# Patient Record
Sex: MALE | Race: WHITE | Employment: OTHER | URBAN - METROPOLITAN AREA
[De-identification: names, ages, dates, MRNs, and addresses within clinical notes are randomized per-mention and may not be internally consistent; named-entity substitution may affect disease eponyms.]

---

## 2017-01-01 ENCOUNTER — HOSPITAL ENCOUNTER (OUTPATIENT)
Dept: OTHER | Age: 72
Discharge: HOME | End: 2017-01-31
Attending: FAMILY MEDICINE | Admitting: FAMILY MEDICINE

## 2017-01-06 ENCOUNTER — HOSPITAL ENCOUNTER (OUTPATIENT)
Dept: WOUND CARE | Age: 72
Discharge: HOME | End: 2017-01-06
Attending: INTERNAL MEDICINE | Admitting: INTERNAL MEDICINE

## 2017-01-06 VITALS
TEMPERATURE: 98.2 F | HEART RATE: 96 BPM | SYSTOLIC BLOOD PRESSURE: 120 MMHG | RESPIRATION RATE: 16 BRPM | DIASTOLIC BLOOD PRESSURE: 76 MMHG

## 2017-01-06 DIAGNOSIS — L97.922 NON-PRESSURE CHRONIC ULCER OF LEFT LOWER LEG WITH FAT LAYER EXPOSED (HCC): Primary | ICD-10-CM

## 2017-01-12 LAB
POC INR: 1.6 INDEX
PROTHROMBIN TIME, POC: 18.6 SECONDS (ref 10–14.3)

## 2017-01-13 ENCOUNTER — HOSPITAL ENCOUNTER (OUTPATIENT)
Dept: WOUND CARE | Age: 72
Discharge: HOME | End: 2017-01-13
Attending: INTERNAL MEDICINE | Admitting: INTERNAL MEDICINE

## 2017-01-13 VITALS
HEART RATE: 77 BPM | DIASTOLIC BLOOD PRESSURE: 73 MMHG | SYSTOLIC BLOOD PRESSURE: 128 MMHG | TEMPERATURE: 97.8 F | RESPIRATION RATE: 16 BRPM

## 2017-01-13 DIAGNOSIS — L97.922 NON-PRESSURE CHRONIC ULCER OF LEFT LOWER LEG WITH FAT LAYER EXPOSED (HCC): ICD-10-CM

## 2017-01-19 LAB
POC INR: 2.6 INDEX
PROTHROMBIN TIME, POC: 31.3 SECONDS (ref 10–14.3)

## 2017-01-20 ENCOUNTER — HOSPITAL ENCOUNTER (OUTPATIENT)
Dept: WOUND CARE | Age: 72
Discharge: HOME | End: 2017-01-20
Attending: INTERNAL MEDICINE | Admitting: INTERNAL MEDICINE

## 2017-01-20 VITALS
DIASTOLIC BLOOD PRESSURE: 77 MMHG | HEART RATE: 81 BPM | TEMPERATURE: 98.5 F | SYSTOLIC BLOOD PRESSURE: 123 MMHG | RESPIRATION RATE: 16 BRPM

## 2017-01-20 DIAGNOSIS — L97.922 NON-PRESSURE CHRONIC ULCER OF LEFT LOWER LEG WITH FAT LAYER EXPOSED (HCC): Primary | ICD-10-CM

## 2017-01-20 DIAGNOSIS — L98.499 DIABETES MELLITUS WITH SKIN ULCER (HCC): ICD-10-CM

## 2017-01-20 DIAGNOSIS — E11.622 DIABETES MELLITUS WITH SKIN ULCER (HCC): ICD-10-CM

## 2017-01-27 ENCOUNTER — HOSPITAL ENCOUNTER (OUTPATIENT)
Dept: WOUND CARE | Age: 72
Discharge: HOME | End: 2017-01-27
Attending: INTERNAL MEDICINE | Admitting: INTERNAL MEDICINE

## 2017-01-27 VITALS
DIASTOLIC BLOOD PRESSURE: 79 MMHG | SYSTOLIC BLOOD PRESSURE: 130 MMHG | RESPIRATION RATE: 16 BRPM | HEART RATE: 89 BPM | TEMPERATURE: 97.4 F

## 2017-01-27 DIAGNOSIS — L97.922 NON-PRESSURE CHRONIC ULCER OF LEFT LOWER LEG WITH FAT LAYER EXPOSED (HCC): ICD-10-CM

## 2017-02-01 ENCOUNTER — HOSPITAL ENCOUNTER (OUTPATIENT)
Dept: OTHER | Age: 72
Discharge: HOME | End: 2017-02-28
Attending: FAMILY MEDICINE | Admitting: FAMILY MEDICINE

## 2017-02-02 LAB
POC INR: 3.5 INDEX
PROTHROMBIN TIME, POC: 42.5 SECONDS (ref 10–14.3)

## 2017-02-03 ENCOUNTER — HOSPITAL ENCOUNTER (OUTPATIENT)
Dept: WOUND CARE | Age: 72
Discharge: HOME | End: 2017-02-03
Attending: INTERNAL MEDICINE | Admitting: INTERNAL MEDICINE

## 2017-02-03 VITALS
RESPIRATION RATE: 16 BRPM | DIASTOLIC BLOOD PRESSURE: 83 MMHG | SYSTOLIC BLOOD PRESSURE: 139 MMHG | HEART RATE: 88 BPM | TEMPERATURE: 97.3 F

## 2017-02-03 DIAGNOSIS — L97.922 NON-PRESSURE CHRONIC ULCER OF LEFT LOWER LEG WITH FAT LAYER EXPOSED (HCC): Primary | ICD-10-CM

## 2017-02-10 ENCOUNTER — HOSPITAL ENCOUNTER (OUTPATIENT)
Dept: WOUND CARE | Age: 72
Discharge: HOME | End: 2017-02-10
Attending: INTERNAL MEDICINE | Admitting: INTERNAL MEDICINE

## 2017-02-10 VITALS
RESPIRATION RATE: 18 BRPM | HEART RATE: 105 BPM | SYSTOLIC BLOOD PRESSURE: 107 MMHG | TEMPERATURE: 98.4 F | DIASTOLIC BLOOD PRESSURE: 72 MMHG

## 2017-02-10 DIAGNOSIS — L97.922 NON-PRESSURE CHRONIC ULCER OF LEFT LOWER LEG WITH FAT LAYER EXPOSED (HCC): ICD-10-CM

## 2017-02-14 ENCOUNTER — OFFICE VISIT (OUTPATIENT)
Dept: FAMILY MEDICINE CLINIC | Age: 72
End: 2017-02-14

## 2017-02-14 ENCOUNTER — CARE COORDINATOR VISIT (OUTPATIENT)
Dept: CARE COORDINATION | Age: 72
End: 2017-02-14

## 2017-02-14 VITALS
RESPIRATION RATE: 22 BRPM | DIASTOLIC BLOOD PRESSURE: 74 MMHG | BODY MASS INDEX: 37.17 KG/M2 | SYSTOLIC BLOOD PRESSURE: 126 MMHG | HEIGHT: 77 IN | WEIGHT: 314.8 LBS | OXYGEN SATURATION: 95 % | HEART RATE: 93 BPM

## 2017-02-14 DIAGNOSIS — N18.30 HYPERTENSION IN STAGE 3 CHRONIC KIDNEY DISEASE DUE TO TYPE 2 DIABETES MELLITUS (HCC): ICD-10-CM

## 2017-02-14 DIAGNOSIS — E11.622 DIABETIC SKIN ULCER ASSOCIATED WITH TYPE 2 DIABETES MELLITUS (HCC): Primary | ICD-10-CM

## 2017-02-14 DIAGNOSIS — E11.22 HYPERTENSION IN STAGE 3 CHRONIC KIDNEY DISEASE DUE TO TYPE 2 DIABETES MELLITUS (HCC): ICD-10-CM

## 2017-02-14 DIAGNOSIS — F33.3 SEVERE RECURRENT MAJOR DEPRESSIVE DISORDER WITH PSYCHOTIC FEATURES (HCC): ICD-10-CM

## 2017-02-14 DIAGNOSIS — E11.69 COMBINED HYPERLIPIDEMIA ASSOCIATED WITH TYPE 2 DIABETES MELLITUS (HCC): ICD-10-CM

## 2017-02-14 DIAGNOSIS — I12.9 HYPERTENSION IN STAGE 3 CHRONIC KIDNEY DISEASE DUE TO TYPE 2 DIABETES MELLITUS (HCC): ICD-10-CM

## 2017-02-14 DIAGNOSIS — E78.2 COMBINED HYPERLIPIDEMIA ASSOCIATED WITH TYPE 2 DIABETES MELLITUS (HCC): ICD-10-CM

## 2017-02-14 DIAGNOSIS — J44.9 CHRONIC OBSTRUCTIVE PULMONARY DISEASE, UNSPECIFIED COPD TYPE (HCC): ICD-10-CM

## 2017-02-14 DIAGNOSIS — R25.1 TREMOR OF BOTH HANDS: ICD-10-CM

## 2017-02-14 DIAGNOSIS — L98.499 DIABETIC SKIN ULCER ASSOCIATED WITH TYPE 2 DIABETES MELLITUS (HCC): Primary | ICD-10-CM

## 2017-02-14 LAB — HBA1C MFR BLD: 7.6 %

## 2017-02-14 PROCEDURE — 83036 HEMOGLOBIN GLYCOSYLATED A1C: CPT | Performed by: FAMILY MEDICINE

## 2017-02-14 PROCEDURE — 99214 OFFICE O/P EST MOD 30 MIN: CPT | Performed by: FAMILY MEDICINE

## 2017-02-14 RX ORDER — ESCITALOPRAM OXALATE 20 MG/1
20 TABLET ORAL DAILY
Qty: 90 TABLET | Refills: 3 | Status: SHIPPED | OUTPATIENT
Start: 2017-02-14 | End: 2018-01-03 | Stop reason: SDUPTHER

## 2017-02-14 RX ORDER — VALSARTAN 80 MG/1
80 TABLET ORAL DAILY
Qty: 90 TABLET | Refills: 3 | Status: SHIPPED | OUTPATIENT
Start: 2017-02-14 | End: 2018-01-03 | Stop reason: SDUPTHER

## 2017-02-14 RX ORDER — ALBUTEROL SULFATE 90 MCG
2 HFA AEROSOL WITH ADAPTER (GRAM) INHALATION EVERY 6 HOURS
Qty: 1 INHALER | Refills: 6 | Status: SHIPPED | OUTPATIENT
Start: 2017-02-14 | End: 2017-08-15 | Stop reason: ALTCHOICE

## 2017-02-14 RX ORDER — OMEPRAZOLE 20 MG/1
CAPSULE, DELAYED RELEASE ORAL
Qty: 90 CAPSULE | Refills: 3 | Status: SHIPPED | OUTPATIENT
Start: 2017-02-14 | End: 2018-01-03 | Stop reason: SDUPTHER

## 2017-02-14 RX ORDER — BUPROPION HYDROCHLORIDE 300 MG/1
300 TABLET ORAL EVERY MORNING
Qty: 90 TABLET | Refills: 3 | Status: SHIPPED | OUTPATIENT
Start: 2017-02-14 | End: 2018-01-03 | Stop reason: SDUPTHER

## 2017-02-14 RX ORDER — ATORVASTATIN CALCIUM 40 MG/1
40 TABLET, FILM COATED ORAL EVERY EVENING
Qty: 90 TABLET | Refills: 3 | Status: SHIPPED | OUTPATIENT
Start: 2017-02-14 | End: 2018-01-03 | Stop reason: SDUPTHER

## 2017-02-16 LAB
POC INR: 1.8 INDEX
PROTHROMBIN TIME, POC: 21.3 SECONDS (ref 10–14.3)

## 2017-02-17 ENCOUNTER — HOSPITAL ENCOUNTER (OUTPATIENT)
Dept: WOUND CARE | Age: 72
Discharge: HOME | End: 2017-02-17
Attending: INTERNAL MEDICINE | Admitting: INTERNAL MEDICINE

## 2017-02-17 VITALS
SYSTOLIC BLOOD PRESSURE: 143 MMHG | RESPIRATION RATE: 18 BRPM | HEART RATE: 80 BPM | TEMPERATURE: 97.6 F | DIASTOLIC BLOOD PRESSURE: 75 MMHG

## 2017-02-17 DIAGNOSIS — L97.922 NON-PRESSURE CHRONIC ULCER OF LEFT LOWER LEG WITH FAT LAYER EXPOSED (HCC): Primary | ICD-10-CM

## 2017-02-24 ENCOUNTER — HOSPITAL ENCOUNTER (OUTPATIENT)
Dept: WOUND CARE | Age: 72
Discharge: HOME | End: 2017-02-24
Attending: INTERNAL MEDICINE | Admitting: INTERNAL MEDICINE

## 2017-02-24 VITALS
HEART RATE: 100 BPM | SYSTOLIC BLOOD PRESSURE: 136 MMHG | TEMPERATURE: 98 F | DIASTOLIC BLOOD PRESSURE: 84 MMHG | RESPIRATION RATE: 16 BRPM

## 2017-02-24 DIAGNOSIS — L97.922 NON-PRESSURE CHRONIC ULCER OF LEFT LOWER LEG WITH FAT LAYER EXPOSED (HCC): Primary | ICD-10-CM

## 2017-03-01 ENCOUNTER — HOSPITAL ENCOUNTER (OUTPATIENT)
Dept: OTHER | Age: 72
Discharge: HOME | End: 2017-03-31
Attending: FAMILY MEDICINE | Admitting: FAMILY MEDICINE

## 2017-03-02 LAB
POC INR: 2 INDEX
PROTHROMBIN TIME, POC: 23.8 SECONDS (ref 10–14.3)

## 2017-03-03 ENCOUNTER — HOSPITAL ENCOUNTER (OUTPATIENT)
Dept: WOUND CARE | Age: 72
Discharge: HOME | End: 2017-03-03
Attending: INTERNAL MEDICINE | Admitting: INTERNAL MEDICINE

## 2017-03-03 VITALS
RESPIRATION RATE: 18 BRPM | SYSTOLIC BLOOD PRESSURE: 138 MMHG | DIASTOLIC BLOOD PRESSURE: 83 MMHG | TEMPERATURE: 97.6 F | HEART RATE: 98 BPM

## 2017-03-10 ENCOUNTER — HOSPITAL ENCOUNTER (OUTPATIENT)
Dept: WOUND CARE | Age: 72
Discharge: HOME | End: 2017-03-10
Attending: INTERNAL MEDICINE | Admitting: INTERNAL MEDICINE

## 2017-03-10 VITALS
DIASTOLIC BLOOD PRESSURE: 80 MMHG | SYSTOLIC BLOOD PRESSURE: 123 MMHG | HEART RATE: 80 BPM | RESPIRATION RATE: 16 BRPM | TEMPERATURE: 98.4 F

## 2017-03-10 DIAGNOSIS — L97.922 NON-PRESSURE CHRONIC ULCER OF LEFT LOWER LEG WITH FAT LAYER EXPOSED (HCC): Primary | ICD-10-CM

## 2017-03-24 ENCOUNTER — HOSPITAL ENCOUNTER (OUTPATIENT)
Dept: WOUND CARE | Age: 72
Discharge: HOME | End: 2017-03-24
Attending: INTERNAL MEDICINE | Admitting: INTERNAL MEDICINE

## 2017-03-24 VITALS
TEMPERATURE: 97.3 F | HEART RATE: 84 BPM | RESPIRATION RATE: 16 BRPM | SYSTOLIC BLOOD PRESSURE: 144 MMHG | DIASTOLIC BLOOD PRESSURE: 82 MMHG

## 2017-03-24 DIAGNOSIS — L97.922 NON-PRESSURE CHRONIC ULCER OF LEFT LOWER LEG WITH FAT LAYER EXPOSED (HCC): Primary | ICD-10-CM

## 2017-03-24 DIAGNOSIS — L98.499 DIABETES MELLITUS WITH SKIN ULCER (HCC): ICD-10-CM

## 2017-03-24 DIAGNOSIS — E11.622 DIABETES MELLITUS WITH SKIN ULCER (HCC): ICD-10-CM

## 2017-03-31 ENCOUNTER — HOSPITAL ENCOUNTER (OUTPATIENT)
Dept: WOUND CARE | Age: 72
Discharge: HOME | End: 2017-03-31
Attending: INTERNAL MEDICINE | Admitting: INTERNAL MEDICINE

## 2017-03-31 VITALS — TEMPERATURE: 98 F | DIASTOLIC BLOOD PRESSURE: 78 MMHG | SYSTOLIC BLOOD PRESSURE: 120 MMHG | HEART RATE: 90 BPM

## 2017-03-31 DIAGNOSIS — L97.922 NON-PRESSURE CHRONIC ULCER OF LEFT LOWER LEG WITH FAT LAYER EXPOSED (HCC): Primary | ICD-10-CM

## 2017-04-01 ENCOUNTER — HOSPITAL ENCOUNTER (OUTPATIENT)
Dept: OTHER | Age: 72
Discharge: HOME | End: 2017-04-30
Attending: FAMILY MEDICINE | Admitting: FAMILY MEDICINE

## 2017-04-03 LAB
POC INR: 3.4 INDEX
PROTHROMBIN TIME, POC: 40.7 SECONDS (ref 10–14.3)

## 2017-04-06 ENCOUNTER — ANTI-COAG VISIT (OUTPATIENT)
Dept: FAMILY MEDICINE CLINIC | Age: 72
End: 2017-04-06

## 2017-04-06 DIAGNOSIS — Z79.01 LONG TERM CURRENT USE OF ANTICOAGULANTS WITH INR GOAL OF 2.0-3.0: ICD-10-CM

## 2017-04-06 DIAGNOSIS — Z86.718 HISTORY OF DVT (DEEP VEIN THROMBOSIS): ICD-10-CM

## 2017-04-07 ENCOUNTER — HOSPITAL ENCOUNTER (OUTPATIENT)
Dept: WOUND CARE | Age: 72
Discharge: HOME | End: 2017-04-07
Attending: INTERNAL MEDICINE | Admitting: INTERNAL MEDICINE

## 2017-04-07 VITALS
DIASTOLIC BLOOD PRESSURE: 60 MMHG | RESPIRATION RATE: 17 BRPM | TEMPERATURE: 98.1 F | SYSTOLIC BLOOD PRESSURE: 90 MMHG | HEART RATE: 90 BPM

## 2017-04-07 DIAGNOSIS — L97.922 NON-PRESSURE CHRONIC ULCER OF LEFT LOWER LEG WITH FAT LAYER EXPOSED (HCC): ICD-10-CM

## 2017-04-14 ENCOUNTER — HOSPITAL ENCOUNTER (OUTPATIENT)
Dept: WOUND CARE | Age: 72
Discharge: HOME | End: 2017-04-14
Attending: INTERNAL MEDICINE | Admitting: INTERNAL MEDICINE

## 2017-04-14 VITALS
TEMPERATURE: 98.6 F | DIASTOLIC BLOOD PRESSURE: 77 MMHG | RESPIRATION RATE: 16 BRPM | SYSTOLIC BLOOD PRESSURE: 116 MMHG | HEART RATE: 86 BPM

## 2017-04-14 DIAGNOSIS — L97.922 NON-PRESSURE CHRONIC ULCER OF LEFT LOWER LEG WITH FAT LAYER EXPOSED (HCC): Primary | ICD-10-CM

## 2017-04-17 LAB
POC INR: 2.9 INDEX
PROTHROMBIN TIME, POC: 35.3 SECONDS (ref 10–14.3)

## 2017-04-21 ENCOUNTER — HOSPITAL ENCOUNTER (OUTPATIENT)
Dept: WOUND CARE | Age: 72
Discharge: HOME | End: 2017-04-21
Attending: INTERNAL MEDICINE | Admitting: INTERNAL MEDICINE

## 2017-04-21 VITALS
SYSTOLIC BLOOD PRESSURE: 116 MMHG | RESPIRATION RATE: 16 BRPM | TEMPERATURE: 97 F | HEART RATE: 87 BPM | DIASTOLIC BLOOD PRESSURE: 77 MMHG

## 2017-04-21 DIAGNOSIS — L97.922 NON-PRESSURE CHRONIC ULCER OF LEFT LOWER LEG WITH FAT LAYER EXPOSED (HCC): ICD-10-CM

## 2017-04-28 ENCOUNTER — HOSPITAL ENCOUNTER (OUTPATIENT)
Dept: WOUND CARE | Age: 72
Discharge: HOME | End: 2017-04-28
Attending: INTERNAL MEDICINE | Admitting: INTERNAL MEDICINE

## 2017-04-28 VITALS
HEART RATE: 65 BPM | TEMPERATURE: 97.1 F | SYSTOLIC BLOOD PRESSURE: 137 MMHG | RESPIRATION RATE: 16 BRPM | DIASTOLIC BLOOD PRESSURE: 81 MMHG

## 2017-04-28 DIAGNOSIS — L97.922 NON-PRESSURE CHRONIC ULCER OF LEFT LOWER LEG WITH FAT LAYER EXPOSED (HCC): Primary | ICD-10-CM

## 2017-05-01 ENCOUNTER — HOSPITAL ENCOUNTER (OUTPATIENT)
Dept: OTHER | Age: 72
Discharge: HOME | End: 2017-05-31
Attending: FAMILY MEDICINE | Admitting: FAMILY MEDICINE

## 2017-05-05 ENCOUNTER — HOSPITAL ENCOUNTER (OUTPATIENT)
Dept: WOUND CARE | Age: 72
Discharge: HOME | End: 2017-05-05
Attending: INTERNAL MEDICINE | Admitting: INTERNAL MEDICINE

## 2017-05-05 DIAGNOSIS — L97.922 NON-PRESSURE CHRONIC ULCER OF LEFT LOWER LEG WITH FAT LAYER EXPOSED (HCC): ICD-10-CM

## 2017-05-11 ENCOUNTER — CARE COORDINATION (OUTPATIENT)
Dept: CARE COORDINATION | Age: 72
End: 2017-05-11

## 2017-05-12 ENCOUNTER — HOSPITAL ENCOUNTER (OUTPATIENT)
Dept: WOUND CARE | Age: 72
Discharge: HOME | End: 2017-05-12
Attending: INTERNAL MEDICINE | Admitting: INTERNAL MEDICINE

## 2017-05-12 DIAGNOSIS — L97.922 NON-PRESSURE CHRONIC ULCER OF LEFT LOWER LEG WITH FAT LAYER EXPOSED (HCC): ICD-10-CM

## 2017-05-15 LAB
POC INR: 2.5 INDEX
PROTHROMBIN TIME, POC: 30.6 SECONDS (ref 10–14.3)

## 2017-05-19 ENCOUNTER — HOSPITAL ENCOUNTER (OUTPATIENT)
Dept: WOUND CARE | Age: 72
Discharge: HOME | End: 2017-05-19
Attending: INTERNAL MEDICINE | Admitting: INTERNAL MEDICINE

## 2017-05-19 VITALS
DIASTOLIC BLOOD PRESSURE: 72 MMHG | SYSTOLIC BLOOD PRESSURE: 114 MMHG | TEMPERATURE: 98.2 F | HEART RATE: 73 BPM | RESPIRATION RATE: 16 BRPM

## 2017-05-19 DIAGNOSIS — L97.922 NON-PRESSURE CHRONIC ULCER OF LEFT LOWER LEG WITH FAT LAYER EXPOSED (HCC): ICD-10-CM

## 2017-05-26 ENCOUNTER — HOSPITAL ENCOUNTER (OUTPATIENT)
Dept: WOUND CARE | Age: 72
Discharge: HOME | End: 2017-05-26
Attending: INTERNAL MEDICINE | Admitting: INTERNAL MEDICINE

## 2017-05-26 VITALS
HEART RATE: 71 BPM | RESPIRATION RATE: 16 BRPM | TEMPERATURE: 97.1 F | DIASTOLIC BLOOD PRESSURE: 76 MMHG | SYSTOLIC BLOOD PRESSURE: 123 MMHG

## 2017-05-26 DIAGNOSIS — L97.922 NON-PRESSURE CHRONIC ULCER OF LEFT LOWER LEG WITH FAT LAYER EXPOSED (HCC): ICD-10-CM

## 2017-06-01 ENCOUNTER — HOSPITAL ENCOUNTER (OUTPATIENT)
Dept: OTHER | Age: 72
Discharge: HOME | End: 2017-06-30
Attending: FAMILY MEDICINE | Admitting: FAMILY MEDICINE

## 2017-06-02 ENCOUNTER — HOSPITAL ENCOUNTER (OUTPATIENT)
Dept: WOUND CARE | Age: 72
Discharge: HOME | End: 2017-06-02
Attending: INTERNAL MEDICINE | Admitting: INTERNAL MEDICINE

## 2017-06-02 VITALS
SYSTOLIC BLOOD PRESSURE: 112 MMHG | TEMPERATURE: 97.8 F | HEART RATE: 75 BPM | DIASTOLIC BLOOD PRESSURE: 72 MMHG | RESPIRATION RATE: 16 BRPM

## 2017-06-02 DIAGNOSIS — L97.922 NON-PRESSURE CHRONIC ULCER OF LEFT LOWER LEG WITH FAT LAYER EXPOSED (HCC): ICD-10-CM

## 2017-06-09 ENCOUNTER — HOSPITAL ENCOUNTER (OUTPATIENT)
Dept: WOUND CARE | Age: 72
Discharge: HOME | End: 2017-06-09
Attending: INTERNAL MEDICINE | Admitting: INTERNAL MEDICINE

## 2017-06-09 VITALS
HEART RATE: 75 BPM | RESPIRATION RATE: 16 BRPM | SYSTOLIC BLOOD PRESSURE: 135 MMHG | TEMPERATURE: 98 F | DIASTOLIC BLOOD PRESSURE: 79 MMHG

## 2017-06-09 DIAGNOSIS — L97.922 NON-PRESSURE CHRONIC ULCER OF LEFT LOWER LEG WITH FAT LAYER EXPOSED (HCC): ICD-10-CM

## 2017-06-12 LAB
POC INR: 2.1 INDEX
PROTHROMBIN TIME, POC: 24.8 SECONDS (ref 10–14.3)

## 2017-06-16 ENCOUNTER — HOSPITAL ENCOUNTER (OUTPATIENT)
Dept: WOUND CARE | Age: 72
Discharge: HOME | End: 2017-06-16
Attending: INTERNAL MEDICINE | Admitting: INTERNAL MEDICINE

## 2017-06-16 VITALS
SYSTOLIC BLOOD PRESSURE: 111 MMHG | TEMPERATURE: 97 F | DIASTOLIC BLOOD PRESSURE: 74 MMHG | RESPIRATION RATE: 16 BRPM | HEART RATE: 86 BPM

## 2017-06-16 DIAGNOSIS — L97.922 NON-PRESSURE CHRONIC ULCER OF LEFT LOWER LEG WITH FAT LAYER EXPOSED (HCC): ICD-10-CM

## 2017-06-23 ENCOUNTER — HOSPITAL ENCOUNTER (OUTPATIENT)
Dept: WOUND CARE | Age: 72
Discharge: HOME | End: 2017-06-23
Attending: INTERNAL MEDICINE | Admitting: INTERNAL MEDICINE

## 2017-06-23 VITALS
HEART RATE: 78 BPM | TEMPERATURE: 97.6 F | SYSTOLIC BLOOD PRESSURE: 133 MMHG | DIASTOLIC BLOOD PRESSURE: 67 MMHG | RESPIRATION RATE: 16 BRPM

## 2017-06-23 DIAGNOSIS — L97.922 NON-PRESSURE CHRONIC ULCER OF LEFT LOWER LEG WITH FAT LAYER EXPOSED (HCC): ICD-10-CM

## 2017-06-23 PROCEDURE — 11042 DBRDMT SUBQ TIS 1ST 20SQCM/<: CPT | Performed by: INTERNAL MEDICINE

## 2017-06-30 ENCOUNTER — HOSPITAL ENCOUNTER (OUTPATIENT)
Dept: WOUND CARE | Age: 72
Discharge: HOME | End: 2017-06-30
Attending: INTERNAL MEDICINE | Admitting: INTERNAL MEDICINE

## 2017-06-30 VITALS
SYSTOLIC BLOOD PRESSURE: 120 MMHG | HEART RATE: 102 BPM | TEMPERATURE: 97.3 F | DIASTOLIC BLOOD PRESSURE: 76 MMHG | RESPIRATION RATE: 16 BRPM

## 2017-06-30 DIAGNOSIS — L97.922 NON-PRESSURE CHRONIC ULCER OF LEFT LOWER LEG WITH FAT LAYER EXPOSED (HCC): ICD-10-CM

## 2017-07-01 ENCOUNTER — HOSPITAL ENCOUNTER (OUTPATIENT)
Dept: OTHER | Age: 72
Discharge: HOME | End: 2017-07-31
Attending: FAMILY MEDICINE | Admitting: FAMILY MEDICINE

## 2017-07-05 ENCOUNTER — CARE COORDINATION (OUTPATIENT)
Dept: CARE COORDINATION | Age: 72
End: 2017-07-05

## 2017-07-07 ENCOUNTER — HOSPITAL ENCOUNTER (OUTPATIENT)
Dept: WOUND CARE | Age: 72
Discharge: HOME | End: 2017-07-07
Attending: INTERNAL MEDICINE | Admitting: INTERNAL MEDICINE

## 2017-07-07 ENCOUNTER — HOSPITAL ENCOUNTER (OUTPATIENT)
Dept: WOUND CARE | Age: 72
Discharge: HOME | End: 2017-07-06
Attending: INTERNAL MEDICINE | Admitting: INTERNAL MEDICINE

## 2017-07-07 VITALS
TEMPERATURE: 98.9 F | HEART RATE: 78 BPM | RESPIRATION RATE: 20 BRPM | SYSTOLIC BLOOD PRESSURE: 136 MMHG | DIASTOLIC BLOOD PRESSURE: 76 MMHG

## 2017-07-07 DIAGNOSIS — L97.922 NON-PRESSURE CHRONIC ULCER OF LEFT LOWER LEG WITH FAT LAYER EXPOSED (HCC): ICD-10-CM

## 2017-07-07 ASSESSMENT — PAIN SCALES - GENERAL: PAINLEVEL_OUTOF10: 0

## 2017-07-10 LAB
POC INR: 1.7 INDEX
PROTHROMBIN TIME, POC: 20.3 SECONDS (ref 10–14.3)

## 2017-07-14 ENCOUNTER — HOSPITAL ENCOUNTER (OUTPATIENT)
Dept: WOUND CARE | Age: 72
Discharge: HOME | End: 2017-07-14
Attending: INTERNAL MEDICINE | Admitting: INTERNAL MEDICINE

## 2017-07-14 VITALS
SYSTOLIC BLOOD PRESSURE: 138 MMHG | HEART RATE: 86 BPM | TEMPERATURE: 96.2 F | DIASTOLIC BLOOD PRESSURE: 73 MMHG | RESPIRATION RATE: 16 BRPM

## 2017-07-14 DIAGNOSIS — L97.922 NON-PRESSURE CHRONIC ULCER OF LEFT LOWER LEG WITH FAT LAYER EXPOSED (HCC): ICD-10-CM

## 2017-07-21 ENCOUNTER — HOSPITAL ENCOUNTER (OUTPATIENT)
Dept: WOUND CARE | Age: 72
Discharge: HOME | End: 2017-07-21
Attending: INTERNAL MEDICINE | Admitting: INTERNAL MEDICINE

## 2017-07-21 VITALS
SYSTOLIC BLOOD PRESSURE: 121 MMHG | HEART RATE: 80 BPM | TEMPERATURE: 97.8 F | DIASTOLIC BLOOD PRESSURE: 77 MMHG | RESPIRATION RATE: 16 BRPM

## 2017-07-21 DIAGNOSIS — L97.922 NON-PRESSURE CHRONIC ULCER OF LEFT LOWER LEG WITH FAT LAYER EXPOSED (HCC): ICD-10-CM

## 2017-07-21 ASSESSMENT — PAIN DESCRIPTION - FREQUENCY: FREQUENCY: CONTINUOUS

## 2017-07-21 ASSESSMENT — PAIN SCALES - GENERAL: PAINLEVEL_OUTOF10: 4

## 2017-07-21 ASSESSMENT — PAIN DESCRIPTION - ORIENTATION: ORIENTATION: LEFT

## 2017-07-21 ASSESSMENT — PAIN DESCRIPTION - LOCATION: LOCATION: ANKLE

## 2017-07-21 ASSESSMENT — PAIN DESCRIPTION - DESCRIPTORS: DESCRIPTORS: ACHING

## 2017-07-21 ASSESSMENT — PAIN DESCRIPTION - PAIN TYPE: TYPE: ACUTE PAIN

## 2017-07-21 ASSESSMENT — PAIN DESCRIPTION - ONSET: ONSET: ON-GOING

## 2017-07-28 ENCOUNTER — HOSPITAL ENCOUNTER (OUTPATIENT)
Dept: WOUND CARE | Age: 72
Discharge: HOME | End: 2017-07-28
Attending: INTERNAL MEDICINE | Admitting: INTERNAL MEDICINE

## 2017-07-28 VITALS
SYSTOLIC BLOOD PRESSURE: 122 MMHG | DIASTOLIC BLOOD PRESSURE: 78 MMHG | HEART RATE: 88 BPM | RESPIRATION RATE: 16 BRPM | TEMPERATURE: 97.1 F

## 2017-07-28 DIAGNOSIS — L97.922 NON-PRESSURE CHRONIC ULCER OF LEFT LOWER LEG WITH FAT LAYER EXPOSED (HCC): ICD-10-CM

## 2017-08-01 ENCOUNTER — HOSPITAL ENCOUNTER (OUTPATIENT)
Dept: OTHER | Age: 72
Discharge: HOME | End: 2017-08-31
Attending: FAMILY MEDICINE | Admitting: FAMILY MEDICINE

## 2017-08-04 ENCOUNTER — HOSPITAL ENCOUNTER (OUTPATIENT)
Dept: WOUND CARE | Age: 72
Discharge: HOME | End: 2017-08-04
Attending: INTERNAL MEDICINE | Admitting: INTERNAL MEDICINE

## 2017-08-04 VITALS
TEMPERATURE: 96.7 F | RESPIRATION RATE: 16 BRPM | SYSTOLIC BLOOD PRESSURE: 105 MMHG | DIASTOLIC BLOOD PRESSURE: 69 MMHG | HEART RATE: 93 BPM

## 2017-08-04 DIAGNOSIS — L97.922 NON-PRESSURE CHRONIC ULCER OF LEFT LOWER LEG WITH FAT LAYER EXPOSED (HCC): ICD-10-CM

## 2017-08-11 ENCOUNTER — HOSPITAL ENCOUNTER (OUTPATIENT)
Dept: WOUND CARE | Age: 72
Discharge: HOME | End: 2017-08-11
Attending: INTERNAL MEDICINE | Admitting: INTERNAL MEDICINE

## 2017-08-11 VITALS
TEMPERATURE: 97.6 F | RESPIRATION RATE: 16 BRPM | DIASTOLIC BLOOD PRESSURE: 73 MMHG | SYSTOLIC BLOOD PRESSURE: 111 MMHG | HEART RATE: 91 BPM

## 2017-08-11 DIAGNOSIS — L97.922 NON-PRESSURE CHRONIC ULCER OF LEFT LOWER LEG WITH FAT LAYER EXPOSED (HCC): ICD-10-CM

## 2017-08-14 LAB
POC INR: 2.1 INDEX
PROTHROMBIN TIME, POC: 25.1 SECONDS (ref 10–14.3)

## 2017-08-15 ENCOUNTER — OFFICE VISIT (OUTPATIENT)
Dept: FAMILY MEDICINE CLINIC | Age: 72
End: 2017-08-15

## 2017-08-15 VITALS
DIASTOLIC BLOOD PRESSURE: 74 MMHG | OXYGEN SATURATION: 97 % | SYSTOLIC BLOOD PRESSURE: 118 MMHG | HEIGHT: 77 IN | RESPIRATION RATE: 18 BRPM | WEIGHT: 303 LBS | HEART RATE: 75 BPM | BODY MASS INDEX: 35.78 KG/M2

## 2017-08-15 DIAGNOSIS — K59.09 OTHER CONSTIPATION: ICD-10-CM

## 2017-08-15 DIAGNOSIS — Z79.01 LONG TERM CURRENT USE OF ANTICOAGULANTS WITH INR GOAL OF 2.0-3.0: ICD-10-CM

## 2017-08-15 LAB — HBA1C MFR BLD: 7 %

## 2017-08-15 PROCEDURE — 83036 HEMOGLOBIN GLYCOSYLATED A1C: CPT | Performed by: FAMILY MEDICINE

## 2017-08-15 PROCEDURE — 99213 OFFICE O/P EST LOW 20 MIN: CPT | Performed by: FAMILY MEDICINE

## 2017-08-15 RX ORDER — WARFARIN SODIUM 7.5 MG/1
TABLET ORAL
Qty: 90 TABLET | Refills: 2 | Status: SHIPPED | OUTPATIENT
Start: 2017-08-15 | End: 2018-01-03 | Stop reason: SDUPTHER

## 2017-08-15 RX ORDER — POLYETHYLENE GLYCOL 3350 17 G/17G
POWDER, FOR SOLUTION ORAL
Qty: 30 EACH | Refills: 5 | Status: SHIPPED | OUTPATIENT
Start: 2017-08-15 | End: 2018-01-03 | Stop reason: SDUPTHER

## 2017-08-18 ENCOUNTER — HOSPITAL ENCOUNTER (OUTPATIENT)
Dept: WOUND CARE | Age: 72
Discharge: HOME | End: 2017-08-18
Attending: INTERNAL MEDICINE | Admitting: INTERNAL MEDICINE

## 2017-08-18 VITALS
HEART RATE: 75 BPM | DIASTOLIC BLOOD PRESSURE: 75 MMHG | TEMPERATURE: 98.2 F | RESPIRATION RATE: 18 BRPM | SYSTOLIC BLOOD PRESSURE: 118 MMHG

## 2017-08-18 DIAGNOSIS — L97.922 NON-PRESSURE CHRONIC ULCER OF LEFT LOWER LEG WITH FAT LAYER EXPOSED (HCC): ICD-10-CM

## 2017-08-25 ENCOUNTER — HOSPITAL ENCOUNTER (OUTPATIENT)
Dept: WOUND CARE | Age: 72
Discharge: HOME | End: 2017-08-25
Attending: INTERNAL MEDICINE | Admitting: INTERNAL MEDICINE

## 2017-08-25 DIAGNOSIS — L97.922 NON-PRESSURE CHRONIC ULCER OF LEFT LOWER LEG WITH FAT LAYER EXPOSED (HCC): ICD-10-CM

## 2017-09-01 ENCOUNTER — HOSPITAL ENCOUNTER (OUTPATIENT)
Dept: OTHER | Age: 72
Discharge: HOME | End: 2017-09-30
Attending: FAMILY MEDICINE | Admitting: FAMILY MEDICINE

## 2017-09-01 ENCOUNTER — HOSPITAL ENCOUNTER (OUTPATIENT)
Dept: WOUND CARE | Age: 72
Discharge: HOME | End: 2017-09-01
Attending: INTERNAL MEDICINE | Admitting: INTERNAL MEDICINE

## 2017-09-01 VITALS
SYSTOLIC BLOOD PRESSURE: 112 MMHG | TEMPERATURE: 97.5 F | RESPIRATION RATE: 16 BRPM | DIASTOLIC BLOOD PRESSURE: 75 MMHG | HEART RATE: 103 BPM

## 2017-09-08 ENCOUNTER — HOSPITAL ENCOUNTER (OUTPATIENT)
Dept: WOUND CARE | Age: 72
Discharge: HOME | End: 2017-09-08
Attending: INTERNAL MEDICINE | Admitting: INTERNAL MEDICINE

## 2017-09-08 VITALS
DIASTOLIC BLOOD PRESSURE: 74 MMHG | HEART RATE: 94 BPM | RESPIRATION RATE: 16 BRPM | TEMPERATURE: 97.3 F | SYSTOLIC BLOOD PRESSURE: 109 MMHG

## 2017-09-08 DIAGNOSIS — L97.922 NON-PRESSURE CHRONIC ULCER OF LEFT LOWER LEG WITH FAT LAYER EXPOSED (HCC): ICD-10-CM

## 2017-09-15 ENCOUNTER — HOSPITAL ENCOUNTER (OUTPATIENT)
Dept: WOUND CARE | Age: 72
Discharge: HOME | End: 2017-09-15
Attending: INTERNAL MEDICINE | Admitting: INTERNAL MEDICINE

## 2017-09-15 VITALS
DIASTOLIC BLOOD PRESSURE: 67 MMHG | SYSTOLIC BLOOD PRESSURE: 101 MMHG | TEMPERATURE: 97.6 F | HEART RATE: 98 BPM | RESPIRATION RATE: 16 BRPM

## 2017-09-18 LAB
POC INR: 3.6 INDEX
PROTHROMBIN TIME, POC: 43.1 SECONDS (ref 10–14.3)

## 2017-09-22 ENCOUNTER — HOSPITAL ENCOUNTER (OUTPATIENT)
Dept: WOUND CARE | Age: 72
Discharge: HOME | End: 2017-09-22
Attending: INTERNAL MEDICINE | Admitting: INTERNAL MEDICINE

## 2017-09-22 VITALS
SYSTOLIC BLOOD PRESSURE: 129 MMHG | DIASTOLIC BLOOD PRESSURE: 82 MMHG | HEART RATE: 99 BPM | TEMPERATURE: 97.2 F | RESPIRATION RATE: 18 BRPM

## 2017-09-22 DIAGNOSIS — L97.922 NON-PRESSURE CHRONIC ULCER OF LEFT LOWER LEG WITH FAT LAYER EXPOSED (HCC): ICD-10-CM

## 2017-09-29 ENCOUNTER — HOSPITAL ENCOUNTER (OUTPATIENT)
Dept: WOUND CARE | Age: 72
Discharge: HOME | End: 2017-09-29
Attending: INTERNAL MEDICINE | Admitting: INTERNAL MEDICINE

## 2017-09-29 VITALS
RESPIRATION RATE: 16 BRPM | DIASTOLIC BLOOD PRESSURE: 77 MMHG | SYSTOLIC BLOOD PRESSURE: 121 MMHG | TEMPERATURE: 98 F | HEART RATE: 81 BPM

## 2017-09-29 DIAGNOSIS — L97.922 NON-PRESSURE CHRONIC ULCER OF LEFT LOWER LEG WITH FAT LAYER EXPOSED (HCC): ICD-10-CM

## 2017-10-01 ENCOUNTER — HOSPITAL ENCOUNTER (OUTPATIENT)
Dept: OTHER | Age: 72
Discharge: HOME | End: 2017-10-31
Attending: FAMILY MEDICINE | Admitting: FAMILY MEDICINE

## 2017-10-06 ENCOUNTER — HOSPITAL ENCOUNTER (OUTPATIENT)
Dept: WOUND CARE | Age: 72
Discharge: HOME | End: 2017-10-06
Attending: INTERNAL MEDICINE | Admitting: INTERNAL MEDICINE

## 2017-10-06 VITALS
SYSTOLIC BLOOD PRESSURE: 137 MMHG | TEMPERATURE: 98.2 F | HEART RATE: 91 BPM | DIASTOLIC BLOOD PRESSURE: 70 MMHG | RESPIRATION RATE: 18 BRPM

## 2017-10-06 DIAGNOSIS — L97.922 NON-PRESSURE CHRONIC ULCER OF LEFT LOWER LEG WITH FAT LAYER EXPOSED (HCC): ICD-10-CM

## 2017-10-06 NOTE — IP AVS SNAPSHOT
After Visit Summary  (Discharge Instructions)    Medication List for Home    Based on the information you provided to us as well as any changes during this visit, the following is your updated medication list.  Compare this with your prescription bottles at home. If you have any questions or concerns, contact your primary care physician's office. Daily Medication List (This medication list can be shared with any healthcare provider who is helping you manage your medications)      ASK your doctor about these medications if you have questions        Last Dose    Next Dose Due AM NOON PM NIGHT    ARIPiprazole 5 MG tablet   Commonly known as:  ABILIFY   Take 10 mg by mouth daily                                         aspirin EC 81 MG EC tablet   Take 81 mg by mouth daily. atorvastatin 40 MG tablet   Commonly known as:  LIPITOR   Take 1 tablet by mouth every evening                                         BLOOD GLUCOSE TEST STRIPS Strp   Please give contour testing strips to test blood sugar 4x daily                                         ALISHA CONTOUR TEST strip   Generic drug:  glucose blood VI test strips   USE AS DIRECTED TO TEST BLOOD SUGAR FOUR TIMES DAILY                                         glucose blood VI test strips strip   Commonly known as:  ALISHA CONTOUR TEST   1 each by In Vitro route 4 times daily As needed. buPROPion 300 MG extended release tablet   Commonly known as:  WELLBUTRIN XL   Take 1 tablet by mouth every morning                                         clotrimazole-betamethasone 1-0.05 % cream   Commonly known as:  LOTRISONE   Apply topically 2 times daily. clotrimazole-betamethasone 1-0.05 % cream   Commonly known as:  LOTRISONE   Apply topically when you change your dressing as needed for irritation/redness. Appointment with Yoko Enrique MD at [address] [place] [address] ([phone])   Please arrive 15 minutes prior to appointment, bring photo ID and insurance card.   [address]         Preventive Care        Date Due    One-time abdominal aortic aneurism (AAA) screening is recommended for all men between the age of 73-68 who have ever smoked 1945    Hepatitis C screening is recommended for all adults regardless of risk factors born between [facility] at least once (lifetime) who have never been tested. 1945    Zoster Vaccine 11/30/2006    Eye Exam By An Eye Doctor 4/28/2016    Cholesterol Screening 5/5/2016    Diabetic Foot Exam 9/2/2016    Yearly Flu Vaccine (1) 9/1/2017    Hemoglobin A1C (Test For Long-Term Glucose Control) 8/15/2018    Colonoscopy 8/4/2019    Tetanus Combination Vaccine (2 - Td) 9/1/2026                 Care Plan Once You Return Home    This section includes instructions you will need to follow once you leave the hospital.  Your care team will discuss these with you, so you and those caring for you know how to best care for your health needs at home. This section may also include educational information about certain health topics that may be of help to you. Discharge Instructions       Discharge Instructions         Elevate legs to the level of the heart or above for 30 minutes 4-5 times a day and/or        when sitting.                              Wound orders 10/6/17      DO NOT  PUT LOTRISONE AROUND RACIEL WOUND TODAY      WOUND CARE:LEFT medial leg wound (DIABETIC ULCERS)   -  Apply Enluxtra, 4x4, conform, tape.  apply double  tubigrip D or Juxta Lite   May use lotrimizol cream at home to periwound.      Culture obtained 12/2/16      Arterial duplex study to pablo lower legs--negative for issues at this time     GET [address]      Your dressing care supplies will be ordered through [employer].  If

## 2017-10-06 NOTE — IP AVS SNAPSHOT
* PEN NEEDLES 31GX5/16\" 31G X 8 MM Misc   USE AS DIRECTED       * PEN NEEDLES 31GX5/16\" 31G X 8 MM Misc   Inject 1 each into the skin 4 times daily as needed (to check blood sugars)       polyethylene glycol Pack packet   Commonly known as:  MIRALAX   Take 17g by mouth two times per week to daily as needed for constipation       Spacer/Aero-Holding Larnell Boga   1 Device by Does not apply route daily as needed. tobramycin-dexamethasone 0.3-0.1 % ophthalmic suspension   Commonly known as:  TOBRADEX       valsartan 80 MG tablet   Commonly known as:  DIOVAN   Take 1 tablet by mouth daily       * warfarin 5 MG tablet   Commonly known as:  COUMADIN   TAKE ONE (1) TABLET BY MOUTH ONCE DAILY EXCEPT TAKE ONE & ONE-HALF (1&1/2) TABLETS BY MOUTH ON WEDNESDAYS       * warfarin 7.5 MG tablet   Commonly known as:  COUMADIN   Indications: 7.5 on WED. and 5mg all other days       * Notice: This list has 11 medication(s) that are the same as other medications prescribed for you. Read the directions carefully, and ask your doctor or other care provider to review them with you.

## 2017-10-06 NOTE — PLAN OF CARE
Problem: Wound:  Intervention: Assess pain status  See flow sheet  Intervention: Assess wound size, appearance and drainage  See flow sheet  Intervention: Doppler if unable to palpate pedal pulse  See flow sheet

## 2017-10-06 NOTE — IP AVS SNAPSHOT
Patient Information     Patient Name KENRICK Corrigan 1945      WARFARIN INFORMATION       What is the most important information you should know about warfarin? Warfarin is a medicine that you take to prevent blood clots. It is often called a blood thinner. Doctors give warfarin (such as Coumadin) to reduce the risk of blood clots. Warfarin/Coumadin Instructions:  ? It is important to take your anticoagulant medication as instructed, and notify your physician if you are unable to for any reason. ? Complete any blood tests ordered for monitoring your medication; such as PT/INR, so your physician can adjust the dose if necessary. ? Eat a consistent amount of foods with Vitamin K, such as leafy greens. ? Avoid major changes in your dietary habits, or notify your health professional before changing habits. ? Follow up with your health care provider or clinic as directed by your physician for monitoring your anticoagulant medication. ? Diet and medication can affect your anticoagulant and PT/INR blood test.   ? Do not take or discontinue any medication or over-the-counter medication except on the advice of your physician or pharmacist.   ? Anticoagulants can increase the risk of bleeding.

## 2017-10-07 NOTE — PROGRESS NOTES
glucose blood VI test strips (ALISHA CONTOUR TEST) strip 1 each by In Vitro route 4 times daily As needed. 100 strip 11    insulin glargine (LANTUS SOLOSTAR) 100 UNIT/ML injection pen 65 units morning and 60 units nightly (dose decrease as of 6/21/2016) 15 Pen 5    LANTUS SOLOSTAR 100 UNIT/ML injection pen INJECT 70 UNITS SUBCUTANEOUSLY EVERY MORNING & SIXTY-FIVE (65) UNITS NIGHTLY 15 Pen 4    clotrimazole-betamethasone (LOTRISONE) 1-0.05 % cream Apply topically when you change your dressing as needed for irritation/redness. 45 g 1    Insulin Pen Needle (PEN NEEDLES 31GX5/16\") 31G X 8 MM MISC Inject 1 each into the skin 4 times daily as needed (to check blood sugars) 100 each 5    ARIPiprazole (ABILIFY) 5 MG tablet Take 10 mg by mouth daily       clotrimazole-betamethasone (LOTRISONE) 1-0.05 % cream Apply topically 2 times daily. 45 g 1    ALISHA CONTOUR TEST strip USE AS DIRECTED TO TEST BLOOD SUGAR FOUR TIMES DAILY 100 strip 5    Insulin Pen Needle (PEN NEEDLES 31GX5/16\") 31G X 8 MM MISC USE AS DIRECTED 100 each 1    Glucose Blood (BLOOD GLUCOSE TEST STRIPS) STRP Please give contour testing strips to test blood sugar 4x daily 200 strip 3    Spacer/Aero-Holding Chambers NGOZI 1 Device by Does not apply route daily as needed. 1 Device 0    tobramycin-dexamethasone (TOBRADEX) ophthalmic suspension Place 1 drop into both eyes 2 times daily as needed.  aspirin EC 81 MG EC tablet Take 81 mg by mouth daily. No current facility-administered medications on file prior to encounter. REVIEW OF SYSTEMS    Pertinent items are noted in HPI. Constitutional: Negative for systemic symptoms including fever, chills and malaise. Objective:      /70   Pulse 91   Temp 98.2 °F (36.8 °C) (Temporal)   Resp 18     PHYSICAL EXAM      General: The patient is in no acute distress. Mental status:  Patient is appropriate, is  oriented to place and plan of care.   Dermatologic exam: Visual inspection 0 10/6/2017 10:44 AM   Undermining Maxium Distance (cm) 0 10/6/2017 10:44 AM   Wound Assessment Red 10/6/2017 10:44 AM   Margins Defined edges 10/6/2017 10:44 AM   Raciel-wound Assessment Dark edges; Red;Black; Maceration 10/6/2017 10:44 AM   Non-staged Wound Description Full thickness 10/6/2017 10:44 AM   Pink%Wound Bed 0 10/6/2017 10:44 AM   Red%Wound Bed 100 10/6/2017 10:44 AM   Yellow%Wound Bed 0 10/6/2017 10:44 AM   Black%Wound Bed 0 10/6/2017 10:44 AM   Purple%Wound Bed 0 10/6/2017 10:44 AM   Other%Wound Bed 0 10/6/2017 10:44 AM   Drainage Amount Moderate 10/6/2017 10:44 AM   Drainage Description Sanguinous 10/6/2017 10:44 AM   Odor Mild 10/6/2017 10:44 AM   Debridement per physician Partial thickness 10/6/2017 11:21 AM   Time out Yes 2/20/2015 10:51 AM   Procedural Pain 0 11/7/2014  9:25 AM   Post procedural Pain 0 4/18/2014  8:55 AM   Number of days: 1653       Percent of Wound(s) Debrided: approximately 100%    Total  Area  Debrided:  0.6 sq cm     Bleeding:  Minimal    Hemostasis Achieved:  by pressure    Procedural Pain:  0  / 10     Post Procedural Pain:  0 / 10     Response to treatment:  Well tolerated by patient. Wound(s) is unchanged. Next week, I will send him with a surgical scrub brush and have him brush the wound gently for about 20 seconds each time he is in the shower. I would like to remove some of the slough and debris regularly while at home as an attempt to keep the wound bed \"\".       Plan:       Discharge Instructions         Discharge Instructions         Elevate legs to the level of the heart or above for 30 minutes 4-5 times a day and/or        when sitting.                              Wound orders 10/6/17      DO NOT  PUT LOTRISONE AROUND RACIEL WOUND TODAY      WOUND CARE:LEFT medial leg wound (DIABETIC ULCERS)   - When available- before each dressing change- gently scrub wound with surgical scrub brush during shower then- Apply Enluxtra, 4x4, conform, tape.  apply double  tubigrip D or Juxta Lite   May use lotrimizol cream at home to periwound.      Culture obtained 12/2/16      Arterial duplex study to pablo lower legs--negative for issues at this time     GET [address]  Your dressing care supplies will be ordered through [employer]. If you have questions or need to reorder your supplies please contact our [employer] representative [address] [place] [address] at [phone].         Follow up with Stanton Gibson in 1 weeks Friday in wound clinic      Call [phone] for any questions or concerns.        Physicians signature_____________________________      Date__________  Time________             Treatment Note Wound 03/29/13 Diabetic Ulcer Ankle Left; Inner;Distal # 2a (onset 1978) Left Medial Proximal Ankle-Dressing/Treatment: 4x4 (enluxtra, 4x4, conform, tape, tubi D x2,)    Written Patient Dismissal Instructions Given            Electronically signed by Lovett Fabry, MD on 10/7/2017 at 8:42 AM

## 2017-10-13 ENCOUNTER — HOSPITAL ENCOUNTER (OUTPATIENT)
Dept: WOUND CARE | Age: 72
Discharge: HOME | End: 2017-10-13
Attending: INTERNAL MEDICINE | Admitting: INTERNAL MEDICINE

## 2017-10-13 VITALS
SYSTOLIC BLOOD PRESSURE: 138 MMHG | DIASTOLIC BLOOD PRESSURE: 85 MMHG | TEMPERATURE: 98 F | HEART RATE: 101 BPM | RESPIRATION RATE: 18 BRPM

## 2017-10-13 DIAGNOSIS — L97.922 NON-PRESSURE CHRONIC ULCER OF LEFT LOWER LEG WITH FAT LAYER EXPOSED (HCC): Primary | ICD-10-CM

## 2017-10-13 DIAGNOSIS — E11.622 DIABETES MELLITUS WITH SKIN ULCER (HCC): ICD-10-CM

## 2017-10-13 DIAGNOSIS — L98.499 DIABETES MELLITUS WITH SKIN ULCER (HCC): ICD-10-CM

## 2017-10-13 PROCEDURE — 11042 DBRDMT SUBQ TIS 1ST 20SQCM/<: CPT | Performed by: INTERNAL MEDICINE

## 2017-10-13 RX ORDER — TACROLIMUS 0.3 MG/G
OINTMENT TOPICAL
Qty: 30 G | Refills: 0 | Status: SHIPPED | OUTPATIENT
Start: 2017-10-13 | End: 2019-09-09 | Stop reason: SDUPTHER

## 2017-10-13 RX ORDER — TACROLIMUS 0.3 MG/G
30 OINTMENT TOPICAL 2 TIMES DAILY
COMMUNITY
End: 2019-12-19 | Stop reason: ALTCHOICE

## 2017-10-13 NOTE — PROGRESS NOTES
HISTORY    Family History   Problem Relation Age of Onset    Cancer Father      prostate    Heart Disease Father     High Blood Pressure Father     High Cholesterol Father     Cancer Brother     Diabetes Brother     Thyroid Disease Brother        SOCIAL HISTORY    Social History   Substance Use Topics    Smoking status: Former Smoker     Packs/day: 2.00     Years: 30.00     Types: Cigarettes     Quit date: 5/28/1993    Smokeless tobacco: Current User     Types: Chew      Comment: chew--30 years. Reviewed 9/24/2015    Alcohol use 0.0 oz/week      Comment: 12-pack beer weekly; 5 cups coffee daily       ALLERGIES    Allergies   Allergen Reactions    Cavilon Durable Barrier [Mineral Oil-Dimeth-Coconut Oil]     Parabens Hives    Prinivil [Lisinopril] Swelling    Cortisone Rash    Dilaudid [Hydromorphone Hcl] Rash    Penicillins Rash    Sulfamethoxazole-Trimethoprim Nausea Only    Tape [Adhesive Tape] Rash       MEDICATIONS    Current Outpatient Prescriptions on File Prior to Encounter   Medication Sig Dispense Refill    polyethylene glycol (MIRALAX) PACK packet Take 17g by mouth two times per week to daily as needed for constipation 30 each 5    warfarin (COUMADIN) 7.5 MG tablet Indications: 7.5 on WED.  and 5mg all other days 90 tablet 2    warfarin (COUMADIN) 5 MG tablet TAKE ONE (1) TABLET BY MOUTH ONCE DAILY EXCEPT TAKE ONE & ONE-HALF (1&1/2) TABLETS BY MOUTH ON WEDNESDAYS 100 tablet 5    buPROPion (WELLBUTRIN XL) 300 MG extended release tablet Take 1 tablet by mouth every morning 90 tablet 3    valsartan (DIOVAN) 80 MG tablet Take 1 tablet by mouth daily 90 tablet 3    atorvastatin (LIPITOR) 40 MG tablet Take 1 tablet by mouth every evening 90 tablet 3    insulin lispro (HUMALOG KWIKPEN) 100 UNIT/ML pen 2- 14 units with each meal three times per day 15 Pen 5    Liraglutide (VICTOZA) 18 MG/3ML SOPN SC injection Inject 1.2 mg into the skin daily 3 Pen 3    escitalopram (LEXAPRO) 20 MG tablet Mental status:  Patient is appropriate, is  oriented to place and plan of care. Dermatologic exam: Visual inspection of the periwound reveals the skin to be dry, coarse and scaly  Wound exam: see wound description below in procedure note      Assessment:     Problem List Items Addressed This Visit     Diabetes mellitus with skin ulcer ([address]    WC-Non-pressure chronic ulcer of left lower leg with fat layer exposed ([address] - Primary      Other Visit Diagnoses    None. Procedure Note    Indications:  Based on my examination of this patient's wound(s) today, sharp excision into necrotic subcutaneous tissue is required to promote healing and evaluate the extent of previous healing. Performed by: Donnell Mccain MD    Consent obtained: Yes    Time out taken:  Yes    Pain Control: not needed       Debridement:Excisional Debridement    Using curette the wound(s) was/were sharply debrided down through and including the removal of subcutaneous tissue. Devitalized Tissue Debrided:  fibrin, biofilm, slough and necrotic/eschar    Pre Debridement Measurements:  Are located in the Wound Documentation Flow Sheet    All active wounds listed below with today's date are evaluated  Wound(s)    debrided this date include # : 2     Post  Debridement Measurements:  Wound 03/29/13 Diabetic Ulcer Ankle Left; Inner;Distal # 2a (onset 1978) Left Medial Proximal Ankle (Active)   Wound Type Wound 10/13/2017 10:45 AM   Dressing Status Clean;Dry; Intact 10/13/2017 11:42 AM   Dressing Changed Changed/New 10/13/2017 11:42 AM   Dressing/Treatment 4x4 10/13/2017 11:42 AM   Wound Cleansed Wound cleanser 10/13/2017 10:45 AM   Wound Length (cm) 2.8 cm 10/13/2017 11:16 AM   Wound Width (cm) 2.5 cm 10/13/2017 11:16 AM   Wound Depth (cm)  .1 10/13/2017 11:16 AM   Calculated Wound Size (cm^2) (l*w) 7 cm^2 10/13/2017 11:16 AM   Change in Wound Size % (l*w) -87607.08 10/13/2017 11:16 AM   Distance Tunneling (cm) 0 cm 10/13/2017 10:45 AM conform, tape.  apply double  tubigrip D or Juxta Lite   May use lotrimizol cream at home to periwound.      Culture obtained 12/2/16      Arterial duplex study to pablo lower legs--negative for issues at this time     GET [address]  Your dressing care supplies will be ordered through [facility]. If you have questions or need to reorder your supplies please contact our [employer] representative Audrey Zheng at [phone].         Follow up with Horatio Rinne in 1 weeks Friday in wound clinic      Call [phone] for any questions or concerns.        Physicians signature_____________________________      Date__________  Time________                Treatment Note Wound 03/29/13 Diabetic Ulcer Ankle Left; Inner;Distal # 2a (onset 1978) Left Medial Proximal Ankle-Dressing/Treatment: 4x4 (enluxtra, 4x4, conform, tape, tubi D x2,)    Written Patient Dismissal Instructions Given            Electronically signed by Allyson Desai MD on 10/13/2017 at 11:47 AM

## 2017-10-20 ENCOUNTER — HOSPITAL ENCOUNTER (OUTPATIENT)
Dept: WOUND CARE | Age: 72
Discharge: HOME | End: 2017-10-20
Attending: INTERNAL MEDICINE | Admitting: INTERNAL MEDICINE

## 2017-10-20 VITALS
SYSTOLIC BLOOD PRESSURE: 111 MMHG | RESPIRATION RATE: 18 BRPM | TEMPERATURE: 97.8 F | DIASTOLIC BLOOD PRESSURE: 73 MMHG | HEART RATE: 84 BPM

## 2017-10-20 DIAGNOSIS — L97.922 NON-PRESSURE CHRONIC ULCER OF LEFT LOWER LEG WITH FAT LAYER EXPOSED (HCC): ICD-10-CM

## 2017-10-20 NOTE — PLAN OF CARE
Problem: Wound:  Intervention: Assess pain status  See flowsheet  Intervention: Assess wound size, appearance and drainage  See flowsheet  Intervention: Doppler if unable to palpate pedal pulse  See flowsheet    Goal: Will show signs of wound healing; wound closure and no evidence of infection  Will show signs of wound healing; wound closure and no evidence of infection   Outcome: Ongoing

## 2017-10-23 NOTE — PROGRESS NOTES
mumps orchitis     as a youth    Hemorrhoids 4/23/12    Dr. Saúl Riley; repeat colonoscopy 3 years   [facility] HLD (hyperlipidemia)     Hx of Doppler ultrasound 1/06/15    Lymph node seen in left groin area. No bilateral stenosis.     Hyperlipemia     Hypertension 1992    Idiopathic chronic venous hypertension of left lower extremity with ulcer and inflammation (HCC) 10/2/2015    Leg ulcer ([address] 1978-present    following at wound center, Dr Gaby Strauss No diabetic retinopathy OU 11/12    Dr. Mac Bolus chronic ulcer of left lower leg with fat layer exposed ([address] 10/2/2015    Pulmonary embolism ([address] 11/13    patient on coumadin    Sleep apnea     doesnt always use cpap dt it drys him out    SOB (shortness of breath) Oct 2011    Stress test normal.     Tendinitis 1973    plantar tendons    Type II or unspecified type diabetes mellitus with other specified manifestations, not stated as uncontrolled 2/8/2013    Unspecified venous (peripheral) insufficiency     Urticaria        PAST SURGICAL HISTORY    Past Surgical History:   Procedure Laterality Date    BREAST SURGERY  1970s    benign tumors bilaterally    CARDIAC CATHETERIZATION  6/3/14    EF55% normal study    CARPAL TUNNEL RELEASE Left 1993    CATARACT REMOVAL Right 3/11/2013    Dr. Josh Gutierrez Left 2/25/2013    Dr. Js Calix  2006    polyps    COLONOSCOPY  11/21/11    villous component--f/u colonoscopy will be needed in 6 months    COLONOSCOPY  4/23/12    mild diverticulosis, internal hemorrhoids; repeat in 3 years (Dr. Saúl Riley)    COLONOSCOPY  08/04/2016    mild diverticulosis, three colon polyps    HERNIA REPAIR  1970s    SKIN GRAFT  1978-present    skin grafts to left ankle ulcers    SPLENECTOMY  1958    TONSILLECTOMY  1953    VARICOSE VEIN SURGERY Left 2009       FAMILY HISTORY    Family History   Problem Relation Age of Onset    Cancer Father      prostate    Heart Disease Father     High Blood Pressure Father     High Cholesterol Father     Cancer Brother     Diabetes Brother     Thyroid Disease Brother        SOCIAL HISTORY    Social History   Substance Use Topics    Smoking status: Former Smoker     Packs/day: 2.00     Years: 30.00     Types: Cigarettes     Quit date: 5/28/1993    Smokeless tobacco: Current User     Types: Chew      Comment: chew--30 years. Reviewed 9/24/2015    Alcohol use 0.0 oz/week      Comment: 12-pack beer weekly; 5 cups coffee daily       ALLERGIES    Allergies   Allergen Reactions    Cavilon Durable Barrier [Mineral Oil-Dimeth-Coconut Oil]     Parabens Hives    Prinivil [Lisinopril] Swelling    Cortisone Rash    Dilaudid [Hydromorphone Hcl] Rash    Penicillins Rash    Sulfamethoxazole-Trimethoprim Nausea Only    Tape [Adhesive Tape] Rash       MEDICATIONS    Current Outpatient Prescriptions on File Prior to Encounter   Medication Sig Dispense Refill    tacrolimus (PROTOPIC) 0.03 % ointment Apply topically 2 times daily. 30 g 0    tacrolimus (PROTOPIC) 0.03 % ointment Apply 30 g topically 2 times daily Apply topically 2 times daily.  polyethylene glycol (MIRALAX) PACK packet Take 17g by mouth two times per week to daily as needed for constipation 30 each 5    warfarin (COUMADIN) 7.5 MG tablet Indications: 7.5 on WED.  and 5mg all other days 90 tablet 2    warfarin (COUMADIN) 5 MG tablet TAKE ONE (1) TABLET BY MOUTH ONCE DAILY EXCEPT TAKE ONE & ONE-HALF (1&1/2) TABLETS BY MOUTH ON WEDNESDAYS 100 tablet 5    buPROPion (WELLBUTRIN XL) 300 MG extended release tablet Take 1 tablet by mouth every morning 90 tablet 3    valsartan (DIOVAN) 80 MG tablet Take 1 tablet by mouth daily 90 tablet 3    atorvastatin (LIPITOR) 40 MG tablet Take 1 tablet by mouth every evening 90 tablet 3    insulin lispro (HUMALOG KWIKPEN) 100 UNIT/ML pen 2- 14 units with each meal three times per day 15 Pen 5    Liraglutide (VICTOZA) 18 MG/3ML SOPN SC injection Inject 1.2 mg into the °C) (Temporal)   Resp 18     PHYSICAL EXAM      General: The patient is in no acute distress. Mental status:  Patient is appropriate, is  oriented to place and plan of care. Dermatologic exam: Visual inspection of the periwound reveals the skin to be normal in turgor and texture  Wound exam: see wound description below in procedure note      Assessment:     Problem List Items Addressed This Visit     WC-Chronic venous hypertension with ulcer involving left side ([address] - Primary    WC-Non-pressure chronic ulcer of left lower leg with fat layer exposed ([address]      Other Visit Diagnoses    None. Procedure Note    Indications:  Based on my examination of this patient's wound(s) today, sharp excision into necrotic subcutaneous tissue is required to promote healing and evaluate the extent of previous healing. Performed by: Shantanu Malik MD    Consent obtained: Yes    Time out taken:  Yes    Pain Control: none       Debridement:Excisional Debridement    Using curette the wound(s) was/were sharply debrided down through and including the removal of epidermis, dermis and subcutaneous tissue. Devitalized Tissue Debrided:  biofilm, slough and necrotic/eschar    Pre Debridement Measurements:  Are located in the Wound Documentation Flow Sheet    All active wounds listed below with today's date are evaluated  Wound(s)    debrided this date include # : 2     Post  Debridement Measurements:  Wound 03/29/13 Diabetic Ulcer Ankle Left; Inner;Distal # 2a (onset 1978) Left Medial Proximal Ankle (Active)   Wound Type Wound 10/20/2017 11:11 AM   Dressing Status Clean;Dry; Intact 10/20/2017 11:56 AM   Dressing Changed Changed/New 10/20/2017 11:56 AM   Dressing/Treatment 4x4 10/13/2017 11:42 AM   Wound Cleansed Wound cleanser 10/20/2017 11:11 AM   Wound Length (cm) 1.9 cm 10/20/2017 11:37 AM   Wound Width (cm) 1.1 cm 10/20/2017 11:37 AM   Wound Depth (cm)  .1 10/20/2017 11:37 AM   Calculated Wound Size (cm^2) (l*w) 2.09 with Enluxtra, 4x4, conform, tape.  apply double  tubigrip D or Juxta Lite   May use lotrimizol cream at home to periwound.      Culture obtained 12/2/16      Arterial duplex study to pablo lower legs--negative for issues at this time     GET [address]  Your dressing care supplies will be ordered through [facility]. If you have questions or need to reorder your supplies please contact our [employer] representative [address] [place] [address] at [phone].         Follow up with Leslie Licea in 1 weeks Friday in wound clinic      Call [phone] for any questions or concerns.        Physicians signature_____________________________      Date__________  Time________                Treatment Note Wound 03/29/13 Diabetic Ulcer Ankle Left; Inner;Distal # 2a (onset 1978) Left Medial Proximal Ankle-Dressing/Treatment:  (Enluxtra, 4x4, conform, double tubi D)    Written Patient Dismissal Instructions Given            Electronically signed by Citlaly Bryan MD on 10/22/2017 at 9:20 PM

## 2017-10-27 ENCOUNTER — HOSPITAL ENCOUNTER (OUTPATIENT)
Dept: WOUND CARE | Age: 72
Discharge: HOME | End: 2017-10-27
Attending: INTERNAL MEDICINE | Admitting: INTERNAL MEDICINE

## 2017-10-27 VITALS
RESPIRATION RATE: 18 BRPM | DIASTOLIC BLOOD PRESSURE: 71 MMHG | TEMPERATURE: 97.9 F | SYSTOLIC BLOOD PRESSURE: 110 MMHG | HEART RATE: 86 BPM

## 2017-10-27 DIAGNOSIS — L97.922 NON-PRESSURE CHRONIC ULCER OF LEFT LOWER LEG WITH FAT LAYER EXPOSED (HCC): ICD-10-CM

## 2017-10-27 NOTE — PROGRESS NOTES
Dio Enrique  AGE: 80 y.o. GENDER: male  : 1945  EPISODE DATE:  10/27/2017     Subjective:     Chief Complaint   Patient presents with    Wound Check     Left ankle          HISTORY of PRESENT ILLNESS      Dio Enrique is a 80 y.o. male who presents today for wound evaluation of Chronic venous and diabetic wound(s) of the left leg. The wound is of mild severity. The underlying cause of the wound is venous hypertension. Started tacrolimus last week, seems to be helping. There is less drainage and discharge this week. 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 Elías Costa Anemia 2011    Arm fracture Vinnynt Alexey Hanson with also yearly DM exam    Cataract     worsening-Dr. Tamara Dangelo    Cellulitis of left lower leg     Chronic venous hypertension with ulcer ([address] 2011    CKD (chronic kidney disease) 2012    Renal u/S normal     Colon polyps     Dr. Elías Costa Diabetes mellitus ([address]     Diabetes mellitus with peripheral circulatory disorder ([address] 10/2/2015    Diabetes mellitus with skin ulcer ([address] 10/2/2015    Diabetic peripheral neuropathy ([address]     + EMG, NCS    Diabetic skin ulcer associated with type 2 diabetes mellitus ([address]     Diverticulosis 12    mild, left colon    Femoral DVT (deep venous thrombosis) ([address] 2011    PARTIAL,CHRONIC-SPFLD HEART SURGEONS    GERD (gastroesophageal reflux disease)     Glaucoma     open angle-Dr. Dony Cartagena H/O cardiac catheterization 6/3/14    EF55% normal study    H/O Doppler ultrasound 03/26/15    Carotid US- no significant stenosis noted bilaterally.      H/O mumps orchitis 3    escitalopram (LEXAPRO) 20 MG tablet Take 1 tablet by mouth daily 90 tablet 3    omeprazole (PRILOSEC) 20 MG delayed release capsule TAKE ONE (1) CAPSULE BY MOUTH ONCE DAILY 90 capsule 3    glucose blood VI test strips (ALISHA CONTOUR TEST) strip 1 each by In Vitro route 4 times daily As needed. 100 strip 11    insulin glargine (LANTUS SOLOSTAR) 100 UNIT/ML injection pen 65 units morning and 60 units nightly (dose decrease as of 6/21/2016) 15 Pen 5    LANTUS SOLOSTAR 100 UNIT/ML injection pen INJECT 70 UNITS SUBCUTANEOUSLY EVERY MORNING & SIXTY-FIVE (65) UNITS NIGHTLY 15 Pen 4    clotrimazole-betamethasone (LOTRISONE) 1-0.05 % cream Apply topically when you change your dressing as needed for irritation/redness. 45 g 1    Insulin Pen Needle (PEN NEEDLES 31GX5/16\") 31G X 8 MM MISC Inject 1 each into the skin 4 times daily as needed (to check blood sugars) 100 each 5    ARIPiprazole (ABILIFY) 5 MG tablet Take 10 mg by mouth daily       clotrimazole-betamethasone (LOTRISONE) 1-0.05 % cream Apply topically 2 times daily. 45 g 1    ALISHA CONTOUR TEST strip USE AS DIRECTED TO TEST BLOOD SUGAR FOUR TIMES DAILY 100 strip 5    Insulin Pen Needle (PEN NEEDLES 31GX5/16\") 31G X 8 MM MISC USE AS DIRECTED 100 each 1    Glucose Blood (BLOOD GLUCOSE TEST STRIPS) STRP Please give contour testing strips to test blood sugar 4x daily 200 strip 3    Spacer/Aero-Holding Chambers NGOZI 1 Device by Does not apply route daily as needed. 1 Device 0    tobramycin-dexamethasone (TOBRADEX) ophthalmic suspension Place 1 drop into both eyes 2 times daily as needed.  aspirin EC 81 MG EC tablet Take 81 mg by mouth daily. No current facility-administered medications on file prior to encounter. REVIEW OF SYSTEMS    Pertinent items are noted in HPI. Constitutional: Negative for systemic symptoms including fever, chills and malaise.       Objective:      /71   Pulse 86   Temp 97.9 °F (36.6 °C) (Temporal) 4x4, conform, tape.  apply double  tubigrip D or Juxta Lite   May use lotrimizol cream at home to periwound.      Culture obtained 12/2/16      Arterial duplex study to pablo lower legs--negative for issues at this time     GET [address]  Your dressing care supplies will be ordered through [facility]. If you have questions or need to reorder your supplies please contact our [employer] representative Monique Leonel at [phone].         Follow up with Kelley Salas in 1 weeks Friday in wound clinic      Call [phone] for any questions or concerns.        Physicians signature_____________________________      Date__________  Time________                Treatment Note Wound 03/29/13 Diabetic Ulcer Ankle Left; Inner;Distal # 2a (onset 1978) Left Medial Proximal Ankle-Dressing/Treatment:  (Pateint own oint., Enluxtra, ABD, conform tape Tubi double D)    Written Patient Dismissal Instructions Given            Electronically signed by Sandro Banks MD on 10/27/2017 at 11:55 AM

## 2017-10-30 LAB
POC INR: 2.1 INDEX
PROTHROMBIN TIME, POC: 25.8 SECONDS (ref 10–14.3)

## 2017-11-01 ENCOUNTER — HOSPITAL ENCOUNTER (OUTPATIENT)
Dept: OTHER | Age: 72
Discharge: HOME | End: 2017-11-30
Attending: FAMILY MEDICINE | Admitting: FAMILY MEDICINE

## 2017-11-03 ENCOUNTER — HOSPITAL ENCOUNTER (OUTPATIENT)
Dept: WOUND CARE | Age: 72
Discharge: HOME | End: 2017-11-02
Attending: INTERNAL MEDICINE | Admitting: INTERNAL MEDICINE

## 2017-11-10 ENCOUNTER — HOSPITAL ENCOUNTER (OUTPATIENT)
Dept: WOUND CARE | Age: 72
Discharge: HOME | End: 2017-11-10
Attending: INTERNAL MEDICINE | Admitting: INTERNAL MEDICINE

## 2017-11-10 VITALS
RESPIRATION RATE: 18 BRPM | SYSTOLIC BLOOD PRESSURE: 110 MMHG | TEMPERATURE: 97.6 F | DIASTOLIC BLOOD PRESSURE: 72 MMHG | HEART RATE: 79 BPM

## 2017-11-10 DIAGNOSIS — L97.922 NON-PRESSURE CHRONIC ULCER OF LEFT LOWER LEG WITH FAT LAYER EXPOSED (HCC): ICD-10-CM

## 2017-11-10 NOTE — PROGRESS NOTES
mumps orchitis     as a youth    Hemorrhoids 4/23/12    Dr. Dorothy Adams; repeat colonoscopy 3 years   Harl Rack HLD (hyperlipidemia)     Hx of Doppler ultrasound 1/06/15    Lymph node seen in left groin area. No bilateral stenosis.     Hyperlipemia     Hypertension 1992    Idiopathic chronic venous hypertension of left lower extremity with ulcer and inflammation (HCC) 10/2/2015    Leg ulcer ([address] 1978-present    following at wound center, Dr Maikol De Jesus No diabetic retinopathy OU 11/12    Dr. Patric Mcdonald chronic ulcer of left lower leg with fat layer exposed ([address] 10/2/2015    Pulmonary embolism ([address] 11/13    patient on coumadin    Sleep apnea     doesnt always use cpap dt it drys him out    SOB (shortness of breath) Oct 2011    Stress test normal.     Tendinitis 1973    plantar tendons    Type II or unspecified type diabetes mellitus with other specified manifestations, not stated as uncontrolled 2/8/2013    Unspecified venous (peripheral) insufficiency     Urticaria        PAST SURGICAL HISTORY    Past Surgical History:   Procedure Laterality Date    BREAST SURGERY  1970s    benign tumors bilaterally    CARDIAC CATHETERIZATION  6/3/14    EF55% normal study    CARPAL TUNNEL RELEASE Left 1993    CATARACT REMOVAL Right 3/11/2013    Dr. Milena Vigil Left 2/25/2013    Dr. Blane Slaughter  2006    polyps    COLONOSCOPY  11/21/11    villous component--f/u colonoscopy will be needed in 6 months    COLONOSCOPY  4/23/12    mild diverticulosis, internal hemorrhoids; repeat in 3 years (Dr. Dorothy Adams)    COLONOSCOPY  08/04/2016    mild diverticulosis, three colon polyps    HERNIA REPAIR  1970s    SKIN GRAFT  1978-present    skin grafts to left ankle ulcers    SPLENECTOMY  1958    TONSILLECTOMY  1953    VARICOSE VEIN SURGERY Left 2009       FAMILY HISTORY    Family History   Problem Relation Age of Onset    Cancer Father      prostate    Heart Disease Father     High Blood Pressure Father     High Cholesterol Father     Cancer Brother     Diabetes Brother     Thyroid Disease Brother        SOCIAL HISTORY    Social History   Substance Use Topics    Smoking status: Former Smoker     Packs/day: 2.00     Years: 30.00     Types: Cigarettes     Quit date: 5/28/1993    Smokeless tobacco: Current User     Types: Chew      Comment: chew--30 years. Reviewed 9/24/2015    Alcohol use 0.0 oz/week      Comment: 12-pack beer weekly; 5 cups coffee daily       ALLERGIES    Allergies   Allergen Reactions    Cavilon Durable Barrier [Mineral Oil-Dimeth-Coconut Oil]     Parabens Hives    Prinivil [Lisinopril] Swelling    Cortisone Rash    Dilaudid [Hydromorphone Hcl] Rash    Penicillins Rash    Sulfamethoxazole-Trimethoprim Nausea Only    Tape [Adhesive Tape] Rash       MEDICATIONS    Current Outpatient Prescriptions on File Prior to Encounter   Medication Sig Dispense Refill    tacrolimus (PROTOPIC) 0.03 % ointment Apply topically 2 times daily. 30 g 0    tacrolimus (PROTOPIC) 0.03 % ointment Apply 30 g topically 2 times daily Apply topically 2 times daily.  polyethylene glycol (MIRALAX) PACK packet Take 17g by mouth two times per week to daily as needed for constipation 30 each 5    warfarin (COUMADIN) 7.5 MG tablet Indications: 7.5 on WED.  and 5mg all other days 90 tablet 2    warfarin (COUMADIN) 5 MG tablet TAKE ONE (1) TABLET BY MOUTH ONCE DAILY EXCEPT TAKE ONE & ONE-HALF (1&1/2) TABLETS BY MOUTH ON WEDNESDAYS 100 tablet 5    buPROPion (WELLBUTRIN XL) 300 MG extended release tablet Take 1 tablet by mouth every morning 90 tablet 3    valsartan (DIOVAN) 80 MG tablet Take 1 tablet by mouth daily 90 tablet 3    atorvastatin (LIPITOR) 40 MG tablet Take 1 tablet by mouth every evening 90 tablet 3    insulin lispro (HUMALOG KWIKPEN) 100 UNIT/ML pen 2- 14 units with each meal three times per day 15 Pen 5    Liraglutide (VICTOZA) 18 MG/3ML SOPN SC injection Inject 1.2 mg into the 0.15 cm^2 11/10/2017 11:16 AM   Change in Wound Size % (l*w) -400 11/10/2017 11:16 AM   Distance Tunneling (cm) 0 cm 11/10/2017 10:53 AM   Tunneling Position ___ O'Clock 0 11/10/2017 10:53 AM   Undermining Starts ___ O'Clock 0 11/10/2017 10:53 AM   Undermining Ends___ O'Clock 0 11/10/2017 10:53 AM   Undermining Maxium Distance (cm) 0 11/10/2017 10:53 AM   Wound Assessment Red;Yellow 11/10/2017 10:53 AM   Margins Defined edges 11/10/2017 10:53 AM   Ana-wound Assessment Dark edges 11/10/2017 10:53 AM   Non-staged Wound Description Full thickness 11/10/2017 10:53 AM   Solis%Wound Bed 0 11/10/2017 10:53 AM   Red%Wound Bed 20 11/10/2017 10:53 AM   Yellow%Wound Bed 80 11/10/2017 10:53 AM   Black%Wound Bed 0 11/10/2017 10:53 AM   Purple%Wound Bed 0 11/10/2017 10:53 AM   Other%Wound Bed 0 11/10/2017 10:53 AM   Drainage Amount Small 11/10/2017 10:53 AM   Drainage Description Serosanguinous 11/10/2017 10:53 AM   Odor Strong 11/10/2017 10:53 AM   Debridement per physician Full thickness 11/10/2017 11:16 AM   Number of days: 9901       Percent of Wound(s) Debrided: approximately 100%    Total  Area  Debrided:  0.15 sq cm     Bleeding:  minimal    Hemostasis Achieved:  by silver nitrate stick    Procedural Pain:  0  / 10     Post Procedural Pain:  0 / 10     Response to treatment:  Well tolerated by patient. Wound(s) has slightly improved. Whole area is improved. Will continue with the tacrolimus.       Plan:       Discharge Instructions         Discharge Instructions           Discharge Instructions           Discharge Instructions         Elevate legs to the level of the heart or above for 30 minutes 4-5 times a day and/or        when sitting.                              MARYCARMENXO orders 10/27/17       WOUND CARE:LEFT medial leg wound (DIABETIC ULCERS)  --APPLY DESITIN TO DAMP PORTION OF LEG OUTSIDE ENLUXTRA-- Apply small amount of protopic--rub it in--cover with Enluxtra, 4x4, conform, tape.  apply double  tubigrip D or

## 2017-11-17 ENCOUNTER — HOSPITAL ENCOUNTER (OUTPATIENT)
Dept: WOUND CARE | Age: 72
Discharge: HOME | End: 2017-11-17
Attending: INTERNAL MEDICINE | Admitting: INTERNAL MEDICINE

## 2017-11-17 VITALS
TEMPERATURE: 99.1 F | HEART RATE: 82 BPM | RESPIRATION RATE: 16 BRPM | DIASTOLIC BLOOD PRESSURE: 74 MMHG | SYSTOLIC BLOOD PRESSURE: 140 MMHG

## 2017-11-17 DIAGNOSIS — L97.922 NON-PRESSURE CHRONIC ULCER OF LEFT LOWER LEG WITH FAT LAYER EXPOSED (HCC): ICD-10-CM

## 2017-11-17 NOTE — PROGRESS NOTES
Brando Lucas  AGE: 80 y.o. GENDER: male  : 1945  EPISODE DATE:  2017     Subjective:     Chief Complaint   Patient presents with    Wound Check         HISTORY of PRESENT ILLNESS      Brando Lucas is a 80 y.o. male who presents today for wound evaluation of Chronic venous wound(s) of the left leg. The wound is of mild severity. The underlying cause of the wound is venous hypertension. He has been having slow improvement with Tacrolimus- today his mood is a little down and he fell in the AM resulting in skin tear on the right arm. 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 Bj Christensen Anemia 2011    Arm fracture Stan Jara Proffer with also yearly DM exam    Cataract     worsening-Dr. Deven Mead    Cellulitis of left lower leg     Chronic venous hypertension with ulcer ([address] 2011    CKD (chronic kidney disease) 2012    Renal u/S normal     Colon polyps     Dr. Bj Christensen Diabetes mellitus ([address]     Diabetes mellitus with peripheral circulatory disorder ([address] 10/2/2015    Diabetes mellitus with skin ulcer ([address] 10/2/2015    Diabetic peripheral neuropathy ([address]     + EMG, NCS    Diabetic skin ulcer associated with type 2 diabetes mellitus ([address]     Diverticulosis 12    mild, left colon    Femoral DVT (deep venous thrombosis) ([address] 2011    PARTIAL,CHRONIC-SPFLD HEART SURGEONS    GERD (gastroesophageal reflux disease)     Glaucoma     open angle-Dr. Rolanda Leyva H/O cardiac catheterization 6/3/14    EF55% normal study    H/O Doppler ultrasound 03/26/15    Carotid US- no significant stenosis noted bilaterally.      H/O mumps daily 3 Pen 3    escitalopram (LEXAPRO) 20 MG tablet Take 1 tablet by mouth daily 90 tablet 3    omeprazole (PRILOSEC) 20 MG delayed release capsule TAKE ONE (1) CAPSULE BY MOUTH ONCE DAILY 90 capsule 3    glucose blood VI test strips (ALISHA CONTOUR TEST) strip 1 each by In Vitro route 4 times daily As needed. 100 strip 11    insulin glargine (LANTUS SOLOSTAR) 100 UNIT/ML injection pen 65 units morning and 60 units nightly (dose decrease as of 6/21/2016) 15 Pen 5    LANTUS SOLOSTAR 100 UNIT/ML injection pen INJECT 70 UNITS SUBCUTANEOUSLY EVERY MORNING & SIXTY-FIVE (65) UNITS NIGHTLY 15 Pen 4    clotrimazole-betamethasone (LOTRISONE) 1-0.05 % cream Apply topically when you change your dressing as needed for irritation/redness. 45 g 1    Insulin Pen Needle (PEN NEEDLES 31GX5/16\") 31G X 8 MM MISC Inject 1 each into the skin 4 times daily as needed (to check blood sugars) 100 each 5    ARIPiprazole (ABILIFY) 5 MG tablet Take 10 mg by mouth daily       clotrimazole-betamethasone (LOTRISONE) 1-0.05 % cream Apply topically 2 times daily. 45 g 1    ALISHA CONTOUR TEST strip USE AS DIRECTED TO TEST BLOOD SUGAR FOUR TIMES DAILY 100 strip 5    Insulin Pen Needle (PEN NEEDLES 31GX5/16\") 31G X 8 MM MISC USE AS DIRECTED 100 each 1    Glucose Blood (BLOOD GLUCOSE TEST STRIPS) STRP Please give contour testing strips to test blood sugar 4x daily 200 strip 3    Spacer/Aero-Holding Chambers NGOZI 1 Device by Does not apply route daily as needed. 1 Device 0    tobramycin-dexamethasone (TOBRADEX) ophthalmic suspension Place 1 drop into both eyes 2 times daily as needed.  aspirin EC 81 MG EC tablet Take 81 mg by mouth daily. No current facility-administered medications on file prior to encounter. REVIEW OF SYSTEMS    Pertinent items are noted in HPI. Constitutional: Negative for systemic symptoms including fever, chills and malaise.       Objective:      BP (!) 140/74   Pulse 82   Temp 99.1 °F (37.3 °C) (Oral)   Resp 16     PHYSICAL EXAM      General: The patient is in no acute distress. Mental status:  Patient is appropriate, is  oriented to place and plan of care. Dermatologic exam: Visual inspection of the periwound reveals the skin to be normal in turgor and texture  Wound exam: see wound description below in procedure note      Assessment:     Problem List Items Addressed This Visit     WC-Chronic venous hypertension with ulcer involving left side ([address] - Primary    WC-Non-pressure chronic ulcer of left lower leg with fat layer exposed ([address]      Other Visit Diagnoses    None. Procedure Note    Indications:  Based on my examination of this patient's wound(s) today, sharp excision into necrotic epidermis is required to promote healing and evaluate the extent of previous healing. Performed by: Javon Frederick MD    Consent obtained: Yes    Time out taken:  Yes    Pain Control: none       Debridement:Non-excisional Debridement    Using curette the wound(s) was/were sharply debrided down through and including the removal of epidermis. Devitalized Tissue Debrided:  fibrin, biofilm, slough and exudate    Pre Debridement Measurements:  Are located in the Wound Documentation Flow Sheet    All active wounds listed below with today's date are evaluated  Wound(s)    debrided this date include # : 2     Post  Debridement Measurements:  Wound 03/29/13 Diabetic Ulcer Ankle Left; Inner;Distal # 2a (onset 1978) Left Medial Proximal Ankle (Active)   Wound Type Wound 11/17/2017 11:43 AM   Dressing Status Clean;Dry; Intact 11/17/2017 12:24 PM   Dressing Changed Changed/New 11/17/2017 12:24 PM   Dressing/Treatment 4x4 10/13/2017 11:42 AM   Wound Cleansed Wound cleanser;Rinsed/Irrigated with saline 11/17/2017 11:43 AM   Wound Length (cm) 0.3 cm 11/17/2017 11:47 AM   Wound Width (cm) 0.6 cm 11/17/2017 11:47 AM   Wound Depth (cm)  .2 11/17/2017 11:47 AM   Calculated Wound Size (cm^2) (l*w) 0.18 cm^2 11/17/2017 conform, tubi Dx2)  Wound 11/17/17 #5 right distal arm (onset 1 day)-Dressing/Treatment:  (mepitel, fibracol, border)    Written Patient Dismissal Instructions Given            Electronically signed by Macey Ramon MD on 11/17/2017 at 6:49 PM

## 2017-11-27 LAB
POC INR: 2.4 INDEX
PROTHROMBIN TIME, POC: 28.3 SECONDS (ref 10–14.3)

## 2017-12-01 ENCOUNTER — HOSPITAL ENCOUNTER (OUTPATIENT)
Dept: OTHER | Age: 72
Discharge: HOME | End: 2017-12-31
Attending: FAMILY MEDICINE | Admitting: FAMILY MEDICINE

## 2017-12-01 ENCOUNTER — HOSPITAL ENCOUNTER (OUTPATIENT)
Dept: WOUND CARE | Age: 72
Discharge: HOME | End: 2017-12-01
Attending: INTERNAL MEDICINE | Admitting: INTERNAL MEDICINE

## 2017-12-01 VITALS
DIASTOLIC BLOOD PRESSURE: 89 MMHG | HEART RATE: 93 BPM | SYSTOLIC BLOOD PRESSURE: 150 MMHG | RESPIRATION RATE: 16 BRPM | TEMPERATURE: 99 F

## 2017-12-01 DIAGNOSIS — L97.922 NON-PRESSURE CHRONIC ULCER OF LEFT LOWER LEG WITH FAT LAYER EXPOSED (HCC): ICD-10-CM

## 2017-12-01 RX ORDER — PEN NEEDLE, DIABETIC 31 GX5/16"
1 NEEDLE, DISPOSABLE MISCELLANEOUS 4 TIMES DAILY PRN
Qty: 100 EACH | Refills: 5 | Status: SHIPPED | OUTPATIENT
Start: 2017-12-01 | End: 2018-04-05 | Stop reason: ALTCHOICE

## 2017-12-01 NOTE — PLAN OF CARE
Problem: Wound:  Intervention: Assess pain status  See flowsheet  Intervention: Assess wound size, appearance and drainage  See flowsheet  Intervention: Doppler if unable to palpate pedal pulse  See flowsheet    Goal: Will show signs of wound healing; wound closure and no evidence of infection  Will show signs of wound healing; wound closure and no evidence of infection   See flowsheet

## 2017-12-02 NOTE — PROGRESS NOTES
Lisa Coffman  AGE: 71 y.o. GENDER: male  : 1945  EPISODE DATE:  2017     Subjective:     Chief Complaint   Patient presents with    Wound Check     left lower leg         HISTORY of PRESENT ILLNESS      Lisa Coffman is a 71 y.o. male who presents today for wound evaluation of Chronic venous wound(s) of the left leg. The wound is of mild severity. The underlying cause of the wound is venous hypertension- he is doing well with tacrolimus- no new issues. 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 Alyson Rossi Anemia 2011    Arm fracture Ria Pemberton with also yearly DM exam    Cataract     worsening-Dr. Shayan Martinez    Cellulitis of left lower leg     Chronic venous hypertension with ulcer ([address] 2011    CKD (chronic kidney disease) 2012    Renal u/S normal     Colon polyps     Dr. Alyson Rossi Diabetes mellitus ([address]     Diabetes mellitus with peripheral circulatory disorder ([address] 10/2/2015    Diabetes mellitus with skin ulcer ([address] 10/2/2015    Diabetic peripheral neuropathy ([address]     + EMG, NCS    Diabetic skin ulcer associated with type 2 diabetes mellitus ([address]     Diverticulosis 12    mild, left colon    Femoral DVT (deep venous thrombosis) ([address] 2011    PARTIAL,CHRONIC-SPFLD HEART SURGEONS    GERD (gastroesophageal reflux disease)     Glaucoma     open angle-Dr. Esvin Hilliard H/O cardiac catheterization 6/3/14    EF55% normal study    H/O Doppler ultrasound 03/26/15    Carotid US- no significant stenosis noted bilaterally.      H/O mumps orchitis     as a youth    Hemorrhoids 12    Dr. Bri Duff; repeat colonoscopy 3 years    HLD (hyperlipidemia)     Hx of Doppler ultrasound 1/06/15    Lymph node seen in left groin area. No bilateral stenosis.     Hyperlipemia     Hypertension 1992    Idiopathic chronic venous hypertension of left lower extremity with ulcer and inflammation (HCC) 10/2/2015    Leg ulcer ([facility] 1978-present    following at wound center, Dr Spike Tapia No diabetic retinopathy OU 11/12    Dr. Capps Cons chronic ulcer of left lower leg with fat layer exposed ([address] 10/2/2015    Pulmonary embolism ([address] 11/13    patient on coumadin    Sleep apnea     doesnt always use cpap dt it drys him out    SOB (shortness of breath) Oct 2011    Stress test normal.     Tendinitis 1973    plantar tendons    Type II or unspecified type diabetes mellitus with other specified manifestations, not stated as uncontrolled 2/8/2013    Unspecified venous (peripheral) insufficiency     Urticaria        PAST SURGICAL HISTORY    Past Surgical History:   Procedure Laterality Date    BREAST SURGERY  1970s    benign tumors bilaterally    CARDIAC CATHETERIZATION  6/3/14    EF55% normal study    CARPAL TUNNEL RELEASE Left 1993    CATARACT REMOVAL Right 3/11/2013    Dr. Latia Higginbotham Left 2/25/2013    Dr. Hay  2006    polyps    COLONOSCOPY  11/21/11    villous component--f/u colonoscopy will be needed in 6 months    COLONOSCOPY  4/23/12    mild diverticulosis, internal hemorrhoids; repeat in 3 years (Dr. Garfield Valladares)    COLONOSCOPY  08/04/2016    mild diverticulosis, three colon polyps    HERNIA REPAIR  1970s    SKIN GRAFT  1978-present    skin grafts to left ankle ulcers    SPLENECTOMY  1958    TONSILLECTOMY  1953    VARICOSE VEIN SURGERY Left 2009       FAMILY HISTORY    Family History   Problem Relation Age of Onset    Cancer Father      prostate    Heart Disease Father     High Blood Pressure Father     High Cholesterol Father     Cancer Brother    [employer] Pen 5    Liraglutide (VICTOZA) 18 MG/3ML SOPN SC injection Inject 1.2 mg into the skin daily 3 Pen 3    escitalopram (LEXAPRO) 20 MG tablet Take 1 tablet by mouth daily 90 tablet 3    omeprazole (PRILOSEC) 20 MG delayed release capsule TAKE ONE (1) CAPSULE BY MOUTH ONCE DAILY 90 capsule 3    glucose blood VI test strips (ALISHA CONTOUR TEST) strip 1 each by In Vitro route 4 times daily As needed. 100 strip 11    insulin glargine (LANTUS SOLOSTAR) 100 UNIT/ML injection pen 65 units morning and 60 units nightly (dose decrease as of 6/21/2016) 15 Pen 5    LANTUS SOLOSTAR 100 UNIT/ML injection pen INJECT 70 UNITS SUBCUTANEOUSLY EVERY MORNING & SIXTY-FIVE (65) UNITS NIGHTLY 15 Pen 4    clotrimazole-betamethasone (LOTRISONE) 1-0.05 % cream Apply topically when you change your dressing as needed for irritation/redness. 45 g 1    ARIPiprazole (ABILIFY) 5 MG tablet Take 10 mg by mouth daily       clotrimazole-betamethasone (LOTRISONE) 1-0.05 % cream Apply topically 2 times daily. 45 g 1    ALISHA CONTOUR TEST strip USE AS DIRECTED TO TEST BLOOD SUGAR FOUR TIMES DAILY 100 strip 5    Insulin Pen Needle (PEN NEEDLES 31GX5/16\") 31G X 8 MM MISC USE AS DIRECTED 100 each 1    Glucose Blood (BLOOD GLUCOSE TEST STRIPS) STRP Please give contour testing strips to test blood sugar 4x daily 200 strip 3    Spacer/Aero-Holding Chambers NGOZI 1 Device by Does not apply route daily as needed. 1 Device 0    tobramycin-dexamethasone (TOBRADEX) ophthalmic suspension Place 1 drop into both eyes 2 times daily as needed.  aspirin EC 81 MG EC tablet Take 81 mg by mouth daily. No current facility-administered medications on file prior to encounter. REVIEW OF SYSTEMS    Pertinent items are noted in HPI. Constitutional: Negative for systemic symptoms including fever, chills and malaise.       Objective:      BP (!) 150/89   Pulse 93   Temp 99 °F (37.2 °C) (Temporal)   Resp 16     PHYSICAL EXAM      General: The Tunneling (cm) 0 cm 12/1/2017 11:24 AM   Tunneling Position ___ O'Clock 0 12/1/2017 11:24 AM   Undermining Starts ___ O'Clock 0 12/1/2017 11:24 AM   Undermining Ends___ O'Clock 0 12/1/2017 11:24 AM   Undermining Maxium Distance (cm) 0 12/1/2017 11:24 AM   Wound Assessment Red 12/1/2017 11:24 AM   Drainage Amount Small 12/1/2017 11:24 AM   Drainage Description Serosanguinous 12/1/2017 11:24 AM   Odor None 12/1/2017 11:24 AM   Margins Attached edges 12/1/2017 11:24 AM   Ana-wound Assessment Clean;Dark edges 12/1/2017 11:24 AM   Non-staged Wound Description Full thickness 12/1/2017 11:24 AM   Pink%Wound Bed 0 12/1/2017 11:24 AM   Red%Wound Bed 100 12/1/2017 11:24 AM   Yellow%Wound Bed 0 12/1/2017 11:24 AM   Black%Wound Bed 0 12/1/2017 11:24 AM   Purple%Wound Bed 0 12/1/2017 11:24 AM   Other%Wound Bed 0 12/1/2017 11:24 AM   Debridement per physician Full thickness 12/1/2017 12:20 PM   Time out Yes 2/20/2015 10:51 AM   Procedural Pain 0 11/7/2014  9:25 AM   Post procedural Pain 0 4/18/2014  8:55 AM   Number of days: 1708       Percent of Wound(s) Debrided: approximately 100%    Total  Area  Debrided:  0.21 sq cm     Bleeding:  Minimal    Hemostasis Achieved:  by pressure    Procedural Pain:  0  / 10     Post Procedural Pain:  0 / 10     Response to treatment:  Well tolerated by patient. Status of wound progress and description from last visit:   The wound actually is smaller- there is a very small portion of the wound that is open- but still a lot of irregular tissue in the wound bed. Over-all he is getting better. Would continue with current. Will consider antifungal cream to the periwound next week.       Plan:       Discharge Instructions         Discharge Instructions         Elevate legs to the level of the heart or above for 30 minutes 4-5 times a day and/or        when sitting.                              [facility] orders 12/1/17       WOUND CARE:LEFT medial leg wound (DIABETIC ULCERS) --Apply small amount of protopic--rub it in--cover with Enluxtra, 4x4, conform, tape.  apply double  tubigrip D or Juxta Lite      May use lotrimizol cream at home to periwound.     Right arm wounds--healed--no dressing needed      Culture obtained 12/2/16      Arterial duplex study to pablo lower legs--negative for issues at this time     GET [address]  Your dressing care supplies will be ordered through [employer]. If you have questions or need to reorder your supplies please contact our [employer] representative [address] [place] [address] at [phone].         Follow up with Angela Delvalle in 1 weeks Friday in wound clinic      Call [phone] for any questions or concerns.        Physicians signature_____________________________      Date__________  Time________                         Treatment Note Wound 03/29/13 Diabetic Ulcer Ankle Left; Inner;Distal # 2a (onset 1978) Left Medial Proximal Ankle-Dressing/Treatment:  (lotion, tacrolimus, enluxtra, 4x4, conform, double tubi D)    Written Patient Dismissal Instructions Given            Electronically signed by Андрей Chi MD on 12/1/2017 at 7:11 PM

## 2017-12-08 ENCOUNTER — HOSPITAL ENCOUNTER (OUTPATIENT)
Dept: WOUND CARE | Age: 72
Discharge: HOME | End: 2017-12-08
Attending: INTERNAL MEDICINE | Admitting: INTERNAL MEDICINE

## 2017-12-22 ENCOUNTER — HOSPITAL ENCOUNTER (OUTPATIENT)
Dept: WOUND CARE | Age: 72
Discharge: HOME | End: 2017-12-22
Attending: INTERNAL MEDICINE | Admitting: INTERNAL MEDICINE

## 2017-12-22 VITALS
SYSTOLIC BLOOD PRESSURE: 143 MMHG | RESPIRATION RATE: 16 BRPM | HEART RATE: 97 BPM | TEMPERATURE: 97.6 F | DIASTOLIC BLOOD PRESSURE: 79 MMHG

## 2017-12-22 DIAGNOSIS — L97.922 NON-PRESSURE CHRONIC ULCER OF LEFT LOWER LEG WITH FAT LAYER EXPOSED (HCC): Primary | ICD-10-CM

## 2017-12-22 PROCEDURE — 11042 DBRDMT SUBQ TIS 1ST 20SQCM/<: CPT | Performed by: INTERNAL MEDICINE

## 2017-12-22 NOTE — PROGRESS NOTES
Mani Oneill  AGE: 71 y.o. GENDER: male  : 1945  EPISODE DATE:  2017     Subjective:     Chief Complaint   Patient presents with    Wound Check         HISTORY of PRESENT ILLNESS      Mani Oneill is a 71 y.o. male who presents today for wound evaluation of Chronic venous and diabetic wound(s) of the left leg/ankle. The wound is of moderate severity. The underlying cause of the wound is venous disease and diabetes. Pt has had ulcer in the area for at least 8 years. He notes that there is a new lesion on the posterior part of the left ankle.   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 Martha Gilmore Anemia 2011    Arm fracture Tommie Rudy     Dr Zoila Us with also yearly DM exam    Cataract     worsening-Dr. Hakeem Montoya    Cellulitis of left lower leg     Chronic venous hypertension with ulcer ([address] 2011    CKD (chronic kidney disease) 2012    Renal u/S normal     Colon polyps     Dr. Martha Gilmore Diabetes mellitus ([address]     Diabetes mellitus with peripheral circulatory disorder ([address] 10/2/2015    Diabetes mellitus with skin ulcer ([address] 10/2/2015    Diabetic peripheral neuropathy ([address]     + EMG, NCS    Diabetic skin ulcer associated with type 2 diabetes mellitus ([address]     Diverticulosis 12    mild, left colon    Femoral DVT (deep venous thrombosis) ([address] 2011    PARTIAL,CHRONIC-SPFLD HEART SURGEONS    GERD (gastroesophageal reflux disease)     Glaucoma     open angle-Dr. Laura Whitt H/O cardiac catheterization 6/3/14    EF55% normal study    H/O Doppler ultrasound 03/26/15 every evening 90 tablet 3    insulin lispro (HUMALOG KWIKPEN) 100 UNIT/ML pen 2- 14 units with each meal three times per day 15 Pen 5    Liraglutide (VICTOZA) 18 MG/3ML SOPN SC injection Inject 1.2 mg into the skin daily 3 Pen 3    escitalopram (LEXAPRO) 20 MG tablet Take 1 tablet by mouth daily 90 tablet 3    omeprazole (PRILOSEC) 20 MG delayed release capsule TAKE ONE (1) CAPSULE BY MOUTH ONCE DAILY 90 capsule 3    glucose blood VI test strips (ALISHA CONTOUR TEST) strip 1 each by In Vitro route 4 times daily As needed. 100 strip 11    insulin glargine (LANTUS SOLOSTAR) 100 UNIT/ML injection pen 65 units morning and 60 units nightly (dose decrease as of 6/21/2016) 15 Pen 5    LANTUS SOLOSTAR 100 UNIT/ML injection pen INJECT 70 UNITS SUBCUTANEOUSLY EVERY MORNING & SIXTY-FIVE (65) UNITS NIGHTLY 15 Pen 4    clotrimazole-betamethasone (LOTRISONE) 1-0.05 % cream Apply topically when you change your dressing as needed for irritation/redness. 45 g 1    ARIPiprazole (ABILIFY) 5 MG tablet Take 10 mg by mouth daily       clotrimazole-betamethasone (LOTRISONE) 1-0.05 % cream Apply topically 2 times daily. 45 g 1    ALISHA CONTOUR TEST strip USE AS DIRECTED TO TEST BLOOD SUGAR FOUR TIMES DAILY 100 strip 5    Insulin Pen Needle (PEN NEEDLES 31GX5/16\") 31G X 8 MM MISC USE AS DIRECTED 100 each 1    Glucose Blood (BLOOD GLUCOSE TEST STRIPS) STRP Please give contour testing strips to test blood sugar 4x daily 200 strip 3    Spacer/Aero-Holding Chambers NGOZI 1 Device by Does not apply route daily as needed. 1 Device 0    tobramycin-dexamethasone (TOBRADEX) ophthalmic suspension Place 1 drop into both eyes 2 times daily as needed.  aspirin EC 81 MG EC tablet Take 81 mg by mouth daily. No current facility-administered medications on file prior to encounter. REVIEW OF SYSTEMS      Constitutional: Negative for systemic symptoms including fever, chills and malaise.       Objective:      BP (!) 143/79 Pulse 97   Temp 97.6 °F (36.4 °C) (Temporal)   Resp 16     PHYSICAL EXAM    General: The patient is in no acute distress. Seems to be more subdued than usual  Mental status:  Patient is appropriate, is  oriented to place and plan of care. Dermatologic exam: Visual inspection of the periwound reveals the skin to be sclerotic  Wound exam: see wound description below in procedure note      Assessment:     Problem List Items Addressed This Visit     WC-Chronic venous hypertension with ulcer involving left side ([address]    WC-Non-pressure chronic ulcer of left lower leg with fat layer exposed ([address] - Primary      Other Visit Diagnoses    None. Procedure Note    Indications:  Based on my examination of this patient's wound(s) today, sharp excision into necrotic subcutaneous tissue is required to promote healing and evaluate the extent of previous healing. Performed by: Buford Hodgkin, MD    Consent obtained: Yes    Time out taken:  Yes    Pain Control: not needed       Debridement:Excisional Debridement    Using curette the wound(s) was/were sharply debrided down through and including the removal of subcutaneous tissue. Devitalized Tissue Debrided:  fibrin, biofilm and slough    Pre Debridement Measurements:  Are located in the Wound Documentation Flow Sheet    All active wounds listed below with today's date are evaluated  Wound(s)    debrided this date include # : 2 a    Post  Debridement Measurements:  Wound 03/29/13 Diabetic Ulcer Ankle Left; Inner;Distal # 2a (onset 1978) Left Medial Proximal Ankle (Active)   Wound Type Wound 11/17/2017 11:43 AM   Dressing Status Clean;Dry; Intact 12/22/2017 12:04 PM   Dressing Changed Changed/New 12/22/2017 12:04 PM   Dressing/Treatment 4x4 10/13/2017 11:42 AM   Wound Cleansed Wound cleanser 12/22/2017 11:12 AM   Wound Length (cm) 0.5 cm 12/22/2017 11:51 AM   Wound Width (cm) 0.8 cm 12/22/2017 11:51 AM   Wound Depth (cm)  .2 12/22/2017 11:51 AM   Calculated Wound Size (cm^2) (l*w) 0.4 cm^2 12/22/2017 11:51 AM   Change in Wound Size % (l*w) -1233.33 12/22/2017 11:51 AM   Distance Tunneling (cm) 0 cm 12/22/2017 11:12 AM   Tunneling Position ___ O'Clock 0 12/22/2017 11:12 AM   Undermining Starts ___ O'Clock 5 12/22/2017 11:12 AM   Undermining Ends___ O'Clock 7 12/22/2017 11:12 AM   Undermining Maxium Distance (cm) 0.2 12/22/2017 11:12 AM   Wound Assessment Yellow;Black; Red 12/22/2017 11:12 AM   Drainage Amount Small 12/22/2017 11:12 AM   Drainage Description Serosanguinous 12/22/2017 11:12 AM   Odor None 12/22/2017 11:12 AM   Margins Undefined edges 12/22/2017 11:12 AM   Ana-wound Assessment Clean;Dark edges 12/22/2017 11:12 AM   Non-staged Wound Description Full thickness 12/22/2017 11:12 AM   Virgie%Wound Bed 0 12/22/2017 11:12 AM   Red%Wound Bed 20 12/22/2017 11:12 AM   Yellow%Wound Bed 50 12/22/2017 11:12 AM   Black%Wound Bed 30 12/22/2017 11:12 AM   Purple%Wound Bed 0 12/22/2017 11:12 AM   Other%Wound Bed 0 12/22/2017 11:12 AM   Debridement per physician Subcutaneous 12/22/2017 11:51 AM   Time out Yes 2/20/2015 10:51 AM   Procedural Pain 0 11/7/2014  9:25 AM   Post procedural Pain 0 4/18/2014  8:55 AM   Number of days: 0189       Percent of Wound(s) Debrided: approximately 100%    Total  Area  Debrided:  0.4 sq cm     Bleeding:  Minimal    Hemostasis Achieved:  by pressure and by silver nitrate stick    Procedural Pain:  0  / 10     Post Procedural Pain:  0 / 10     Response to treatment:  Well tolerated by patient. Status of wound progress and description from last visit:   Primary wound looks better there is a possible new area posterior. This is treated with alginate.       Plan:       Discharge Instructions         Discharge Instructions         Elevate legs to the level of the heart or above for 30 minutes 4-5 times a day and/or        when sitting.          Wound orders 12/22/17       WOUND CARE:LEFT medial leg wound (DIABETIC ULCERS) --Apply small amount of

## 2017-12-28 LAB
POC INR: 2.3 INDEX
PROTHROMBIN TIME, POC: 28.1 SECONDS (ref 10–14.3)

## 2017-12-29 ENCOUNTER — HOSPITAL ENCOUNTER (OUTPATIENT)
Dept: WOUND CARE | Age: 72
Discharge: HOME | End: 2017-12-29
Attending: INTERNAL MEDICINE | Admitting: INTERNAL MEDICINE

## 2017-12-29 VITALS
DIASTOLIC BLOOD PRESSURE: 80 MMHG | HEART RATE: 69 BPM | RESPIRATION RATE: 16 BRPM | SYSTOLIC BLOOD PRESSURE: 136 MMHG | TEMPERATURE: 97.6 F

## 2017-12-29 DIAGNOSIS — L97.922 NON-PRESSURE CHRONIC ULCER OF LEFT LOWER LEG WITH FAT LAYER EXPOSED (HCC): ICD-10-CM

## 2017-12-29 NOTE — PROGRESS NOTES
Arleen Blake  AGE: 71 y.o. GENDER: male  : 1945  EPISODE DATE:  2017     Subjective:     Chief Complaint   Patient presents with    Wound Check     left ankle         HISTORY of PRESENT ILLNESS      Arleen Blake is a 71 y.o. male who presents today for wound evaluation of Chronic venous wound(s) of the left leg. The wound is of mild severity. The underlying cause of the wound is venous- he is improved this week with less drainage. There is no pain. 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 Logan Rodríguez Anemia 2011    Arm fracture Ivory Mcmillan with also yearly DM exam    Cataract     worsening-Dr. Jaramillo Care    Cellulitis of left lower leg     Chronic venous hypertension with ulcer ([address] 2011    CKD (chronic kidney disease) 2012    Renal u/S normal     Colon polyps     Dr. Logan Rodríguez Diabetes mellitus ([address]     Diabetes mellitus with peripheral circulatory disorder ([address] 10/2/2015    Diabetes mellitus with skin ulcer ([address] 10/2/2015    Diabetic peripheral neuropathy ([address]     + EMG, NCS    Diabetic skin ulcer associated with type 2 diabetes mellitus ([address]     Diverticulosis 12    mild, left colon    Femoral DVT (deep venous thrombosis) ([address] 2011    PARTIAL,CHRONIC-SPFLD HEART SURGEONS    GERD (gastroesophageal reflux disease)     Glaucoma     open angle-Dr. Roshan Nava H/O cardiac catheterization 6/3/14    EF55% normal study    H/O Doppler ultrasound 03/26/15    Carotid US- no significant stenosis noted bilaterally.      H/O mumps orchitis     as a youth    Hemorrhoids 12     15 Pen 5    Liraglutide (VICTOZA) 18 MG/3ML SOPN SC injection Inject 1.2 mg into the skin daily 3 Pen 3    escitalopram (LEXAPRO) 20 MG tablet Take 1 tablet by mouth daily 90 tablet 3    omeprazole (PRILOSEC) 20 MG delayed release capsule TAKE ONE (1) CAPSULE BY MOUTH ONCE DAILY 90 capsule 3    glucose blood VI test strips (ALISHA CONTOUR TEST) strip 1 each by In Vitro route 4 times daily As needed. 100 strip 11    insulin glargine (LANTUS SOLOSTAR) 100 UNIT/ML injection pen 65 units morning and 60 units nightly (dose decrease as of 6/21/2016) 15 Pen 5    LANTUS SOLOSTAR 100 UNIT/ML injection pen INJECT 70 UNITS SUBCUTANEOUSLY EVERY MORNING & SIXTY-FIVE (65) UNITS NIGHTLY 15 Pen 4    clotrimazole-betamethasone (LOTRISONE) 1-0.05 % cream Apply topically when you change your dressing as needed for irritation/redness. 45 g 1    ARIPiprazole (ABILIFY) 5 MG tablet Take 10 mg by mouth daily       clotrimazole-betamethasone (LOTRISONE) 1-0.05 % cream Apply topically 2 times daily. 45 g 1    ALISHA CONTOUR TEST strip USE AS DIRECTED TO TEST BLOOD SUGAR FOUR TIMES DAILY 100 strip 5    Insulin Pen Needle (PEN NEEDLES 31GX5/16\") 31G X 8 MM MISC USE AS DIRECTED 100 each 1    Glucose Blood (BLOOD GLUCOSE TEST STRIPS) STRP Please give contour testing strips to test blood sugar 4x daily 200 strip 3    Spacer/Aero-Holding Chambers NGOZI 1 Device by Does not apply route daily as needed. 1 Device 0    tobramycin-dexamethasone (TOBRADEX) ophthalmic suspension Place 1 drop into both eyes 2 times daily as needed.  aspirin EC 81 MG EC tablet Take 81 mg by mouth daily. No current facility-administered medications on file prior to encounter. REVIEW OF SYSTEMS    Pertinent items are noted in HPI. Constitutional: Negative for systemic symptoms including fever, chills and malaise.       Objective:      /80   Pulse 69   Temp 97.6 °F (36.4 °C) (Temporal)   Resp 16     PHYSICAL EXAM      General: The patient is in no acute distress. Mental status:  Patient is appropriate, is  oriented to place and plan of care. Dermatologic exam: Visual inspection of the periwound reveals the skin to be normal in turgor and texture  Wound exam: see wound description below in procedure note      Assessment:     Problem List Items Addressed This Visit     WC-Chronic venous hypertension with ulcer involving left side ([address] - Primary    WC-Non-pressure chronic ulcer of left lower leg with fat layer exposed ([address]      Other Visit Diagnoses    None. Procedure Note    Indications:  Based on my examination of this patient's wound(s) today, sharp excision into necrotic epidermis and dermis is required to promote healing and evaluate the extent of previous healing. Performed by: Janny Friend MD    Consent obtained: Yes    Time out taken:  Yes    Pain Control: none       Debridement:Non-excisional Debridement    Using curette the wound(s) was/were sharply debrided down through and including the removal of epidermis and dermis. Devitalized Tissue Debrided:  fibrin, biofilm, slough and necrotic/eschar    Pre Debridement Measurements:  Are located in the Wound Documentation Flow Sheet    All active wounds listed below with today's date are evaluated  Wound(s)    debrided this date include # : 2     Post  Debridement Measurements:  Wound 03/29/13 Diabetic Ulcer Ankle Left; Inner;Distal # 2a (onset 1978) Left Medial Proximal Ankle (Active)   Wound Type Wound 11/17/2017 11:43 AM   Dressing Status Clean;Dry; Intact 12/29/2017 11:52 AM   Dressing Changed Changed/New 12/29/2017 11:52 AM   Dressing/Treatment 4x4 10/13/2017 11:42 AM   Wound Cleansed Wound cleanser 12/29/2017 11:15 AM   Wound Length (cm) 0.5 cm 12/29/2017 11:47 AM   Wound Width (cm) 0.5 cm 12/29/2017 11:47 AM   Wound Depth (cm)  .2 12/29/2017 11:47 AM   Calculated Wound Size (cm^2) (l*w) 0.25 cm^2 12/29/2017 11:47 AM   Change in Wound Size % (l*w) -733.33

## 2018-01-01 ENCOUNTER — HOSPITAL ENCOUNTER (OUTPATIENT)
Dept: OTHER | Age: 73
Discharge: HOME | End: 2018-01-31
Attending: FAMILY MEDICINE | Admitting: FAMILY MEDICINE

## 2018-01-04 ENCOUNTER — OFFICE VISIT (OUTPATIENT)
Dept: FAMILY MEDICINE CLINIC | Age: 73
End: 2018-01-04

## 2018-01-04 VITALS
HEART RATE: 86 BPM | WEIGHT: 303 LBS | HEIGHT: 77 IN | BODY MASS INDEX: 35.78 KG/M2 | RESPIRATION RATE: 18 BRPM | DIASTOLIC BLOOD PRESSURE: 78 MMHG | OXYGEN SATURATION: 95 % | SYSTOLIC BLOOD PRESSURE: 130 MMHG

## 2018-01-04 DIAGNOSIS — N18.30 HYPERTENSION IN STAGE 3 CHRONIC KIDNEY DISEASE DUE TO TYPE 2 DIABETES MELLITUS (HCC): ICD-10-CM

## 2018-01-04 DIAGNOSIS — E78.2 COMBINED HYPERLIPIDEMIA ASSOCIATED WITH TYPE 2 DIABETES MELLITUS (HCC): ICD-10-CM

## 2018-01-04 DIAGNOSIS — F33.3 SEVERE RECURRENT MAJOR DEPRESSIVE DISORDER WITH PSYCHOTIC FEATURES (HCC): ICD-10-CM

## 2018-01-04 DIAGNOSIS — E11.622 DIABETIC SKIN ULCER ASSOCIATED WITH TYPE 2 DIABETES MELLITUS (HCC): ICD-10-CM

## 2018-01-04 DIAGNOSIS — L97.922 NON-PRESSURE CHRONIC ULCER OF LEFT LOWER LEG WITH FAT LAYER EXPOSED (HCC): ICD-10-CM

## 2018-01-04 DIAGNOSIS — K59.09 OTHER CONSTIPATION: ICD-10-CM

## 2018-01-04 DIAGNOSIS — E11.22 HYPERTENSION IN STAGE 3 CHRONIC KIDNEY DISEASE DUE TO TYPE 2 DIABETES MELLITUS (HCC): ICD-10-CM

## 2018-01-04 DIAGNOSIS — L98.499 DIABETIC SKIN ULCER ASSOCIATED WITH TYPE 2 DIABETES MELLITUS (HCC): ICD-10-CM

## 2018-01-04 DIAGNOSIS — J44.9 CHRONIC OBSTRUCTIVE PULMONARY DISEASE, UNSPECIFIED COPD TYPE (HCC): ICD-10-CM

## 2018-01-04 DIAGNOSIS — E11.69 COMBINED HYPERLIPIDEMIA ASSOCIATED WITH TYPE 2 DIABETES MELLITUS (HCC): ICD-10-CM

## 2018-01-04 DIAGNOSIS — Z23 NEED FOR INFLUENZA VACCINATION: ICD-10-CM

## 2018-01-04 DIAGNOSIS — Z79.01 LONG TERM CURRENT USE OF ANTICOAGULANTS WITH INR GOAL OF 2.0-3.0: ICD-10-CM

## 2018-01-04 DIAGNOSIS — I12.9 HYPERTENSION IN STAGE 3 CHRONIC KIDNEY DISEASE DUE TO TYPE 2 DIABETES MELLITUS (HCC): ICD-10-CM

## 2018-01-04 LAB
ANION GAP SERPL CALCULATED.3IONS-SCNC: 15 MMOL/L (ref 3–16)
BUN BLDV-MCNC: 12 MG/DL (ref 7–20)
CALCIUM SERPL-MCNC: 8.9 MG/DL (ref 8.3–10.6)
CHLORIDE BLD-SCNC: 104 MMOL/L (ref 99–110)
CO2: 22 MMOL/L (ref 21–32)
CREAT SERPL-MCNC: 1.3 MG/DL (ref 0.8–1.3)
GFR AFRICAN AMERICAN: >60
GFR NON-AFRICAN AMERICAN: 54
GLUCOSE BLD-MCNC: 120 MG/DL (ref 70–99)
HBA1C MFR BLD: 6.6 %
HCT VFR BLD CALC: 44.6 % (ref 40.5–52.5)
HEMOGLOBIN: 14.7 G/DL (ref 13.5–17.5)
MCH RBC QN AUTO: 31.5 PG (ref 26–34)
MCHC RBC AUTO-ENTMCNC: 33 G/DL (ref 31–36)
MCV RBC AUTO: 95.5 FL (ref 80–100)
PDW BLD-RTO: 15.2 % (ref 12.4–15.4)
PLATELET # BLD: 228 K/UL (ref 135–450)
PMV BLD AUTO: 10.6 FL (ref 5–10.5)
POTASSIUM SERPL-SCNC: 5.4 MMOL/L (ref 3.5–5.1)
RBC # BLD: 4.67 M/UL (ref 4.2–5.9)
SODIUM BLD-SCNC: 141 MMOL/L (ref 136–145)
TSH SERPL DL<=0.05 MIU/L-ACNC: 2 UIU/ML (ref 0.27–4.2)
WBC # BLD: 7 K/UL (ref 4–11)

## 2018-01-04 PROCEDURE — 90686 IIV4 VACC NO PRSV 0.5 ML IM: CPT | Performed by: FAMILY MEDICINE

## 2018-01-04 PROCEDURE — G8484 FLU IMMUNIZE NO ADMIN: HCPCS | Performed by: FAMILY MEDICINE

## 2018-01-04 PROCEDURE — 4004F PT TOBACCO SCREEN RCVD TLK: CPT | Performed by: FAMILY MEDICINE

## 2018-01-04 PROCEDURE — 3017F COLORECTAL CA SCREEN DOC REV: CPT | Performed by: FAMILY MEDICINE

## 2018-01-04 PROCEDURE — 3288F FALL RISK ASSESSMENT DOCD: CPT | Performed by: FAMILY MEDICINE

## 2018-01-04 PROCEDURE — 1123F ACP DISCUSS/DSCN MKR DOCD: CPT | Performed by: FAMILY MEDICINE

## 2018-01-04 PROCEDURE — G8510 SCR DEP NEG, NO PLAN REQD: HCPCS | Performed by: FAMILY MEDICINE

## 2018-01-04 PROCEDURE — G0008 ADMIN INFLUENZA VIRUS VAC: HCPCS | Performed by: FAMILY MEDICINE

## 2018-01-04 PROCEDURE — G8926 SPIRO NO PERF OR DOC: HCPCS | Performed by: FAMILY MEDICINE

## 2018-01-04 PROCEDURE — G8427 DOCREV CUR MEDS BY ELIG CLIN: HCPCS | Performed by: FAMILY MEDICINE

## 2018-01-04 PROCEDURE — 4040F PNEUMOC VAC/ADMIN/RCVD: CPT | Performed by: FAMILY MEDICINE

## 2018-01-04 PROCEDURE — 3044F HG A1C LEVEL LT 7.0%: CPT | Performed by: FAMILY MEDICINE

## 2018-01-04 PROCEDURE — 99214 OFFICE O/P EST MOD 30 MIN: CPT | Performed by: FAMILY MEDICINE

## 2018-01-04 PROCEDURE — G8417 CALC BMI ABV UP PARAM F/U: HCPCS | Performed by: FAMILY MEDICINE

## 2018-01-04 PROCEDURE — 3023F SPIROM DOC REV: CPT | Performed by: FAMILY MEDICINE

## 2018-01-04 PROCEDURE — 83036 HEMOGLOBIN GLYCOSYLATED A1C: CPT | Performed by: FAMILY MEDICINE

## 2018-01-04 PROCEDURE — 36415 COLL VENOUS BLD VENIPUNCTURE: CPT | Performed by: FAMILY MEDICINE

## 2018-01-04 RX ORDER — WARFARIN SODIUM 5 MG/1
5 TABLET ORAL DAILY
Qty: 100 TABLET | Refills: 5 | Status: SHIPPED | OUTPATIENT
Start: 2018-01-04 | End: 2018-09-27 | Stop reason: DRUGHIGH

## 2018-01-04 RX ORDER — WARFARIN SODIUM 7.5 MG/1
TABLET ORAL
Qty: 90 TABLET | Refills: 2 | Status: SHIPPED | OUTPATIENT
Start: 2018-01-04 | End: 2018-05-18

## 2018-01-04 RX ORDER — ATORVASTATIN CALCIUM 40 MG/1
40 TABLET, FILM COATED ORAL EVERY EVENING
Qty: 90 TABLET | Refills: 3 | Status: SHIPPED | OUTPATIENT
Start: 2018-01-04 | End: 2019-03-11 | Stop reason: SDUPTHER

## 2018-01-04 RX ORDER — BUPROPION HYDROCHLORIDE 300 MG/1
300 TABLET ORAL EVERY MORNING
Qty: 90 TABLET | Refills: 3 | Status: SHIPPED | OUTPATIENT
Start: 2018-01-04 | End: 2018-11-30 | Stop reason: SDUPTHER

## 2018-01-04 RX ORDER — POLYETHYLENE GLYCOL 3350 17 G/17G
POWDER, FOR SOLUTION ORAL
Qty: 30 EACH | Refills: 5 | Status: SHIPPED | OUTPATIENT
Start: 2018-01-04 | End: 2018-10-15 | Stop reason: ALTCHOICE

## 2018-01-04 RX ORDER — OMEPRAZOLE 20 MG/1
CAPSULE, DELAYED RELEASE ORAL
Qty: 90 CAPSULE | Refills: 3 | Status: SHIPPED | OUTPATIENT
Start: 2018-01-04 | End: 2019-01-11 | Stop reason: SDUPTHER

## 2018-01-04 RX ORDER — ESCITALOPRAM OXALATE 20 MG/1
20 TABLET ORAL DAILY
Qty: 90 TABLET | Refills: 3 | Status: SHIPPED | OUTPATIENT
Start: 2018-01-04 | End: 2018-11-30 | Stop reason: SDUPTHER

## 2018-01-04 RX ORDER — VALSARTAN 80 MG/1
80 TABLET ORAL DAILY
Qty: 90 TABLET | Refills: 3 | Status: SHIPPED | OUTPATIENT
Start: 2018-01-04 | End: 2019-01-11 | Stop reason: SDUPTHER

## 2018-01-04 ASSESSMENT — PATIENT HEALTH QUESTIONNAIRE - PHQ9
1. LITTLE INTEREST OR PLEASURE IN DOING THINGS: 0
SUM OF ALL RESPONSES TO PHQ9 QUESTIONS 1 & 2: 0
2. FEELING DOWN, DEPRESSED OR HOPELESS: 0
SUM OF ALL RESPONSES TO PHQ QUESTIONS 1-9: 0

## 2018-01-04 NOTE — PROGRESS NOTES
Diagnosis Assessment and Associated Orders:      No Follow-up on file. 1. DM (diabetes mellitus) type II uncontrolled, periph vascular disorder ([facility] -DM- doing well. Decrease long acting to 50 units bid. Stop Novolog. Amos Coy as he is tolerated well. If still having morning tremors, will stop insulins, and vicotza, and switch to glipizide only with meals. Liraglutide (VICTOZA) 18 MG/3ML SOPN SC injection    POCT glycosylated hemoglobin (Hb A1C)    insulin glargine (BASAGLAR KWIKPEN) 100 UNIT/ML injection pen   2. Hypertension in stage 3 chronic kidney disease due to type 2 diabetes mellitus ([facility] -Stable on current regimen. Refill given on current medication   valsartan (DIOVAN) 80 MG tablet    TSH without Reflex    Basic Metabolic Panel    CBC   3. Combined hyperlipidemia associated with type 2 diabetes mellitus (HCC) - stable. Will continue o statin atorvastatin (LIPITOR) 40 MG tablet   4. Diabetic skin ulcer associated with type 2 diabetes mellitus ([facility] - improving with better blood sugars and weekly wound care team intervention. Liraglutide (VICTOZA) 18 MG/3ML SOPN SC injection    insulin glargine (BASAGLAR KWIKPEN) 100 UNIT/ML injection pen   5. WC-Non-pressure chronic ulcer of left lower leg with fat layer exposed ([facility] - improved with treatment. Will continue to follow along with wound care team     6. Chronic obstructive pulmonary disease, unspecified COPD type ([facility] - stable on rare use of inhaler     7. Severe recurrent major depressive disorder with psychotic features ([facility] - doing well on lexapro, wellbutrin and abilify  and following with Dr. Eric Litten. buPROPion (WELLBUTRIN XL) 300 MG extended release tablet    escitalopram (LEXAPRO) 20 MG tablet   8. Long term current use of anticoagulants with INR goal of 2.0-3.0 - stable. Med refill given continue INR checks warfarin (COUMADIN) 7.5 MG tablet   9. Other constipation - improved with miralax.   polyethylene glycol (MIRALAX)

## 2018-01-12 ENCOUNTER — TELEPHONE (OUTPATIENT)
Dept: FAMILY MEDICINE CLINIC | Age: 73
End: 2018-01-12

## 2018-01-12 ENCOUNTER — HOSPITAL ENCOUNTER (OUTPATIENT)
Dept: WOUND CARE | Age: 73
Discharge: HOME | End: 2018-01-12
Attending: INTERNAL MEDICINE | Admitting: INTERNAL MEDICINE

## 2018-01-26 ENCOUNTER — HOSPITAL ENCOUNTER (OUTPATIENT)
Dept: WOUND CARE | Age: 73
Discharge: HOME | End: 2018-01-26
Attending: INTERNAL MEDICINE | Admitting: INTERNAL MEDICINE

## 2018-01-26 VITALS — DIASTOLIC BLOOD PRESSURE: 73 MMHG | SYSTOLIC BLOOD PRESSURE: 117 MMHG | TEMPERATURE: 98.2 F | HEART RATE: 73 BPM

## 2018-01-26 DIAGNOSIS — L97.922 NON-PRESSURE CHRONIC ULCER OF LEFT LOWER LEG WITH FAT LAYER EXPOSED (HCC): Primary | ICD-10-CM

## 2018-01-26 NOTE — PROGRESS NOTES
Relation Age of Onset    Cancer Father      prostate    Heart Disease Father     High Blood Pressure Father     High Cholesterol Father     Cancer Brother     Diabetes Brother     Thyroid Disease Brother        SOCIAL HISTORY    Social History   Substance Use Topics    Smoking status: Former Smoker     Packs/day: 2.00     Years: 30.00     Types: Cigarettes     Quit date: 5/28/1993    Smokeless tobacco: Current User     Types: Chew      Comment: chew--30 years.   Reviewed 9/24/2015    Alcohol use 0.0 oz/week      Comment: 12-pack beer weekly; 5 cups coffee daily       ALLERGIES    Allergies   Allergen Reactions    Cavilon Durable Barrier [Mineral Oil-Dimeth-Coconut Oil]     Parabens Hives    Prinivil [Lisinopril] Swelling    Cortisone Rash    Dilaudid [Hydromorphone Hcl] Rash    Penicillins Rash    Sulfamethoxazole-Trimethoprim Nausea Only    Tape [Adhesive Tape] Rash       MEDICATIONS    Current Outpatient Prescriptions on File Prior to Encounter   Medication Sig Dispense Refill    warfarin (COUMADIN) 7.5 MG tablet Taken on Wednesdays and 5mg all other days 90 tablet 2    warfarin (COUMADIN) 5 MG tablet Take 1 tablet by mouth daily 100 tablet 5    buPROPion (WELLBUTRIN XL) 300 MG extended release tablet Take 1 tablet by mouth every morning 90 tablet 3    escitalopram (LEXAPRO) 20 MG tablet Take 1 tablet by mouth daily 90 tablet 3    Liraglutide (VICTOZA) 18 MG/3ML SOPN SC injection Inject 1.2 mg into the skin daily 3 pen 3    atorvastatin (LIPITOR) 40 MG tablet Take 1 tablet by mouth every evening 90 tablet 3    valsartan (DIOVAN) 80 MG tablet Take 1 tablet by mouth daily 90 tablet 3    polyethylene glycol (MIRALAX) PACK packet Take 17g by mouth two times per week to daily as needed for constipation 30 each 5    omeprazole (PRILOSEC) 20 MG delayed release capsule TAKE ONE (1) CAPSULE BY MOUTH ONCE DAILY 90 capsule 3    insulin glargine (BASAGLAR KWIKPEN) 100 UNIT/ML injection pen 50 units in the morning and 50 units in the evening 15 pen 3    Insulin Pen Needle (PEN NEEDLES 31GX5/16\") 31G X 8 MM MISC Inject 1 each into the skin 4 times daily as needed (to check blood sugars) 100 each 5    tacrolimus (PROTOPIC) 0.03 % ointment Apply topically 2 times daily. 30 g 0    tacrolimus (PROTOPIC) 0.03 % ointment Apply 30 g topically 2 times daily Apply topically 2 times daily.  insulin lispro (HUMALOG KWIKPEN) 100 UNIT/ML pen 2- 14 units with each meal three times per day 15 Pen 5    glucose blood VI test strips (ALISHA CONTOUR TEST) strip 1 each by In Vitro route 4 times daily As needed. 100 strip 11    LANTUS SOLOSTAR 100 UNIT/ML injection pen INJECT 70 UNITS SUBCUTANEOUSLY EVERY MORNING & SIXTY-FIVE (65) UNITS NIGHTLY 15 Pen 4    clotrimazole-betamethasone (LOTRISONE) 1-0.05 % cream Apply topically when you change your dressing as needed for irritation/redness. 45 g 1    ARIPiprazole (ABILIFY) 5 MG tablet Take 10 mg by mouth daily       clotrimazole-betamethasone (LOTRISONE) 1-0.05 % cream Apply topically 2 times daily. 45 g 1    ALISHA CONTOUR TEST strip USE AS DIRECTED TO TEST BLOOD SUGAR FOUR TIMES DAILY 100 strip 5    Insulin Pen Needle (PEN NEEDLES 31GX5/16\") 31G X 8 MM MISC USE AS DIRECTED 100 each 1    Glucose Blood (BLOOD GLUCOSE TEST STRIPS) STRP Please give contour testing strips to test blood sugar 4x daily 200 strip 3    Spacer/Aero-Holding Chambers NGOZI 1 Device by Does not apply route daily as needed. 1 Device 0    tobramycin-dexamethasone (TOBRADEX) ophthalmic suspension Place 1 drop into both eyes 2 times daily as needed.  aspirin EC 81 MG EC tablet Take 81 mg by mouth daily. No current facility-administered medications on file prior to encounter. REVIEW OF SYSTEMS    Pertinent items are noted in HPI. Constitutional: Negative for systemic symptoms including fever, chills and malaise.       Objective:      /73   Pulse 73   Temp 98.2 °F (36.8 °C)

## 2018-01-29 LAB
POC INR: 3.7 INDEX
PROTHROMBIN TIME, POC: 43.8 SECONDS (ref 10–14.3)

## 2018-02-01 ENCOUNTER — HOSPITAL ENCOUNTER (OUTPATIENT)
Dept: OTHER | Age: 73
Discharge: HOME | End: 2018-02-28
Attending: FAMILY MEDICINE | Admitting: FAMILY MEDICINE

## 2018-02-02 ENCOUNTER — HOSPITAL ENCOUNTER (OUTPATIENT)
Dept: WOUND CARE | Age: 73
Discharge: HOME | End: 2018-02-02
Attending: INTERNAL MEDICINE | Admitting: INTERNAL MEDICINE

## 2018-02-02 VITALS
TEMPERATURE: 98.4 F | DIASTOLIC BLOOD PRESSURE: 83 MMHG | RESPIRATION RATE: 16 BRPM | SYSTOLIC BLOOD PRESSURE: 153 MMHG | HEART RATE: 94 BPM

## 2018-02-02 DIAGNOSIS — L97.922 NON-PRESSURE CHRONIC ULCER OF LEFT LOWER LEG WITH FAT LAYER EXPOSED (HCC): Primary | ICD-10-CM

## 2018-02-02 NOTE — PROGRESS NOTES
Damian Chaves  AGE: 71 y.o. GENDER: male  : 1945  EPISODE DATE:  2018     Subjective:     Chief Complaint   Patient presents with    Wound Check     left ankle         HISTORY of PRESENT ILLNESS      Damian Chaves is a 71 y.o. male who presents today for wound evaluation of Chronic venous and diabetic wound(s) of the left leg. The wound is of mild severity. The underlying cause of the wound is venous stasis. He is in for f/u- doing well with betadine- much less drainage. 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 Zaida Ramirez Anemia 2011    Arm fracture Merary Pastrana with also yearly DM exam    Cataract     worsening-Dr. Brianna Dixon    Cellulitis of left lower leg     Chronic venous hypertension with ulcer ([address] 2011    CKD (chronic kidney disease) 2012    Renal u/S normal     Colon polyps     Dr. Zaida Ramirez Diabetes mellitus ([address]     Diabetes mellitus with peripheral circulatory disorder ([address] 10/2/2015    Diabetes mellitus with skin ulcer ([address] 10/2/2015    Diabetic peripheral neuropathy ([address]     + EMG, NCS    Diabetic skin ulcer associated with type 2 diabetes mellitus ([address]     Diverticulosis 12    mild, left colon    Femoral DVT (deep venous thrombosis) ([address] 2011    PARTIAL,CHRONIC-SPFLD HEART SURGEONS    GERD (gastroesophageal reflux disease)     Glaucoma     open angle-Dr. Gi Voss H/O cardiac catheterization 6/3/14    EF55% normal study    H/O Doppler ultrasound 03/26/15    Carotid US- no significant stenosis noted bilaterally.      H/O mumps orchitis     as a youth    Hemorrhoids 12      Diabetes Brother     Thyroid Disease Brother        SOCIAL HISTORY    Social History   Substance Use Topics    Smoking status: Former Smoker     Packs/day: 2.00     Years: 30.00     Types: Cigarettes     Quit date: 5/28/1993    Smokeless tobacco: Current User     Types: Chew      Comment: chew--30 years. Reviewed 9/24/2015    Alcohol use 0.0 oz/week      Comment: 12-pack beer weekly; 5 cups coffee daily       ALLERGIES    Allergies   Allergen Reactions    Cavilon Durable Barrier [Mineral Oil-Dimeth-Coconut Oil]     Parabens Hives    Prinivil [Lisinopril] Swelling    Cortisone Rash    Dilaudid [Hydromorphone Hcl] Rash    Penicillins Rash    Sulfamethoxazole-Trimethoprim Nausea Only    Tape [Adhesive Tape] Rash       MEDICATIONS    Current Outpatient Prescriptions on File Prior to Encounter   Medication Sig Dispense Refill    warfarin (COUMADIN) 7.5 MG tablet Taken on Wednesdays and 5mg all other days 90 tablet 2    warfarin (COUMADIN) 5 MG tablet Take 1 tablet by mouth daily 100 tablet 5    buPROPion (WELLBUTRIN XL) 300 MG extended release tablet Take 1 tablet by mouth every morning 90 tablet 3    escitalopram (LEXAPRO) 20 MG tablet Take 1 tablet by mouth daily 90 tablet 3    Liraglutide (VICTOZA) 18 MG/3ML SOPN SC injection Inject 1.2 mg into the skin daily 3 pen 3    atorvastatin (LIPITOR) 40 MG tablet Take 1 tablet by mouth every evening 90 tablet 3    valsartan (DIOVAN) 80 MG tablet Take 1 tablet by mouth daily 90 tablet 3    polyethylene glycol (MIRALAX) PACK packet Take 17g by mouth two times per week to daily as needed for constipation 30 each 5    omeprazole (PRILOSEC) 20 MG delayed release capsule TAKE ONE (1) CAPSULE BY MOUTH ONCE DAILY 90 capsule 3    Insulin Pen Needle (PEN NEEDLES 31GX5/16\") 31G X 8 MM MISC Inject 1 each into the skin 4 times daily as needed (to check blood sugars) 100 each 5    tacrolimus (PROTOPIC) 0.03 % ointment Apply topically 2 times daily.  30 g 0    APPROVAL FOR NEW SHIELD      Your dressing care supplies will be ordered through [employer]. If you have questions or need to reorder your supplies please contact our [employer] representative [address] [place] [address] at [phone].         Follow up with Jacques Gibson in 1 weeks Friday in wound clinic      Call [phone] for any questions or concerns.        Physicians signature_____________________________      Date__________  Time________                                  Treatment Note Wound 03/29/13 Diabetic Ulcer Ankle Left; Inner;Distal # 2a (onset 1978) Left Medial Proximal Ankle-Dressing/Treatment:  (betadine damp 2x2, Enluxtra, conform, double tubi D)    Written Patient Dismissal Instructions Given            Electronically signed by Eloina Silvestre MD on 2/2/2018 at 2:16 PM

## 2018-02-09 ENCOUNTER — HOSPITAL ENCOUNTER (OUTPATIENT)
Dept: WOUND CARE | Age: 73
Discharge: HOME | End: 2018-02-09
Attending: INTERNAL MEDICINE | Admitting: INTERNAL MEDICINE

## 2018-02-09 VITALS
DIASTOLIC BLOOD PRESSURE: 82 MMHG | BODY MASS INDEX: 36.01 KG/M2 | SYSTOLIC BLOOD PRESSURE: 143 MMHG | WEIGHT: 305 LBS | HEART RATE: 71 BPM | TEMPERATURE: 98.5 F | RESPIRATION RATE: 16 BRPM | HEIGHT: 77 IN

## 2018-02-09 DIAGNOSIS — L97.922 NON-PRESSURE CHRONIC ULCER OF LEFT LOWER LEG WITH FAT LAYER EXPOSED (HCC): Primary | ICD-10-CM

## 2018-02-09 NOTE — PROGRESS NOTES
(HUMALOG KWIKPEN) 100 UNIT/ML pen 2- 14 units with each meal three times per day 15 Pen 5    glucose blood VI test strips (ALISHA CONTOUR TEST) strip 1 each by In Vitro route 4 times daily As needed. 100 strip 11    LANTUS SOLOSTAR 100 UNIT/ML injection pen INJECT 70 UNITS SUBCUTANEOUSLY EVERY MORNING & SIXTY-FIVE (65) UNITS NIGHTLY 15 Pen 4    clotrimazole-betamethasone (LOTRISONE) 1-0.05 % cream Apply topically when you change your dressing as needed for irritation/redness. 45 g 1    ARIPiprazole (ABILIFY) 5 MG tablet Take 10 mg by mouth daily       clotrimazole-betamethasone (LOTRISONE) 1-0.05 % cream Apply topically 2 times daily. 45 g 1    ALISHA CONTOUR TEST strip USE AS DIRECTED TO TEST BLOOD SUGAR FOUR TIMES DAILY 100 strip 5    Insulin Pen Needle (PEN NEEDLES 31GX5/16\") 31G X 8 MM MISC USE AS DIRECTED 100 each 1    Glucose Blood (BLOOD GLUCOSE TEST STRIPS) STRP Please give contour testing strips to test blood sugar 4x daily 200 strip 3    tobramycin-dexamethasone (TOBRADEX) ophthalmic suspension Place 1 drop into both eyes 2 times daily as needed.  aspirin EC 81 MG EC tablet Take 81 mg by mouth daily.  polyethylene glycol (MIRALAX) PACK packet Take 17g by mouth two times per week to daily as needed for constipation 30 each 5    insulin glargine (BASAGLAR KWIKPEN) 100 UNIT/ML injection pen 50 units in the morning and 50 units in the evening 15 pen 3    Spacer/Aero-Holding Chambers NGOZI 1 Device by Does not apply route daily as needed. 1 Device 0     No current facility-administered medications on file prior to encounter. REVIEW OF SYSTEMS    Pertinent items are noted in HPI. Constitutional: Negative for systemic symptoms including fever, chills and malaise.       Objective:      BP (!) 143/82   Pulse 71   Temp 98.5 °F (36.9 °C) (Temporal)   Resp 16   Ht 6' 5\" (1.956 m)   Wt (!) 305 lb (138.3 kg)   BMI 36.17 kg/m²     PHYSICAL EXAM      General: The patient is in no acute distress. Mental status:  Patient is appropriate, is  oriented to place and plan of care. Dermatologic exam: Visual inspection of the periwound reveals the skin to be normal in turgor and texture  Wound exam: see wound description below in procedure note      Assessment:     Problem List Items Addressed This Visit     WC-Chronic venous hypertension with ulcer involving left side ([address]    WC-Non-pressure chronic ulcer of left lower leg with fat layer exposed ([address] - Primary      Other Visit Diagnoses    None. Procedure Note    Indications:  Based on my examination of this patient's wound(s) today, sharp excision into necrotic epidermis is required to promote healing and evaluate the extent of previous healing. Performed by: Rosalva Spencre MD    Consent obtained: Yes    Time out taken:  Yes    Pain Control: none       Debridement:Non-excisional Debridement    Using curette the wound(s) was/were sharply debrided down through and including the removal of epidermis. Devitalized Tissue Debrided:  fibrin, biofilm, slough and exudate    Pre Debridement Measurements:  Are located in the Wound Documentation Flow Sheet    All active wounds listed below with today's date are evaluated  Wound(s)    debrided this date include # : 2     Post  Debridement Measurements:  Wound 03/29/13 Diabetic Ulcer Ankle Left; Inner;Distal # 2a (onset 1978) Left Medial Proximal Ankle (Active)   Wound Image   2/9/2018 11:20 AM   Wound Type Wound 2/9/2018 11:20 AM   Dressing Status Clean;Dry; Intact 2/9/2018 12:14 PM   Dressing Changed Changed/New 2/9/2018 12:14 PM   Wound Cleansed Rinsed/Irrigated with saline 2/9/2018 11:20 AM   Wound Length (cm) 0.2 cm 2/9/2018 11:58 AM   Wound Width (cm) 0.3 cm 2/9/2018 11:58 AM   Wound Depth (cm)  .2 2/9/2018 11:58 AM   Calculated Wound Size (cm^2) (l*w) 0.06 cm^2 2/9/2018 11:58 AM   Change in Wound Size % (l*w) -100 2/9/2018 11:58 AM   Distance Tunneling (cm) 0 cm 2/9/2018 11:20 AM PCP:     Continuing wound care orders and information:              Residence: home              Continue home health care with:    Your wound-care supplies will be provided by: Libby CARDENAS provider:   Compression with   Off loading:  Date    Wound Meds:    Wound cleansing:      Do not scrub or use excessive force. Wash hands with soap and water before and after dressing changes. Prior to applying a clean dressing, cleanse wound with normal saline,    wound cleanser, or mild soap and water. Ask your physician or nurse before getting the wound(s) wet in the shower. Daily Wound management:    Keep weight off wounds and reposition every 2 hours. Avoid standing for long periods of time. Apply wraps/stockings in AM and remove at bedtime. Elevate legs to the level of the heart or above for 30 minutes 4-5 times a day and/or when sitting. When taking antibiotics take entire prescription as ordered by MD do not stop taking until medicine is all gone. Orders for this week: 2/9/18     LEFT medial leg wound (DIABETIC ULCERS) --lotrisone to periwound--apply small piece of betadine damp 2x2 to indent, half of abd, secure with conform, tape.  apply double  tubigrip D or Juxta Lite--change every other day    May use lotrimizol cream at home to periwound    Follow up with Dr Ivis Mchugh  In 1 week in the wound care center    Call [phone] for any questions or concerns. Date__________   Time____________                   Treatment Note Wound 03/29/13 Diabetic Ulcer Ankle Left; Inner;Distal # 2a (onset 1978) Left Medial Proximal Ankle-Dressing/Treatment:  (lotrisone, 2x2 damp betadine, ABD, conform, tubi D)    Written Patient Dismissal Instructions Given            Electronically signed by Barbi Mattson MD on 2/9/2018 at 12:19 PM

## 2018-02-16 ENCOUNTER — HOSPITAL ENCOUNTER (OUTPATIENT)
Dept: WOUND CARE | Age: 73
Discharge: HOME | End: 2018-02-16
Attending: INTERNAL MEDICINE | Admitting: INTERNAL MEDICINE

## 2018-02-16 VITALS
RESPIRATION RATE: 16 BRPM | HEART RATE: 92 BPM | DIASTOLIC BLOOD PRESSURE: 79 MMHG | SYSTOLIC BLOOD PRESSURE: 117 MMHG | TEMPERATURE: 97.1 F

## 2018-02-16 DIAGNOSIS — L97.922 NON-PRESSURE CHRONIC ULCER OF LEFT LOWER LEG WITH FAT LAYER EXPOSED (HCC): Primary | ICD-10-CM

## 2018-02-16 NOTE — PROGRESS NOTES
4/23/12    Dr. Gonzalez Addison; repeat colonoscopy 3 years   Leno Granados HLD (hyperlipidemia)     Hx of Doppler ultrasound 1/06/15    Lymph node seen in left groin area. No bilateral stenosis.     Hyperlipemia     Hypertension 1992    Idiopathic chronic venous hypertension of left lower extremity with ulcer and inflammation (HCC) 10/2/2015    Leg ulcer ([address] 1978-present    following at wound center, Dr Esteban Richard No diabetic retinopathy OU 11/12    Dr. Kerry Montana chronic ulcer of left lower leg with fat layer exposed ([address] 10/2/2015    Pulmonary embolism ([address] 11/13    patient on coumadin    Sleep apnea     doesnt always use cpap dt it drys him out    SOB (shortness of breath) Oct 2011    Stress test normal.     Tendinitis 1973    plantar tendons    Type II or unspecified type diabetes mellitus with other specified manifestations, not stated as uncontrolled 2/8/2013    Unspecified venous (peripheral) insufficiency     Urticaria        PAST SURGICAL HISTORY    Past Surgical History:   Procedure Laterality Date    BREAST SURGERY  1970s    benign tumors bilaterally    CARDIAC CATHETERIZATION  6/3/14    EF55% normal study    CARPAL TUNNEL RELEASE Left 1993    CATARACT REMOVAL Right 3/11/2013    Dr. Alcantar Bachsugaror Left 2/25/2013    Dr. Hanny Fairbanks  2006    polyps    COLONOSCOPY  11/21/11    villous component--f/u colonoscopy will be needed in 6 months    COLONOSCOPY  4/23/12    mild diverticulosis, internal hemorrhoids; repeat in 3 years (Dr. Gonzalez Addison)    COLONOSCOPY  08/04/2016    mild diverticulosis, three colon polyps    HERNIA REPAIR  1970s    SKIN GRAFT  1978-present    skin grafts to left ankle ulcers    SPLENECTOMY  1958    TONSILLECTOMY  1953    VARICOSE VEIN SURGERY Left 2009       FAMILY HISTORY    Family History   Problem Relation Age of Onset    Cancer Father      prostate    Heart Disease Father     High Blood Pressure Father     High Cholesterol Father     0 cm 2/16/2018 11:31 AM   Tunneling Position ___ O'Clock 0 2/16/2018 11:31 AM   Undermining Starts ___ O'Clock 0 2/16/2018 11:31 AM   Undermining Ends___ O'Clock 0 2/16/2018 11:31 AM   Undermining Maxium Distance (cm) 0 2/16/2018 11:31 AM   Wound Assessment Red 2/9/2018 11:20 AM   Drainage Amount Moderate 2/16/2018 11:31 AM   Drainage Description Serosanguinous 2/16/2018 11:31 AM   Odor None 2/16/2018 11:31 AM   Margins Defined edges 2/16/2018 11:31 AM   Ana-wound Assessment Dark edges 2/16/2018 11:31 AM   Non-staged Wound Description Full thickness 2/16/2018 11:31 AM   [place]%Wound Bed 0 2/16/2018 11:31 AM   Red%Wound Bed 100 2/16/2018 11:31 AM   Yellow%Wound Bed 0 2/16/2018 11:31 AM   Black%Wound Bed 0 2/16/2018 11:31 AM   Purple%Wound Bed 0 2/16/2018 11:31 AM   Other%Wound Bed 0 2/16/2018 11:31 AM   Debridement per physician Full thickness 2/9/2018 11:58 AM   Time out Yes 2/20/2015 10:51 AM   Procedural Pain 0 11/7/2014  9:25 AM   Post procedural Pain 0 4/18/2014  8:55 AM   Number of days: 1367       Percent of Wound(s) Debrided: approximately 100%    Total  Area  Debrided:  0.06 sq cm     Bleeding:  Minimal    Hemostasis Achieved:  by pressure    Procedural Pain:  0  / 10     Post Procedural Pain:  0 / 10     Response to treatment:  Well tolerated by patient. Status of wound progress and description from last visit:   improved. Plan:       Discharge Instructions            PHYSICIAN ORDERS AND DISCHARGE INSTRUCTIONS     NOTE: Upon discharge from the [address], you will receive a patient experience survey.  We would be grateful if you would take the time to fill this survey out.     Wound care order history:        ANA CRISTINA's   Right       Left               Date      Vascular studies:   Date completed w no issues noted     Imaging:   Date      Cultures:   Date      Labs/ HbA1c:   Date      Grafts:  Date      HBO:  Not at this time     Antibiotics:               Earlier Wound care treatments:

## 2018-02-26 LAB
POC INR: 2.5 INDEX
PROTHROMBIN TIME, POC: 29.9 SECONDS (ref 10–14.3)

## 2018-03-01 ENCOUNTER — HOSPITAL ENCOUNTER (OUTPATIENT)
Dept: OTHER | Age: 73
Discharge: HOME | End: 2018-03-31
Attending: FAMILY MEDICINE | Admitting: FAMILY MEDICINE

## 2018-03-02 ENCOUNTER — HOSPITAL ENCOUNTER (OUTPATIENT)
Dept: WOUND CARE | Age: 73
Discharge: HOME | End: 2018-03-02
Attending: INTERNAL MEDICINE | Admitting: INTERNAL MEDICINE

## 2018-03-02 VITALS
HEART RATE: 94 BPM | DIASTOLIC BLOOD PRESSURE: 83 MMHG | RESPIRATION RATE: 16 BRPM | TEMPERATURE: 98.4 F | SYSTOLIC BLOOD PRESSURE: 132 MMHG

## 2018-03-02 DIAGNOSIS — L97.921 NON-PRESSURE CHRONIC ULCER OF LEFT LOWER LEG, LIMITED TO BREAKDOWN OF SKIN (HCC): ICD-10-CM

## 2018-03-02 NOTE — PROGRESS NOTES
no acute distress. Mental status:  Patient is appropriate, is  oriented to place and plan of care. Dermatologic exam: Visual inspection of the periwound reveals the skin to be normal in turgor and texture. Wound exam:  see wound description below     All active wounds listed below with today's date are evaluated      Wound 11/15/13 Other (Comment) Leg Inner erethema with a small puncture site (Active)   Number of days: 1567       Assessment:       Problem List Items Addressed This Visit     WC-Chronic venous hypertension with ulcer involving left side ([address] - Primary    WD-Non-pressure chronic ulcer of left lower leg, limited to breakdown of skin ([address]          Status of wound progress and description from last visit:   Wound appears to have closed today! I will have him f/u in 2 weeks to ensure stability. Plan:     Discharge Instructions         PHYSICIAN ORDERS AND DISCHARGE INSTRUCTIONS     NOTE: Upon discharge from the [address], you will receive a patient experience survey. We would be grateful if you would take the time to fill this survey out.     Wound care order history:        ANA CRISTINA's   Right       Left               Date      Vascular studies:   Date completed w no issues noted     Imaging:   Date      Cultures:   Date      Labs/ HbA1c:   Date      Grafts:  Date      HBO:  Not at this time     Antibiotics:               Earlier Wound care treatments:                Authorizations:                  Consults:   Date                  PCP:      Continuing wound care orders and information:              Residence: home              Continue home health care with:      Your wound-care supplies will be provided by: Clydell Rubinstein provider:     Compression with     EWX loading:  Date      Wound Meds:     Wound cleansing:                  MR not scrub or use excessive force.                 Wash hands with soap and water before and after dressing changes.                  DWDTX to applying a clean

## 2018-03-02 NOTE — PLAN OF CARE
Problem: Wound:  Intervention: Assess pain status  See flowsheet  Intervention: Assess wound size, appearance and drainage  See flowsheet  Intervention: Doppler if unable to palpate pedal pulse  See flowsheet  Intervention: Assess pedal pulses bilaterally if patient has a foot or leg ulcer  See flowsheet

## 2018-03-16 ENCOUNTER — HOSPITAL ENCOUNTER (OUTPATIENT)
Dept: WOUND CARE | Age: 73
Discharge: HOME | End: 2018-03-16
Attending: INTERNAL MEDICINE | Admitting: INTERNAL MEDICINE

## 2018-03-16 VITALS
RESPIRATION RATE: 16 BRPM | TEMPERATURE: 97.7 F | HEART RATE: 87 BPM | DIASTOLIC BLOOD PRESSURE: 76 MMHG | SYSTOLIC BLOOD PRESSURE: 134 MMHG

## 2018-03-16 DIAGNOSIS — L98.499 DIABETIC SKIN ULCER ASSOCIATED WITH TYPE 2 DIABETES MELLITUS (HCC): ICD-10-CM

## 2018-03-16 DIAGNOSIS — E11.622 DIABETIC SKIN ULCER ASSOCIATED WITH TYPE 2 DIABETES MELLITUS (HCC): ICD-10-CM

## 2018-03-16 DIAGNOSIS — L97.921 NON-PRESSURE CHRONIC ULCER OF LEFT LOWER LEG, LIMITED TO BREAKDOWN OF SKIN (HCC): Primary | ICD-10-CM

## 2018-03-16 DIAGNOSIS — I83.219 VARICOSE VEINS OF LOWER EXTREMITIES WITH ULCER AND INFLAMMATION (HCC): ICD-10-CM

## 2018-03-16 DIAGNOSIS — L97.919 VARICOSE VEINS OF LOWER EXTREMITIES WITH ULCER AND INFLAMMATION (HCC): ICD-10-CM

## 2018-03-16 DIAGNOSIS — I83.229 VARICOSE VEINS OF LOWER EXTREMITIES WITH ULCER AND INFLAMMATION (HCC): ICD-10-CM

## 2018-03-16 DIAGNOSIS — L97.929 VARICOSE VEINS OF LOWER EXTREMITIES WITH ULCER AND INFLAMMATION (HCC): ICD-10-CM

## 2018-03-16 NOTE — PROGRESS NOTES
VARICOSE VEIN SURGERY Left 2009       FAMILY HISTORY    Family History   Problem Relation Age of Onset    Cancer Father      prostate    Heart Disease Father     High Blood Pressure Father     High Cholesterol Father     Cancer Brother     Diabetes Brother     Thyroid Disease Brother        SOCIAL HISTORY    Social History   Substance Use Topics    Smoking status: Former Smoker     Packs/day: 2.00     Years: 30.00     Types: Cigarettes     Quit date: 5/28/1993    Smokeless tobacco: Current User     Types: Chew      Comment: chew--30 years.   Reviewed 9/24/2015    Alcohol use 0.0 oz/week      Comment: 12-pack beer weekly; 5 cups coffee daily       ALLERGIES    Allergies   Allergen Reactions    Cavilon Durable Barrier [Mineral Oil-Dimeth-Coconut Oil]     Parabens Hives    Prinivil [Lisinopril] Swelling    Cortisone Rash    Dilaudid [Hydromorphone Hcl] Rash    Penicillins Rash    Sulfamethoxazole-Trimethoprim Nausea Only    Tape [Adhesive Tape] Rash       MEDICATIONS    Current Outpatient Prescriptions on File Prior to Encounter   Medication Sig Dispense Refill    warfarin (COUMADIN) 7.5 MG tablet Taken on Wednesdays and 5mg all other days 90 tablet 2    warfarin (COUMADIN) 5 MG tablet Take 1 tablet by mouth daily 100 tablet 5    buPROPion (WELLBUTRIN XL) 300 MG extended release tablet Take 1 tablet by mouth every morning 90 tablet 3    escitalopram (LEXAPRO) 20 MG tablet Take 1 tablet by mouth daily 90 tablet 3    Liraglutide (VICTOZA) 18 MG/3ML SOPN SC injection Inject 1.2 mg into the skin daily 3 pen 3    atorvastatin (LIPITOR) 40 MG tablet Take 1 tablet by mouth every evening 90 tablet 3    valsartan (DIOVAN) 80 MG tablet Take 1 tablet by mouth daily 90 tablet 3    polyethylene glycol (MIRALAX) PACK packet Take 17g by mouth two times per week to daily as needed for constipation 30 each 5    omeprazole (PRILOSEC) 20 MG delayed release capsule TAKE ONE (1) CAPSULE BY MOUTH ONCE DAILY 90

## 2018-03-16 NOTE — PLAN OF CARE
Problem: Wound:  Intervention: Assess pain status  See Flowsheet  Intervention: Assess wound size, appearance and drainage  See Flowsheet  Intervention: Doppler if unable to palpate pedal pulse  See Flowsheet  Intervention: Assess pedal pulses bilaterally if patient has a foot or leg ulcer  See Flowsheet    Goal: Will show signs of wound healing; wound closure and no evidence of infection  Will show signs of wound healing; wound closure and no evidence of infection    Outcome: Ongoing  See Flowsheet

## 2018-03-26 LAB
POC INR: 2 INDEX
PROTHROMBIN TIME, POC: 23.4 SECONDS (ref 10–14.3)

## 2018-04-01 ENCOUNTER — HOSPITAL ENCOUNTER (OUTPATIENT)
Dept: OTHER | Age: 73
Discharge: HOME | End: 2018-04-30
Attending: FAMILY MEDICINE | Admitting: FAMILY MEDICINE

## 2018-04-05 ENCOUNTER — OFFICE VISIT (OUTPATIENT)
Dept: FAMILY MEDICINE CLINIC | Age: 73
End: 2018-04-05

## 2018-04-05 VITALS
SYSTOLIC BLOOD PRESSURE: 136 MMHG | RESPIRATION RATE: 16 BRPM | HEART RATE: 81 BPM | HEIGHT: 77 IN | WEIGHT: 306.6 LBS | OXYGEN SATURATION: 95 % | DIASTOLIC BLOOD PRESSURE: 74 MMHG | BODY MASS INDEX: 36.2 KG/M2

## 2018-04-05 DIAGNOSIS — E78.2 COMBINED HYPERLIPIDEMIA ASSOCIATED WITH TYPE 2 DIABETES MELLITUS (HCC): ICD-10-CM

## 2018-04-05 DIAGNOSIS — Z11.59 ENCOUNTER FOR HEPATITIS C SCREENING TEST FOR LOW RISK PATIENT: ICD-10-CM

## 2018-04-05 DIAGNOSIS — L98.499 DIABETIC SKIN ULCER ASSOCIATED WITH TYPE 2 DIABETES MELLITUS (HCC): ICD-10-CM

## 2018-04-05 DIAGNOSIS — E11.622 DIABETIC SKIN ULCER ASSOCIATED WITH TYPE 2 DIABETES MELLITUS (HCC): ICD-10-CM

## 2018-04-05 DIAGNOSIS — E11.69 COMBINED HYPERLIPIDEMIA ASSOCIATED WITH TYPE 2 DIABETES MELLITUS (HCC): ICD-10-CM

## 2018-04-05 LAB — HBA1C MFR BLD: 6.2 %

## 2018-04-05 PROCEDURE — 83036 HEMOGLOBIN GLYCOSYLATED A1C: CPT | Performed by: FAMILY MEDICINE

## 2018-04-05 PROCEDURE — G8427 DOCREV CUR MEDS BY ELIG CLIN: HCPCS | Performed by: FAMILY MEDICINE

## 2018-04-05 PROCEDURE — 4004F PT TOBACCO SCREEN RCVD TLK: CPT | Performed by: FAMILY MEDICINE

## 2018-04-05 PROCEDURE — 1123F ACP DISCUSS/DSCN MKR DOCD: CPT | Performed by: FAMILY MEDICINE

## 2018-04-05 PROCEDURE — 4040F PNEUMOC VAC/ADMIN/RCVD: CPT | Performed by: FAMILY MEDICINE

## 2018-04-05 PROCEDURE — 3017F COLORECTAL CA SCREEN DOC REV: CPT | Performed by: FAMILY MEDICINE

## 2018-04-05 PROCEDURE — 3044F HG A1C LEVEL LT 7.0%: CPT | Performed by: FAMILY MEDICINE

## 2018-04-05 PROCEDURE — 99214 OFFICE O/P EST MOD 30 MIN: CPT | Performed by: FAMILY MEDICINE

## 2018-04-05 PROCEDURE — G8417 CALC BMI ABV UP PARAM F/U: HCPCS | Performed by: FAMILY MEDICINE

## 2018-04-05 RX ORDER — PEN NEEDLE, DIABETIC 31 GX5/16"
NEEDLE, DISPOSABLE MISCELLANEOUS
Qty: 100 EACH | Refills: 1 | Status: CANCELLED | OUTPATIENT
Start: 2018-04-05

## 2018-04-06 ENCOUNTER — HOSPITAL ENCOUNTER (OUTPATIENT)
Dept: WOUND CARE | Age: 73
Discharge: HOME | End: 2018-04-06
Attending: INTERNAL MEDICINE | Admitting: INTERNAL MEDICINE

## 2018-04-06 VITALS
DIASTOLIC BLOOD PRESSURE: 79 MMHG | HEART RATE: 74 BPM | SYSTOLIC BLOOD PRESSURE: 132 MMHG | TEMPERATURE: 98.4 F | RESPIRATION RATE: 16 BRPM

## 2018-04-06 DIAGNOSIS — L97.921 NON-PRESSURE CHRONIC ULCER OF LEFT LOWER LEG, LIMITED TO BREAKDOWN OF SKIN (HCC): Primary | ICD-10-CM

## 2018-04-12 LAB — DIABETIC RETINOPATHY: NORMAL

## 2018-04-23 ENCOUNTER — HOSPITAL ENCOUNTER (OUTPATIENT)
Dept: GENERAL RADIOLOGY | Age: 73
Discharge: HOME | End: 2018-04-23
Attending: FAMILY MEDICINE | Admitting: FAMILY MEDICINE

## 2018-04-23 LAB
ANION GAP SERPL CALCULATED.3IONS-SCNC: 14 MMOL/L (ref 4–16)
BUN BLDV-MCNC: 14 MG/DL (ref 6–23)
CALCIUM SERPL-MCNC: 9.1 MG/DL (ref 8.3–10.6)
CHLORIDE BLD-SCNC: 99 MMOL/L (ref 99–110)
CO2: 25 MMOL/L (ref 21–32)
CREAT SERPL-MCNC: 1.4 MG/DL (ref 0.9–1.3)
GFR AFRICAN AMERICAN: >60 ML/MIN/1.73M2
GFR NON-AFRICAN AMERICAN: 50 ML/MIN/1.73M2
GLUCOSE BLD-MCNC: 162 MG/DL (ref 70–99)
HEPATITIS C ANTIBODY: NON REACTIVE
POC INR: 4.2 INDEX
POTASSIUM SERPL-SCNC: 5 MMOL/L (ref 3.5–5.1)
PROTHROMBIN TIME, POC: 50.7 SECONDS (ref 10–14.3)
SODIUM BLD-SCNC: 138 MMOL/L (ref 135–145)

## 2018-04-27 ENCOUNTER — HOSPITAL ENCOUNTER (OUTPATIENT)
Dept: WOUND CARE | Age: 73
Discharge: HOME | End: 2018-04-27
Attending: INTERNAL MEDICINE | Admitting: INTERNAL MEDICINE

## 2018-04-27 DIAGNOSIS — L97.921 NON-PRESSURE CHRONIC ULCER OF LEFT LOWER LEG, LIMITED TO BREAKDOWN OF SKIN (HCC): Primary | ICD-10-CM

## 2018-05-01 ENCOUNTER — HOSPITAL ENCOUNTER (OUTPATIENT)
Dept: OTHER | Age: 73
Discharge: HOME | End: 2018-05-31
Attending: FAMILY MEDICINE | Admitting: FAMILY MEDICINE

## 2018-05-07 LAB
POC INR: 4.5 INDEX
PROTHROMBIN TIME, POC: 53.7 SECONDS (ref 10–14.3)

## 2018-05-17 LAB
POC INR: 3.7 INDEX
PROTHROMBIN TIME, POC: 44.2 SECONDS (ref 10–14.3)

## 2018-05-18 ENCOUNTER — HOSPITAL ENCOUNTER (OUTPATIENT)
Dept: WOUND CARE | Age: 73
Discharge: HOME | End: 2018-05-18
Attending: INTERNAL MEDICINE | Admitting: INTERNAL MEDICINE

## 2018-05-18 VITALS
HEART RATE: 76 BPM | RESPIRATION RATE: 20 BRPM | SYSTOLIC BLOOD PRESSURE: 102 MMHG | DIASTOLIC BLOOD PRESSURE: 69 MMHG | TEMPERATURE: 98.2 F

## 2018-05-18 RX ORDER — WARFARIN SODIUM 2.5 MG/1
2.5 TABLET ORAL
COMMUNITY
End: 2018-09-27 | Stop reason: DRUGHIGH

## 2018-05-31 LAB
POC INR: 3.1 INDEX
PROTHROMBIN TIME, POC: 37 SECONDS (ref 10–14.3)

## 2018-06-01 ENCOUNTER — HOSPITAL ENCOUNTER (OUTPATIENT)
Dept: OTHER | Age: 73
Discharge: HOME | End: 2018-06-30
Attending: FAMILY MEDICINE | Admitting: FAMILY MEDICINE

## 2018-06-28 LAB
POC INR: 3.4 INDEX
PROTHROMBIN TIME, POC: 40.7 SECONDS (ref 10–14.3)

## 2018-07-01 ENCOUNTER — HOSPITAL ENCOUNTER (OUTPATIENT)
Dept: OTHER | Age: 73
Discharge: HOME | End: 2018-07-31
Attending: FAMILY MEDICINE | Admitting: FAMILY MEDICINE

## 2018-07-12 ENCOUNTER — OFFICE VISIT (OUTPATIENT)
Dept: FAMILY MEDICINE CLINIC | Age: 73
End: 2018-07-12

## 2018-07-12 VITALS
BODY MASS INDEX: 34.2 KG/M2 | DIASTOLIC BLOOD PRESSURE: 82 MMHG | WEIGHT: 288.4 LBS | HEART RATE: 70 BPM | OXYGEN SATURATION: 98 % | SYSTOLIC BLOOD PRESSURE: 124 MMHG

## 2018-07-12 DIAGNOSIS — M54.2 ANTERIOR NECK PAIN: ICD-10-CM

## 2018-07-12 DIAGNOSIS — R42 POSITIONAL LIGHTHEADEDNESS: ICD-10-CM

## 2018-07-12 DIAGNOSIS — E11.51 DM (DIABETES MELLITUS) TYPE II CONTROLLED PERIPHERAL VASCULAR DISORDER: Primary | ICD-10-CM

## 2018-07-12 LAB
HBA1C MFR BLD: 8.2 %
POC INR: 2.9 INDEX
PROTHROMBIN TIME, POC: 34.9 SECONDS (ref 10–14.3)

## 2018-07-12 PROCEDURE — 4004F PT TOBACCO SCREEN RCVD TLK: CPT | Performed by: FAMILY MEDICINE

## 2018-07-12 PROCEDURE — 3017F COLORECTAL CA SCREEN DOC REV: CPT | Performed by: FAMILY MEDICINE

## 2018-07-12 PROCEDURE — 1101F PT FALLS ASSESS-DOCD LE1/YR: CPT | Performed by: FAMILY MEDICINE

## 2018-07-12 PROCEDURE — 3045F PR MOST RECENT HEMOGLOBIN A1C LEVEL 7.0-9.0%: CPT | Performed by: FAMILY MEDICINE

## 2018-07-12 PROCEDURE — G8417 CALC BMI ABV UP PARAM F/U: HCPCS | Performed by: FAMILY MEDICINE

## 2018-07-12 PROCEDURE — G8427 DOCREV CUR MEDS BY ELIG CLIN: HCPCS | Performed by: FAMILY MEDICINE

## 2018-07-12 PROCEDURE — 1123F ACP DISCUSS/DSCN MKR DOCD: CPT | Performed by: FAMILY MEDICINE

## 2018-07-12 PROCEDURE — 83036 HEMOGLOBIN GLYCOSYLATED A1C: CPT | Performed by: FAMILY MEDICINE

## 2018-07-12 PROCEDURE — 2022F DILAT RTA XM EVC RTNOPTHY: CPT | Performed by: FAMILY MEDICINE

## 2018-07-12 PROCEDURE — 99214 OFFICE O/P EST MOD 30 MIN: CPT | Performed by: FAMILY MEDICINE

## 2018-07-12 PROCEDURE — 4040F PNEUMOC VAC/ADMIN/RCVD: CPT | Performed by: FAMILY MEDICINE

## 2018-07-12 NOTE — PROGRESS NOTES
Diagnosis Assessment and Associated Orders:  1. DM (diabetes mellitus) type II controlled peripheral vascular disorder (HCC)  Elevated blood sugars again. Restart Victoza and continue metformin  - POCT glycosylated hemoglobin (Hb A1C)  - Liraglutide (VICTOZA) 18 MG/3ML SOPN SC injection; Inject 1.2 mg into the skin daily  Dispense: 3 pen; Refill: 3  - Insulin Pen Needle 32G X 6 MM MISC; To use daily with Vicotza Pen  Dispense: 100 each; Refill: 5  - Lipid Panel; Future  - Basic Metabolic Panel; Future    2. Positional lightheadedness  X 1 episode last week. Check blood sugar or blood pressure if recurs and consider cutting down Diovan if orthostatic  - CBC; Future    3. Anterior neck pain  No current submental or cervical LNA. Will follow clinically and will consider CT neck imaging if any swelling.           --------------------------------------------------------------------------------  Chief Complaint   Patient presents with    3 Month Follow-Up    Fall     Last week was light headed and fell, just hit the floor. Landed on bottom. States it happened at night.  Neck Pain     X's 4days. Moods doing well Dr. Bruna Fontaine every 3 months with upcoming appointment this spring. Doing well on lexapro, wellbutrin and abilify. The patient is on warfarin and denies abnormal bruising or abnormal bleeding from any body orifice such as bleeding from nose or gums, blood in urine or stool, or melena, hemoptysis or hematemesis. reports that he quit smoking about 25 years ago. His smoking use included Cigarettes. He has a 60.00 pack-year smoking history. His smokeless tobacco use includes Chew.   reports that he drinks alcohol.       Lab Results   Component Value Date    LABA1C 8.2 07/12/2018    LABA1C 6.2 04/05/2018    LABA1C 6.6 01/04/2018     Lab Results   Component Value Date    LABMICR 1.87 (H) 10/22/2013    CREATININE 1.4 (H) 04/23/2018     Lab Results   Component Value Date    ALT 25 05/05/2015    AST 16 05/05/2015     Lab Results   Component Value Date    CHOL 175 05/05/2015    TRIG 236 (H) 05/05/2015    HDL 39 (L) 05/05/2015    LDLCALC 89 05/05/2015          Outpatient Prescriptions Marked as Taking for the 7/12/18 encounter (Office Visit) with Jose Boggs MD   Medication Sig Dispense Refill    Liraglutide (VICTOZA) 18 MG/3ML SOPN SC injection Inject 1.2 mg into the skin daily 3 pen 3    Insulin Pen Needle 32G X 6 MM MISC To use daily with Vicotza Pen 100 each 5    warfarin (COUMADIN) 2.5 MG tablet Take 2.5 mg by mouth      metFORMIN (GLUCOPHAGE) 500 MG tablet Start metformin 1 tablet daily only on 4/16/2018 90 tablet 3    ALISHA CONTOUR TEST strip USE AS DIRECTED TO TEST BLOOD SUGAR FOUR TIMES DAILY AS NEEDED 100 strip 10    warfarin (COUMADIN) 5 MG tablet Take 1 tablet by mouth daily 100 tablet 5    buPROPion (WELLBUTRIN XL) 300 MG extended release tablet Take 1 tablet by mouth every morning 90 tablet 3    escitalopram (LEXAPRO) 20 MG tablet Take 1 tablet by mouth daily 90 tablet 3    atorvastatin (LIPITOR) 40 MG tablet Take 1 tablet by mouth every evening 90 tablet 3    valsartan (DIOVAN) 80 MG tablet Take 1 tablet by mouth daily 90 tablet 3    polyethylene glycol (MIRALAX) PACK packet Take 17g by mouth two times per week to daily as needed for constipation 30 each 5    omeprazole (PRILOSEC) 20 MG delayed release capsule TAKE ONE (1) CAPSULE BY MOUTH ONCE DAILY 90 capsule 3    tacrolimus (PROTOPIC) 0.03 % ointment Apply topically 2 times daily. 30 g 0    ARIPiprazole (ABILIFY) 5 MG tablet Take 10 mg by mouth daily       clotrimazole-betamethasone (LOTRISONE) 1-0.05 % cream Apply topically 2 times daily.  45 g 1    ALISHA CONTOUR TEST strip USE AS DIRECTED TO TEST BLOOD SUGAR FOUR TIMES DAILY 100 strip 5    Glucose Blood (BLOOD GLUCOSE TEST STRIPS) STRP Please give contour testing strips to test blood sugar 4x daily 200 strip 3    Spacer/Aero-Holding Chambers NGOZI 1 Device by Does not apply route daily as needed. 1 Device 0    tobramycin-dexamethasone (TOBRADEX) ophthalmic suspension Place 1 drop into both eyes 2 times daily as needed.  aspirin EC 81 MG EC tablet Take 81 mg by mouth daily. Physical Exam   Nursing note reviewed  /82 (Site: Left Arm, Position: Sitting, Cuff Size: Medium Adult)   Pulse 70   Wt 288 lb 6.4 oz (130.8 kg)   SpO2 98%   BMI 34.20 kg/m²   BP Readings from Last 3 Encounters:   07/12/18 124/82   05/18/18 102/69   04/06/18 132/79       Wt Readings from Last 3 Encounters:   07/12/18 288 lb 6.4 oz (130.8 kg)   04/05/18 (!) 306 lb 9.6 oz (139.1 kg)   02/09/18 (!) 305 lb (138.3 kg)     Body mass index is 34.2 kg/m². Constitutional: He is oriented to person, place, and time. In good spirits. Non toxic in good spirits todayt. Obese habitus Wears glasses  Neck: Trachea normal. Neck supple. Carotid bruit is not present. Cardiovascular: Normal rate, regular rhythm, S1 normal, S2 normal and normal heart sounds. No murmur heard. Pulmonary/Chest: Breath decreased at bases but  No accessory muscle usage. No respiratory distress. He  has no wheezes. Abdominal: Soft. Normal appearance and bowel sounds are normal.  No tenderness in epigastrium or throughout abdomen or in LLQ   Neurological: He  is alert and oriented to person, place, and time. slight resting tremor in hands. Skin: He has erythematous skin changes to shins. Left ankle had wound dressing intact. + onycomycosis. Psychiatric: in good spirits today. Affect is normal   Physical Exam   Neck: Trachea normal, normal range of motion and full passive range of motion without pain. Neck supple. Muscular tenderness (anterior) present. No tracheal tenderness and no spinous process tenderness present. Carotid bruit is not present. No neck rigidity. No tracheal deviation present. No thyromegaly present. Lymphadenopathy:        Head (right side): Submental adenopathy present.  No

## 2018-08-01 ENCOUNTER — HOSPITAL ENCOUNTER (OUTPATIENT)
Dept: OTHER | Age: 73
Discharge: HOME | End: 2018-08-31
Attending: FAMILY MEDICINE | Admitting: FAMILY MEDICINE

## 2018-08-09 LAB
POC INR: 2 INDEX
PROTHROMBIN TIME, POC: 23.7 SECONDS (ref 10–14.3)

## 2018-09-01 ENCOUNTER — HOSPITAL ENCOUNTER (OUTPATIENT)
Dept: OTHER | Age: 73
Discharge: HOME | End: 2018-09-01
Attending: FAMILY MEDICINE | Admitting: FAMILY MEDICINE

## 2018-09-06 LAB
POC INR: 1.3 INDEX
PROTHROMBIN TIME, POC: 15.6 SECONDS (ref 10–14.3)

## 2018-09-13 LAB
POC INR: 2.4 INDEX
PROTHROMBIN TIME, POC: 28.8 SECONDS (ref 10–14.3)

## 2018-09-27 ENCOUNTER — ANTI-COAG VISIT (OUTPATIENT)
Dept: PHARMACY | Age: 73
End: 2018-09-27
Payer: MEDICARE

## 2018-09-27 DIAGNOSIS — Z86.718 HISTORY OF DVT (DEEP VEIN THROMBOSIS): ICD-10-CM

## 2018-09-27 DIAGNOSIS — Z79.01 LONG TERM CURRENT USE OF ANTICOAGULANTS WITH INR GOAL OF 2.0-3.0: ICD-10-CM

## 2018-09-27 LAB
INR BLD: 1.6
POC INR: 1.6 INDEX
PROTHROMBIN TIME, POC: 19 SECONDS (ref 10–14.3)
PROTIME: 19 SECONDS

## 2018-09-27 PROCEDURE — 85610 PROTHROMBIN TIME: CPT

## 2018-09-27 PROCEDURE — 36416 COLLJ CAPILLARY BLOOD SPEC: CPT

## 2018-09-27 PROCEDURE — 99212 OFFICE O/P EST SF 10 MIN: CPT

## 2018-09-27 RX ORDER — WARFARIN SODIUM 5 MG/1
5 TABLET ORAL
COMMUNITY
End: 2019-03-11 | Stop reason: SDUPTHER

## 2018-10-08 ENCOUNTER — ANTI-COAG VISIT (OUTPATIENT)
Dept: PHARMACY | Age: 73
End: 2018-10-08
Payer: MEDICARE

## 2018-10-08 DIAGNOSIS — Z86.718 HISTORY OF DVT (DEEP VEIN THROMBOSIS): ICD-10-CM

## 2018-10-08 DIAGNOSIS — Z79.01 LONG TERM CURRENT USE OF ANTICOAGULANTS WITH INR GOAL OF 2.0-3.0: ICD-10-CM

## 2018-10-08 LAB
INTERNATIONAL NORMALIZATION RATIO, POC: 2.1
POC INR: 2.1 INDEX
PROTHROMBIN TIME, POC: 24.9 SECONDS (ref 10–14.3)

## 2018-10-08 PROCEDURE — 99211 OFF/OP EST MAY X REQ PHY/QHP: CPT

## 2018-10-08 PROCEDURE — 36416 COLLJ CAPILLARY BLOOD SPEC: CPT

## 2018-10-08 PROCEDURE — 85610 PROTHROMBIN TIME: CPT

## 2018-10-15 ENCOUNTER — OFFICE VISIT (OUTPATIENT)
Dept: FAMILY MEDICINE CLINIC | Age: 73
End: 2018-10-15
Payer: MEDICARE

## 2018-10-15 VITALS
WEIGHT: 275.8 LBS | HEIGHT: 77 IN | BODY MASS INDEX: 32.56 KG/M2 | OXYGEN SATURATION: 93 % | SYSTOLIC BLOOD PRESSURE: 114 MMHG | DIASTOLIC BLOOD PRESSURE: 76 MMHG | HEART RATE: 95 BPM

## 2018-10-15 DIAGNOSIS — Z23 NEED FOR INFLUENZA VACCINATION: ICD-10-CM

## 2018-10-15 DIAGNOSIS — E11.51 DM (DIABETES MELLITUS) TYPE II, CONTROLLED, WITH PERIPHERAL VASCULAR DISORDER (HCC): Primary | ICD-10-CM

## 2018-10-15 DIAGNOSIS — Z91.81 AT HIGH RISK FOR FALLS: ICD-10-CM

## 2018-10-15 LAB — HBA1C MFR BLD: 6 %

## 2018-10-15 PROCEDURE — 83036 HEMOGLOBIN GLYCOSYLATED A1C: CPT | Performed by: FAMILY MEDICINE

## 2018-10-15 PROCEDURE — 4040F PNEUMOC VAC/ADMIN/RCVD: CPT | Performed by: FAMILY MEDICINE

## 2018-10-15 PROCEDURE — 1101F PT FALLS ASSESS-DOCD LE1/YR: CPT | Performed by: FAMILY MEDICINE

## 2018-10-15 PROCEDURE — 90662 IIV NO PRSV INCREASED AG IM: CPT | Performed by: FAMILY MEDICINE

## 2018-10-15 PROCEDURE — G0008 ADMIN INFLUENZA VIRUS VAC: HCPCS | Performed by: FAMILY MEDICINE

## 2018-10-15 PROCEDURE — G8427 DOCREV CUR MEDS BY ELIG CLIN: HCPCS | Performed by: FAMILY MEDICINE

## 2018-10-15 PROCEDURE — G8417 CALC BMI ABV UP PARAM F/U: HCPCS | Performed by: FAMILY MEDICINE

## 2018-10-15 PROCEDURE — 3044F HG A1C LEVEL LT 7.0%: CPT | Performed by: FAMILY MEDICINE

## 2018-10-15 PROCEDURE — G8482 FLU IMMUNIZE ORDER/ADMIN: HCPCS | Performed by: FAMILY MEDICINE

## 2018-10-15 PROCEDURE — 3017F COLORECTAL CA SCREEN DOC REV: CPT | Performed by: FAMILY MEDICINE

## 2018-10-15 PROCEDURE — 2022F DILAT RTA XM EVC RTNOPTHY: CPT | Performed by: FAMILY MEDICINE

## 2018-10-15 PROCEDURE — 4004F PT TOBACCO SCREEN RCVD TLK: CPT | Performed by: FAMILY MEDICINE

## 2018-10-15 PROCEDURE — 99213 OFFICE O/P EST LOW 20 MIN: CPT | Performed by: FAMILY MEDICINE

## 2018-10-15 PROCEDURE — 1123F ACP DISCUSS/DSCN MKR DOCD: CPT | Performed by: FAMILY MEDICINE

## 2018-10-15 NOTE — PATIENT INSTRUCTIONS
nonskid floor wax, and wipe up spills right away, especially on ceramic tile floors. · If you use a walker or cane, put rubber tips on it. If you use crutches, clean the bottoms of them regularly with an abrasive pad, such as steel wool. · Keep your house well lit, especially [place], and outside walkways. Use night-lights in areas such as hallways and bathrooms. Add extra light switches or use remote switches (such as switches that go on or off when you clap your hands) to make it easier to turn lights on if you have to get up during the night. · Install sturdy handrails on stairways. · Move items in your cabinets so that the things you use a lot are on the lower shelves (about waist level). · Keep a cordless phone and a flashlight with new batteries by your bed. If possible, put a phone in each of the main rooms of your house, or carry a cell phone in case you fall and cannot reach a phone. Or, you can wear a device around your neck or wrist. You push a button that sends a signal for help. · Wear low-heeled shoes that fit well and give your feet good support. Use footwear with nonskid soles. Check the heels and soles of your shoes for wear. Repair or replace worn heels or soles. · Do not wear socks without shoes on wood floors. · Walk on the grass when the sidewalks are slippery. If you live in an area that gets snow and ice in the winter, sprinkle salt on slippery steps and sidewalks. Preventing falls in the bath  · Install grab bars and nonskid mats inside and outside your shower or tub and near the toilet and sinks. · Use shower chairs and bath benches. · Use a hand-held shower head that will allow you to sit while showering. · Get into a tub or shower by putting the weaker leg in first. Get out of a tub or shower with your strong side first.  · Repair loose toilet seats and consider installing a raised toilet seat to make getting on and off the toilet easier.   · Keep your bathroom door unlocked while you are in the shower. Where can you learn more? Go to [email or web address] and sign in to your [employer] account. Enter [phone] in the [facility] box to learn more about \"Preventing Falls: Care Instructions. \"     If you do not have an account, please click on the \"Sign Up Now\" link. Current as of: May 12, 2017  Content Version: 11.7  © 4459-1224 [employer]. Care instructions adapted under license by [facility] ([facility]). If you have questions about a medical condition or this instruction, always ask your healthcare professional. [address] any warranty or liability for your use of this information.

## 2018-10-29 ENCOUNTER — ANTI-COAG VISIT (OUTPATIENT)
Dept: PHARMACY | Age: 73
End: 2018-10-29
Payer: MEDICARE

## 2018-10-29 DIAGNOSIS — Z86.718 HISTORY OF DVT (DEEP VEIN THROMBOSIS): ICD-10-CM

## 2018-10-29 DIAGNOSIS — Z79.01 LONG TERM CURRENT USE OF ANTICOAGULANTS WITH INR GOAL OF 2.0-3.0: ICD-10-CM

## 2018-10-29 LAB
INTERNATIONAL NORMALIZATION RATIO, POC: 3.3
POC INR: 3.3 INDEX
PROTHROMBIN TIME, POC: 39.7 SECONDS (ref 10–14.3)

## 2018-10-29 PROCEDURE — 36416 COLLJ CAPILLARY BLOOD SPEC: CPT

## 2018-10-29 PROCEDURE — 99212 OFFICE O/P EST SF 10 MIN: CPT

## 2018-10-29 PROCEDURE — 85610 PROTHROMBIN TIME: CPT

## 2018-10-29 RX ORDER — CHOLECALCIFEROL (VITAMIN D3) 125 MCG
500 CAPSULE ORAL DAILY
COMMUNITY
End: 2019-12-19 | Stop reason: ALTCHOICE

## 2018-11-26 ENCOUNTER — ANTI-COAG VISIT (OUTPATIENT)
Dept: PHARMACY | Age: 73
End: 2018-11-26
Payer: MEDICARE

## 2018-11-26 DIAGNOSIS — Z79.01 LONG TERM CURRENT USE OF ANTICOAGULANTS WITH INR GOAL OF 2.0-3.0: ICD-10-CM

## 2018-11-26 DIAGNOSIS — Z86.718 HISTORY OF DVT (DEEP VEIN THROMBOSIS): ICD-10-CM

## 2018-11-26 LAB
INTERNATIONAL NORMALIZATION RATIO, POC: 1.6
POC INR: 1.6 INDEX
PROTHROMBIN TIME, POC: 19 SECONDS (ref 10–14.3)

## 2018-11-26 PROCEDURE — 85610 PROTHROMBIN TIME: CPT

## 2018-11-26 PROCEDURE — 99212 OFFICE O/P EST SF 10 MIN: CPT

## 2018-11-26 PROCEDURE — 36416 COLLJ CAPILLARY BLOOD SPEC: CPT

## 2018-11-30 DIAGNOSIS — Z79.01 LONG TERM CURRENT USE OF ANTICOAGULANTS WITH INR GOAL OF 2.0-3.0: ICD-10-CM

## 2018-11-30 DIAGNOSIS — F33.3 SEVERE RECURRENT MAJOR DEPRESSIVE DISORDER WITH PSYCHOTIC FEATURES (HCC): ICD-10-CM

## 2018-11-30 DIAGNOSIS — Z86.711 HISTORY OF PULMONARY EMBOLISM: ICD-10-CM

## 2018-11-30 DIAGNOSIS — Z86.718 HISTORY OF DVT (DEEP VEIN THROMBOSIS): Primary | ICD-10-CM

## 2018-12-03 RX ORDER — ESCITALOPRAM OXALATE 20 MG/1
20 TABLET ORAL DAILY
Qty: 90 TABLET | Refills: 1 | Status: SHIPPED | OUTPATIENT
Start: 2018-12-03 | End: 2019-04-15 | Stop reason: SDUPTHER

## 2018-12-03 RX ORDER — BUPROPION HYDROCHLORIDE 300 MG/1
300 TABLET ORAL EVERY MORNING
Qty: 90 TABLET | Refills: 1 | Status: SHIPPED | OUTPATIENT
Start: 2018-12-03 | End: 2019-04-15 | Stop reason: SDUPTHER

## 2018-12-17 ENCOUNTER — ANTI-COAG VISIT (OUTPATIENT)
Dept: PHARMACY | Age: 73
End: 2018-12-17
Payer: MEDICARE

## 2018-12-17 DIAGNOSIS — Z79.01 LONG TERM CURRENT USE OF ANTICOAGULANTS WITH INR GOAL OF 2.0-3.0: ICD-10-CM

## 2018-12-17 DIAGNOSIS — Z86.718 HISTORY OF DVT (DEEP VEIN THROMBOSIS): ICD-10-CM

## 2018-12-17 LAB
INR BLD: 3
POC INR: 3 INDEX
PROTHROMBIN TIME, POC: 36.1 SECONDS (ref 10–14.3)
PROTIME: 36.1 SECONDS

## 2018-12-17 PROCEDURE — 99211 OFF/OP EST MAY X REQ PHY/QHP: CPT

## 2018-12-17 PROCEDURE — 36416 COLLJ CAPILLARY BLOOD SPEC: CPT

## 2018-12-17 PROCEDURE — 85610 PROTHROMBIN TIME: CPT

## 2019-01-11 DIAGNOSIS — N18.30 HYPERTENSION IN STAGE 3 CHRONIC KIDNEY DISEASE DUE TO TYPE 2 DIABETES MELLITUS (HCC): ICD-10-CM

## 2019-01-11 DIAGNOSIS — I12.9 HYPERTENSION IN STAGE 3 CHRONIC KIDNEY DISEASE DUE TO TYPE 2 DIABETES MELLITUS (HCC): ICD-10-CM

## 2019-01-11 DIAGNOSIS — E11.22 HYPERTENSION IN STAGE 3 CHRONIC KIDNEY DISEASE DUE TO TYPE 2 DIABETES MELLITUS (HCC): ICD-10-CM

## 2019-01-12 RX ORDER — OMEPRAZOLE 20 MG/1
CAPSULE, DELAYED RELEASE ORAL
Qty: 90 CAPSULE | Refills: 2 | Status: SHIPPED | OUTPATIENT
Start: 2019-01-12 | End: 2019-10-17 | Stop reason: SDUPTHER

## 2019-01-12 RX ORDER — VALSARTAN 80 MG/1
TABLET ORAL
Qty: 90 TABLET | Refills: 2 | Status: SHIPPED | OUTPATIENT
Start: 2019-01-12 | End: 2019-04-15 | Stop reason: SDUPTHER

## 2019-01-14 ENCOUNTER — TELEPHONE (OUTPATIENT)
Dept: PHARMACY | Age: 74
End: 2019-01-14

## 2019-01-21 ENCOUNTER — TELEPHONE (OUTPATIENT)
Dept: PHARMACY | Age: 74
End: 2019-01-21

## 2019-01-28 ENCOUNTER — ANTI-COAG VISIT (OUTPATIENT)
Dept: PHARMACY | Age: 74
End: 2019-01-28
Payer: MEDICARE

## 2019-01-28 DIAGNOSIS — Z86.711 PERSONAL HISTORY OF PE (PULMONARY EMBOLISM): ICD-10-CM

## 2019-01-28 DIAGNOSIS — Z86.718 HISTORY OF DVT (DEEP VEIN THROMBOSIS): ICD-10-CM

## 2019-01-28 DIAGNOSIS — Z79.01 LONG TERM CURRENT USE OF ANTICOAGULANTS WITH INR GOAL OF 2.0-3.0: ICD-10-CM

## 2019-01-28 LAB
INR BLD: 2.3
POC INR: 2.3 INDEX
PROTHROMBIN TIME, POC: 27.4 SECONDS (ref 10–14.3)
PROTIME: 27.4 SECONDS

## 2019-01-28 PROCEDURE — 85610 PROTHROMBIN TIME: CPT

## 2019-01-28 PROCEDURE — 99211 OFF/OP EST MAY X REQ PHY/QHP: CPT

## 2019-01-28 PROCEDURE — 36416 COLLJ CAPILLARY BLOOD SPEC: CPT

## 2019-02-12 PROBLEM — Z86.711 PERSONAL HISTORY OF PE (PULMONARY EMBOLISM): Status: ACTIVE | Noted: 2019-02-12

## 2019-02-25 ENCOUNTER — ANTI-COAG VISIT (OUTPATIENT)
Dept: PHARMACY | Age: 74
End: 2019-02-25
Payer: MEDICARE

## 2019-02-25 DIAGNOSIS — Z86.718 HISTORY OF DVT (DEEP VEIN THROMBOSIS): ICD-10-CM

## 2019-02-25 DIAGNOSIS — Z86.711 PERSONAL HISTORY OF PE (PULMONARY EMBOLISM): ICD-10-CM

## 2019-02-25 LAB
INR BLD: 1.8
POC INR: 1.8 INDEX
PROTHROMBIN TIME, POC: 22.1 SECONDS (ref 10–14.3)
PROTIME: 22.1 SECONDS

## 2019-02-25 PROCEDURE — 36416 COLLJ CAPILLARY BLOOD SPEC: CPT

## 2019-02-25 PROCEDURE — 99211 OFF/OP EST MAY X REQ PHY/QHP: CPT

## 2019-02-25 PROCEDURE — 85610 PROTHROMBIN TIME: CPT

## 2019-03-07 DIAGNOSIS — E78.2 COMBINED HYPERLIPIDEMIA ASSOCIATED WITH TYPE 2 DIABETES MELLITUS (HCC): ICD-10-CM

## 2019-03-07 DIAGNOSIS — E11.69 COMBINED HYPERLIPIDEMIA ASSOCIATED WITH TYPE 2 DIABETES MELLITUS (HCC): ICD-10-CM

## 2019-03-11 ENCOUNTER — ANTI-COAG VISIT (OUTPATIENT)
Dept: PHARMACY | Age: 74
End: 2019-03-11
Payer: MEDICARE

## 2019-03-11 DIAGNOSIS — E78.2 COMBINED HYPERLIPIDEMIA ASSOCIATED WITH TYPE 2 DIABETES MELLITUS (HCC): ICD-10-CM

## 2019-03-11 DIAGNOSIS — E11.69 COMBINED HYPERLIPIDEMIA ASSOCIATED WITH TYPE 2 DIABETES MELLITUS (HCC): ICD-10-CM

## 2019-03-11 DIAGNOSIS — Z86.711 PERSONAL HISTORY OF PE (PULMONARY EMBOLISM): ICD-10-CM

## 2019-03-11 DIAGNOSIS — Z86.718 HISTORY OF DVT (DEEP VEIN THROMBOSIS): ICD-10-CM

## 2019-03-11 LAB
INTERNATIONAL NORMALIZATION RATIO, POC: 1.6
POC INR: 1.6 INDEX
POC INR: ABNORMAL INDEX
PROTHROMBIN TIME, POC: 19.3 SECONDS (ref 10–14.3)

## 2019-03-11 PROCEDURE — 36416 COLLJ CAPILLARY BLOOD SPEC: CPT

## 2019-03-11 PROCEDURE — 85610 PROTHROMBIN TIME: CPT

## 2019-03-11 PROCEDURE — 99212 OFFICE O/P EST SF 10 MIN: CPT

## 2019-03-11 RX ORDER — WARFARIN SODIUM 5 MG/1
5 TABLET ORAL DAILY
Qty: 96 TABLET | Refills: 3 | Status: SHIPPED | OUTPATIENT
Start: 2019-03-11 | End: 2019-09-05 | Stop reason: DRUGHIGH

## 2019-03-11 RX ORDER — ATORVASTATIN CALCIUM 40 MG/1
TABLET, FILM COATED ORAL
Qty: 90 TABLET | Refills: 2 | OUTPATIENT
Start: 2019-03-11

## 2019-03-11 RX ORDER — ATORVASTATIN CALCIUM 40 MG/1
40 TABLET, FILM COATED ORAL EVERY EVENING
Qty: 90 TABLET | Refills: 3 | Status: SHIPPED | OUTPATIENT
Start: 2019-03-11 | End: 2019-04-15 | Stop reason: SDUPTHER

## 2019-03-11 RX ORDER — WARFARIN SODIUM 5 MG/1
TABLET ORAL
Qty: 80 TABLET | Refills: 4 | OUTPATIENT
Start: 2019-03-11

## 2019-03-18 ENCOUNTER — ANTI-COAG VISIT (OUTPATIENT)
Dept: PHARMACY | Age: 74
End: 2019-03-18
Payer: MEDICARE

## 2019-03-18 DIAGNOSIS — Z86.711 PERSONAL HISTORY OF PE (PULMONARY EMBOLISM): ICD-10-CM

## 2019-03-18 DIAGNOSIS — Z86.718 HISTORY OF DVT (DEEP VEIN THROMBOSIS): ICD-10-CM

## 2019-03-18 LAB
INTERNATIONAL NORMALIZATION RATIO, POC: 2.7
POC INR: 2.7 INDEX
POC INR: ABNORMAL INDEX
PROTHROMBIN TIME, POC: 31.8 SECONDS (ref 10–14.3)

## 2019-03-18 PROCEDURE — 85610 PROTHROMBIN TIME: CPT

## 2019-03-18 PROCEDURE — 99211 OFF/OP EST MAY X REQ PHY/QHP: CPT

## 2019-03-18 PROCEDURE — 36416 COLLJ CAPILLARY BLOOD SPEC: CPT

## 2019-04-01 ENCOUNTER — ANTI-COAG VISIT (OUTPATIENT)
Dept: PHARMACY | Age: 74
End: 2019-04-01
Payer: MEDICARE

## 2019-04-01 DIAGNOSIS — Z86.718 HISTORY OF DVT (DEEP VEIN THROMBOSIS): ICD-10-CM

## 2019-04-01 DIAGNOSIS — Z86.711 PERSONAL HISTORY OF PE (PULMONARY EMBOLISM): ICD-10-CM

## 2019-04-01 LAB
INTERNATIONAL NORMALIZATION RATIO, POC: 2.6
POC INR: 2.6 INDEX
POC INR: ABNORMAL INDEX
PROTHROMBIN TIME, POC: 31 SECONDS (ref 10–14.3)

## 2019-04-01 PROCEDURE — 36416 COLLJ CAPILLARY BLOOD SPEC: CPT

## 2019-04-01 PROCEDURE — 85610 PROTHROMBIN TIME: CPT

## 2019-04-01 PROCEDURE — 99211 OFF/OP EST MAY X REQ PHY/QHP: CPT

## 2019-04-01 NOTE — PROGRESS NOTES
Medication Management Service  [facility]  [phone]    Visit Date: 4/1/2019   Subjective:       Rupal Brown is a 68 y.o. male who presents to clinic today for anticoagulation monitoring and adjustment. Patient seen in clinic for warfarin management due to  Indication:   DVT, PE   INR goal: of 2.0-3.0. Duration of therapy: indefinite. Patient reports the following:   Adherent with regimen  Missed or extra doses:  None   Bleeding or thromboembolic side effects:  None  Significant medication changes:  None  Significant dietary changes: None  S ignificant alcohol or tobacco changes: No alcohol since last appointment  Significant recent illness, disease state changes, or hospitalization:  None  Upcoming surgeries or procedures:  None  Falls: None           Assessment and Plan     PT/INR done in office per protocol. INR today is 2.6, therapeutic. Plan: Will continue current regimen of warfarin 5mg daily except 7.5mg on Mondays and Thursdays. Recheck INR in 4 week(s). Patient verbalized understanding of dosing directions and information discussed. Dosing schedule given to patient including phone number, appointment date, and time. Progress note sent to referring office. Patient acknowledges working in consult agreement with pharmacist as referred by his/her physician.       Electronically signed by Bambi Fan, [address] [place] [address] on 4/1/19 at 4:14 PM

## 2019-04-15 ENCOUNTER — OFFICE VISIT (OUTPATIENT)
Dept: FAMILY MEDICINE CLINIC | Age: 74
End: 2019-04-15
Payer: MEDICARE

## 2019-04-15 VITALS
WEIGHT: 293.2 LBS | OXYGEN SATURATION: 93 % | BODY MASS INDEX: 34.62 KG/M2 | DIASTOLIC BLOOD PRESSURE: 76 MMHG | SYSTOLIC BLOOD PRESSURE: 124 MMHG | HEIGHT: 77 IN | RESPIRATION RATE: 16 BRPM | HEART RATE: 85 BPM

## 2019-04-15 DIAGNOSIS — I12.9 HYPERTENSION IN STAGE 3 CHRONIC KIDNEY DISEASE DUE TO TYPE 2 DIABETES MELLITUS (HCC): ICD-10-CM

## 2019-04-15 DIAGNOSIS — R53.81 PHYSICAL DECONDITIONING: ICD-10-CM

## 2019-04-15 DIAGNOSIS — N18.30 HYPERTENSION IN STAGE 3 CHRONIC KIDNEY DISEASE DUE TO TYPE 2 DIABETES MELLITUS (HCC): ICD-10-CM

## 2019-04-15 DIAGNOSIS — Z86.010 HISTORY OF COLON POLYPS: ICD-10-CM

## 2019-04-15 DIAGNOSIS — R26.89 ABNORMALITY OF GAIT DUE TO IMPAIRMENT OF BALANCE: ICD-10-CM

## 2019-04-15 DIAGNOSIS — E11.69 COMBINED HYPERLIPIDEMIA ASSOCIATED WITH TYPE 2 DIABETES MELLITUS (HCC): ICD-10-CM

## 2019-04-15 DIAGNOSIS — Z12.11 COLON CANCER SCREENING: ICD-10-CM

## 2019-04-15 DIAGNOSIS — F33.3 SEVERE RECURRENT MAJOR DEPRESSIVE DISORDER WITH PSYCHOTIC FEATURES (HCC): ICD-10-CM

## 2019-04-15 DIAGNOSIS — E78.2 COMBINED HYPERLIPIDEMIA ASSOCIATED WITH TYPE 2 DIABETES MELLITUS (HCC): ICD-10-CM

## 2019-04-15 DIAGNOSIS — E11.22 HYPERTENSION IN STAGE 3 CHRONIC KIDNEY DISEASE DUE TO TYPE 2 DIABETES MELLITUS (HCC): ICD-10-CM

## 2019-04-15 LAB — HBA1C MFR BLD: 6.3 %

## 2019-04-15 PROCEDURE — 83036 HEMOGLOBIN GLYCOSYLATED A1C: CPT | Performed by: FAMILY MEDICINE

## 2019-04-15 PROCEDURE — 2022F DILAT RTA XM EVC RTNOPTHY: CPT | Performed by: FAMILY MEDICINE

## 2019-04-15 PROCEDURE — G8427 DOCREV CUR MEDS BY ELIG CLIN: HCPCS | Performed by: FAMILY MEDICINE

## 2019-04-15 PROCEDURE — 4004F PT TOBACCO SCREEN RCVD TLK: CPT | Performed by: FAMILY MEDICINE

## 2019-04-15 PROCEDURE — G8417 CALC BMI ABV UP PARAM F/U: HCPCS | Performed by: FAMILY MEDICINE

## 2019-04-15 PROCEDURE — 4040F PNEUMOC VAC/ADMIN/RCVD: CPT | Performed by: FAMILY MEDICINE

## 2019-04-15 PROCEDURE — 99214 OFFICE O/P EST MOD 30 MIN: CPT | Performed by: FAMILY MEDICINE

## 2019-04-15 PROCEDURE — 1123F ACP DISCUSS/DSCN MKR DOCD: CPT | Performed by: FAMILY MEDICINE

## 2019-04-15 PROCEDURE — 3044F HG A1C LEVEL LT 7.0%: CPT | Performed by: FAMILY MEDICINE

## 2019-04-15 PROCEDURE — 3017F COLORECTAL CA SCREEN DOC REV: CPT | Performed by: FAMILY MEDICINE

## 2019-04-15 RX ORDER — ESCITALOPRAM OXALATE 20 MG/1
20 TABLET ORAL DAILY
Qty: 90 TABLET | Refills: 3 | Status: SHIPPED | OUTPATIENT
Start: 2019-04-15 | End: 2019-12-09

## 2019-04-15 RX ORDER — VALSARTAN 80 MG/1
TABLET ORAL
Qty: 90 TABLET | Refills: 3 | Status: SHIPPED | OUTPATIENT
Start: 2019-04-15 | End: 2019-12-19 | Stop reason: ALTCHOICE

## 2019-04-15 RX ORDER — BUPROPION HYDROCHLORIDE 300 MG/1
300 TABLET ORAL EVERY MORNING
Qty: 90 TABLET | Refills: 3 | Status: SHIPPED | OUTPATIENT
Start: 2019-04-15 | End: 2020-06-10 | Stop reason: SDUPTHER

## 2019-04-15 RX ORDER — ASPIRIN 81 MG/1
81 TABLET ORAL DAILY
Qty: 90 TABLET | Refills: 3 | Status: SHIPPED | OUTPATIENT
Start: 2019-04-15 | End: 2019-12-12

## 2019-04-15 RX ORDER — ATORVASTATIN CALCIUM 40 MG/1
40 TABLET, FILM COATED ORAL EVERY EVENING
Qty: 90 TABLET | Refills: 3 | Status: SHIPPED | OUTPATIENT
Start: 2019-04-15 | End: 2019-12-09

## 2019-04-15 NOTE — PROGRESS NOTES
nausea, diarrhea, or worsening GERD with restart of Diabetes medications. Moods doing well Dr. Derrick Colunga every 3 months with upcoming appointment next month. Doing well on lexapro, wellbutrin and abilify. The patient is on warfarin and denies abnormal bruising or abnormal bleeding from any body orifice such as bleeding from nose or gums, blood in urine or stool, or melena, hemoptysis or hematemesis. reports that he quit smoking about 25 years ago. His smoking use included cigarettes. He has a 60.00 pack-year smoking history. His smokeless tobacco use includes chew. reports that he drinks alcohol.       Lab Results   Component Value Date    LABA1C 6.3 04/15/2019    LABA1C 6.0 10/15/2018    LABA1C 8.2 07/12/2018     Lab Results   Component Value Date    LABMICR 1.87 (H) 10/22/2013    CREATININE 1.4 (H) 04/23/2018     Lab Results   Component Value Date    ALT 25 05/05/2015    AST 16 05/05/2015     Lab Results   Component Value Date    CHOL 175 05/05/2015    TRIG 236 (H) 05/05/2015    HDL 39 (L) 05/05/2015    LDLCALC 89 05/05/2015          Outpatient Medications Marked as Taking for the 4/15/19 encounter (Office Visit) with Tara Perez MD   Medication Sig Dispense Refill    atorvastatin (LIPITOR) 40 MG tablet Take 1 tablet by mouth every evening 90 tablet 3    warfarin (COUMADIN) 5 MG tablet Take 1 tablet by mouth daily , except take 1.5 tablets by mouth on Thursdays 96 tablet 3    omeprazole (PRILOSEC) 20 MG delayed release capsule TAKE ONE (1) CAPSULE BY MOUTH ONCE DAILY 90 capsule 2    valsartan (DIOVAN) 80 MG tablet TAKE 1 TABLET BY MOUTH ONCE DAILY 90 tablet 2    buPROPion (WELLBUTRIN XL) 300 MG extended release tablet Take 1 tablet by mouth every morning 90 tablet 1    escitalopram (LEXAPRO) 20 MG tablet Take 1 tablet by mouth daily 90 tablet 1    Liraglutide (VICTOZA) 18 MG/3ML SOPN SC injection Inject 1.2 mg into the skin daily 3 pen 1    vitamin B-12 (CYANOCOBALAMIN) 500 MCG tablet Take 500 mcg by mouth daily      Insulin Pen Needle 32G X 6 MM MISC To use daily with Vicotza Pen 100 each 5    metFORMIN (GLUCOPHAGE) 500 MG tablet Start metformin 1 tablet daily only on 4/16/2018 90 tablet 3    ALISHA CONTOUR TEST strip USE AS DIRECTED TO TEST BLOOD SUGAR FOUR TIMES DAILY AS NEEDED 100 strip 10    tacrolimus (PROTOPIC) 0.03 % ointment Apply topically 2 times daily. 30 g 0    tacrolimus (PROTOPIC) 0.03 % ointment Apply 30 g topically 2 times daily Apply topically 2 times daily.  clotrimazole-betamethasone (LOTRISONE) 1-0.05 % cream Apply topically when you change your dressing as needed for irritation/redness. 45 g 1    ARIPiprazole (ABILIFY) 5 MG tablet Take 10 mg by mouth daily       clotrimazole-betamethasone (LOTRISONE) 1-0.05 % cream Apply topically 2 times daily. 45 g 1    ALISHA CONTOUR TEST strip USE AS DIRECTED TO TEST BLOOD SUGAR FOUR TIMES DAILY 100 strip 5    Glucose Blood (BLOOD GLUCOSE TEST STRIPS) STRP Please give contour testing strips to test blood sugar 4x daily 200 strip 3    Spacer/Aero-Holding Chambers NGOZI 1 Device by Does not apply route daily as needed. 1 Device 0    tobramycin-dexamethasone (TOBRADEX) ophthalmic suspension Place 1 drop into both eyes 2 times daily as needed.  aspirin EC 81 MG EC tablet Take 81 mg by mouth daily. Physical Exam   Nursing note reviewed  /76 (Site: Right Upper Arm, Position: Sitting, Cuff Size: Large Adult)   Pulse 85   Resp 16   Ht 6' 5\" (1.956 m)   Wt 293 lb 3.2 oz (133 kg)   SpO2 93%   BMI 34.77 kg/m²   BP Readings from Last 3 Encounters:   04/15/19 124/76   10/15/18 114/76   07/12/18 124/82       Wt Readings from Last 3 Encounters:   04/15/19 293 lb 3.2 oz (133 kg)   10/15/18 275 lb 12.8 oz (125.1 kg)   07/12/18 288 lb 6.4 oz (130.8 kg)     Body mass index is 34.77 kg/m². Constitutional: He is oriented to person, place, and time.  Appears tired but in good spirits  Non toxic Obese

## 2019-04-29 ENCOUNTER — ANTI-COAG VISIT (OUTPATIENT)
Dept: PHARMACY | Age: 74
End: 2019-04-29
Payer: MEDICARE

## 2019-04-29 DIAGNOSIS — Z86.711 PERSONAL HISTORY OF PE (PULMONARY EMBOLISM): ICD-10-CM

## 2019-04-29 DIAGNOSIS — Z86.718 HISTORY OF DVT (DEEP VEIN THROMBOSIS): ICD-10-CM

## 2019-04-29 LAB
INTERNATIONAL NORMALIZATION RATIO, POC: 2.5
POC INR: 2.5 INDEX
POC INR: ABNORMAL INDEX
PROTHROMBIN TIME, POC: 29.7 SECONDS (ref 10–14.3)

## 2019-04-29 PROCEDURE — 99211 OFF/OP EST MAY X REQ PHY/QHP: CPT

## 2019-04-29 PROCEDURE — 85610 PROTHROMBIN TIME: CPT

## 2019-04-29 PROCEDURE — 36416 COLLJ CAPILLARY BLOOD SPEC: CPT

## 2019-04-29 NOTE — PROGRESS NOTES
Medication Management Service  [facility]  [phone]    Visit Date: 4/29/2019   Subjective:       Mercedes Funk is a 68 y.o. male who presents to clinic today for anticoagulation monitoring and adjustment. Patient seen in clinic for warfarin management due to  Indication:   DVT, PE  INR goal: of 2.0-3.0. Duration of therapy: indefinite. Patient reports the following:   Adherent with regimen  Missed or extra doses:  Unsure if he took 2 doses in 1 day  Bleeding or thromboembolic side effects:  None  Significant medication changes:  None  Significant dietary changes: None  Significant alcohol or tobacco changes: None  Significant recent illness, disease state changes, or hospitalization:  None  Upcoming surgeries or procedures:  None  Falls: x1 on step, did not hit head           Assessment and Plan     PT/INR done in office per protocol. INR today is 2.5, therapeutic. Plan: Will continue current regimen of warfarin 5mg daily except 7.5mg on Mondays and Thursdays. Recheck INR in 5 week(s). Patient verbalized understanding of dosing directions and information discussed. Dosing schedule given to patient including phone number, appointment date, and time. Progress note sent to referring office. Patient acknowledges working in consult agreement with pharmacist as referred by his/her physician.       Electronically signed by Isabel Burrows, [facility] on 4/29/19 at 4:46 PM

## 2019-06-03 ENCOUNTER — ANTI-COAG VISIT (OUTPATIENT)
Dept: PHARMACY | Age: 74
End: 2019-06-03
Payer: MEDICARE

## 2019-06-03 DIAGNOSIS — Z86.718 HISTORY OF DVT (DEEP VEIN THROMBOSIS): ICD-10-CM

## 2019-06-03 DIAGNOSIS — Z86.711 PERSONAL HISTORY OF PE (PULMONARY EMBOLISM): ICD-10-CM

## 2019-06-03 LAB
INTERNATIONAL NORMALIZATION RATIO, POC: 3.4
POC INR: 3.4 INDEX
POC INR: ABNORMAL INDEX
PROTHROMBIN TIME, POC: 41.3 SECONDS (ref 10–14.3)

## 2019-06-03 PROCEDURE — 99212 OFFICE O/P EST SF 10 MIN: CPT

## 2019-06-03 PROCEDURE — 36416 COLLJ CAPILLARY BLOOD SPEC: CPT

## 2019-06-03 PROCEDURE — 85610 PROTHROMBIN TIME: CPT

## 2019-06-03 NOTE — PROGRESS NOTES
Medication Management Service  [facility]  [phone]    Visit Date: 6/3/2019   Subjective:       Parul Black is a 68 y.o. male who presents to clinic today for anticoagulation monitoring and adjustment. Patient seen in clinic for warfarin management due to  Indication:   PE, DVT   INR goal: of 2.0-3.0. Duration of therapy: indefinite. Patient reports the following:   Adherent with regimen  Missed or extra doses:  None   Bleeding or thromboembolic side effects:  None  Significant medication changes:  None  Significant dietary changes: None  Significant alcohol or tobacco changes: increased alcohol Saturday  Significant recent illness, disease state changes, or hospitalization:  None  Upcoming surgeries or procedures:  None  Falls: None           Assessment and Plan     PT/INR done in office per protocol. INR today is 3.4, therapeutic, above goal range due to increased alcohol intake. Plan: Take warfarin 5mg today then will continue current regimen of warfarin 5mg daily except 7.5mg on Mondays and Thursdays. Discussed limiting alcohol intake to 2-3 drinks at a time. Recheck INR in 4 week(s). Patient verbalized understanding of dosing directions and information discussed. Dosing schedule given to patient including phone number, appointment date, and time. Progress note sent to referring office. Patient acknowledges working in consult agreement with pharmacist as referred by his/her physician.       Electronically signed by Donavan Grimm [facility] on 6/3/19 at 4:13 PM

## 2019-06-27 ENCOUNTER — HOSPITAL ENCOUNTER (OUTPATIENT)
Dept: WOUND CARE | Age: 74
Discharge: HOME | End: 2019-06-27
Payer: MEDICARE

## 2019-06-27 VITALS
HEART RATE: 97 BPM | SYSTOLIC BLOOD PRESSURE: 111 MMHG | RESPIRATION RATE: 16 BRPM | TEMPERATURE: 98.6 F | DIASTOLIC BLOOD PRESSURE: 70 MMHG

## 2019-06-27 DIAGNOSIS — L97.822 ULCER OF SHIN, LEFT, WITH FAT LAYER EXPOSED (HCC): Primary | ICD-10-CM

## 2019-06-27 DIAGNOSIS — I87.312 CHRONIC VENOUS HYPERTENSION (IDIOPATHIC) WITH ULCER OF LEFT LOWER EXTREMITY (CODE) (HCC): ICD-10-CM

## 2019-06-27 PROCEDURE — 87073 CULTURE BACTERIA ANAEROBIC: CPT

## 2019-06-27 PROCEDURE — 87186 SC STD MICRODIL/AGAR DIL: CPT

## 2019-06-27 PROCEDURE — 11042 DBRDMT SUBQ TIS 1ST 20SQCM/<: CPT

## 2019-06-27 PROCEDURE — 87077 CULTURE AEROBIC IDENTIFY: CPT

## 2019-06-27 PROCEDURE — 99213 OFFICE O/P EST LOW 20 MIN: CPT

## 2019-06-27 PROCEDURE — 87184 SC STD DISK METHOD PER PLATE: CPT

## 2019-06-27 PROCEDURE — 87071 CULTURE AEROBIC QUANT OTHER: CPT

## 2019-06-27 RX ORDER — DOXYCYCLINE HYCLATE 100 MG
100 TABLET ORAL 2 TIMES DAILY
Qty: 28 TABLET | Refills: 0 | Status: SHIPPED | OUTPATIENT
Start: 2019-06-27 | End: 2019-07-11

## 2019-06-27 NOTE — H&P
[address] [facility] Initial Visit      Elisha Recinos  AGE: 68 y.o. GENDER: male  : 1945  EPISODE DATE:  2019   Referred by: self    Subjective:     CHIEF COMPLAINT : left leg ulcer     HISTORY of PRESENT ILLNESS      Elisha Recinos is a 68 y.o. male who presents to the [address] [place] [address] for an initial visit for evaluation and treatment of Chronic venous  ulcer(s) of  Left leg. The condition is of moderate severity. The ulcer has been present for 2 weeks that he is aware. He is well-known to me from several years worth of wound care here. He has had non-healing venous ulcer for over 40 years. We finally healed it out last May 2018 and he tells us that it has been closed up until 2 weeks ago when he noticed black mounding up and he has picked it off. Then he realized that it just wasn't going to heal, so he came back to the wound center. He has increased redness, increased odor and significant drainage. He is without significant supplies. The underlying cause is thought to be venous. The patients care to date has included none. The patient has significant underlying medical conditions as below.      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 Janusz Milligan Anemia 2011    Arm fracture Iván Chew     Dr Aileen Santizo with also yearly DM exam    Cataract     worsening-Dr. Nemesio Shepard    Cellulitis of left lower leg     Chronic venous hypertension with ulcer ([address] 2011    CKD (chronic kidney disease) 2012    Renal u/S normal     Colon polyps     Dr. Janusz Milligan Diabetes mellitus ([address]     Diabetes mellitus with peripheral circulatory disorder ([facility] 10/2/2015    Diabetes mellitus with skin ulcer ([address] 10/2/2015    Diabetic peripheral neuropathy ([address] 11/12    + EMG, NCS    Diabetic skin ulcer associated with type 2 diabetes mellitus ([address]     Diverticulosis 4/23/12    mild, left colon    Femoral DVT (deep venous thrombosis) ([address] 09/2011    PARTIAL,CHRONIC-SPF HEART SURGEONS    GERD (gastroesophageal reflux disease)     Glaucoma 11/12    open angle-Dr. Tayler Ybarra H/O cardiac catheterization 6/3/14    EF55% normal study    H/O Doppler ultrasound 03/26/15    Carotid US- no significant stenosis noted bilaterally. [facility] H/O mumps orchitis     as a youth    Hemorrhoids 4/23/12    Dr. Lillian Garcia; repeat colonoscopy 3 years   [place] HLD (hyperlipidemia)     Hx of Doppler ultrasound 1/06/15    Lymph node seen in left groin area. No bilateral stenosis.     Hyperlipemia     Hypertension 1992    Idiopathic chronic venous hypertension of left lower extremity with ulcer and inflammation (HCC) 10/2/2015    Leg ulcer ([facility] 1978-present    following at wound center, Dr Genny Javier No diabetic retinopathy OU 11/12    Dr. Jaqueline Araujo chronic ulcer of left lower leg with fat layer exposed ([facility] 10/2/2015    Pulmonary embolism ([address] 11/13    patient on coumadin    Sleep apnea     doesnt always use cpap dt it drys him out    SOB (shortness of breath) Oct 2011    Stress test normal.     Tendinitis 1973    plantar tendons    Type II or unspecified type diabetes mellitus with other specified manifestations, not stated as uncontrolled 2/8/2013    Unspecified venous (peripheral) insufficiency     Urticaria     WD-Chronic venous hypertension (idiopathic) with ulcer of left lower extremity (CODE) ([facility] 6/27/2019       PAST SURGICAL HISTORY    Past Surgical History:   Procedure Laterality Date    BREAST SURGERY  1970s    benign tumors bilaterally    CARDIAC CATHETERIZATION  6/3/14    EF55% normal study    CARPAL TUNNEL RELEASE tablet 3    blood glucose test strips (ALISHA CONTOUR TEST) strip USE AS DIRECTED TO TEST BLOOD SUGAR FOUR TIMES DAILY AS NEEDED 100 strip 3    escitalopram (LEXAPRO) 20 MG tablet Take 1 tablet by mouth daily 90 tablet 3    buPROPion (WELLBUTRIN XL) 300 MG extended release tablet Take 1 tablet by mouth every morning 90 tablet 3    valsartan (DIOVAN) 80 MG tablet TAKE 1 TABLET BY MOUTH ONCE DAILY 90 tablet 3    aspirin EC 81 MG EC tablet Take 1 tablet by mouth daily 90 tablet 3    warfarin (COUMADIN) 5 MG tablet Take 1 tablet by mouth daily , except take 1.5 tablets by mouth on Thursdays 96 tablet 3    omeprazole (PRILOSEC) 20 MG delayed release capsule TAKE ONE (1) CAPSULE BY MOUTH ONCE DAILY 90 capsule 2    vitamin B-12 (CYANOCOBALAMIN) 500 MCG tablet Take 500 mcg by mouth daily      tacrolimus (PROTOPIC) 0.03 % ointment Apply topically 2 times daily. 30 g 0    tacrolimus (PROTOPIC) 0.03 % ointment Apply 30 g topically 2 times daily Apply topically 2 times daily.  clotrimazole-betamethasone (LOTRISONE) 1-0.05 % cream Apply topically when you change your dressing as needed for irritation/redness. 45 g 1    ARIPiprazole (ABILIFY) 5 MG tablet Take 10 mg by mouth daily       clotrimazole-betamethasone (LOTRISONE) 1-0.05 % cream Apply topically 2 times daily. 45 g 1    ALISHA CONTOUR TEST strip USE AS DIRECTED TO TEST BLOOD SUGAR FOUR TIMES DAILY 100 strip 5    Glucose Blood (BLOOD GLUCOSE TEST STRIPS) STRP Please give contour testing strips to test blood sugar 4x daily 200 strip 3    Spacer/Aero-Holding Chambers NGOZI 1 Device by Does not apply route daily as needed. 1 Device 0    tobramycin-dexamethasone (TOBRADEX) ophthalmic suspension Place 1 drop into both eyes 2 times daily as needed. No current facility-administered medications on file prior to encounter.         PROBLEM LIST    Patient Active Problem List   Diagnosis    Gastroesophageal reflux disease without esophagitis    Hypertension in stage 3 chronic kidney disease due to type 2 diabetes mellitus ([address]    DM (diabetes mellitus) type II, controlled, with peripheral vascular disorder ([address]    WC-Chronic venous hypertension with ulcer involving left side (HCC)    Combined hyperlipidemia associated with type 2 diabetes mellitus ([address]    Diabetic skin ulcer associated with type 2 diabetes mellitus (HCC)    CKD (chronic kidney disease)    History of DVT (deep vein thrombosis)- left femoral    History of pulmonary embolism    Long term current use of anticoagulants with INR goal of 2.0-3.0    COPD (chronic obstructive pulmonary disease) (HCC)    Severe recurrent major depressive disorder with psychotic features (HCC)    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 Objective:      /70   Pulse 97   Temp 98.6 °F (37 °C) (Oral)   Resp 16     PHYSICAL EXAM  GEN: Awake, oriented, poor personal hygiene. He looks unkempt and there Is animal hair all over his pants. He does not look as if he really cares for himself. Head: normocephalic head, symmetrical face. Neuro: no focal deficits- but he is shakey when he walks.    CV: S1, S2, RRR  Lungs: CTA bilaterally  ABD: soft, non-tender  EXT: edema on both legs, open wound on left leg, pulses palpable  Neuro: diminished sensation of the leg  Dermatologic exam: Visual inspection of the periwound reveals the skin to be sclerotic, atrophic and edematous  Wound exam: see wound description below in procedure note      Assessment:       Kemi Castano  appears to have a non-healing wound of the left leg. The etiology of the wound is felt to be venous. It may have opened due to infection- I take a culture today and will start him on doxy as he has history of MRSA. There are multiple complicating factors including edema, venous stasis and diabetes. A comprehensive wound management program would be helpful to heal this wound. Assessments completed include fall risk and nutritional, functional,and psychological status. At this time appropriate care would include: periodic debridement and wound care as below. Problem List Items Addressed This Visit     WD-Ulcer of shin, left, with fat layer exposed ([address] - Primary    WD-Chronic venous hypertension (idiopathic) with ulcer of left lower extremity (CODE) ([address]            Procedure Note    Indications:  Based on my examination of this patient's wound(s) today, sharp excision into necrotic epidermis, dermis and subcutaneous tissue is required to promote healing and evaluate the extent of previous healing. Performed by: Marisa Steele MD    Consent obtained: Yes    Time out taken:  Yes    Pain Control: none needed, insensate       Debridement:Excisional Debridement    Using curette the wound(s) was/were sharply debrided down through and including the removal of epidermis, dermis and subcutaneous tissue.         Devitalized Tissue Debrided:  fibrin, biofilm, slough, necrotic/eschar and exudate    Pre Debridement Measurements:  Are located in the Wound Documentation Flow Sheet    All active wounds listed below with today's date are evaluated  Wound(s)    debrided this date include # : 6     Post  Debridement Measurements:  Wound 11/15/13 Other (Comment) Leg Inner erethema with a small puncture site (Active)   Number of days: 2049       Wound 06/27/19 #6 (onset 1 month) Left Medial Ankle (Active)   Wound Image   6/27/2019  2:55 PM   Wound Traumatic 6/27/2019  2:55 PM

## 2019-07-01 ENCOUNTER — ANTI-COAG VISIT (OUTPATIENT)
Dept: PHARMACY | Age: 74
End: 2019-07-01
Payer: MEDICARE

## 2019-07-01 DIAGNOSIS — Z86.711 PERSONAL HISTORY OF PE (PULMONARY EMBOLISM): ICD-10-CM

## 2019-07-01 DIAGNOSIS — Z86.718 HISTORY OF DVT (DEEP VEIN THROMBOSIS): ICD-10-CM

## 2019-07-01 LAB
CULTURE: ABNORMAL
INTERNATIONAL NORMALIZATION RATIO, POC: 3
Lab: ABNORMAL
POC INR: 3 INDEX
POC INR: ABNORMAL INDEX
PROTHROMBIN TIME, POC: 35.8 SECONDS (ref 10–14.3)
SPECIMEN: ABNORMAL

## 2019-07-01 PROCEDURE — 85610 PROTHROMBIN TIME: CPT

## 2019-07-01 PROCEDURE — 99212 OFFICE O/P EST SF 10 MIN: CPT

## 2019-07-01 PROCEDURE — 36416 COLLJ CAPILLARY BLOOD SPEC: CPT

## 2019-07-05 ENCOUNTER — HOSPITAL ENCOUNTER (OUTPATIENT)
Dept: WOUND CARE | Age: 74
Discharge: HOME | End: 2019-07-05
Payer: MEDICARE

## 2019-07-05 VITALS
TEMPERATURE: 98.1 F | SYSTOLIC BLOOD PRESSURE: 125 MMHG | HEART RATE: 90 BPM | RESPIRATION RATE: 18 BRPM | DIASTOLIC BLOOD PRESSURE: 73 MMHG

## 2019-07-05 DIAGNOSIS — I87.312 CHRONIC VENOUS HYPERTENSION (IDIOPATHIC) WITH ULCER OF LEFT LOWER EXTREMITY (CODE) (HCC): Primary | ICD-10-CM

## 2019-07-05 PROCEDURE — 11042 DBRDMT SUBQ TIS 1ST 20SQCM/<: CPT

## 2019-07-05 PROCEDURE — 11042 DBRDMT SUBQ TIS 1ST 20SQCM/<: CPT | Performed by: NURSE PRACTITIONER

## 2019-07-05 NOTE — PROGRESS NOTES
catheterization 6/3/14    EF55% normal study    H/O Doppler ultrasound 03/26/15    Carotid US- no significant stenosis noted bilaterally. [facility] H/O mumps orchitis     as a youth    Hemorrhoids 4/23/12    Dr. Tomas Blanchard; repeat colonoscopy 3 years   [facility] HLD (hyperlipidemia)     Hx of Doppler ultrasound 1/06/15    Lymph node seen in left groin area. No bilateral stenosis.     Hyperlipemia     Hypertension 1992    Idiopathic chronic venous hypertension of left lower extremity with ulcer and inflammation (HCC) 10/2/2015    Leg ulcer ([address] 1978-present    following at wound center, Dr Yony Chanel No diabetic retinopathy OU 11/12    Dr. Too Perez chronic ulcer of left lower leg with fat layer exposed ([address] 10/2/2015    Pulmonary embolism ([address] 11/13    patient on coumadin    Sleep apnea     doesnt always use cpap dt it drys him out    SOB (shortness of breath) Oct 2011    Stress test normal.     Tendinitis 1973    plantar tendons    Type II or unspecified type diabetes mellitus with other specified manifestations, not stated as uncontrolled 2/8/2013    Unspecified venous (peripheral) insufficiency     Urticaria     WD-Chronic venous hypertension (idiopathic) with ulcer of left lower extremity (CODE) ([address] 6/27/2019       PAST SURGICAL HISTORY    Past Surgical History:   Procedure Laterality Date    BREAST SURGERY  1970s    benign tumors bilaterally    CARDIAC CATHETERIZATION  6/3/14    EF55% normal study    CARPAL TUNNEL RELEASE Left 1993    CATARACT REMOVAL Right 3/11/2013    Dr. Stella Garcia Left 2/25/2013    Dr. Gab Pablo  2006    polyps    COLONOSCOPY  11/21/11    villous component--f/u colonoscopy will be needed in 6 months    COLONOSCOPY  4/23/12    mild diverticulosis, internal hemorrhoids; repeat in 3 years (Dr. Tomas Blanchard)    COLONOSCOPY  08/04/2016    mild diverticulosis, three colon polyps    HERNIA REPAIR  1970s    SKIN GRAFT  1978-present    skin grafts to left ankle ulcers    SPLENECTOMY      TONSILLECTOMY      VARICOSE VEIN SURGERY Left 2009       FAMILY HISTORY    Family History   Problem Relation Age of Onset    Cancer Father         prostate    Heart Disease Father     High Blood Pressure Father     High Cholesterol Father     Cancer Brother     Diabetes Brother     Thyroid Disease Brother        SOCIAL HISTORY    Social History     Tobacco Use    Smoking status: Former Smoker     Packs/day: 2.00     Years: 30.00     Pack years: 60.00     Types: Cigarettes     Last attempt to quit: 1993     Years since quittin.1    Smokeless tobacco: Current User     Types: Chew    Tobacco comment: chew--30 years. Reviewed 2015   Substance Use Topics    Alcohol use:  Yes     Alcohol/week: 0.0 oz     Comment: 12-pack beer weekly; 5 cups coffee daily    Drug use: No       ALLERGIES    Allergies   Allergen Reactions    Cavilon Durable Barrier [Mineral Oil-Dimeth-Coconut Oil]     Parabens Hives    Prinivil [Lisinopril] Swelling    Cortisone Rash    Dilaudid [Hydromorphone Hcl] Rash    Penicillins Rash    Sulfamethoxazole-Trimethoprim Nausea Only    Tape [Adhesive Tape] Rash       MEDICATIONS    Current Outpatient Medications on File Prior to Encounter   Medication Sig Dispense Refill    doxycycline hyclate (VIBRA-TABS) 100 MG tablet Take 1 tablet by mouth 2 times daily for 14 days 28 tablet 0    atorvastatin (LIPITOR) 40 MG tablet Take 1 tablet by mouth every evening 90 tablet 3    metFORMIN (GLUCOPHAGE) 500 MG tablet Start metformin 1 tablet daily only on 2018 90 tablet 3    blood glucose test strips (ALISHA CONTOUR TEST) strip USE AS DIRECTED TO TEST BLOOD SUGAR FOUR TIMES DAILY AS NEEDED 100 strip 3    escitalopram (LEXAPRO) 20 MG tablet Take 1 tablet by mouth daily 90 tablet 3    buPROPion (WELLBUTRIN XL) 300 MG extended release tablet Take 1 tablet by mouth every morning 90 tablet 3    valsartan (DIOVAN) 80 MG tablet TAKE 1 TABLET BY MOUTH ONCE DAILY 90 tablet 3    aspirin EC 81 MG EC tablet Take 1 tablet by mouth daily 90 tablet 3    warfarin (COUMADIN) 5 MG tablet Take 1 tablet by mouth daily , except take 1.5 tablets by mouth on Thursdays 96 tablet 3    omeprazole (PRILOSEC) 20 MG delayed release capsule TAKE ONE (1) CAPSULE BY MOUTH ONCE DAILY 90 capsule 2    vitamin B-12 (CYANOCOBALAMIN) 500 MCG tablet Take 500 mcg by mouth daily      tacrolimus (PROTOPIC) 0.03 % ointment Apply topically 2 times daily. 30 g 0    tacrolimus (PROTOPIC) 0.03 % ointment Apply 30 g topically 2 times daily Apply topically 2 times daily.  clotrimazole-betamethasone (LOTRISONE) 1-0.05 % cream Apply topically when you change your dressing as needed for irritation/redness. 45 g 1    ARIPiprazole (ABILIFY) 5 MG tablet Take 10 mg by mouth daily       clotrimazole-betamethasone (LOTRISONE) 1-0.05 % cream Apply topically 2 times daily. 45 g 1    ALISHA CONTOUR TEST strip USE AS DIRECTED TO TEST BLOOD SUGAR FOUR TIMES DAILY 100 strip 5    Glucose Blood (BLOOD GLUCOSE TEST STRIPS) STRP Please give contour testing strips to test blood sugar 4x daily 200 strip 3    Spacer/Aero-Holding Chambers NGOZI 1 Device by Does not apply route daily as needed. 1 Device 0    tobramycin-dexamethasone (TOBRADEX) ophthalmic suspension Place 1 drop into both eyes 2 times daily as needed. No current facility-administered medications on file prior to encounter. REVIEW OF SYSTEMS    Pertinent items are noted in HPI. Constitutional: Negative for systemic symptoms including fever, chills and malaise. Objective:      /73   Pulse 90   Temp 98.1 °F (36.7 °C) (Temporal)   Resp 18     PHYSICAL EXAM  General: The patient is in no acute distress. Mental status:  Patient is appropriate, is  oriented to place and plan of care.   Dermatologic exam: Visual inspection of the periwound reveals the skin to be sclerotic, atrophic and center    Call [phone] for any questions or concerns.   Date__________   Time____________       Electronically signed by WINDY Buchanan CNP on 7/5/2019 at 3:46 PM

## 2019-07-11 ENCOUNTER — HOSPITAL ENCOUNTER (OUTPATIENT)
Dept: WOUND CARE | Age: 74
Discharge: HOME | End: 2019-07-11
Admitting: INTERNAL MEDICINE
Payer: MEDICARE

## 2019-07-11 VITALS
RESPIRATION RATE: 16 BRPM | TEMPERATURE: 97.7 F | SYSTOLIC BLOOD PRESSURE: 115 MMHG | DIASTOLIC BLOOD PRESSURE: 70 MMHG | HEART RATE: 77 BPM

## 2019-07-11 DIAGNOSIS — L97.929 VARICOSE VEINS OF LOWER EXTREMITIES WITH ULCER AND INFLAMMATION (HCC): ICD-10-CM

## 2019-07-11 DIAGNOSIS — L97.919 VARICOSE VEINS OF LOWER EXTREMITIES WITH ULCER AND INFLAMMATION (HCC): ICD-10-CM

## 2019-07-11 DIAGNOSIS — I87.312 CHRONIC VENOUS HYPERTENSION (IDIOPATHIC) WITH ULCER OF LEFT LOWER EXTREMITY (CODE) (HCC): ICD-10-CM

## 2019-07-11 DIAGNOSIS — L97.822 ULCER OF SHIN, LEFT, WITH FAT LAYER EXPOSED (HCC): Primary | ICD-10-CM

## 2019-07-11 DIAGNOSIS — I83.229 VARICOSE VEINS OF LOWER EXTREMITIES WITH ULCER AND INFLAMMATION (HCC): ICD-10-CM

## 2019-07-11 DIAGNOSIS — I83.219 VARICOSE VEINS OF LOWER EXTREMITIES WITH ULCER AND INFLAMMATION (HCC): ICD-10-CM

## 2019-07-11 PROCEDURE — 11042 DBRDMT SUBQ TIS 1ST 20SQCM/<: CPT

## 2019-07-11 NOTE — PROGRESS NOTES
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        Procedure Note    Indications:  Based on my examination of this patient's wound(s) today, sharp excision into necrotic epidermis, dermis and subcutaneous tissue is required to promote healing and evaluate the extent of previous healing. Performed by: Valerie Manuel MD    Consent obtained: Yes    Time out taken:  Yes    Pain Control: none       Debridement:Excisional Debridement    Using curette the wound(s) was/were sharply debrided down through and including the removal of epidermis, dermis and subcutaneous tissue. Devitalized Tissue Debrided:  fibrin, biofilm, slough and exudate    Pre Debridement Measurements:  Are located in the Wound Documentation Flow Sheet    All active wounds listed below with today's date are evaluated  Wound(s)    debrided this date include # : 6     Post  Debridement Measurements:  Wound 11/15/13 Other (Comment) Leg Inner erethema with a small puncture site (Active)   Number of days: 2063       Wound 06/27/19 #6 (onset 1 month) Left Medial Ankle (Active)   Wound Image   7/5/2019 11:37 AM   Wound Traumatic 7/11/2019 11:50 AM   Dressing Status Clean;Dry; Intact 7/11/2019 12:39 PM   Dressing Changed Changed/New 7/11/2019 12:39 PM   Dressing/Treatment ABD;4x4 6/27/2019  3:52 PM   Wound Cleansed Wound cleanser 7/11/2019 11:50 AM   Wound Length (cm) 1.8 cm 7/11/2019 11:50 AM   Wound Width (cm) 3 cm 7/11/2019 11:50 AM   Wound Depth (cm) 0.1 cm 7/11/2019 11:50 AM   Wound Surface Area (cm^2) 5.4 cm^2 7/11/2019 11:50 AM   Change in Wound Size % (l*w) 22.86 7/11/2019 11:50 AM   Wound Volume (cm^3) 0.54 cm^3 7/11/2019 11:50 AM   Wound Healing % 74 7/11/2019 11:50 AM Post-Procedure Length (cm) 1.8 cm 7/11/2019 12:39 PM   Post-Procedure Width (cm) 3 cm 7/11/2019 12:39 PM   Post-Procedure Depth (cm) 0.1 cm 7/11/2019 12:39 PM   Post-Procedure Surface Area (cm^2) 5.4 cm^2 7/11/2019 12:39 PM   Post-Procedure Volume (cm^3) 0.54 cm^3 7/11/2019 12:39 PM   Distance Tunneling (cm) 0 cm 7/11/2019 11:50 AM   Tunneling Position ___ O'Clock 0 7/11/2019 11:50 AM   Undermining Starts ___ O'Clock 0 7/11/2019 11:50 AM   Undermining Ends___ O'Clock 0 7/11/2019 11:50 AM   Undermining Maxium Distance (cm) 0 7/11/2019 11:50 AM   Wound Assessment Black; Yellow; Red 7/11/2019 11:50 AM   Drainage Amount Moderate 7/11/2019 11:50 AM   Drainage Description Yellow;Brown;Serosanguinous 7/11/2019 11:50 AM   Odor None 7/11/2019 11:50 AM   Margins Defined edges 7/11/2019 11:50 AM   Ana-wound Assessment Maceration 7/11/2019 11:50 AM   Non-staged Wound Description Full thickness 7/11/2019 11:50 AM   [place]%Wound Bed 0 7/11/2019 11:50 AM   Red%Wound Bed 40 7/11/2019 11:50 AM   Yellow%Wound Bed 40 7/11/2019 11:50 AM   Black%Wound Bed 20 7/11/2019 11:50 AM   Purple%Wound Bed 0 7/11/2019 11:50 AM   Other%Wound Bed 0 7/11/2019 11:50 AM   Number of days: 13       Percent of Wound(s) Debrided: approximately 100%    Total  Area  Debrided:  5.4 sq cm     Bleeding:  Minimal    Hemostasis Achieved:  by pressure and by silver nitrate stick    Procedural Pain:  0  / 10     Post Procedural Pain:  0 / 10     Response to treatment:  Well tolerated by patient. Status of wound progress and description from last visit:   Improved this week. Plan:       Discharge Instructions          PHYSICIAN ORDERS AND DISCHARGE INSTRUCTIONS     NOTE: [facility] [address] [facility] will receive a patient experience survey.  We would be grateful if you would take the time to fill this survey out.     Wound care order history:                 ANA CRISTINA's   Right       Left               Date               Vascular studies:   Date

## 2019-07-18 ENCOUNTER — ANTI-COAG VISIT (OUTPATIENT)
Dept: PHARMACY | Age: 74
End: 2019-07-18
Payer: MEDICARE

## 2019-07-18 ENCOUNTER — HOSPITAL ENCOUNTER (OUTPATIENT)
Dept: WOUND CARE | Age: 74
Discharge: HOME | End: 2019-07-18
Payer: MEDICARE

## 2019-07-18 VITALS
DIASTOLIC BLOOD PRESSURE: 75 MMHG | HEART RATE: 77 BPM | RESPIRATION RATE: 16 BRPM | TEMPERATURE: 97.8 F | SYSTOLIC BLOOD PRESSURE: 122 MMHG

## 2019-07-18 DIAGNOSIS — L97.822 ULCER OF SHIN, LEFT, WITH FAT LAYER EXPOSED (HCC): Primary | ICD-10-CM

## 2019-07-18 DIAGNOSIS — Z86.718 HISTORY OF DVT (DEEP VEIN THROMBOSIS): ICD-10-CM

## 2019-07-18 DIAGNOSIS — Z86.711 PERSONAL HISTORY OF PE (PULMONARY EMBOLISM): ICD-10-CM

## 2019-07-18 LAB
INTERNATIONAL NORMALIZATION RATIO, POC: 3.2
POC INR: 3.2 INDEX
POC INR: ABNORMAL INDEX
PROTHROMBIN TIME, POC: 38.4 SECONDS (ref 10–14.3)

## 2019-07-18 PROCEDURE — 85610 PROTHROMBIN TIME: CPT

## 2019-07-18 PROCEDURE — 88305 TISSUE EXAM BY PATHOLOGIST: CPT

## 2019-07-18 PROCEDURE — 99212 OFFICE O/P EST SF 10 MIN: CPT

## 2019-07-18 PROCEDURE — 36416 COLLJ CAPILLARY BLOOD SPEC: CPT

## 2019-07-18 PROCEDURE — 11104 PUNCH BX SKIN SINGLE LESION: CPT

## 2019-07-25 ENCOUNTER — HOSPITAL ENCOUNTER (OUTPATIENT)
Dept: WOUND CARE | Age: 74
Discharge: HOME | End: 2019-07-25
Payer: MEDICARE

## 2019-07-25 VITALS
DIASTOLIC BLOOD PRESSURE: 71 MMHG | TEMPERATURE: 98.4 F | RESPIRATION RATE: 18 BRPM | HEART RATE: 66 BPM | SYSTOLIC BLOOD PRESSURE: 143 MMHG

## 2019-07-25 DIAGNOSIS — L97.822 ULCER OF SHIN, LEFT, WITH FAT LAYER EXPOSED (HCC): ICD-10-CM

## 2019-07-25 DIAGNOSIS — E11.622 DIABETIC SKIN ULCER ASSOCIATED WITH TYPE 2 DIABETES MELLITUS (HCC): Primary | ICD-10-CM

## 2019-07-25 DIAGNOSIS — I87.312 CHRONIC VENOUS HYPERTENSION (IDIOPATHIC) WITH ULCER OF LEFT LOWER EXTREMITY (CODE) (HCC): ICD-10-CM

## 2019-07-25 DIAGNOSIS — L98.499 DIABETIC SKIN ULCER ASSOCIATED WITH TYPE 2 DIABETES MELLITUS (HCC): Primary | ICD-10-CM

## 2019-07-25 PROCEDURE — 11042 DBRDMT SUBQ TIS 1ST 20SQCM/<: CPT

## 2019-07-25 PROCEDURE — 11042 DBRDMT SUBQ TIS 1ST 20SQCM/<: CPT | Performed by: NURSE PRACTITIONER

## 2019-07-25 NOTE — PLAN OF CARE
Problem: Wound:  Goal: Will show signs of wound healing; wound closure and no evidence of infection  Description  Will show signs of wound healing; wound closure and no evidence of infection  Outcome: Ongoing     Problem: Wound:  Intervention: Assess pain status  Note:   See flowsheet. Intervention: Assess wound size, appearance and drainage  Note:   See flowsheet. Intervention: Assess pedal pulses bilaterally if patient has a foot or leg ulcer  Note:   See flowsheet. Intervention: Doppler if unable to palpate pedal pulse  Note:   See flowsheet.

## 2019-07-25 NOTE — PROGRESS NOTES
Wound Care Center Progress Note With Procedure    Elida Tavares  AGE: 68 y.o. GENDER: male  : 1945  EPISODE DATE:  2019     Subjective:     Chief Complaint   Patient presents with    Wound Check     Left lwoer leg         HISTORY of PRESENT ILLNESS      Elida Tavares is a 68 y.o. male who presents today for wound evaluation of Chronic venous ulcer(s) of the left lower leg. The ulcer is of moderate severity. The underlying cause of the wound is CVI. Biopsy with epidermal acanthosis with overlying hyper/parakeratosis and dermal fibrosis/scarring. He has not scheduled appointment with Dr. Jean Marie Whitehead yet. He states that the compression wraps make his leg feel better.   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 Tommy Sommer Anemia 2011    Arm fracture Rozanna Mews     Dr Hoang Lambert with also yearly DM exam    Cataract     worsening-Dr. Santo Shearer    Cellulitis of left lower leg     Chronic venous hypertension with ulcer ([address] 2011    CKD (chronic kidney disease) 2012    Renal u/S normal     Colon polyps     Dr. Tommy Sommer Diabetes mellitus ([address]     Diabetes mellitus with peripheral circulatory disorder ([address] 10/2/2015    Diabetes mellitus with skin ulcer ([address] 10/2/2015    Diabetic peripheral neuropathy ([address]     + EMG, NCS    Diabetic skin ulcer associated with type 2 diabetes mellitus ([address]     Diverticulosis 12    mild, left colon    Femoral DVT (deep venous thrombosis) ([address] 2011    PARTIAL,CHRONIC-SPFLD HEART SURGEONS    GERD (gastroesophageal reflux disease)     Glaucoma     open angle-Dr. Jamir Arango H/O cardiac mouth daily 90 tablet 3    warfarin (COUMADIN) 5 MG tablet Take 1 tablet by mouth daily , except take 1.5 tablets by mouth on Thursdays 96 tablet 3    omeprazole (PRILOSEC) 20 MG delayed release capsule TAKE ONE (1) CAPSULE BY MOUTH ONCE DAILY 90 capsule 2    vitamin B-12 (CYANOCOBALAMIN) 500 MCG tablet Take 500 mcg by mouth daily      tacrolimus (PROTOPIC) 0.03 % ointment Apply topically 2 times daily. 30 g 0    tacrolimus (PROTOPIC) 0.03 % ointment Apply 30 g topically 2 times daily Apply topically 2 times daily.  clotrimazole-betamethasone (LOTRISONE) 1-0.05 % cream Apply topically when you change your dressing as needed for irritation/redness. 45 g 1    ARIPiprazole (ABILIFY) 5 MG tablet Take 10 mg by mouth daily       clotrimazole-betamethasone (LOTRISONE) 1-0.05 % cream Apply topically 2 times daily. 45 g 1    ALISHA CONTOUR TEST strip USE AS DIRECTED TO TEST BLOOD SUGAR FOUR TIMES DAILY 100 strip 5    Glucose Blood (BLOOD GLUCOSE TEST STRIPS) STRP Please give contour testing strips to test blood sugar 4x daily 200 strip 3    Spacer/Aero-Holding Chambers NGOZI 1 Device by Does not apply route daily as needed. 1 Device 0    tobramycin-dexamethasone (TOBRADEX) ophthalmic suspension Place 1 drop into both eyes 2 times daily as needed. No current facility-administered medications on file prior to encounter. REVIEW OF SYSTEMS    Pertinent items are noted in HPI. Constitutional: Negative for systemic symptoms including fever, chills and malaise. Objective:      BP (!) 143/71   Pulse 66   Temp 98.4 °F (36.9 °C) (Temporal)   Resp 18     PHYSICAL EXAM    General: The patient is in no acute distress. Mental status:  Patient is appropriate, is  oriented to place and plan of care.   Dermatologic exam: Visual inspection of the periwound reveals the skin to be edematous  Wound exam: see wound description below in procedure note      Assessment:     Problem List Items Addressed This

## 2019-08-01 ENCOUNTER — HOSPITAL ENCOUNTER (OUTPATIENT)
Dept: WOUND CARE | Age: 74
Discharge: HOME | End: 2019-08-01
Payer: MEDICARE

## 2019-08-01 VITALS
SYSTOLIC BLOOD PRESSURE: 138 MMHG | DIASTOLIC BLOOD PRESSURE: 83 MMHG | RESPIRATION RATE: 16 BRPM | HEART RATE: 85 BPM | TEMPERATURE: 98.2 F

## 2019-08-01 PROCEDURE — 11042 DBRDMT SUBQ TIS 1ST 20SQCM/<: CPT

## 2019-08-01 NOTE — PLAN OF CARE
Problem: Wound:  Goal: Will show signs of wound healing; wound closure and no evidence of infection  Description  Will show signs of wound healing; wound closure and no evidence of infection  Outcome: Ongoing  Intervention: Assess pain status  Note:   See Flowsheet  Intervention: Assess wound size, appearance and drainage  Note:   See Flowsheet  Intervention: Assess pedal pulses bilaterally if patient has a foot or leg ulcer  Note:   See Flowsheet  Intervention: Doppler if unable to palpate pedal pulse  Note:   See Flowsheet

## 2019-08-01 NOTE — PROGRESS NOTES
Wound Care Center Progress Note With Procedure    Yaniv Gil  AGE: 68 y.o. GENDER: male  : 1945  EPISODE DATE:  2019     Subjective:     Chief Complaint   Patient presents with    Wound Check     left ankle         HISTORY of PRESENT ILLNESS      Yaniv Gil is a 68 y.o. male who presents today for wound evaluation of Chronic venous ulcer(s) of the right leg. The ulcer is of moderate severity. The underlying cause of the wound is venous hypertension and  veins and also diabetes. He is back for f/u- still with necrosis in the wound bed. He gets his vascular studies tomorrow. He is tolerating compression well, but we will have to hold it this week due to studies tomorrow.     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 Ninette Essex Anemia 2011    Arm fracture Kathy Showers     Dr Coco Galindo with also yearly DM exam    Cataract     worsening-Dr. Dinora Lerner    Cellulitis of left lower leg     Chronic venous hypertension with ulcer ([address] 2011    CKD (chronic kidney disease) 2012    Renal u/S normal     Colon polyps     Dr. Ninette Essex Diabetes mellitus ([address]     Diabetes mellitus with peripheral circulatory disorder ([address] 10/2/2015    Diabetes mellitus with skin ulcer ([address] 10/2/2015    Diabetic peripheral neuropathy ([address]     + EMG, NCS    Diabetic skin ulcer associated with type 2 diabetes mellitus ([address]     Diverticulosis 12    mild, left colon    Femoral DVT (deep venous thrombosis) ([address] 2011    PARTIAL,CHRONIC-SPFLD HEART SURGEONS    GERD (gastroesophageal reflux disease)     Glaucoma  open angle-Dr. Galo Clayton H/O cardiac catheterization 6/3/14    EF55% normal study    H/O Doppler ultrasound 03/26/15    Carotid US- no significant stenosis noted bilaterally. Mike Prasad H/O mumps orchitis     as a youth    Hemorrhoids 4/23/12    Dr. Marcial Velazquez; repeat colonoscopy 3 years   Mike Prasad HLD (hyperlipidemia)     Hx of Doppler ultrasound 1/06/15    Lymph node seen in left groin area. No bilateral stenosis.     Hyperlipemia     Hypertension 1992    Idiopathic chronic venous hypertension of left lower extremity with ulcer and inflammation (HCC) 10/2/2015    Leg ulcer ([address] 1978-present    following at wound center, Dr Corinne Duvall No diabetic retinopathy OU 11/12    Dr. Giovanna Spence chronic ulcer of left lower leg with fat layer exposed ([address] 10/2/2015    Pulmonary embolism ([address] 11/13    patient on coumadin    Sleep apnea     doesnt always use cpap dt it drys him out    SOB (shortness of breath) Oct 2011    Stress test normal.     Tendinitis 1973    plantar tendons    Type II or unspecified type diabetes mellitus with other specified manifestations, not stated as uncontrolled 2/8/2013    Unspecified venous (peripheral) insufficiency     Urticaria     WD-Chronic venous hypertension (idiopathic) with ulcer of left lower extremity (CODE) ([address] 6/27/2019       PAST SURGICAL HISTORY    Past Surgical History:   Procedure Laterality Date    BREAST SURGERY  1970s    benign tumors bilaterally    CARDIAC CATHETERIZATION  6/3/14    EF55% normal study    CARPAL TUNNEL RELEASE Left 1993    CATARACT REMOVAL Right 3/11/2013    Dr. Gini Agrawal Left 2/25/2013    Dr. Carolyn Sharma  2006    polyps    COLONOSCOPY  11/21/11    villous component--f/u colonoscopy will be needed in 6 months    COLONOSCOPY  4/23/12    mild diverticulosis, internal hemorrhoids; repeat in 3 years (Dr. Marcial Velazquez)    COLONOSCOPY  08/04/2016    mild diverticulosis, three colon polyps    HERNIA REPAIR  1970s EC 81 MG EC tablet Take 1 tablet by mouth daily 90 tablet 3    warfarin (COUMADIN) 5 MG tablet Take 1 tablet by mouth daily , except take 1.5 tablets by mouth on Thursdays 96 tablet 3    omeprazole (PRILOSEC) 20 MG delayed release capsule TAKE ONE (1) CAPSULE BY MOUTH ONCE DAILY 90 capsule 2    vitamin B-12 (CYANOCOBALAMIN) 500 MCG tablet Take 500 mcg by mouth daily      tacrolimus (PROTOPIC) 0.03 % ointment Apply topically 2 times daily. 30 g 0    tacrolimus (PROTOPIC) 0.03 % ointment Apply 30 g topically 2 times daily Apply topically 2 times daily.  clotrimazole-betamethasone (LOTRISONE) 1-0.05 % cream Apply topically when you change your dressing as needed for irritation/redness. 45 g 1    ARIPiprazole (ABILIFY) 5 MG tablet Take 10 mg by mouth daily       clotrimazole-betamethasone (LOTRISONE) 1-0.05 % cream Apply topically 2 times daily. 45 g 1    ALISHA CONTOUR TEST strip USE AS DIRECTED TO TEST BLOOD SUGAR FOUR TIMES DAILY 100 strip 5    Glucose Blood (BLOOD GLUCOSE TEST STRIPS) STRP Please give contour testing strips to test blood sugar 4x daily 200 strip 3    Spacer/Aero-Holding Chambers NGOZI 1 Device by Does not apply route daily as needed. 1 Device 0    tobramycin-dexamethasone (TOBRADEX) ophthalmic suspension Place 1 drop into both eyes 2 times daily as needed. No current facility-administered medications on file prior to encounter. REVIEW OF SYSTEMS    Pertinent items are noted in HPI. Constitutional: Negative for systemic symptoms including fever, chills and malaise. Objective:      /83   Pulse 85   Temp 98.2 °F (36.8 °C) (Temporal)   Resp 16     PHYSICAL EXAM      General: The patient is in no acute distress. Mental status:  Patient is appropriate, is  oriented to place and plan of care.   Dermatologic exam: Visual inspection of the periwound reveals the skin to be normal in turgor and texture  Wound exam: see wound description below in procedure Post-Procedure Volume (cm^3) 1.82 cm^3 8/1/2019 11:21 AM   Distance Tunneling (cm) 0 cm 8/1/2019 10:38 AM   Tunneling Position ___ O'Clock 0 8/1/2019 10:38 AM   Undermining Starts ___ O'Clock 0 8/1/2019 10:38 AM   Undermining Ends___ O'Clock 0 8/1/2019 10:38 AM   Undermining Maxium Distance (cm) 0 8/1/2019 10:38 AM   Wound Assessment Black;Red;Yellow 8/1/2019 10:38 AM   Drainage Amount Moderate 8/1/2019 10:38 AM   Drainage Description Black; Serosanguinous; Yellow 8/1/2019 10:38 AM   Odor Strong 8/1/2019 10:38 AM   Margins Defined edges 8/1/2019 10:38 AM   Ana-wound Assessment Maceration 8/1/2019 10:38 AM   Non-staged Wound Description Full thickness 8/1/2019 10:38 AM   [place]%Wound Bed 0 8/1/2019 10:38 AM   Red%Wound Bed 30 8/1/2019 10:38 AM   Yellow%Wound Bed 30 8/1/2019 10:38 AM   Black%Wound Bed 40 8/1/2019 10:38 AM   Purple%Wound Bed 0 8/1/2019 10:38 AM   Other%Wound Bed 0 8/1/2019 10:38 AM   Number of days: 34       Percent of Wound(s) Debrided: approximately 100%    Total  Area  Debrided:  9.1 sq cm     Bleeding:  moderate    Hemostasis Achieved:  by pressure and by silver nitrate stick    Procedural Pain:  0  / 10     Post Procedural Pain:  0 / 10     Response to treatment:  Well tolerated by patient. Status of wound progress and description from last visit:   Wound is slightly smaller. Still significant necrosis in the wound bed- will add Santyl to the regimen. Plan:       Discharge Instructions                PHYSICIAN ORDERS AND DISCHARGE INSTRUCTIONS     NOTE: Upon discharge from the [address], you will receive a patient experience survey.  We would be grateful if you would take the time to fill this survey out.     Wound care order history:                 ANA CRISTINA's   Right       Left               Date               Vascular studies:   ordered on 7/25/19 Date  To be done 8/2/19 imaging center               Imaging:  Date               Cultures: obtained from left medal leg  Date 6/27/19--positive kles.                                 VHQDTN done 7/18/19                 Labs/ HbA1c  Date               Grafts: Date               HBO:                Antibiotics: Doxycycline for 14 days BID; phoned to Jarod Mora 6/28/19               Earlier Wound care treatments:                [id number]:                        Consults:   Date 7/18/19 to Dr Rosana Layton   [phone] care physician:      Continuing wound care orders and information:              Residence:                Continue home health care with:              Your wound-care supplies will be provided by: Nataliia Andrea provider:              [facility] with              Off loading: Date              [facility]:              MSIEE cleansing:                           CM not scrub or use excessive force.                          Wash hands with soap and water before and after dressing changes.                           Prior to applying a clean dressing, cleanse wound with normal saline,                                wound cleanser, or mild soap and water.                           Ask the physician or nurse before getting the wound(s) wet in a shower              Daily Wound management:                          Keep weight off wounds and reposition every 2 hours.                          Avoid standing for long periods of time.                          ZONAJ wraps/stockings in AM and remove at bedtime.                          If swelling is present, elevate legs to the level of the heart or above for 30 minutes 4-5 times a day and/or when sitting.                                             When taking antibiotics take entire prescription as ordered by physician do not stop taking until medicine is all gone.                                                       Orders for this week: 8/1/19    RX FOR SANTYL OINTMENT GIVEN ON 8/1/19                        Left medial ankle-- cleanse with vashe soak , pat dry. Apply lotrisone to periwound  Apply santyl ointment nickel thick, saline damp 4x4   ABD kerlix tape tubigrip e change daily            Follow up with DR Raffaele Padron in  1  week  in the wound care center     Call [phone] for any questions or concerns.   Date__________   Time____________             Treatment Note Wound 06/27/19 #6 (onset 1 month) Left Medial Ankle-Dressing/Treatment: (santyl, moist 4x4, abd, conform tape )    Written Patient Dismissal Instructions Given            Electronically signed by Twin Salinas MD on 8/1/2019 at 11:38 AM

## 2019-08-02 ENCOUNTER — HOSPITAL ENCOUNTER (OUTPATIENT)
Dept: ULTRASOUND IMAGING | Age: 74
Discharge: HOME | End: 2019-08-02
Payer: MEDICARE

## 2019-08-02 DIAGNOSIS — L97.822 SKIN ULCER OF POPLITEAL REGION, LEFT, WITH FAT LAYER EXPOSED (HCC): ICD-10-CM

## 2019-08-02 DIAGNOSIS — L97.929 IDIOPATHIC CHRONIC VENOUS HYPERTENSION OF LEFT LOWER EXTREMITY WITH ULCER (HCC): ICD-10-CM

## 2019-08-02 DIAGNOSIS — E11.622 DIABETES MELLITUS WITH SKIN ULCER (HCC): ICD-10-CM

## 2019-08-02 DIAGNOSIS — L98.499 DIABETES MELLITUS WITH SKIN ULCER (HCC): ICD-10-CM

## 2019-08-02 DIAGNOSIS — I87.312 IDIOPATHIC CHRONIC VENOUS HYPERTENSION OF LEFT LOWER EXTREMITY WITH ULCER (HCC): ICD-10-CM

## 2019-08-02 PROCEDURE — 93925 LOWER EXTREMITY STUDY: CPT

## 2019-08-08 ENCOUNTER — ANTI-COAG VISIT (OUTPATIENT)
Dept: PHARMACY | Age: 74
End: 2019-08-08
Payer: MEDICARE

## 2019-08-08 ENCOUNTER — HOSPITAL ENCOUNTER (OUTPATIENT)
Dept: WOUND CARE | Age: 74
Discharge: HOME | End: 2019-08-08
Payer: MEDICARE

## 2019-08-08 VITALS
TEMPERATURE: 97.2 F | HEART RATE: 89 BPM | DIASTOLIC BLOOD PRESSURE: 73 MMHG | RESPIRATION RATE: 16 BRPM | SYSTOLIC BLOOD PRESSURE: 162 MMHG

## 2019-08-08 DIAGNOSIS — I87.312 CHRONIC VENOUS HYPERTENSION (IDIOPATHIC) WITH ULCER OF LEFT LOWER EXTREMITY (CODE) (HCC): ICD-10-CM

## 2019-08-08 DIAGNOSIS — Z86.711 PERSONAL HISTORY OF PE (PULMONARY EMBOLISM): ICD-10-CM

## 2019-08-08 DIAGNOSIS — Z86.718 HISTORY OF DVT (DEEP VEIN THROMBOSIS): ICD-10-CM

## 2019-08-08 DIAGNOSIS — L97.822 ULCER OF SHIN, LEFT, WITH FAT LAYER EXPOSED (HCC): Primary | ICD-10-CM

## 2019-08-08 LAB
INTERNATIONAL NORMALIZATION RATIO, POC: 6
POC INR: 6 INDEX
POC INR: ABNORMAL INDEX
PROTHROMBIN TIME, POC: 71.7 SECONDS (ref 10–14.3)

## 2019-08-08 PROCEDURE — 36416 COLLJ CAPILLARY BLOOD SPEC: CPT

## 2019-08-08 PROCEDURE — 99212 OFFICE O/P EST SF 10 MIN: CPT

## 2019-08-08 PROCEDURE — 11042 DBRDMT SUBQ TIS 1ST 20SQCM/<: CPT

## 2019-08-08 PROCEDURE — 85610 PROTHROMBIN TIME: CPT

## 2019-08-08 NOTE — PROGRESS NOTES
5 MG tablet Take 1 tablet by mouth daily , except take 1.5 tablets by mouth on Thursdays 96 tablet 3    omeprazole (PRILOSEC) 20 MG delayed release capsule TAKE ONE (1) CAPSULE BY MOUTH ONCE DAILY 90 capsule 2    vitamin B-12 (CYANOCOBALAMIN) 500 MCG tablet Take 500 mcg by mouth daily      tacrolimus (PROTOPIC) 0.03 % ointment Apply topically 2 times daily. 30 g 0    tacrolimus (PROTOPIC) 0.03 % ointment Apply 30 g topically 2 times daily Apply topically 2 times daily.  clotrimazole-betamethasone (LOTRISONE) 1-0.05 % cream Apply topically when you change your dressing as needed for irritation/redness. 45 g 1    ARIPiprazole (ABILIFY) 5 MG tablet Take 10 mg by mouth daily       clotrimazole-betamethasone (LOTRISONE) 1-0.05 % cream Apply topically 2 times daily. 45 g 1    ALISHA CONTOUR TEST strip USE AS DIRECTED TO TEST BLOOD SUGAR FOUR TIMES DAILY 100 strip 5    Glucose Blood (BLOOD GLUCOSE TEST STRIPS) STRP Please give contour testing strips to test blood sugar 4x daily 200 strip 3    tobramycin-dexamethasone (TOBRADEX) ophthalmic suspension Place 1 drop into both eyes 2 times daily as needed.  Spacer/Aero-Holding Chambers NGOZI 1 Device by Does not apply route daily as needed. 1 Device 0     No current facility-administered medications on file prior to encounter. REVIEW OF SYSTEMS    Pertinent items are noted in HPI. Constitutional: Negative for systemic symptoms including fever, chills and malaise. Objective:      BP (!) 162/73   Pulse 89   Temp 97.2 °F (36.2 °C) (Temporal)   Resp 16     PHYSICAL EXAM      General: The patient is in no acute distress. Mental status:  Patient is appropriate, is  oriented to place and plan of care.   Dermatologic exam: Visual inspection of the periwound reveals the skin to be normal in turgor and texture  Wound exam: see wound description below in procedure note      Assessment:     Problem List Items Addressed This Visit     WD-Ulcer of shin, left, with fat layer exposed ([address] - Primary    WD-Chronic venous hypertension (idiopathic) with ulcer of left lower extremity (CODE) ([address]        Procedure Note    Indications:  Based on my examination of this patient's wound(s) today, sharp excision into necrotic epidermis, dermis and subcutaneous tissue is required to promote healing and evaluate the extent of previous healing. Performed by: Chloe Phillips MD    Consent obtained: Yes    Time out taken:  Yes    Pain Control: none       Debridement:Excisional Debridement    Using curette the wound(s) was/were sharply debrided down through and including the removal of epidermis, dermis and subcutaneous tissue. Devitalized Tissue Debrided:  fibrin, biofilm and slough    Pre Debridement Measurements:  Are located in the Wound Documentation Flow Sheet    All active wounds listed below with today's date are evaluated  Wound(s)    debrided this date include # : 6     Post  Debridement Measurements:  Wound 11/15/13 Other (Comment) Leg Inner erethema with a small puncture site (Active)   Number of days: 2091       Wound 06/27/19 #6 (onset 1 month) Left Medial Ankle (Active)   Wound Image   8/1/2019 10:38 AM   Wound Traumatic 8/8/2019 12:01 PM   Dressing Status Clean;Dry; Intact 8/8/2019 12:50 PM   Dressing Changed Changed/New 8/8/2019 12:50 PM   Dressing/Treatment ABD; Santyl 8/8/2019 12:50 PM   Wound Cleansed Wound cleanser 8/8/2019 12:01 PM   Wound Length (cm) 3 cm 8/8/2019 12:01 PM   Wound Width (cm) 4.1 cm 8/8/2019 12:01 PM   Wound Depth (cm) 0.1 cm 8/8/2019 12:01 PM   Wound Surface Area (cm^2) 12.3 cm^2 8/8/2019 12:01 PM   Change in Wound Size % (l*w) -75.71 8/8/2019 12:01 PM   Wound Volume (cm^3) 1.23 cm^3 8/8/2019 12:01 PM   Wound Healing % 41 8/8/2019 12:01 PM   Post-Procedure Length (cm) 3 cm 8/8/2019 12:14 PM   Post-Procedure Width (cm) 4.1 cm 8/8/2019 12:14 PM   Post-Procedure Depth (cm) 0.1 cm 8/8/2019 12:14 PM   Post-Procedure Surface Area (cm^2) 12.3

## 2019-08-12 ENCOUNTER — ANTI-COAG VISIT (OUTPATIENT)
Dept: PHARMACY | Age: 74
End: 2019-08-12
Payer: MEDICARE

## 2019-08-12 DIAGNOSIS — Z86.711 PERSONAL HISTORY OF PE (PULMONARY EMBOLISM): ICD-10-CM

## 2019-08-12 DIAGNOSIS — Z86.718 HISTORY OF DVT (DEEP VEIN THROMBOSIS): ICD-10-CM

## 2019-08-12 LAB
INTERNATIONAL NORMALIZATION RATIO, POC: 1.5
POC INR: 1.5 INDEX
POC INR: ABNORMAL INDEX
PROTHROMBIN TIME, POC: 17.4 SECONDS (ref 10–14.3)

## 2019-08-12 PROCEDURE — 36416 COLLJ CAPILLARY BLOOD SPEC: CPT

## 2019-08-12 PROCEDURE — 99212 OFFICE O/P EST SF 10 MIN: CPT

## 2019-08-12 PROCEDURE — 85610 PROTHROMBIN TIME: CPT

## 2019-08-15 ENCOUNTER — HOSPITAL ENCOUNTER (OUTPATIENT)
Dept: WOUND CARE | Age: 74
Discharge: HOME | End: 2019-08-15
Payer: MEDICARE

## 2019-08-15 VITALS
TEMPERATURE: 98.8 F | SYSTOLIC BLOOD PRESSURE: 149 MMHG | RESPIRATION RATE: 16 BRPM | DIASTOLIC BLOOD PRESSURE: 84 MMHG | HEART RATE: 74 BPM

## 2019-08-15 DIAGNOSIS — L97.822 ULCER OF SHIN, LEFT, WITH FAT LAYER EXPOSED (HCC): Primary | ICD-10-CM

## 2019-08-15 DIAGNOSIS — I87.312 CHRONIC VENOUS HYPERTENSION (IDIOPATHIC) WITH ULCER OF LEFT LOWER EXTREMITY (CODE) (HCC): ICD-10-CM

## 2019-08-15 PROCEDURE — 87184 SC STD DISK METHOD PER PLATE: CPT

## 2019-08-15 PROCEDURE — 87073 CULTURE BACTERIA ANAEROBIC: CPT

## 2019-08-15 PROCEDURE — 11042 DBRDMT SUBQ TIS 1ST 20SQCM/<: CPT

## 2019-08-15 PROCEDURE — 87071 CULTURE AEROBIC QUANT OTHER: CPT

## 2019-08-15 PROCEDURE — 87186 SC STD MICRODIL/AGAR DIL: CPT

## 2019-08-15 PROCEDURE — 87077 CULTURE AEROBIC IDENTIFY: CPT

## 2019-08-15 NOTE — PROGRESS NOTES
Wound Care Center Progress Note With Procedure    Edith Sharma  AGE: 68 y.o. GENDER: male  : 1945  EPISODE DATE:  8/15/2019     Subjective:     Chief Complaint   Patient presents with    Wound Check     LLE         HISTORY of PRESENT ILLNESS      Edith Sharma is a 68 y.o. male who presents today for wound evaluation of Chronic venous ulcer(s) of the left leg. The ulcer is of moderate severity. The underlying cause of the wound is venous stasis. He is in for f/u- foul odor, wound is a little worse and too wet this visit. 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 John Gramajo Anemia 2011    Arm fracture Sherill Loges     Dr Shayna Moran with also yearly DM exam    Cataract     worsening-Dr. Mccall Scales    Cellulitis of left lower leg     Chronic venous hypertension with ulcer ([address] 2011    CKD (chronic kidney disease) 2012    Renal u/S normal     Colon polyps     Dr. John Gramajo Diabetes mellitus ([address]     Diabetes mellitus with peripheral circulatory disorder ([address] 10/2/2015    Diabetes mellitus with skin ulcer ([address] 10/2/2015    Diabetic peripheral neuropathy ([address]     + EMG, NCS    Diabetic skin ulcer associated with type 2 diabetes mellitus ([address]     Diverticulosis 12    mild, left colon    Femoral DVT (deep venous thrombosis) ([address] 2011    PARTIAL,CHRONIC-SPFLD HEART SURGEONS    GERD (gastroesophageal reflux disease)     Glaucoma     open angle-Dr. Rachel Albrecht H/O cardiac catheterization 6/3/14    EF55% normal study    H/O Doppler ultrasound 03/26/15    Carotid US- no significant stenosis noted bilaterally. Chloe Eddy H/O mumps orchitis     as a youth    Hemorrhoids 4/23/12    Dr. Donya Glover; repeat colonoscopy 3 years   Chloe Eddy HLD (hyperlipidemia)     Hx of Doppler ultrasound 1/06/15    Lymph node seen in left groin area. No bilateral stenosis.     Hyperlipemia     Hypertension 1992    Idiopathic chronic venous hypertension of left lower extremity with ulcer and inflammation (HCC) 10/2/2015    Leg ulcer ([address] 1978-present    following at wound center, Dr Inés Wilson No diabetic retinopathy OU 11/12    Dr. Isa Farrell chronic ulcer of left lower leg with fat layer exposed ([address] 10/2/2015    Pulmonary embolism ([address] 11/13    patient on coumadin    Sleep apnea     doesnt always use cpap dt it drys him out    SOB (shortness of breath) Oct 2011    Stress test normal.     Tendinitis 1973    plantar tendons    Type II or unspecified type diabetes mellitus with other specified manifestations, not stated as uncontrolled 2/8/2013    Unspecified venous (peripheral) insufficiency     Urticaria     WD-Chronic venous hypertension (idiopathic) with ulcer of left lower extremity (CODE) ([address] 6/27/2019       PAST SURGICAL HISTORY    Past Surgical History:   Procedure Laterality Date    BREAST SURGERY  1970s    benign tumors bilaterally    CARDIAC CATHETERIZATION  6/3/14    EF55% normal study    CARPAL TUNNEL RELEASE Left 1993    CATARACT REMOVAL Right 3/11/2013    Dr. Heather Andrade Left 2/25/2013    Dr. Himanshu Harper  2006    polyps    COLONOSCOPY  11/21/11    villous component--f/u colonoscopy will be needed in 6 months    COLONOSCOPY  4/23/12    mild diverticulosis, internal hemorrhoids; repeat in 3 years (Dr. Donya Glover)    COLONOSCOPY  08/04/2016    mild diverticulosis, three colon polyps    HERNIA REPAIR  1970s    SKIN GRAFT  1978-present    skin grafts to left ankle ulcers   [address]    VARICOSE VEIN SURGERY Left 2009       FAMILY HISTORY    Family ulcer of left lower extremity (CODE) ([address]        Procedure Note    Indications:  Based on my examination of this patient's wound(s) today, sharp excision into necrotic subcutaneous tissue is required to promote healing and evaluate the extent of previous healing. Performed by: Naomi Garcia MD    Consent obtained: Yes    Time out taken:  Yes    Pain Control: none       Debridement:Excisional Debridement    Using curette the wound(s) was/were sharply debrided down through and including the removal of epidermis, dermis and subcutaneous tissue. Devitalized Tissue Debrided:  fibrin, biofilm, slough and exudate    Pre Debridement Measurements:  Are located in the Wound Documentation Flow Sheet    All active wounds listed below with today's date are evaluated  Wound(s)    debrided this date include # : 6     Post  Debridement Measurements:  Wound 11/15/13 Other (Comment) Leg Inner erethema with a small puncture site (Active)   Number of days: 2098       Wound 06/27/19 #6 (onset 1 month) Left Medial Ankle (Active)   Wound Image   8/15/2019 10:50 AM   Wound Traumatic 8/15/2019 10:50 AM   Dressing Status Clean;Dry; Intact 8/15/2019 12:18 PM   Dressing Changed Changed/New 8/15/2019 12:18 PM   Dressing/Treatment ABD; Alginate 8/15/2019 12:18 PM   Wound Cleansed Wound cleanser;Rinsed/Irrigated with saline 8/15/2019 10:50 AM   Wound Length (cm) 2.5 cm 8/15/2019 10:50 AM   Wound Width (cm) 4.5 cm 8/15/2019 10:50 AM   Wound Depth (cm) 0.1 cm 8/15/2019 10:50 AM   Wound Surface Area (cm^2) 11.25 cm^2 8/15/2019 10:50 AM   Change in Wound Size % (l*w) -60.71 8/15/2019 10:50 AM   Wound Volume (cm^3) 1.12 cm^3 8/15/2019 10:50 AM   Wound Healing % 47 8/15/2019 10:50 AM   Post-Procedure Length (cm) 3 cm 8/8/2019 12:14 PM   Post-Procedure Width (cm) 4.1 cm 8/8/2019 12:14 PM   Post-Procedure Depth (cm) 0.1 cm 8/8/2019 12:14 PM   Post-Procedure Surface Area (cm^2) 12.3 cm^2 8/8/2019 12:14 PM   Post-Procedure Volume (cm^3) 1.23 cm^3 8/8/2019 12:14 PM   Distance Tunneling (cm) 0 cm 8/15/2019 10:50 AM   Tunneling Position ___ O'Clock 0 8/15/2019 10:50 AM   Undermining Starts ___ O'Clock 0 8/15/2019 10:50 AM   Undermining Ends___ O'Clock 0 8/15/2019 10:50 AM   Undermining Maxium Distance (cm) 0 8/15/2019 10:50 AM   Wound Assessment Black;Red;Yellow 8/15/2019 10:50 AM   Drainage Amount Moderate 8/15/2019 10:50 AM   Drainage Description Brown 8/15/2019 10:50 AM   Odor Strong 8/15/2019 10:50 AM   Margins Defined edges; Unattached edges 8/15/2019 10:50 AM   Ana-wound Assessment Maceration 8/15/2019 10:50 AM   Non-staged Wound Description Full thickness 8/15/2019 10:50 AM   Marks%Wound Bed 0 8/15/2019 10:50 AM   Red%Wound Bed 25 8/15/2019 10:50 AM   Yellow%Wound Bed 5 8/15/2019 10:50 AM   Black%Wound Bed 70 8/15/2019 10:50 AM   Purple%Wound Bed 0 8/15/2019 10:50 AM   Other%Wound Bed 0 8/15/2019 10:50 AM   Number of days: 48       Percent of Wound(s) Debrided: approximately 100%    Total  Area  Debrided:  11.25 sq cm     Bleeding:  Minimal    Hemostasis Achieved:  by pressure    Procedural Pain:  0  / 10     Post Procedural Pain:  0 / 10     Response to treatment:  Well tolerated by patient. Status of wound progress and description from last visit:   Too wet this week. A little larger too. Plan:       Discharge Instructions          PHYSICIAN ORDERS AND DISCHARGE INSTRUCTIONS     NOTE: Upon discharge from the [address], you will receive a patient experience survey. We would be grateful if you would take the time to fill this survey out.     Wound care order history:                 ANA CRISTINA's   Right       Left               Date               Vascular studies:   ordered on 7/25/19 Date  To be done 8/2/19 imaging center               Imaging:  Date               Cultures: obtained from left medal leg  Date 6/27/19--positive kles.                                Obtained from left medial lower leg on

## 2019-08-19 ENCOUNTER — HOSPITAL ENCOUNTER (OUTPATIENT)
Dept: WOUND CARE | Age: 74
Discharge: HOME | End: 2019-08-19
Payer: MEDICARE

## 2019-08-19 ENCOUNTER — ANTI-COAG VISIT (OUTPATIENT)
Dept: PHARMACY | Age: 74
End: 2019-08-19
Payer: MEDICARE

## 2019-08-19 VITALS
HEART RATE: 106 BPM | RESPIRATION RATE: 16 BRPM | TEMPERATURE: 97.2 F | DIASTOLIC BLOOD PRESSURE: 79 MMHG | SYSTOLIC BLOOD PRESSURE: 142 MMHG

## 2019-08-19 DIAGNOSIS — Z86.718 HISTORY OF DVT (DEEP VEIN THROMBOSIS): ICD-10-CM

## 2019-08-19 DIAGNOSIS — Z86.711 PERSONAL HISTORY OF PE (PULMONARY EMBOLISM): ICD-10-CM

## 2019-08-19 LAB
INTERNATIONAL NORMALIZATION RATIO, POC: 2.4
POC INR: 2.4 INDEX
POC INR: ABNORMAL INDEX
PROTHROMBIN TIME, POC: 29.2 SECONDS (ref 10–14.3)

## 2019-08-19 PROCEDURE — 85610 PROTHROMBIN TIME: CPT

## 2019-08-19 PROCEDURE — 36416 COLLJ CAPILLARY BLOOD SPEC: CPT

## 2019-08-19 PROCEDURE — 29581 APPL MULTLAYER CMPRN SYS LEG: CPT

## 2019-08-19 PROCEDURE — 99211 OFF/OP EST MAY X REQ PHY/QHP: CPT

## 2019-08-19 NOTE — PROGRESS NOTES
Medication Management Service  [facility]  [phone]    Visit Date: 8/19/2019   Subjective:       Ivan Gunderson is a 68 y.o. male who presents to clinic today for anticoagulation monitoring and adjustment. Patient seen in clinic for warfarin management due to  Indication:   DVT and PE. INR goal: of 2.0-3.0. Duration of therapy: indefinite. Patient reports the following:   Adherent with regimen  Missed or extra doses:  None   Bleeding or thromboembolic side effects:  None  Significant medication changes:  None  Significant dietary changes: None  Significant alcohol or tobacco changes: None  Significant recent illness, disease state changes, or hospitalization:  None  Upcoming surgeries or procedures:  None  Falls: None           Assessment and Plan     PT/INR done in office per protocol. INR today is 2.4, therapeutic. Plan: Will continue current regimen of warfarin 5mg daily except 7.5mg on Mondays. Recheck INR in 2.5 week(s). Patient verbalized understanding of dosing directions and information discussed. Dosing schedule given to patient including phone number, appointment date, and time. Progress note sent to referring office. Patient acknowledges working in consult agreement with pharmacist as referred by his/her physician.       Electronically signed by Francie Ruby, [address] [place] [address] on 8/19/19 at 3:51 PM

## 2019-08-20 LAB
CULTURE: ABNORMAL
Lab: ABNORMAL
SPECIMEN: ABNORMAL

## 2019-08-22 ENCOUNTER — HOSPITAL ENCOUNTER (OUTPATIENT)
Dept: WOUND CARE | Age: 74
Discharge: HOME | End: 2019-08-22
Payer: MEDICARE

## 2019-08-22 ENCOUNTER — TELEPHONE (OUTPATIENT)
Dept: PHARMACY | Age: 74
End: 2019-08-22

## 2019-08-22 VITALS
DIASTOLIC BLOOD PRESSURE: 72 MMHG | HEART RATE: 72 BPM | TEMPERATURE: 98.3 F | SYSTOLIC BLOOD PRESSURE: 138 MMHG | RESPIRATION RATE: 16 BRPM

## 2019-08-22 DIAGNOSIS — I83.219 VARICOSE VEINS OF LOWER EXTREMITIES WITH ULCER AND INFLAMMATION (HCC): Primary | ICD-10-CM

## 2019-08-22 DIAGNOSIS — L97.822 ULCER OF SHIN, LEFT, WITH FAT LAYER EXPOSED (HCC): ICD-10-CM

## 2019-08-22 DIAGNOSIS — I83.229 VARICOSE VEINS OF LOWER EXTREMITIES WITH ULCER AND INFLAMMATION (HCC): Primary | ICD-10-CM

## 2019-08-22 DIAGNOSIS — L97.919 VARICOSE VEINS OF LOWER EXTREMITIES WITH ULCER AND INFLAMMATION (HCC): Primary | ICD-10-CM

## 2019-08-22 DIAGNOSIS — I87.312 CHRONIC VENOUS HYPERTENSION (IDIOPATHIC) WITH ULCER OF LEFT LOWER EXTREMITY (CODE) (HCC): ICD-10-CM

## 2019-08-22 DIAGNOSIS — L97.929 VARICOSE VEINS OF LOWER EXTREMITIES WITH ULCER AND INFLAMMATION (HCC): Primary | ICD-10-CM

## 2019-08-22 PROCEDURE — 11042 DBRDMT SUBQ TIS 1ST 20SQCM/<: CPT

## 2019-08-22 NOTE — PROGRESS NOTES
([address] 11/12    + EMG, NCS    Diabetic skin ulcer associated with type 2 diabetes mellitus ([address]     Diverticulosis 4/23/12    mild, left colon    Femoral DVT (deep venous thrombosis) ([address] 09/2011    PARTIAL,CHRONIC-SPFLD HEART SURGEONS    GERD (gastroesophageal reflux disease)     Glaucoma 11/12    open angle-Dr. Yomi Hinkle H/O cardiac catheterization 6/3/14    EF55% normal study    H/O Doppler ultrasound 03/26/15    Carotid US- no significant stenosis noted bilaterally. [employer] H/O mumps orchitis     as a youth    Hemorrhoids 4/23/12    Dr. Rudi Stocktonchild; repeat colonoscopy 3 years   [employer] HLD (hyperlipidemia)     Hx of Doppler ultrasound 1/06/15    Lymph node seen in left groin area. No bilateral stenosis.     Hyperlipemia     Hypertension 1992    Idiopathic chronic venous hypertension of left lower extremity with ulcer and inflammation (HCC) 10/2/2015    Leg ulcer ([address] 1978-present    following at wound center, Dr Lopez Arellano No diabetic retinopathy OU 11/12    Dr. Olivo Patch chronic ulcer of left lower leg with fat layer exposed ([address] 10/2/2015    Pulmonary embolism ([address] 11/13    patient on coumadin    Sleep apnea     doesnt always use cpap dt it drys him out    SOB (shortness of breath) Oct 2011    Stress test normal.     Tendinitis 1973    plantar tendons    Type II or unspecified type diabetes mellitus with other specified manifestations, not stated as uncontrolled 2/8/2013    Unspecified venous (peripheral) insufficiency     Urticaria     WD-Chronic venous hypertension (idiopathic) with ulcer of left lower extremity (CODE) ([address] 6/27/2019       PAST SURGICAL HISTORY    Past Surgical History:   Procedure Laterality Date    BREAST SURGERY  1970s    benign tumors bilaterally    CARDIAC CATHETERIZATION  6/3/14    EF55% normal study    CARPAL TUNNEL RELEASE Left 1993    CATARACT REMOVAL Right 3/11/2013    Dr. Magdy Manning Left 2/25/2013    Dr. Kit Lanes EXAM      General: The patient is in no acute distress. Mental status:  Patient is appropriate, is  oriented to place and plan of care. 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 Performed by: Duc Figueroa MD    Consent obtained: Yes    Time out taken:  Yes    Pain Control: none needed       Debridement:Excisional Debridement    Using curette the wound(s) was/were sharply debrided down through and including the removal of subcutaneous tissue. Devitalized Tissue Debrided:  fibrin, biofilm, slough, exudate and callus    Pre Debridement Measurements:  Are located in the Wound Documentation Flow Sheet    All active wounds listed below with today's date are evaluated  Wound(s)    debrided this date include # : 6     Post  Debridement Measurements:  Wound 11/15/13 Other (Comment) Leg Inner erethema with a small puncture site (Active)   Number of days: 2105       Wound 06/27/19 #6 (onset 1 month) Left Medial Ankle (Active)   Wound Image   8/15/2019 10:50 AM   Wound Venous 8/22/2019 10:51 AM   Dressing Status Clean;Dry; Intact 8/22/2019 11:47 AM   Dressing Changed Changed/New 8/22/2019 11:47 AM   Dressing/Treatment ABD; Alginate 8/15/2019 12:18 PM   Wound Cleansed Wound cleanser 8/22/2019 10:51 AM   Wound Length (cm) 3 cm 8/22/2019 10:51 AM   Wound Width (cm) 4.2 cm 8/22/2019 10:51

## 2019-08-26 ENCOUNTER — ANTI-COAG VISIT (OUTPATIENT)
Dept: PHARMACY | Age: 74
End: 2019-08-26
Payer: MEDICARE

## 2019-08-26 DIAGNOSIS — Z86.718 HISTORY OF DVT (DEEP VEIN THROMBOSIS): ICD-10-CM

## 2019-08-26 DIAGNOSIS — Z86.711 PERSONAL HISTORY OF PE (PULMONARY EMBOLISM): ICD-10-CM

## 2019-08-26 LAB
INTERNATIONAL NORMALIZATION RATIO, POC: 4.4
POC INR: 4.4 INDEX
POC INR: ABNORMAL INDEX
PROTHROMBIN TIME, POC: 52.5 SECONDS (ref 10–14.3)

## 2019-08-26 PROCEDURE — 99212 OFFICE O/P EST SF 10 MIN: CPT

## 2019-08-26 PROCEDURE — 85610 PROTHROMBIN TIME: CPT

## 2019-08-26 PROCEDURE — 36416 COLLJ CAPILLARY BLOOD SPEC: CPT

## 2019-08-27 ENCOUNTER — HOSPITAL ENCOUNTER (OUTPATIENT)
Dept: WOUND CARE | Age: 74
Discharge: HOME | End: 2019-08-27
Payer: MEDICARE

## 2019-08-27 VITALS
DIASTOLIC BLOOD PRESSURE: 71 MMHG | TEMPERATURE: 97.6 F | RESPIRATION RATE: 16 BRPM | HEART RATE: 92 BPM | SYSTOLIC BLOOD PRESSURE: 110 MMHG

## 2019-08-27 DIAGNOSIS — L97.822 ULCER OF SHIN, LEFT, WITH FAT LAYER EXPOSED (HCC): Primary | ICD-10-CM

## 2019-08-27 PROCEDURE — 11042 DBRDMT SUBQ TIS 1ST 20SQCM/<: CPT

## 2019-08-27 NOTE — PROGRESS NOTES
FAMILY HISTORY    Family History   Problem Relation Age of Onset    Cancer Father         prostate    Heart Disease Father     High Blood Pressure Father     High Cholesterol Father     Cancer Brother     Diabetes Brother     Thyroid Disease Brother        SOCIAL HISTORY    Social History     Tobacco Use    Smoking status: Former Smoker     Packs/day: 2.00     Years: 30.00     Pack years: 60.00     Types: Cigarettes     Last attempt to quit: 1993     Years since quittin.2    Smokeless tobacco: Current User     Types: Chew    Tobacco comment: chew--30 years. Reviewed 2015   Substance Use Topics    Alcohol use:  Yes     Alcohol/week: 0.0 standard drinks     Comment: 12-pack beer weekly; 5 cups coffee daily    Drug use: No       ALLERGIES    Allergies   Allergen Reactions    Cavilon Durable Barrier [Mineral Oil-Dimeth-Coconut Oil]     Parabens Hives    Prinivil [Lisinopril] Swelling    Cortisone Rash    Dilaudid [Hydromorphone Hcl] Rash    Penicillins Rash    Sulfamethoxazole-Trimethoprim Nausea Only    Tape [Adhesive Tape] Rash       MEDICATIONS    Current Outpatient Medications on File Prior to Encounter   Medication Sig Dispense Refill    ciprofloxacin (CIPRO) 500 MG tablet Take 500 mg by mouth 2 times daily      doxycycline hyclate (VIBRAMYCIN) 100 MG capsule Take 100 mg by mouth 2 times daily      atorvastatin (LIPITOR) 40 MG tablet Take 1 tablet by mouth every evening 90 tablet 3    metFORMIN (GLUCOPHAGE) 500 MG tablet Start metformin 1 tablet daily only on 2018 90 tablet 3    blood glucose test strips (ALISHA CONTOUR TEST) strip USE AS DIRECTED TO TEST BLOOD SUGAR FOUR TIMES DAILY AS NEEDED 100 strip 3    escitalopram (LEXAPRO) 20 MG tablet Take 1 tablet by mouth daily 90 tablet 3    buPROPion (WELLBUTRIN XL) 300 MG extended release tablet Take 1 tablet by mouth every morning 90 tablet 3    valsartan (DIOVAN) 80 MG tablet TAKE 1 TABLET BY MOUTH ONCE DAILY 90

## 2019-08-29 ENCOUNTER — HOSPITAL ENCOUNTER (OUTPATIENT)
Dept: WOUND CARE | Age: 74
Discharge: HOME | End: 2019-08-29
Payer: MEDICARE

## 2019-08-29 VITALS
RESPIRATION RATE: 18 BRPM | TEMPERATURE: 98.3 F | SYSTOLIC BLOOD PRESSURE: 154 MMHG | HEART RATE: 91 BPM | DIASTOLIC BLOOD PRESSURE: 80 MMHG

## 2019-08-29 DIAGNOSIS — L97.822 ULCER OF SHIN, LEFT, WITH FAT LAYER EXPOSED (HCC): ICD-10-CM

## 2019-08-29 DIAGNOSIS — S51.811A SKIN TEAR OF RIGHT FOREARM WITHOUT COMPLICATION, INITIAL ENCOUNTER: ICD-10-CM

## 2019-08-29 DIAGNOSIS — I87.312 CHRONIC VENOUS HYPERTENSION (IDIOPATHIC) WITH ULCER OF LEFT LOWER EXTREMITY (CODE) (HCC): Primary | ICD-10-CM

## 2019-08-29 PROCEDURE — 11042 DBRDMT SUBQ TIS 1ST 20SQCM/<: CPT

## 2019-09-05 ENCOUNTER — ANTI-COAG VISIT (OUTPATIENT)
Dept: PHARMACY | Age: 74
End: 2019-09-05
Payer: MEDICARE

## 2019-09-05 ENCOUNTER — HOSPITAL ENCOUNTER (OUTPATIENT)
Dept: WOUND CARE | Age: 74
Discharge: HOME | End: 2019-09-05
Payer: MEDICARE

## 2019-09-05 VITALS
RESPIRATION RATE: 18 BRPM | SYSTOLIC BLOOD PRESSURE: 126 MMHG | HEART RATE: 91 BPM | DIASTOLIC BLOOD PRESSURE: 75 MMHG | TEMPERATURE: 97.5 F

## 2019-09-05 DIAGNOSIS — I87.312 CHRONIC VENOUS HYPERTENSION (IDIOPATHIC) WITH ULCER OF LEFT LOWER EXTREMITY (CODE) (HCC): Primary | ICD-10-CM

## 2019-09-05 DIAGNOSIS — L03.115 CELLULITIS OF RIGHT ANTERIOR LOWER LEG: ICD-10-CM

## 2019-09-05 DIAGNOSIS — Z86.711 PERSONAL HISTORY OF PE (PULMONARY EMBOLISM): ICD-10-CM

## 2019-09-05 DIAGNOSIS — S51.811A SKIN TEAR OF RIGHT FOREARM WITHOUT COMPLICATION, INITIAL ENCOUNTER: ICD-10-CM

## 2019-09-05 DIAGNOSIS — Z86.718 HISTORY OF DVT (DEEP VEIN THROMBOSIS): ICD-10-CM

## 2019-09-05 LAB
INTERNATIONAL NORMALIZATION RATIO, POC: 4.3
POC INR: 4.3 INDEX
POC INR: ABNORMAL INDEX
PROTHROMBIN TIME, POC: 52.1 SECONDS (ref 10–14.3)

## 2019-09-05 PROCEDURE — 99212 OFFICE O/P EST SF 10 MIN: CPT

## 2019-09-05 PROCEDURE — 36416 COLLJ CAPILLARY BLOOD SPEC: CPT

## 2019-09-05 PROCEDURE — 85610 PROTHROMBIN TIME: CPT

## 2019-09-05 PROCEDURE — 11042 DBRDMT SUBQ TIS 1ST 20SQCM/<: CPT

## 2019-09-05 RX ORDER — WARFARIN SODIUM 5 MG/1
5 TABLET ORAL DAILY
COMMUNITY
End: 2019-11-18 | Stop reason: DRUGHIGH

## 2019-09-05 RX ORDER — DOXYCYCLINE HYCLATE 100 MG
100 TABLET ORAL 2 TIMES DAILY
Qty: 14 TABLET | Refills: 0 | Status: SHIPPED | OUTPATIENT
Start: 2019-09-05 | End: 2019-09-12

## 2019-09-05 NOTE — PROGRESS NOTES
Medication Management Service  [facility]  [phone]    Visit Date: 9/5/2019   Subjective:       Solitario Garibay is a 68 y.o. male who presents to clinic today for anticoagulation monitoring and adjustment. Patient seen in clinic for warfarin management due to  Indication:   DVT. INR goal: of 2.0-3.0. Duration of therapy: indefinite. Patient reports the following:   Adherent with regimen  Missed or extra doses:  None   Bleeding or thromboembolic side effects:  None  Significant medication changes:  Doxycycline for 7 days starting today   Significant dietary changes: None  Significant alcohol or tobacco changes: None  Significant recent illness, disease state changes, or hospitalization:  Pain from fall   Upcoming surgeries or procedures:  None  Falls: None           Assessment and Plan     PT/INR done in office per protocol. INR today is 4.3, supratherapeutic. Patient in increased pain from last fall and reports cellulitis in leg. Patient starting doxycycline again for 7 days, which can increase INR. Plan:  Hold warfarin today then decrease weekly dose by ~7% to warfarin 5mg daily. Recheck INR in 1 week(s). Patient verbalized understanding of dosing directions and information discussed. Dosing schedule given to patient including phone number, appointment date, and time. Progress note sent to referring office. Patient acknowledges working in consult agreement with pharmacist as referred by his/her physician.       Electronically signed by NADEEN [facility] on 9/5/19 at 4:05 PM

## 2019-09-05 NOTE — PROGRESS NOTES
skin to be normal in turgor and texture  Wound exam: see wound description below in procedure note      Assessment:     Problem List Items Addressed This Visit     WD-Chronic venous hypertension (idiopathic) with ulcer of left lower extremity (CODE) ([address] - Primary    WD-Skin tear of right forearm without complication    WD-Cellulitis of right anterior lower leg        Procedure Note    Indications:  Based on my examination of this patient's wound(s) today, sharp excision into necrotic epidermis, dermis and subcutaneous tissue is required to promote healing and evaluate the extent of previous healing. Performed by: Krunal Mckeon MD    Consent obtained: Yes    Time out taken:  Yes    Pain Control: none needed       Debridement:Excisional Debridement    Using curette, #15 blade scalpel and forceps the wound(s) was/were sharply debrided down through and including the removal of epidermis, dermis and subcutaneous tissue. Devitalized Tissue Debrided:  fibrin, biofilm, slough and necrotic/eschar    Pre Debridement Measurements:  Are located in the Wound Documentation Flow Sheet    All active wounds listed below with today's date are evaluated  Wound(s)    debrided this date include # : 6 and 7     Post  Debridement Measurements:  Wound 11/15/13 Other (Comment) Leg Inner erethema with a small puncture site (Active)   Number of days: 2119       Wound 06/27/19 #6 (onset 1 month) Left Medial Ankle (Active)   Wound Image   9/5/2019 11:04 AM   Wound Venous 9/5/2019 11:04 AM   Dressing Status Clean;Dry; Intact 8/29/2019 11:13 AM   Dressing Changed Changed/New 8/29/2019 11:13 AM   Dressing/Treatment ABD; Alginate 8/15/2019 12:18 PM   Wound Cleansed Wound cleanser 9/5/2019 11:04 AM   Wound Length (cm) 3 cm 9/5/2019 11:04 AM   Wound Width (cm) 4.5 cm 9/5/2019 11:04 AM   Wound Depth (cm) 0.2 cm 9/5/2019 11:04 AM   Wound Surface Area (cm^2) 13.5 cm^2 9/5/2019 11:04 AM   Change in Wound Size % (l*w) -92.86 9/5/2019 11:04 AM Wound Volume (cm^3) 2.7 cm^3 9/5/2019 11:04 AM   Wound Healing % -29 9/5/2019 11:04 AM   Post-Procedure Length (cm) 3 cm 9/5/2019 11:47 AM   Post-Procedure Width (cm) 4.5 cm 9/5/2019 11:47 AM   Post-Procedure Depth (cm) 0.2 cm 9/5/2019 11:47 AM   Post-Procedure Surface Area (cm^2) 13.5 cm^2 9/5/2019 11:47 AM   Post-Procedure Volume (cm^3) 2.7 cm^3 9/5/2019 11:47 AM   Distance Tunneling (cm) 0 cm 9/5/2019 11:04 AM   Tunneling Position ___ O'Clock 0 9/5/2019 11:04 AM   Undermining Starts ___ O'Clock 0 9/5/2019 11:04 AM   Undermining Ends___ O'Clock 0 9/5/2019 11:04 AM   Undermining Maxium Distance (cm) 0 8/29/2019 10:29 AM   Wound Assessment Red;Yellow 9/5/2019 11:04 AM   Drainage Amount Moderate 9/5/2019 11:04 AM   Drainage Description Serosanguinous 9/5/2019 11:04 AM   Odor Strong 9/5/2019 11:04 AM   Margins Defined edges 9/5/2019 11:04 AM   Ana-wound Assessment Calloused; Maceration 9/5/2019 11:04 AM   Non-staged Wound Description Full thickness 9/5/2019 11:04 AM   [place]%Wound Bed 0 9/5/2019 11:04 AM   Red%Wound Bed 10 9/5/2019 11:04 AM   Yellow%Wound Bed 90 9/5/2019 11:04 AM   Black%Wound Bed 0 9/5/2019 11:04 AM   Purple%Wound Bed 0 9/5/2019 11:04 AM   Other%Wound Bed 0 8/29/2019 10:29 AM   Number of days: 69       Wound 08/29/19 #7 right forearm cluster  (Active)   Wound Image   9/5/2019 11:04 AM   Wound Skin Tear 9/5/2019 11:04 AM   Dressing Status Clean;Dry; Intact 8/29/2019 11:13 AM   Dressing Changed Changed/New 8/29/2019 11:13 AM   Wound Cleansed Wound cleanser 9/5/2019 11:04 AM   Wound Length (cm) 3 cm 9/5/2019 11:04 AM   Wound Width (cm) 1.5 cm 9/5/2019 11:04 AM   Wound Depth (cm) 0.1 cm 9/5/2019 11:04 AM   Wound Surface Area (cm^2) 4.5 cm^2 9/5/2019 11:04 AM   Change in Wound Size % (l*w) 94.89 9/5/2019 11:04 AM   Wound Volume (cm^3) 0.45 cm^3 9/5/2019 11:04 AM   Wound Healing % 97 9/5/2019 11:04 AM   Post-Procedure Length (cm) 3 cm 9/5/2019 11:47 AM   Post-Procedure Width (cm) 1.5 cm 9/5/2019 11:47 AM

## 2019-09-09 ENCOUNTER — HOSPITAL ENCOUNTER (OUTPATIENT)
Dept: WOUND CARE | Age: 74
Discharge: HOME | End: 2019-09-09
Payer: MEDICARE

## 2019-09-09 VITALS
RESPIRATION RATE: 16 BRPM | HEART RATE: 94 BPM | DIASTOLIC BLOOD PRESSURE: 65 MMHG | SYSTOLIC BLOOD PRESSURE: 172 MMHG | TEMPERATURE: 98.7 F

## 2019-09-09 PROCEDURE — 29581 APPL MULTLAYER CMPRN SYS LEG: CPT

## 2019-09-12 ENCOUNTER — ANTI-COAG VISIT (OUTPATIENT)
Dept: PHARMACY | Age: 74
End: 2019-09-12
Payer: MEDICARE

## 2019-09-12 ENCOUNTER — HOSPITAL ENCOUNTER (OUTPATIENT)
Dept: WOUND CARE | Age: 74
Discharge: HOME | End: 2019-09-12
Payer: MEDICARE

## 2019-09-12 VITALS
HEART RATE: 89 BPM | DIASTOLIC BLOOD PRESSURE: 82 MMHG | RESPIRATION RATE: 18 BRPM | SYSTOLIC BLOOD PRESSURE: 158 MMHG | TEMPERATURE: 98.7 F

## 2019-09-12 DIAGNOSIS — Z86.718 HISTORY OF DVT (DEEP VEIN THROMBOSIS): ICD-10-CM

## 2019-09-12 DIAGNOSIS — I83.219 VARICOSE VEINS OF LOWER EXTREMITIES WITH ULCER AND INFLAMMATION (HCC): ICD-10-CM

## 2019-09-12 DIAGNOSIS — I83.229 VARICOSE VEINS OF LOWER EXTREMITIES WITH ULCER AND INFLAMMATION (HCC): ICD-10-CM

## 2019-09-12 DIAGNOSIS — L97.919 VARICOSE VEINS OF LOWER EXTREMITIES WITH ULCER AND INFLAMMATION (HCC): ICD-10-CM

## 2019-09-12 DIAGNOSIS — I87.312 CHRONIC VENOUS HYPERTENSION (IDIOPATHIC) WITH ULCER OF LEFT LOWER EXTREMITY (CODE) (HCC): ICD-10-CM

## 2019-09-12 DIAGNOSIS — L97.929 VARICOSE VEINS OF LOWER EXTREMITIES WITH ULCER AND INFLAMMATION (HCC): ICD-10-CM

## 2019-09-12 DIAGNOSIS — Z86.711 PERSONAL HISTORY OF PE (PULMONARY EMBOLISM): ICD-10-CM

## 2019-09-12 DIAGNOSIS — L97.822 ULCER OF SHIN, LEFT, WITH FAT LAYER EXPOSED (HCC): Primary | ICD-10-CM

## 2019-09-12 LAB
INTERNATIONAL NORMALIZATION RATIO, POC: 5
POC INR: 5 INDEX
POC INR: ABNORMAL INDEX
PROTHROMBIN TIME, POC: 59.6 SECONDS (ref 10–14.3)

## 2019-09-12 PROCEDURE — 36416 COLLJ CAPILLARY BLOOD SPEC: CPT

## 2019-09-12 PROCEDURE — 11042 DBRDMT SUBQ TIS 1ST 20SQCM/<: CPT

## 2019-09-12 PROCEDURE — 85610 PROTHROMBIN TIME: CPT

## 2019-09-12 PROCEDURE — 99212 OFFICE O/P EST SF 10 MIN: CPT

## 2019-09-12 NOTE — PROGRESS NOTES
PARTIAL,CHRONIC-SPFLD [facility]ART SURGEONS    GERD (gastroesophageal reflux disease)     Glaucoma 11/12    open angle-Dr. Zeinab Falk H/O cardiac catheterization 6/3/14    EF55% normal study    H/O Doppler ultrasound 03/26/15    Carotid US- no significant stenosis noted bilaterally. [place] H/O mumps orchitis     as a youth    Hemorrhoids 4/23/12    Dr. Xavier Granados; repeat colonoscopy 3 years   [place] HLD (hyperlipidemia)     Hx of Doppler ultrasound 1/06/15    Lymph node seen in left groin area. No bilateral stenosis.     Hyperlipemia     Hypertension 1992    Idiopathic chronic venous hypertension of left lower extremity with ulcer and inflammation (HCC) 10/2/2015    Leg ulcer ([address] 1978-present    following at wound center, Dr Lilia Johnston No diabetic retinopathy OU 11/12    Dr. Samantha Marie chronic ulcer of left lower leg with fat layer exposed ([address] 10/2/2015    Pulmonary embolism ([address] 11/13    patient on coumadin    Sleep apnea     doesnt always use cpap dt it drys him out    SOB (shortness of breath) Oct 2011    Stress test normal.     Tendinitis 1973    plantar tendons    Type II or unspecified type diabetes mellitus with other specified manifestations, not stated as uncontrolled 2/8/2013    Unspecified venous (peripheral) insufficiency     Urticaria     WD-Cellulitis of right anterior lower leg 9/5/2019    WD-Chronic venous hypertension (idiopathic) with ulcer of left lower extremity (CODE) ([address] 6/27/2019    WD-Skin tear of right forearm without complication 1/91/0992       PAST SURGICAL HISTORY    Past Surgical History:   Procedure Laterality Date    BREAST SURGERY  1970s    benign tumors bilaterally    CARDIAC CATHETERIZATION  6/3/14    EF55% normal study    CARPAL TUNNEL RELEASE Left 1993    CATARACT REMOVAL Right 3/11/2013    Dr. Daksha Neal Left 2/25/2013    Dr. Kerrie Lucas  2006    polyps    COLONOSCOPY  11/21/11    villous component--f/u colonoscopy 10:35 AM   Post-Procedure Length (cm) 3 cm 9/5/2019 11:47 AM   Post-Procedure Width (cm) 1.5 cm 9/5/2019 11:47 AM   Post-Procedure Depth (cm) 0.1 cm 9/5/2019 11:47 AM   Post-Procedure Surface Area (cm^2) 4.5 cm^2 9/5/2019 11:47 AM   Post-Procedure Volume (cm^3) 0.45 cm^3 9/5/2019 11:47 AM   Distance Tunneling (cm) 0 cm 9/12/2019 10:35 AM   Tunneling Position ___ O'Clock 0 9/12/2019 10:35 AM   Undermining Starts ___ O'Clock 0 9/12/2019 10:35 AM   Undermining Ends___ O'Clock 0 9/12/2019 10:35 AM   Undermining Maxium Distance (cm) 0 9/12/2019 10:35 AM   Wound Assessment Pink 9/12/2019 10:35 AM   Drainage Amount None 9/12/2019 10:35 AM   Drainage Description Sanguinous 9/5/2019 11:04 AM   Odor None 9/12/2019 10:35 AM   Margins Attached edges 9/12/2019 10:35 AM   Ana-wound Assessment Dry; Intact; Pink 9/12/2019 10:35 AM   Non-staged Wound Description Not applicable 6/16/3730 76:79 AM   [place]%Wound Bed 0 9/12/2019 10:35 AM   Red%Wound Bed 0 9/12/2019 10:35 AM   Yellow%Wound Bed 0 9/12/2019 10:35 AM   Black%Wound Bed 0 9/12/2019 10:35 AM   Purple%Wound Bed 0 9/12/2019 10:35 AM   Other%Wound Bed 0 9/12/2019 10:35 AM   Number of days: 14       Percent of Wound(s) Debrided: approximately 100%    Total  Area  Debrided:  4.5 sq cm     Bleeding:  Minimal    Hemostasis Achieved:  by pressure    Procedural Pain:  0  / 10     Post Procedural Pain:  0 / 10     Response to treatment:  Well tolerated by patient. Status of wound progress and description from last visit:   Improved today- he has continued to epithelialize around the 'islands' of skin. Plan:       Discharge Instructions         Discharge Instructions        PHYSICIAN ORDERS AND DISCHARGE INSTRUCTIONS     NOTE: [facility] [facility] you will receive a patient experience survey.  We would be grateful if you would take the time to fill this survey out.     Wound care order history:                 ANA CRISTINA's   Right       Left               Date sitting.                                             KKPA taking antibiotics take entire prescription as ordered by physician do not stop taking until medicine is all gone.                                                       Orders for this week: 9/12/19     RX FOR SANTYL OINTMENT GIVEN ON 8/1/19                     Left medial ankle-- cleanse with vashe soak , pat dry.  Apply lotrisone to periwound  Apply  Santyl  ointment to wound bed , Calcium alginate then  abd profore--Change with nurse visit on Monday     Right left TUBI E     Right lower arm--Healed today 9/12/19      Nurse visit on Monday-- I87.312,L97.822,S51.811A        Follow up with DR CORNEJO this Thursday as scheduled in the wound care center     Call [phone] for any questions or concerns.     Date__________   Time____________          Treatment Note Wound 08/29/19 #7 right forearm cluster -Dressing/Treatment: Open to air  Wound 06/27/19 #6 (onset 1 month) Left Medial Ankle-Dressing/Treatment: Alginate, Santyl, 4x4, ABD(vashe soak, santyl, calcium alginate, ABD, profore, )    Written Patient Dismissal Instructions Given            Electronically signed by Dom Jimenez MD on 9/12/2019 at 12:00 PM

## 2019-09-16 ENCOUNTER — HOSPITAL ENCOUNTER (OUTPATIENT)
Dept: WOUND CARE | Age: 74
Discharge: HOME | End: 2019-09-16
Payer: MEDICARE

## 2019-09-16 VITALS
TEMPERATURE: 98.7 F | HEART RATE: 102 BPM | RESPIRATION RATE: 18 BRPM | DIASTOLIC BLOOD PRESSURE: 70 MMHG | SYSTOLIC BLOOD PRESSURE: 150 MMHG

## 2019-09-16 PROCEDURE — 29581 APPL MULTLAYER CMPRN SYS LEG: CPT

## 2019-09-19 ENCOUNTER — HOSPITAL ENCOUNTER (OUTPATIENT)
Dept: WOUND CARE | Age: 74
Discharge: HOME | End: 2019-09-19
Payer: MEDICARE

## 2019-09-19 ENCOUNTER — ANTI-COAG VISIT (OUTPATIENT)
Dept: PHARMACY | Age: 74
End: 2019-09-19
Payer: MEDICARE

## 2019-09-19 VITALS
SYSTOLIC BLOOD PRESSURE: 125 MMHG | RESPIRATION RATE: 20 BRPM | HEART RATE: 83 BPM | DIASTOLIC BLOOD PRESSURE: 66 MMHG | TEMPERATURE: 97.4 F

## 2019-09-19 DIAGNOSIS — I83.219 VARICOSE VEINS OF LOWER EXTREMITIES WITH ULCER AND INFLAMMATION (HCC): Primary | ICD-10-CM

## 2019-09-19 DIAGNOSIS — I83.229 VARICOSE VEINS OF LOWER EXTREMITIES WITH ULCER AND INFLAMMATION (HCC): Primary | ICD-10-CM

## 2019-09-19 DIAGNOSIS — Z86.718 HISTORY OF DVT (DEEP VEIN THROMBOSIS): ICD-10-CM

## 2019-09-19 DIAGNOSIS — L97.929 VARICOSE VEINS OF LOWER EXTREMITIES WITH ULCER AND INFLAMMATION (HCC): Primary | ICD-10-CM

## 2019-09-19 DIAGNOSIS — I87.321 CHRONIC VENOUS HYPERTENSION WITH INFLAMMATION, RIGHT: ICD-10-CM

## 2019-09-19 DIAGNOSIS — Z86.711 PERSONAL HISTORY OF PE (PULMONARY EMBOLISM): ICD-10-CM

## 2019-09-19 DIAGNOSIS — L97.919 VARICOSE VEINS OF LOWER EXTREMITIES WITH ULCER AND INFLAMMATION (HCC): Primary | ICD-10-CM

## 2019-09-19 DIAGNOSIS — I87.312 CHRONIC VENOUS HYPERTENSION (IDIOPATHIC) WITH ULCER OF LEFT LOWER EXTREMITY (CODE) (HCC): ICD-10-CM

## 2019-09-19 LAB
INTERNATIONAL NORMALIZATION RATIO, POC: 2.6
POC INR: 2.6 INDEX
POC INR: ABNORMAL INDEX
PROTHROMBIN TIME, POC: 31.7 SECONDS (ref 10–14.3)

## 2019-09-19 PROCEDURE — 99211 OFF/OP EST MAY X REQ PHY/QHP: CPT

## 2019-09-19 PROCEDURE — 85610 PROTHROMBIN TIME: CPT

## 2019-09-19 PROCEDURE — 36416 COLLJ CAPILLARY BLOOD SPEC: CPT

## 2019-09-19 PROCEDURE — 11042 DBRDMT SUBQ TIS 1ST 20SQCM/<: CPT

## 2019-09-19 NOTE — PROGRESS NOTES
normal study    H/O Doppler ultrasound 03/26/15    Carotid US- no significant stenosis noted bilaterally. Jossie Ochoa H/O mumps orchitis     as a youth    Hemorrhoids 4/23/12    Dr. Devorah Valles; repeat colonoscopy 3 years   Jossie Ochoa HLD (hyperlipidemia)     Hx of Doppler ultrasound 1/06/15    Lymph node seen in left groin area. No bilateral stenosis.     Hyperlipemia     Hypertension 1992    Idiopathic chronic venous hypertension of left lower extremity with ulcer and inflammation (HCC) 10/2/2015    Leg ulcer ([address] 1978-present    following at wound center, Dr Neal Landrum No diabetic retinopathy OU 11/12    Dr. Dora Nielsen chronic ulcer of left lower leg with fat layer exposed ([address] 10/2/2015    Pulmonary embolism ([address] 11/13    patient on coumadin    Sleep apnea     doesnt always use cpap dt it drys him out    SOB (shortness of breath) Oct 2011    Stress test normal.     Tendinitis 1973    plantar tendons    Type II or unspecified type diabetes mellitus with other specified manifestations, not stated as uncontrolled 2/8/2013    Unspecified venous (peripheral) insufficiency     Urticaria     WD-Cellulitis of right anterior lower leg 9/5/2019    WD-Chronic venous hypertension (idiopathic) with ulcer of left lower extremity (CODE) ([address] 6/27/2019    WD-Skin tear of right forearm without complication 3/75/7524       PAST SURGICAL HISTORY    Past Surgical History:   Procedure Laterality Date    BREAST SURGERY  1970s    benign tumors bilaterally    CARDIAC CATHETERIZATION  6/3/14    EF55% normal study    CARPAL TUNNEL RELEASE Left 1993    CATARACT REMOVAL Right 3/11/2013    Dr. Filomena Newell Left 2/25/2013    Dr. Simeon Wolfe  2006    polyps    COLONOSCOPY  11/21/11    villous component--f/u colonoscopy will be needed in 6 months    COLONOSCOPY  4/23/12    mild diverticulosis, internal hemorrhoids; repeat in 3 years (Dr. Devorah Valles)    COLONOSCOPY  08/04/2016    mild diverticulosis, three colon polyps    HERNIA REPAIR  1970s    SKIN GRAFT  -present    skin grafts to left ankle ulcers    SPLENECTOMY      TONSILLECTOMY      VARICOSE VEIN SURGERY Left 2009       FAMILY HISTORY    Family History   Problem Relation Age of Onset    Cancer Father         prostate    Heart Disease Father     High Blood Pressure Father     High Cholesterol Father     Cancer Brother     Diabetes Brother     Thyroid Disease Brother        SOCIAL HISTORY    Social History     Tobacco Use    Smoking status: Former Smoker     Packs/day: 2.00     Years: 30.00     Pack years: 60.00     Types: Cigarettes     Last attempt to quit: 1993     Years since quittin.3    Smokeless tobacco: Current User     Types: Chew    Tobacco comment: chew--30 years. Reviewed 2015   Substance Use Topics    Alcohol use:  Yes     Alcohol/week: 0.0 standard drinks     Comment: 12-pack beer weekly; 5 cups coffee daily    Drug use: No       ALLERGIES    Allergies   Allergen Reactions    Cavilon Durable Barrier [Mineral Oil-Dimeth-Coconut Oil]     Parabens Hives    Prinivil [Lisinopril] Swelling    Cortisone Rash    Dilaudid [Hydromorphone Hcl] Rash    Penicillins Rash    Sulfamethoxazole-Trimethoprim Nausea Only    Tape [Adhesive Tape] Rash       MEDICATIONS    Current Outpatient Medications on File Prior to Encounter   Medication Sig Dispense Refill    warfarin (COUMADIN) 5 MG tablet Take 5 mg by mouth daily      atorvastatin (LIPITOR) 40 MG tablet Take 1 tablet by mouth every evening 90 tablet 3    metFORMIN (GLUCOPHAGE) 500 MG tablet Start metformin 1 tablet daily only on 2018 90 tablet 3    blood glucose test strips (ALISHA CONTOUR TEST) strip USE AS DIRECTED TO TEST BLOOD SUGAR FOUR TIMES DAILY AS NEEDED 100 strip 3    escitalopram (LEXAPRO) 20 MG tablet Take 1 tablet by mouth daily 90 tablet 3    buPROPion (WELLBUTRIN XL) 300 MG extended release tablet Take 1 tablet by Undermining Ends___ O'Clock 0 9/19/2019 11:38 AM   Undermining Maxium Distance (cm) 0 9/19/2019 11:38 AM   Wound Assessment Pink 9/19/2019 11:38 AM   Drainage Amount None 9/19/2019 11:38 AM   Drainage Description Sanguinous 9/5/2019 11:04 AM   Odor None 9/19/2019 11:38 AM   Margins Attached edges 9/19/2019 11:38 AM   Ana-wound Assessment Pink 9/19/2019 11:38 AM   Non-staged Wound Description Not applicable 6/63/5254 90:53 AM   [place]%Wound Bed 100 9/19/2019 11:38 AM   Red%Wound Bed 0 9/19/2019 11:38 AM   Yellow%Wound Bed 0 9/19/2019 11:38 AM   Black%Wound Bed 0 9/19/2019 11:38 AM   Purple%Wound Bed 0 9/19/2019 11:38 AM   Other%Wound Bed 0 9/19/2019 11:38 AM   Number of days: 21       Percent of Wound(s) Debrided: approximately 100%    Total  Area  Debrided:  12 sq cm     Bleeding:  Minimal    Hemostasis Achieved:  by pressure    Procedural Pain:  0  / 10     Post Procedural Pain:  0 / 10     Response to treatment:  Well tolerated by patient. Status of wound progress and description from last visit:   Improved today, but need to start compression on the opposite leg for venous hypertension. Plan:       Discharge Instructions         Discharge Instructions        PHYSICIAN ORDERS AND DISCHARGE INSTRUCTIONS     NOTE: Upon discharge from the [address], you will receive a patient experience survey. We would be grateful if you would take the time to fill this survey out.     Wound care order history:                 ANA CRISTINA's   Right       Left               Date               Vascular studies:   ordered on 7/25/19 Date  To be done 8/2/19 imaging center               Imaging:  Date               Cultures: obtained from left medal leg  Date 6/27/19--positive kles.                                NIDPEVSI from left medial lower leg on 8/15/19                                  Biopsy done 7/18/19                 Labs/ HbA1c  Date               Grafts: Date               HBO:                Antibiotics: Doxycycline for  Santyl  ointment to wound bed , Calcium alginate then  abd profore--Change with nurse visit on Monday     Right leg wrap with unna boot and coban       Nurse visit on Monday-- I87.312,L97.822,S51.811A        Follow up Susannah ambrosio Thursday as scheduled in the wound care center     Call [phone] for any questions or concerns.     Date__________   Time____________          Treatment Note Wound 08/29/19 #7 right forearm cluster -Dressing/Treatment: Open to air  Wound 06/27/19 #6 (onset 1 month) Left Medial Ankle-Dressing/Treatment: Alginate, Santyl, 4x4, ABD(vashe soak, santyl, calcium alginate, ABD, profore, )    Written Patient Dismissal Instructions Given            Electronically signed by Christie Mullen MD on 9/19/2019 at 12:55 PM

## 2019-09-23 ENCOUNTER — HOSPITAL ENCOUNTER (OUTPATIENT)
Dept: WOUND CARE | Age: 74
Discharge: HOME | End: 2019-09-23
Payer: MEDICARE

## 2019-09-23 VITALS
DIASTOLIC BLOOD PRESSURE: 69 MMHG | HEART RATE: 74 BPM | RESPIRATION RATE: 16 BRPM | SYSTOLIC BLOOD PRESSURE: 118 MMHG | TEMPERATURE: 98.9 F

## 2019-09-23 PROCEDURE — 97602 WOUND(S) CARE NON-SELECTIVE: CPT

## 2019-09-23 NOTE — PROGRESS NOTES
Dr Kemal Chamorro notified of rash to right leg from ankle area to right below knee. Patient states it is the type of rash he gets when he is sensitive to things. lortisone and tubigrip e ordered per dr hendrix--patient to apply lotrisone daily at home for the next few days--dr hendrix to reeval on Thursday with doctors visit.  Electronically signed by Salma Chamorro RN on 9/23/2019 at 2:00 PM

## 2019-09-23 NOTE — PROGRESS NOTES
Nonselective enzymatic debridement performed with Santyl per physician order to wound(s) of the LLE  Patient tolerated the procedure well.

## 2019-09-26 ENCOUNTER — HOSPITAL ENCOUNTER (OUTPATIENT)
Dept: WOUND CARE | Age: 74
Discharge: HOME | End: 2019-09-26
Payer: MEDICARE

## 2019-09-26 ENCOUNTER — ANTI-COAG VISIT (OUTPATIENT)
Dept: PHARMACY | Age: 74
End: 2019-09-26
Payer: MEDICARE

## 2019-09-26 VITALS
TEMPERATURE: 98.3 F | SYSTOLIC BLOOD PRESSURE: 129 MMHG | HEART RATE: 78 BPM | DIASTOLIC BLOOD PRESSURE: 74 MMHG | RESPIRATION RATE: 16 BRPM

## 2019-09-26 DIAGNOSIS — Z86.718 HISTORY OF DVT (DEEP VEIN THROMBOSIS): ICD-10-CM

## 2019-09-26 DIAGNOSIS — Z86.711 PERSONAL HISTORY OF PE (PULMONARY EMBOLISM): ICD-10-CM

## 2019-09-26 DIAGNOSIS — L97.822 ULCER OF SHIN, LEFT, WITH FAT LAYER EXPOSED (HCC): Primary | ICD-10-CM

## 2019-09-26 DIAGNOSIS — I87.312 CHRONIC VENOUS HYPERTENSION (IDIOPATHIC) WITH ULCER OF LEFT LOWER EXTREMITY (CODE) (HCC): ICD-10-CM

## 2019-09-26 PROBLEM — S51.811A SKIN TEAR OF RIGHT FOREARM WITHOUT COMPLICATION: Status: RESOLVED | Noted: 2019-08-29 | Resolved: 2019-09-26

## 2019-09-26 LAB
INTERNATIONAL NORMALIZATION RATIO, POC: 4.6
POC INR: 4.6 INDEX
POC INR: ABNORMAL INDEX
PROTHROMBIN TIME, POC: 55.7 SECONDS (ref 10–14.3)

## 2019-09-26 PROCEDURE — 99212 OFFICE O/P EST SF 10 MIN: CPT

## 2019-09-26 PROCEDURE — 36416 COLLJ CAPILLARY BLOOD SPEC: CPT

## 2019-09-26 PROCEDURE — 85610 PROTHROMBIN TIME: CPT

## 2019-09-26 PROCEDURE — 87186 SC STD MICRODIL/AGAR DIL: CPT

## 2019-09-26 PROCEDURE — 11042 DBRDMT SUBQ TIS 1ST 20SQCM/<: CPT

## 2019-09-26 PROCEDURE — 87071 CULTURE AEROBIC QUANT OTHER: CPT

## 2019-09-26 PROCEDURE — 87073 CULTURE BACTERIA ANAEROBIC: CPT

## 2019-09-26 PROCEDURE — 87077 CULTURE AEROBIC IDENTIFY: CPT

## 2019-09-26 NOTE — PROGRESS NOTES
H/O Doppler ultrasound 03/26/15    Carotid US- no significant stenosis noted bilaterally. [employer] H/O mumps orchitis     as a youth    Hemorrhoids 4/23/12    Dr. Marcial Velazquez; repeat colonoscopy 3 years   [employer] HLD (hyperlipidemia)     Hx of Doppler ultrasound 1/06/15    Lymph node seen in left groin area. No bilateral stenosis.     Hyperlipemia     Hypertension 1992    Idiopathic chronic venous hypertension of left lower extremity with ulcer and inflammation (HCC) 10/2/2015    Leg ulcer ([address] 1978-present    following at wound center, Dr Corinne Duvall No diabetic retinopathy OU 11/12    Dr. Giovanna Spence chronic ulcer of left lower leg with fat layer exposed ([address] 10/2/2015    Pulmonary embolism ([address] 11/13    patient on coumadin    Sleep apnea     doesnt always use cpap dt it drys him out    SOB (shortness of breath) Oct 2011    Stress test normal.     Tendinitis 1973    plantar tendons    Type II or unspecified type diabetes mellitus with other specified manifestations, not stated as uncontrolled 2/8/2013    Unspecified venous (peripheral) insufficiency     Urticaria     WD-Cellulitis of right anterior lower leg 9/5/2019    WD-Chronic venous hypertension (idiopathic) with ulcer of left lower extremity (CODE) ([address] 6/27/2019    WD-Chronic venous hypertension with inflammation, right 9/19/2019    WD-Skin tear of right forearm without complication 1/87/4010       PAST SURGICAL HISTORY    Past Surgical History:   Procedure Laterality Date    BREAST SURGERY  1970s    benign tumors bilaterally    CARDIAC CATHETERIZATION  6/3/14    EF55% normal study    CARPAL TUNNEL RELEASE Left 1993    CATARACT REMOVAL Right 3/11/2013    Dr. Gini Agrawal Left 2/25/2013    Dr. Carolyn Sharma  2006    polyps    COLONOSCOPY  11/21/11    villous component--f/u colonoscopy will be needed in 6 months    COLONOSCOPY  4/23/12    mild diverticulosis, internal hemorrhoids; repeat in 3 years (

## 2019-09-30 ENCOUNTER — HOSPITAL ENCOUNTER (OUTPATIENT)
Dept: WOUND CARE | Age: 74
Discharge: HOME | End: 2019-09-30
Payer: MEDICARE

## 2019-09-30 LAB
CULTURE: ABNORMAL
Lab: ABNORMAL
SPECIMEN: ABNORMAL

## 2019-09-30 PROCEDURE — 29581 APPL MULTLAYER CMPRN SYS LEG: CPT

## 2019-10-03 ENCOUNTER — HOSPITAL ENCOUNTER (OUTPATIENT)
Dept: WOUND CARE | Age: 74
Discharge: HOME | End: 2019-10-03
Payer: MEDICARE

## 2019-10-03 ENCOUNTER — ANTI-COAG VISIT (OUTPATIENT)
Dept: PHARMACY | Age: 74
End: 2019-10-03
Payer: MEDICARE

## 2019-10-03 VITALS
SYSTOLIC BLOOD PRESSURE: 136 MMHG | RESPIRATION RATE: 17 BRPM | TEMPERATURE: 97.7 F | DIASTOLIC BLOOD PRESSURE: 76 MMHG | HEART RATE: 85 BPM

## 2019-10-03 DIAGNOSIS — Z86.718 HISTORY OF DVT (DEEP VEIN THROMBOSIS): ICD-10-CM

## 2019-10-03 DIAGNOSIS — L97.822 ULCER OF SHIN, LEFT, WITH FAT LAYER EXPOSED (HCC): Primary | ICD-10-CM

## 2019-10-03 DIAGNOSIS — I87.312 CHRONIC VENOUS HYPERTENSION (IDIOPATHIC) WITH ULCER OF LEFT LOWER EXTREMITY (CODE) (HCC): ICD-10-CM

## 2019-10-03 DIAGNOSIS — Z86.711 PERSONAL HISTORY OF PE (PULMONARY EMBOLISM): ICD-10-CM

## 2019-10-03 DIAGNOSIS — L03.115 CELLULITIS OF RIGHT ANTERIOR LOWER LEG: ICD-10-CM

## 2019-10-03 DIAGNOSIS — I87.321 CHRONIC VENOUS HYPERTENSION WITH INFLAMMATION, RIGHT: ICD-10-CM

## 2019-10-03 LAB
INTERNATIONAL NORMALIZATION RATIO, POC: 2.9
POC INR: 2.9 INDEX
POC INR: ABNORMAL INDEX
PROTHROMBIN TIME, POC: 34.9 SECONDS (ref 10–14.3)

## 2019-10-03 PROCEDURE — 11042 DBRDMT SUBQ TIS 1ST 20SQCM/<: CPT

## 2019-10-03 PROCEDURE — 85610 PROTHROMBIN TIME: CPT

## 2019-10-03 PROCEDURE — 36416 COLLJ CAPILLARY BLOOD SPEC: CPT

## 2019-10-03 PROCEDURE — 99211 OFF/OP EST MAY X REQ PHY/QHP: CPT

## 2019-10-10 ENCOUNTER — HOSPITAL ENCOUNTER (OUTPATIENT)
Dept: WOUND CARE | Age: 74
Discharge: HOME | End: 2019-10-10
Payer: MEDICARE

## 2019-10-10 VITALS
TEMPERATURE: 98.9 F | RESPIRATION RATE: 16 BRPM | SYSTOLIC BLOOD PRESSURE: 138 MMHG | DIASTOLIC BLOOD PRESSURE: 81 MMHG | HEART RATE: 96 BPM

## 2019-10-10 DIAGNOSIS — I87.312 CHRONIC VENOUS HYPERTENSION (IDIOPATHIC) WITH ULCER OF LEFT LOWER EXTREMITY (CODE) (HCC): ICD-10-CM

## 2019-10-10 DIAGNOSIS — L97.822 ULCER OF SHIN, LEFT, WITH FAT LAYER EXPOSED (HCC): Primary | ICD-10-CM

## 2019-10-10 PROCEDURE — 11042 DBRDMT SUBQ TIS 1ST 20SQCM/<: CPT

## 2019-10-14 ENCOUNTER — ANTI-COAG VISIT (OUTPATIENT)
Dept: PHARMACY | Age: 74
End: 2019-10-14
Payer: MEDICARE

## 2019-10-14 DIAGNOSIS — Z86.718 HISTORY OF DVT (DEEP VEIN THROMBOSIS): ICD-10-CM

## 2019-10-14 DIAGNOSIS — Z86.711 PERSONAL HISTORY OF PE (PULMONARY EMBOLISM): ICD-10-CM

## 2019-10-14 LAB
INTERNATIONAL NORMALIZATION RATIO, POC: 2.2
POC INR: 2.2 INDEX
POC INR: ABNORMAL INDEX
PROTHROMBIN TIME, POC: 26 SECONDS (ref 10–14.3)

## 2019-10-14 PROCEDURE — 99211 OFF/OP EST MAY X REQ PHY/QHP: CPT

## 2019-10-14 PROCEDURE — 85610 PROTHROMBIN TIME: CPT

## 2019-10-14 PROCEDURE — 36416 COLLJ CAPILLARY BLOOD SPEC: CPT

## 2019-10-17 ENCOUNTER — HOSPITAL ENCOUNTER (OUTPATIENT)
Dept: WOUND CARE | Age: 74
Discharge: HOME | End: 2019-10-17
Payer: MEDICARE

## 2019-10-17 VITALS
DIASTOLIC BLOOD PRESSURE: 73 MMHG | RESPIRATION RATE: 16 BRPM | SYSTOLIC BLOOD PRESSURE: 131 MMHG | TEMPERATURE: 99 F | HEART RATE: 79 BPM

## 2019-10-17 DIAGNOSIS — I87.312 CHRONIC VENOUS HYPERTENSION (IDIOPATHIC) WITH ULCER OF LEFT LOWER EXTREMITY (CODE) (HCC): Primary | ICD-10-CM

## 2019-10-17 DIAGNOSIS — I87.321 CHRONIC VENOUS HYPERTENSION WITH INFLAMMATION, RIGHT: ICD-10-CM

## 2019-10-17 DIAGNOSIS — L03.115 CELLULITIS OF RIGHT ANTERIOR LOWER LEG: ICD-10-CM

## 2019-10-17 DIAGNOSIS — L97.812 ULCER OF RIGHT PRETIBIAL REGION, WITH FAT LAYER EXPOSED (HCC): ICD-10-CM

## 2019-10-17 PROCEDURE — 87073 CULTURE BACTERIA ANAEROBIC: CPT

## 2019-10-17 PROCEDURE — 87077 CULTURE AEROBIC IDENTIFY: CPT

## 2019-10-17 PROCEDURE — 11042 DBRDMT SUBQ TIS 1ST 20SQCM/<: CPT

## 2019-10-17 PROCEDURE — 87186 SC STD MICRODIL/AGAR DIL: CPT

## 2019-10-17 PROCEDURE — 87071 CULTURE AEROBIC QUANT OTHER: CPT

## 2019-10-17 RX ORDER — OMEPRAZOLE 20 MG/1
CAPSULE, DELAYED RELEASE ORAL
Qty: 90 CAPSULE | Refills: 1 | Status: SHIPPED | OUTPATIENT
Start: 2019-10-17 | End: 2020-06-10 | Stop reason: SDUPTHER

## 2019-10-20 LAB
CULTURE: ABNORMAL
Lab: ABNORMAL
SPECIMEN: ABNORMAL

## 2019-10-21 ENCOUNTER — OFFICE VISIT (OUTPATIENT)
Dept: FAMILY MEDICINE CLINIC | Age: 74
End: 2019-10-21
Payer: MEDICARE

## 2019-10-21 VITALS
BODY MASS INDEX: 33.77 KG/M2 | DIASTOLIC BLOOD PRESSURE: 68 MMHG | SYSTOLIC BLOOD PRESSURE: 132 MMHG | HEIGHT: 77 IN | OXYGEN SATURATION: 92 % | HEART RATE: 78 BPM | WEIGHT: 286 LBS

## 2019-10-21 DIAGNOSIS — E11.51 DM (DIABETES MELLITUS) TYPE II, CONTROLLED, WITH PERIPHERAL VASCULAR DISORDER (HCC): ICD-10-CM

## 2019-10-21 DIAGNOSIS — R79.81 BORDERLINE LOW O2 SATURATION: ICD-10-CM

## 2019-10-21 DIAGNOSIS — Z00.00 ROUTINE GENERAL MEDICAL EXAMINATION AT A HEALTH CARE FACILITY: Primary | ICD-10-CM

## 2019-10-21 LAB
A/G RATIO: 1.1 (ref 1.1–2.2)
ALBUMIN SERPL-MCNC: 3.5 G/DL (ref 3.4–5)
ALP BLD-CCNC: 126 U/L (ref 40–129)
ALT SERPL-CCNC: 13 U/L (ref 10–40)
ANION GAP SERPL CALCULATED.3IONS-SCNC: 16 MMOL/L (ref 3–16)
AST SERPL-CCNC: 22 U/L (ref 15–37)
BILIRUB SERPL-MCNC: 0.7 MG/DL (ref 0–1)
BUN BLDV-MCNC: 9 MG/DL (ref 7–20)
CALCIUM SERPL-MCNC: 9.1 MG/DL (ref 8.3–10.6)
CHLORIDE BLD-SCNC: 100 MMOL/L (ref 99–110)
CHOLESTEROL, FASTING: 147 MG/DL (ref 0–199)
CO2: 23 MMOL/L (ref 21–32)
CREAT SERPL-MCNC: 1.1 MG/DL (ref 0.8–1.3)
GFR AFRICAN AMERICAN: >60
GFR NON-AFRICAN AMERICAN: >60
GLOBULIN: 3.2 G/DL
GLUCOSE BLD-MCNC: 178 MG/DL (ref 70–99)
HDLC SERPL-MCNC: 60 MG/DL (ref 40–60)
LDL CHOLESTEROL CALCULATED: 65 MG/DL
POTASSIUM SERPL-SCNC: 4.6 MMOL/L (ref 3.5–5.1)
SODIUM BLD-SCNC: 139 MMOL/L (ref 136–145)
TOTAL PROTEIN: 6.7 G/DL (ref 6.4–8.2)
TRIGLYCERIDE, FASTING: 108 MG/DL (ref 0–150)
VLDLC SERPL CALC-MCNC: 22 MG/DL

## 2019-10-21 PROCEDURE — 1123F ACP DISCUSS/DSCN MKR DOCD: CPT | Performed by: FAMILY MEDICINE

## 2019-10-21 PROCEDURE — 3017F COLORECTAL CA SCREEN DOC REV: CPT | Performed by: FAMILY MEDICINE

## 2019-10-21 PROCEDURE — 90653 IIV ADJUVANT VACCINE IM: CPT | Performed by: FAMILY MEDICINE

## 2019-10-21 PROCEDURE — G0008 ADMIN INFLUENZA VIRUS VAC: HCPCS | Performed by: FAMILY MEDICINE

## 2019-10-21 PROCEDURE — 3044F HG A1C LEVEL LT 7.0%: CPT | Performed by: FAMILY MEDICINE

## 2019-10-21 PROCEDURE — G0438 PPPS, INITIAL VISIT: HCPCS | Performed by: FAMILY MEDICINE

## 2019-10-21 PROCEDURE — G8482 FLU IMMUNIZE ORDER/ADMIN: HCPCS | Performed by: FAMILY MEDICINE

## 2019-10-21 PROCEDURE — 36415 COLL VENOUS BLD VENIPUNCTURE: CPT | Performed by: FAMILY MEDICINE

## 2019-10-21 PROCEDURE — 4040F PNEUMOC VAC/ADMIN/RCVD: CPT | Performed by: FAMILY MEDICINE

## 2019-10-21 RX ORDER — AZITHROMYCIN 250 MG/1
TABLET, FILM COATED ORAL
Qty: 6 TABLET | Refills: 0 | Status: SHIPPED | OUTPATIENT
Start: 2019-10-21 | End: 2019-10-31

## 2019-10-21 ASSESSMENT — LIFESTYLE VARIABLES
HOW OFTEN DO YOU HAVE SIX OR MORE DRINKS ON ONE OCCASION: 0
HAS A RELATIVE, FRIEND, DOCTOR, OR ANOTHER HEALTH PROFESSIONAL EXPRESSED CONCERN ABOUT YOUR DRINKING OR SUGGESTED YOU CUT DOWN: 0
HOW OFTEN DO YOU HAVE A DRINK CONTAINING ALCOHOL: 2
HOW OFTEN DURING THE LAST YEAR HAVE YOU FAILED TO DO WHAT WAS NORMALLY EXPECTED FROM YOU BECAUSE OF DRINKING: 0
AUDIT-C TOTAL SCORE: 2
HOW OFTEN DURING THE LAST YEAR HAVE YOU HAD A FEELING OF GUILT OR REMORSE AFTER DRINKING: 0
HOW OFTEN DURING THE LAST YEAR HAVE YOU FOUND THAT YOU WERE NOT ABLE TO STOP DRINKING ONCE YOU HAD STARTED: 0
AUDIT TOTAL SCORE: 2
HOW OFTEN DURING THE LAST YEAR HAVE YOU BEEN UNABLE TO REMEMBER WHAT HAPPENED THE NIGHT BEFORE BECAUSE YOU HAD BEEN DRINKING: 0
HOW OFTEN DURING THE LAST YEAR HAVE YOU NEEDED AN ALCOHOLIC DRINK FIRST THING IN THE MORNING TO GET YOURSELF GOING AFTER A NIGHT OF HEAVY DRINKING: 0
HAVE YOU OR SOMEONE ELSE BEEN INJURED AS A RESULT OF YOUR DRINKING: 0
HOW MANY STANDARD DRINKS CONTAINING ALCOHOL DO YOU HAVE ON A TYPICAL DAY: 0

## 2019-10-21 ASSESSMENT — PATIENT HEALTH QUESTIONNAIRE - PHQ9
SUM OF ALL RESPONSES TO PHQ QUESTIONS 1-9: 0
SUM OF ALL RESPONSES TO PHQ QUESTIONS 1-9: 0

## 2019-10-22 LAB
ESTIMATED AVERAGE GLUCOSE: 214.5 MG/DL
HBA1C MFR BLD: 9.1 %

## 2019-10-24 ENCOUNTER — HOSPITAL ENCOUNTER (OUTPATIENT)
Dept: WOUND CARE | Age: 74
Discharge: HOME | End: 2019-10-24
Payer: MEDICARE

## 2019-10-24 VITALS
SYSTOLIC BLOOD PRESSURE: 152 MMHG | DIASTOLIC BLOOD PRESSURE: 81 MMHG | RESPIRATION RATE: 18 BRPM | HEART RATE: 97 BPM | TEMPERATURE: 99.1 F

## 2019-10-24 DIAGNOSIS — I87.312 CHRONIC VENOUS HYPERTENSION (IDIOPATHIC) WITH ULCER OF LEFT LOWER EXTREMITY (CODE) (HCC): Primary | ICD-10-CM

## 2019-10-24 DIAGNOSIS — I87.321 CHRONIC VENOUS HYPERTENSION WITH INFLAMMATION, RIGHT: ICD-10-CM

## 2019-10-24 DIAGNOSIS — L03.115 CELLULITIS OF RIGHT ANTERIOR LOWER LEG: ICD-10-CM

## 2019-10-24 DIAGNOSIS — L97.812 ULCER OF RIGHT PRETIBIAL REGION, WITH FAT LAYER EXPOSED (HCC): ICD-10-CM

## 2019-10-24 PROCEDURE — 11042 DBRDMT SUBQ TIS 1ST 20SQCM/<: CPT

## 2019-10-28 ENCOUNTER — ANTI-COAG VISIT (OUTPATIENT)
Dept: PHARMACY | Age: 74
End: 2019-10-28
Payer: MEDICARE

## 2019-10-28 DIAGNOSIS — Z86.718 HISTORY OF DVT (DEEP VEIN THROMBOSIS): ICD-10-CM

## 2019-10-28 DIAGNOSIS — Z86.711 PERSONAL HISTORY OF PE (PULMONARY EMBOLISM): ICD-10-CM

## 2019-10-28 LAB
INTERNATIONAL NORMALIZATION RATIO, POC: 2.9
POC INR: 2.9 INDEX
POC INR: ABNORMAL INDEX
PROTHROMBIN TIME, POC: 34.2 SECONDS (ref 10–14.3)

## 2019-10-28 PROCEDURE — 36416 COLLJ CAPILLARY BLOOD SPEC: CPT

## 2019-10-28 PROCEDURE — 85610 PROTHROMBIN TIME: CPT

## 2019-10-28 PROCEDURE — 99211 OFF/OP EST MAY X REQ PHY/QHP: CPT

## 2019-11-07 ENCOUNTER — HOSPITAL ENCOUNTER (OUTPATIENT)
Dept: WOUND CARE | Age: 74
Discharge: HOME | End: 2019-11-07
Payer: MEDICARE

## 2019-11-07 VITALS
RESPIRATION RATE: 20 BRPM | SYSTOLIC BLOOD PRESSURE: 160 MMHG | TEMPERATURE: 96.7 F | DIASTOLIC BLOOD PRESSURE: 79 MMHG | HEART RATE: 102 BPM

## 2019-11-07 DIAGNOSIS — I87.312 CHRONIC VENOUS HYPERTENSION (IDIOPATHIC) WITH ULCER OF LEFT LOWER EXTREMITY (CODE) (HCC): ICD-10-CM

## 2019-11-07 DIAGNOSIS — I87.321 CHRONIC VENOUS HYPERTENSION WITH INFLAMMATION, RIGHT: ICD-10-CM

## 2019-11-07 DIAGNOSIS — L97.822 ULCER OF SHIN, LEFT, WITH FAT LAYER EXPOSED (HCC): ICD-10-CM

## 2019-11-07 DIAGNOSIS — L97.812 ULCER OF RIGHT PRETIBIAL REGION, WITH FAT LAYER EXPOSED (HCC): Primary | ICD-10-CM

## 2019-11-07 PROCEDURE — 11042 DBRDMT SUBQ TIS 1ST 20SQCM/<: CPT

## 2019-11-14 ENCOUNTER — HOSPITAL ENCOUNTER (OUTPATIENT)
Dept: WOUND CARE | Age: 74
Discharge: HOME | End: 2019-11-14
Payer: MEDICARE

## 2019-11-14 VITALS
RESPIRATION RATE: 16 BRPM | HEART RATE: 91 BPM | TEMPERATURE: 98.5 F | DIASTOLIC BLOOD PRESSURE: 80 MMHG | SYSTOLIC BLOOD PRESSURE: 147 MMHG

## 2019-11-14 DIAGNOSIS — I87.321 CHRONIC VENOUS HYPERTENSION WITH INFLAMMATION, RIGHT: ICD-10-CM

## 2019-11-14 DIAGNOSIS — I87.312 CHRONIC VENOUS HYPERTENSION (IDIOPATHIC) WITH ULCER OF LEFT LOWER EXTREMITY (CODE) (HCC): Primary | ICD-10-CM

## 2019-11-14 DIAGNOSIS — L03.115 CELLULITIS OF RIGHT ANTERIOR LOWER LEG: ICD-10-CM

## 2019-11-14 PROCEDURE — 11042 DBRDMT SUBQ TIS 1ST 20SQCM/<: CPT

## 2019-11-18 ENCOUNTER — ANTI-COAG VISIT (OUTPATIENT)
Dept: PHARMACY | Age: 74
End: 2019-11-18
Payer: MEDICARE

## 2019-11-18 DIAGNOSIS — Z86.718 HISTORY OF DVT (DEEP VEIN THROMBOSIS): ICD-10-CM

## 2019-11-18 DIAGNOSIS — Z86.711 PERSONAL HISTORY OF PE (PULMONARY EMBOLISM): ICD-10-CM

## 2019-11-18 LAB
INTERNATIONAL NORMALIZATION RATIO, POC: 2.9
POC INR: 2.9 INDEX
POC INR: ABNORMAL INDEX
PROTHROMBIN TIME, POC: 34.8 SECONDS (ref 10–14.3)

## 2019-11-18 PROCEDURE — 36416 COLLJ CAPILLARY BLOOD SPEC: CPT

## 2019-11-18 PROCEDURE — 85610 PROTHROMBIN TIME: CPT

## 2019-11-18 PROCEDURE — 99211 OFF/OP EST MAY X REQ PHY/QHP: CPT

## 2019-11-18 RX ORDER — WARFARIN SODIUM 5 MG/1
5 TABLET ORAL DAILY
COMMUNITY
End: 2020-04-02

## 2019-11-21 ENCOUNTER — APPOINTMENT (OUTPATIENT)
Dept: GENERAL RADIOLOGY | Age: 74
DRG: 281 | End: 2019-11-21
Payer: MEDICARE

## 2019-11-21 ENCOUNTER — HOSPITAL ENCOUNTER (OUTPATIENT)
Dept: WOUND CARE | Age: 74
Discharge: HOME | End: 2019-11-21
Payer: MEDICARE

## 2019-11-21 ENCOUNTER — HOSPITAL ENCOUNTER (INPATIENT)
Age: 74
LOS: 4 days | Discharge: NURSING, REHAB OR LONG-TERM CARE | DRG: 281 | End: 2019-11-25
Attending: EMERGENCY MEDICINE | Admitting: HOSPITALIST
Payer: MEDICARE

## 2019-11-21 VITALS
HEART RATE: 89 BPM | DIASTOLIC BLOOD PRESSURE: 80 MMHG | RESPIRATION RATE: 18 BRPM | SYSTOLIC BLOOD PRESSURE: 151 MMHG | TEMPERATURE: 97.5 F

## 2019-11-21 DIAGNOSIS — I87.312 CHRONIC VENOUS HYPERTENSION (IDIOPATHIC) WITH ULCER OF LEFT LOWER EXTREMITY (CODE) (HCC): Primary | ICD-10-CM

## 2019-11-21 DIAGNOSIS — R53.83 OTHER FATIGUE: ICD-10-CM

## 2019-11-21 DIAGNOSIS — L03.115 CELLULITIS OF RIGHT ANTERIOR LOWER LEG: ICD-10-CM

## 2019-11-21 DIAGNOSIS — R77.8 ELEVATED TROPONIN: Primary | ICD-10-CM

## 2019-11-21 DIAGNOSIS — R79.89 ELEVATED LACTIC ACID LEVEL: ICD-10-CM

## 2019-11-21 DIAGNOSIS — I87.321 CHRONIC VENOUS HYPERTENSION WITH INFLAMMATION, RIGHT: ICD-10-CM

## 2019-11-21 DIAGNOSIS — R79.1 SUPRATHERAPEUTIC INR: ICD-10-CM

## 2019-11-21 PROBLEM — I21.4 NSTEMI (NON-ST ELEVATED MYOCARDIAL INFARCTION) (HCC): Status: ACTIVE | Noted: 2019-11-21

## 2019-11-21 LAB
ALBUMIN SERPL-MCNC: 3.4 GM/DL (ref 3.4–5)
ALP BLD-CCNC: 100 IU/L (ref 40–129)
ALT SERPL-CCNC: 15 U/L (ref 10–40)
ANION GAP SERPL CALCULATED.3IONS-SCNC: 16 MMOL/L (ref 4–16)
APTT: 36.3 SECONDS (ref 25.1–37.1)
AST SERPL-CCNC: 27 IU/L (ref 15–37)
BASOPHILS ABSOLUTE: 0 K/CU MM
BASOPHILS RELATIVE PERCENT: 0.6 % (ref 0–1)
BILIRUB SERPL-MCNC: 0.9 MG/DL (ref 0–1)
BUN BLDV-MCNC: 12 MG/DL (ref 6–23)
CALCIUM SERPL-MCNC: 9.2 MG/DL (ref 8.3–10.6)
CHLORIDE BLD-SCNC: 100 MMOL/L (ref 99–110)
CHP ED QC CHECK: YES
CO2: 22 MMOL/L (ref 21–32)
CREAT SERPL-MCNC: 1.2 MG/DL (ref 0.9–1.3)
DIFFERENTIAL TYPE: ABNORMAL
EOSINOPHILS ABSOLUTE: 0.1 K/CU MM
EOSINOPHILS RELATIVE PERCENT: 1.6 % (ref 0–3)
GFR AFRICAN AMERICAN: >60 ML/MIN/1.73M2
GFR NON-AFRICAN AMERICAN: 60 ML/MIN/1.73M2
GLUCOSE BLD-MCNC: 105 MG/DL (ref 70–99)
GLUCOSE BLD-MCNC: 93 MG/DL
GLUCOSE BLD-MCNC: 93 MG/DL (ref 70–99)
GLUCOSE BLD-MCNC: 93 MG/DL (ref 70–99)
HCT VFR BLD CALC: 44.9 % (ref 42–52)
HEMOGLOBIN: 13.8 GM/DL (ref 13.5–18)
HETEROPHILE ANTIBODIES: NEGATIVE
IMMATURE NEUTROPHIL %: 0.4 % (ref 0–0.43)
INR BLD: 4.05 INDEX
LACTATE: 1.3 MMOL/L (ref 0.4–2)
LACTATE: ABNORMAL MMOL/L (ref 0.4–2)
LYMPHOCYTES ABSOLUTE: 1.8 K/CU MM
LYMPHOCYTES RELATIVE PERCENT: 25.6 % (ref 24–44)
MCH RBC QN AUTO: 28.9 PG (ref 27–31)
MCHC RBC AUTO-ENTMCNC: 30.7 % (ref 32–36)
MCV RBC AUTO: 94.1 FL (ref 78–100)
MONOCYTES ABSOLUTE: 0.4 K/CU MM
MONOCYTES RELATIVE PERCENT: 6.3 % (ref 0–4)
NUCLEATED RBC %: 0 %
PDW BLD-RTO: 15.6 % (ref 11.7–14.9)
PLATELET # BLD: 254 K/CU MM (ref 140–440)
PMV BLD AUTO: 11.4 FL (ref 7.5–11.1)
POTASSIUM SERPL-SCNC: 4.2 MMOL/L (ref 3.5–5.1)
PRO-BNP: 115.2 PG/ML
PROTHROMBIN TIME: 47.6 SECONDS (ref 11.7–14.5)
RAPID INFLUENZA  B AGN: NEGATIVE
RAPID INFLUENZA A AGN: NEGATIVE
RBC # BLD: 4.77 M/CU MM (ref 4.6–6.2)
SEGMENTED NEUTROPHILS ABSOLUTE COUNT: 4.6 K/CU MM
SEGMENTED NEUTROPHILS RELATIVE PERCENT: 65.5 % (ref 36–66)
SODIUM BLD-SCNC: 138 MMOL/L (ref 135–145)
T4 FREE: 1.14 NG/DL (ref 0.9–1.8)
TOTAL CK: 269 IU/L (ref 38–174)
TOTAL IMMATURE NEUTOROPHIL: 0.03 K/CU MM
TOTAL NUCLEATED RBC: 0 K/CU MM
TOTAL PROTEIN: 7 GM/DL (ref 6.4–8.2)
TROPONIN T: 0.14 NG/ML
TROPONIN T: 0.17 NG/ML
TSH HIGH SENSITIVITY: 1.77 UIU/ML (ref 0.27–4.2)
WBC # BLD: 7 K/CU MM (ref 4–10.5)

## 2019-11-21 PROCEDURE — 6370000000 HC RX 637 (ALT 250 FOR IP): Performed by: PHYSICIAN ASSISTANT

## 2019-11-21 PROCEDURE — 83880 ASSAY OF NATRIURETIC PEPTIDE: CPT

## 2019-11-21 PROCEDURE — 80053 COMPREHEN METABOLIC PANEL: CPT

## 2019-11-21 PROCEDURE — 84439 ASSAY OF FREE THYROXINE: CPT

## 2019-11-21 PROCEDURE — 6360000002 HC RX W HCPCS: Performed by: EMERGENCY MEDICINE

## 2019-11-21 PROCEDURE — 84443 ASSAY THYROID STIM HORMONE: CPT

## 2019-11-21 PROCEDURE — 71045 X-RAY EXAM CHEST 1 VIEW: CPT

## 2019-11-21 PROCEDURE — 93005 ELECTROCARDIOGRAM TRACING: CPT | Performed by: PHYSICIAN ASSISTANT

## 2019-11-21 PROCEDURE — 85730 THROMBOPLASTIN TIME PARTIAL: CPT

## 2019-11-21 PROCEDURE — 36415 COLL VENOUS BLD VENIPUNCTURE: CPT

## 2019-11-21 PROCEDURE — 83605 ASSAY OF LACTIC ACID: CPT

## 2019-11-21 PROCEDURE — 87804 INFLUENZA ASSAY W/OPTIC: CPT

## 2019-11-21 PROCEDURE — 85610 PROTHROMBIN TIME: CPT

## 2019-11-21 PROCEDURE — 2580000003 HC RX 258: Performed by: HOSPITALIST

## 2019-11-21 PROCEDURE — 82550 ASSAY OF CK (CPK): CPT

## 2019-11-21 PROCEDURE — 1200000000 HC SEMI PRIVATE

## 2019-11-21 PROCEDURE — 84484 ASSAY OF TROPONIN QUANT: CPT

## 2019-11-21 PROCEDURE — 6370000000 HC RX 637 (ALT 250 FOR IP): Performed by: HOSPITALIST

## 2019-11-21 PROCEDURE — 2580000003 HC RX 258: Performed by: EMERGENCY MEDICINE

## 2019-11-21 PROCEDURE — 86318 IA INFECTIOUS AGENT ANTIBODY: CPT

## 2019-11-21 PROCEDURE — 11042 DBRDMT SUBQ TIS 1ST 20SQCM/<: CPT

## 2019-11-21 PROCEDURE — 99285 EMERGENCY DEPT VISIT HI MDM: CPT

## 2019-11-21 PROCEDURE — 85025 COMPLETE CBC W/AUTO DIFF WBC: CPT

## 2019-11-21 PROCEDURE — 82962 GLUCOSE BLOOD TEST: CPT

## 2019-11-21 RX ORDER — ONDANSETRON 2 MG/ML
4 INJECTION INTRAMUSCULAR; INTRAVENOUS EVERY 6 HOURS PRN
Status: DISCONTINUED | OUTPATIENT
Start: 2019-11-21 | End: 2019-11-25 | Stop reason: HOSPADM

## 2019-11-21 RX ORDER — ESCITALOPRAM OXALATE 10 MG/1
10 TABLET ORAL DAILY
Status: ON HOLD | COMMUNITY
End: 2019-11-25 | Stop reason: HOSPADM

## 2019-11-21 RX ORDER — ARIPIPRAZOLE 15 MG/1
15 TABLET ORAL DAILY
COMMUNITY
End: 2020-06-10 | Stop reason: SDUPTHER

## 2019-11-21 RX ORDER — SODIUM CHLORIDE 0.9 % (FLUSH) 0.9 %
10 SYRINGE (ML) INJECTION PRN
Status: DISCONTINUED | OUTPATIENT
Start: 2019-11-21 | End: 2019-11-25 | Stop reason: HOSPADM

## 2019-11-21 RX ORDER — VALSARTAN 160 MG/1
80 TABLET ORAL DAILY
Status: DISCONTINUED | OUTPATIENT
Start: 2019-11-22 | End: 2019-11-25 | Stop reason: HOSPADM

## 2019-11-21 RX ORDER — VALSARTAN 80 MG/1
80 TABLET ORAL DAILY
COMMUNITY
End: 2019-12-12

## 2019-11-21 RX ORDER — ATORVASTATIN CALCIUM 40 MG/1
40 TABLET, FILM COATED ORAL NIGHTLY
COMMUNITY
End: 2020-06-10 | Stop reason: SDUPTHER

## 2019-11-21 RX ORDER — ATORVASTATIN CALCIUM 40 MG/1
40 TABLET, FILM COATED ORAL NIGHTLY
Status: DISCONTINUED | OUTPATIENT
Start: 2019-11-21 | End: 2019-11-25 | Stop reason: HOSPADM

## 2019-11-21 RX ORDER — ASPIRIN 81 MG/1
81 TABLET, CHEWABLE ORAL DAILY
Status: DISCONTINUED | OUTPATIENT
Start: 2019-11-22 | End: 2019-11-25 | Stop reason: HOSPADM

## 2019-11-21 RX ORDER — SODIUM CHLORIDE 0.9 % (FLUSH) 0.9 %
10 SYRINGE (ML) INJECTION EVERY 12 HOURS SCHEDULED
Status: DISCONTINUED | OUTPATIENT
Start: 2019-11-21 | End: 2019-11-25 | Stop reason: HOSPADM

## 2019-11-21 RX ORDER — SODIUM CHLORIDE 9 MG/ML
INJECTION, SOLUTION INTRAVENOUS CONTINUOUS
Status: DISPENSED | OUTPATIENT
Start: 2019-11-21 | End: 2019-11-22

## 2019-11-21 RX ORDER — ESCITALOPRAM OXALATE 10 MG/1
10 TABLET ORAL DAILY
Status: DISCONTINUED | OUTPATIENT
Start: 2019-11-22 | End: 2019-11-25

## 2019-11-21 RX ORDER — BUPROPION HYDROCHLORIDE 150 MG/1
300 TABLET ORAL EVERY MORNING
Status: DISCONTINUED | OUTPATIENT
Start: 2019-11-22 | End: 2019-11-25 | Stop reason: HOSPADM

## 2019-11-21 RX ORDER — BUPROPION HYDROCHLORIDE 300 MG/1
300 TABLET ORAL EVERY MORNING
COMMUNITY
End: 2019-12-12 | Stop reason: SDUPTHER

## 2019-11-21 RX ORDER — WARFARIN SODIUM 5 MG/1
5 TABLET ORAL DAILY
Status: DISCONTINUED | OUTPATIENT
Start: 2019-11-21 | End: 2019-11-21

## 2019-11-21 RX ORDER — NITROGLYCERIN 0.4 MG/1
0.4 TABLET SUBLINGUAL EVERY 5 MIN PRN
Status: DISCONTINUED | OUTPATIENT
Start: 2019-11-21 | End: 2019-11-25 | Stop reason: HOSPADM

## 2019-11-21 RX ORDER — ASPIRIN 325 MG
325 TABLET ORAL ONCE
Status: COMPLETED | OUTPATIENT
Start: 2019-11-21 | End: 2019-11-21

## 2019-11-21 RX ORDER — OMEPRAZOLE 20 MG/1
20 CAPSULE, DELAYED RELEASE ORAL DAILY
COMMUNITY
End: 2019-12-09

## 2019-11-21 RX ADMIN — ATORVASTATIN CALCIUM 40 MG: 40 TABLET, FILM COATED ORAL at 21:14

## 2019-11-21 RX ADMIN — ASPIRIN 325 MG ORAL TABLET 325 MG: 325 PILL ORAL at 19:24

## 2019-11-21 RX ADMIN — Medication 10 ML: at 21:15

## 2019-11-21 RX ADMIN — CEFEPIME HYDROCHLORIDE 2 G: 2 INJECTION, POWDER, FOR SOLUTION INTRAVENOUS at 19:24

## 2019-11-21 RX ADMIN — SODIUM CHLORIDE: 9 INJECTION, SOLUTION INTRAVENOUS at 21:14

## 2019-11-21 ASSESSMENT — ENCOUNTER SYMPTOMS
EYE REDNESS: 0
SORE THROAT: 0
EYE DISCHARGE: 0
BLOOD IN STOOL: 0
CHEST TIGHTNESS: 0
CONSTIPATION: 0
COUGH: 0
DIARRHEA: 0
WHEEZING: 0
SHORTNESS OF BREATH: 1
VOMITING: 0
EYE ITCHING: 0
RHINORRHEA: 1
ABDOMINAL PAIN: 0

## 2019-11-22 ENCOUNTER — APPOINTMENT (OUTPATIENT)
Dept: ULTRASOUND IMAGING | Age: 74
DRG: 281 | End: 2019-11-22
Payer: MEDICARE

## 2019-11-22 ENCOUNTER — APPOINTMENT (OUTPATIENT)
Dept: MRI IMAGING | Age: 74
DRG: 281 | End: 2019-11-22
Payer: MEDICARE

## 2019-11-22 ENCOUNTER — APPOINTMENT (OUTPATIENT)
Dept: NUCLEAR MEDICINE | Age: 74
DRG: 281 | End: 2019-11-22
Payer: MEDICARE

## 2019-11-22 LAB
ALBUMIN SERPL-MCNC: 3.1 GM/DL (ref 3.4–5)
ALP BLD-CCNC: 88 IU/L (ref 40–128)
ALT SERPL-CCNC: 12 U/L (ref 10–40)
ANION GAP SERPL CALCULATED.3IONS-SCNC: 10 MMOL/L (ref 4–16)
AST SERPL-CCNC: 22 IU/L (ref 15–37)
BILIRUB SERPL-MCNC: 0.9 MG/DL (ref 0–1)
BUN BLDV-MCNC: 12 MG/DL (ref 6–23)
CALCIUM SERPL-MCNC: 8.5 MG/DL (ref 8.3–10.6)
CHLORIDE BLD-SCNC: 100 MMOL/L (ref 99–110)
CHOLESTEROL: 107 MG/DL
CO2: 27 MMOL/L (ref 21–32)
CREAT SERPL-MCNC: 1.2 MG/DL (ref 0.9–1.3)
EKG ATRIAL RATE: 58 BPM
EKG ATRIAL RATE: 85 BPM
EKG DIAGNOSIS: NORMAL
EKG DIAGNOSIS: NORMAL
EKG P AXIS: 55 DEGREES
EKG P-R INTERVAL: 218 MS
EKG Q-T INTERVAL: 390 MS
EKG Q-T INTERVAL: 468 MS
EKG QRS DURATION: 104 MS
EKG QRS DURATION: 108 MS
EKG QTC CALCULATION (BAZETT): 455 MS
EKG QTC CALCULATION (BAZETT): 464 MS
EKG R AXIS: -27 DEGREES
EKG R AXIS: -33 DEGREES
EKG T AXIS: 2 DEGREES
EKG T AXIS: 31 DEGREES
EKG VENTRICULAR RATE: 57 BPM
EKG VENTRICULAR RATE: 85 BPM
ESTIMATED AVERAGE GLUCOSE: 192 MG/DL
GFR AFRICAN AMERICAN: >60 ML/MIN/1.73M2
GFR NON-AFRICAN AMERICAN: 60 ML/MIN/1.73M2
GLUCOSE BLD-MCNC: 73 MG/DL (ref 70–99)
GLUCOSE BLD-MCNC: 87 MG/DL (ref 70–99)
GLUCOSE BLD-MCNC: 89 MG/DL (ref 70–99)
HBA1C MFR BLD: 8.3 % (ref 4.2–6.3)
HCT VFR BLD CALC: 40.5 % (ref 42–52)
HDLC SERPL-MCNC: 46 MG/DL
HEMOGLOBIN: 12.3 GM/DL (ref 13.5–18)
INR BLD: 3.11 INDEX
LACTATE: 1.6 MMOL/L (ref 0.4–2)
LACTATE: 1.7 MMOL/L (ref 0.4–2)
LDL CHOLESTEROL DIRECT: 53 MG/DL
LV EF: 53 %
LVEF MODALITY: NORMAL
MCH RBC QN AUTO: 28.7 PG (ref 27–31)
MCHC RBC AUTO-ENTMCNC: 30.4 % (ref 32–36)
MCV RBC AUTO: 94.4 FL (ref 78–100)
PDW BLD-RTO: 15.6 % (ref 11.7–14.9)
PLATELET # BLD: 233 K/CU MM (ref 140–440)
PMV BLD AUTO: 11.3 FL (ref 7.5–11.1)
POTASSIUM SERPL-SCNC: 3.7 MMOL/L (ref 3.5–5.1)
PROTHROMBIN TIME: 35.9 SECONDS (ref 11.7–14.5)
RBC # BLD: 4.29 M/CU MM (ref 4.6–6.2)
SODIUM BLD-SCNC: 137 MMOL/L (ref 135–145)
TOTAL PROTEIN: 5.9 GM/DL (ref 6.4–8.2)
TRIGL SERPL-MCNC: 81 MG/DL
TROPONIN T: 0.14 NG/ML
WBC # BLD: 5.5 K/CU MM (ref 4–10.5)

## 2019-11-22 PROCEDURE — 99222 1ST HOSP IP/OBS MODERATE 55: CPT | Performed by: INTERNAL MEDICINE

## 2019-11-22 PROCEDURE — 97530 THERAPEUTIC ACTIVITIES: CPT

## 2019-11-22 PROCEDURE — 2500000003 HC RX 250 WO HCPCS: Performed by: NURSE PRACTITIONER

## 2019-11-22 PROCEDURE — 78582 LUNG VENTILAT&PERFUS IMAGING: CPT

## 2019-11-22 PROCEDURE — 83605 ASSAY OF LACTIC ACID: CPT

## 2019-11-22 PROCEDURE — 6370000000 HC RX 637 (ALT 250 FOR IP): Performed by: HOSPITALIST

## 2019-11-22 PROCEDURE — 80061 LIPID PANEL: CPT

## 2019-11-22 PROCEDURE — 83721 ASSAY OF BLOOD LIPOPROTEIN: CPT

## 2019-11-22 PROCEDURE — 85610 PROTHROMBIN TIME: CPT

## 2019-11-22 PROCEDURE — 70551 MRI BRAIN STEM W/O DYE: CPT

## 2019-11-22 PROCEDURE — 72148 MRI LUMBAR SPINE W/O DYE: CPT

## 2019-11-22 PROCEDURE — 85027 COMPLETE CBC AUTOMATED: CPT

## 2019-11-22 PROCEDURE — 2580000003 HC RX 258: Performed by: HOSPITALIST

## 2019-11-22 PROCEDURE — 1200000000 HC SEMI PRIVATE

## 2019-11-22 PROCEDURE — 97116 GAIT TRAINING THERAPY: CPT

## 2019-11-22 PROCEDURE — 36415 COLL VENOUS BLD VENIPUNCTURE: CPT

## 2019-11-22 PROCEDURE — 93005 ELECTROCARDIOGRAM TRACING: CPT | Performed by: HOSPITALIST

## 2019-11-22 PROCEDURE — 82962 GLUCOSE BLOOD TEST: CPT

## 2019-11-22 PROCEDURE — 83036 HEMOGLOBIN GLYCOSYLATED A1C: CPT

## 2019-11-22 PROCEDURE — 93010 ELECTROCARDIOGRAM REPORT: CPT | Performed by: INTERNAL MEDICINE

## 2019-11-22 PROCEDURE — 93306 TTE W/DOPPLER COMPLETE: CPT

## 2019-11-22 PROCEDURE — 93970 EXTREMITY STUDY: CPT

## 2019-11-22 PROCEDURE — 80053 COMPREHEN METABOLIC PANEL: CPT

## 2019-11-22 PROCEDURE — 3430000000 HC RX DIAGNOSTIC RADIOPHARMACEUTICAL: Performed by: INTERNAL MEDICINE

## 2019-11-22 PROCEDURE — 97166 OT EVAL MOD COMPLEX 45 MIN: CPT

## 2019-11-22 PROCEDURE — 93925 LOWER EXTREMITY STUDY: CPT

## 2019-11-22 PROCEDURE — 94761 N-INVAS EAR/PLS OXIMETRY MLT: CPT

## 2019-11-22 PROCEDURE — A9540 TC99M MAA: HCPCS | Performed by: INTERNAL MEDICINE

## 2019-11-22 PROCEDURE — 99211 OFF/OP EST MAY X REQ PHY/QHP: CPT

## 2019-11-22 PROCEDURE — A9539 TC99M PENTETATE: HCPCS | Performed by: INTERNAL MEDICINE

## 2019-11-22 PROCEDURE — 97163 PT EVAL HIGH COMPLEX 45 MIN: CPT

## 2019-11-22 RX ORDER — KIT FOR THE PREPARATION OF TECHNETIUM TC 99M PENTETATE 20 MG/1
3 INJECTION, POWDER, LYOPHILIZED, FOR SOLUTION INTRAVENOUS; RESPIRATORY (INHALATION)
Status: COMPLETED | OUTPATIENT
Start: 2019-11-22 | End: 2019-11-22

## 2019-11-22 RX ORDER — DEXTROSE MONOHYDRATE 25 G/50ML
12.5 INJECTION, SOLUTION INTRAVENOUS PRN
Status: DISCONTINUED | OUTPATIENT
Start: 2019-11-22 | End: 2019-11-25 | Stop reason: HOSPADM

## 2019-11-22 RX ORDER — DEXTROSE MONOHYDRATE 50 MG/ML
100 INJECTION, SOLUTION INTRAVENOUS PRN
Status: DISCONTINUED | OUTPATIENT
Start: 2019-11-22 | End: 2019-11-25 | Stop reason: HOSPADM

## 2019-11-22 RX ORDER — NICOTINE POLACRILEX 4 MG
15 LOZENGE BUCCAL PRN
Status: DISCONTINUED | OUTPATIENT
Start: 2019-11-22 | End: 2019-11-25 | Stop reason: HOSPADM

## 2019-11-22 RX ADMIN — VALSARTAN 80 MG: 160 TABLET, FILM COATED ORAL at 15:53

## 2019-11-22 RX ADMIN — MICONAZOLE NITRATE: 20 POWDER TOPICAL at 20:56

## 2019-11-22 RX ADMIN — ARIPIPRAZOLE 15 MG: 10 TABLET ORAL at 15:53

## 2019-11-22 RX ADMIN — METOPROLOL TARTRATE 12.5 MG: 25 TABLET ORAL at 20:56

## 2019-11-22 RX ADMIN — ATORVASTATIN CALCIUM 40 MG: 40 TABLET, FILM COATED ORAL at 20:56

## 2019-11-22 RX ADMIN — KIT FOR THE PREPARATION OF TECHNETIUM TC 99M PENTETATE 3 MILLICURIE: 20 INJECTION, POWDER, LYOPHILIZED, FOR SOLUTION INTRAVENOUS; RESPIRATORY (INHALATION) at 11:20

## 2019-11-22 RX ADMIN — METOPROLOL TARTRATE 25 MG: 25 TABLET ORAL at 01:32

## 2019-11-22 RX ADMIN — ESCITALOPRAM OXALATE 10 MG: 10 TABLET ORAL at 15:53

## 2019-11-22 RX ADMIN — ASPIRIN 81 MG 81 MG: 81 TABLET ORAL at 15:53

## 2019-11-22 RX ADMIN — MICONAZOLE NITRATE: 20 POWDER TOPICAL at 06:40

## 2019-11-22 RX ADMIN — Medication 10 ML: at 20:56

## 2019-11-22 RX ADMIN — Medication 6 MILLICURIE: at 11:35

## 2019-11-22 ASSESSMENT — PAIN SCALES - GENERAL
PAINLEVEL_OUTOF10: 0
PAINLEVEL_OUTOF10: 0

## 2019-11-23 LAB
EKG ATRIAL RATE: 62 BPM
EKG DIAGNOSIS: NORMAL
EKG P AXIS: 47 DEGREES
EKG P-R INTERVAL: 242 MS
EKG Q-T INTERVAL: 494 MS
EKG QRS DURATION: 144 MS
EKG QTC CALCULATION (BAZETT): 501 MS
EKG R AXIS: -43 DEGREES
EKG T AXIS: 13 DEGREES
EKG VENTRICULAR RATE: 62 BPM
ERYTHROCYTE SEDIMENTATION RATE: 22 MM/HR (ref 0–20)
FOLATE: 6.4 NG/ML (ref 3.1–17.5)
GLUCOSE BLD-MCNC: 118 MG/DL (ref 70–99)
GLUCOSE BLD-MCNC: 125 MG/DL (ref 70–99)
GLUCOSE BLD-MCNC: 213 MG/DL (ref 70–99)
GLUCOSE BLD-MCNC: 88 MG/DL (ref 70–99)
HIGH SENSITIVE C-REACTIVE PROTEIN: 3.8 MG/L
INR BLD: 1.8 INDEX
PROTHROMBIN TIME: 20.7 SECONDS (ref 11.7–14.5)
TOTAL CK: 191 IU/L (ref 38–174)
VITAMIN B-12: 824.5 PG/ML (ref 211–911)

## 2019-11-23 PROCEDURE — 82607 VITAMIN B-12: CPT

## 2019-11-23 PROCEDURE — 99233 SBSQ HOSP IP/OBS HIGH 50: CPT | Performed by: INTERNAL MEDICINE

## 2019-11-23 PROCEDURE — 82962 GLUCOSE BLOOD TEST: CPT

## 2019-11-23 PROCEDURE — 2580000003 HC RX 258: Performed by: HOSPITALIST

## 2019-11-23 PROCEDURE — 93010 ELECTROCARDIOGRAM REPORT: CPT | Performed by: INTERNAL MEDICINE

## 2019-11-23 PROCEDURE — 86141 C-REACTIVE PROTEIN HS: CPT

## 2019-11-23 PROCEDURE — 93005 ELECTROCARDIOGRAM TRACING: CPT | Performed by: INTERNAL MEDICINE

## 2019-11-23 PROCEDURE — 6370000000 HC RX 637 (ALT 250 FOR IP): Performed by: HOSPITALIST

## 2019-11-23 PROCEDURE — 1200000000 HC SEMI PRIVATE

## 2019-11-23 PROCEDURE — 94761 N-INVAS EAR/PLS OXIMETRY MLT: CPT

## 2019-11-23 PROCEDURE — 82746 ASSAY OF FOLIC ACID SERUM: CPT

## 2019-11-23 PROCEDURE — 82550 ASSAY OF CK (CPK): CPT

## 2019-11-23 PROCEDURE — 36415 COLL VENOUS BLD VENIPUNCTURE: CPT

## 2019-11-23 PROCEDURE — 85610 PROTHROMBIN TIME: CPT

## 2019-11-23 PROCEDURE — 85652 RBC SED RATE AUTOMATED: CPT

## 2019-11-23 RX ORDER — WARFARIN SODIUM 4 MG/1
4 TABLET ORAL DAILY
Status: DISCONTINUED | OUTPATIENT
Start: 2019-11-23 | End: 2019-11-25

## 2019-11-23 RX ORDER — ACETAMINOPHEN 80 MG
TABLET,CHEWABLE ORAL
Status: DISPENSED
Start: 2019-11-23 | End: 2019-11-23

## 2019-11-23 RX ADMIN — MICONAZOLE NITRATE: 20 POWDER TOPICAL at 22:10

## 2019-11-23 RX ADMIN — ESCITALOPRAM OXALATE 10 MG: 10 TABLET ORAL at 08:45

## 2019-11-23 RX ADMIN — BUPROPION HYDROCHLORIDE 300 MG: 150 TABLET, FILM COATED, EXTENDED RELEASE ORAL at 08:45

## 2019-11-23 RX ADMIN — WARFARIN SODIUM 4 MG: 4 TABLET ORAL at 18:08

## 2019-11-23 RX ADMIN — ATORVASTATIN CALCIUM 40 MG: 40 TABLET, FILM COATED ORAL at 22:11

## 2019-11-23 RX ADMIN — ARIPIPRAZOLE 15 MG: 10 TABLET ORAL at 08:45

## 2019-11-23 RX ADMIN — METOPROLOL TARTRATE 12.5 MG: 25 TABLET ORAL at 08:45

## 2019-11-23 RX ADMIN — MICONAZOLE NITRATE: 20 POWDER TOPICAL at 08:44

## 2019-11-23 RX ADMIN — Medication 10 ML: at 22:14

## 2019-11-23 RX ADMIN — ASPIRIN 81 MG 81 MG: 81 TABLET ORAL at 08:45

## 2019-11-23 RX ADMIN — VALSARTAN 80 MG: 160 TABLET, FILM COATED ORAL at 08:45

## 2019-11-23 RX ADMIN — Medication 10 ML: at 08:45

## 2019-11-23 ASSESSMENT — PAIN SCALES - GENERAL
PAINLEVEL_OUTOF10: 0
PAINLEVEL_OUTOF10: 0

## 2019-11-24 LAB
GLUCOSE BLD-MCNC: 119 MG/DL (ref 70–99)
GLUCOSE BLD-MCNC: 125 MG/DL (ref 70–99)
GLUCOSE BLD-MCNC: 131 MG/DL (ref 70–99)
GLUCOSE BLD-MCNC: 135 MG/DL (ref 70–99)
INR BLD: 1.35 INDEX
PROTHROMBIN TIME: 15.4 SECONDS (ref 11.7–14.5)

## 2019-11-24 PROCEDURE — 36415 COLL VENOUS BLD VENIPUNCTURE: CPT

## 2019-11-24 PROCEDURE — 2580000003 HC RX 258: Performed by: HOSPITALIST

## 2019-11-24 PROCEDURE — 1200000000 HC SEMI PRIVATE

## 2019-11-24 PROCEDURE — 94761 N-INVAS EAR/PLS OXIMETRY MLT: CPT

## 2019-11-24 PROCEDURE — 6370000000 HC RX 637 (ALT 250 FOR IP): Performed by: HOSPITALIST

## 2019-11-24 PROCEDURE — 97530 THERAPEUTIC ACTIVITIES: CPT

## 2019-11-24 PROCEDURE — 82962 GLUCOSE BLOOD TEST: CPT

## 2019-11-24 PROCEDURE — 2700000000 HC OXYGEN THERAPY PER DAY

## 2019-11-24 PROCEDURE — 85610 PROTHROMBIN TIME: CPT

## 2019-11-24 PROCEDURE — 97110 THERAPEUTIC EXERCISES: CPT

## 2019-11-24 PROCEDURE — 99233 SBSQ HOSP IP/OBS HIGH 50: CPT | Performed by: INTERNAL MEDICINE

## 2019-11-24 RX ADMIN — Medication 10 ML: at 13:42

## 2019-11-24 RX ADMIN — MICONAZOLE NITRATE: 20 POWDER TOPICAL at 13:42

## 2019-11-24 RX ADMIN — ESCITALOPRAM OXALATE 10 MG: 10 TABLET ORAL at 08:46

## 2019-11-24 RX ADMIN — METOPROLOL TARTRATE 12.5 MG: 25 TABLET ORAL at 20:38

## 2019-11-24 RX ADMIN — VALSARTAN 80 MG: 160 TABLET, FILM COATED ORAL at 08:46

## 2019-11-24 RX ADMIN — WARFARIN SODIUM 4 MG: 4 TABLET ORAL at 16:58

## 2019-11-24 RX ADMIN — BUPROPION HYDROCHLORIDE 300 MG: 150 TABLET, FILM COATED, EXTENDED RELEASE ORAL at 08:46

## 2019-11-24 RX ADMIN — Medication 10 ML: at 20:38

## 2019-11-24 RX ADMIN — ATORVASTATIN CALCIUM 40 MG: 40 TABLET, FILM COATED ORAL at 20:38

## 2019-11-24 RX ADMIN — ASPIRIN 81 MG 81 MG: 81 TABLET ORAL at 08:46

## 2019-11-24 RX ADMIN — ARIPIPRAZOLE 15 MG: 10 TABLET ORAL at 08:46

## 2019-11-24 RX ADMIN — MICONAZOLE NITRATE: 20 POWDER TOPICAL at 20:40

## 2019-11-24 ASSESSMENT — PAIN SCALES - GENERAL
PAINLEVEL_OUTOF10: 0

## 2019-11-25 ENCOUNTER — APPOINTMENT (OUTPATIENT)
Dept: ULTRASOUND IMAGING | Age: 74
DRG: 281 | End: 2019-11-25
Payer: MEDICARE

## 2019-11-25 ENCOUNTER — APPOINTMENT (OUTPATIENT)
Dept: MRI IMAGING | Age: 74
DRG: 281 | End: 2019-11-25
Payer: MEDICARE

## 2019-11-25 ENCOUNTER — APPOINTMENT (OUTPATIENT)
Dept: NUCLEAR MEDICINE | Age: 74
DRG: 281 | End: 2019-11-25
Payer: MEDICARE

## 2019-11-25 VITALS
TEMPERATURE: 98.5 F | BODY MASS INDEX: 31.55 KG/M2 | WEIGHT: 267.2 LBS | OXYGEN SATURATION: 97 % | RESPIRATION RATE: 14 BRPM | HEART RATE: 63 BPM | HEIGHT: 77 IN | SYSTOLIC BLOOD PRESSURE: 114 MMHG | DIASTOLIC BLOOD PRESSURE: 49 MMHG

## 2019-11-25 LAB
GLUCOSE BLD-MCNC: 120 MG/DL (ref 70–99)
GLUCOSE BLD-MCNC: 129 MG/DL (ref 70–99)
GLUCOSE BLD-MCNC: 132 MG/DL (ref 70–99)
INR BLD: 1.19 INDEX
LV EF: 60 %
LVEF MODALITY: NORMAL
PROTHROMBIN TIME: 13.6 SECONDS (ref 11.7–14.5)

## 2019-11-25 PROCEDURE — 93880 EXTRACRANIAL BILAT STUDY: CPT

## 2019-11-25 PROCEDURE — 3430000000 HC RX DIAGNOSTIC RADIOPHARMACEUTICAL: Performed by: INTERNAL MEDICINE

## 2019-11-25 PROCEDURE — 99233 SBSQ HOSP IP/OBS HIGH 50: CPT | Performed by: INTERNAL MEDICINE

## 2019-11-25 PROCEDURE — 6360000002 HC RX W HCPCS: Performed by: INTERNAL MEDICINE

## 2019-11-25 PROCEDURE — APPSS60 APP SPLIT SHARED TIME 46-60 MINUTES: Performed by: NURSE PRACTITIONER

## 2019-11-25 PROCEDURE — 93017 CV STRESS TEST TRACING ONLY: CPT

## 2019-11-25 PROCEDURE — A9500 TC99M SESTAMIBI: HCPCS | Performed by: INTERNAL MEDICINE

## 2019-11-25 PROCEDURE — 82962 GLUCOSE BLOOD TEST: CPT

## 2019-11-25 PROCEDURE — 85610 PROTHROMBIN TIME: CPT

## 2019-11-25 PROCEDURE — 78452 HT MUSCLE IMAGE SPECT MULT: CPT

## 2019-11-25 PROCEDURE — 36415 COLL VENOUS BLD VENIPUNCTURE: CPT

## 2019-11-25 PROCEDURE — 6370000000 HC RX 637 (ALT 250 FOR IP): Performed by: HOSPITALIST

## 2019-11-25 PROCEDURE — 2580000003 HC RX 258: Performed by: HOSPITALIST

## 2019-11-25 PROCEDURE — 72146 MRI CHEST SPINE W/O DYE: CPT

## 2019-11-25 RX ORDER — WARFARIN SODIUM 5 MG/1
5 TABLET ORAL DAILY
Status: DISCONTINUED | OUTPATIENT
Start: 2019-11-25 | End: 2019-11-25 | Stop reason: HOSPADM

## 2019-11-25 RX ORDER — ESCITALOPRAM OXALATE 20 MG/1
20 TABLET ORAL DAILY
Qty: 30 TABLET | Refills: 3 | DISCHARGE
Start: 2019-11-26 | End: 2020-06-10 | Stop reason: SDUPTHER

## 2019-11-25 RX ORDER — ESCITALOPRAM OXALATE 10 MG/1
20 TABLET ORAL DAILY
Status: DISCONTINUED | OUTPATIENT
Start: 2019-11-26 | End: 2019-11-25 | Stop reason: HOSPADM

## 2019-11-25 RX ORDER — ASPIRIN 81 MG/1
81 TABLET, CHEWABLE ORAL DAILY
Qty: 30 TABLET | Refills: 3 | DISCHARGE
Start: 2019-11-25

## 2019-11-25 RX ORDER — WARFARIN SODIUM 5 MG/1
5 TABLET ORAL DAILY
Qty: 30 TABLET | Refills: 2 | DISCHARGE
Start: 2019-11-25 | End: 2019-12-20 | Stop reason: SDUPTHER

## 2019-11-25 RX ADMIN — VALSARTAN 80 MG: 160 TABLET, FILM COATED ORAL at 12:54

## 2019-11-25 RX ADMIN — METOPROLOL TARTRATE 12.5 MG: 25 TABLET ORAL at 12:54

## 2019-11-25 RX ADMIN — WARFARIN SODIUM 5 MG: 5 TABLET ORAL at 17:31

## 2019-11-25 RX ADMIN — MICONAZOLE NITRATE: 20 POWDER TOPICAL at 12:55

## 2019-11-25 RX ADMIN — BUPROPION HYDROCHLORIDE 300 MG: 150 TABLET, FILM COATED, EXTENDED RELEASE ORAL at 12:55

## 2019-11-25 RX ADMIN — ASPIRIN 81 MG 81 MG: 81 TABLET ORAL at 12:54

## 2019-11-25 RX ADMIN — ARIPIPRAZOLE 15 MG: 10 TABLET ORAL at 12:55

## 2019-11-25 RX ADMIN — ESCITALOPRAM OXALATE 10 MG: 10 TABLET ORAL at 12:55

## 2019-11-25 RX ADMIN — REGADENOSON 0.4 MG: 0.08 INJECTION, SOLUTION INTRAVENOUS at 10:55

## 2019-11-25 RX ADMIN — Medication 10 MILLICURIE: at 08:20

## 2019-11-25 RX ADMIN — Medication 10 ML: at 12:56

## 2019-11-25 RX ADMIN — Medication 30 MILLICURIE: at 10:55

## 2019-11-25 ASSESSMENT — PAIN SCALES - GENERAL
PAINLEVEL_OUTOF10: 0
PAINLEVEL_OUTOF10: 0

## 2019-12-05 ENCOUNTER — HOSPITAL ENCOUNTER (OUTPATIENT)
Dept: WOUND CARE | Age: 74
Discharge: HOME | End: 2019-12-05
Payer: MEDICARE

## 2019-12-05 VITALS
SYSTOLIC BLOOD PRESSURE: 123 MMHG | TEMPERATURE: 98 F | HEART RATE: 77 BPM | RESPIRATION RATE: 16 BRPM | DIASTOLIC BLOOD PRESSURE: 74 MMHG

## 2019-12-05 DIAGNOSIS — L97.822 ULCER OF SHIN, LEFT, WITH FAT LAYER EXPOSED (HCC): Primary | ICD-10-CM

## 2019-12-05 PROCEDURE — 11042 DBRDMT SUBQ TIS 1ST 20SQCM/<: CPT

## 2019-12-09 ENCOUNTER — OFFICE VISIT (OUTPATIENT)
Dept: CARDIOLOGY CLINIC | Age: 74
End: 2019-12-09
Payer: MEDICARE

## 2019-12-09 VITALS
WEIGHT: 272 LBS | DIASTOLIC BLOOD PRESSURE: 70 MMHG | HEIGHT: 77 IN | SYSTOLIC BLOOD PRESSURE: 122 MMHG | BODY MASS INDEX: 32.12 KG/M2 | HEART RATE: 68 BPM

## 2019-12-09 DIAGNOSIS — I10 ESSENTIAL HYPERTENSION: Primary | ICD-10-CM

## 2019-12-09 PROCEDURE — 1123F ACP DISCUSS/DSCN MKR DOCD: CPT | Performed by: INTERNAL MEDICINE

## 2019-12-09 PROCEDURE — 1111F DSCHRG MED/CURRENT MED MERGE: CPT | Performed by: INTERNAL MEDICINE

## 2019-12-09 PROCEDURE — G8598 ASA/ANTIPLAT THER USED: HCPCS | Performed by: INTERNAL MEDICINE

## 2019-12-09 PROCEDURE — 99214 OFFICE O/P EST MOD 30 MIN: CPT | Performed by: INTERNAL MEDICINE

## 2019-12-09 PROCEDURE — 4040F PNEUMOC VAC/ADMIN/RCVD: CPT | Performed by: INTERNAL MEDICINE

## 2019-12-09 PROCEDURE — G8417 CALC BMI ABV UP PARAM F/U: HCPCS | Performed by: INTERNAL MEDICINE

## 2019-12-09 PROCEDURE — G8482 FLU IMMUNIZE ORDER/ADMIN: HCPCS | Performed by: INTERNAL MEDICINE

## 2019-12-09 PROCEDURE — 1036F TOBACCO NON-USER: CPT | Performed by: INTERNAL MEDICINE

## 2019-12-09 PROCEDURE — 3017F COLORECTAL CA SCREEN DOC REV: CPT | Performed by: INTERNAL MEDICINE

## 2019-12-09 PROCEDURE — G8427 DOCREV CUR MEDS BY ELIG CLIN: HCPCS | Performed by: INTERNAL MEDICINE

## 2019-12-12 ENCOUNTER — HOSPITAL ENCOUNTER (OUTPATIENT)
Dept: WOUND CARE | Age: 74
Discharge: HOME | End: 2019-12-12
Payer: MEDICARE

## 2019-12-12 VITALS
RESPIRATION RATE: 16 BRPM | HEART RATE: 74 BPM | TEMPERATURE: 97.6 F | SYSTOLIC BLOOD PRESSURE: 114 MMHG | DIASTOLIC BLOOD PRESSURE: 63 MMHG

## 2019-12-12 DIAGNOSIS — L97.822 ULCER OF SHIN, LEFT, WITH FAT LAYER EXPOSED (HCC): Primary | ICD-10-CM

## 2019-12-12 DIAGNOSIS — S61.402A: ICD-10-CM

## 2019-12-12 DIAGNOSIS — I87.312 CHRONIC VENOUS HYPERTENSION (IDIOPATHIC) WITH ULCER OF LEFT LOWER EXTREMITY (CODE) (HCC): ICD-10-CM

## 2019-12-12 PROCEDURE — 11042 DBRDMT SUBQ TIS 1ST 20SQCM/<: CPT

## 2019-12-16 ENCOUNTER — TELEPHONE (OUTPATIENT)
Dept: PHARMACY | Age: 74
End: 2019-12-16

## 2019-12-19 ENCOUNTER — TELEPHONE (OUTPATIENT)
Dept: FAMILY MEDICINE CLINIC | Age: 74
End: 2019-12-19

## 2019-12-19 ENCOUNTER — ANTI-COAG VISIT (OUTPATIENT)
Dept: PHARMACY | Age: 74
End: 2019-12-19

## 2019-12-19 ENCOUNTER — OFFICE VISIT (OUTPATIENT)
Dept: FAMILY MEDICINE CLINIC | Age: 74
End: 2019-12-19
Payer: MEDICARE

## 2019-12-19 VITALS
BODY MASS INDEX: 32.21 KG/M2 | RESPIRATION RATE: 16 BRPM | DIASTOLIC BLOOD PRESSURE: 72 MMHG | WEIGHT: 272.8 LBS | OXYGEN SATURATION: 98 % | HEIGHT: 77 IN | HEART RATE: 104 BPM | SYSTOLIC BLOOD PRESSURE: 128 MMHG

## 2019-12-19 DIAGNOSIS — Z86.711 PERSONAL HISTORY OF PE (PULMONARY EMBOLISM): ICD-10-CM

## 2019-12-19 DIAGNOSIS — N18.30 HYPERTENSION IN STAGE 3 CHRONIC KIDNEY DISEASE DUE TO TYPE 2 DIABETES MELLITUS (HCC): ICD-10-CM

## 2019-12-19 DIAGNOSIS — I12.9 HYPERTENSION IN STAGE 3 CHRONIC KIDNEY DISEASE DUE TO TYPE 2 DIABETES MELLITUS (HCC): ICD-10-CM

## 2019-12-19 DIAGNOSIS — Z09 HOSPITAL DISCHARGE FOLLOW-UP: Primary | ICD-10-CM

## 2019-12-19 DIAGNOSIS — E11.44 TYPE 2 DIABETES MELLITUS WITH DIABETIC AMYOTROPHY, WITH LONG-TERM CURRENT USE OF INSULIN (HCC): ICD-10-CM

## 2019-12-19 DIAGNOSIS — Z79.01 LONG TERM CURRENT USE OF ANTICOAGULANTS WITH INR GOAL OF 2.0-3.0: Primary | ICD-10-CM

## 2019-12-19 DIAGNOSIS — L97.822 ULCER OF SHIN, LEFT, WITH FAT LAYER EXPOSED (HCC): ICD-10-CM

## 2019-12-19 DIAGNOSIS — E11.22 HYPERTENSION IN STAGE 3 CHRONIC KIDNEY DISEASE DUE TO TYPE 2 DIABETES MELLITUS (HCC): ICD-10-CM

## 2019-12-19 DIAGNOSIS — Z79.4 TYPE 2 DIABETES MELLITUS WITH DIABETIC AMYOTROPHY, WITH LONG-TERM CURRENT USE OF INSULIN (HCC): ICD-10-CM

## 2019-12-19 DIAGNOSIS — Z86.718 HISTORY OF DVT (DEEP VEIN THROMBOSIS): ICD-10-CM

## 2019-12-19 DIAGNOSIS — R77.8 ELEVATED TROPONIN: ICD-10-CM

## 2019-12-19 LAB — INTERNATIONAL NORMALIZATION RATIO, POC: 2.7

## 2019-12-19 PROCEDURE — 1111F DSCHRG MED/CURRENT MED MERGE: CPT | Performed by: FAMILY MEDICINE

## 2019-12-19 PROCEDURE — 2022F DILAT RTA XM EVC RTNOPTHY: CPT | Performed by: FAMILY MEDICINE

## 2019-12-19 PROCEDURE — 3017F COLORECTAL CA SCREEN DOC REV: CPT | Performed by: FAMILY MEDICINE

## 2019-12-19 PROCEDURE — G8482 FLU IMMUNIZE ORDER/ADMIN: HCPCS | Performed by: FAMILY MEDICINE

## 2019-12-19 PROCEDURE — 1123F ACP DISCUSS/DSCN MKR DOCD: CPT | Performed by: FAMILY MEDICINE

## 2019-12-19 PROCEDURE — G8417 CALC BMI ABV UP PARAM F/U: HCPCS | Performed by: FAMILY MEDICINE

## 2019-12-19 PROCEDURE — 4040F PNEUMOC VAC/ADMIN/RCVD: CPT | Performed by: FAMILY MEDICINE

## 2019-12-19 PROCEDURE — 1036F TOBACCO NON-USER: CPT | Performed by: FAMILY MEDICINE

## 2019-12-19 PROCEDURE — G8427 DOCREV CUR MEDS BY ELIG CLIN: HCPCS | Performed by: FAMILY MEDICINE

## 2019-12-19 PROCEDURE — 99214 OFFICE O/P EST MOD 30 MIN: CPT | Performed by: FAMILY MEDICINE

## 2019-12-19 RX ORDER — LOSARTAN POTASSIUM 50 MG/1
50 TABLET ORAL DAILY
Qty: 90 TABLET | Refills: 1 | Status: SHIPPED | OUTPATIENT
Start: 2019-12-19 | End: 2020-07-09 | Stop reason: SDUPTHER

## 2019-12-26 ENCOUNTER — ANTI-COAG VISIT (OUTPATIENT)
Dept: PHARMACY | Age: 74
End: 2019-12-26

## 2019-12-26 ENCOUNTER — HOSPITAL ENCOUNTER (OUTPATIENT)
Dept: WOUND CARE | Age: 74
Discharge: HOME | End: 2019-12-26
Payer: MEDICARE

## 2019-12-26 VITALS — RESPIRATION RATE: 18 BRPM | DIASTOLIC BLOOD PRESSURE: 62 MMHG | SYSTOLIC BLOOD PRESSURE: 105 MMHG | TEMPERATURE: 97.3 F

## 2019-12-26 DIAGNOSIS — I87.312 CHRONIC VENOUS HYPERTENSION (IDIOPATHIC) WITH ULCER OF LEFT LOWER EXTREMITY (CODE) (HCC): Primary | ICD-10-CM

## 2019-12-26 DIAGNOSIS — L97.822 ULCER OF SHIN, LEFT, WITH FAT LAYER EXPOSED (HCC): ICD-10-CM

## 2019-12-26 DIAGNOSIS — Z86.718 HISTORY OF DVT (DEEP VEIN THROMBOSIS): ICD-10-CM

## 2019-12-26 DIAGNOSIS — Z86.711 PERSONAL HISTORY OF PE (PULMONARY EMBOLISM): ICD-10-CM

## 2019-12-26 PROCEDURE — 87186 SC STD MICRODIL/AGAR DIL: CPT

## 2019-12-26 PROCEDURE — 87073 CULTURE BACTERIA ANAEROBIC: CPT

## 2019-12-26 PROCEDURE — 11042 DBRDMT SUBQ TIS 1ST 20SQCM/<: CPT

## 2019-12-26 PROCEDURE — 87077 CULTURE AEROBIC IDENTIFY: CPT

## 2019-12-26 PROCEDURE — 87071 CULTURE AEROBIC QUANT OTHER: CPT

## 2019-12-27 LAB — INR BLD: 1.9

## 2019-12-29 LAB
CULTURE: ABNORMAL
Lab: ABNORMAL
SPECIMEN: ABNORMAL

## 2020-01-02 ENCOUNTER — HOSPITAL ENCOUNTER (OUTPATIENT)
Dept: WOUND CARE | Age: 75
Discharge: HOME | End: 2020-01-02
Payer: MEDICARE

## 2020-01-02 ENCOUNTER — TELEPHONE (OUTPATIENT)
Dept: PHARMACY | Age: 75
End: 2020-01-02

## 2020-01-02 ENCOUNTER — ANTI-COAG VISIT (OUTPATIENT)
Dept: PHARMACY | Age: 75
End: 2020-01-02

## 2020-01-02 VITALS
RESPIRATION RATE: 18 BRPM | DIASTOLIC BLOOD PRESSURE: 72 MMHG | TEMPERATURE: 97.8 F | SYSTOLIC BLOOD PRESSURE: 114 MMHG | HEART RATE: 89 BPM

## 2020-01-02 PROBLEM — L89.322 PRESSURE INJURY OF LEFT BUTTOCK, STAGE 2 (HCC): Status: ACTIVE | Noted: 2020-01-02

## 2020-01-02 LAB — INR BLD: 1.5

## 2020-01-02 PROCEDURE — 11042 DBRDMT SUBQ TIS 1ST 20SQCM/<: CPT

## 2020-01-02 RX ORDER — DOXYCYCLINE HYCLATE 100 MG
100 TABLET ORAL 2 TIMES DAILY
Qty: 20 TABLET | Refills: 0 | Status: SHIPPED | OUTPATIENT
Start: 2020-01-02 | End: 2020-01-12

## 2020-01-02 ASSESSMENT — PAIN DESCRIPTION - LOCATION: LOCATION: LEG

## 2020-01-02 ASSESSMENT — PAIN DESCRIPTION - PAIN TYPE: TYPE: CHRONIC PAIN

## 2020-01-02 ASSESSMENT — PAIN DESCRIPTION - ORIENTATION: ORIENTATION: LEFT

## 2020-01-02 ASSESSMENT — PAIN SCALES - GENERAL: PAINLEVEL_OUTOF10: 5

## 2020-01-02 NOTE — PROGRESS NOTES
Wound Care Center Progress Note With Procedure    Karen Loo  AGE: 69 y.o. GENDER: male  : 1945  EPISODE DATE:  2020     Subjective:     Chief Complaint   Patient presents with    Wound Check     BLE         HISTORY of PRESENT ILLNESS      Karen Loo is a 69 y.o. male who presents today for wound evaluation of Chronic venous ulcer(s) of the left leg. The ulcer is of moderate severity. The underlying cause of the wound is venous. He is in for f/u- culture was with scanty MRSA, but he has increased drainage and redness- I suspect infection and will treat.   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 Fausto Sierra Anemia 2011    Arm fracture Shirline Patrick     Dr Jonathan Wright with also yearly DM exam    Cataract     worsening-Dr. Arriaga Genin    Cellulitis of left lower leg     Chronic venous hypertension with ulcer ([address] 2011    CKD (chronic kidney disease) 2012    Renal u/S normal     Colon polyps     Dr. Fausto Sierra COPD (chronic obstructive pulmonary disease) ([address]     Diabetes mellitus ([address]     Diabetes mellitus ([address]     Diabetes mellitus with peripheral circulatory disorder ([address] 10/2/2015    Diabetes mellitus with skin ulcer ([address] 10/2/2015    Diabetic peripheral neuropathy ([address]     + EMG, NCS    Diabetic skin ulcer associated with type 2 diabetes mellitus ([address]     Diverticulosis 12    mild, left colon    Femoral DVT (deep venous thrombosis) ([address] 2011    PARTIAL,CHRONIC-SPFLD HEART SURGEONS    GERD (gastroesophageal reflux disease)     Glaucoma     open angle-Dr. Brad Odom H/O cardiac catheterization 6/3/14    EF55% normal study  H/O Doppler ultrasound 03/26/15    Carotid US- no significant stenosis noted bilaterally.  H/O echocardiogram 11/21/2019    EF50-55%, Mild AR. Moderately dilated right ventricle with negative Solis's sign.  H/O mumps orchitis     as a youth    Hemorrhoids 4/23/12    Dr. Araceli Soto; repeat colonoscopy 3 years    History of nuclear stress test 11/21/2019    EF 60%, Normal study.  HLD (hyperlipidemia)     Hx of Doppler ultrasound 1/06/15    Lymph node seen in left groin area. No bilateral stenosis.     Hyperlipemia     Hyperlipidemia     Hypertension 1992    Hypertension     Idiopathic chronic venous hypertension of left lower extremity with ulcer and inflammation (HCC) 10/2/2015    Leg ulcer ([address] 1978-present    following at wound center, Dr Kaylee Durant No diabetic retinopathy OU 11/12    Dr. Kiran Beltran chronic ulcer of left lower leg with fat layer exposed ([address] 10/2/2015    Pulmonary embolism ([address] 11/13    patient on coumadin    Sleep apnea     doesnt always use cpap dt it drys him out    SOB (shortness of breath) Oct 2011    Stress test normal.     Tendinitis 1973    plantar tendons    Type II or unspecified type diabetes mellitus with other specified manifestations, not stated as uncontrolled 2/8/2013    Unspecified venous (peripheral) insufficiency     Urticaria     WD-Cellulitis of right anterior lower leg 9/5/2019    WD-Chronic venous hypertension (idiopathic) with ulcer of left lower extremity (CODE) ([address] 6/27/2019    WD-Chronic venous hypertension with inflammation, right 9/19/2019    WD-Open wound of hand without complication, left, initial encounter 12/12/2019    WD-Skin tear of right forearm without complication 8/29/1640    WD-Ulcer of right pretibial region, with fat layer exposed ([address] 10/17/2019       PAST SURGICAL HISTORY    Past Surgical History:   Procedure Laterality Date    BREAST SURGERY  1970s    benign tumors bilaterally    CARDIAC CATHETERIZATION  6/3/14    EF55% normal study    CARPAL TUNNEL RELEASE Left     CARPAL TUNNEL RELEASE      CATARACT REMOVAL Right 3/11/2013    Dr. Parth Underwood Left 2013    Dr. Aura Milligan      polyps    COLONOSCOPY  11    villous component--f/u colonoscopy will be needed in 6 months    COLONOSCOPY  12    mild diverticulosis, internal hemorrhoids; repeat in 3 years (Dr. Dani Patel)    COLONOSCOPY  2016    mild diverticulosis, three colon polyps    HERNIA REPAIR  1970s    HERNIA REPAIR      SKIN GRAFT  -present    skin grafts to left ankle ulcers    SPLENECTOMY      SPLENECTOMY      TONSILLECTOMY      VARICOSE VEIN SURGERY Left 2009       FAMILY HISTORY    Family History   Problem Relation Age of Onset    Heart Disease Father     Cancer Father         prostate    High Blood Pressure Father     High Cholesterol Father     Cancer Brother     Diabetes Brother     Thyroid Disease Brother        SOCIAL HISTORY    Social History     Tobacco Use    Smoking status: Former Smoker     Packs/day: 2.00     Years: 35.00     Pack years: 70.00     Types: Cigarettes     Last attempt to quit:      Years since quittin.0    Smokeless tobacco: Never Used    Tobacco comment: chew--30 years.   Reviewed 2015   Substance Use Topics    Alcohol use: Yes     Comment: soc    Drug use: Never       ALLERGIES    Allergies   Allergen Reactions    Cavilon Durable Barrier [Mineral Oil-Dimeth-Coconut Oil]     Parabens Hives    Parabens Hives    Prinivil [Lisinopril] Swelling    Cortisone Rash    Cortisone Rash    Dilaudid [Hydromorphone Hcl] Rash    Penicillins Rash    Penicillins Rash    Sulfamethoxazole-Trimethoprim Nausea Only    Tape [Adhesive Tape] Rash       MEDICATIONS    Current Outpatient Medications on File Prior to Encounter   Medication Sig Dispense Refill    Zinc Oxide (DESITIN CREAMY EX) Apply 13 % topically      losartan (COZAAR) 50 MG tablet Take 1 tablet by mouth daily Patient tolerates diovan 90 tablet 1    aspirin 81 MG chewable tablet Take 1 tablet by mouth daily 30 tablet 3    miconazole (MICOTIN) 2 % powder Apply topically 2 times daily. 45 g 1    metoprolol tartrate (LOPRESSOR) 25 MG tablet Take 0.5 tablets by mouth 2 times daily 60 tablet 3    escitalopram (LEXAPRO) 20 MG tablet Take 1 tablet by mouth daily 30 tablet 3    atorvastatin (LIPITOR) 40 MG tablet Take 40 mg by mouth nightly      metFORMIN (GLUCOPHAGE) 500 MG tablet Take 500 mg by mouth daily (with breakfast)      ARIPiprazole (ABILIFY) 15 MG tablet Take 15 mg by mouth daily      warfarin (COUMADIN) 5 MG tablet Take 5 mg by mouth daily Except take 2.5mg on Tuesdays and Fridays      omeprazole (PRILOSEC) 20 MG delayed release capsule TAKE ONE (1) CAPSULE BY MOUTH ONCE DAILY 90 capsule 1    blood glucose test strips (ALISHA CONTOUR TEST) strip USE AS DIRECTED TO TEST BLOOD SUGAR FOUR TIMES DAILY AS NEEDED 100 strip 3    buPROPion (WELLBUTRIN XL) 300 MG extended release tablet Take 1 tablet by mouth every morning 90 tablet 3    Glucose Blood (BLOOD GLUCOSE TEST STRIPS) STRP Please give contour testing strips to test blood sugar 4x daily 200 strip 3    ALISHA CONTOUR TEST strip USE AS DIRECTED TO TEST BLOOD SUGAR FOUR TIMES DAILY 100 strip 5     No current facility-administered medications on file prior to encounter. REVIEW OF SYSTEMS    Pertinent items are noted in HPI. Constitutional: Negative for systemic symptoms including fever, chills and malaise. Objective:      /72   Pulse 89   Temp 97.8 °F (36.6 °C) (Tympanic)   Resp 18     PHYSICAL EXAM      General: The patient is in no acute distress. Mental status:  Patient is appropriate, is  oriented to place and plan of care.   Dermatologic exam: Visual inspection of the periwound reveals the skin to be normal in turgor and texture  Wound exam: see wound description below in procedure note      Assessment:     Problem List Items Addressed This Visit     WD-Ulcer of shin, left, with fat layer exposed ([address] - Primary    WD-Cellulitis of right anterior lower leg    WD-Chronic venous hypertension with inflammation, right    WD-Open wound of hand without complication, left, initial encounter        Procedure Note    Indications:  Based on my examination of this patient's wound(s) today, sharp excision into necrotic subcutaneous tissue is required to promote healing and evaluate the extent of previous healing. Performed by: Junaid Blake MD    Consent obtained: Yes    Time out taken:  Yes    Pain Control: none       Debridement:Excisional Debridement    Using curette the wound(s) was/were sharply debrided down through and including the removal of epidermis, dermis and subcutaneous tissue. Devitalized Tissue Debrided:  fibrin, biofilm and slough    Pre Debridement Measurements:  Are located in the Wound Documentation Flow Sheet    All active wounds listed below with today's date are evaluated  Wound(s)    debrided this date include # : 6     Post  Debridement Measurements:  Wound 11/15/13 Other (Comment) Leg Inner erethema with a small puncture site (Active)   Number of days: 2238       Wound 06/27/19 #6 (onset 1 month) Left Medial Ankle distal  (Active)   Wound Image   12/5/2019 11:11 AM   Wound Venous 12/26/2019 11:24 AM   Offloading for Diabetic Foot Ulcers No 12/26/2019 11:24 AM   Dressing Status Clean;Dry; Intact 12/26/2019 11:55 AM   Dressing Changed Changed/New 12/26/2019 11:55 AM   Wound Cleansed Wound cleanser 12/26/2019 11:24 AM   Wound Length (cm) 1 cm 1/2/2020 11:11 AM   Wound Width (cm) 1 cm 1/2/2020 11:11 AM   Wound Depth (cm) 0.1 cm 1/2/2020 11:11 AM   Wound Surface Area (cm^2) 1 cm^2 1/2/2020 11:11 AM   Change in Wound Size % (l*w) 85.71 1/2/2020 11:11 AM   Wound Volume (cm^3) 0.1 cm^3 1/2/2020 11:11 AM   Wound Healing % 95 1/2/2020 11:11 AM   Post-Procedure Length                         Primary care physician:      Continuing wound care orders and information:              Residence:                Continue home health care with:               Your wound-care supplies will be provided by: Candida Guillermo provider:              EDSVIEVIHER with              CQA loading: Date              MXLKJ Medications: RX SANTYL GIVEN 8/1/19              QDIKK cleansing:                           VV not scrub or use excessive force.                          Wash hands with soap and water before and after dressing changes.                         Prior to applying a clean dressing, cleanse wound with normal saline,                                wound cleanser, or mild soap and water.                                     Daily Wound management:                                                Avoid standing for long periods of time.                          KQUJK wraps/stockings in AM and remove at bedtime.                          If swelling is present, elevate legs to the level of the heart or above for 30 minutes 4-5 times a day and/or when sitting.                                             When taking antibiotics take entire prescription as ordered by physician do not stop taking until medicine is all gone.     Right lower arm--Healed today 9/12/19                                                        Orders for this week: 1/2/2020     RX FOR SANTYL OINTMENT GIVEN ON 8/1/19                     Left medial ankle-- cleanse with  Dakins soak , pat dry. Apply desitin around wound to intact skin.  Then to open areas:  Apply stimulin gel and cover with calcium alginate then wrap with Profore lite  keep in place until next visit      Left hand-- healed 12/26/19     Right Leg -- TUBI E on in am and off at night               Follow up [address] in 1 week on Thursday as scheduled in the wound care center     Call [phone] for any questions or concerns.           Physician Signature ___________________________________________________________  Date_______________________________________________                Treatment Note      Written Patient Dismissal Instructions Given            Electronically signed by Gabriela Nguyen MD on 1/2/2020 at 11:41 AM

## 2020-01-02 NOTE — PROGRESS NOTES
Medication Management Service  [address] [phone]    Visit Date: 1/2/2020   Subjective:       Vero Sutton is a 69 y.o. male who is having INR checked by [facility]. INR results were called in to the clinic for anticoagulation monitoring and adjustment by Kareem Wisdom RN who can be reached at [phone]. Patient results called in to clinic for warfarin management due to  Indication:   DVT and PE. INR goal: of 2.0-3.0. Duration of therapy: indefinite. Patient reports the following:   Adherent with regimen  Missed or extra doses:  None   Bleeding or thromboembolic side effects:  None  Significant medication changes:  Starting doxycycline 100mg BID for 10 days  Significant dietary changes: sauerkraut yesterday  Significant alcohol or tobacco changes: None  Significant recent illness, disease state changes, or hospitalization:  None  Upcoming surgeries or procedures:  None  Falls: None           Assessment and Plan     PT/INR performed by [address] INR today is 1.5, subtherapeutic, likely due to increased vitamin K from sauerkraut he ate yesterday. Patient starting doxycycline today, which has increased his INR in the past. No dose change at this time until re-check. Plan: Will continue current regimen of warfarin 5mg daily except 2.5mg on Tuesdays and Fridays. Recheck INR in 4 days. Patient's home care nurse verbalized understanding of dosing directions and information discussed and will provide information to patient. Patient's home care nurse acknowledges working in consult agreement with pharmacist as referred by patient's physician on behalf of patient.       Electronically signed by Zana Herrera [address] [place] [address] on 1/2/20 at 4:12 PM

## 2020-01-02 NOTE — TELEPHONE ENCOUNTER
Patient starting doxycycline today. Home care is to check patient's INR later today. Will call patient back after home care visit.

## 2020-01-06 ENCOUNTER — ANTI-COAG VISIT (OUTPATIENT)
Dept: PHARMACY | Age: 75
End: 2020-01-06

## 2020-01-06 LAB — INR BLD: 1.9

## 2020-01-06 NOTE — PROGRESS NOTES
Medication Management Service  [address] [phone]    Visit Date: 1/6/2020   Subjective:       Jessy Souza is a 69 y.o. male who is having INR checked by [facility] [place]. INR results were called in to the clinic for anticoagulation monitoring and adjustment by Dhara Diaz RN who can be reached at [phone]. Patient results called in to clinic for warfarin management due to  Indication:   DVT and PE. INR goal: of 2.0-3.0. Duration of therapy: indefinite. Patient reports the following:   Adherent with regimen  Missed or extra doses:  None   Bleeding or thromboembolic side effects:  None  Significant medication changes:  None  Significant dietary changes: None  Significant alcohol or tobacco changes: None  Significant recent illness, disease state changes, or hospitalization:  None  Upcoming surgeries or procedures:  None  Falls: None           Assessment and Plan     PT/INR performed by [address] INR today is 1.9, therapeutic, but below goal range. Patient currently on doxycyline, which can increase INR. Plan: Will continue current regimen of warfarin 5mg daily except 2.5mg on Tuesdays and Fridays. Recheck INR in 1 week(s). Patient's home care nurse verbalized understanding of dosing directions and information discussed and will provide information to patient. Patient's home care nurse acknowledges working in consult agreement with pharmacist as referred by patient's physician on behalf of patient.       Electronically signed by Mack Grimm [facility] on 1/6/20 at 6:05 PM

## 2020-01-09 ENCOUNTER — TELEPHONE (OUTPATIENT)
Dept: FAMILY MEDICINE CLINIC | Age: 75
End: 2020-01-09

## 2020-01-09 ENCOUNTER — HOSPITAL ENCOUNTER (OUTPATIENT)
Dept: WOUND CARE | Age: 75
Discharge: HOME | End: 2020-01-09
Payer: MEDICARE

## 2020-01-09 VITALS
RESPIRATION RATE: 18 BRPM | TEMPERATURE: 97.6 F | DIASTOLIC BLOOD PRESSURE: 79 MMHG | SYSTOLIC BLOOD PRESSURE: 147 MMHG | HEART RATE: 102 BPM

## 2020-01-09 PROBLEM — L89.322 PRESSURE INJURY OF LEFT BUTTOCK, STAGE 2 (HCC): Status: RESOLVED | Noted: 2020-01-02 | Resolved: 2020-01-09

## 2020-01-09 PROBLEM — S61.402A: Status: RESOLVED | Noted: 2019-12-12 | Resolved: 2020-01-09

## 2020-01-09 PROBLEM — L03.115 CELLULITIS OF RIGHT ANTERIOR LOWER LEG: Status: RESOLVED | Noted: 2019-09-05 | Resolved: 2020-01-09

## 2020-01-09 PROCEDURE — 11042 DBRDMT SUBQ TIS 1ST 20SQCM/<: CPT

## 2020-01-09 NOTE — PROGRESS NOTES
Wound Care Center Progress Note With Procedure    Ion Alexandre  AGE: 69 y.o. GENDER: male  : 1945  EPISODE DATE:  2020     Subjective:     Chief Complaint   Patient presents with    Wound Check     right lower leg         HISTORY of PRESENT ILLNESS      Ino Alexandre is a 69 y.o. male who presents today for wound evaluation of Chronic venous ulcer(s) of the left leg. The ulcer is of mild severity. The underlying cause of the wound is venous hypertension. He is in for f/u- much better this week- I have him on antibiotics for wound infection. No new issues.     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 Martínez Anemia 2011    Arm fracture Sheng Rodriguez with also yearly DM exam    Cataract     worsening-Dr. Juventino Baeza    Cellulitis of left lower leg     Chronic venous hypertension with ulcer ([address] 2011    CKD (chronic kidney disease) 2012    Renal u/S normal     Colon polyps     Dr. Martínez COPD (chronic obstructive pulmonary disease) ([address]     Diabetes mellitus ([address]     Diabetes mellitus ([address]     Diabetes mellitus with peripheral circulatory disorder ([address] 10/2/2015    Diabetes mellitus with skin ulcer ([address] 10/2/2015    Diabetic peripheral neuropathy ([address]     + EMG, NCS    Diabetic skin ulcer associated with type 2 diabetes mellitus ([address]     Diverticulosis 12    mild, left colon    Femoral DVT (deep venous thrombosis) ([address] 2011    PARTIAL,CHRONIC-SPF HEART SURGEONS    GERD (gastroesophageal reflux disease)     Glaucoma     open angle-Dr. Rajwinder Garcia H/O cardiac catheterization Medication Sig Dispense Refill    doxycycline hyclate (VIBRA-TABS) 100 MG tablet Take 1 tablet by mouth 2 times daily for 10 days 20 tablet 0    Zinc Oxide (DESITIN CREAMY EX) Apply 13 % topically      losartan (COZAAR) 50 MG tablet Take 1 tablet by mouth daily Patient tolerates diovan 90 tablet 1    aspirin 81 MG chewable tablet Take 1 tablet by mouth daily 30 tablet 3    miconazole (MICOTIN) 2 % powder Apply topically 2 times daily. 45 g 1    metoprolol tartrate (LOPRESSOR) 25 MG tablet Take 0.5 tablets by mouth 2 times daily 60 tablet 3    escitalopram (LEXAPRO) 20 MG tablet Take 1 tablet by mouth daily 30 tablet 3    atorvastatin (LIPITOR) 40 MG tablet Take 40 mg by mouth nightly      metFORMIN (GLUCOPHAGE) 500 MG tablet Take 500 mg by mouth daily (with breakfast)      ARIPiprazole (ABILIFY) 15 MG tablet Take 15 mg by mouth daily      warfarin (COUMADIN) 5 MG tablet Take 5 mg by mouth daily Except take 2.5mg on Tuesdays and Fridays      omeprazole (PRILOSEC) 20 MG delayed release capsule TAKE ONE (1) CAPSULE BY MOUTH ONCE DAILY 90 capsule 1    blood glucose test strips (ALISHA CONTOUR TEST) strip USE AS DIRECTED TO TEST BLOOD SUGAR FOUR TIMES DAILY AS NEEDED 100 strip 3    buPROPion (WELLBUTRIN XL) 300 MG extended release tablet Take 1 tablet by mouth every morning 90 tablet 3    ALISHA CONTOUR TEST strip USE AS DIRECTED TO TEST BLOOD SUGAR FOUR TIMES DAILY 100 strip 5    Glucose Blood (BLOOD GLUCOSE TEST STRIPS) STRP Please give contour testing strips to test blood sugar 4x daily 200 strip 3     No current facility-administered medications on file prior to encounter. REVIEW OF SYSTEMS    Pertinent items are noted in HPI. Constitutional: Negative for systemic symptoms including fever, chills and malaise. Objective:      BP (!) 147/79   Pulse 102   Temp 97.6 °F (36.4 °C) (Temporal)   Resp 18     PHYSICAL EXAM      General: The patient is in no acute distress.     Mental status: Surface Area (cm^2) 0.64 cm^2 1/9/2020 10:35 AM   Change in Wound Size % (l*w) 90.86 1/9/2020 10:35 AM   Wound Volume (cm^3) 0.06 cm^3 1/9/2020 10:35 AM   Wound Healing % 97 1/9/2020 10:35 AM   Post-Procedure Length (cm) 0.8 cm 1/9/2020 11:06 AM   Post-Procedure Width (cm) 0.8 cm 1/9/2020 11:06 AM   Post-Procedure Depth (cm) 0.1 cm 1/9/2020 11:06 AM   Post-Procedure Surface Area (cm^2) 0.64 cm^2 1/9/2020 11:06 AM   Post-Procedure Volume (cm^3) 0.06 cm^3 1/9/2020 11:06 AM   Distance Tunneling (cm) 0 cm 1/9/2020 10:35 AM   Tunneling Position ___ O'Clock 0 1/9/2020 10:35 AM   Undermining Starts ___ O'Clock 0 1/9/2020 10:35 AM   Undermining Ends___ O'Clock 0 1/9/2020 10:35 AM   Undermining Maxium Distance (cm) 0 1/9/2020 10:35 AM   Wound Assessment Pink;Yellow 1/9/2020 10:35 AM   Drainage Amount Small 1/9/2020 10:35 AM   Drainage Description Serosanguinous 1/9/2020 10:35 AM   Odor None 1/9/2020 10:35 AM   Margins Attached edges 1/9/2020 10:35 AM   Ana-wound Assessment Calloused;Dry 1/9/2020 10:35 AM   Non-staged Wound Description Full thickness 1/9/2020 10:35 AM   [place]%Wound Bed 0 1/9/2020 10:35 AM   Red%Wound Bed 10 1/9/2020 10:35 AM   Yellow%Wound Bed 90 1/9/2020 10:35 AM   Black%Wound Bed 0 1/9/2020 10:35 AM   Purple%Wound Bed 0 1/9/2020 10:35 AM   Other%Wound Bed 0 1/2/2020 11:11 AM   Number of days: 195       Wound 12/12/19 Ankle Left;Medial;Proximal #10  from #6 on 12/12/19  (Active)   Dressing Status Clean;Dry; Intact 1/9/2020 11:14 AM   Dressing Changed Changed/New 1/9/2020 11:14 AM   Wound Cleansed Wound cleanser 1/9/2020 10:35 AM   Wound Length (cm) 0 cm 1/9/2020 10:35 AM   Wound Width (cm) 0 cm 1/9/2020 10:35 AM   Wound Depth (cm) 0 cm 1/9/2020 10:35 AM   Wound Surface Area (cm^2) 0 cm^2 1/9/2020 10:35 AM   Wound Volume (cm^3) 0 cm^3 1/9/2020 10:35 AM   Post-Procedure Length (cm) 0.5 cm 12/26/2019 11:43 AM   Post-Procedure Width (cm) 0.4 cm 12/26/2019 11:43 AM   Post-Procedure Depth (cm) 0.1 cm 9/26/19                               Obtained from right leg on 10/17/19                                  Biopsy done 7/18/19                 Labs/ HbA1c  Date               Grafts: Date               HBO:                Antibiotics: Doxycycline for 14 days BID; phoned to [facility] 6/28/19                                    Doxycycline for 10 days BID and CIPRO 500 mg BID for 10 days ordered on 8/22/19 [facility]                                   Doxycycline for 7 days on 9/5/19              Earlier Wound care treatments:                [id number]:                        Consults:   Date 7/18/19 to Dr Ashleigh Maldonado-- [facility] physician:      Continuing wound care orders and information:              Residence:                Continue home health care with:               Your wound-care supplies will be provided by: Maged Perez provider:              [id number] with              KAD loading: Date              ZXQZP Medications: RX SANTYL GIVEN 8/1/19              LTWNB cleansing:                           RI not scrub or use excessive force.                          Wash hands with soap and water before and after dressing changes.                           Prior to applying a clean dressing, cleanse wound with normal saline,                                wound cleanser, or mild soap and water.                                     Daily Wound management:                                                Avoid standing for long periods of time.                          Apply wraps/stockings in AM and remove at bedtime.                          If swelling is present, elevate legs to the level of the heart or above for 30 minutes 4-5 times a day and/or when sitting.                                             When taking antibiotics take entire prescription as ordered by physician do not stop taking until medicine is all gone.     Right lower arm--Healed today

## 2020-01-13 ENCOUNTER — ANTI-COAG VISIT (OUTPATIENT)
Dept: PHARMACY | Age: 75
End: 2020-01-13

## 2020-01-13 LAB — INR BLD: 1.6

## 2020-01-13 NOTE — PROGRESS NOTES
Medication Management Service  [address] [phone]    Visit Date: 1/13/2020   Subjective:       Apollo Lanier is a 69 y.o. male who is having INR checked by [facility]. INR results were called in to the clinic for anticoagulation monitoring and adjustment by Pascual Jalloh RN who can be reached at [phone]. Patient results called in to clinic for warfarin management due to  Indication:   DVT and PE. INR goal: of 2.0-3.0. Duration of therapy: indefinite. Patient reports the following:   Adherent with regimen  Missed or extra doses:  None   Bleeding or thromboembolic side effects:  None  Significant medication changes:  Last dose of doxycycline today  Significant dietary changes: None  Significant alcohol or tobacco changes: None  Significant recent illness, disease state changes, or hospitalization:  None  Upcoming surgeries or procedures:  None  Falls: None           Assessment and Plan     PT/INR performed by [address] INR today is 1.6, subtherapeutic. Patient will finish doxycyline today. INR trending down even while on doxycycline. Patient is feeling better and more active now than immediately after discharge from SNF. Called patient directly with new dose and spoke to home care nurse with new dose. Plan:  Take warfarin 7.5mg today then increase weekly dose by ~8% to warfarin 5mg daily except 2.5mg on Fridays. Recheck INR in 1 week(s). Home care office will be closed on Monday 1/20/20, so patient's visit will be Tuesday. Patient's home care nurse verbalized understanding of dosing directions and information discussed and will provide information to patient. Patient's home care nurse acknowledges working in consult agreement with pharmacist as referred by patient's physician on behalf of patient.       Electronically signed by NADEEN [facility] on 1/13/20 at 1:10 PM

## 2020-01-16 ENCOUNTER — HOSPITAL ENCOUNTER (OUTPATIENT)
Dept: WOUND CARE | Age: 75
Discharge: HOME | End: 2020-01-16
Payer: MEDICARE

## 2020-01-16 VITALS
TEMPERATURE: 97.9 F | SYSTOLIC BLOOD PRESSURE: 127 MMHG | DIASTOLIC BLOOD PRESSURE: 76 MMHG | HEART RATE: 94 BPM | RESPIRATION RATE: 16 BRPM

## 2020-01-16 PROCEDURE — 11042 DBRDMT SUBQ TIS 1ST 20SQCM/<: CPT

## 2020-01-16 NOTE — PROGRESS NOTES
Wound Care Center Progress Note With Procedure    Zia Acevedo  AGE: 69 y.o. GENDER: male  : 1945  EPISODE DATE:  2020     Subjective:     Chief Complaint   Patient presents with    Wound Check     left medial ankle         HISTORY of PRESENT ILLNESS      Zia Acevedo is a 69 y.o. male who presents today for wound evaluation of Chronic venous and diabetic ulcer(s) of the left leg. The ulcer is of mild severity. The underlying cause of the wound is venous hypertension. He has no changes this week. Wound has stalled.     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 Sudhakar Chappell Anemia 2011    Arm fracture Noreen Grippe     Dr Breanna Benson with also yearly DM exam    Cataract     worsening-Dr. Susan Alexandra    Cellulitis of left lower leg     Chronic venous hypertension with ulcer ([address] 2011    CKD (chronic kidney disease) 2012    Renal u/S normal     Colon polyps     Dr. Sudhakar Chappell COPD (chronic obstructive pulmonary disease) ([address]     Diabetes mellitus ([address]     Diabetes mellitus ([address]     Diabetes mellitus with peripheral circulatory disorder ([address] 10/2/2015    Diabetes mellitus with skin ulcer ([address] 10/2/2015    Diabetic peripheral neuropathy ([address]     + EMG, NCS    Diabetic skin ulcer associated with type 2 diabetes mellitus ([address]     Diverticulosis 12    mild, left colon    Femoral DVT (deep venous thrombosis) ([address] 2011    PARTIAL,CHRONIC-SPFLD HEART SURGEONS    GERD (gastroesophageal reflux disease)     Glaucoma     open angle-Dr. Olmstead Red H/O cardiac catheterization 6/3/14    EF55% normal study    H/O Doppler benign tumors bilaterally    CARDIAC CATHETERIZATION  6/3/14    EF55% normal study    CARPAL TUNNEL RELEASE Left     CARPAL TUNNEL RELEASE      CATARACT REMOVAL Right 3/11/2013    Dr. Charity Acosta Left 2013    Dr. Char Elder      polyps    COLONOSCOPY  11    villous component--f/u colonoscopy will be needed in 6 months    COLONOSCOPY  12    mild diverticulosis, internal hemorrhoids; repeat in 3 years (Dr. Ruperto Hamilton)    COLONOSCOPY  2016    mild diverticulosis, three colon polyps    HERNIA REPAIR  1970s    HERNIA REPAIR      SKIN GRAFT  -present    skin grafts to left ankle ulcers    SPLENECTOMY      SPLENECTOMY      TONSILLECTOMY      VARICOSE VEIN SURGERY Left 2009       FAMILY HISTORY    Family History   Problem Relation Age of Onset    Heart Disease Father     Cancer Father         prostate    High Blood Pressure Father     High Cholesterol Father     Cancer Brother     Diabetes Brother     Thyroid Disease Brother        SOCIAL HISTORY    Social History     Tobacco Use    Smoking status: Former Smoker     Packs/day: 2.00     Years: 35.00     Pack years: 70.00     Types: Cigarettes     Last attempt to quit:      Years since quittin.0    Smokeless tobacco: Never Used    Tobacco comment: chew--30 years.   Reviewed 2015   Substance Use Topics    Alcohol use: Yes     Comment: soc    Drug use: Never       ALLERGIES    Allergies   Allergen Reactions    Cavilon Durable Barrier [Mineral Oil-Dimeth-Coconut Oil]     Parabens Hives    Parabens Hives    Prinivil [Lisinopril] Swelling    Cortisone Rash    Cortisone Rash    Dilaudid [Hydromorphone Hcl] Rash    Penicillins Rash    Penicillins Rash    Sulfamethoxazole-Trimethoprim Nausea Only    Tape [Adhesive Tape] Rash       MEDICATIONS    Current Outpatient Medications on File Prior to Encounter   Medication Sig Dispense Refill    metoprolol tartrate studies:   ordered on 7/25/19 Date  To be done 8/2/19 imaging center               Imaging:  Date               Cultures: obtained from left medal leg  Date 6/27/19--positive kles.                              OASNEEQE from left medial lower leg on 8/15/19                               Obtained from left medial lower leg on 9/26/19                               Obtained from right leg on 10/17/19                                  Biopsy done 7/18/19                 Labs/ HbA1c  Date               Grafts: Date               HBO:                Antibiotics: Doxycycline for 14 days BID; phoned to [facility] 6/28/19                                    Doxycycline for 10 days BID and CIPRO 500 mg BID for 10 days ordered on 8/22/19 [facility]                                   Doxycycline for 7 days on 9/5/19              Earlier Wound care treatments:                [id number]:                        Consults:   Date 7/18/19 to Dr Kathryn Blanco-- Mahesh Parul [facility] physician:      Continuing wound care orders and information:              Residence:                Continue home health care with:               Your wound-care supplies will be provided by: Anabel White provider:              BARBARA with              CPE loading: Date              PHRNK Medications: RX SANTYL GIVEN 8/1/19              UISAO cleansing:                           WH not scrub or use excessive force.                          Wash hands with soap and water before and after dressing changes.                           Prior to applying a clean dressing, cleanse wound with normal saline,                                wound cleanser, or mild soap and water.                                     Daily Wound management:                                                Avoid standing for long periods of time.                          XKHIS wraps/stockings in AM and remove at

## 2020-01-16 NOTE — PLAN OF CARE
Problem: Wound:  Intervention: Assess ankle, calf, or foot circumference blilaterally  Note:   See flowsheet  Intervention: Assess pain status  Note:   See flowsheet  Intervention: Assess wound size, appearance and drainage  Note:   See flowsheet  Intervention: Assess pedal pulses bilaterally if patient has a foot or leg ulcer  Note:   See flowsheet  Intervention: Doppler if unable to palpate pedal pulse  Note:   See flowsheet

## 2020-01-16 NOTE — DISCHARGE INSTR - COC
10/21/2019    Pneumococcal Conjugate 13-valent (Oqtmomg19) 09/02/2015    Pneumococcal Polysaccharide (Ohctmrdxf70) 06/10/2014    Tdap (Boostrix, Adacel) 09/01/2016       Active Problems:  Patient Active Problem List   Diagnosis Code    Gastroesophageal reflux disease without esophagitis K21.9    Hypertension in stage 3 chronic kidney disease due to type 2 diabetes mellitus ([facility]) E11.22, I12.9, N18.3    DM (diabetes mellitus) type II, controlled, with peripheral vascular disorder ([facility]) E11.51    Combined hyperlipidemia associated with type 2 diabetes mellitus ([facility]) E11.69, E78.2    Diabetic skin ulcer associated with type 2 diabetes mellitus ([facility]) E11.622, L98.499    CKD (chronic kidney disease) N18.9    History of DVT (deep vein thrombosis)- left femoral Z86.718    History of pulmonary embolism Z86.711    Long term current use of anticoagulants with INR goal of 2.0-3.0 Z79.01    COPD (chronic obstructive pulmonary disease) ([facility]) J44.9    Severe recurrent major depressive disorder with psychotic features ([facility]) F33.3    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 ([facility]) I83.229, I83.219, L97.919, L97.929    Venous (peripheral) insufficiency I87.2    Uncontrolled secondary diabetes mellitus with stage 3 CKD (GFR 30-59) ([facility]) E13.22, N18.3, E13.65    Diabetes mellitus with skin ulcer ([facility]) E11.622, L98.499    WD-Ulcer of shin, left, with fat layer exposed ([facility] Q11.603    History of colon polyps Z86.010    Personal history of PE (pulmonary embolism) Z86.711    WD-Chronic venous hypertension (idiopathic) with ulcer of left lower extremity (CODE) ([facility]) I87.312    WD-Chronic venous hypertension with inflammation, right I87.321    Elevated troponin R79.89       Isolation/Infection:   Isolation          No Isolation        Patient Infection Status     Infection Onset Added Last Indicated Last Indicated By Review Planned Expiration Resolved Resolved By    CRE (Carbapenem-Resistant Enterobacteriaceae)  08/26/19 08/26/19 Jeanne Larson RN        8/15/19 Klebsiella aerogenes wound    MRSA  02/24/16 12/26/19 Wound Culture        12/26/19 wound; 10/17/19 wound; 8/15/19 wound; 6/10/16 leg wound; 4/22/16 wound; 2/19/16 wound    Resolved    MRSA  08/22/12 08/22/12 Christel Blanco RN   11/18/13 Bay Lemons RN    wound 11/15/12          Nurse Assessment:  Last Vital Signs: There were no vitals taken for this visit. Last documented pain score (0-10 scale):    Last Weight:   Wt Readings from Last 1 Encounters:   12/19/19 272 lb 12.8 oz (123.7 kg)     Mental Status:  {IP PT MENTAL STATUS:24429}    IV Access:  { NANETTE IV ACCESS:336803334}    Nursing Mobility/ADLs:  Walking   {CHP DME AYOX:254497420}  Transfer  {P DME RVLH:438971017}  Bathing  {CHP DME LOZD:182028532}  Dressing  {CHP DME OWJU:362270492}  Toileting  {CHP DME HPUB:048760270}  Feeding  {P DME YLUM:482918045}  Med Admin  {P DME UEYI:696126459}  Med Delivery   { NANETTE MED Delivery:159173155}    Wound Care Documentation and Therapy:  Wound 11/15/13 Other (Comment) Leg Inner erethema with a small puncture site (Active)   Number of days: 2252       Wound 06/27/19 #6 (onset 1 month) Left Medial Ankle distal  (Active)   Wound Venous 12/26/2019 11:24 AM   Offloading for Diabetic Foot Ulcers No 12/26/2019 11:24 AM   Dressing Status Clean;Dry; Intact 1/9/2020 11:14 AM   Dressing Changed Changed/New 1/9/2020 11:14 AM   Wound Cleansed Wound cleanser 1/9/2020 10:35 AM   Wound Length (cm) 0.8 cm 1/9/2020 10:35 AM   Wound Width (cm) 0.8 cm 1/9/2020 10:35 AM   Wound Depth (cm) 0.1 cm 1/9/2020 10:35 AM   Wound Surface Area (cm^2) 0.64 cm^2 1/9/2020 10:35 AM   Change in Wound Size % (l*w) 90.86 1/9/2020 10:35 AM   Wound Volume (cm^3) 0.06 cm^3 1/9/2020 10:35 AM   Wound Healing % 97 1/9/2020 10:35 AM   Post-Procedure Length (cm) 0.8 cm 1/9/2020 11:06 AM   Post-Procedure Width (cm) 0.8 cm 1/9/2020 11:06 AM   Post-Procedure Depth (cm) 0.1 cm 2020 10:35 AM   Undermining Maxium Distance (cm) 00 2020 10:35 AM   Wound Assessment Yellow;Pink 2020 10:35 AM   Drainage Amount Scant 2020 10:35 AM   Drainage Description Yellow 2020 10:35 AM   Odor None 2020 10:35 AM   [place]%Wound Bed 0 2020 10:35 AM   Red%Wound Bed 40 2020 10:35 AM   Yellow%Wound Bed 60 2020 10:35 AM   Black%Wound Bed 0 2020 10:35 AM   Purple%Wound Bed 0 2020 10:35 AM   Other%Wound Bed 0 2020 11:11 AM   Number of days: 34        Elimination:  Continence:   · Bowel: {YES / KU:93661}  · Bladder: {YES / NL:31329}  Urinary Catheter: {Urinary Catheter:265894297}   Colostomy/Ileostomy/Ileal Conduit: {YES / ZD:51480}       Date of Last BM: ***  No intake or output data in the 24 hours ending 20 0813  No intake/output data recorded.     Safety Concerns:     [address] [employer] Safety Concerns:335238916}    Impairments/Disabilities:      [address] [employer] Impairments/Disabilities:485352124}    Nutrition Therapy:  Current Nutrition Therapy:   [address] [employer] Diet List:483194867}    Routes of Feeding: {CHP DME Other Feedings:443846259}  Liquids: {Slp liquid thickness:82029}  Daily Fluid Restriction: {CHP DME Yes amt example:050025324}  Last Modified Barium Swallow with Video (Video Swallowing Test): {Done Not Done GIBQ:174560383}    Treatments at the Time of Hospital Discharge:   Respiratory Treatments: ***  Oxygen Therapy:  {Therapy; copd oxygen:99080}  Ventilator:    {[facility] Vent JJIY:198682848}    Rehab Therapies: {THERAPEUTIC INTERVENTION:1352054644}  Weight Bearing Status/Restrictions: [address]  Weight Bearin}  Other Medical Equipment (for information only, NOT a DME order):  {EQUIPMENT:949231220}  Other Treatments: ***    Patient's personal belongings (please select all that are sent with patient):  {P DME Belongings:986988780}    RN SIGNATURE:  {Esignature:843438969}    CASE MANAGEMENT/SOCIAL WORK SECTION    Inpatient Status Date: ***    Readmission Risk Assessment

## 2020-01-18 PROBLEM — R77.8 ELEVATED TROPONIN: Status: RESOLVED | Noted: 2019-11-21 | Resolved: 2020-01-18

## 2020-01-23 ENCOUNTER — HOSPITAL ENCOUNTER (OUTPATIENT)
Dept: WOUND CARE | Age: 75
Discharge: HOME | End: 2020-01-23
Payer: MEDICARE

## 2020-01-23 VITALS
RESPIRATION RATE: 17 BRPM | HEART RATE: 65 BPM | SYSTOLIC BLOOD PRESSURE: 152 MMHG | DIASTOLIC BLOOD PRESSURE: 82 MMHG | TEMPERATURE: 98.1 F

## 2020-01-23 PROCEDURE — 11042 DBRDMT SUBQ TIS 1ST 20SQCM/<: CPT

## 2020-01-23 NOTE — PROGRESS NOTES
Tape Larena Rude Tape] Rash       MEDICATIONS    Current Outpatient Medications on File Prior to Encounter   Medication Sig Dispense Refill    metoprolol tartrate (LOPRESSOR) 25 MG tablet Take 0.5 tablets by mouth 2 times daily 90 tablet 3    Zinc Oxide (DESITIN CREAMY EX) Apply 13 % topically      losartan (COZAAR) 50 MG tablet Take 1 tablet by mouth daily Patient tolerates diovan 90 tablet 1    aspirin 81 MG chewable tablet Take 1 tablet by mouth daily 30 tablet 3    escitalopram (LEXAPRO) 20 MG tablet Take 1 tablet by mouth daily 30 tablet 3    atorvastatin (LIPITOR) 40 MG tablet Take 40 mg by mouth nightly      metFORMIN (GLUCOPHAGE) 500 MG tablet Take 500 mg by mouth daily (with breakfast)      ARIPiprazole (ABILIFY) 15 MG tablet Take 15 mg by mouth daily      warfarin (COUMADIN) 5 MG tablet Take 5 mg by mouth daily       omeprazole (PRILOSEC) 20 MG delayed release capsule TAKE ONE (1) CAPSULE BY MOUTH ONCE DAILY 90 capsule 1    blood glucose test strips (ALISHA CONTOUR TEST) strip USE AS DIRECTED TO TEST BLOOD SUGAR FOUR TIMES DAILY AS NEEDED 100 strip 3    buPROPion (WELLBUTRIN XL) 300 MG extended release tablet Take 1 tablet by mouth every morning 90 tablet 3    ALISHA CONTOUR TEST strip USE AS DIRECTED TO TEST BLOOD SUGAR FOUR TIMES DAILY 100 strip 5    Glucose Blood (BLOOD GLUCOSE TEST STRIPS) STRP Please give contour testing strips to test blood sugar 4x daily 200 strip 3     No current facility-administered medications on file prior to encounter. REVIEW OF SYSTEMS    Pertinent items are noted in HPI. Constitutional: Negative for systemic symptoms including fever, chills and malaise. Objective:      BP (!) 152/82   Pulse 65   Temp 98.1 °F (36.7 °C) (Temporal)   Resp 17     PHYSICAL EXAM      General: The patient is in no acute distress. Mental status:  Patient is appropriate, is  oriented to place and plan of care.   Dermatologic exam: Visual inspection of the periwound 1/23/2020 11:24 AM   Wound Volume (cm^3) 0.05 cm^3 1/23/2020 11:24 AM   Wound Healing % 98 1/23/2020 11:24 AM   Post-Procedure Length (cm) 0.7 cm 1/23/2020 11:42 AM   Post-Procedure Width (cm) 0.7 cm 1/23/2020 11:42 AM   Post-Procedure Depth (cm) 0.1 cm 1/23/2020 11:42 AM   Post-Procedure Surface Area (cm^2) 0.49 cm^2 1/23/2020 11:42 AM   Post-Procedure Volume (cm^3) 0.05 cm^3 1/23/2020 11:42 AM   Distance Tunneling (cm) 0 cm 1/23/2020 11:24 AM   Tunneling Position ___ O'Clock 0 1/23/2020 11:24 AM   Undermining Starts ___ O'Clock 0 1/23/2020 11:24 AM   Undermining Ends___ O'Clock 0 1/23/2020 11:24 AM   Undermining Maxium Distance (cm) 0 1/23/2020 11:24 AM   Wound Assessment Yellow 1/23/2020 11:24 AM   Drainage Amount Small 1/23/2020 11:24 AM   Drainage Description Serosanguinous 1/23/2020 11:24 AM   Odor None 1/23/2020 11:24 AM   Margins Defined edges; Unattached edges 1/23/2020 11:24 AM   Ana-wound Assessment Dry 1/23/2020 11:24 AM   Non-staged Wound Description Full thickness 1/23/2020 11:24 AM   [place]%Wound Bed 0 1/23/2020 11:24 AM   Red%Wound Bed 0 1/23/2020 11:24 AM   Yellow%Wound Bed 100 1/23/2020 11:24 AM   Black%Wound Bed 0 1/23/2020 11:24 AM   Purple%Wound Bed 0 1/23/2020 11:24 AM   Other%Wound Bed 0 1/16/2020 11:06 AM   Number of days: 209       Percent of Wound(s) Debrided: approximately 100%    Total  Area  Debrided:  0.49 sq cm     Bleeding:  Minimal    Hemostasis Achieved:  by pressure    Procedural Pain:  0  / 10     Post Procedural Pain:  0 / 10     Response to treatment:  Well tolerated by patient. Status of wound progress and description from last visit:   Wound is slightly better- the base is more granulated and less fibrous. Over-all size is about the same though. Plan:       Discharge Instructions         Discharge Instructions        PHYSICIAN ORDERS AND DISCHARGE INSTRUCTIONS     NOTE: Upon discharge from the [address], you will receive a patient experience survey.  We would be

## 2020-01-27 ENCOUNTER — ANTI-COAG VISIT (OUTPATIENT)
Dept: PHARMACY | Age: 75
End: 2020-01-27

## 2020-01-27 LAB — INR BLD: 1.7

## 2020-01-30 ENCOUNTER — HOSPITAL ENCOUNTER (OUTPATIENT)
Dept: WOUND CARE | Age: 75
Discharge: HOME | End: 2020-01-30
Payer: MEDICARE

## 2020-01-30 ENCOUNTER — ANTI-COAG VISIT (OUTPATIENT)
Dept: PHARMACY | Age: 75
End: 2020-01-30
Payer: MEDICARE

## 2020-01-30 VITALS
SYSTOLIC BLOOD PRESSURE: 134 MMHG | RESPIRATION RATE: 16 BRPM | DIASTOLIC BLOOD PRESSURE: 73 MMHG | TEMPERATURE: 98.3 F | HEART RATE: 67 BPM

## 2020-01-30 LAB
INTERNATIONAL NORMALIZATION RATIO, POC: 1.7
POC INR: 1.7 INDEX
POC INR: ABNORMAL INDEX
PROTHROMBIN TIME, POC: 20.7 SECONDS (ref 10–14.3)

## 2020-01-30 PROCEDURE — 99212 OFFICE O/P EST SF 10 MIN: CPT

## 2020-01-30 PROCEDURE — 85610 PROTHROMBIN TIME: CPT

## 2020-01-30 PROCEDURE — 11042 DBRDMT SUBQ TIS 1ST 20SQCM/<: CPT

## 2020-01-30 PROCEDURE — 36416 COLLJ CAPILLARY BLOOD SPEC: CPT

## 2020-01-30 NOTE — PROGRESS NOTES
Medication Management Service  [facility]  [phone]    Visit Date: 1/30/2020   Subjective:       Tay Willson is a 69 y.o. male who presents to clinic today for anticoagulation monitoring and adjustment. Patient seen in clinic for warfarin management due to  Indication:   DVT and PE. INR goal: of 2.0-3.0. Duration of therapy: indefinite. Patient reports the following:   Adherent with regimen  Missed or extra doses:  None   Bleeding or thromboembolic side effects:  None  Significant medication changes:  None  Significant dietary changes: None  Significant alcohol or tobacco changes: None  Significant recent illness, disease state changes, or hospitalization:  Hospital to nursing home and now home  Upcoming surgeries or procedures:  None  Falls: None           Assessment and Plan     PT/INR done in office per protocol. INR today is 1.7, therapeutic, but remains below goal range. Patient in physical therapy at home. Plan:  Increase weekly dose ~7% to warfarin 5mg daily except 7.5mg on Mondays and Thursdays. Recheck INR in 1.5 week(s). Patient verbalized understanding of dosing directions and information discussed. Dosing schedule given to patient including phone number, appointment date, and time. Progress note sent to referring office. Patient acknowledges working in consult agreement with pharmacist as referred by his/her physician.       Electronically signed by NADEEN uMrphy [facility] on 1/30/20 at 12:19 PM

## 2020-01-30 NOTE — PROGRESS NOTES
Wound Care Center Progress Note With Procedure    Natalee Bishop  AGE: 69 y.o. GENDER: male  : 1945  EPISODE DATE:  2020     Subjective:     Chief Complaint   Patient presents with    Wound Check     left ankle         HISTORY of PRESENT ILLNESS      Natalee Bishop is a 69 y.o. male who presents today for wound evaluation of Chronic venous ulcer(s) of the left leg. The ulcer is of mild severity. The underlying cause of the wound is venous. He is in for f/u- we had switched to iodopaste dressing last week- no significant changes. The wounds are about the same. Will stick with this until now. Still trying to get [employer] for him.     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 Judy Chavez Anemia 2011    Arm fracture Theadora Cowboy     Dr Camron Han with also yearly DM exam    Cataract     worsening-Dr. Kerri Elam    Cellulitis of left lower leg     Chronic venous hypertension with ulcer ([address] 2011    CKD (chronic kidney disease) 2012    Renal u/S normal     Colon polyps     Dr. Judy Chavez COPD (chronic obstructive pulmonary disease) ([address]     Diabetes mellitus ([address]     Diabetes mellitus ([address]     Diabetes mellitus with peripheral circulatory disorder ([address] 10/2/2015    Diabetes mellitus with skin ulcer ([address] 10/2/2015    Diabetic peripheral neuropathy ([address]     + EMG, NCS    Diabetic skin ulcer associated with type 2 diabetes mellitus ([address]     Diverticulosis 12    mild, left colon    Femoral DVT (deep venous thrombosis) ([address] 2011    PARTIAL,CHRONIC-SPFLD HEART SURGEONS    GERD (gastroesophageal reflux disease)     Glaucoma 11/12    open angle-Dr. Bianca Ballard H/O cardiac catheterization 6/3/14    EF55% normal study    H/O Doppler ultrasound 03/26/15    Carotid US- no significant stenosis noted bilaterally.  H/O echocardiogram 11/21/2019    EF50-55%, Mild AR. Moderately dilated right ventricle with negative Solis's sign.  H/O mumps orchitis     as a youth    Hemorrhoids 4/23/12    Dr. Emilee Tineo; repeat colonoscopy 3 years    History of nuclear stress test 11/21/2019    EF 60%, Normal study.  HLD (hyperlipidemia)     Hx of Doppler ultrasound 1/06/15    Lymph node seen in left groin area. No bilateral stenosis.     Hyperlipemia     Hyperlipidemia     Hypertension 1992    Hypertension     Idiopathic chronic venous hypertension of left lower extremity with ulcer and inflammation (HCC) 10/2/2015    Leg ulcer ([address] 1978-present    following at wound center, Dr Valles Hiss No diabetic retinopathy OU 11/12    Dr. Chris Hu chronic ulcer of left lower leg with fat layer exposed ([address] 10/2/2015    Pulmonary embolism ([address] 11/13    patient on coumadin    Sleep apnea     doesnt always use cpap dt it drys him out    SOB (shortness of breath) Oct 2011    Stress test normal.     Tendinitis 1973    plantar tendons    Type II or unspecified type diabetes mellitus with other specified manifestations, not stated as uncontrolled 2/8/2013    Unspecified venous (peripheral) insufficiency     Urticaria     WD-Cellulitis of right anterior lower leg 9/5/2019    WD-Chronic venous hypertension (idiopathic) with ulcer of left lower extremity (CODE) ([address] 6/27/2019    WD-Chronic venous hypertension with inflammation, right 9/19/2019    WD-Open wound of hand without complication, left, initial encounter 12/12/2019    WD-Pressure injury of left buttock, stage 2 ([address] 1/2/2020    WD-Skin tear of right forearm without complication 6/06/0504    WD-Ulcer of right pretibial region, with fat layer exposed ([address] 10/17/2019 PAST SURGICAL HISTORY    Past Surgical History:   Procedure Laterality Date    BREAST SURGERY  1970s    benign tumors bilaterally    CARDIAC CATHETERIZATION  6/3/14    EF55% normal study    CARPAL TUNNEL RELEASE Left     CARPAL TUNNEL RELEASE      CATARACT REMOVAL Right 3/11/2013    Dr. Taisha Cary Left 2013    Dr. Amos Murcia  2006    polyps    COLONOSCOPY  11    villous component--f/u colonoscopy will be needed in 6 months    COLONOSCOPY  12    mild diverticulosis, internal hemorrhoids; repeat in 3 years (Dr. Sisi Hu)    COLONOSCOPY  2016    mild diverticulosis, three colon polyps    HERNIA REPAIR  1970s    HERNIA REPAIR      SKIN GRAFT  -present    skin grafts to left ankle ulcers    SPLENECTOMY      SPLENECTOMY      TONSILLECTOMY      VARICOSE VEIN SURGERY Left 2009       FAMILY HISTORY    Family History   Problem Relation Age of Onset    Heart Disease Father     Cancer Father         prostate    High Blood Pressure Father     High Cholesterol Father     Cancer Brother     Diabetes Brother     Thyroid Disease Brother        SOCIAL HISTORY    Social History     Tobacco Use    Smoking status: Former Smoker     Packs/day: 2.00     Years: 35.00     Pack years: 70.00     Types: Cigarettes     Last attempt to quit:      Years since quittin.0    Smokeless tobacco: Never Used    Tobacco comment: chew--30 years.   Reviewed 2015   Substance Use Topics    Alcohol use: Yes     Comment: soc    Drug use: Never       ALLERGIES    Allergies   Allergen Reactions    Cavilon Durable Barrier [Mineral Oil-Dimeth-Coconut Oil]     Parabens Hives    Parabens Hives    Prinivil [Lisinopril] Swelling    Cortisone Rash    Cortisone Rash    Dilaudid [Hydromorphone Hcl] Rash    Penicillins Rash    Penicillins Rash    Sulfamethoxazole-Trimethoprim Nausea Only    Tape [Adhesive Tape] Rash       MEDICATIONS    Current Outpatient Medications on File Prior to Encounter   Medication Sig Dispense Refill    metoprolol tartrate (LOPRESSOR) 25 MG tablet Take 0.5 tablets by mouth 2 times daily 90 tablet 3    Zinc Oxide (DESITIN CREAMY EX) Apply 13 % topically      losartan (COZAAR) 50 MG tablet Take 1 tablet by mouth daily Patient tolerates diovan 90 tablet 1    aspirin 81 MG chewable tablet Take 1 tablet by mouth daily 30 tablet 3    escitalopram (LEXAPRO) 20 MG tablet Take 1 tablet by mouth daily 30 tablet 3    atorvastatin (LIPITOR) 40 MG tablet Take 40 mg by mouth nightly      metFORMIN (GLUCOPHAGE) 500 MG tablet Take 500 mg by mouth daily (with breakfast)      ARIPiprazole (ABILIFY) 15 MG tablet Take 15 mg by mouth daily      warfarin (COUMADIN) 5 MG tablet Take 5 mg by mouth daily       omeprazole (PRILOSEC) 20 MG delayed release capsule TAKE ONE (1) CAPSULE BY MOUTH ONCE DAILY 90 capsule 1    blood glucose test strips (ALISHA CONTOUR TEST) strip USE AS DIRECTED TO TEST BLOOD SUGAR FOUR TIMES DAILY AS NEEDED 100 strip 3    buPROPion (WELLBUTRIN XL) 300 MG extended release tablet Take 1 tablet by mouth every morning 90 tablet 3    ALISHA CONTOUR TEST strip USE AS DIRECTED TO TEST BLOOD SUGAR FOUR TIMES DAILY 100 strip 5    Glucose Blood (BLOOD GLUCOSE TEST STRIPS) STRP Please give contour testing strips to test blood sugar 4x daily 200 strip 3     No current facility-administered medications on file prior to encounter. REVIEW OF SYSTEMS    Pertinent items are noted in HPI. Constitutional: Negative for systemic symptoms including fever, chills and malaise. Objective:      /73   Pulse 67   Temp 98.3 °F (36.8 °C) (Oral)   Resp 16     PHYSICAL EXAM      General: The patient is in no acute distress. Mental status:  Patient is appropriate, is  oriented to place and plan of care.   Dermatologic exam: Visual inspection of the periwound reveals the skin to be normal in turgor and texture  Wound exam: see on 7/25/19 Date  To be done 8/2/19 imaging center               Imaging:  Date               Cultures: obtained from left medal leg  Date 6/27/19--positive kles.                              SHANTAESL from left medial lower leg on 8/15/19                               Obtained from left medial lower leg on 9/26/19                               Obtained from right leg on 10/17/19                                  Biopsy done 7/18/19                 Labs/ HbA1c  Date               Grafts: Date               HBO:                Antibiotics: Doxycycline for 14 days BID; phoned to [facility] [address] 6/28/19                                    Doxycycline for 10 days BID and CIPRO 500 mg BID for 10 days ordered on 8/22/19 [facility]                                   Doxycycline for 7 days on 9/5/19              Earlier Wound care treatments:                [id number]:                        Consults:   Date 7/18/19 to Dr Erin Conway-- [facility] physician:      Continuing wound care orders and information:              Residence:                Continue home health care with:               Your wound-care supplies will be provided by: Milo Munson provider:              NIESHAZMARY with              HRZ loading: Date              PKYXN Medications: RX SANTYL GIVEN 8/1/19              LWLBM cleansing:                           QV not scrub or use excessive force.                          Wash hands with soap and water before and after dressing changes.                           Prior to applying a clean dressing, cleanse wound with normal saline,                                wound cleanser, or mild soap and water.                                     Daily Wound management:                                                Avoid standing for long periods of time.                          BTLOQ wraps/stockings in AM and remove at bedtime.                          If swelling is present, elevate legs to the level of the heart or above for 30 minutes 4-5 times a day and/or when sitting.                                             When taking antibiotics take entire prescription as ordered by physician do not stop taking until medicine is all gone.     Right lower arm--Healed today 9/12/19    Left hand-- healed 12/26/19   Left medial buttock -- healed 1/23/2020                                                      Orders for this week: 1/30/2020            Left medial ankle distal-- cleanse with  Vashe soak , pat dry. Lotrisone to both legs.  Then to open areas:  Apply iodosorb  and cover with piece  ABD then wrap with Profore lite  keep in place until next visit      Right Leg --  Apply lotrisone to leg redness, TUBI E on in am and off at night           Follow up with DR CORNEJO in 1 week on Thursday as scheduled in the wound care center     Call [phone] for any questions or concerns.           Physician Signature ___________________________________________________________            Treatment Note Wound 06/27/19 #6 (onset 1 month) Left Medial Distal Ankle-Dressing/Treatment: (vashe, lotirsone, iodosorb, abd profore lite.  (Tubi E right))    Written Patient Dismissal Instructions Given            Electronically signed by Teresa Chapplel MD on 1/30/2020 at 9:45 AM

## 2020-02-10 ENCOUNTER — ANTI-COAG VISIT (OUTPATIENT)
Dept: PHARMACY | Age: 75
End: 2020-02-10
Payer: MEDICARE

## 2020-02-10 LAB
INTERNATIONAL NORMALIZATION RATIO, POC: 2.1
POC INR: 2.1 INDEX
POC INR: ABNORMAL INDEX
PROTHROMBIN TIME, POC: 24.6 SECONDS (ref 10–14.3)

## 2020-02-10 PROCEDURE — 85610 PROTHROMBIN TIME: CPT

## 2020-02-10 PROCEDURE — 36416 COLLJ CAPILLARY BLOOD SPEC: CPT

## 2020-02-10 PROCEDURE — 99211 OFF/OP EST MAY X REQ PHY/QHP: CPT

## 2020-02-10 NOTE — PROGRESS NOTES
Medication Management Service  [facility]  [phone]    Visit Date: 2/10/2020   Subjective:       Roland Handley is a 69 y.o. male who presents to clinic today for anticoagulation monitoring and adjustment. Patient seen in clinic for warfarin management due to  Indication:   DVT and PE. INR goal: of 2.0-3.0. Duration of therapy: indefinite. Patient reports the following:   Adherent with regimen  Missed or extra doses:  None   Bleeding or thromboembolic side effects:  None  Significant medication changes:  None  Significant dietary changes: None  Significant alcohol or tobacco changes: None  Significant recent illness, disease state changes, or hospitalization:  None  Upcoming surgeries or procedures:  None  Falls: None           Assessment and Plan     PT/INR done in office per protocol. INR today is 2.1, therapeutic. Plan: Will continue current regimen of warfarin 5mg daily except 7.5mg on Mondays and Thursdays. Recheck INR in 3 week(s). Patient verbalized understanding of dosing directions and information discussed. Dosing schedule given to patient including phone number, appointment date, and time. Progress note sent to referring office. Patient acknowledges working in consult agreement with pharmacist as referred by his/her physician.       Electronically signed by Rylie Torres, [address] [place] [address] on 2/10/20 at 11:35 AM

## 2020-02-20 ENCOUNTER — HOSPITAL ENCOUNTER (OUTPATIENT)
Dept: WOUND CARE | Age: 75
Discharge: HOME | End: 2020-02-20
Payer: MEDICARE

## 2020-02-20 VITALS
SYSTOLIC BLOOD PRESSURE: 149 MMHG | DIASTOLIC BLOOD PRESSURE: 68 MMHG | HEART RATE: 87 BPM | RESPIRATION RATE: 16 BRPM | TEMPERATURE: 98 F

## 2020-02-20 PROCEDURE — 97597 DBRDMT OPN WND 1ST 20 CM/<: CPT

## 2020-02-20 NOTE — PROGRESS NOTES
Wound Care Center Progress Note With Procedure    Linn Bautista  AGE: 69 y.o. GENDER: male  : 1945  EPISODE DATE:  2020     Subjective:     Chief Complaint   Patient presents with    Wound Check     lt distal ankle         HISTORY of PRESENT ILLNESS      Linn Bautista is a 69 y.o. male who presents today for wound evaluation of Chronic venous and diabetic ulcer(s) of the left leg. The ulcer is of mild severity. The underlying cause of the wound is venous stasis. He is in for f/u- missed last 2 appointments and has kept the SAME wraps on for 3 weeks now. He suprisingly has improvement in his wounds today.     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 Khushi Trujillo Anemia 2011    Arm fracture Jordana Foster Castanon with also yearly DM exam    Cataract     worsening-Dr. Jameson Willingham    Cellulitis of left lower leg     Chronic venous hypertension with ulcer ([address] 2011    CKD (chronic kidney disease) 2012    Renal u/S normal     Colon polyps     Dr. Khushi Trujillo COPD (chronic obstructive pulmonary disease) ([address]     Diabetes mellitus ([address]     Diabetes mellitus ([address]     Diabetes mellitus with peripheral circulatory disorder ([address] 10/2/2015    Diabetes mellitus with skin ulcer ([address] 10/2/2015    Diabetic peripheral neuropathy ([address]     + EMG, NCS    Diabetic skin ulcer associated with type 2 diabetes mellitus ([address]     Diverticulosis 12    mild, left colon    Femoral DVT (deep venous thrombosis) ([address] 2011    PARTIAL,CHRONIC-SPFLD HEART SURGEONS    GERD (gastroesophageal reflux disease)     the skin to be normal in turgor and texture  Wound exam: see wound description below in procedure note      Assessment:     Problem List Items Addressed This Visit     WD-Chronic venous hypertension (idiopathic) with ulcer of left lower extremity (CODE) ([address] - Primary    WD-Chronic venous hypertension with inflammation, right        Procedure Note    Indications:  Based on my examination of this patient's wound(s) today, sharp excision into necrotic epidermis is required to promote healing and evaluate the extent of previous healing. Performed by: Vicente Sutton MD    Consent obtained: Yes    Time out taken:  Yes    Pain Control: none       Debridement:Non-excisional Debridement    Using curette the wound(s) was/were sharply debrided down through and including the removal of epidermis and dermis. Devitalized Tissue Debrided:  fibrin, biofilm, slough and callus    Pre Debridement Measurements:  Are located in the Wound Documentation Flow Sheet    All active wounds listed below with today's date are evaluated  Wound(s)    debrided this date include # : 6     Post  Debridement Measurements:  Wound 11/15/13 Other (Comment) Leg Inner erethema with a small puncture site (Active)   Number of days: 2287       Wound 06/27/19 #6 (onset 1 month) Left Medial Distal Ankle (Active)   Wound Image   1/30/2020  8:52 AM   Wound Other 2/20/2020 10:13 AM   Offloading for Diabetic Foot Ulcers No 12/26/2019 11:24 AM   Dressing Status Clean;Dry; Intact 2/20/2020 11:03 AM   Dressing Changed Changed/New 2/20/2020 11:03 AM   Dressing/Treatment ABD 2/20/2020 11:03 AM   Wound Cleansed Soap and water 2/20/2020 10:13 AM   Wound Length (cm) 0.3 cm 2/20/2020 10:13 AM   Wound Width (cm) 0.5 cm 2/20/2020 10:13 AM   Wound Depth (cm) 0.1 cm 2/20/2020 10:13 AM   Wound Surface Area (cm^2) 0.15 cm^2 2/20/2020 10:13 AM   Change in Wound Size % (l*w) 97.86 2/20/2020 10:13 AM   Wound Volume (cm^3) 0.02 cm^3 2/20/2020 10:13 AM   Wound Healing % 99   Right       Left               Date               Vascular studies:   ordered on 7/25/19 Date  To be done 8/2/19 imaging center               Imaging:  Date               Cultures: obtained from left medal leg  Date 6/27/19--positive kles.                              SGYATDUV from left medial lower leg on 8/15/19                               Obtained from left medial lower leg on 9/26/19                               Obtained from right leg on 10/17/19                                  Biopsy done 7/18/19                 Labs/ HbA1c  Date               Grafts: Date               HBO:                Antibiotics: Doxycycline for 14 days BID; [facility]                                    Doxycycline for 10 days BID and CIPRO 500 mg BID for 10 days ordered on 8/22/19 [facility]                                   Doxycycline for 7 days on 9/5/19              Earlier Wound care treatments:                [id number]:                        Consults:   Date 7/18/19 [facility]:      Continuing wound care orders and information:              Residence:                Continue home health care with:               Your wound-care supplies will be provided by: Valentino Brim provider:              VIKA with              VOT loading: Date              CQQLV Medications: RX SANTYL GIVEN 8/1/19              IMWOP cleansing:                           XX not scrub or use excessive force.                          Wash hands with soap and water before and after dressing changes.                           Prior to applying a clean dressing, cleanse wound with normal saline,                                wound cleanser, or mild soap and water.                                     Daily Wound management:                                                Avoid standing for long periods of time.                          VXTZG wraps/stockings in AM and remove at bedtime.                          If swelling is present, elevate legs to the level of the heart or above for 30 minutes 4-5 times a day and/or when sitting.                                             When taking antibiotics take entire prescription as ordered by physician do not stop taking until medicine is all gone.     Right lower arm--Healed today 9/12/19    Left hand-- healed 12/26/19   Left medial buttock -- healed 1/23/2020                                                      Orders for this week: 2/20/2020            Left medial ankle distal-- cleanse with  Vashe soak , pat dry.  Lotrisone to fabricio wound .  Then to open areas:  Apply iodosorb  and cover with piece  ABD then wrap with Profore lite  keep in place until next visit      Right Leg --   TUBI E on in am and off at night           Follow up with DR CORNEJO in 1 week on Thursday as scheduled in the wound care center     Call [phone] for any questions or concerns.           Physician Signature ___________________________________________________________            Treatment Note Wound 06/27/19 #6 (onset 1 month) Left Medial Distal Ankle-Dressing/Treatment: ABD(lotrisone, iodosorb, ABD, profore lite, (tubi E RLE))    Written Patient Dismissal Instructions Given            Electronically signed by Chad Nowak MD on 2/20/2020 at 11:35 AM

## 2020-03-02 ENCOUNTER — ANTI-COAG VISIT (OUTPATIENT)
Dept: PHARMACY | Age: 75
End: 2020-03-02
Payer: MEDICARE

## 2020-03-02 LAB
INR BLD: 3
POC INR: 3 INDEX
POC INR: ABNORMAL INDEX
PROTHROMBIN TIME, POC: 35.5 SECONDS (ref 10–14.3)
PROTIME: 35.5 SECONDS

## 2020-03-02 PROCEDURE — 85610 PROTHROMBIN TIME: CPT

## 2020-03-02 PROCEDURE — 36416 COLLJ CAPILLARY BLOOD SPEC: CPT

## 2020-03-02 PROCEDURE — 99211 OFF/OP EST MAY X REQ PHY/QHP: CPT

## 2020-03-02 NOTE — PROGRESS NOTES
Medication Management Service  [facility]  [phone]    Visit Date: 3/2/2020   Subjective:       Hunter Walsh is a 69 y.o. male who presents to clinic today for anticoagulation monitoring and adjustment. Patient seen in clinic for warfarin management due to  Indication:   DVT and PE. INR goal: of 2.0-3.0. Duration of therapy: indefinite. Patient reports the following:   Adherent with regimen  Missed or extra doses:  None   Bleeding or thromboembolic side effects:  None  Significant medication changes:  None  Significant dietary changes: None  Significant alcohol or tobacco changes: None  Significant recent illness, disease state changes, or hospitalization:  None  Upcoming surgeries or procedures:  None  Falls: None           Assessment and Plan     PT/INR done in office per protocol. INR today is 3, therapeutic. Plan: Will continue current regimen of warfarin 5mg daily except 7.5mg Mondays and Thursdays. Recheck INR in 4 week(s). Patient verbalized understanding of dosing directions and information discussed. Dosing schedule given to patient including phone number, appointment date, and time. Progress note sent to referring office. Patient acknowledges working in consult agreement with pharmacist as referred by his/her physician.       Electronically signed by [facility], [address] [place] [address] on 3/2/20 at 11:37 AM

## 2020-03-05 ENCOUNTER — HOSPITAL ENCOUNTER (OUTPATIENT)
Dept: WOUND CARE | Age: 75
Discharge: HOME | End: 2020-03-05
Payer: MEDICARE

## 2020-03-05 VITALS
TEMPERATURE: 97.6 F | DIASTOLIC BLOOD PRESSURE: 71 MMHG | SYSTOLIC BLOOD PRESSURE: 145 MMHG | RESPIRATION RATE: 16 BRPM | HEART RATE: 82 BPM

## 2020-03-05 PROCEDURE — 29581 APPL MULTLAYER CMPRN SYS LEG: CPT

## 2020-03-05 PROCEDURE — 97597 DBRDMT OPN WND 1ST 20 CM/<: CPT

## 2020-03-05 NOTE — PROGRESS NOTES
Wound Care Center Progress Note With Procedure    Sarita Jacobson  AGE: 69 y.o. GENDER: male  : 1945  EPISODE DATE:  3/5/2020     Subjective:     Chief Complaint   Patient presents with    Wound Check     left leg         HISTORY of PRESENT ILLNESS      Sarita Jacobson is a 69 y.o. male who presents today for wound evaluation of Chronic venous ulcer(s) of the left leg. The ulcer is of mild severity. The underlying cause of the wound is venous stasis. He is in for f/u- almost closed today.     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 Deyanira Jimenez Anemia 2011    Arm fracture Mamadou Berman with also yearly DM exam    Cataract     worsening-Dr. Iván Calvo    Cellulitis of left lower leg     Chronic venous hypertension with ulcer ([address] 2011    CKD (chronic kidney disease) 2012    Renal u/S normal     Colon polyps     Dr. Deyanira Jimenez COPD (chronic obstructive pulmonary disease) ([address]     Diabetes mellitus ([address]     Diabetes mellitus ([address]     Diabetes mellitus with peripheral circulatory disorder ([address] 10/2/2015    Diabetes mellitus with skin ulcer ([address] 10/2/2015    Diabetic peripheral neuropathy ([address]     + EMG, NCS    Diabetic skin ulcer associated with type 2 diabetes mellitus ([address]     Diverticulosis 12    mild, left colon    Femoral DVT (deep venous thrombosis) ([address] 2011    PARTIAL,CHRONIC-[facility]    GERD (gastroesophageal reflux disease)     Glaucoma     open angle-Dr. Hardy Current H/O cardiac catheterization 6/3/14    EF55% normal study    H/O Doppler ultrasound 03/26/15    Carotid US- no CARDIAC CATHETERIZATION  6/3/14    EF55% normal study    CARPAL TUNNEL RELEASE Left     CARPAL TUNNEL RELEASE      CATARACT REMOVAL Right 3/11/2013    Dr. Grace Daniels Left 2013    Dr. Anthony Luis  2006    polyps    COLONOSCOPY  11    villous component--f/u colonoscopy will be needed in 6 months    COLONOSCOPY  12    mild diverticulosis, internal hemorrhoids; repeat in 3 years (Dr. Guy Merlin)    COLONOSCOPY  2016    mild diverticulosis, three colon polyps    HERNIA REPAIR  1970s    HERNIA REPAIR      SKIN GRAFT  -present    skin grafts to left ankle ulcers    SPLENECTOMY      SPLENECTOMY      TONSILLECTOMY      VARICOSE VEIN SURGERY Left 2009       FAMILY HISTORY    Family History   Problem Relation Age of Onset    Heart Disease Father     Cancer Father         prostate    High Blood Pressure Father     High Cholesterol Father     Cancer Brother     Diabetes Brother     Thyroid Disease Brother        SOCIAL HISTORY    Social History     Tobacco Use    Smoking status: Former Smoker     Packs/day: 2.00     Years: 35.00     Pack years: 70.00     Types: Cigarettes     Last attempt to quit:      Years since quittin.1    Smokeless tobacco: Never Used    Tobacco comment: chew--30 years.   Reviewed 2015   Substance Use Topics    Alcohol use: Yes     Comment: soc    Drug use: Never       ALLERGIES    Allergies   Allergen Reactions    Cavilon Durable Barrier [Mineral Oil-Dimeth-Coconut Oil]     Parabens Hives    Parabens Hives    Prinivil [Lisinopril] Swelling    Cortisone Rash    Cortisone Rash    Dilaudid [Hydromorphone Hcl] Rash    Penicillins Rash    Penicillins Rash    Sulfamethoxazole-Trimethoprim Nausea Only    Tape [Adhesive Tape] Rash       MEDICATIONS    Current Outpatient Medications on File Prior to Encounter   Medication Sig Dispense Refill    metoprolol tartrate (LOPRESSOR) 25 MG tablet Take 0.5 tablets by mouth 2 times daily 90 tablet 3    Zinc Oxide (DESITIN CREAMY EX) Apply 13 % topically      losartan (COZAAR) 50 MG tablet Take 1 tablet by mouth daily Patient tolerates diovan 90 tablet 1    aspirin 81 MG chewable tablet Take 1 tablet by mouth daily 30 tablet 3    escitalopram (LEXAPRO) 20 MG tablet Take 1 tablet by mouth daily 30 tablet 3    atorvastatin (LIPITOR) 40 MG tablet Take 40 mg by mouth nightly      metFORMIN (GLUCOPHAGE) 500 MG tablet Take 500 mg by mouth daily (with breakfast)      ARIPiprazole (ABILIFY) 15 MG tablet Take 15 mg by mouth daily      warfarin (COUMADIN) 5 MG tablet Take 5 mg by mouth daily Except 7.5mg on Mondays and Thursdays      omeprazole (PRILOSEC) 20 MG delayed release capsule TAKE ONE (1) CAPSULE BY MOUTH ONCE DAILY 90 capsule 1    blood glucose test strips (ALISHA CONTOUR TEST) strip USE AS DIRECTED TO TEST BLOOD SUGAR FOUR TIMES DAILY AS NEEDED 100 strip 3    buPROPion (WELLBUTRIN XL) 300 MG extended release tablet Take 1 tablet by mouth every morning 90 tablet 3    ALISHA CONTOUR TEST strip USE AS DIRECTED TO TEST BLOOD SUGAR FOUR TIMES DAILY 100 strip 5    Glucose Blood (BLOOD GLUCOSE TEST STRIPS) STRP Please give contour testing strips to test blood sugar 4x daily 200 strip 3     No current facility-administered medications on file prior to encounter. REVIEW OF SYSTEMS    Pertinent items are noted in HPI. Constitutional: Negative for systemic symptoms including fever, chills and malaise. Objective:      BP (!) 145/71   Pulse 82   Temp 97.6 °F (36.4 °C) (Temporal)   Resp 16     PHYSICAL EXAM    General: The patient is in no acute distress. Mental status:  Patient is appropriate, is  oriented to place and plan of care.   Dermatologic exam: Visual inspection of the periwound reveals the skin to be normal in turgor and texture  Wound exam: see wound description below in procedure note      Assessment:     Problem List Items Addressed This Visit     WD-Ulcer of shin, left, with fat layer exposed ([address] - Primary    WD-Chronic venous hypertension (idiopathic) with ulcer of left lower extremity (CODE) ([address]    WD-Chronic venous hypertension with inflammation, right        Procedure Note    Indications:  Based on my examination of this patient's wound(s) today, sharp excision into necrotic epidermis is required to promote healing and evaluate the extent of previous healing. Performed by: Kristina Bryan MD    Consent obtained: Yes    Time out taken:  Yes    Pain Control: none       Debridement: nonExcisional Debridement    Using curette the wound(s) was/were sharply debrided down through and including the removal of epidermis and dermis. Devitalized Tissue Debrided:  fibrin, biofilm, slough and callus    Pre Debridement Measurements:  Are located in the Wound Documentation Flow Sheet    All active wounds listed below with today's date are evaluated  Wound(s)    debrided this date include # : 6     Post  Debridement Measurements:  Wound 11/15/13 Other (Comment) Leg Inner erethema with a small puncture site (Active)   Number of days: 2301       Wound 06/27/19 #6 (onset 1 month) Left Medial Distal Ankle (Active)   Wound Image   3/5/2020 10:54 AM   Wound Other 3/5/2020 10:54 AM   Offloading for Diabetic Foot Ulcers No 12/26/2019 11:24 AM   Dressing Status Clean;Dry; Intact 2/20/2020 11:03 AM   Dressing Changed Changed/New 2/20/2020 11:03 AM   Dressing/Treatment ABD 2/20/2020 11:03 AM   Wound Cleansed Soap and water 3/5/2020 10:54 AM   Wound Length (cm) 1 cm 3/5/2020 10:54 AM   Wound Width (cm) 0.7 cm 3/5/2020 10:54 AM   Wound Depth (cm) 0.1 cm 3/5/2020 10:54 AM   Wound Surface Area (cm^2) 0.7 cm^2 3/5/2020 10:54 AM   Change in Wound Size % (l*w) 90 3/5/2020 10:54 AM   Wound Volume (cm^3) 0.07 cm^3 3/5/2020 10:54 AM   Wound Healing % 97 3/5/2020 10:54 AM   Post-Procedure Length (cm) 0.3 cm 2/20/2020 10:42 AM   Post-Procedure Width (cm) 0.5 cm 2/20/2020 10:42 AM   Post-Procedure Depth (cm) 0.1 cm 2/20/2020 10:42 AM   Post-Procedure Surface Area (cm^2) 0.15 cm^2 2/20/2020 10:42 AM   Post-Procedure Volume (cm^3) 0.02 cm^3 2/20/2020 10:42 AM   Distance Tunneling (cm) 0 cm 3/5/2020 10:54 AM   Tunneling Position ___ O'Clock 0 3/5/2020 10:54 AM   Undermining Starts ___ O'Clock 0 3/5/2020 10:54 AM   Undermining Ends___ O'Clock 0 3/5/2020 10:54 AM   Undermining Maxium Distance (cm) 0 3/5/2020 10:54 AM   Wound Assessment Red 3/5/2020 10:54 AM   Drainage Amount Small 3/5/2020 10:54 AM   Drainage Description Serosanguinous 3/5/2020 10:54 AM   Odor None 3/5/2020 10:54 AM   Margins Defined edges; Unattached edges 3/5/2020 10:54 AM   Ana-wound Assessment Dry 3/5/2020 10:54 AM   Non-staged Wound Description Full thickness 3/5/2020 10:54 AM   [place]%Wound Bed 100 3/5/2020 10:54 AM   Red%Wound Bed 0 3/5/2020 10:54 AM   Yellow%Wound Bed 0 3/5/2020 10:54 AM   Black%Wound Bed 0 3/5/2020 10:54 AM   Purple%Wound Bed 0 3/5/2020 10:54 AM   Other%Wound Bed 0 3/5/2020 10:54 AM   Number of days: 251       Percent of Wound(s) Debrided: approximately 100%    Total  Area  Debrided:  0.01 sq cm     Bleeding:  None    Hemostasis Achieved:  by pressure    Procedural Pain:  0  / 10     Post Procedural Pain:  0 / 10     Response to treatment:  Well tolerated by patient. Status of wound progress and description from last visit:   Almost closed today- after debridement, the actual open area is less than 0.01. Plan:       Discharge Instructions         Discharge Instructions        PHYSICIAN ORDERS AND DISCHARGE INSTRUCTIONS     NOTE: Upon discharge from the [address], you will receive a patient experience survey.  We would be grateful if you would take the time to fill this survey out.     Wound care order history:                 ANA CRISTINA's   Right       Left               Date               Vascular studies:   ordered on 7/25/19 Date  To be done 8/2/19 imaging center

## 2020-03-12 ENCOUNTER — TELEPHONE (OUTPATIENT)
Dept: PHARMACY | Age: 75
End: 2020-03-12

## 2020-03-12 ENCOUNTER — HOSPITAL ENCOUNTER (OUTPATIENT)
Dept: WOUND CARE | Age: 75
Discharge: HOME | End: 2020-03-12
Payer: MEDICARE

## 2020-03-12 VITALS
HEART RATE: 96 BPM | RESPIRATION RATE: 18 BRPM | TEMPERATURE: 97.4 F | DIASTOLIC BLOOD PRESSURE: 84 MMHG | SYSTOLIC BLOOD PRESSURE: 147 MMHG

## 2020-03-12 PROBLEM — L89.323 PRESSURE INJURY OF LEFT BUTTOCK, STAGE 3 (HCC): Status: ACTIVE | Noted: 2020-03-12

## 2020-03-12 PROBLEM — L89.313 PRESSURE INJURY OF RIGHT BUTTOCK, STAGE 3 (HCC): Status: ACTIVE | Noted: 2020-03-12

## 2020-03-12 PROCEDURE — 11042 DBRDMT SUBQ TIS 1ST 20SQCM/<: CPT

## 2020-03-12 PROCEDURE — 87077 CULTURE AEROBIC IDENTIFY: CPT

## 2020-03-12 PROCEDURE — 87186 SC STD MICRODIL/AGAR DIL: CPT

## 2020-03-12 PROCEDURE — 87073 CULTURE BACTERIA ANAEROBIC: CPT

## 2020-03-12 PROCEDURE — 87071 CULTURE AEROBIC QUANT OTHER: CPT

## 2020-03-12 RX ORDER — DOXYCYCLINE HYCLATE 100 MG
100 TABLET ORAL 2 TIMES DAILY
Qty: 14 TABLET | Refills: 0 | Status: SHIPPED | OUTPATIENT
Start: 2020-03-12 | End: 2020-03-19

## 2020-03-12 ASSESSMENT — PAIN SCALES - GENERAL: PAINLEVEL_OUTOF10: 0

## 2020-03-12 NOTE — TELEPHONE ENCOUNTER
Patient left VM that he is starting doxycycline today. Returned call. No answer and VM not set up. Doxycycline has increased patient's INR significantly in the past. Will call again tomorrow to attempt to schedule for 3/16/20.

## 2020-03-12 NOTE — PROGRESS NOTES
PARTIAL,CHRONIC-[facility]    GERD (gastroesophageal reflux disease)     Glaucoma 11/12    open angle-Dr. Shannan Tinajero H/O cardiac catheterization 6/3/14    EF55% normal study    H/O Doppler ultrasound 03/26/15    Carotid US- no significant stenosis noted bilaterally.  H/O echocardiogram 11/21/2019    EF50-55%, Mild AR. Moderately dilated right ventricle with negative Solis's sign.  H/O mumps orchitis     as a youth    Hemorrhoids 4/23/12    Dr. Robyn Ellis; repeat colonoscopy 3 years    History of nuclear stress test 11/21/2019    EF 60%, Normal study.  HLD (hyperlipidemia)     Hx of Doppler ultrasound 1/06/15    Lymph node seen in left groin area. No bilateral stenosis.     Hyperlipemia     Hyperlipidemia     Hypertension 1992    Hypertension     Idiopathic chronic venous hypertension of left lower extremity with ulcer and inflammation (HCC) 10/2/2015    Leg ulcer ([address] 1978-present    following at wound center, Dr Everton Servin No diabetic retinopathy OU 11/12    Dr. Maryellen Mcdonald chronic ulcer of left lower leg with fat layer exposed ([address] 10/2/2015    Pulmonary embolism ([address] 11/13    patient on coumadin    Sleep apnea     doesnt always use cpap dt it drys him out    SOB (shortness of breath) Oct 2011    Stress test normal.     Tendinitis 1973    plantar tendons    Type II or unspecified type diabetes mellitus with other specified manifestations, not stated as uncontrolled 2/8/2013    Unspecified venous (peripheral) insufficiency     Urticaria     WD-Cellulitis of right anterior lower leg 9/5/2019    WD-Chronic venous hypertension (idiopathic) with ulcer of left lower extremity (CODE) ([address] 6/27/2019    WD-Chronic venous hypertension with inflammation, right 9/19/2019    WD-Open wound of hand without complication, left, initial encounter 12/12/2019    WD-Pressure injury of left buttock, stage 2 ([address] 1/2/2020    WD-Pressure injury of left buttock, stage 3 ([facility] 3/12/2020    WD-Pressure injury of right buttock, stage 3 ([facility] 3/12/2020    WD-Skin tear of right forearm without complication     WD-Ulcer of right pretibial region, with fat layer exposed ([facility] 10/17/2019       PAST SURGICAL HISTORY    Past Surgical History:   Procedure Laterality Date    BREAST SURGERY  1970s    benign tumors bilaterally    CARDIAC CATHETERIZATION  6/3/14    EF55% normal study    CARPAL TUNNEL RELEASE Left     CARPAL TUNNEL RELEASE      CATARACT REMOVAL Right 3/11/2013    Dr. Grace Daniels Left 2013    Dr. Anthony Luis  2006    polyps    COLONOSCOPY  11    villous component--f/u colonoscopy will be needed in 6 months    COLONOSCOPY  12    mild diverticulosis, internal hemorrhoids; repeat in 3 years (Dr. Guy Merlin)    COLONOSCOPY  2016    mild diverticulosis, three colon polyps    HERNIA REPAIR  1970s    HERNIA REPAIR      SKIN GRAFT  -present    skin grafts to left ankle ulcers    SPLENECTOMY      SPLENECTOMY      TONSILLECTOMY      VARICOSE VEIN SURGERY Left 2009       FAMILY HISTORY    Family History   Problem Relation Age of Onset    Heart Disease Father     Cancer Father         prostate    High Blood Pressure Father     High Cholesterol Father     Cancer Brother     Diabetes Brother     Thyroid Disease Brother        SOCIAL HISTORY    Social History     Tobacco Use    Smoking status: Former Smoker     Packs/day: 2.00     Years: 35.00     Pack years: 70.00     Types: Cigarettes     Last attempt to quit:      Years since quittin.2    Smokeless tobacco: Never Used    Tobacco comment: chew--30 years.   Reviewed 2015   Substance Use Topics    Alcohol use: Yes     Comment: soc    Drug use: Never       ALLERGIES    Allergies   Allergen Reactions    Cavilon Durable Barrier [Mineral Oil-Dimeth-Coconut Oil]     Parabens Hives    Parabens Hives    Prinivil [Lisinopril] Swelling  Cortisone Rash    Cortisone Rash    Dilaudid [Hydromorphone Hcl] Rash    Penicillins Rash    Penicillins Rash    Sulfamethoxazole-Trimethoprim Nausea Only    Tape [Adhesive Tape] Rash       MEDICATIONS    Current Outpatient Medications on File Prior to Encounter   Medication Sig Dispense Refill    metoprolol tartrate (LOPRESSOR) 25 MG tablet Take 0.5 tablets by mouth 2 times daily 90 tablet 3    Zinc Oxide (DESITIN CREAMY EX) Apply 13 % topically      losartan (COZAAR) 50 MG tablet Take 1 tablet by mouth daily Patient tolerates diovan 90 tablet 1    aspirin 81 MG chewable tablet Take 1 tablet by mouth daily 30 tablet 3    escitalopram (LEXAPRO) 20 MG tablet Take 1 tablet by mouth daily 30 tablet 3    atorvastatin (LIPITOR) 40 MG tablet Take 40 mg by mouth nightly      metFORMIN (GLUCOPHAGE) 500 MG tablet Take 500 mg by mouth daily (with breakfast)      ARIPiprazole (ABILIFY) 15 MG tablet Take 15 mg by mouth daily      warfarin (COUMADIN) 5 MG tablet Take 5 mg by mouth daily Except 7.5mg on Mondays and Thursdays      omeprazole (PRILOSEC) 20 MG delayed release capsule TAKE ONE (1) CAPSULE BY MOUTH ONCE DAILY 90 capsule 1    blood glucose test strips (ALISHA CONTOUR TEST) strip USE AS DIRECTED TO TEST BLOOD SUGAR FOUR TIMES DAILY AS NEEDED 100 strip 3    buPROPion (WELLBUTRIN XL) 300 MG extended release tablet Take 1 tablet by mouth every morning 90 tablet 3    ALISHA CONTOUR TEST strip USE AS DIRECTED TO TEST BLOOD SUGAR FOUR TIMES DAILY 100 strip 5    Glucose Blood (BLOOD GLUCOSE TEST STRIPS) STRP Please give contour testing strips to test blood sugar 4x daily 200 strip 3     No current facility-administered medications on file prior to encounter. REVIEW OF SYSTEMS    Pertinent items are noted in HPI. Constitutional: Negative for systemic symptoms including fever, chills and malaise.       Objective:      BP (!) 147/84   Pulse 96   Temp 97.4 °F (36.3 °C) (Temporal)   Resp 18 PHYSICAL EXAM      General: The patient is in no acute distress. Mental status:  Patient is appropriate, is  oriented to place and plan of care. Dermatologic exam: Visual inspection of the periwound reveals the skin to be normal in turgor and texture  Wound exam: see wound description below in procedure note      Assessment:     Problem List Items Addressed This Visit     WD-Ulcer of shin, left, with fat layer exposed ([address] - Primary    WD-Chronic venous hypertension (idiopathic) with ulcer of left lower extremity (CODE) ([address]    WD-Chronic venous hypertension with inflammation, right    WD-Pressure injury of left buttock, stage 3 ([address]    WD-Pressure injury of right buttock, stage 3 ([address]        Procedure Note    Indications:  Based on my examination of this patient's wound(s) today, sharp excision into necrotic epidermis, dermis and subcutaneous tissue is required to promote healing and evaluate the extent of previous healing. Performed by: Mirna Peters MD    Consent obtained: Yes    Time out taken:  Yes    Pain Control: none       Debridement: Excisional debridement  Using curette the wound(s) was/were sharply debrided down through and including the removal of epidermis, dermis and subcutaneous tissue. Devitalized Tissue Debrided:  fibrin, biofilm and slough    Pre Debridement Measurements:  Are located in the Wound Documentation Flow Sheet    All active wounds listed below with today's date are evaluated  Wound(s)    debrided this date include # : 6     Post  Debridement Measurements:  Wound 11/15/13 Other (Comment) Leg Inner erethema with a small puncture site (Active)   Number of days: 2308       Wound 06/27/19 #6 (onset 1 month) Left Medial Distal Ankle (Active)   Wound Image   3/5/2020 10:54 AM   Wound Other 3/12/2020  9:08 AM   Offloading for Diabetic Foot Ulcers No 12/26/2019 11:24 AM   Dressing Status Clean;Dry; Intact 2/20/2020 11:03 AM   Dressing Changed Changed/New 3/5/2020 11:58 AM infection today. I also debride his buttocks ulcers to sub cutaneus tissue- I use a scissors and curette, minimal bleeding with pressure to stop. Total debridement area still less than 20 sq cm. Plan:       Discharge Instructions         Discharge Instructions        PHYSICIAN ORDERS AND DISCHARGE INSTRUCTIONS     NOTE: Upon discharge from the [address], you will receive a patient experience survey. We would be grateful if you would take the time to fill this survey out.     Wound care order history:                 ANA CRISTINA's   Right       Left               Date               Vascular studies:   ordered on 7/25/19 Date  To be done 8/2/19 imaging center               Imaging:  Date               Cultures: obtained from left medal leg  Date 6/27/19--positive kles.                                [id number] from left medial lower leg on 8/15/19                               Obtained from left medial lower leg on 9/26/19                               Obtained from right leg on 10/17/19                               Obtained from left leg on 3/12/2020                                 Biopsy done 7/18/19                 Labs/ HbA1c  Date               Grafts: Date               HBO:                Antibiotics: Doxycycline for 14 days BID; phoned to [facility] [address] 6/28/19                                    Doxycycline for 10 days BID and CIPRO 500 mg BID for 10 days ordered on 8/22/19 [facility]                                   Doxycycline for 7 days on 9/5/19                                   Doxycycline for 7 days on 3/12/2020              Earlier Wound care treatments:                [id number]:                        Consults:   Date 7/18/19 to Dr Hilary Roa-- Reema Condon care physician:      Continuing wound care orders and information:              Residence: private                 Continue home health care with: none at this time, referral to [facility] TEDDY Bach wound-care supplies will be provided by: Vera Wang provider:              GLORIA with              [facility] loading: Date              PQAES Medications: RX SANTYL GIVEN 8/1/19              XVLLG cleansing:                           QG not scrub or use excessive force.                          Wash hands with soap and water before and after dressing changes.                         Prior to applying a clean dressing, cleanse wound with normal saline,                                wound cleanser, or mild soap and water.                                     Daily Wound management:                                                Avoid standing for long periods of time.                          BNGKA wraps/stockings in AM and remove at bedtime.                          If swelling is present, elevate legs to the level of the heart or above for 30 minutes 4-5 times a day and/or when sitting.                                             When taking antibiotics take entire prescription as ordered by physician do not stop taking until medicine is all gone.     Right lower arm--Healed today 9/12/19    Left hand-- healed 12/26/19   Left medial buttock -- healed 1/23/2020                                                      Orders for this week: 3/12/2020            Left medial ankle distal-- cleanse with  Vashe soak in clinic  , pat dry. Lotrisone to fabricio wound .  Then to open areas:  Apply betadine dampened 2x2 gauze   and cover with piece  ABD then wrap with conform. Change daily,  tubi E.      Right Leg --   TUBI E on in am and off at night    Buttock wounds-- wash with soap and water, pat dry. Cover with fibracol, and cover with Mepilex sacrum border.   Change every 3 days     Follow up [address] in 1 week on Thursday as scheduled in the wound care center     Call [phone] for any questions or concerns.           Physician Signature _________________________________          Treatment Note Wound 06/27/19 #6

## 2020-03-15 LAB
CULTURE: ABNORMAL
Lab: ABNORMAL
SPECIMEN: ABNORMAL

## 2020-03-16 ENCOUNTER — TELEPHONE (OUTPATIENT)
Dept: PHARMACY | Age: 75
End: 2020-03-16

## 2020-03-16 NOTE — TELEPHONE ENCOUNTER
Patient now receiving home care wound care visits with [facility] per [address] Patient started doxycycline on 3/12/20. Mariajose will check INR at home visit 3/17.     Ksenia Nguyễn, PharmD, [address] [place]  3/16/2020  4:57 PM

## 2020-03-19 ENCOUNTER — HOSPITAL ENCOUNTER (OUTPATIENT)
Dept: WOUND CARE | Age: 75
Discharge: HOME | End: 2020-03-19
Payer: MEDICARE

## 2020-03-19 VITALS
SYSTOLIC BLOOD PRESSURE: 150 MMHG | TEMPERATURE: 97.7 F | RESPIRATION RATE: 18 BRPM | DIASTOLIC BLOOD PRESSURE: 79 MMHG | HEART RATE: 96 BPM

## 2020-03-19 PROCEDURE — 87088 URINE BACTERIA CULTURE: CPT

## 2020-03-19 PROCEDURE — 87186 SC STD MICRODIL/AGAR DIL: CPT

## 2020-03-19 PROCEDURE — 87071 CULTURE AEROBIC QUANT OTHER: CPT

## 2020-03-19 PROCEDURE — 11042 DBRDMT SUBQ TIS 1ST 20SQCM/<: CPT

## 2020-03-19 PROCEDURE — 87073 CULTURE BACTERIA ANAEROBIC: CPT

## 2020-03-19 ASSESSMENT — PAIN SCALES - GENERAL: PAINLEVEL_OUTOF10: 0

## 2020-03-19 NOTE — PROGRESS NOTES
11/18/2011    CKD (chronic kidney disease) Feb 2012    Renal u/S normal     Colon polyps     Dr. Deyanira Jimenez COPD (chronic obstructive pulmonary disease) ([address]     Diabetes mellitus ([address] 1993    Diabetes mellitus ([address]     Diabetes mellitus with peripheral circulatory disorder ([address] 10/2/2015    Diabetes mellitus with skin ulcer ([address] 10/2/2015    Diabetic peripheral neuropathy ([address] 11/12    + EMG, NCS    Diabetic skin ulcer associated with type 2 diabetes mellitus ([address]     Diverticulosis 4/23/12    mild, left colon    Femoral DVT (deep venous thrombosis) ([address] 09/2011    PARTIAL,CHRONIC-[facility]GE[facility]    GERD (gastroesophageal reflux disease)     Glaucoma 11/12    open angle-Dr. Hardy Current H/O cardiac catheterization 6/3/14    EF55% normal study    H/O Doppler ultrasound 03/26/15    Carotid US- no significant stenosis noted bilaterally.  H/O echocardiogram 11/21/2019    EF50-55%, Mild AR. Moderately dilated right ventricle with negative Solis's sign.  H/O mumps orchitis     as a youth    Hemorrhoids 4/23/12    Dr. Pam Willingham; repeat colonoscopy 3 years    History of nuclear stress test 11/21/2019    EF 60%, Normal study.  HLD (hyperlipidemia)     Hx of Doppler ultrasound 1/06/15    Lymph node seen in left groin area. No bilateral stenosis.     Hyperlipemia     Hyperlipidemia     Hypertension 1992    Hypertension     Idiopathic chronic venous hypertension of left lower extremity with ulcer and inflammation ([address] 10/2/2015    Leg ulcer ([address] 1978-present    following at wound center, Dr Shelley Hickman No diabetic retinopathy OU 11/12    Dr. Zenon Huggins chronic ulcer of left lower leg with fat layer exposed ([address] 10/2/2015    Pulmonary embolism ([address] 11/13    patient on coumadin    Sleep apnea     doesnt always use cpap dt it drys him out    SOB (shortness of breath) Oct 2011    Stress test normal.     Tendinitis 1973    plantar tendons    Type II or BLOOD SUGAR FOUR TIMES DAILY AS NEEDED 100 strip 3    buPROPion (WELLBUTRIN XL) 300 MG extended release tablet Take 1 tablet by mouth every morning 90 tablet 3    ALISHA CONTOUR TEST strip USE AS DIRECTED TO TEST BLOOD SUGAR FOUR TIMES DAILY 100 strip 5    Glucose Blood (BLOOD GLUCOSE TEST STRIPS) STRP Please give contour testing strips to test blood sugar 4x daily 200 strip 3     No current facility-administered medications on file prior to encounter. REVIEW OF SYSTEMS    Pertinent items are noted in HPI. Constitutional: Negative for systemic symptoms including fever, chills and malaise. Objective:      BP (!) 150/79   Pulse 96   Temp 97.7 °F (36.5 °C) (Temporal)   Resp 18     PHYSICAL EXAM      General: The patient is in no acute distress. Mental status:  Patient is appropriate, is  oriented to place and plan of care. Dermatologic exam: Visual inspection of the periwound reveals the skin to be normal in turgor and texture  Wound exam: see wound description below in procedure note      Assessment:     Problem List Items Addressed This Visit     WD-Chronic venous hypertension (idiopathic) with ulcer of left lower extremity (CODE) ([address] - Primary    WD-Chronic venous hypertension with inflammation, right    WD-Pressure injury of left buttock, stage 3 (HCC)    WD-Pressure injury of right buttock, stage 3 ([address]        Procedure Note    Indications:  Based on my examination of this patient's wound(s) today, sharp excision into necrotic subcutaneous tissue is required to promote healing and evaluate the extent of previous healing. Performed by: Salvatore Valdovinos MD    Consent obtained: Yes    Time out taken:  Yes    Pain Control: none needed       Debridement:Excisional Debridement    Using curette the wound(s) was/were sharply debrided down through and including the removal of epidermis, dermis and subcutaneous tissue.         Devitalized Tissue Debrided:  fibrin, biofilm, slough and [place]%Wound Bed 100 3/19/2020  9:05 AM   Red%Wound Bed 0 3/19/2020  9:05 AM   Yellow%Wound Bed 0 3/19/2020  9:05 AM   Black%Wound Bed 0 3/19/2020  9:05 AM   Purple%Wound Bed 0 3/19/2020  9:05 AM   Other%Wound Bed 0 3/19/2020  9:05 AM   Number of days: 265       Wound 03/12/20 #11 right buttocks--pressure stage III (Active)   Wound Image   3/12/2020  9:35 AM   Wound Pressure Stage  3 3/12/2020  9:35 AM   Dressing Status Clean;Dry; Intact 3/19/2020  9:32 AM   Dressing Changed Changed/New 3/19/2020  9:32 AM   Wound Length (cm) 3 cm 3/19/2020  9:49 AM   Wound Width (cm) 3.5 cm 3/19/2020  9:49 AM   Wound Depth (cm) 0.1 cm 3/19/2020  9:49 AM   Wound Surface Area (cm^2) 10.5 cm^2 3/19/2020  9:49 AM   Change in Wound Size % (l*w) -1212.5 3/19/2020  9:49 AM   Wound Volume (cm^3) 1.05 cm^3 3/19/2020  9:49 AM   Wound Healing % -556 3/19/2020  9:49 AM   Post-Procedure Length (cm) 5 cm 3/19/2020  9:50 AM   Post-Procedure Width (cm) 3.5 cm 3/19/2020  9:50 AM   Post-Procedure Depth (cm) 0.1 cm 3/19/2020  9:50 AM   Post-Procedure Surface Area (cm^2) 17.5 cm^2 3/19/2020  9:50 AM   Post-Procedure Volume (cm^3) 1.75 cm^3 3/19/2020  9:50 AM   Distance Tunneling (cm) 0 cm 3/12/2020  9:35 AM   Tunneling Position ___ O'Clock 0 3/12/2020  9:35 AM   Undermining Starts ___ O'Clock 0 3/12/2020  9:35 AM   Undermining Ends___ O'Clock 0 3/12/2020  9:35 AM   Undermining Maxium Distance (cm) 0 3/12/2020  9:35 AM   Wound Assessment Red 3/12/2020  9:35 AM   Drainage Amount Moderate 3/12/2020  9:35 AM   Drainage Description Serosanguinous 3/12/2020  9:35 AM   Odor None 3/12/2020  9:35 AM   Margins Defined edges 3/12/2020  9:35 AM   Ana-wound Assessment Red 3/12/2020  9:35 AM   [place]%Wound Bed 0 3/12/2020  9:35 AM   Red%Wound Bed 100 3/12/2020  9:35 AM   Yellow%Wound Bed 0 3/12/2020  9:35 AM   Black%Wound Bed 0 3/12/2020  9:35 AM   Purple%Wound Bed 0 3/12/2020  9:35 AM   Other%Wound Bed 0 3/12/2020  9:35 AM   Number of days: 6       Wound 03/12/20 #12 left buttocks cluster--pressures stage III (Active)   Wound Image   3/12/2020  9:35 AM   Wound Pressure Stage  3 3/19/2020  9:05 AM   Wound Cleansed Soap and water 3/19/2020  9:05 AM   Wound Length (cm) 7 cm 3/19/2020  9:49 AM   Wound Width (cm) 3 cm 3/19/2020  9:49 AM   Wound Depth (cm) 0.2 cm 3/19/2020  9:49 AM   Wound Surface Area (cm^2) 21 cm^2 3/19/2020  9:49 AM   Change in Wound Size % (l*w) 6.67 3/19/2020  9:49 AM   Wound Volume (cm^3) 4.2 cm^3 3/19/2020  9:49 AM   Wound Healing % 7 3/19/2020  9:49 AM   Post-Procedure Length (cm) 7.5 cm 3/12/2020  9:35 AM   Post-Procedure Width (cm) 3 cm 3/12/2020  9:35 AM   Post-Procedure Depth (cm) 0.2 cm 3/12/2020  9:35 AM   Post-Procedure Surface Area (cm^2) 22.5 cm^2 3/12/2020  9:35 AM   Post-Procedure Volume (cm^3) 4.5 cm^3 3/12/2020  9:35 AM   Distance Tunneling (cm) 0 cm 3/12/2020  9:35 AM   Tunneling Position ___ O'Clock 0 3/12/2020  9:35 AM   Undermining Starts ___ O'Clock 0 3/12/2020  9:35 AM   Undermining Ends___ O'Clock 0 3/12/2020  9:35 AM   Undermining Maxium Distance (cm) 0 3/12/2020  9:35 AM   Wound Assessment Red 3/12/2020  9:35 AM   Drainage Amount Moderate 3/12/2020  9:35 AM   Drainage Description Serosanguinous 3/12/2020  9:35 AM   Odor None 3/12/2020  9:35 AM   Margins Defined edges 3/12/2020  9:35 AM   Ana-wound Assessment Red 3/12/2020  9:35 AM   Non-staged Wound Description Full thickness 3/12/2020  9:35 AM   [place]%Wound Bed 0 3/12/2020  9:35 AM   Red%Wound Bed 100 3/12/2020  9:35 AM   Yellow%Wound Bed 0 3/12/2020  9:35 AM   Black%Wound Bed 0 3/12/2020  9:35 AM   Purple%Wound Bed 0 3/12/2020  9:35 AM   Other%Wound Bed 0 3/12/2020  9:35 AM   Number of days: 6       Percent of Wound(s) Debrided: approximately 10%    Total  Area  Debrided:  3.8 sq cm     Bleeding:  Minimal    Hemostasis Achieved:  by pressure    Procedural Pain:  5  / 10     Post Procedural Pain:  2 / 10     Response to treatment:  Well tolerated by patient.      Status of wound progress and description from last visit:   All his wounds are improved today, but I am concerned about the right leg- it is much more red and swollen today. I take a culture to ensure no infection. I extend his doxy due to the heavy growth of MRSA on last culture of the left leg. May need to adjust his antibiotic to cover what grows on his right leg (cultured today). Plan:       Discharge Instructions         Discharge Instructions        PHYSICIAN ORDERS AND DISCHARGE INSTRUCTIONS     NOTE: Upon discharge from the [address], you will receive a patient experience survey. We would be grateful if you would take the time to fill this survey out.     Wound care order history:                 ANA CRISTINA's   Right       Left               Date               Vascular studies:   ordered on 7/25/19 Date  To be done 8/2/19 imaging center               Imaging:  Date               Cultures: obtained from left medal leg  Date 6/27/19--positive kles.                                [id number] from left medial lower leg on 8/15/19                               Obtained from left medial lower leg on 9/26/19                               Obtained from right leg on 10/17/19                               Obtained from left leg on 3/12/2020                               Obtained from right leg on 3/19/2020                                  Biopsy done 7/18/19                 Labs/ HbA1c  Date               Grafts: Date               HBO:                Antibiotics: Doxycycline for 14 days BID; phoned to [facility] 6/28/19                                    Doxycycline for 10 days BID and CIPRO 500 mg BID for 10 days ordered on 8/22/19 [facility]                                   Doxycycline for 7 days on 9/5/19                                   Doxycycline for 7 days on 3/12/2020 continued on 3/19/2020 for 7 days               Earlier Wound care treatments:                [id number]:                        Consults:   Date

## 2020-03-24 LAB
CULTURE: ABNORMAL
Lab: ABNORMAL
SPECIMEN: ABNORMAL

## 2020-03-26 ENCOUNTER — HOSPITAL ENCOUNTER (OUTPATIENT)
Dept: WOUND CARE | Age: 75
Discharge: HOME | End: 2020-03-26
Payer: MEDICARE

## 2020-03-26 VITALS
TEMPERATURE: 97.7 F | SYSTOLIC BLOOD PRESSURE: 111 MMHG | DIASTOLIC BLOOD PRESSURE: 75 MMHG | RESPIRATION RATE: 18 BRPM | HEART RATE: 89 BPM

## 2020-03-26 PROBLEM — L97.811 NON-PRESSURE CHRONIC ULCER OF OTHER PART OF RIGHT LOWER LEG LIMITED TO BREAKDOWN OF SKIN (HCC): Status: ACTIVE | Noted: 2020-03-26

## 2020-03-26 PROBLEM — L03.115 CELLULITIS OF RIGHT LOWER EXTREMITY: Status: ACTIVE | Noted: 2020-03-26

## 2020-03-26 PROCEDURE — 11042 DBRDMT SUBQ TIS 1ST 20SQCM/<: CPT

## 2020-03-26 ASSESSMENT — PAIN SCALES - GENERAL: PAINLEVEL_OUTOF10: 0

## 2020-03-26 NOTE — PROGRESS NOTES
Multilayer Compression Wrap   (Not Unna) Below the Knee    NAME:  Lisa Walker OF BIRTH:  1945  MEDICAL RECORD NUMBER:  [id number]  DATE:  3/26/2020    Multilayer compression wrap: Removed old Multilayer wrap if indicated and wash leg with mild soap/water. Applied moisturizing agent to dry skin as needed. Applied primary and secondary dressing as ordered. Applied multilayered dressing below the knee to left lower leg. Instructed patient/caregiver not to remove dressing and to keep it clean and dry. Instructed patient/caregiver on complications to report to provider, such as pain, numbness in toes, heavy drainage, and slippage of dressing. Instructed patient on purpose of compression dressing and on activity and exercise recommendations.       Electronically signed by Darell Hills LPN on 4/12/0764 at 82:61 PM

## 2020-03-26 NOTE — PROGRESS NOTES
Diabetic skin ulcer associated with type 2 diabetes mellitus ([address]     Diverticulosis 4/23/12    mild, left colon    Femoral DVT (deep venous thrombosis) ([address] 09/2011    [facility]RONIC-[facility]    GERD (gastroesophageal reflux disease)     Glaucoma 11/12    open angle-Dr. Janna Nevarez H/O cardiac catheterization 6/3/14    EF55% normal study    H/O Doppler ultrasound 03/26/15    Carotid US- no significant stenosis noted bilaterally.  H/O echocardiogram 11/21/2019    EF50-55%, Mild AR. Moderately dilated right ventricle with negative Solis's sign.  H/O mumps orchitis     as a youth    Hemorrhoids 4/23/12    Dr. Jj Ruggiero; repeat colonoscopy 3 years    History of nuclear stress test 11/21/2019    EF 60%, Normal study.  HLD (hyperlipidemia)     Hx of Doppler ultrasound 1/06/15    Lymph node seen in left groin area. No bilateral stenosis.     Hyperlipemia     Hyperlipidemia     Hypertension 1992    Hypertension     Idiopathic chronic venous hypertension of left lower extremity with ulcer and inflammation (HCC) 10/2/2015    Leg ulcer ([address] 1978-present    following at wound center, Dr Lyn Hair No diabetic retinopathy OU 11/12    Dr. Radha Manrique chronic ulcer of left lower leg with fat layer exposed ([address] 10/2/2015    Pulmonary embolism ([address] 11/13    patient on coumadin    Sleep apnea     doesnt always use cpap dt it drys him out    SOB (shortness of breath) Oct 2011    Stress test normal.     Tendinitis 1973    plantar tendons    Type II or unspecified type diabetes mellitus with other specified manifestations, not stated as uncontrolled 2/8/2013    Unspecified venous (peripheral) insufficiency     Urticaria     WD-Cellulitis of right anterior lower leg 9/5/2019    WD-Cellulitis of right lower extremity 3/26/2020    WD-Chronic venous hypertension (idiopathic) with ulcer of left lower extremity (CODE) ([address] 6/27/2019    WD-Chronic venous hypertension with inflammation, right 2019    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 to breakdown of skin ([address] 3/26/2020    WD-Open wound of hand without complication, left, initial encounter 2019    WD-Pressure injury of left buttock, stage 2 ([address] 2020    WD-Pressure injury of left buttock, stage 3 ([address] 3/12/2020    WD-Pressure injury of right buttock, stage 3 ([address] 3/12/2020    WD-Skin tear of right forearm without complication     WD-Ulcer of right pretibial region, with fat layer exposed ([address] 10/17/2019       PAST SURGICAL HISTORY    Past Surgical History:   Procedure Laterality Date    BREAST SURGERY  1970s    benign tumors bilaterally    CARDIAC CATHETERIZATION  6/3/14    EF55% normal study    CARPAL TUNNEL RELEASE Left     CARPAL TUNNEL RELEASE      CATARACT REMOVAL Right 3/11/2013    Dr. Tree Keen Left 2013    Dr. Hoang Christine      polyps    COLONOSCOPY  11    villous component--f/u colonoscopy will be needed in 6 months    COLONOSCOPY  12    mild diverticulosis, internal hemorrhoids; repeat in 3 years (Dr. Flor March)    COLONOSCOPY  2016    mild diverticulosis, three colon polyps    HERNIA REPAIR      HERNIA REPAIR      SKIN GRAFT  -present    skin grafts to left ankle ulcers    SPLENECTOMY      SPLENECTOMY      TONSILLECTOMY      VARICOSE VEIN SURGERY Left        FAMILY HISTORY    Family History   Problem Relation Age of Onset    Heart Disease Father     Cancer Father         prostate    High Blood Pressure Father     High Cholesterol Father     Cancer Brother     Diabetes Brother     Thyroid Disease Brother        SOCIAL HISTORY    Social History     Tobacco Use    Smoking status: Former Smoker     Packs/day: 2.00     Years: 35.00     Pack years: 70.00     Types: Cigarettes     Last attempt to quit:      Years since quittin.2    Smokeless tobacco: Never Used    Tobacco comment: chew--30 years.   Reviewed 9/24/2015   Substance Use Topics    Alcohol use: Yes     Comment: soc    Drug use: Never       ALLERGIES    Allergies   Allergen Reactions    Cavilon Durable Barrier [Mineral Oil-Dimeth-Coconut Oil]     Parabens Hives    Parabens Hives    Prinivil [Lisinopril] Swelling    Cortisone Rash    Cortisone Rash    Dilaudid [Hydromorphone Hcl] Rash    Penicillins Rash    Penicillins Rash    Sulfamethoxazole-Trimethoprim Nausea Only    Tape [Adhesive Tape] Rash       MEDICATIONS    Current Outpatient Medications on File Prior to Encounter   Medication Sig Dispense Refill    metoprolol tartrate (LOPRESSOR) 25 MG tablet Take 0.5 tablets by mouth 2 times daily 90 tablet 3    Zinc Oxide (DESITIN CREAMY EX) Apply 13 % topically      losartan (COZAAR) 50 MG tablet Take 1 tablet by mouth daily Patient tolerates diovan 90 tablet 1    aspirin 81 MG chewable tablet Take 1 tablet by mouth daily 30 tablet 3    escitalopram (LEXAPRO) 20 MG tablet Take 1 tablet by mouth daily 30 tablet 3    atorvastatin (LIPITOR) 40 MG tablet Take 40 mg by mouth nightly      metFORMIN (GLUCOPHAGE) 500 MG tablet Take 500 mg by mouth daily (with breakfast)      ARIPiprazole (ABILIFY) 15 MG tablet Take 15 mg by mouth daily      warfarin (COUMADIN) 5 MG tablet Take 5 mg by mouth daily Except 7.5mg on Mondays and Thursdays      omeprazole (PRILOSEC) 20 MG delayed release capsule TAKE ONE (1) CAPSULE BY MOUTH ONCE DAILY 90 capsule 1    blood glucose test strips (ALISHA CONTOUR TEST) strip USE AS DIRECTED TO TEST BLOOD SUGAR FOUR TIMES DAILY AS NEEDED 100 strip 3    buPROPion (WELLBUTRIN XL) 300 MG extended release tablet Take 1 tablet by mouth every morning 90 tablet 3    ALISHA CONTOUR TEST strip USE AS DIRECTED TO TEST BLOOD SUGAR FOUR TIMES DAILY 100 strip 5    Glucose Blood (BLOOD GLUCOSE TEST STRIPS) STRP Please give contour testing strips to test blood sugar 4x daily 200 (Active)   Wound Image   3/12/2020  9:35 AM   Wound Pressure Stage  3 3/26/2020  9:04 AM   Dressing Status Clean;Dry; Intact 3/19/2020  9:50 AM   Dressing Changed Changed/New 3/19/2020  9:50 AM   Wound Cleansed Rinsed/Irrigated with saline 3/26/2020  9:04 AM   Wound Length (cm) 0.5 cm 3/26/2020  9:04 AM   Wound Width (cm) 0.5 cm 3/26/2020  9:04 AM   Wound Depth (cm) 0.1 cm 3/26/2020  9:04 AM   Wound Surface Area (cm^2) 0.25 cm^2 3/26/2020  9:04 AM   Change in Wound Size % (l*w) 68.75 3/26/2020  9:04 AM   Wound Volume (cm^3) 0.02 cm^3 3/26/2020  9:04 AM   Wound Healing % 88 3/26/2020  9:04 AM   Post-Procedure Length (cm) 5 cm 3/19/2020  9:50 AM   Post-Procedure Width (cm) 3.5 cm 3/19/2020  9:50 AM   Post-Procedure Depth (cm) 0.1 cm 3/19/2020  9:50 AM   Post-Procedure Surface Area (cm^2) 17.5 cm^2 3/19/2020  9:50 AM   Post-Procedure Volume (cm^3) 1.75 cm^3 3/19/2020  9:50 AM   Distance Tunneling (cm) 0 cm 3/26/2020  9:04 AM   Tunneling Position ___ O'Clock 0 3/26/2020  9:04 AM   Undermining Starts ___ O'Clock 0 3/26/2020  9:04 AM   Undermining Ends___ O'Clock 0 3/26/2020  9:04 AM   Undermining Maxium Distance (cm) 0 3/26/2020  9:04 AM   Wound Assessment Red 3/26/2020  9:04 AM   Drainage Amount Moderate 3/26/2020  9:04 AM   Drainage Description Serosanguinous 3/26/2020  9:04 AM   Odor None 3/26/2020  9:04 AM   Margins Defined edges 3/26/2020  9:04 AM   Ana-wound Assessment Pink 3/26/2020  9:04 AM   Non-staged Wound Description Full thickness 3/26/2020  9:04 AM   [place]%Wound Bed 0 3/26/2020  9:04 AM   Red%Wound Bed 100 3/26/2020  9:04 AM   Yellow%Wound Bed 0 3/26/2020  9:04 AM   Black%Wound Bed 0 3/26/2020  9:04 AM   Purple%Wound Bed 0 3/26/2020  9:04 AM   Other%Wound Bed 0 3/26/2020  9:04 AM   Number of days: 14       Wound 03/12/20 #12 left buttocks cluster--pressures stage III (Active)   Wound Image   3/12/2020  9:35 AM   Wound Pressure Stage  3 3/26/2020  9:04 AM   Dressing Status Clean;Dry; Intact 3/19/2020  9:50 AM Dressing Changed Changed/New 3/19/2020  9:50 AM   Wound Cleansed Rinsed/Irrigated with saline 3/26/2020  9:04 AM   Wound Length (cm) 1 cm 3/26/2020  9:04 AM   Wound Width (cm) 1 cm 3/26/2020  9:04 AM   Wound Depth (cm) 0.1 cm 3/26/2020  9:04 AM   Wound Surface Area (cm^2) 1 cm^2 3/26/2020  9:04 AM   Change in Wound Size % (l*w) 95.56 3/26/2020  9:04 AM   Wound Volume (cm^3) 0.1 cm^3 3/26/2020  9:04 AM   Wound Healing % 98 3/26/2020  9:04 AM   Post-Procedure Length (cm) 7 cm 3/19/2020  9:50 AM   Post-Procedure Width (cm) 3 cm 3/19/2020  9:50 AM   Post-Procedure Depth (cm) 0.2 cm 3/19/2020  9:50 AM   Post-Procedure Surface Area (cm^2) 21 cm^2 3/19/2020  9:50 AM   Post-Procedure Volume (cm^3) 4.2 cm^3 3/19/2020  9:50 AM   Distance Tunneling (cm) 0 cm 3/26/2020  9:04 AM   Tunneling Position ___ O'Clock 0 3/26/2020  9:04 AM   Undermining Starts ___ O'Clock 0 3/26/2020  9:04 AM   Undermining Ends___ O'Clock 0 3/26/2020  9:04 AM   Undermining Maxium Distance (cm) 0 3/26/2020  9:04 AM   Wound Assessment Red 3/26/2020  9:04 AM   Drainage Amount Moderate 3/26/2020  9:04 AM   Drainage Description Serosanguinous 3/26/2020  9:04 AM   Odor None 3/26/2020  9:04 AM   Margins Defined edges 3/26/2020  9:04 AM   Ana-wound Assessment Dry 3/26/2020  9:04 AM   Non-staged Wound Description Full thickness 3/26/2020  9:04 AM   [place]%Wound Bed 0 3/26/2020  9:04 AM   Red%Wound Bed 100 3/26/2020  9:04 AM   Yellow%Wound Bed 0 3/26/2020  9:04 AM   Black%Wound Bed 0 3/26/2020  9:04 AM   Purple%Wound Bed 0 3/26/2020  9:04 AM   Other%Wound Bed 0 3/26/2020  9:04 AM   Number of days: 14       Wound 03/26/20 Right #13 right lower medial leh (Active)   Number of days: 0       Percent of Wound(s) Debrided: approximately 100%    Total  Area  Debrided:0.35 sq cm     Bleeding:  Minimal    Hemostasis Achieved:  by pressure    Procedural Pain:  0  / 10     Post Procedural Pain:  0 / 10     Response to treatment:  Well tolerated by patient.      Status of wound progress and description from last visit:   Improved today on the left leg, but the right leg has infection. New and worsening cellulitis. He needs antibiotics- linezolid is the only oral option given his allergies and resistances. If he gets worse, he needs to go to the ER. This is discussed with him. Plan:       Discharge Instructions         Discharge Instructions        PHYSICIAN ORDERS AND DISCHARGE INSTRUCTIONS     NOTE: Upon discharge from the [address], you will receive a patient experience survey. We would be grateful if you would take the time to fill this survey out.     Wound care order history:                 ANA CRISTINA's   Right       Left               Date               Vascular studies:   ordered on 7/25/19 Date  To be done 8/2/19 imaging center               Imaging:  Date               Cultures: obtained from left medal leg  Date 6/27/19--positive kles.                                [id number] from left medial lower leg on 8/15/19                               Obtained from left medial lower leg on 9/26/19                               Obtained from right leg on 10/17/19                               Obtained from left leg on 3/12/2020                               Obtained from right leg on 3/19/2020                                  Biopsy done 7/18/19                 Labs/ HbA1c  Date               Grafts: Date               HBO:                Antibiotics: Doxycycline for 14 days BID; phoned to [facility] 6/28/19                                    Doxycycline for 10 days BID and CIPRO 500 mg BID for 10 days ordered on 8/22/19 [facility]                                   Doxycycline for 7 days on 9/5/19                                   Doxycycline for 7 days on 3/12/2020 continued on 3/19/2020 for 7 days               Earlier Wound care treatments:                [id number]:                        Consults:   Date 7/18/19 to Dr Ady Lees-- Hilary Kimble wounds-- wash with soap and water, pat dry.  Cover with fibracol, and cover with Mepilex sacrum border.  Change every 3 days      Follow up Susannah Padilla Dr in 1 week on Thursday as scheduled in the wound care center     Call [phone] for any questions or concerns.           Physician Signature _________________________________             Treatment Note      Written Patient Dismissal Instructions Given            Electronically signed by Elio Soto MD on 3/26/2020 at 10:42 AM

## 2020-03-30 ENCOUNTER — HOSPITAL ENCOUNTER (OUTPATIENT)
Age: 75
Discharge: HOME | End: 2020-03-30
Payer: MEDICARE

## 2020-03-30 ENCOUNTER — ANTI-COAG VISIT (OUTPATIENT)
Dept: PHARMACY | Age: 75
End: 2020-03-30

## 2020-03-30 LAB
INR BLD: 2.72 INDEX
PROTHROMBIN TIME: 33.2 SECONDS (ref 11.7–14.5)

## 2020-03-30 PROCEDURE — 36415 COLL VENOUS BLD VENIPUNCTURE: CPT

## 2020-03-30 PROCEDURE — 99211 OFF/OP EST MAY X REQ PHY/QHP: CPT

## 2020-03-30 PROCEDURE — 85610 PROTHROMBIN TIME: CPT

## 2020-04-02 ENCOUNTER — HOSPITAL ENCOUNTER (OUTPATIENT)
Age: 75
Discharge: HOME | End: 2020-04-02
Payer: MEDICARE

## 2020-04-02 ENCOUNTER — SCHEDULED TELEPHONE ENCOUNTER (OUTPATIENT)
Dept: PHARMACY | Age: 75
End: 2020-04-02

## 2020-04-02 ENCOUNTER — HOSPITAL ENCOUNTER (OUTPATIENT)
Dept: WOUND CARE | Age: 75
Discharge: HOME | End: 2020-04-02
Payer: MEDICARE

## 2020-04-02 VITALS
SYSTOLIC BLOOD PRESSURE: 165 MMHG | HEART RATE: 90 BPM | DIASTOLIC BLOOD PRESSURE: 78 MMHG | RESPIRATION RATE: 20 BRPM | TEMPERATURE: 98.3 F

## 2020-04-02 LAB
INR BLD: 2.64 INDEX
PROTHROMBIN TIME: 32.3 SECONDS (ref 11.7–14.5)

## 2020-04-02 PROCEDURE — 97597 DBRDMT OPN WND 1ST 20 CM/<: CPT

## 2020-04-02 PROCEDURE — 85610 PROTHROMBIN TIME: CPT

## 2020-04-02 PROCEDURE — 36415 COLL VENOUS BLD VENIPUNCTURE: CPT

## 2020-04-02 PROCEDURE — 99211 OFF/OP EST MAY X REQ PHY/QHP: CPT

## 2020-04-02 RX ORDER — WARFARIN SODIUM 5 MG/1
TABLET ORAL
Qty: 96 TABLET | Refills: 5 | Status: SHIPPED | OUTPATIENT
Start: 2020-04-02 | End: 2020-06-10 | Stop reason: ALTCHOICE

## 2020-04-02 ASSESSMENT — PAIN SCALES - GENERAL: PAINLEVEL_OUTOF10: 4

## 2020-04-02 NOTE — PROGRESS NOTES
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 to breakdown of skin ([address] 3/26/2020    WD-Open wound of hand without complication, left, initial encounter 2019    WD-Pressure injury of left buttock, stage 2 ([address] 2020    WD-Pressure injury of left buttock, stage 3 ([address] 3/12/2020    WD-Pressure injury of right buttock, stage 3 ([address] 3/12/2020    WD-Skin tear of right forearm without complication 676    WD-Ulcer of right pretibial region, with fat layer exposed ([address] 10/17/2019       PAST SURGICAL HISTORY    Past Surgical History:   Procedure Laterality Date    BREAST SURGERY  1970s    benign tumors bilaterally    CARDIAC CATHETERIZATION  6/3/14    EF55% normal study    CARPAL TUNNEL RELEASE Left     CARPAL TUNNEL RELEASE      CATARACT REMOVAL Right 3/11/2013    Dr. Yehuda Núñez Left 2013    Dr. Dio Rene      polyps    COLONOSCOPY  11    villous component--f/u colonoscopy will be needed in 6 months    COLONOSCOPY  12    mild diverticulosis, internal hemorrhoids; repeat in 3 years (Dr. Karen Lopez)    COLONOSCOPY  2016    mild diverticulosis, three colon polyps    HERNIA REPAIR      HERNIA REPAIR      SKIN GRAFT  -present    skin grafts to left ankle ulcers    SPLENECTOMY      SPLENECTOMY      TONSILLECTOMY      VARICOSE VEIN SURGERY Left        FAMILY HISTORY    Family History   Problem Relation Age of Onset    Heart Disease Father     Cancer Father         prostate    High Blood Pressure Father     High Cholesterol Father     Cancer Brother     Diabetes Brother     Thyroid Disease Brother        SOCIAL HISTORY    Social History     Tobacco Use    Smoking status: Former Smoker     Packs/day: 2.00     Years: 35.00     Pack years: 70.00     Types: Cigarettes     Last attempt to quit:      Years since quittin.2    Smokeless tobacco: Never Used    Tobacco comment: right buttocks--pressure stage III (Active)   Wound Image   3/12/2020  9:35 AM   Wound Pressure Stage  3 3/26/2020  9:04 AM   Dressing Status Clean;Dry; Intact 3/26/2020 11:51 AM   Dressing Changed Changed/New 3/26/2020 11:51 AM   Wound Cleansed Wound cleanser 4/2/2020  9:06 AM   Wound Length (cm) 0.3 cm 4/2/2020  9:06 AM   Wound Width (cm) 0.3 cm 4/2/2020  9:06 AM   Wound Depth (cm) 0.1 cm 4/2/2020  9:06 AM   Wound Surface Area (cm^2) 0.09 cm^2 4/2/2020  9:06 AM   Change in Wound Size % (l*w) 88.75 4/2/2020  9:06 AM   Wound Volume (cm^3) 0.01 cm^3 4/2/2020  9:06 AM   Wound Healing % 94 4/2/2020  9:06 AM   Post-Procedure Length (cm) 5 cm 3/19/2020  9:50 AM   Post-Procedure Width (cm) 3.5 cm 3/19/2020  9:50 AM   Post-Procedure Depth (cm) 0.1 cm 3/19/2020  9:50 AM   Post-Procedure Surface Area (cm^2) 17.5 cm^2 3/19/2020  9:50 AM   Post-Procedure Volume (cm^3) 1.75 cm^3 3/19/2020  9:50 AM   Distance Tunneling (cm) 0 cm 3/26/2020  9:04 AM   Tunneling Position ___ O'Clock 0 3/26/2020  9:04 AM   Undermining Starts ___ O'Clock 0 3/26/2020  9:04 AM   Undermining Ends___ O'Clock 0 3/26/2020  9:04 AM   Undermining Maxium Distance (cm) 0 3/26/2020  9:04 AM   Wound Assessment Red 3/26/2020  9:04 AM   Drainage Amount Moderate 3/26/2020  9:04 AM   Drainage Description Serosanguinous 3/26/2020  9:04 AM   Odor None 3/26/2020  9:04 AM   Margins Defined edges 3/26/2020  9:04 AM   Ana-wound Assessment Pink 3/26/2020  9:04 AM   Non-staged Wound Description Full thickness 3/26/2020  9:04 AM   [place]%Wound Bed 0 3/26/2020  9:04 AM   Red%Wound Bed 100 3/26/2020  9:04 AM   Yellow%Wound Bed 0 3/26/2020  9:04 AM   Black%Wound Bed 0 3/26/2020  9:04 AM   Purple%Wound Bed 0 3/26/2020  9:04 AM   Other%Wound Bed 0 3/26/2020  9:04 AM   Number of days: 20       Wound 03/12/20 #12 left buttocks cluster--pressures stage III (Active)   Wound Image   3/12/2020  9:35 AM   Wound Pressure Stage  3 3/26/2020  9:04 AM   Dressing Status Clean;Dry; Intact

## 2020-04-09 ENCOUNTER — ANTI-COAG VISIT (OUTPATIENT)
Dept: PHARMACY | Age: 75
End: 2020-04-09

## 2020-04-09 ENCOUNTER — TELEPHONE (OUTPATIENT)
Dept: PHARMACY | Age: 75
End: 2020-04-09

## 2020-04-09 ENCOUNTER — HOSPITAL ENCOUNTER (OUTPATIENT)
Age: 75
Discharge: HOME | End: 2020-04-09
Payer: MEDICARE

## 2020-04-09 LAB
INR BLD: 2.21 INDEX
PROTHROMBIN TIME: 27 SECONDS (ref 11.7–14.5)

## 2020-04-09 PROCEDURE — 99211 OFF/OP EST MAY X REQ PHY/QHP: CPT

## 2020-04-09 PROCEDURE — 36415 COLL VENOUS BLD VENIPUNCTURE: CPT

## 2020-04-09 PROCEDURE — 85610 PROTHROMBIN TIME: CPT

## 2020-04-09 NOTE — PROGRESS NOTES
Medication Management Service  [address] [phone]    Visit Date: 4/9/2020   Subjective: All appointments have been changed to telephone visits at this time due to COVID-19. Bruno Guillermo is a 69 y.o. male who is having INR checked by  Lab. INR results were sent to the clinic for anticoagulation monitoring and adjustment. Patient results called in to clinic for warfarin management due to  Indication:   DVT and PE. INR goal: of 2.0-3.0. Duration of therapy: indefinite. Patient reports the following:   Adherent with regimen  Missed or extra doses:  None   Bleeding or thromboembolic side effects:  None  Significant medication changes:  Picked up 4 more days of Bactrim today  Significant dietary changes: None  Significant alcohol or tobacco changes: None  Significant recent illness, disease state changes, or hospitalization:  None  Upcoming surgeries or procedures:  None  Falls: None           Assessment and Plan     PT/INR performed by Lab. INR today is 2.21, therapeutic. Patient reports he picked up additional 4 days of Bactrim therapy today. Bactrim can increase INR. Dose was reduced already  at 3/30 visit. Will continue frequent monitoring due to Bactrim interaction     Plan: Will continue current regimen of warfarin 5mg. Recheck INR in 1 week(s). Patient has standing lab orders for PT/INR. Patient verbalized understanding of dosing directions and information discussed. Progress note sent to referring office. Patient acknowledges working in consult agreement with pharmacist as referred by his/her physician.       Electronically signed by Josie Aguilar [address] [place] [address] on 4/9/20 at 5:16 PM EDT    CLINICAL PHARMACY CONSULT: MED RECONCILIATION/REVIEW ADDENDUM    For Pharmacy Admin Tracking Only    PHSO: No  Total # of Interventions Recommended: 0    - Maintenance Safety Lab Monitoring #: 1    Total Interventions Accepted:

## 2020-04-16 ENCOUNTER — ANTI-COAG VISIT (OUTPATIENT)
Dept: PHARMACY | Age: 75
End: 2020-04-16

## 2020-04-16 ENCOUNTER — HOSPITAL ENCOUNTER (OUTPATIENT)
Dept: WOUND CARE | Age: 75
Discharge: HOME | End: 2020-04-16
Payer: MEDICARE

## 2020-04-16 ENCOUNTER — HOSPITAL ENCOUNTER (OUTPATIENT)
Age: 75
Discharge: HOME | End: 2020-04-16
Payer: MEDICARE

## 2020-04-16 VITALS
TEMPERATURE: 97.9 F | HEART RATE: 97 BPM | SYSTOLIC BLOOD PRESSURE: 144 MMHG | DIASTOLIC BLOOD PRESSURE: 85 MMHG | RESPIRATION RATE: 20 BRPM

## 2020-04-16 LAB
INR BLD: 2.07 INDEX
PROTHROMBIN TIME: 25.2 SECONDS (ref 11.7–14.5)

## 2020-04-16 PROCEDURE — 87186 SC STD MICRODIL/AGAR DIL: CPT

## 2020-04-16 PROCEDURE — 87077 CULTURE AEROBIC IDENTIFY: CPT

## 2020-04-16 PROCEDURE — 99211 OFF/OP EST MAY X REQ PHY/QHP: CPT

## 2020-04-16 PROCEDURE — 87071 CULTURE AEROBIC QUANT OTHER: CPT

## 2020-04-16 PROCEDURE — 87073 CULTURE BACTERIA ANAEROBIC: CPT

## 2020-04-16 PROCEDURE — 36415 COLL VENOUS BLD VENIPUNCTURE: CPT

## 2020-04-16 PROCEDURE — 85610 PROTHROMBIN TIME: CPT

## 2020-04-16 PROCEDURE — 11042 DBRDMT SUBQ TIS 1ST 20SQCM/<: CPT

## 2020-04-16 ASSESSMENT — PAIN SCALES - GENERAL: PAINLEVEL_OUTOF10: 4

## 2020-04-16 ASSESSMENT — PAIN DESCRIPTION - ONSET: ONSET: ON-GOING

## 2020-04-16 ASSESSMENT — PAIN DESCRIPTION - FREQUENCY: FREQUENCY: CONTINUOUS

## 2020-04-16 ASSESSMENT — PAIN DESCRIPTION - LOCATION: LOCATION: LEG

## 2020-04-16 ASSESSMENT — PAIN DESCRIPTION - ORIENTATION: ORIENTATION: RIGHT

## 2020-04-16 ASSESSMENT — PAIN DESCRIPTION - PROGRESSION: CLINICAL_PROGRESSION: NOT CHANGED

## 2020-04-16 ASSESSMENT — PAIN DESCRIPTION - PAIN TYPE: TYPE: ACUTE PAIN

## 2020-04-16 NOTE — PROGRESS NOTES
Medication Management Service  [address] [phone]  Anais linares [phone]    Visit Date: 4/16/2020   Subjective: All appointments have been changed to telephone visits at this time due to COVID-19. Virginia Holter is a 69 y.o. male who is having INR checked by  Lab. INR results were sent to the clinic for anticoagulation monitoring and adjustment. Patient results called in to clinic for warfarin management due to  Indication:   DVT and PE. INR goal: of 2.0-3.0. Duration of therapy: indefinite. Patient reports the following:   Adherent with regimen  Missed or extra doses:  None   Bleeding or thromboembolic side effects:  Blood when blows nose  Significant medication changes:  Bactrim finished  Significant dietary changes: None  Significant alcohol or tobacco changes: None  Significant recent illness, disease state changes, or hospitalization:  None  Upcoming surgeries or procedures:  None  Falls: None           Assessment and Plan     PT/INR performed by Lab. INR today is 2.07, therapeutic. Patient finished Bactrim, but reports wound culture was done at wound care appointment this morning. Dose was reduced while on Bactrim. Return to maintenance dose prior to Bactrim. Instructed patient to call if he starts a new antibiotic. Reviewed nosebleed precautions and adivsed to gently wipe and avoid blowing nose. Plan: Increase weekly dose ~14% back to maintenance dose on prior to Bactrim therapy: warfarin 5mg daily except 7.5mg on Mondays and Thursdays. Recheck INR in 1 week(s). Patient has standing lab orders for PT/INR. Patient verbalized understanding of dosing directions and information discussed. Progress note sent to referring office. Patient acknowledges working in consult agreement with pharmacist as referred by his/her physician.       Electronically signed by Jerrod Marcos, [facility] on 4/16/20 at 2:06 PM EDT    CLINICAL PHARMACY

## 2020-04-16 NOTE — PROGRESS NOTES
[place]%Wound Bed 0 4/16/2020  9:12 AM   Red%Wound Bed 100 4/16/2020  9:12 AM   Yellow%Wound Bed 0 4/16/2020  9:12 AM   Black%Wound Bed 0 4/16/2020  9:12 AM   Purple%Wound Bed 0 4/16/2020  9:12 AM   Other%Wound Bed 0 4/16/2020  9:12 AM   Number of days: 0       Percent of Wound(s) Debrided: approximately 100%    Total  Area  Debrided:  1 sq cm     Bleeding:  Minimal    Hemostasis Achieved:  by pressure    Procedural Pain:  0  / 10     Post Procedural Pain:  0 / 10     Response to treatment:  Well tolerated by patient. Status of wound progress and description from last visit:   About the same on the left leg, the right leg appears to have infection. I have obtained a culture today. Will also need to apply a compression wrap on the leg. Plan:       Discharge Instructions         Discharge Instructions        PHYSICIAN ORDERS AND DISCHARGE INSTRUCTIONS     NOTE: Upon discharge from the [address], you will receive a patient experience survey. We would be grateful if you would take the time to fill this survey out.     Wound care order history:                 ANA CRISTINA's   Right       Left               Date               Vascular studies:   ordered on 7/25/19 Date  To be done 8/2/19 imaging center               Imaging:  Date               Cultures: obtained from left medal leg  Date 6/27/19--positive kles.                                IBSTHGDV from left medial lower leg on 8/15/19                               Obtained from left medial lower leg on 9/26/19                               Obtained from right leg on 10/17/19                               Obtained from left leg on 3/12/2020                               Obtained from right leg on 3/19/2020                                  Biopsy done 7/18/19                 Labs/ HbA1c  Date               Grafts: Date               HBO:                Antibiotics: Doxycycline for 14 days BID; phoned to [facility] 6/28/19                                  Doxycycline for 10 days BID and CIPRO 500 mg BID for 10 days ordered on 8/22/19 [facility]                                   Doxycycline for 7 days on 9/5/19                                   Doxycycline for 7 days on 3/12/2020 continued on 3/19/2020 for 7 days                                    Bactrim DS for  10 days on 3/26/32586              Earlier Wound care treatments:                [id number]:                        Consults:   Date 7/18/19 to Dr [facility] physician:      Continuing wound care orders and information:              Residence: private                 Continue home health care with: none at this time, [place] [facility] [place]              Your wound-care supplies will be provided by: Ana Paula Romero provider:              [facility] with              [facility] loading: Date              ENGIL Medications: RX SANTYL GIVEN 8/1/19              BKYBB cleansing:                           NF not scrub or use excessive force.                          Wash hands with soap and water before and after dressing changes.                           Prior to applying a clean dressing, cleanse wound with normal saline,                                wound cleanser, or mild soap and water.                                     Daily Wound management:                                                Avoid standing for long periods of time.                          DFQRB wraps/stockings in AM and remove at bedtime.                          If swelling is present, elevate legs to the level of the heart or above for 30 minutes 4-5 times a day and/or when sitting.                                             When taking antibiotics take entire prescription as ordered by physician do not stop taking until medicine is all gone.     Right lower arm--Healed today 9/12/19    Left hand-- healed 12/26/19   Left medial buttock -- healed

## 2020-04-19 LAB
CULTURE: ABNORMAL
Lab: ABNORMAL
SPECIMEN: ABNORMAL

## 2020-04-23 ENCOUNTER — TELEPHONE (OUTPATIENT)
Dept: PHARMACY | Age: 75
End: 2020-04-23

## 2020-04-23 ENCOUNTER — HOSPITAL ENCOUNTER (OUTPATIENT)
Dept: WOUND CARE | Age: 75
Discharge: HOME | End: 2020-04-23
Payer: MEDICARE

## 2020-04-23 ENCOUNTER — ANTI-COAG VISIT (OUTPATIENT)
Dept: PHARMACY | Age: 75
End: 2020-04-23
Payer: MEDICARE

## 2020-04-23 VITALS
HEART RATE: 80 BPM | RESPIRATION RATE: 18 BRPM | DIASTOLIC BLOOD PRESSURE: 72 MMHG | TEMPERATURE: 97.1 F | SYSTOLIC BLOOD PRESSURE: 129 MMHG

## 2020-04-23 LAB
INR BLD: 2.1 INDEX
PROTHROMBIN TIME: 25.6 SECONDS (ref 11.7–14.5)

## 2020-04-23 PROCEDURE — 11042 DBRDMT SUBQ TIS 1ST 20SQCM/<: CPT

## 2020-04-23 PROCEDURE — 36415 COLL VENOUS BLD VENIPUNCTURE: CPT

## 2020-04-23 PROCEDURE — 99211 OFF/OP EST MAY X REQ PHY/QHP: CPT

## 2020-04-23 PROCEDURE — 85610 PROTHROMBIN TIME: CPT

## 2020-04-23 NOTE — PROGRESS NOTES
prostate    High Blood Pressure Father     High Cholesterol Father     Cancer Brother     Diabetes Brother     Thyroid Disease Brother        SOCIAL HISTORY    Social History     Tobacco Use    Smoking status: Former Smoker     Packs/day: 2.00     Years: 35.00     Pack years: 70.00     Types: Cigarettes     Last attempt to quit:      Years since quittin.3    Smokeless tobacco: Never Used    Tobacco comment: chew--30 years.   Reviewed 2015   Substance Use Topics    Alcohol use: Yes     Comment: soc    Drug use: Never       ALLERGIES    Allergies   Allergen Reactions    Cavilon Durable Barrier [Mineral Oil-Dimeth-Coconut Oil]     Parabens Hives    Parabens Hives    Prinivil [Lisinopril] Swelling    Cortisone Rash    Cortisone Rash    Dilaudid [Hydromorphone Hcl] Rash    Penicillins Rash    Penicillins Rash    Sulfamethoxazole-Trimethoprim Nausea Only    Tape [Adhesive Tape] Rash       MEDICATIONS    Current Outpatient Medications on File Prior to Encounter   Medication Sig Dispense Refill    warfarin (COUMADIN) 5 MG tablet TAKE ONE (1) TABLET BY MOUTH DAILY , EXCEPT TAKE ONE AND ONE HALF TABLETS BY MOUTH ON  96 tablet 5    metoprolol tartrate (LOPRESSOR) 25 MG tablet Take 0.5 tablets by mouth 2 times daily 90 tablet 3    Zinc Oxide (DESITIN CREAMY EX) Apply 13 % topically      losartan (COZAAR) 50 MG tablet Take 1 tablet by mouth daily Patient tolerates diovan 90 tablet 1    aspirin 81 MG chewable tablet Take 1 tablet by mouth daily 30 tablet 3    escitalopram (LEXAPRO) 20 MG tablet Take 1 tablet by mouth daily 30 tablet 3    atorvastatin (LIPITOR) 40 MG tablet Take 40 mg by mouth nightly      metFORMIN (GLUCOPHAGE) 500 MG tablet Take 500 mg by mouth daily (with breakfast)      ARIPiprazole (ABILIFY) 15 MG tablet Take 15 mg by mouth daily      omeprazole (PRILOSEC) 20 MG delayed release capsule TAKE ONE (1) CAPSULE BY MOUTH ONCE DAILY 90 capsule 1    blood Debridement    Using curette the wound(s) was/were sharply debrided down through and including the removal of epidermis, dermis and subcutaneous tissue. Devitalized Tissue Debrided:  fibrin, biofilm, slough and exudate    Pre Debridement Measurements:  Are located in the Wound Documentation Flow Sheet    All active wounds listed below with today's date are evaluated  Wound(s)    debrided this date include # : 6     Post  Debridement Measurements:  Wound 11/15/13 Other (Comment) Leg Inner erethema with a small puncture site (Active)   Number of days: 2350       Wound 06/27/19 #6 (onset 1 month) Left Medial Distal Ankle (Active)   Wound Image   4/16/2020  9:12 AM   Wound Venous 4/16/2020  8:54 AM   Offloading for Diabetic Foot Ulcers No 12/26/2019 11:24 AM   Dressing Status Clean;Dry; Intact 4/16/2020  9:46 AM   Dressing Changed Changed/New 4/16/2020  9:46 AM   Wound Cleansed Wound cleanser 4/16/2020  8:54 AM   Wound Length (cm) 1 cm 4/23/2020  9:04 AM   Wound Width (cm) 1 cm 4/23/2020  9:04 AM   Wound Depth (cm) 0.1 cm 4/23/2020  9:04 AM   Wound Surface Area (cm^2) 1 cm^2 4/23/2020  9:04 AM   Change in Wound Size % (l*w) 85.71 4/23/2020  9:04 AM   Wound Volume (cm^3) 0.1 cm^3 4/23/2020  9:04 AM   Wound Healing % 95 4/23/2020  9:04 AM   Post-Procedure Length (cm) 1 cm 4/23/2020  9:04 AM   Post-Procedure Width (cm) 0.8 cm 4/16/2020  9:12 AM   Post-Procedure Depth (cm) 0.1 cm 4/16/2020  9:12 AM   Post-Procedure Surface Area (cm^2) 0.64 cm^2 4/16/2020  9:12 AM   Post-Procedure Volume (cm^3) 0.06 cm^3 4/16/2020  9:12 AM   Distance Tunneling (cm) 0 cm 4/23/2020  9:04 AM   Tunneling Position ___ O'Clock 0 4/23/2020  9:04 AM   Undermining Starts ___ O'Clock 0 4/23/2020  9:04 AM   Undermining Ends___ O'Clock 0 4/23/2020  9:04 AM   Undermining Maxium Distance (cm) 0 4/23/2020  9:04 AM   Wound Assessment Red 4/23/2020  9:04 AM   Drainage Amount Large 4/23/2020  9:04 AM   Drainage Description Serosanguinous 4/23/2020  9:04 AM   Odor Strong 4/23/2020  9:04 AM   Margins Defined edges 4/23/2020  9:04 AM   Ana-wound Assessment Red 4/23/2020  9:04 AM   Non-staged Wound Description Full thickness 4/16/2020  8:54 AM   [place]%Wound Bed 0 4/23/2020  9:04 AM   Red%Wound Bed 100 4/23/2020  9:04 AM   Yellow%Wound Bed 0 4/23/2020  9:04 AM   Black%Wound Bed 0 4/23/2020  9:04 AM   Purple%Wound Bed 0 4/23/2020  9:04 AM   Other%Wound Bed 0 4/23/2020  9:04 AM   Number of days: 300       Wound 03/12/20 #11 right buttocks--pressure stage III (Active)   Wound Image   4/16/2020  9:12 AM   Wound Pressure Stage  3 4/16/2020  9:12 AM   Dressing Status Clean;Dry; Intact 4/16/2020 10:18 AM   Dressing Changed Changed/New 4/16/2020 10:18 AM   Wound Cleansed Wound cleanser 4/2/2020  9:06 AM   Wound Length (cm) 0.1 cm 4/16/2020  9:12 AM   Wound Width (cm) 0.1 cm 4/16/2020  9:12 AM   Wound Depth (cm) 0.1 cm 4/16/2020  9:12 AM   Wound Surface Area (cm^2) 0.01 cm^2 4/16/2020  9:12 AM   Change in Wound Size % (l*w) 98.75 4/16/2020  9:12 AM   Wound Volume (cm^3) 0 cm^3 4/16/2020  9:12 AM   Wound Healing % 100 4/16/2020  9:12 AM   Post-Procedure Length (cm) 5 cm 3/19/2020  9:50 AM   Post-Procedure Width (cm) 3.5 cm 3/19/2020  9:50 AM   Post-Procedure Depth (cm) 0.1 cm 3/19/2020  9:50 AM   Post-Procedure Surface Area (cm^2) 17.5 cm^2 3/19/2020  9:50 AM   Post-Procedure Volume (cm^3) 1.75 cm^3 3/19/2020  9:50 AM   Distance Tunneling (cm) 0 cm 4/16/2020  9:12 AM   Tunneling Position ___ O'Clock 0 4/16/2020  9:12 AM   Undermining Starts ___ O'Clock 0 4/16/2020  9:12 AM   Undermining Ends___ O'Clock 0 4/16/2020  9:12 AM   Undermining Maxium Distance (cm) 0 4/16/2020  9:12 AM   Wound Assessment Pink 4/16/2020  9:12 AM   Drainage Amount Scant 4/16/2020  9:12 AM   Drainage Description Serosanguinous 4/16/2020  9:12 AM   Odor None 4/16/2020  9:12 AM   Margins Defined edges 3/26/2020  9:04 AM   Ana-wound Assessment Pink 4/16/2020  9:12 AM   Non-staged Wound Description Full changes.                         Prior to applying a clean dressing, cleanse wound with normal saline,                                wound cleanser, or mild soap and water.                                     Daily Wound management:                                                Avoid standing for long periods of time.                          KWGNL wraps/stockings in AM and remove at bedtime.                          If swelling is present, elevate legs to the level of the heart or above for 30 minutes 4-5 times a day and/or when sitting.                                             When taking antibiotics take entire prescription as ordered by physician do not stop taking until medicine is all gone.     Right lower arm--Healed today 9/12/19    Left hand-- healed 12/26/19   Left medial buttock -- healed 1/23/2020                                                      Orders for this week: 4/23/2020        Left medial ankle distal-- cleanse with  Dakins  soak in clinic  , pat dry. Lotrisone to fabricio wound .  Then to open areas:  iodosorb gel to open area,calcium alginate and  cover with abd,  Wrap with coban 2 and change with Doctor  visit on Monday      Right Leg --  cleanse with  Dakins  soak in clinic. At home soak in acetic acid on gauze for 5 minutes. Then   Cover with Silver  Calcium Alginate,  ABD and wrap with Kerlix.  Change every other day Tubi F      Buttock wounds-- wash with soap and water, pat dry, cover with Mepilex sacrum border.  Change every 3  days     New culture obtained 4/16/20--right leg      Reassess per DR Padilla(4/27/2020) on Monday due to infection      Follow up [address] in 1 week on Thursday as scheduled in the wound care center     Call [phone] for any questions or concerns.           Physician Signature _________________________________          Treatment Note      Written Patient Dismissal Instructions Given            Electronically signed by Wilfrido Huerta MD on 4/23/2020

## 2020-04-27 ENCOUNTER — HOSPITAL ENCOUNTER (OUTPATIENT)
Dept: WOUND CARE | Age: 75
Discharge: HOME | End: 2020-04-27
Payer: MEDICARE

## 2020-04-27 VITALS
SYSTOLIC BLOOD PRESSURE: 135 MMHG | DIASTOLIC BLOOD PRESSURE: 79 MMHG | RESPIRATION RATE: 20 BRPM | TEMPERATURE: 99.6 F | HEART RATE: 82 BPM

## 2020-04-27 PROCEDURE — 99214 OFFICE O/P EST MOD 30 MIN: CPT

## 2020-04-27 PROCEDURE — 29581 APPL MULTLAYER CMPRN SYS LEG: CPT

## 2020-04-27 ASSESSMENT — PAIN DESCRIPTION - PROGRESSION: CLINICAL_PROGRESSION: NOT CHANGED

## 2020-04-27 ASSESSMENT — PAIN DESCRIPTION - ONSET: ONSET: ON-GOING

## 2020-04-27 ASSESSMENT — PAIN DESCRIPTION - LOCATION: LOCATION: LEG

## 2020-04-27 ASSESSMENT — PAIN - FUNCTIONAL ASSESSMENT: PAIN_FUNCTIONAL_ASSESSMENT: ACTIVITIES ARE NOT PREVENTED

## 2020-04-27 ASSESSMENT — PAIN DESCRIPTION - ORIENTATION: ORIENTATION: RIGHT;LEFT

## 2020-04-27 ASSESSMENT — PAIN DESCRIPTION - PAIN TYPE: TYPE: ACUTE PAIN

## 2020-04-27 ASSESSMENT — PAIN SCALES - GENERAL: PAINLEVEL_OUTOF10: 4

## 2020-04-27 ASSESSMENT — PAIN DESCRIPTION - FREQUENCY: FREQUENCY: CONTINUOUS

## 2020-04-27 ASSESSMENT — PAIN DESCRIPTION - DESCRIPTORS: DESCRIPTORS: SORE

## 2020-04-27 NOTE — PROGRESS NOTES
Wound Care Center Progress Note       Rigo Pryor  AGE: 69 y.o. GENDER: male  : 1945  TODAY'S DATE:  2020        Subjective:     Chief Complaint   Patient presents with    Wound Check     RT LEG, LT LEG         HISTORY of PRESENT ILLNESS     Rigo Pryor is a 69 y.o. male who presents today for wound evaluation of Chronic venous, arterial and diabetic ulcer(s) of the left and right LE. The ulcer is of moderate severity. The underlying cause of the wound is venous disease.     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 Hector Mattson Anemia 2011    Arm fracture Christofer Conquest     Dr Pam Adams with also yearly DM exam    Cataract     worsening-Dr. Barnes Crease    Cellulitis of left lower leg     Chronic venous hypertension with ulcer ([address] 2011    CKD (chronic kidney disease) 2012    Renal u/S normal     Colon polyps     Dr. Hector Mattson COPD (chronic obstructive pulmonary disease) ([address]     Diabetes mellitus ([address]     Diabetes mellitus ([address]     Diabetes mellitus with peripheral circulatory disorder ([address] 10/2/2015    Diabetes mellitus with skin ulcer ([address] 10/2/2015    Diabetic peripheral neuropathy ([address]     + EMG, NCS    Diabetic skin ulcer associated with type 2 diabetes mellitus ([address]     Diverticulosis 12    mild, left colon    Femoral DVT (deep venous thrombosis) ([address] 2011    PARTIAL,CHRONIC-[facility]    GERD (gastroesophageal reflux disease)     Glaucoma     open angle-Dr. Padmaja Cortes H/O cardiac catheterization 6/3/14    EF55% normal  Cortisone Rash    Dilaudid [Hydromorphone Hcl] Rash    Penicillins Rash    Penicillins Rash    Sulfamethoxazole-Trimethoprim Nausea Only    Tape [Adhesive Tape] Rash       MEDICATIONS    Current Outpatient Medications on File Prior to Encounter   Medication Sig Dispense Refill    warfarin (COUMADIN) 5 MG tablet TAKE ONE (1) TABLET BY MOUTH DAILY , EXCEPT TAKE ONE AND ONE HALF TABLETS BY MOUTH ON THURSDAYS 96 tablet 5    metoprolol tartrate (LOPRESSOR) 25 MG tablet Take 0.5 tablets by mouth 2 times daily 90 tablet 3    losartan (COZAAR) 50 MG tablet Take 1 tablet by mouth daily Patient tolerates diovan 90 tablet 1    aspirin 81 MG chewable tablet Take 1 tablet by mouth daily 30 tablet 3    escitalopram (LEXAPRO) 20 MG tablet Take 1 tablet by mouth daily 30 tablet 3    atorvastatin (LIPITOR) 40 MG tablet Take 40 mg by mouth nightly      metFORMIN (GLUCOPHAGE) 500 MG tablet Take 500 mg by mouth daily (with breakfast)      ARIPiprazole (ABILIFY) 15 MG tablet Take 15 mg by mouth daily      omeprazole (PRILOSEC) 20 MG delayed release capsule TAKE ONE (1) CAPSULE BY MOUTH ONCE DAILY 90 capsule 1    buPROPion (WELLBUTRIN XL) 300 MG extended release tablet Take 1 tablet by mouth every morning 90 tablet 3    Zinc Oxide (DESITIN CREAMY EX) Apply 13 % topically      blood glucose test strips (ALISHA CONTOUR TEST) strip USE AS DIRECTED TO TEST BLOOD SUGAR FOUR TIMES DAILY AS NEEDED 100 strip 3    ALISHA CONTOUR TEST strip USE AS DIRECTED TO TEST BLOOD SUGAR FOUR TIMES DAILY 100 strip 5    Glucose Blood (BLOOD GLUCOSE TEST STRIPS) STRP Please give contour testing strips to test blood sugar 4x daily 200 strip 3     No current facility-administered medications on file prior to encounter. REVIEW OF SYSTEMS    Pertinent items are noted in HPI. Constitutional: Negative for systemic symptoms including fever, chills and malaise.     Objective:      /79   Pulse 82   Temp 99.6 °F (37.6 °C) (Temporal) edges 4/27/2020  9:26 AM   Ana-wound Assessment Red 4/27/2020  9:26 AM   Non-staged Wound Description Partial thickness 4/27/2020  9:26 AM   [place]%Wound Bed 0 4/27/2020  9:26 AM   Red%Wound Bed 50 4/27/2020  9:26 AM   Yellow%Wound Bed 50 4/27/2020  9:26 AM   Black%Wound Bed 0 4/27/2020  9:26 AM   Purple%Wound Bed 0 4/27/2020  9:26 AM   Other%Wound Bed 0 4/27/2020  9:26 AM   Number of days: 304       Wound 03/12/20 #11 right buttocks--pressure stage III (Active)   Wound Image   4/16/2020  9:12 AM   Wound Pressure Stage  3 4/16/2020  9:12 AM   Dressing Status Clean;Dry; Intact 4/16/2020 10:18 AM   Dressing Changed Changed/New 4/16/2020 10:18 AM   Wound Cleansed Wound cleanser 4/2/2020  9:06 AM   Wound Length (cm) 0.1 cm 4/16/2020  9:12 AM   Wound Width (cm) 0.1 cm 4/16/2020  9:12 AM   Wound Depth (cm) 0.1 cm 4/16/2020  9:12 AM   Wound Surface Area (cm^2) 0.01 cm^2 4/16/2020  9:12 AM   Change in Wound Size % (l*w) 98.75 4/16/2020  9:12 AM   Wound Volume (cm^3) 0 cm^3 4/16/2020  9:12 AM   Wound Healing % 100 4/16/2020  9:12 AM   Post-Procedure Length (cm) 5 cm 3/19/2020  9:50 AM   Post-Procedure Width (cm) 3.5 cm 3/19/2020  9:50 AM   Post-Procedure Depth (cm) 0.1 cm 3/19/2020  9:50 AM   Post-Procedure Surface Area (cm^2) 17.5 cm^2 3/19/2020  9:50 AM   Post-Procedure Volume (cm^3) 1.75 cm^3 3/19/2020  9:50 AM   Distance Tunneling (cm) 0 cm 4/16/2020  9:12 AM   Tunneling Position ___ O'Clock 0 4/16/2020  9:12 AM   Undermining Starts ___ O'Clock 0 4/16/2020  9:12 AM   Undermining Ends___ O'Clock 0 4/16/2020  9:12 AM   Undermining Maxium Distance (cm) 0 4/16/2020  9:12 AM   Drainage Amount Scant 4/16/2020  9:12 AM   Drainage Description Serosanguinous 4/16/2020  9:12 AM   Odor None 4/16/2020  9:12 AM   Margins Defined edges 3/26/2020  9:04 AM   Ana-wound Assessment Pink 4/16/2020  9:12 AM   Non-staged Wound Description Full thickness 4/16/2020  9:12 AM   [place]%Wound Bed 0 3/26/2020  9:04 AM   Red%Wound Bed 100 3/26/2020

## 2020-04-30 ENCOUNTER — HOSPITAL ENCOUNTER (OUTPATIENT)
Dept: WOUND CARE | Age: 75
Discharge: HOME | End: 2020-04-30
Payer: MEDICARE

## 2020-04-30 ENCOUNTER — HOSPITAL ENCOUNTER (OUTPATIENT)
Age: 75
Discharge: HOME | End: 2020-04-30
Payer: MEDICARE

## 2020-04-30 ENCOUNTER — ANTI-COAG VISIT (OUTPATIENT)
Dept: PHARMACY | Age: 75
End: 2020-04-30

## 2020-04-30 VITALS
RESPIRATION RATE: 20 BRPM | DIASTOLIC BLOOD PRESSURE: 76 MMHG | HEART RATE: 82 BPM | SYSTOLIC BLOOD PRESSURE: 145 MMHG | TEMPERATURE: 98.9 F

## 2020-04-30 PROBLEM — L89.323 PRESSURE INJURY OF LEFT BUTTOCK, STAGE 3 (HCC): Status: RESOLVED | Noted: 2020-03-12 | Resolved: 2020-04-30

## 2020-04-30 PROBLEM — L89.313 PRESSURE INJURY OF RIGHT BUTTOCK, STAGE 3 (HCC): Status: RESOLVED | Noted: 2020-03-12 | Resolved: 2020-04-30

## 2020-04-30 LAB
INR BLD: 1.95 INDEX
PROTHROMBIN TIME: 23.7 SECONDS (ref 11.7–14.5)

## 2020-04-30 PROCEDURE — 11042 DBRDMT SUBQ TIS 1ST 20SQCM/<: CPT

## 2020-04-30 PROCEDURE — 87077 CULTURE AEROBIC IDENTIFY: CPT

## 2020-04-30 PROCEDURE — 36415 COLL VENOUS BLD VENIPUNCTURE: CPT

## 2020-04-30 PROCEDURE — 87186 SC STD MICRODIL/AGAR DIL: CPT

## 2020-04-30 PROCEDURE — 99211 OFF/OP EST MAY X REQ PHY/QHP: CPT

## 2020-04-30 PROCEDURE — 85610 PROTHROMBIN TIME: CPT

## 2020-04-30 PROCEDURE — 87073 CULTURE BACTERIA ANAEROBIC: CPT

## 2020-04-30 PROCEDURE — 87071 CULTURE AEROBIC QUANT OTHER: CPT

## 2020-04-30 ASSESSMENT — PAIN SCALES - GENERAL: PAINLEVEL_OUTOF10: 5

## 2020-04-30 ASSESSMENT — PAIN DESCRIPTION - FREQUENCY: FREQUENCY: CONTINUOUS

## 2020-04-30 ASSESSMENT — PAIN DESCRIPTION - ONSET: ONSET: ON-GOING

## 2020-04-30 ASSESSMENT — PAIN DESCRIPTION - DESCRIPTORS: DESCRIPTORS: ACHING;STABBING;SORE

## 2020-04-30 ASSESSMENT — PAIN DESCRIPTION - PAIN TYPE: TYPE: ACUTE PAIN

## 2020-04-30 ASSESSMENT — PAIN DESCRIPTION - LOCATION: LOCATION: LEG

## 2020-04-30 ASSESSMENT — PAIN DESCRIPTION - ORIENTATION: ORIENTATION: RIGHT

## 2020-04-30 ASSESSMENT — PAIN DESCRIPTION - PROGRESSION: CLINICAL_PROGRESSION: NOT CHANGED

## 2020-04-30 NOTE — PROGRESS NOTES
Wound Care Center Progress Note With Procedure    Syed Nesbitt  AGE: 69 y.o. GENDER: male  : 1945  EPISODE DATE:  2020     Subjective:     Chief Complaint   Patient presents with    Wound Check     pablo lower legs         HISTORY of PRESENT ILLNESS      Syed Nesbitt is a 69 y.o. male who presents today for wound evaluation of Chronic venous ulcer(s) of the left leg and more of a cellulitis on the right leg. The ulcer is of moderate severity. The underlying cause of the wound is venous on the left and I suspect a mix of venous and infection on the right. Last week, I had started him on cipro- he finished as of today. I had him come in earlier this week due to concern about wrapping the right leg due to the infection. He has continued to have redness and excessive drainage. Today, he has foul odor, increased redness, increased drainage and maceration on the right leg. The left leg is the same. Given the worsening of the redness and drainage despite the antibiotics, I will switch the right leg to daily dressing changes with home health. Will continue topical treatment with acetic acid as well. I will take  New culture today. His buttock ulcers are healed this week.   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 Miguel Dixon Anemia 2011    Arm fracture Wynetta Jefferson Dr Rosalva Brunner with also yearly DM exam    Cataract     worsening-Dr. Daren Giles    Cellulitis of left lower leg     Chronic venous hypertension with ulcer ([facility] 2011    CKD (chronic kidney disease) 2012    Renal u/S normal     Colon polyps      Aris    COPD (chronic obstructive pulmonary disease) ([address]     Diabetes mellitus ([address] 1993    Diabetes mellitus ([address]     Diabetes mellitus with peripheral circulatory disorder ([address] 10/2/2015    Diabetes mellitus with skin ulcer ([address] 10/2/2015    Diabetic peripheral neuropathy ([address] 11/12    + EMG, NCS    Diabetic skin ulcer associated with type 2 diabetes mellitus ([address]     Diverticulosis 4/23/12    mild, left colon    Femoral DVT (deep venous thrombosis) ([address] 09/2011    PARTIAL,CHRONIC-[facility]    GERD (gastroesophageal reflux disease)     Glaucoma 11/12    open angle-Dr. Denia Padron H/O cardiac catheterization 6/3/14    EF55% normal study    H/O Doppler ultrasound 03/26/15    Carotid US- no significant stenosis noted bilaterally.  H/O echocardiogram 11/21/2019    EF50-55%, Mild AR. Moderately dilated right ventricle with negative Solis's sign.  H/O mumps orchitis     as a youth    Hemorrhoids 4/23/12    Dr. Troy Julien; repeat colonoscopy 3 years    History of nuclear stress test 11/21/2019    EF 60%, Normal study.  HLD (hyperlipidemia)     Hx of Doppler ultrasound 1/06/15    Lymph node seen in left groin area. No bilateral stenosis.     Hyperlipemia     Hyperlipidemia     Hypertension 1992    Hypertension     Idiopathic chronic venous hypertension of left lower extremity with ulcer and inflammation ([address] 10/2/2015    Leg ulcer ([address] 1978-present    following at wound center, Dr Claudia Jaime No diabetic retinopathy OU 11/12    Dr. Haydee Brady chronic ulcer of left lower leg with fat layer exposed ([address] 10/2/2015    Pulmonary embolism ([address] 11/13    patient on coumadin    Sleep apnea     doesnt always use cpap dt it drys him out    SOB (shortness of breath) Oct 2011    Stress test normal.     Tendinitis 1973    plantar tendons    Type II or unspecified type diabetes mellitus with other specified manifestations, not stated as uncontrolled 2/8/2013 AM   Drainage Amount Moderate 4/30/2020  9:15 AM   Drainage Description Serosanguinous 4/30/2020  9:15 AM   Odor None 4/30/2020  9:15 AM   Margins Defined edges 4/30/2020  9:15 AM   Ana-wound Assessment Red 4/30/2020  9:15 AM   Non-staged Wound Description Full thickness 4/30/2020  9:15 AM   [place]%Wound Bed 0 4/30/2020  9:15 AM   Red%Wound Bed 70 4/30/2020  9:15 AM   Yellow%Wound Bed 30 4/30/2020  9:15 AM   Black%Wound Bed 0 4/30/2020  9:15 AM   Purple%Wound Bed 0 4/30/2020  9:15 AM   Other%Wound Bed 0 4/30/2020  9:15 AM   Number of days: 307       Wound 03/12/20 #11 right buttocks--pressure stage III (Active)   Wound Image   4/16/2020  9:12 AM   Wound Pressure Stage  3 4/16/2020  9:12 AM   Dressing Status Clean;Dry; Intact 4/16/2020 10:18 AM   Dressing Changed Changed/New 4/16/2020 10:18 AM   Wound Cleansed Wound cleanser 4/2/2020  9:06 AM   Wound Length (cm) 0.1 cm 4/16/2020  9:12 AM   Wound Width (cm) 0.1 cm 4/16/2020  9:12 AM   Wound Depth (cm) 0.1 cm 4/16/2020  9:12 AM   Wound Surface Area (cm^2) 0.01 cm^2 4/16/2020  9:12 AM   Change in Wound Size % (l*w) 98.75 4/16/2020  9:12 AM   Wound Volume (cm^3) 0 cm^3 4/16/2020  9:12 AM   Wound Healing % 100 4/16/2020  9:12 AM   Post-Procedure Length (cm) 5 cm 3/19/2020  9:50 AM   Post-Procedure Width (cm) 3.5 cm 3/19/2020  9:50 AM   Post-Procedure Depth (cm) 0.1 cm 3/19/2020  9:50 AM   Post-Procedure Surface Area (cm^2) 17.5 cm^2 3/19/2020  9:50 AM   Post-Procedure Volume (cm^3) 1.75 cm^3 3/19/2020  9:50 AM   Distance Tunneling (cm) 0 cm 4/16/2020  9:12 AM   Tunneling Position ___ O'Clock 0 4/16/2020  9:12 AM   Undermining Starts ___ O'Clock 0 4/16/2020  9:12 AM   Undermining Ends___ O'Clock 0 4/16/2020  9:12 AM   Undermining [place] Distance (cm) 0 4/16/2020  9:12 AM   Drainage Amount Scant 4/16/2020  9:12 AM   Drainage Description Serosanguinous 4/16/2020  9:12 AM   Odor None 4/16/2020  9:12 AM   Margins Defined edges 3/26/2020  9:04 AM   Ana-wound Assessment Pink leg is about the same. The right leg is macerated, there is redness, copious drainage, foul odor. Plan:       Discharge Instructions         Discharge Instructions                   Discharge Instructions        PHYSICIAN ORDERS AND DISCHARGE INSTRUCTIONS     NOTE: Upon discharge from the [address], you will receive a patient experience survey. We would be grateful if you would take the time to fill this survey out.     Wound care order history:                 ANA CRISTINA's   Right       Left               Date               Vascular studies:   ordered on 7/25/19 Date  To be done 8/2/19 imaging center               Imaging:  Date               Cultures: obtained from left medal leg  Date 6/27/19--positive kles.                                [id number] from left medial lower leg on 8/15/19                               Obtained from left medial lower leg on 9/26/19                               Obtained from right leg on 10/17/19                               Obtained from left leg on 3/12/2020                               Obtained from right leg on 3/19/2020                                  Biopsy done 7/18/19                 Labs/ HbA1c  Date               Grafts: Date               HBO:                Antibiotics: Doxycycline for 14 days BID; phoned to [facility] 6/28/19                                    Doxycycline for 10 days BID and CIPRO 500 mg BID for 10 days ordered on 8/22/19 [facility]                                   Doxycycline for 7 days on 9/5/19                                   Doxycycline for 7 days on 3/12/2020 continued on 3/19/2020 for 7 days                                    Bactrim DS for  10 days on 3/26/47694              Earlier Wound care treatments:                [id number]:                        Consults:   Date 7/18/19 to Dr Kacy Temple-- [facility] care physician:      Continuing wound care orders and

## 2020-04-30 NOTE — PROGRESS NOTES
Multilayer Compression Wrap   (Not Unna) Below the Knee    NAME:  Niya Dior OF BIRTH:  1945  MEDICAL RECORD NUMBER:  [id number]  DATE:  4/30/2020    Multilayer compression wrap: Removed old Multilayer wrap if indicated and wash leg with mild soap/water. Applied moisturizing agent to dry skin as needed. Applied primary and secondary dressing as ordered. Applied multilayered dressing below the knee to left lower leg. Instructed patient/caregiver not to remove dressing and to keep it clean and dry. Instructed patient/caregiver on complications to report to provider, such as pain, numbness in toes, heavy drainage, and slippage of dressing. Instructed patient on purpose of compression dressing and on activity and exercise recommendations.       Electronically signed by Shekhar Ashley RN on 4/30/2020 at 10:24 AM

## 2020-05-01 ENCOUNTER — APPOINTMENT (OUTPATIENT)
Dept: ULTRASOUND IMAGING | Age: 75
End: 2020-05-01
Payer: MEDICARE

## 2020-05-01 ENCOUNTER — HOSPITAL ENCOUNTER (EMERGENCY)
Age: 75
Discharge: NURSING, REHAB OR LONG-TERM CARE | End: 2020-05-01
Payer: MEDICARE

## 2020-05-01 ENCOUNTER — APPOINTMENT (OUTPATIENT)
Dept: GENERAL RADIOLOGY | Age: 75
End: 2020-05-01
Payer: MEDICARE

## 2020-05-01 VITALS
HEIGHT: 77 IN | SYSTOLIC BLOOD PRESSURE: 136 MMHG | RESPIRATION RATE: 16 BRPM | HEART RATE: 73 BPM | DIASTOLIC BLOOD PRESSURE: 59 MMHG | TEMPERATURE: 98.4 F | OXYGEN SATURATION: 96 % | WEIGHT: 275 LBS | BODY MASS INDEX: 32.47 KG/M2

## 2020-05-01 LAB
ALBUMIN SERPL-MCNC: 3.1 GM/DL (ref 3.4–5)
ALP BLD-CCNC: 72 IU/L (ref 40–129)
ALT SERPL-CCNC: 9 U/L (ref 10–40)
ANION GAP SERPL CALCULATED.3IONS-SCNC: 9 MMOL/L (ref 4–16)
AST SERPL-CCNC: 13 IU/L (ref 15–37)
BACTERIA: NEGATIVE /HPF
BASOPHILS ABSOLUTE: 0 K/CU MM
BASOPHILS RELATIVE PERCENT: 0.7 % (ref 0–1)
BILIRUB SERPL-MCNC: 0.8 MG/DL (ref 0–1)
BILIRUBIN URINE: NEGATIVE MG/DL
BLOOD, URINE: NEGATIVE
BUN BLDV-MCNC: 14 MG/DL (ref 6–23)
CALCIUM SERPL-MCNC: 8.2 MG/DL (ref 8.3–10.6)
CHLORIDE BLD-SCNC: 103 MMOL/L (ref 99–110)
CLARITY: CLEAR
CO2: 22 MMOL/L (ref 21–32)
COLOR: YELLOW
CREAT SERPL-MCNC: 1.1 MG/DL (ref 0.9–1.3)
DIFFERENTIAL TYPE: ABNORMAL
EOSINOPHILS ABSOLUTE: 0.2 K/CU MM
EOSINOPHILS RELATIVE PERCENT: 2.8 % (ref 0–3)
GFR AFRICAN AMERICAN: >60 ML/MIN/1.73M2
GFR NON-AFRICAN AMERICAN: >60 ML/MIN/1.73M2
GLUCOSE BLD-MCNC: 139 MG/DL (ref 70–99)
GLUCOSE, URINE: NEGATIVE MG/DL
HCT VFR BLD CALC: 34.1 % (ref 42–52)
HEMOGLOBIN: 10.6 GM/DL (ref 13.5–18)
IMMATURE NEUTROPHIL %: 0.4 % (ref 0–0.43)
INR BLD: 2.16 INDEX
KETONES, URINE: NEGATIVE MG/DL
LEUKOCYTE ESTERASE, URINE: NEGATIVE
LYMPHOCYTES ABSOLUTE: 1.7 K/CU MM
LYMPHOCYTES RELATIVE PERCENT: 29.2 % (ref 24–44)
MCH RBC QN AUTO: 29.3 PG (ref 27–31)
MCHC RBC AUTO-ENTMCNC: 31.1 % (ref 32–36)
MCV RBC AUTO: 94.2 FL (ref 78–100)
MONOCYTES ABSOLUTE: 0.5 K/CU MM
MONOCYTES RELATIVE PERCENT: 9.1 % (ref 0–4)
MUCUS: ABNORMAL HPF
NITRITE URINE, QUANTITATIVE: NEGATIVE
NUCLEATED RBC %: 0 %
PDW BLD-RTO: 15.9 % (ref 11.7–14.9)
PH, URINE: 5 (ref 5–8)
PLATELET # BLD: 291 K/CU MM (ref 140–440)
PMV BLD AUTO: 10.7 FL (ref 7.5–11.1)
POTASSIUM SERPL-SCNC: 4.6 MMOL/L (ref 3.5–5.1)
PRO-BNP: 638.9 PG/ML
PROTEIN UA: NEGATIVE MG/DL
PROTHROMBIN TIME: 26.4 SECONDS (ref 11.7–14.5)
RBC # BLD: 3.62 M/CU MM (ref 4.6–6.2)
RBC URINE: 1 /HPF (ref 0–3)
SEGMENTED NEUTROPHILS ABSOLUTE COUNT: 3.3 K/CU MM
SEGMENTED NEUTROPHILS RELATIVE PERCENT: 57.8 % (ref 36–66)
SODIUM BLD-SCNC: 134 MMOL/L (ref 135–145)
SPECIFIC GRAVITY UA: 1.01 (ref 1–1.03)
TOTAL CK: 230 IU/L (ref 38–174)
TOTAL IMMATURE NEUTOROPHIL: 0.02 K/CU MM
TOTAL NUCLEATED RBC: 0 K/CU MM
TOTAL PROTEIN: 6.5 GM/DL (ref 6.4–8.2)
TRICHOMONAS: ABNORMAL /HPF
TROPONIN T: 0.09 NG/ML
TROPONIN T: 0.1 NG/ML
UROBILINOGEN, URINE: NORMAL MG/DL (ref 0.2–1)
WBC # BLD: 5.7 K/CU MM (ref 4–10.5)
WBC UA: <1 /HPF (ref 0–2)

## 2020-05-01 PROCEDURE — 85025 COMPLETE CBC W/AUTO DIFF WBC: CPT

## 2020-05-01 PROCEDURE — 80053 COMPREHEN METABOLIC PANEL: CPT

## 2020-05-01 PROCEDURE — 83880 ASSAY OF NATRIURETIC PEPTIDE: CPT

## 2020-05-01 PROCEDURE — 93005 ELECTROCARDIOGRAM TRACING: CPT | Performed by: PHYSICIAN ASSISTANT

## 2020-05-01 PROCEDURE — 84484 ASSAY OF TROPONIN QUANT: CPT

## 2020-05-01 PROCEDURE — 85610 PROTHROMBIN TIME: CPT

## 2020-05-01 PROCEDURE — 81001 URINALYSIS AUTO W/SCOPE: CPT

## 2020-05-01 PROCEDURE — 99285 EMERGENCY DEPT VISIT HI MDM: CPT

## 2020-05-01 PROCEDURE — 93010 ELECTROCARDIOGRAM REPORT: CPT | Performed by: INTERNAL MEDICINE

## 2020-05-01 PROCEDURE — 82550 ASSAY OF CK (CPK): CPT

## 2020-05-01 PROCEDURE — 71045 X-RAY EXAM CHEST 1 VIEW: CPT

## 2020-05-01 PROCEDURE — 93971 EXTREMITY STUDY: CPT

## 2020-05-01 ASSESSMENT — PAIN DESCRIPTION - ONSET: ONSET: GRADUAL

## 2020-05-01 ASSESSMENT — PAIN DESCRIPTION - FREQUENCY: FREQUENCY: CONTINUOUS

## 2020-05-01 ASSESSMENT — PAIN DESCRIPTION - DESCRIPTORS: DESCRIPTORS: OTHER (COMMENT)

## 2020-05-01 ASSESSMENT — PAIN DESCRIPTION - PROGRESSION: CLINICAL_PROGRESSION: GRADUALLY WORSENING

## 2020-05-01 ASSESSMENT — PAIN SCALES - GENERAL: PAINLEVEL_OUTOF10: 5

## 2020-05-01 ASSESSMENT — PAIN DESCRIPTION - PAIN TYPE: TYPE: ACUTE PAIN

## 2020-05-01 ASSESSMENT — PAIN DESCRIPTION - LOCATION: LOCATION: LEG

## 2020-05-01 ASSESSMENT — PAIN DESCRIPTION - ORIENTATION: ORIENTATION: RIGHT;LOWER

## 2020-05-01 NOTE — ED NOTES
Bed: ED-20  Expected date: 5/1/20  Expected time:   Means of arrival: [facility] [facility]  Comments:  [address]  SAMI Zazueta  05/01/20 [id number]
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
## 2020-05-01 NOTE — ACP (ADVANCE CARE PLANNING)
Advance Care Planning   Advance Care Planning Clinical Specialist  Conversation Note      Date of ACP Conversation: 5/1/2020    Conversation Conducted with:   Patient with [address]: Next of Kin by law (only applies in absence of above) is dtr Barrera Banegas [phone] who pt wants, and wants document completed. ACP Clinical Specialist: Gina Ervin makes decisions on behalf of the incapacitated patient: Decision Maker is asked to consider and make decisions based on patient values, known preferences, or best interests. Current Designated Health Care Decision Maker:   Primary Decision Maker: Rosalind Pascal - Child - [phone]  (as entered in [address] field. Validate  this information as still accurate & up-to-date; edit [place] field as needed.)    If no Decision Maker listed above or available through scanned documents, then:    [address]   Who do you trust to make healthcare decisions for you? Name:   José Luis Wolfe  Phone  Number: [phone]  Can this person be reached and be able to respond quickly, such as within a few minutes or hours? Yes    Who would be your back-up decision maker? Name na  Phone Number: na    For below questions, when conducting conversation with Dolly, substitute \"he\" and \"his\" for \"you\" and \"your\". Ventilation  If you were in your present state of health and suddenly became very ill and were unable to breathe on your own, what would your preference be about the use of a ventilator (breathing machine) if it were available to you? If patient would desire the use of a ventilator (breathing machine), answer \"yes\", if not \"no\":yes    If your health were to worsen and it became clear that your chance of recovery was unlikely, would that change your answer?  Yes    Resuscitation  CPR works best to restart the heart when there is a sudden event, like a heart attack, in someone who is otherwise healthy. Unfortunately, CPR does not typically restart the heart for people who have serious health conditions or who are very sick. In the event your heart stopped, would you want attempts to restart your heart (answer \"yes\") or would you prefer a natural death (answer \"no\")? yes    If your health were to worsen and it became clear that your chance of recovery was unlikely, would that change your answer? No, see below    [] Yes  [x] No   Educated Patient / Damion Dawson regarding differences between Advance Directives and portable DNR orders. Length of ACP Conversation in minutes:  10 minutes    Conversation Outcomes:  [x] ACP discussion completed  [] Existing advance directive reviewed with patient; no changes to patient's previously recorded wishes   [] New Advance Directive completed   [] Portable Do Not Rescitate prepared for Provider review and signature  [] POLST/POST/MOLST/MOST prepared for Provider review and signature      Follow-up plan:    [x] Schedule follow-up conversation to continue planning  [x] Referred individual to Provider for additional questions/concerns   [] Advised patient/agent/surrogate to review completed ACP document and update if needed with changes in condition, patient preferences or care setting     [x] This note routed to one or more involved healthcare providers   would want heart started more than once until no recovery likely.

## 2020-05-01 NOTE — ED NOTES
[place], lactic and 1st set of cultures were drawn on patient.      Luciano Villagran  05/01/20 2256

## 2020-05-01 NOTE — ED TRIAGE NOTES
Patient c/o pain and weakness to right leg since 1400 yesterday. Patient has a drain to right lower leg that has been in for about 2 weeks.

## 2020-05-05 LAB
CULTURE: ABNORMAL
EKG ATRIAL RATE: 66 BPM
EKG DIAGNOSIS: NORMAL
EKG P AXIS: 73 DEGREES
EKG P-R INTERVAL: 218 MS
EKG Q-T INTERVAL: 436 MS
EKG QRS DURATION: 96 MS
EKG QTC CALCULATION (BAZETT): 457 MS
EKG R AXIS: -16 DEGREES
EKG T AXIS: 7 DEGREES
EKG VENTRICULAR RATE: 66 BPM
Lab: ABNORMAL
SPECIMEN: ABNORMAL

## 2020-05-06 ENCOUNTER — TELEPHONE (OUTPATIENT)
Dept: PHARMACY | Age: 75
End: 2020-05-06

## 2020-05-07 ENCOUNTER — TELEPHONE (OUTPATIENT)
Dept: PHARMACY | Age: 75
End: 2020-05-07

## 2020-05-07 ENCOUNTER — HOSPITAL ENCOUNTER (OUTPATIENT)
Dept: WOUND CARE | Age: 75
Discharge: HOME | End: 2020-05-07
Payer: MEDICARE

## 2020-05-07 VITALS
RESPIRATION RATE: 16 BRPM | HEART RATE: 87 BPM | TEMPERATURE: 98.2 F | SYSTOLIC BLOOD PRESSURE: 111 MMHG | DIASTOLIC BLOOD PRESSURE: 64 MMHG

## 2020-05-07 PROCEDURE — 11042 DBRDMT SUBQ TIS 1ST 20SQCM/<: CPT

## 2020-05-07 RX ORDER — TRAMADOL HYDROCHLORIDE 50 MG/1
25 TABLET ORAL EVERY 8 HOURS PRN
COMMUNITY
End: 2020-07-16

## 2020-05-07 NOTE — PROGRESS NOTES
Wound Care Center Progress Note With Procedure    Jose C Santana  AGE: 69 y.o. GENDER: male  : 1945  EPISODE DATE:  2020     Subjective:     Chief Complaint   Patient presents with    Wound Check     pablo lower legs         HISTORY of PRESENT ILLNESS      Jose C Santana is a 69 y.o. male who presents today for wound evaluation of Chronic venous ulcer(s) of the bilateral legs, left worse than right. The ulcer is of mild severity. The underlying cause of the wound is venous. He actually was much worse last week- he ended up going home and then being too weak to move. Home health came to see him the day after his visit and he was still in the same chair as when he got home after being in the wound center- he was too weak to walk. He was taken to the ER and had essentially negative work-up (including venous doppler on the right leg, which was negative for DVT). He was discharged to [facility] for  PT. He presents to the wound center today with significant improvement in both his legs. His culture grew scanty growth of several resistant organisms. Given the clinical improvement from simply having better care really goes against him actually having an infection- so no need for antibiotics. His real issue is that he is unable to care for himself at home and he has no one to help him out. He will likely improve rapidly now that he is at [facility], but once he goes back home, he will probably decline again.     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 Cha Saucedo Anemia 2011    Arm fracture Peggi Crystal     Dr Coty Moran with also yearly DM exam    Cataract     worsening- 2011    Stress test normal.     Tendinitis 1973    plantar tendons    Type II or unspecified type diabetes mellitus with other specified manifestations, not stated as uncontrolled 2/8/2013    Unspecified venous (peripheral) insufficiency     Urticaria     WD-Cellulitis of right anterior lower leg 9/5/2019    WD-Cellulitis of right lower extremity 3/26/2020    WD-Chronic venous hypertension (idiopathic) with ulcer of left lower extremity (CODE) ([address] 6/27/2019    WD-Chronic venous hypertension with inflammation, right 9/19/2019    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 to breakdown of skin ([address] 3/26/2020    WD-Open wound of hand without complication, left, initial encounter 12/12/2019    WD-Pressure injury of left buttock, stage 2 ([address] 1/2/2020    WD-Pressure injury of left buttock, stage 3 ([address] 3/12/2020    WD-Pressure injury of right buttock, stage 3 ([address] 3/12/2020    WD-Skin tear of right forearm without complication 9/99/1248    WD-Ulcer of right pretibial region, with fat layer exposed ([address] 10/17/2019       PAST SURGICAL HISTORY    Past Surgical History:   Procedure Laterality Date    BREAST SURGERY  1970s    benign tumors bilaterally    CARDIAC CATHETERIZATION  6/3/14    EF55% normal study    CARPAL TUNNEL RELEASE Left 1993    CARPAL TUNNEL RELEASE      CATARACT REMOVAL Right 3/11/2013    Dr. Chetan Collado Left 2/25/2013    Dr. Calixto Ly  2006    polyps    COLONOSCOPY  11/21/11    villous component--f/u colonoscopy will be needed in 6 months    COLONOSCOPY  4/23/12    mild diverticulosis, internal hemorrhoids; repeat in 3 years (Dr. Whitt Doctor)    COLONOSCOPY  08/04/2016    mild diverticulosis, three colon polyps    HERNIA REPAIR  1970s    HERNIA REPAIR      SKIN GRAFT  1978-present    skin grafts to left ankle ulcers    SPLENECTOMY  1958    SPLENECTOMY     [address] SURGERY Left 2009 FAMILY HISTORY    Family History   Problem Relation Age of Onset    Heart Disease Father     Cancer Father         prostate    High Blood Pressure Father     High Cholesterol Father     Cancer Brother     Diabetes Brother     Thyroid Disease Brother        SOCIAL HISTORY    Social History     Tobacco Use    Smoking status: Former Smoker     Packs/day: 2.00     Years: 35.00     Pack years: 70.00     Types: Cigarettes     Last attempt to quit:      Years since quittin.3    Smokeless tobacco: Never Used    Tobacco comment: chew--30 years. Reviewed 2015   Substance Use Topics    Alcohol use: Yes     Comment: soc    Drug use: Never       ALLERGIES    Allergies   Allergen Reactions    Cavilon Durable Barrier [Mineral Oil-Dimeth-Coconut Oil]     Parabens Hives    Parabens Hives    Prinivil [Lisinopril] Swelling    Cortisone Rash    Cortisone Rash    Dilaudid [Hydromorphone Hcl] Rash    Penicillins Rash    Penicillins Rash    Sulfamethoxazole-Trimethoprim Nausea Only    Tape [Adhesive Tape] Rash       MEDICATIONS    Current Outpatient Medications on File Prior to Encounter   Medication Sig Dispense Refill    traMADol (ULTRAM) 50 MG tablet Take 25 mg by mouth every 8 hours as needed for Pain.       warfarin (COUMADIN) 5 MG tablet TAKE ONE (1) TABLET BY MOUTH DAILY , EXCEPT TAKE ONE AND ONE HALF TABLETS BY MOUTH ON  (Patient taking differently: 6 mg ) 96 tablet 5    metoprolol tartrate (LOPRESSOR) 25 MG tablet Take 0.5 tablets by mouth 2 times daily 90 tablet 3    losartan (COZAAR) 50 MG tablet Take 1 tablet by mouth daily Patient tolerates diovan 90 tablet 1    aspirin 81 MG chewable tablet Take 1 tablet by mouth daily 30 tablet 3    escitalopram (LEXAPRO) 20 MG tablet Take 1 tablet by mouth daily 30 tablet 3    atorvastatin (LIPITOR) 40 MG tablet Take 40 mg by mouth nightly      metFORMIN (GLUCOPHAGE) 500 MG tablet Take 500 mg by mouth daily (with breakfast)      Ester Dyer MD    Consent obtained: Yes    Time out taken:  Yes    Pain Control: none       Debridement:Excisional Debridement    Using curette the wound(s) was/were sharply debrided down through and including the removal of epidermis, dermis and subcutaneous tissue. Devitalized Tissue Debrided:  fibrin, biofilm, slough and exudate    Pre Debridement Measurements:  Are located in the Wound Documentation Flow Sheet    All active wounds listed below with today's date are evaluated  Wound(s)    debrided this date include # : 6     Post  Debridement Measurements:  Wound 11/15/13 Other (Comment) Leg Inner erethema with a small puncture site (Active)   Number of days: 2364       Wound 06/27/19 #6 (onset 1 month) Left Medial Distal Ankle (Active)   Wound Image   4/27/2020  9:26 AM   Wound Venous 5/7/2020  9:04 AM   Offloading for Diabetic Foot Ulcers No 12/26/2019 11:24 AM   Dressing Status Changed 4/30/2020 10:21 AM   Dressing Changed Changed/New 4/30/2020 10:21 AM   Wound Cleansed Soap and water 4/27/2020  9:26 AM   Wound Length (cm) 0.6 cm 5/7/2020  9:04 AM   Wound Width (cm) 0.7 cm 5/7/2020  9:04 AM   Wound Depth (cm) 0.1 cm 5/7/2020  9:04 AM   Wound Surface Area (cm^2) 0.42 cm^2 5/7/2020  9:04 AM   Change in Wound Size % (l*w) 94 5/7/2020  9:04 AM   Wound Volume (cm^3) 0.04 cm^3 5/7/2020  9:04 AM   Wound Healing % 98 5/7/2020  9:04 AM   Post-Procedure Length (cm) 0.6 cm 5/7/2020  9:29 AM   Post-Procedure Width (cm) 0.7 cm 5/7/2020  9:29 AM   Post-Procedure Depth (cm) 0.1 cm 5/7/2020  9:29 AM   Post-Procedure Surface Area (cm^2) 0.42 cm^2 5/7/2020  9:29 AM   Post-Procedure Volume (cm^3) 0.04 cm^3 5/7/2020  9:29 AM   Distance Tunneling (cm) 0 cm 5/7/2020  9:04 AM   Tunneling Position ___ O'Clock 0 5/7/2020  9:04 AM   Undermining Starts ___ O'Clock 0 5/7/2020  9:04 AM   Undermining Ends___ O'Clock 0 5/7/2020  9:04 AM   Undermining Maxium Distance (cm) 0 5/7/2020  9:04 AM   Wound Assessment Slough; Red 5/7/2020  9:04 AM

## 2020-05-07 NOTE — PLAN OF CARE
Problem: Wound:  Intervention: Assess ankle, calf, or foot circumference blilaterally  Note: See flowsheet  Intervention: Assess pain status  Note: See flowsheet  Intervention: Assess wound size, appearance and drainage  Note: See flowsheet  Intervention: Assess pedal pulses bilaterally if patient has a foot or leg ulcer  Note: See flowsheet

## 2020-05-14 ENCOUNTER — HOSPITAL ENCOUNTER (OUTPATIENT)
Dept: WOUND CARE | Age: 75
Discharge: HOME | End: 2020-05-14
Payer: MEDICARE

## 2020-05-14 VITALS
TEMPERATURE: 97.9 F | RESPIRATION RATE: 16 BRPM | HEART RATE: 70 BPM | SYSTOLIC BLOOD PRESSURE: 129 MMHG | DIASTOLIC BLOOD PRESSURE: 73 MMHG

## 2020-05-14 PROCEDURE — 11042 DBRDMT SUBQ TIS 1ST 20SQCM/<: CPT

## 2020-05-14 RX ORDER — POTASSIUM CHLORIDE 750 MG/1
10 CAPSULE, EXTENDED RELEASE ORAL DAILY
COMMUNITY

## 2020-05-14 RX ORDER — FUROSEMIDE 20 MG/1
20 TABLET ORAL 2 TIMES DAILY
COMMUNITY
End: 2020-06-10 | Stop reason: SDUPTHER

## 2020-05-14 NOTE — PROGRESS NOTES
Take 15 mg by mouth daily      blood glucose test strips (ALISHA CONTOUR TEST) strip USE AS DIRECTED TO TEST BLOOD SUGAR FOUR TIMES DAILY AS NEEDED 100 strip 3    buPROPion (WELLBUTRIN XL) 300 MG extended release tablet Take 1 tablet by mouth every morning 90 tablet 3    ALISHA CONTOUR TEST strip USE AS DIRECTED TO TEST BLOOD SUGAR FOUR TIMES DAILY 100 strip 5    Glucose Blood (BLOOD GLUCOSE TEST STRIPS) STRP Please give contour testing strips to test blood sugar 4x daily 200 strip 3    omeprazole (PRILOSEC) 20 MG delayed release capsule TAKE ONE (1) CAPSULE BY MOUTH ONCE DAILY 90 capsule 1     No current facility-administered medications on file prior to encounter. REVIEW OF SYSTEMS    Pertinent items are noted in HPI. Constitutional: Negative for systemic symptoms including fever, chills and malaise. Objective:      /73   Pulse 70   Temp 97.9 °F (36.6 °C) (Temporal)   Resp 16     PHYSICAL EXAM      General: The patient is in no acute distress. Mental status:  Patient is appropriate, is  oriented to place and plan of care. Dermatologic exam: Visual inspection of the periwound reveals the skin to be normal in turgor and texture  Wound exam: see wound description below in procedure note      Assessment:     Problem List Items Addressed This Visit     WD-Ulcer of shin, left, with fat layer exposed ([address]    WD-Chronic venous hypertension (idiopathic) with ulcer of left lower extremity (CODE) ([address] - Primary    WD-Chronic venous hypertension with inflammation, right    WD-Cellulitis of right lower extremity    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 to breakdown of skin ([address]        Procedure Note    Indications:  Based on my examination of this patient's wound(s) today, sharp excision into necrotic epidermis, dermis and subcutaneous tissue is required to promote healing and evaluate the extent of previous healing.     Performed by: Tiera Magallanes 5/14/2020  9:11 AM   Drainage Amount Moderate 5/14/2020  9:11 AM   Drainage Description Serosanguinous 5/14/2020  9:11 AM   Odor None 5/14/2020  9:11 AM   Margins Defined edges 5/14/2020  9:11 AM   Ana-wound Assessment Pink 5/14/2020  9:11 AM   Non-staged Wound Description Full thickness 5/14/2020  9:11 AM   [place]%Wound Bed 0 5/14/2020  9:11 AM   Red%Wound Bed 70 5/14/2020  9:11 AM   Yellow%Wound Bed 30 5/14/2020  9:11 AM   Black%Wound Bed 0 5/14/2020  9:11 AM   Purple%Wound Bed 0 5/14/2020  9:11 AM   Other%Wound Bed 0 5/14/2020  9:11 AM   Number of days: 321       Wound 04/16/20 Leg Right #14 right lower leg cluster-venous--combined with #13 (Active)   Wound Image   5/14/2020  9:11 AM   Wound Venous 5/14/2020  9:11 AM   Dressing Status Changed 5/7/2020  9:58 AM   Dressing Changed Changed/New 5/7/2020  9:58 AM   Wound Cleansed Soap and water 5/7/2020  9:04 AM   Wound Length (cm) 10.5 cm 5/14/2020  9:11 AM   Wound Width (cm) 12 cm 5/14/2020  9:11 AM   Wound Depth (cm) 0.1 cm 5/14/2020  9:11 AM   Wound Surface Area (cm^2) 126 cm^2 5/14/2020  9:11 AM   Change in Wound Size % (l*w) -100 5/14/2020  9:11 AM   Wound Volume (cm^3) 12.6 cm^3 5/14/2020  9:11 AM   Wound Healing % -100 5/14/2020  9:11 AM   Distance Tunneling (cm) 0 cm 5/14/2020  9:11 AM   Tunneling Position ___ O'Clock 0 5/14/2020  9:11 AM   Undermining Starts ___ O'Clock 0 5/14/2020  9:11 AM   Undermining Ends___ O'Clock 0 5/14/2020  9:11 AM   Undermining Maxium Distance (cm) 0 5/14/2020  9:11 AM   Wound Assessment Red;Black 5/14/2020  9:11 AM   Drainage Amount Small 5/14/2020  9:11 AM   Drainage Description Sanguinous 5/14/2020  9:11 AM   Odor None 5/14/2020  9:11 AM   Ana-wound Assessment Pink;Dry 5/14/2020  9:11 AM   Non-staged Wound Description Partial thickness 5/14/2020  9:11 AM   [place]%Wound Bed 0 5/14/2020  9:11 AM   Red%Wound Bed 80 5/14/2020  9:11 AM   Yellow%Wound Bed 0 5/14/2020  9:11 AM   Black%Wound Bed 20 5/14/2020  9:11 AM   Purple%Wound Bed 0 on 9/5/19                                   Doxycycline for 7 days on 3/12/2020 continued on 3/19/2020 for 7 days                                    Bactrim DS for  10 days on 3/26/83943              Earlier Wound care treatments:                [id number]:                        Consults:   Date 7/18/19 to Dr Marichuy Mayer-- Ronnie Dodson care physician:      Continuing wound care orders and information:              Residence: [place]              Continue home health care with:              Your wound-care supplies will be provided by: Subhash Ramos provider:              QUINTIN with              LHS loading: Date              HSLLX Medications: RX SANTYL GIVEN 8/1/19              FYIVH cleansing:                           KN not scrub or use excessive force.                          Wash hands with soap and water before and after dressing changes.                         Prior to applying a clean dressing, cleanse wound with normal saline,                                wound cleanser, or mild soap and water.                                     Daily Wound management:                                                Avoid standing for long periods of time.                          CPLQB wraps/stockings in AM and remove at bedtime.                          If swelling is present, elevate legs to the level of the heart or above for 30 minutes 4-5 times a day and/or when sitting.                                             When taking antibiotics take entire prescription as ordered by physician do not stop taking until medicine is all gone.     Right lower arm--Healed today 9/12/19    Left hand-- healed 12/26/19   Left medial buttock -- healed 1/23/2020                                                      Orders for this week: 5/14/2020        Left medial ankle distal-- cleanse with Valma Rile --for 5-10 minutes, pat dry. Lotrisone to fabricio wound(in clinic only).

## 2020-05-21 ENCOUNTER — HOSPITAL ENCOUNTER (OUTPATIENT)
Dept: WOUND CARE | Age: 75
Discharge: HOME | End: 2020-05-21
Payer: MEDICARE

## 2020-05-21 VITALS
SYSTOLIC BLOOD PRESSURE: 115 MMHG | DIASTOLIC BLOOD PRESSURE: 62 MMHG | TEMPERATURE: 96 F | RESPIRATION RATE: 16 BRPM | HEART RATE: 66 BPM

## 2020-05-21 PROCEDURE — 11042 DBRDMT SUBQ TIS 1ST 20SQCM/<: CPT

## 2020-05-21 NOTE — PROGRESS NOTES
part of right lower leg limited to breakdown of skin ([address] 3/26/2020    WD-Open wound of hand without complication, left, initial encounter 2019    WD-Pressure injury of left buttock, stage 2 ([address] 2020    WD-Pressure injury of left buttock, stage 3 ([address] 3/12/2020    WD-Pressure injury of right buttock, stage 3 ([address] 3/12/2020    WD-Skin tear of right forearm without complication 8040    WD-Ulcer of right pretibial region, with fat layer exposed ([address] 10/17/2019       PAST SURGICAL HISTORY    Past Surgical History:   Procedure Laterality Date    BREAST SURGERY  1970s    benign tumors bilaterally    CARDIAC CATHETERIZATION  6/3/14    EF55% normal study    CARPAL TUNNEL RELEASE Left     CARPAL TUNNEL RELEASE      CATARACT REMOVAL Right 3/11/2013    Dr. Loree Macias Left 2013    Dr. Anabell Katz      polyps    COLONOSCOPY  11    villous component--f/u colonoscopy will be needed in 6 months    COLONOSCOPY  12    mild diverticulosis, internal hemorrhoids; repeat in 3 years (Dr. Angel Grey)    COLONOSCOPY  2016    mild diverticulosis, three colon polyps    HERNIA REPAIR  1970s    HERNIA REPAIR      SKIN GRAFT  -present    skin grafts to left ankle ulcers    SPLENECTOMY      SPLENECTOMY      TONSILLECTOMY      VARICOSE VEIN SURGERY Left        FAMILY HISTORY    Family History   Problem Relation Age of Onset    Heart Disease Father     Cancer Father         prostate    High Blood Pressure Father     High Cholesterol Father     Cancer Brother     Diabetes Brother     Thyroid Disease Brother        SOCIAL HISTORY    Social History     Tobacco Use    Smoking status: Former Smoker     Packs/day: 2.00     Years: 35.00     Pack years: 70.00     Types: Cigarettes     Last attempt to quit:      Years since quittin.4    Smokeless tobacco: Never Used    Tobacco comment: chew--30 years.   Reviewed 2015 CONTOUR TEST) strip USE AS DIRECTED TO TEST BLOOD SUGAR FOUR TIMES DAILY AS NEEDED 100 strip 3    buPROPion (WELLBUTRIN XL) 300 MG extended release tablet Take 1 tablet by mouth every morning 90 tablet 3    ALISHA CONTOUR TEST strip USE AS DIRECTED TO TEST BLOOD SUGAR FOUR TIMES DAILY 100 strip 5    Glucose Blood (BLOOD GLUCOSE TEST STRIPS) STRP Please give contour testing strips to test blood sugar 4x daily 200 strip 3     No current facility-administered medications on file prior to encounter. REVIEW OF SYSTEMS    Pertinent items are noted in HPI. Constitutional: Negative for systemic symptoms including fever, chills and malaise. Objective:      /62   Pulse 66   Temp 96 °F (35.6 °C) (Temporal)   Resp 16     PHYSICAL EXAM      General: The patient is in no acute distress. Mental status:  Patient is appropriate, is  oriented to place and plan of care. Dermatologic exam: Visual inspection of the periwound reveals the skin to be normal in turgor and texture  Wound exam: see wound description below in procedure note      Assessment:     Problem List Items Addressed This Visit     WD-Ulcer of shin, left, with fat layer exposed ([address] - Primary    WD-Chronic venous hypertension (idiopathic) with ulcer of left lower extremity (CODE) ([address]    WD-Chronic venous hypertension with inflammation, right    WD-Cellulitis of right lower extremity    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 to breakdown of skin ([address]        Procedure Note    Indications:  Based on my examination of this patient's wound(s) today, sharp excision into necrotic epidermis, dermis and subcutaneous tissue is required to promote healing and evaluate the extent of previous healing, and today- he has undermining and the source of the constant bleeding appears to be under the rim of skin on the lateral edge of the wound.     Performed by: Pascual Jimenez MD    Consent obtained: Yes    Time out taken: Yes    Pain Control: none       Debridement:Excisional Debridement    Using curette the wound(s) was/were sharply debrided down through and including the removal of dermis and subcutaneous tissue. Devitalized Tissue Debrided:  fibrin, biofilm and slough    Pre Debridement Measurements:  Are located in the Wound Documentation Flow Sheet    All active wounds listed below with today's date are evaluated  Wound(s)    debrided this date include # : 6     Post  Debridement Measurements:  Wound 11/15/13 Other (Comment) Leg Inner erethema with a small puncture site (Active)   Number of days: 2378       Wound 06/27/19 #6 (onset 1 month) Left Medial Distal Ankle (Active)   Wound Image   4/27/2020  9:26 AM   Wound Venous 5/21/2020  8:23 AM   Offloading for Diabetic Foot Ulcers No 12/26/2019 11:24 AM   Dressing Status Changed 5/14/2020 10:31 AM   Dressing Changed Changed/New 5/14/2020 10:31 AM   Wound Cleansed Soap and water 4/27/2020  9:26 AM   Wound Length (cm) 2 cm 5/21/2020  8:23 AM   Wound Width (cm) 2 cm 5/21/2020  8:23 AM   Wound Depth (cm) 0.1 cm 5/21/2020  8:23 AM   Wound Surface Area (cm^2) 4 cm^2 5/21/2020  8:23 AM   Change in Wound Size % (l*w) 42.86 5/21/2020  8:23 AM   Wound Volume (cm^3) 0.4 cm^3 5/21/2020  8:23 AM   Wound Healing % 81 5/21/2020  8:23 AM   Post-Procedure Length (cm) 2.1 cm 5/21/2020  8:51 AM   Post-Procedure Width (cm) 2.1 cm 5/21/2020  8:51 AM   Post-Procedure Depth (cm) 0.1 cm 5/21/2020  8:51 AM   Post-Procedure Surface Area (cm^2) 4.41 cm^2 5/21/2020  8:51 AM   Post-Procedure Volume (cm^3) 0.44 cm^3 5/21/2020  8:51 AM   Distance Tunneling (cm) 0 cm 5/21/2020  8:23 AM   Tunneling Position ___ O'Clock 0 5/21/2020  8:23 AM   Undermining Starts ___ O'Clock 0 5/21/2020  8:23 AM   Undermining Ends___ O'Clock 0 5/21/2020  8:23 AM   Undermining Maxium Distance (cm) 0 5/21/2020  8:23 AM   Wound Assessment Black; Red 5/21/2020  8:23 AM   Drainage Amount Large 5/21/2020  8:23 AM   Drainage Description Sanguinous 5/21/2020  8:23 AM   Odor None 5/21/2020  8:23 AM   Margins Defined edges; Undefined edges 5/21/2020  8:23 AM   Ana-wound Assessment Red 5/21/2020  8:23 AM   Non-staged Wound Description Full thickness 5/21/2020  8:23 AM   [place]%Wound Bed 0 5/21/2020  8:23 AM   Red%Wound Bed 10 5/21/2020  8:23 AM   Yellow%Wound Bed 0 5/21/2020  8:23 AM   Black%Wound Bed 90 5/21/2020  8:23 AM   Purple%Wound Bed 0 5/21/2020  8:23 AM   Other%Wound Bed 0 5/21/2020  8:23 AM   Number of days: 328       Wound 04/16/20 Leg Right #14 right lower leg cluster-venous--combined with #13 (Active)   Wound Image   5/14/2020  9:11 AM   Wound Venous 5/21/2020  8:23 AM   Dressing Status Changed 5/14/2020 10:31 AM   Dressing Changed Changed/New 5/14/2020 10:31 AM   Wound Cleansed Soap and water 5/7/2020  9:04 AM   Wound Length (cm) 1 cm 5/21/2020  8:23 AM   Wound Width (cm) 3.2 cm 5/21/2020  8:23 AM   Wound Depth (cm) 0.1 cm 5/21/2020  8:23 AM   Wound Surface Area (cm^2) 3.2 cm^2 5/21/2020  8:23 AM   Change in Wound Size % (l*w) 94.92 5/21/2020  8:23 AM   Wound Volume (cm^3) 0.32 cm^3 5/21/2020  8:23 AM   Wound Healing % 95 5/21/2020  8:23 AM   Distance Tunneling (cm) 0 cm 5/21/2020  8:23 AM   Tunneling Position ___ O'Clock 0 5/21/2020  8:23 AM   Undermining Starts ___ O'Clock 0 5/21/2020  8:23 AM   Undermining Ends___ O'Clock 0 5/21/2020  8:23 AM   Undermining Maxium Distance (cm) 0 5/21/2020  8:23 AM   Wound Assessment Red;Black 5/21/2020  8:23 AM   Drainage Amount None 5/21/2020  8:23 AM   Drainage Description Sanguinous 5/14/2020  9:11 AM   Odor None 5/21/2020  8:23 AM   Margins Attached edges; Unattached edges 5/21/2020  8:23 AM   Ana-wound Assessment Pink;Dry 5/21/2020  8:23 AM   Non-staged Wound Description Partial thickness 5/21/2020  8:23 AM   [place]%Wound Bed 0 5/21/2020  8:23 AM   Red%Wound Bed 20 5/21/2020  8:23 AM   Yellow%Wound Bed 0 5/21/2020  8:23 AM   Black%Wound Bed 80 5/21/2020  8:23 AM   Purple%Wound Bed 0 gone.     Right lower arm--Healed today 9/12/19    Left hand-- healed 12/26/19   Left medial buttock -- healed 1/23/2020                                                      Orders for this week: 5/21/2020        Left medial ankle distal-- cleanse with  vashe in clinic today --for 5-10 minutes, pat dry. Lotrisone to fabricio wound(in clinic only).  Lotion to leg, cover open wound and periwound with xeroform, cover with fluffed 4x4, abd, coban 2 lite--leave in place x 1 week until patient returns to clinic next week     Right Leg -- apply lotrisone to all red areas on leg then cover with lotion to intact skin and foot, xeroform to posterior leg open area, cover with abd conform,  tubigrip f--Change daily      Buttock wounds-- no dressing just barrier cream daily    (((((Please be sure to re-eval PT/INR and need for Lovenox--increased bleeding noted today at clinic))))     Follow up [address] in 1 week on Thursday as scheduled in the wound care center     Call [phone] for any questions or concerns.           Physician Signature ______________________________        Treatment Note Wound 06/27/19 #6 (onset 1 month) Left Medial Distal Ankle-Dressing/Treatment: (silver nitrate stick per dr Babak Zuñiga)    Written Patient Dismissal Instructions Given            Electronically signed by Magalis Moore MD on 5/21/2020 at 9:33 AM

## 2020-05-26 ENCOUNTER — TELEPHONE (OUTPATIENT)
Dept: FAMILY MEDICINE CLINIC | Age: 75
End: 2020-05-26

## 2020-05-28 ENCOUNTER — HOSPITAL ENCOUNTER (OUTPATIENT)
Dept: WOUND CARE | Age: 75
Discharge: HOME | End: 2020-05-28
Payer: MEDICARE

## 2020-05-28 PROBLEM — L03.115 CELLULITIS OF RIGHT LOWER EXTREMITY: Status: RESOLVED | Noted: 2020-03-26 | Resolved: 2020-05-28

## 2020-05-28 PROBLEM — L97.811 NON-PRESSURE CHRONIC ULCER OF OTHER PART OF RIGHT LOWER LEG LIMITED TO BREAKDOWN OF SKIN (HCC): Status: RESOLVED | Noted: 2020-03-26 | Resolved: 2020-05-28

## 2020-05-28 PROCEDURE — 11042 DBRDMT SUBQ TIS 1ST 20SQCM/<: CPT

## 2020-05-28 NOTE — PROGRESS NOTES
Orion Yusuf H/O cardiac catheterization 6/3/14    EF55% normal study    H/O Doppler ultrasound 03/26/15    Carotid US- no significant stenosis noted bilaterally.  H/O echocardiogram 11/21/2019    EF50-55%, Mild AR. Moderately dilated right ventricle with negative Solis's sign.  H/O mumps orchitis     as a youth    Hemorrhoids 4/23/12    Dr. Jose Martin Witt; repeat colonoscopy 3 years    History of nuclear stress test 11/21/2019    EF 60%, Normal study.  HLD (hyperlipidemia)     Hx of Doppler ultrasound 1/06/15    Lymph node seen in left groin area. No bilateral stenosis.     Hyperlipemia     Hyperlipidemia     Hypertension 1992    Hypertension     Idiopathic chronic venous hypertension of left lower extremity with ulcer and inflammation (HCC) 10/2/2015    Leg ulcer ([address] 1978-present    following at wound center, Dr Bright Willoughby No diabetic retinopathy OU 11/12    Dr. Crisitan King chronic ulcer of left lower leg with fat layer exposed ([address] 10/2/2015    Pulmonary embolism ([address] 11/13    patient on coumadin    Sleep apnea     doesnt always use cpap dt it drys him out    SOB (shortness of breath) Oct 2011    Stress test normal.     Tendinitis 1973    plantar tendons    Type II or unspecified type diabetes mellitus with other specified manifestations, not stated as uncontrolled 2/8/2013    Unspecified venous (peripheral) insufficiency     Urticaria     WD-Cellulitis of right anterior lower leg 9/5/2019    WD-Cellulitis of right lower extremity 3/26/2020    WD-Chronic venous hypertension (idiopathic) with ulcer of left lower extremity (CODE) ([address] 6/27/2019    WD-Chronic venous hypertension with inflammation, right 9/19/2019    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 to breakdown of skin ([address] 3/26/2020    WD-Open wound of hand without complication, left, initial encounter 12/12/2019    WD-Pressure injury of left buttock, stage 2 ([address] 1/2/2020    CONTOUR TEST strip USE AS DIRECTED TO TEST BLOOD SUGAR FOUR TIMES DAILY 100 strip 5    Glucose Blood (BLOOD GLUCOSE TEST STRIPS) STRP Please give contour testing strips to test blood sugar 4x daily 200 strip 3     No current facility-administered medications on file prior to encounter. REVIEW OF SYSTEMS    Pertinent items are noted in HPI. Constitutional: Negative for systemic symptoms including fever, chills and malaise. Objective: There were no vitals taken for this visit. PHYSICAL EXAM      General: The patient is in no acute distress. Mental status:  Patient is appropriate, is  oriented to place and plan of care. Dermatologic exam: Visual inspection of the periwound reveals the skin to be normal in turgor and texture  Wound exam: see wound description below in procedure note      Assessment:     Problem List Items Addressed This Visit     WD-Chronic venous hypertension (idiopathic) with ulcer of left lower extremity (CODE) ([address] - Primary    WD-Chronic venous hypertension with inflammation, right    RESOLVED: WD-Cellulitis of right lower extremity    RESOLVED: 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 to breakdown of skin ([address]        Procedure Note    Indications:  Based on my examination of this patient's wound(s) today, sharp excision into necrotic subcutaneous tissue is required to promote healing and evaluate the extent of previous healing. Performed by: Juan Her MD    Consent obtained: Yes    Time out taken:  Yes    Pain Control: none needed       Debridement:Excisional Debridement    Using curette the wound(s) was/were sharply debrided down through and including the removal of epidermis, dermis and subcutaneous tissue.         Devitalized Tissue Debrided:  fibrin, biofilm, slough and exudate    Pre Debridement Measurements:  Are located in the Wound Documentation Flow Sheet    All active wounds listed below with today's date are evaluated  Wound(s)    debrided this date include # : 1     Post  Debridement Measurements:  Wound 11/15/13 Other (Comment) Leg Inner erethema with a small puncture site (Active)   Number of days: 2385       Wound 06/27/19 #6 (onset 1 month) Left Medial Distal Ankle (Active)   Wound Image   4/27/2020  9:26 AM   Wound Venous 5/21/2020  8:23 AM   Offloading for Diabetic Foot Ulcers No 12/26/2019 11:24 AM   Dressing Status Changed 5/21/2020  9:05 AM   Dressing Changed Changed/New 5/21/2020  9:05 AM   Wound Cleansed Soap and water 4/27/2020  9:26 AM   Wound Length (cm) 1.8 cm 5/28/2020  9:17 AM   Wound Width (cm) 1.8 cm 5/28/2020  9:17 AM   Wound Depth (cm) 0.1 cm 5/28/2020  9:17 AM   Wound Surface Area (cm^2) 3.24 cm^2 5/28/2020  9:17 AM   Change in Wound Size % (l*w) 53.71 5/28/2020  9:17 AM   Wound Volume (cm^3) 0.32 cm^3 5/28/2020  9:17 AM   Wound Healing % 85 5/28/2020  9:17 AM   Post-Procedure Length (cm) 2.1 cm 5/21/2020  8:51 AM   Post-Procedure Width (cm) 2.1 cm 5/21/2020  8:51 AM   Post-Procedure Depth (cm) 0.1 cm 5/21/2020  8:51 AM   Post-Procedure Surface Area (cm^2) 4.41 cm^2 5/21/2020  8:51 AM   Post-Procedure Volume (cm^3) 0.44 cm^3 5/21/2020  8:51 AM   Distance Tunneling (cm) 0 cm 5/28/2020  9:17 AM   Tunneling Position ___ O'Clock 0 5/28/2020  9:17 AM   Undermining Starts ___ O'Clock 0 5/28/2020  9:17 AM   Undermining Ends___ O'Clock 0 5/28/2020  9:17 AM   Undermining Maxium Distance (cm) 0 5/28/2020  9:17 AM   Wound Assessment Red 5/28/2020  9:17 AM   Drainage Amount Moderate 5/28/2020  9:17 AM   Drainage Description Sanguinous 5/28/2020  9:17 AM   Odor None 5/28/2020  9:17 AM   Margins Defined edges; Undefined edges 5/28/2020  9:17 AM   Ana-wound Assessment Red 5/28/2020  9:17 AM   Non-staged Wound Description Full thickness 5/28/2020  9:17 AM   [place]%Wound Bed 0 5/28/2020  9:17 AM   Red%Wound Bed 100 5/28/2020  9:17 AM   Yellow%Wound Bed 0 5/28/2020  9:17 AM   Black%Wound Bed 0 5/28/2020  9:17 AM Purple%Wound Bed 0 5/28/2020  9:17 AM   Other%Wound Bed 0 5/28/2020  9:17 AM   Number of days: 335       Wound 04/16/20 Leg Right #14 right lower leg cluster-venous--combined with #13 (Active)   Wound Image   5/14/2020  9:11 AM   Wound Venous 5/21/2020  8:23 AM   Dressing Status Changed 5/21/2020  9:05 AM   Dressing Changed Changed/New 5/21/2020  9:05 AM   Wound Cleansed Soap and water 5/7/2020  9:04 AM   Wound Length (cm) 0 cm 5/28/2020  9:17 AM   Wound Width (cm) 0 cm 5/28/2020  9:17 AM   Wound Depth (cm) 0 cm 5/28/2020  9:17 AM   Wound Surface Area (cm^2) 0 cm^2 5/28/2020  9:17 AM   Change in Wound Size % (l*w) 100 5/28/2020  9:17 AM   Wound Volume (cm^3) 0 cm^3 5/28/2020  9:17 AM   Wound Healing % 100 5/28/2020  9:17 AM   Distance Tunneling (cm) 0 cm 5/21/2020  8:23 AM   Tunneling Position ___ O'Clock 0 5/21/2020  8:23 AM   Undermining Starts ___ O'Clock 0 5/21/2020  8:23 AM   Undermining Ends___ O'Clock 0 5/21/2020  8:23 AM   Undermining Maxium Distance (cm) 0 5/21/2020  8:23 AM   Wound Assessment Red;Black 5/21/2020  8:23 AM   Drainage Amount None 5/21/2020  8:23 AM   Drainage Description Sanguinous 5/14/2020  9:11 AM   Odor None 5/21/2020  8:23 AM   Margins Attached edges; Unattached edges 5/21/2020  8:23 AM   Ana-wound Assessment Pink;Dry 5/21/2020  8:23 AM   Non-staged Wound Description Partial thickness 5/21/2020  8:23 AM   [place]%Wound Bed 0 5/21/2020  8:23 AM   Red%Wound Bed 20 5/21/2020  8:23 AM   Yellow%Wound Bed 0 5/21/2020  8:23 AM   Black%Wound Bed 80 5/21/2020  8:23 AM   Purple%Wound Bed 0 5/21/2020  8:23 AM   Other%Wound Bed 0 5/21/2020  8:23 AM   Number of days: 42       Percent of Wound(s) Debrided: approximately 100%    Total  Area  Debrided:  4.41 sq cm     Bleeding:  Minimal    Hemostasis Achieved:  by pressure    Procedural Pain:  0  / 10     Post Procedural Pain:  0 / 10     Response to treatment:  Well tolerated by patient.      Status of wound progress and description from last visit:

## 2020-06-01 ENCOUNTER — TELEPHONE (OUTPATIENT)
Dept: PHARMACY | Age: 75
End: 2020-06-01

## 2020-06-04 ENCOUNTER — HOSPITAL ENCOUNTER (OUTPATIENT)
Dept: WOUND CARE | Age: 75
Discharge: HOME | End: 2020-06-04
Payer: MEDICARE

## 2020-06-04 VITALS
SYSTOLIC BLOOD PRESSURE: 100 MMHG | RESPIRATION RATE: 15 BRPM | TEMPERATURE: 97.4 F | DIASTOLIC BLOOD PRESSURE: 62 MMHG | HEART RATE: 78 BPM

## 2020-06-04 PROCEDURE — 11042 DBRDMT SUBQ TIS 1ST 20SQCM/<: CPT

## 2020-06-04 PROCEDURE — 99213 OFFICE O/P EST LOW 20 MIN: CPT

## 2020-06-04 NOTE — PROGRESS NOTES
Multilayer Compression Wrap   (Not Unna) Below the Knee    NAME:  Ana Paula Oshea  YOB: 1945  MEDICAL RECORD NUMBER:  [id number]  DATE:  6/4/2020    Multilayer compression wrap: Removed old Multilayer wrap if indicated and wash leg with mild soap/water. Applied moisturizing agent to dry skin as needed. Applied primary and secondary dressing as ordered. Applied multilayered dressing below the knee to left lower leg. Instructed patient/caregiver not to remove dressing and to keep it clean and dry. Instructed patient/caregiver on complications to report to provider, such as pain, numbness in toes, heavy drainage, and slippage of dressing. Instructed patient on purpose of compression dressing and on activity and exercise recommendations.       Electronically signed by Fe Joshi RN on 6/4/2020 at 9:48 AM

## 2020-06-04 NOTE — PROGRESS NOTES
WD-Pressure injury of left buttock, stage 3 ([address] 3/12/2020    WD-Pressure injury of right buttock, stage 3 ([address] 3/12/2020    WD-Skin tear of right forearm without complication     WD-Ulcer of right pretibial region, with fat layer exposed ([address] 10/17/2019       PAST SURGICAL HISTORY    Past Surgical History:   Procedure Laterality Date    BREAST SURGERY  1970s    benign tumors bilaterally    CARDIAC CATHETERIZATION  6/3/14    EF55% normal study    CARPAL TUNNEL RELEASE Left     CARPAL TUNNEL RELEASE      CATARACT REMOVAL Right 3/11/2013    Dr. Aidan Rachel Left 2013    Dr. Sarah Burton      polyps    COLONOSCOPY  11    villous component--f/u colonoscopy will be needed in 6 months    COLONOSCOPY  12    mild diverticulosis, internal hemorrhoids; repeat in 3 years (Dr. Anjali Glover)    COLONOSCOPY  2016    mild diverticulosis, three colon polyps    HERNIA REPAIR  1970s    HERNIA REPAIR      SKIN GRAFT  -present    skin grafts to left ankle ulcers    SPLENECTOMY      SPLENECTOMY      TONSILLECTOMY      VARICOSE VEIN SURGERY Left 2009       FAMILY HISTORY    Family History   Problem Relation Age of Onset    Heart Disease Father     Cancer Father         prostate    High Blood Pressure Father     High Cholesterol Father     Cancer Brother     Diabetes Brother     Thyroid Disease Brother        SOCIAL HISTORY    Social History     Tobacco Use    Smoking status: Former Smoker     Packs/day: 2.00     Years: 35.00     Pack years: 70.00     Types: Cigarettes     Last attempt to quit:      Years since quittin.4    Smokeless tobacco: Never Used    Tobacco comment: chew--30 years.   Reviewed 2015   Substance Use Topics    Alcohol use: Yes     Comment: soc    Drug use: Never       ALLERGIES    Allergies   Allergen Reactions    Cavilon Durable Barrier [Mineral Oil-Dimeth-Coconut Oil]     Parabens Hives    test strips (ALISHA CONTOUR TEST) strip USE AS DIRECTED TO TEST BLOOD SUGAR FOUR TIMES DAILY AS NEEDED 100 strip 3    buPROPion (WELLBUTRIN XL) 300 MG extended release tablet Take 1 tablet by mouth every morning 90 tablet 3    ALISHA CONTOUR TEST strip USE AS DIRECTED TO TEST BLOOD SUGAR FOUR TIMES DAILY 100 strip 5    Glucose Blood (BLOOD GLUCOSE TEST STRIPS) STRP Please give contour testing strips to test blood sugar 4x daily 200 strip 3     No current facility-administered medications on file prior to encounter. REVIEW OF SYSTEMS    Pertinent items are noted in HPI. Constitutional: Negative for systemic symptoms including fever, chills and malaise. Objective:      /62   Pulse 78   Temp 97.4 °F (36.3 °C) (Temporal)   Resp 15     PHYSICAL EXAM      General: The patient is in no acute distress. Mental status:  Patient is appropriate, is  oriented to place and plan of care. Dermatologic exam: Visual inspection of the periwound reveals the skin to be normal in turgor and texture  Wound exam: see wound description below in procedure note      Assessment:     Problem List Items Addressed This Visit     WD-Ulcer of shin, left, with fat layer exposed ([address] - Primary    WD-Chronic venous hypertension (idiopathic) with ulcer of left lower extremity (CODE) ([address]        Procedure Note    Indications:  Based on my examination of this patient's wound(s) today, sharp excision into necrotic subcutaneous tissue is required to promote healing and evaluate the extent of previous healing. Performed by: Ciara Sharma MD    Consent obtained: Yes    Time out taken:  Yes    Pain Control: none       Debridement:Excisional Debridement    Using curette the wound(s) was/were sharply debrided down through and including the removal of subcutaneous tissue.         Devitalized Tissue Debrided:  slough and necrotic/eschar    Pre Debridement Measurements:  Are located in the Wound Documentation Flow Sheet    All

## 2020-06-05 ENCOUNTER — TELEPHONE (OUTPATIENT)
Dept: PHARMACY | Age: 75
End: 2020-06-05

## 2020-06-05 NOTE — TELEPHONE ENCOUNTER
Called to schedule. No answer. No VM. Unable to leave message.     CLINICAL PHARMACY CONSULT: MED RECONCILIATION/REVIEW ADDENDUM    For Pharmacy Admin Tracking Only    PHSO: No  Total # of Interventions Recommended: 0  Total Interventions Accepted: 0  Time Spent (min): 5    Jossie Díaz PharmD

## 2020-06-08 ENCOUNTER — TELEPHONE (OUTPATIENT)
Dept: FAMILY MEDICINE CLINIC | Age: 75
End: 2020-06-08

## 2020-06-08 ENCOUNTER — TELEPHONE (OUTPATIENT)
Dept: PHARMACY | Age: 75
End: 2020-06-08

## 2020-06-09 ENCOUNTER — TELEPHONE (OUTPATIENT)
Dept: PHARMACY | Age: 75
End: 2020-06-09

## 2020-06-09 ENCOUNTER — TELEPHONE (OUTPATIENT)
Dept: FAMILY MEDICINE CLINIC | Age: 75
End: 2020-06-09

## 2020-06-09 ENCOUNTER — ANTI-COAG VISIT (OUTPATIENT)
Dept: PHARMACY | Age: 75
End: 2020-06-09
Payer: MEDICARE

## 2020-06-09 ENCOUNTER — HOSPITAL ENCOUNTER (OUTPATIENT)
Age: 75
Setting detail: SPECIMEN
Discharge: HOME | End: 2020-06-09
Payer: MEDICARE

## 2020-06-09 LAB
INR BLD: 8.51 INDEX
PROTHROMBIN TIME: 105.2 SECONDS (ref 11.7–14.5)

## 2020-06-09 PROCEDURE — 99212 OFFICE O/P EST SF 10 MIN: CPT

## 2020-06-09 PROCEDURE — 85610 PROTHROMBIN TIME: CPT

## 2020-06-09 PROCEDURE — 36415 COLL VENOUS BLD VENIPUNCTURE: CPT

## 2020-06-09 RX ORDER — PHYTONADIONE 5 MG/1
5 TABLET ORAL ONCE
Qty: 1 TABLET | Refills: 0 | Status: SHIPPED | OUTPATIENT
Start: 2020-06-09 | End: 2020-11-11

## 2020-06-09 NOTE — PROGRESS NOTES
Medication Management Service  [address] [phone]    Visit Date: 6/9/2020   Subjective: All appointments have been changed to telephone visits at this time due to COVID-19. Eunice Jimenez is a 69 y.o. male who is having INR checked by [facility]. INR results were sent to the clinic for anticoagulation monitoring and adjustment. Patient results called in to clinic for warfarin management due to  Indication:   DVT and PE. INR goal: of 2.0-3.0. Duration of therapy: indefinite. Patient reports the following:   Adherent with regimen - Patient states he has been taking warfarin 12mg daily per the Replaced by [facility]. Missed or extra doses:  None   Bleeding or thromboembolic side effects:  None  Significant medication changes:  None  Significant dietary changes: None  Significant alcohol or tobacco changes: None  Significant recent illness, disease state changes, or hospitalization:  None  Upcoming surgeries or procedures:  None  Falls: None           Assessment and Plan     PT/INR performed by Lab. INR today is 8.51, supratherapeutic. Plan:  Hold warfarin. Recheck INR in 3 days. Patient has standing lab orders for PT/INR. Patient verbalized understanding of dosing directions and information discussed. Progress note sent to referring office. Patient acknowledges working in consult agreement with pharmacist as referred by his/her physician. Patient accepted that for purposes of billing, this is a virtual visit with your provider for which we will submit a claim for reimbursement with your insurance company. You may be responsible for any copays, coinsurance amounts or other amounts not covered by your insurance company.      Electronically signed by NADEEN Alegre [facility] on 6/9/20 at 2:19 PM EDT    CLINICAL PHARMACY CONSULT: MED RECONCILIATION/REVIEW ADDENDUM    For Pharmacy Admin Tracking Only    PHSO:

## 2020-06-09 NOTE — PROGRESS NOTES
Spoke with patient and notified that his INR was 8.5,     Patient having some bleeding through his bandage, currently managed by wound center. If worsens ovenight, I instructed for him to got to ER. Otherwise he will  from [facility] today Vit K tablets x 1 dose. He will follow up with [facility] as scheduled tomorrow afternoon at 4:30, where team can draw POCT INR at his visit. Staff to draw POCT INR tomorrow during his visit with [facility].  I will also copy Coumadin clinic pharmacists

## 2020-06-09 NOTE — TELEPHONE ENCOUNTER
Received notification from [facility] that they don't carry Vit K tablets as they are too expensive. Spoke with [facility] and cancelled order     Spoke with [employer] and we agree to hold all his dose for next 3 days and recheck INR on Friday with the coumadin. Patient is already agreeable to the plan       We won't check in our office tomorrow during his visit with [facility] unless stable.

## 2020-06-10 ENCOUNTER — OFFICE VISIT (OUTPATIENT)
Dept: FAMILY MEDICINE CLINIC | Age: 75
End: 2020-06-10
Payer: MEDICARE

## 2020-06-10 VITALS
OXYGEN SATURATION: 93 % | DIASTOLIC BLOOD PRESSURE: 66 MMHG | HEART RATE: 110 BPM | WEIGHT: 289 LBS | HEIGHT: 77 IN | BODY MASS INDEX: 34.12 KG/M2 | SYSTOLIC BLOOD PRESSURE: 120 MMHG

## 2020-06-10 PROCEDURE — 3023F SPIROM DOC REV: CPT | Performed by: NURSE PRACTITIONER

## 2020-06-10 PROCEDURE — 1123F ACP DISCUSS/DSCN MKR DOCD: CPT | Performed by: NURSE PRACTITIONER

## 2020-06-10 PROCEDURE — 4040F PNEUMOC VAC/ADMIN/RCVD: CPT | Performed by: NURSE PRACTITIONER

## 2020-06-10 PROCEDURE — G8417 CALC BMI ABV UP PARAM F/U: HCPCS | Performed by: NURSE PRACTITIONER

## 2020-06-10 PROCEDURE — 3046F HEMOGLOBIN A1C LEVEL >9.0%: CPT | Performed by: NURSE PRACTITIONER

## 2020-06-10 PROCEDURE — 99214 OFFICE O/P EST MOD 30 MIN: CPT | Performed by: NURSE PRACTITIONER

## 2020-06-10 PROCEDURE — 3017F COLORECTAL CA SCREEN DOC REV: CPT | Performed by: NURSE PRACTITIONER

## 2020-06-10 PROCEDURE — 2022F DILAT RTA XM EVC RTNOPTHY: CPT | Performed by: NURSE PRACTITIONER

## 2020-06-10 PROCEDURE — G8926 SPIRO NO PERF OR DOC: HCPCS | Performed by: NURSE PRACTITIONER

## 2020-06-10 PROCEDURE — 1036F TOBACCO NON-USER: CPT | Performed by: NURSE PRACTITIONER

## 2020-06-10 PROCEDURE — G8427 DOCREV CUR MEDS BY ELIG CLIN: HCPCS | Performed by: NURSE PRACTITIONER

## 2020-06-10 RX ORDER — ARIPIPRAZOLE 15 MG/1
15 TABLET ORAL DAILY
Qty: 90 TABLET | Refills: 0 | Status: SHIPPED | OUTPATIENT
Start: 2020-06-10 | End: 2020-11-11

## 2020-06-10 RX ORDER — ATORVASTATIN CALCIUM 40 MG/1
40 TABLET, FILM COATED ORAL NIGHTLY
Qty: 90 TABLET | Refills: 0 | Status: SHIPPED | OUTPATIENT
Start: 2020-06-10 | End: 2020-11-11

## 2020-06-10 RX ORDER — OMEPRAZOLE 20 MG/1
CAPSULE, DELAYED RELEASE ORAL
Qty: 90 CAPSULE | Refills: 1 | Status: SHIPPED | OUTPATIENT
Start: 2020-06-10

## 2020-06-10 RX ORDER — ESCITALOPRAM OXALATE 20 MG/1
20 TABLET ORAL DAILY
Qty: 90 TABLET | Refills: 0 | Status: SHIPPED | OUTPATIENT
Start: 2020-06-10 | End: 2020-11-11

## 2020-06-10 RX ORDER — BUPROPION HYDROCHLORIDE 300 MG/1
300 TABLET ORAL EVERY MORNING
Qty: 90 TABLET | Refills: 0 | Status: SHIPPED | OUTPATIENT
Start: 2020-06-10

## 2020-06-10 RX ORDER — FUROSEMIDE 20 MG/1
20 TABLET ORAL 2 TIMES DAILY
Qty: 180 TABLET | Refills: 0 | Status: SHIPPED | OUTPATIENT
Start: 2020-06-10 | End: 2020-11-11

## 2020-06-10 ASSESSMENT — ENCOUNTER SYMPTOMS
DIARRHEA: 0
WHEEZING: 0
ABDOMINAL PAIN: 0
VOMITING: 0
BACK PAIN: 1
EYES NEGATIVE: 1
SHORTNESS OF BREATH: 1
COLOR CHANGE: 0
NAUSEA: 0
COUGH: 0
BLOOD IN STOOL: 0
CONSTIPATION: 0

## 2020-06-10 NOTE — PROGRESS NOTES
500 MG tablet Take 500 mg by mouth daily (with breakfast) Yes Historical Provider, MD   ARIPiprazole (ABILIFY) 15 MG tablet Take 15 mg by mouth daily Yes Historical Provider, MD   omeprazole (PRILOSEC) 20 MG delayed release capsule TAKE ONE (1) CAPSULE BY MOUTH ONCE DAILY Yes [facility] MD RADHA   blood glucose test strips (ALISHA CONTOUR TEST) strip USE AS DIRECTED TO TEST BLOOD SUGAR FOUR TIMES DAILY AS NEEDED Yes [facility] MD RADHA   buPROPion (WELLBUTRIN XL) 300 MG extended release tablet Take 1 tablet by mouth every morning Yes [facility] MD RADHA   ALISHA CONTOUR TEST strip USE AS DIRECTED TO TEST BLOOD SUGAR FOUR TIMES DAILY Yes [facility] MD RADHA   Glucose Blood (BLOOD GLUCOSE TEST STRIPS) STRP Please give contour testing strips to test blood sugar 4x daily Yes [facility] MD RADHA   phytonadione (VITAMIN K) 5 MG tablet Take 1 tablet by mouth once for 1 dose Today on 2020 (Today's INR was 8.5)  [facility] RICHIE GARCIA MD        Social History     Tobacco Use    Smoking status: Former Smoker     Packs/day: 2.00     Years: 35.00     Pack years: 70.00     Types: Cigarettes     Last attempt to quit:      Years since quittin.4    Smokeless tobacco: Never Used    Tobacco comment: chew--30 years. Reviewed 2015   Substance Use Topics    Alcohol use: Yes     Comment: soc        Vitals:    06/10/20 1627   BP: 120/66   Site: Left Upper Arm   Position: Sitting   Cuff Size: Large Adult   Pulse: 129   SpO2: (!) 85%   Weight: 289 lb (131.1 kg)   Height: 6' 5\" (1.956 m)     Estimated body mass index is 34.27 kg/m² as calculated from the following:    Height as of this encounter: 6' 5\" (1.956 m). Weight as of this encounter: 289 lb (131.1 kg). Physical Exam  Vitals signs reviewed. Constitutional:       General: He is not in acute distress. Appearance: Normal appearance. He is obese.  He is not ill-appearing,

## 2020-06-12 ENCOUNTER — TELEPHONE (OUTPATIENT)
Dept: PHARMACY | Age: 75
End: 2020-06-12

## 2020-06-12 ENCOUNTER — ANTI-COAG VISIT (OUTPATIENT)
Dept: PHARMACY | Age: 75
End: 2020-06-12
Payer: MEDICARE

## 2020-06-12 LAB — INR BLD: 2.4

## 2020-06-12 RX ORDER — WARFARIN SODIUM 5 MG/1
TABLET ORAL
COMMUNITY
End: 2020-08-31 | Stop reason: SDUPTHER

## 2020-06-12 NOTE — PROGRESS NOTES
Medication Management Service  [address] [phone]    Visit Date: 6/12/2020   Subjective:       Maninder Gutierrez is a 69 y.o. male who is having INR checked by [facility]. INR results were called in to the clinic for anticoagulation monitoring and adjustment by King Eloise RN. Patient results called in to clinic for warfarin management due to  Indication:   DVT and PE. INR goal: of 2.0-3.0. Duration of therapy: indefinite. Patient reports the following:   Adherent with regimen  Missed or extra doses:  None   Bleeding or thromboembolic side effects:  None  Significant medication changes:  None  Significant dietary changes: None  Significant alcohol or tobacco changes: None  Significant recent illness, disease state changes, or hospitalization:  None  Upcoming surgeries or procedures:  None  Falls: None           Assessment and Plan     PT/INR performed by [address] INR today is 2.4, therapeutic, after INR of 8.51 on 6/9/20. Plan: Restart warfarin today at maintenance dose on prior to hospitalization and stay at [facility]:  Warfarin 5mg daily except 7.5mg on Mondays and Thursdays. Recheck INR in 4 days. Provided clinic phone number to nurse. Patient's home care nurse verbalized understanding of dosing directions and information discussed and will provide information to patient. Patient's home care nurse acknowledges working in consult agreement with pharmacist as referred by patient's physician on behalf of patient.       Electronically signed by Mattie Baker, [facility] on 6/12/20 at 12:56 PM EDT    CLINICAL PHARMACY CONSULT: MED RECONCILIATION/REVIEW Ghislaine  22. Tracking Only    PHSO: No  Total # of Interventions Recommended: 0  - Decreased Dose #: 0  - Maintenance Safety Lab Monitoring #: 1  Total Interventions Accepted: 0  Time Spent (min): 109 [facility] PharmD

## 2020-06-16 ENCOUNTER — ANTI-COAG VISIT (OUTPATIENT)
Dept: PHARMACY | Age: 75
End: 2020-06-16

## 2020-06-16 LAB — INR BLD: 2.6

## 2020-06-18 ENCOUNTER — HOSPITAL ENCOUNTER (OUTPATIENT)
Dept: WOUND CARE | Age: 75
Discharge: HOME | End: 2020-06-18
Payer: MEDICARE

## 2020-06-18 VITALS
RESPIRATION RATE: 16 BRPM | HEART RATE: 102 BPM | DIASTOLIC BLOOD PRESSURE: 78 MMHG | TEMPERATURE: 99.2 F | SYSTOLIC BLOOD PRESSURE: 154 MMHG

## 2020-06-18 PROCEDURE — 11042 DBRDMT SUBQ TIS 1ST 20SQCM/<: CPT

## 2020-06-18 NOTE — PROGRESS NOTES
ulcer associated with type 2 diabetes mellitus ([address]     Diverticulosis 4/23/12    mild, left colon    Femoral DVT (deep venous thrombosis) ([address] 09/2011    PARTIAL,CHRONIC-SPF HEART SURGEONS    GERD (gastroesophageal reflux disease)     Glaucoma 11/12    open angle-Dr. Martínez Waters H/O cardiac catheterization 6/3/14    EF55% normal study    H/O Doppler ultrasound 03/26/15    Carotid US- no significant stenosis noted bilaterally.  H/O echocardiogram 11/21/2019    EF50-55%, Mild AR. Moderately dilated right ventricle with negative Solis's sign.  H/O mumps orchitis     as a youth    Hemorrhoids 4/23/12    Dr. Lucille Gandhi; repeat colonoscopy 3 years    History of nuclear stress test 11/21/2019    EF 60%, Normal study.  HLD (hyperlipidemia)     Hx of Doppler ultrasound 1/06/15    Lymph node seen in left groin area. No bilateral stenosis.     Hyperlipemia     Hyperlipidemia     Hypertension 1992    Hypertension     Idiopathic chronic venous hypertension of left lower extremity with ulcer and inflammation (HCC) 10/2/2015    Leg ulcer ([address] 1978-present    following at wound center, Dr Holly Calderón No diabetic retinopathy OU 11/12    Dr. Elen Holland chronic ulcer of left lower leg with fat layer exposed ([address] 10/2/2015    Pulmonary embolism ([address] 11/13    patient on coumadin    Sleep apnea     doesnt always use cpap dt it drys him out    SOB (shortness of breath) Oct 2011    Stress test normal.     Tendinitis 1973    plantar tendons    Type II or unspecified type diabetes mellitus with other specified manifestations, not stated as uncontrolled 2/8/2013    Unspecified venous (peripheral) insufficiency     Urticaria     WD-Cellulitis of right anterior lower leg 9/5/2019    WD-Cellulitis of right lower extremity 3/26/2020    WD-Chronic venous hypertension (idiopathic) with ulcer of left lower extremity (CODE) ([address] 6/27/2019    WD-Chronic venous hypertension with inflammation,  Tobacco comment: chew--30 years. Reviewed 9/24/2015   Substance Use Topics    Alcohol use: Yes     Comment: soc    Drug use: Never       ALLERGIES    Allergies   Allergen Reactions    Cavilon Durable Barrier [Mineral Oil-Dimeth-Coconut Oil]     Parabens Hives    Parabens Hives    Prinivil [Lisinopril] Swelling    Cortisone Rash    Cortisone Rash    Dilaudid [Hydromorphone Hcl] Rash    Penicillins Rash    Penicillins Rash    Sulfamethoxazole-Trimethoprim Nausea Only    Tape [Adhesive Tape] Rash       MEDICATIONS    Current Outpatient Medications on File Prior to Encounter   Medication Sig Dispense Refill    warfarin (COUMADIN) 5 MG tablet 5mg daily except 7.5mg on Mondays and Thursdays      buPROPion (WELLBUTRIN XL) 300 MG extended release tablet Take 1 tablet by mouth every morning 90 tablet 0    escitalopram (LEXAPRO) 20 MG tablet Take 1 tablet by mouth daily 90 tablet 0    omeprazole (PRILOSEC) 20 MG delayed release capsule TAKE ONE (1) CAPSULE BY MOUTH ONCE DAILY 90 capsule 1    ARIPiprazole (ABILIFY) 15 MG tablet Take 1 tablet by mouth daily 90 tablet 0    atorvastatin (LIPITOR) 40 MG tablet Take 1 tablet by mouth nightly 90 tablet 0    furosemide (LASIX) 20 MG tablet Take 1 tablet by mouth 2 times daily 180 tablet 0    metFORMIN (GLUCOPHAGE) 500 MG tablet Take 1 tablet by mouth daily (with breakfast) 90 tablet 0    glycopyrrolate-formoterol (BEVESPI AEROSPHERE) 9-4.8 MCG/ACT AERO Inhale 2 puffs into the lungs 2 times daily 1 Inhaler 5    Spacer/Aero-Holding Chambers (AEROCHAMBER MV) MISC 1 actuation by Does not apply route 2 times daily 1 each 0    Zinc Sulfate (ZINC 15 PO) Take 50 mg by mouth every other day      potassium chloride (MICRO-K) 10 MEQ extended release capsule Take 10 mEq by mouth daily      Polyethylene Glycol 3350 (MIRALAX PO) Take 17 g by mouth every other day      traMADol (ULTRAM) 50 MG tablet Take 25 mg by mouth every 8 hours as needed for Pain.       metoprolol tartrate (LOPRESSOR) 25 MG tablet Take 0.5 tablets by mouth 2 times daily 90 tablet 3    Zinc Oxide (DESITIN CREAMY EX) Apply 13 % topically      losartan (COZAAR) 50 MG tablet Take 1 tablet by mouth daily Patient tolerates diovan 90 tablet 1    aspirin 81 MG chewable tablet Take 1 tablet by mouth daily 30 tablet 3    blood glucose test strips (ALISHA CONTOUR TEST) strip USE AS DIRECTED TO TEST BLOOD SUGAR FOUR TIMES DAILY AS NEEDED 100 strip 3    ALISHA CONTOUR TEST strip USE AS DIRECTED TO TEST BLOOD SUGAR FOUR TIMES DAILY 100 strip 5    Glucose Blood (BLOOD GLUCOSE TEST STRIPS) STRP Please give contour testing strips to test blood sugar 4x daily 200 strip 3    phytonadione (VITAMIN K) 5 MG tablet Take 1 tablet by mouth once for 1 dose Today on 6/9/2020 (Today's INR was 8.5) 1 tablet 0     No current facility-administered medications on file prior to encounter. REVIEW OF SYSTEMS    Pertinent items are noted in HPI. Constitutional: Negative for systemic symptoms including fever, chills and malaise. Objective:      BP (!) 154/78   Pulse 102   Temp 99.2 °F (37.3 °C) (Temporal)   Resp 16     PHYSICAL EXAM      General: The patient is in no acute distress. Mental status:  Patient is appropriate, is  oriented to place and plan of care.   Dermatologic exam: Visual inspection of the periwound reveals the skin to be normal in turgor and texture  Wound exam: see wound description below in procedure note      Assessment:     Problem List Items Addressed This Visit     WD-Ulcer of shin, left, with fat layer exposed ([address]    WD-Chronic venous hypertension (idiopathic) with ulcer of left lower extremity (CODE) ([address] - Primary    WD-Chronic venous hypertension with inflammation, right        Procedure Note    Indications:  Based on my examination of this patient's wound(s) today, sharp excision into necrotic epidermis, dermis and subcutaneous tissue is required to promote healing and evaluate Wound Assessment Red;Yellow 6/18/2020  9:20 AM   Drainage Amount Moderate 6/18/2020  9:20 AM   Drainage Description Serosanguinous 6/18/2020  9:20 AM   Odor None 6/18/2020  9:20 AM   Margins Defined edges 6/18/2020  9:20 AM   Ana-wound Assessment Maceration 6/18/2020  9:20 AM   Non-staged Wound Description Full thickness 6/18/2020  9:20 AM   [place]%Wound Bed 0 6/18/2020  9:20 AM   Red%Wound Bed 90 6/18/2020  9:20 AM   Yellow%Wound Bed 10 6/18/2020  9:20 AM   Black%Wound Bed 0 6/18/2020  9:20 AM   Purple%Wound Bed 0 6/18/2020  9:20 AM   Other%Wound Bed 0 6/18/2020  9:20 AM   Number of days: 356       Percent of Wound(s) Debrided: approximately 100%    Total  Area  Debrided:  1.3 sq cm     Bleeding:  Minimal    Hemostasis Achieved:  by pressure    Procedural Pain:  0  / 10     Post Procedural Pain:  0 / 10     Response to treatment:  Well tolerated by patient. Status of wound progress and description from last visit:   Wound is improved, but he has cellulitis on the right leg- this needs to be treated- will start Bactrim as he has tolerated this in the past.  He is with coumadin checks with home health- will need to increase the frequency. Plan:       Discharge Instructions       NOTE: Upon discharge from the [address], you will receive a patient experience survey. We would be grateful if you would take the time to fill this survey out.     Wound care order history:                 ANA CRISTINA's   Right       Left               Date               Vascular studies:   ordered on 7/25/19 Date  To be done 8/2/19 imaging center               Imaging:  Date               Cultures: obtained from left medal leg  Date 6/27/19--positive kles.                                [id number] from left medial lower leg on 8/15/19                               Obtained from left medial lower leg on 9/26/19                               Obtained from right leg on 10/17/19                               Obtained from left leg on 3/12/2020                               Obtained from right leg on 3/19/2020                                                                                                                           Obtained from right leg on 4/30/2020--scanty growth                                  Biopsy done 7/18/19                 Labs/ HbA1c  Date               Grafts: Date               HBO:                Antibiotics: Doxycycline for 14 days BID; ph[facility] to [facility] 6/28/19                                    Doxycycline for 10 days BID and CIPRO 500 mg BID for 10 days ordered on 8/22/19                                     Doxycycline for 7 days on 9/5/19                                   Doxycycline for 7 days on 3/12/2020 continued on 3/19/2020 for 7 days                                    Bactrim DS for  10 days on 3/26/70668              Earlier Wound care treatments:                [id number]:                        Consults:   Date 7/18/19 to Dr Doris Gamble-- [facility] physician:      Continuing wound care orders and information:              Residence: [place]              Continue home health care with:              Your wound-care supplies will be provided by: Ana Paula Romero provider:              GREG with              IQW loading: Date              COQQI Medications: RX SANTYL GIVEN 8/1/19              ADFCW cleansing:                           YS not scrub or use excessive force.                          Wash hands with soap and water before and after dressing changes.                           Prior to applying a clean dressing, cleanse wound with normal saline,                                wound cleanser, or mild soap and water.                                     Daily Wound management:                                                Avoid standing for long periods of time.                          ZZYMY wraps/stockings in AM and remove at

## 2020-06-23 ENCOUNTER — ANTI-COAG VISIT (OUTPATIENT)
Dept: PHARMACY | Age: 75
End: 2020-06-23

## 2020-06-23 LAB — INR BLD: 2.9

## 2020-06-25 ENCOUNTER — HOSPITAL ENCOUNTER (OUTPATIENT)
Dept: WOUND CARE | Age: 75
Discharge: HOME | End: 2020-06-25
Payer: MEDICARE

## 2020-06-25 VITALS
DIASTOLIC BLOOD PRESSURE: 71 MMHG | SYSTOLIC BLOOD PRESSURE: 153 MMHG | RESPIRATION RATE: 18 BRPM | HEART RATE: 106 BPM | TEMPERATURE: 99.1 F

## 2020-06-25 PROBLEM — L89.323 DECUBITUS ULCER OF LEFT BUTTOCK, STAGE 3 (HCC): Status: ACTIVE | Noted: 2020-06-25

## 2020-06-25 PROCEDURE — 11042 DBRDMT SUBQ TIS 1ST 20SQCM/<: CPT

## 2020-06-25 NOTE — PROGRESS NOTES
Measurements:  Are located in the Wound Documentation Flow Sheet    All active wounds listed below with today's date are evaluated  Wound(s)    debrided this date include # : 6     Post  Debridement Measurements:  Wound 11/15/13 Other (Comment) Leg Inner erethema with a small puncture site (Active)   Number of days: 2413       Wound 06/27/19 #6 (onset 1 month) Left Medial Distal Ankle (Active)   Wound Image   6/25/2020  9:07 AM   Wound Venous 6/25/2020  9:07 AM   Offloading for Diabetic Foot Ulcers No 6/4/2020  9:10 AM   Dressing Status Clean;Dry; Intact 6/25/2020  9:38 AM   Dressing Changed Changed/New 6/25/2020  9:38 AM   Wound Cleansed Vashe 6/18/2020  9:32 AM   Wound Length (cm) 1 cm 6/25/2020  9:07 AM   Wound Width (cm) 1.3 cm 6/25/2020  9:07 AM   Wound Depth (cm) 0.1 cm 6/25/2020  9:07 AM   Wound Surface Area (cm^2) 1.3 cm^2 6/25/2020  9:07 AM   Change in Wound Size % (l*w) 81.43 6/25/2020  9:07 AM   Wound Volume (cm^3) 0.13 cm^3 6/25/2020  9:07 AM   Wound Healing % 94 6/25/2020  9:07 AM   Post-Procedure Length (cm) 1 cm 6/25/2020  9:36 AM   Post-Procedure Width (cm) 1.3 cm 6/25/2020  9:36 AM   Post-Procedure Depth (cm) 0.1 cm 6/25/2020  9:36 AM   Post-Procedure Surface Area (cm^2) 1.3 cm^2 6/25/2020  9:36 AM   Post-Procedure Volume (cm^3) 0.13 cm^3 6/25/2020  9:36 AM   Distance Tunneling (cm) 0 cm 6/25/2020  9:07 AM   Tunneling Position ___ O'Clock 0 6/25/2020  9:07 AM   Undermining Starts ___ O'Clock 0 6/25/2020  9:07 AM   Undermining Ends___ O'Clock 0 6/25/2020  9:07 AM   Undermining Maxium Distance (cm) 0 6/25/2020  9:07 AM   Wound Assessment Red;Yellow 6/25/2020  9:07 AM   Drainage Amount Moderate 6/25/2020  9:07 AM   Drainage Description Tan 6/25/2020  9:07 AM   Odor None 6/25/2020  9:07 AM   Margins Defined edges 6/25/2020  9:07 AM   Ana-wound Assessment Maceration 6/25/2020  9:07 AM   Non-staged Wound Description Full thickness 6/25/2020  9:07 AM   [place]%Wound Bed 0 6/25/2020  9:07 AM   Red%Wound Bed 20

## 2020-06-26 ENCOUNTER — ANTI-COAG VISIT (OUTPATIENT)
Dept: PHARMACY | Age: 75
End: 2020-06-26

## 2020-06-26 LAB — INR BLD: 3.2

## 2020-06-29 ENCOUNTER — ANTI-COAG VISIT (OUTPATIENT)
Dept: PHARMACY | Age: 75
End: 2020-06-29

## 2020-06-29 LAB — INR BLD: 2.1

## 2020-06-29 NOTE — PROGRESS NOTES
Pharmacy Admin Tracking Only    PHSO: No  Total # of Interventions Recommended: 1  - Increased Dose #: 1  - Maintenance Safety Lab Monitoring #: 1     Total Interventions Accepted: 1  Time Spent (min): 15    Shin PostD

## 2020-07-02 ENCOUNTER — HOSPITAL ENCOUNTER (OUTPATIENT)
Age: 75
Discharge: HOME | End: 2020-07-02
Payer: MEDICARE

## 2020-07-02 ENCOUNTER — HOSPITAL ENCOUNTER (OUTPATIENT)
Dept: WOUND CARE | Age: 75
Discharge: HOME | End: 2020-07-02
Payer: MEDICARE

## 2020-07-02 ENCOUNTER — HOSPITAL ENCOUNTER (OUTPATIENT)
Dept: GENERAL RADIOLOGY | Age: 75
Discharge: HOME | End: 2020-07-02
Payer: MEDICARE

## 2020-07-02 VITALS
SYSTOLIC BLOOD PRESSURE: 181 MMHG | DIASTOLIC BLOOD PRESSURE: 78 MMHG | HEART RATE: 105 BPM | RESPIRATION RATE: 17 BRPM | TEMPERATURE: 98.2 F

## 2020-07-02 PROCEDURE — 11042 DBRDMT SUBQ TIS 1ST 20SQCM/<: CPT

## 2020-07-02 PROCEDURE — 71046 X-RAY EXAM CHEST 2 VIEWS: CPT

## 2020-07-02 NOTE — PROGRESS NOTES
Multilayer Compression Wrap   (Not Unna) Below the Knee    NAME:  Dejah Mantilla  YOB: 1945  MEDICAL RECORD NUMBER:  [id number]  DATE:  7/2/2020    Multilayer compression wrap: Removed old Multilayer wrap if indicated and wash leg with mild soap/water. Applied moisturizing agent to dry skin as needed. Applied primary and secondary dressing as ordered. Applied multilayered dressing below the knee to left lower leg. Instructed patient/caregiver not to remove dressing and to keep it clean and dry. Instructed patient/caregiver on complications to report to provider, such as pain, numbness in toes, heavy drainage, and slippage of dressing. Instructed patient on purpose of compression dressing and on activity and exercise recommendations.       Electronically signed by Jordana Munoz RN on 7/2/2020 at 9:51 AM

## 2020-07-02 NOTE — PROGRESS NOTES
[facility]-Dr. Suly Alvarez H/O cardiac catheterization 6/3/14    EF55% normal study    H/O Doppler ultrasound 03/26/15    Carotid US- no significant stenosis noted bilaterally.  H/O echocardiogram 11/21/2019    EF50-55%, Mild AR. Moderately dilated right ventricle with negative Solis's sign.  H/O mumps orchitis     as a youth    Hemorrhoids 4/23/12    Dr. Kristen Garcia; repeat colonoscopy 3 years    History of nuclear stress test 11/21/2019    EF 60%, Normal study.  HLD (hyperlipidemia)     Hx of Doppler ultrasound 1/06/15    Lymph node seen in left groin area. No bilateral stenosis.     Hyperlipemia     Hyperlipidemia     Hypertension 1992    Hypertension     Idiopathic chronic venous hypertension of left lower extremity with ulcer and inflammation (HCC) 10/2/2015    Leg ulcer ([address] 1978-present    following at wound center, Dr Helder Vizcaino No diabetic retinopathy OU 11/12    Dr. Cami Shen chronic ulcer of left lower leg with fat layer exposed ([address] 10/2/2015    Pulmonary embolism ([address] 11/13    patient on coumadin    Sleep apnea     doesnt always use cpap dt it drys him out    SOB (shortness of breath) Oct 2011    Stress test normal.     Tendinitis 1973    plantar tendons    Type II or unspecified type diabetes mellitus with other specified manifestations, not stated as uncontrolled 2/8/2013    Unspecified venous (peripheral) insufficiency     Urticaria     WD-Cellulitis of right anterior lower leg 9/5/2019    WD-Cellulitis of right lower extremity 3/26/2020    WD-Chronic venous hypertension (idiopathic) with ulcer of left lower extremity (CODE) ([address] 6/27/2019    WD-Chronic venous hypertension with inflammation, right 9/19/2019    WD-Decubitus ulcer of left buttock, stage 3 ([address] 6/25/2020    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 to breakdown of skin ([address] 3/26/2020    WD-Open wound of hand without complication, left, initial encounter 2019    WD-Pressure injury of left buttock, stage 2 ([address] 2020    WD-Pressure injury of left buttock, stage 3 ([address] 3/12/2020    WD-Pressure injury of right buttock, stage 3 ([address] 3/12/2020    WD-Skin tear of right forearm without complication     WD-Ulcer of right pretibial region, with fat layer exposed ([address] 10/17/2019       PAST SURGICAL HISTORY    Past Surgical History:   Procedure Laterality Date    BREAST SURGERY  1970s    benign tumors bilaterally    CARDIAC CATHETERIZATION  6/3/14    EF55% normal study    CARPAL TUNNEL RELEASE Left     CARPAL TUNNEL RELEASE      CATARACT REMOVAL Right 3/11/2013    Dr. Tj Elizabeth Left 2013    Dr. William Roy      polyps    COLONOSCOPY  11    villous component--f/u colonoscopy will be needed in 6 months    COLONOSCOPY  12    mild diverticulosis, internal hemorrhoids; repeat in 3 years (Dr. Justin Navarrete)    COLONOSCOPY  2016    mild diverticulosis, three colon polyps    HERNIA REPAIR  1970s    HERNIA REPAIR      SKIN GRAFT  -present    skin grafts to left ankle ulcers    SPLENECTOMY      SPLENECTOMY      TONSILLECTOMY      VARICOSE VEIN SURGERY Left 2009       FAMILY HISTORY    Family History   Problem Relation Age of Onset    Heart Disease Father     Cancer Father         prostate    High Blood Pressure Father     High Cholesterol Father     Cancer Brother     Diabetes Brother     Thyroid Disease Brother        SOCIAL HISTORY    Social History     Tobacco Use    Smoking status: Former Smoker     Packs/day: 2.00     Years: 35.00     Pack years: 70.00     Types: Cigarettes     Last attempt to quit:      Years since quittin.5    Smokeless tobacco: Never Used    Tobacco comment: chew--30 years.   Reviewed 2015   Substance Use Topics    Alcohol use: Yes     Comment: soc    Drug use: Never       ALLERGIES    Allergies   Allergen Reactions    Cavilon Durable Barrier [Mineral Oil-Dimeth-Coconut Oil]     Parabens Hives    Parabens Hives    Prinivil [Lisinopril] Swelling    Cortisone Rash    Cortisone Rash    Dilaudid [Hydromorphone Hcl] Rash    Penicillins Rash    Penicillins Rash    Sulfamethoxazole-Trimethoprim Nausea Only    Tape [Adhesive Tape] Rash       MEDICATIONS    Current Outpatient Medications on File Prior to Encounter   Medication Sig Dispense Refill    warfarin (COUMADIN) 5 MG tablet 5mg daily except 7.5mg on Mondays and Thursdays      buPROPion (WELLBUTRIN XL) 300 MG extended release tablet Take 1 tablet by mouth every morning 90 tablet 0    escitalopram (LEXAPRO) 20 MG tablet Take 1 tablet by mouth daily 90 tablet 0    omeprazole (PRILOSEC) 20 MG delayed release capsule TAKE ONE (1) CAPSULE BY MOUTH ONCE DAILY 90 capsule 1    ARIPiprazole (ABILIFY) 15 MG tablet Take 1 tablet by mouth daily 90 tablet 0    atorvastatin (LIPITOR) 40 MG tablet Take 1 tablet by mouth nightly 90 tablet 0    furosemide (LASIX) 20 MG tablet Take 1 tablet by mouth 2 times daily 180 tablet 0    metFORMIN (GLUCOPHAGE) 500 MG tablet Take 1 tablet by mouth daily (with breakfast) 90 tablet 0    glycopyrrolate-formoterol (BEVESPI AEROSPHERE) 9-4.8 MCG/ACT AERO Inhale 2 puffs into the lungs 2 times daily 1 Inhaler 5    Spacer/Aero-Holding Chambers (AEROCHAMBER MV) MISC 1 actuation by Does not apply route 2 times daily 1 each 0    Zinc Sulfate (ZINC 15 PO) Take 50 mg by mouth every other day      potassium chloride (MICRO-K) 10 MEQ extended release capsule Take 10 mEq by mouth daily      Polyethylene Glycol 3350 (MIRALAX PO) Take 17 g by mouth every other day      traMADol (ULTRAM) 50 MG tablet Take 25 mg by mouth every 8 hours as needed for Pain.       metoprolol tartrate (LOPRESSOR) 25 MG tablet Take 0.5 tablets by mouth 2 times daily 90 tablet 3    Zinc Oxide (DESITIN CREAMY EX) Apply 13 % topically      losartan (COZAAR) 50 MG tablet Take 1 tablet by mouth daily Patient tolerates diovan 90 tablet 1    aspirin 81 MG chewable tablet Take 1 tablet by mouth daily 30 tablet 3    blood glucose test strips (ALISHA CONTOUR TEST) strip USE AS DIRECTED TO TEST BLOOD SUGAR FOUR TIMES DAILY AS NEEDED 100 strip 3    ALISHA CONTOUR TEST strip USE AS DIRECTED TO TEST BLOOD SUGAR FOUR TIMES DAILY 100 strip 5    Glucose Blood (BLOOD GLUCOSE TEST STRIPS) STRP Please give contour testing strips to test blood sugar 4x daily 200 strip 3    phytonadione (VITAMIN K) 5 MG tablet Take 1 tablet by mouth once for 1 dose Today on 6/9/2020 (Today's INR was 8.5) 1 tablet 0     No current facility-administered medications on file prior to encounter. REVIEW OF SYSTEMS    Pertinent items are noted in HPI. Constitutional: Negative for systemic symptoms including fever, chills and malaise. Objective:      BP (!) 181/78   Pulse 105   Temp 98.2 °F (36.8 °C) (Temporal)   Resp 17     PHYSICAL EXAM      General: The patient is in no acute distress. Mental status:  Patient is appropriate, is  oriented to place and plan of care. Dermatologic exam: Visual inspection of the periwound reveals the skin to be normal in turgor and texture  Wound exam: see wound description below in procedure note      Assessment:     Problem List Items Addressed This Visit     WD-Ulcer of shin, left, with fat layer exposed ([address] - Primary    WD-Chronic venous hypertension (idiopathic) with ulcer of left lower extremity (CODE) ([address]      I also reviewed his left buttock and right buttock tiny ulcers- stage 3 and stage 2  Procedure Note    Indications:  Based on my examination of this patient's wound(s) today, sharp excision into necrotic subcutaneous tissue is required to promote healing and evaluate the extent of previous healing.     Performed by: Talia Wadsworth MD    Consent obtained: Yes    Time out taken:  Yes    Pain Control: none needed       Debridement:Non-excisional Debridement    Using curette the wound(s) was/were sharply debrided down through and including the removal of epidermis and subcutaneous tissue. Devitalized Tissue Debrided:  fibrin, biofilm and slough    Pre Debridement Measurements:  Are located in the Wound Documentation Flow Sheet    All active wounds listed below with today's date are evaluated  Wound(s)    debrided this date include # : 6     Post  Debridement Measurements:  Wound 11/15/13 Other (Comment) Leg Inner erethema with a small puncture site (Active)   Number of days: 1575       Wound 06/27/19 #6 (onset 1 month) Left Medial Distal Ankle (Active)   Wound Image   6/25/2020  9:07 AM   Wound Venous 7/2/2020  8:55 AM   Offloading for Diabetic Foot Ulcers No 6/4/2020  9:10 AM   Dressing Status Clean;Dry; Intact 7/2/2020  9:49 AM   Dressing Changed Changed/New 7/2/2020  9:49 AM   Wound Cleansed Soap and water 7/2/2020  8:55 AM   Wound Length (cm) 1.4 cm 7/2/2020  8:55 AM   Wound Width (cm) 0.9 cm 7/2/2020  8:55 AM   Wound Depth (cm) 0.1 cm 7/2/2020  8:55 AM   Wound Surface Area (cm^2) 1.26 cm^2 7/2/2020  8:55 AM   Change in Wound Size % (l*w) 82 7/2/2020  8:55 AM   Wound Volume (cm^3) 0.13 cm^3 7/2/2020  8:55 AM   Wound Healing % 94 7/2/2020  8:55 AM   Post-Procedure Length (cm) 1 cm 6/25/2020  9:36 AM   Post-Procedure Width (cm) 1.3 cm 6/25/2020  9:36 AM   Post-Procedure Depth (cm) 0.1 cm 6/25/2020  9:36 AM   Post-Procedure Surface Area (cm^2) 1.3 cm^2 6/25/2020  9:36 AM   Post-Procedure Volume (cm^3) 0.13 cm^3 6/25/2020  9:36 AM   Distance Tunneling (cm) 0 cm 7/2/2020  8:55 AM   Tunneling Position ___ O'Clock 0 7/2/2020  8:55 AM   Undermining Starts ___ O'Clock 0 7/2/2020  8:55 AM   Undermining Ends___ O'Clock 0 7/2/2020  8:55 AM   Undermining Maxium Distance (cm) 0 7/2/2020  8:55 AM   Wound Assessment Red;Yellow 7/2/2020  8:55 AM   Drainage Amount Moderate 7/2/2020  8:55 AM   Drainage Description Serosanguinous 7/2/2020  8:55 AM   Odor None 7/2/2020  8:55 AM   Margins Defined edges 7/2/2020  8:55 AM   Ana-wound Assessment Maceration 7/2/2020  8:55 AM   Non-staged Wound Description Full thickness 7/2/2020  8:55 AM   [place]%Wound Bed 0 7/2/2020  8:55 AM   Red%Wound Bed 10 7/2/2020  8:55 AM   Yellow%Wound Bed 90 7/2/2020  8:55 AM   Black%Wound Bed 0 7/2/2020  8:55 AM   Purple%Wound Bed 0 7/2/2020  8:55 AM   Other%Wound Bed 0 7/2/2020  8:55 AM   Number of days: 370       Wound 06/25/20 Buttocks Left;Lateral #15  (Active)   Wound Pressure Stage  3 6/25/2020  9:36 AM   Dressing Status Clean;Dry; Intact 6/25/2020  9:38 AM   Dressing Changed Changed/New 6/25/2020  9:38 AM   Wound Cleansed Rinsed/Irrigated with saline; Wound cleanser 6/25/2020  9:36 AM   Wound Length (cm) 1 cm 6/25/2020  9:36 AM   Wound Width (cm) 0.7 cm 6/25/2020  9:36 AM   Wound Depth (cm) 0.1 cm 6/25/2020  9:36 AM   Wound Surface Area (cm^2) 0.7 cm^2 6/25/2020  9:36 AM   Wound Volume (cm^3) 0.07 cm^3 6/25/2020  9:36 AM   Post-Procedure Length (cm) 1 cm 6/25/2020  9:38 AM   Post-Procedure Width (cm) 0.7 cm 6/25/2020  9:38 AM   Post-Procedure Depth (cm) 0.2 cm 6/25/2020  9:38 AM   Post-Procedure Surface Area (cm^2) 0.7 cm^2 6/25/2020  9:38 AM   Post-Procedure Volume (cm^3) 0.14 cm^3 6/25/2020  9:38 AM   Wound Assessment Red;Yellow 6/25/2020  9:36 AM   Drainage Amount Moderate 6/25/2020  9:36 AM   Drainage Description Serosanguinous 6/25/2020  9:36 AM   Odor None 6/25/2020  9:36 AM   Ana-wound Assessment Red 6/25/2020  9:36 AM   [place]%Wound Bed 0 6/25/2020  9:36 AM   Red%Wound Bed 40 6/25/2020  9:36 AM   Yellow%Wound Bed 60 6/25/2020  9:36 AM   Black%Wound Bed 0 6/25/2020  9:36 AM   Purple%Wound Bed 0 6/25/2020  9:36 AM   Number of days: 6       Percent of Wound(s) Debrided: approximately 100%    Total  Area  Debrided:  1.3 sq cm     Bleeding:  Minimal    Hemostasis Achieved:  by pressure    Procedural Pain:  0  / 10     Post Procedural Pain:  0 / 10     Response to treatment:  Well tolerated by patient.      Status of wound progress and description from last visit:   Improved very slightly. The buttocks sores are both healed today, but as I pulled off the bandage on the right buttock, the skin tore and results in bleeding! I place a new foam to the buttock today. Plan:       Discharge Instructions       NOTE: Upon discharge from the [address], you will receive a patient experience survey. We would be grateful if you would take the time to fill this survey out.     Wound care order history:                 ANA CRISTINA's   Right       Left               Date               Vascular studies:   ordered on 7/25/19 Date  To be done 8/2/19 imaging center               Imaging:  Date               Cultures: obtained from left medal leg  Date 6/27/19--positive kles.                                TAPRFRNS from left medial lower leg on 8/15/19                               Obtained from left medial lower leg on 9/26/19                               Obtained from right leg on 10/17/19                               Obtained from left leg on 3/12/2020                               Obtained from right leg on 3/19/2020                                                                                                                           Obtained from right leg on 4/30/2020--scanty growth                                  Biopsy done 7/18/19                 Labs/ HbA1c  Date               Grafts: Date               HBO:                Antibiotics: Doxycycline for 14 days BID; [facility]oned to [facility] 6/28/19                                    Doxycycline for 10 days BID and CIPRO 500 mg BID for 10 days ordered on 8/22/19                                     Doxycycline for 7 days on 9/5/19                                   Doxycycline for 7 days on 3/12/2020 continued on 3/19/2020 for 7 days                                    Bactrim DS for  10 days on 3/26/14842              Earlier Wound care treatments:                [id number]:                        Consults:   Date 7/18/19 to Dr Sarah Patel-- [facility] physician:      Continuing wound care orders and information:              Residence: [place]              Continue home health care with:              Your wound-care supplies will be provided by: Marcos Waller provider:              [id number] with              MMG loading: Date              TKHOB Medications: RX SANTYL GIVEN 8/1/19              IJSMR cleansing:                           UL not scrub or use excessive force.                          Wash hands with soap and water before and after dressing changes.                         Prior to applying a clean dressing, cleanse wound with normal saline,                                wound cleanser, or mild soap and water.                                     Daily Wound management:                                                Avoid standing for long periods of time.                          Apply wraps/stockings in AM and remove at bedtime.                          If swelling is present, elevate legs to the level of the heart or above for 30 minutes 4-5 times a day and/or when sitting.                                             When taking antibiotics take entire prescription as ordered by physician do not stop taking until medicine is all gone.                                                      Orders for this week: 7/2/2020        Left medial ankle distal-- cleanse with  vashe in clinic today --for 5-10 minutes, pat dry. Lotrisone to fabricio wound(in clinic only). Lotion to leg, Apply stimulin to wound bed then cover open wound and periwound with vacutex, abd, coban 2 lite--leave in place x 1 week until patient returns to clinic next week      Right Leg -- apply   tubigrip f      Lateral Left Buttock wounds-- Clean with soap and water,pat dry.   apply Fibracol and Mepilex border Change every 3 days    Script called in for bactrim, please - Make sure to let coumadin clinic know about starting ABX, you will need to have your levels checked more frequently     Follow up [address] Dr in 1 week on Thursday as scheduled in the wound care center  Call [phone] for any questions or concerns.   Physician Signature         Treatment Note Wound 06/27/19 #6 (onset 1 month) Left Medial Distal Ankle-Dressing/Treatment: (lotrisone;stimulen gel;vaccutex;abd;coban 2 lite; tape)    Written Patient Dismissal Instructions Given            Electronically signed by Lydia Clemens MD on 7/2/2020 at 9:51 AM

## 2020-07-03 ENCOUNTER — ANTI-COAG VISIT (OUTPATIENT)
Dept: PHARMACY | Age: 75
End: 2020-07-03

## 2020-07-03 LAB — INR BLD: 1.9

## 2020-07-03 NOTE — PROGRESS NOTES
Medication Management Service  [address] [phone]    Visit Date: 7/3/2020   Subjective:       Jamila Bishop is a 69 y.o. male who is having INR checked by [facility]. INR results were called in to the clinic for anticoagulation monitoring and adjustment by Milagros Betts RN who can be reached at [phone]. Patient results called in to clinic for warfarin management due to  Indication:   DVT and PE. INR goal: of 2.0-3.0. Duration of therapy: indefinite. Patient reports the following:   Adherent with regimen  Missed or extra doses:  None   Bleeding or thromboembolic side effects:  Sore on right leg bled a little. Scattered bruising on arms. Significant medication changes:  None  Significant dietary changes: None  Significant alcohol or tobacco changes: None  Significant recent illness, disease state changes, or hospitalization:  None  Upcoming surgeries or procedures:  None  Falls: None           Assessment and Plan     PT/INR performed by [address] INR today is 1.9, slightly below goal range. Plan:  Take warfarin 7.5mg x 1 then will continue current regimen of warfarin 5mg daily. Recheck INR in 1 week(s). Patient is being discharged from [facility] today and next INR will be checked by [address] [place] [address] lab. Patient's home care nurse verbalized understanding of dosing directions and information discussed and will provide information to patient. Patient's home care nurse acknowledges working in consult agreement with pharmacist as referred by patient's physician on behalf of patient.       Electronically signed by Chad Stone, [facility] on 7/3/20 at 12:47 PM EDT    CLINICAL PHARMACY CONSULT: MED RECONCILIATION/REVIEW ADDENDUM    For Pharmacy Admin Tracking Only    PHSO: No  Total # of Interventions Recommended: 1  - Increased Dose #: 1  - Maintenance Safety Lab Monitoring #: 1  Total Interventions Accepted: 1  Time Spent (min): 15    Kiya Booth, PharmD

## 2020-07-09 ENCOUNTER — HOSPITAL ENCOUNTER (OUTPATIENT)
Age: 75
Discharge: HOME | End: 2020-07-09
Payer: MEDICARE

## 2020-07-09 ENCOUNTER — HOSPITAL ENCOUNTER (OUTPATIENT)
Dept: WOUND CARE | Age: 75
Discharge: HOME | End: 2020-07-09
Payer: MEDICARE

## 2020-07-09 ENCOUNTER — ANTI-COAG VISIT (OUTPATIENT)
Dept: PHARMACY | Age: 75
End: 2020-07-09
Payer: MEDICARE

## 2020-07-09 VITALS
RESPIRATION RATE: 15 BRPM | DIASTOLIC BLOOD PRESSURE: 68 MMHG | SYSTOLIC BLOOD PRESSURE: 110 MMHG | TEMPERATURE: 98.3 F | HEART RATE: 84 BPM

## 2020-07-09 LAB
INR BLD: 2.11 INDEX
PROTHROMBIN TIME: 25.7 SECONDS (ref 11.7–14.5)

## 2020-07-09 PROCEDURE — 11042 DBRDMT SUBQ TIS 1ST 20SQCM/<: CPT

## 2020-07-09 PROCEDURE — 36415 COLL VENOUS BLD VENIPUNCTURE: CPT

## 2020-07-09 PROCEDURE — 99212 OFFICE O/P EST SF 10 MIN: CPT

## 2020-07-09 PROCEDURE — 85610 PROTHROMBIN TIME: CPT

## 2020-07-09 RX ORDER — LOSARTAN POTASSIUM 50 MG/1
50 TABLET ORAL DAILY
Qty: 90 TABLET | Refills: 1 | Status: ON HOLD
Start: 2020-07-09 | End: 2020-11-18 | Stop reason: HOSPADM

## 2020-07-09 NOTE — PLAN OF CARE
Problem: Wound:  Intervention: Assess ankle, calf, or foot circumference blilaterally  Note: See flow sheet  Intervention: Assess pain status  Note: See flow sheet  Intervention: Assess wound size, appearance and drainage  Note: See flow sheet  Intervention: Assess pedal pulses bilaterally if patient has a foot or leg ulcer  Note: See flow sheet  Intervention: Doppler if unable to palpate pedal pulse  Note: See flow sheet

## 2020-07-09 NOTE — PROGRESS NOTES
Medication Management Service  [address] [phone]    Visit Date: 7/9/2020   Subjective: All appointments have been changed to telephone visits at this time due to COVID-19. Grisel Alex is a 69 y.o. male who is having INR checked by [facility]. INR results were sent to the clinic for anticoagulation monitoring and adjustment. Patient results called in to clinic for warfarin management due to  Indication:   DVT and PE. INR goal: of 2.0-3.0. Duration of therapy: indefinite. Patient reports the following:   Adherent with regimen  Missed or extra doses:  None   Bleeding or thromboembolic side effects:  None  Significant medication changes:  None  Significant dietary changes: None  Significant alcohol or tobacco changes: None  Significant recent illness, disease state changes, or hospitalization:  None  Upcoming surgeries or procedures:  None  Falls: None           Assessment and Plan     PT/INR performed by Lab. INR today is 2.11, therapeutic, after booster dose of 7.5mg last Friday. Plan:  Increase weekly dose by ~7% to warfarin 5mg daily except 7.5mg on Fridays. Recheck INR in 1 week(s). Patient has standing lab orders for PT/INR. Patient verbalized understanding of dosing directions and information discussed. Progress note sent to referring office. Patient acknowledges working in consult agreement with pharmacist as referred by his/her physician. Patient accepted that for purposes of billing, this is a virtual visit with your provider for which we will submit a claim for reimbursement with your insurance company. You may be responsible for any copays, coinsurance amounts or other amounts not covered by your insurance company.      Electronically signed by NADEEN Castro [facility] on 7/9/20 at 1:12 PM EDT    CLINICAL PHARMACY CONSULT: MED RECONCILIATION/REVIEW Ghislaine  22. Tracking

## 2020-07-09 NOTE — PROGRESS NOTES
EF55% normal study    H/O Doppler ultrasound 03/26/15    Carotid US- no significant stenosis noted bilaterally.  H/O echocardiogram 11/21/2019    EF50-55%, Mild AR. Moderately dilated right ventricle with negative Solis's sign.  H/O mumps orchitis     as a youth    Hemorrhoids 4/23/12    Dr. Blessing Rosa; repeat colonoscopy 3 years    History of nuclear stress test 11/21/2019    EF 60%, Normal study.  HLD (hyperlipidemia)     Hx of Doppler ultrasound 1/06/15    Lymph node seen in left groin area. No bilateral stenosis.     Hyperlipemia     Hyperlipidemia     Hypertension 1992    Hypertension     Idiopathic chronic venous hypertension of left lower extremity with ulcer and inflammation (HCC) 10/2/2015    Leg ulcer ([address] 1978-present    following at wound center, Dr Talia Gaston No diabetic retinopathy OU 11/12    Dr. Anna Granados chronic ulcer of left lower leg with fat layer exposed ([address] 10/2/2015    Pulmonary embolism ([address] 11/13    patient on coumadin    Sleep apnea     doesnt always use cpap dt it drys him out    SOB (shortness of breath) Oct 2011    Stress test normal.     Tendinitis 1973    plantar tendons    Type II or unspecified type diabetes mellitus with other specified manifestations, not stated as uncontrolled 2/8/2013    Unspecified venous (peripheral) insufficiency     Urticaria     WD-Cellulitis of right anterior lower leg 9/5/2019    WD-Cellulitis of right lower extremity 3/26/2020    WD-Chronic venous hypertension (idiopathic) with ulcer of left lower extremity (CODE) ([address] 6/27/2019    WD-Chronic venous hypertension with inflammation, right 9/19/2019    WD-Decubitus ulcer of left buttock, stage 3 ([address] 6/25/2020    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 to breakdown of skin ([address] 3/26/2020    WD-Open wound of hand without complication, left, initial encounter 12/12/2019    WD-Pressure injury of left buttock, stage 2 ([facility] 2020    WD-Pressure injury of left buttock, stage 3 ([address] 3/12/2020    WD-Pressure injury of right buttock, stage 3 ([address] 3/12/2020    WD-Skin tear of right forearm without complication     WD-Ulcer of right pretibial region, with fat layer exposed ([address] 10/17/2019       PAST SURGICAL HISTORY    Past Surgical History:   Procedure Laterality Date    BREAST SURGERY  1970s    benign tumors bilaterally    CARDIAC CATHETERIZATION  6/3/14    EF55% normal study    CARPAL TUNNEL RELEASE Left     CARPAL TUNNEL RELEASE      CATARACT REMOVAL Right 3/11/2013    Dr. Nehemias Morales Left 2013    Dr. Leonid Galeano      polyps    COLONOSCOPY  11    villous component--f/u colonoscopy will be needed in 6 months    COLONOSCOPY  12    mild diverticulosis, internal hemorrhoids; repeat in 3 years (Dr. Jun Pink)    COLONOSCOPY  2016    mild diverticulosis, three colon polyps    HERNIA REPAIR  1970s    HERNIA REPAIR      SKIN GRAFT  -present    skin grafts to left ankle ulcers    SPLENECTOMY      SPLENECTOMY      TONSILLECTOMY      VARICOSE VEIN SURGERY Left 2009       FAMILY HISTORY    Family History   Problem Relation Age of Onset    Heart Disease Father     Cancer Father         prostate    High Blood Pressure Father     High Cholesterol Father     Cancer Brother     Diabetes Brother     Thyroid Disease Brother        SOCIAL HISTORY    Social History     Tobacco Use    Smoking status: Former Smoker     Packs/day: 2.00     Years: 35.00     Pack years: 70.00     Types: Cigarettes     Last attempt to quit:      Years since quittin.5    Smokeless tobacco: Never Used    Tobacco comment: chew--30 years.   Reviewed 2015   Substance Use Topics    Alcohol use: Yes     Comment: soc    Drug use: Never       ALLERGIES    Allergies   Allergen Reactions    Cavilon Durable Barrier [Mineral Oil-Dimeth-Coconut Oil]     Parabens Hives    Parabens Hives    Prinivil [Lisinopril] Swelling    Cortisone Rash    Cortisone Rash    Dilaudid [Hydromorphone Hcl] Rash    Penicillins Rash    Penicillins Rash    Sulfamethoxazole-Trimethoprim Nausea Only    Tape [Adhesive Tape] Rash       MEDICATIONS    Current Outpatient Medications on File Prior to Encounter   Medication Sig Dispense Refill    warfarin (COUMADIN) 5 MG tablet 5mg daily except 7.5mg on Mondays and Thursdays      buPROPion (WELLBUTRIN XL) 300 MG extended release tablet Take 1 tablet by mouth every morning 90 tablet 0    escitalopram (LEXAPRO) 20 MG tablet Take 1 tablet by mouth daily 90 tablet 0    omeprazole (PRILOSEC) 20 MG delayed release capsule TAKE ONE (1) CAPSULE BY MOUTH ONCE DAILY 90 capsule 1    ARIPiprazole (ABILIFY) 15 MG tablet Take 1 tablet by mouth daily 90 tablet 0    atorvastatin (LIPITOR) 40 MG tablet Take 1 tablet by mouth nightly 90 tablet 0    furosemide (LASIX) 20 MG tablet Take 1 tablet by mouth 2 times daily 180 tablet 0    metFORMIN (GLUCOPHAGE) 500 MG tablet Take 1 tablet by mouth daily (with breakfast) 90 tablet 0    glycopyrrolate-formoterol (BEVESPI AEROSPHERE) 9-4.8 MCG/ACT AERO Inhale 2 puffs into the lungs 2 times daily 1 Inhaler 5    Spacer/Aero-Holding Chambers (AEROCHAMBER MV) MISC 1 actuation by Does not apply route 2 times daily 1 each 0    Zinc Sulfate (ZINC 15 PO) Take 50 mg by mouth every other day      potassium chloride (MICRO-K) 10 MEQ extended release capsule Take 10 mEq by mouth daily      Polyethylene Glycol 3350 (MIRALAX PO) Take 17 g by mouth every other day      traMADol (ULTRAM) 50 MG tablet Take 25 mg by mouth every 8 hours as needed for Pain.       metoprolol tartrate (LOPRESSOR) 25 MG tablet Take 0.5 tablets by mouth 2 times daily 90 tablet 3    Zinc Oxide (DESITIN CREAMY EX) Apply 13 % topically      losartan (COZAAR) 50 MG tablet Take 1 tablet by mouth daily Patient tolerates diovan 90 tablet 1    aspirin 81 MG chewable tablet Take 1 tablet by mouth daily 30 tablet 3    blood glucose test strips (ALISHA CONTOUR TEST) strip USE AS DIRECTED TO TEST BLOOD SUGAR FOUR TIMES DAILY AS NEEDED 100 strip 3    ALISHA CONTOUR TEST strip USE AS DIRECTED TO TEST BLOOD SUGAR FOUR TIMES DAILY 100 strip 5    Glucose Blood (BLOOD GLUCOSE TEST STRIPS) STRP Please give contour testing strips to test blood sugar 4x daily 200 strip 3    phytonadione (VITAMIN K) 5 MG tablet Take 1 tablet by mouth once for 1 dose Today on 6/9/2020 (Today's INR was 8.5) 1 tablet 0     No current facility-administered medications on file prior to encounter. REVIEW OF SYSTEMS    Pertinent items are noted in HPI. Constitutional: Negative for systemic symptoms including fever, chills and malaise. Objective:      /68   Pulse 84   Temp 98.3 °F (36.8 °C) (Temporal)   Resp 15     PHYSICAL EXAM      General: The patient is in no acute distress. Mental status:  Patient is appropriate, is  oriented to place and plan of care. Dermatologic exam: Visual inspection of the periwound reveals the skin to be normal in turgor and texture  Wound exam: see wound description below in procedure note      Assessment:     Problem List Items Addressed This Visit     WD-Ulcer of shin, left, with fat layer exposed ([address]    WD-Chronic venous hypertension (idiopathic) with ulcer of left lower extremity (CODE) ([address]    WD-Chronic venous hypertension with inflammation, right    WD-Decubitus ulcer of left buttock, stage 3 ([address] - Primary        Procedure Note    Indications:  Based on my examination of this patient's wound(s) today, sharp excision into necrotic epidermis, dermis and subcutaneous tissue is required to promote healing and evaluate the extent of previous healing.     Performed by: Imani Pickett MD    Consent obtained: Yes    Time out taken:  Yes    Pain Control: none       Debridement:Excisional Debridement    Using curette the wound(s) was/were sharply debrided down through and including the removal of epidermis, dermis and subcutaneous tissue. Devitalized Tissue Debrided:  fibrin, biofilm and slough    Pre Debridement Measurements:  Are located in the Wound Documentation Flow Sheet    All active wounds listed below with today's date are evaluated  Wound(s)    debrided this date include # : 6     Post  Debridement Measurements:  Wound 11/15/13 Other (Comment) Leg Inner erethema with a small puncture site (Active)   Number of days: 2427       Wound 06/27/19 #6 (onset 1 month) Left Medial Distal Ankle (Active)   Wound Image   7/9/2020  8:47 AM   Wound Venous 7/9/2020  8:47 AM   Offloading for Diabetic Foot Ulcers No 7/9/2020  8:47 AM   Dressing Status Clean;Dry; Intact 7/2/2020  9:49 AM   Dressing Changed Changed/New 7/2/2020  9:49 AM   Wound Cleansed Soap and water 7/9/2020  8:47 AM   Wound Length (cm) 1.3 cm 7/9/2020  8:47 AM   Wound Width (cm) 1.3 cm 7/9/2020  8:47 AM   Wound Depth (cm) 0.1 cm 7/9/2020  8:47 AM   Wound Surface Area (cm^2) 1.69 cm^2 7/9/2020  8:47 AM   Change in Wound Size % (l*w) 75.86 7/9/2020  8:47 AM   Wound Volume (cm^3) 0.17 cm^3 7/9/2020  8:47 AM   Wound Healing % 92 7/9/2020  8:47 AM   Post-Procedure Length (cm) 1.4 cm 7/2/2020  9:30 AM   Post-Procedure Width (cm) 0.9 cm 7/2/2020  9:30 AM   Post-Procedure Depth (cm) 0.1 cm 7/2/2020  9:30 AM   Post-Procedure Surface Area (cm^2) 1.26 cm^2 7/2/2020  9:30 AM   Post-Procedure Volume (cm^3) 0.13 cm^3 7/2/2020  9:30 AM   Distance Tunneling (cm) 0 cm 7/9/2020  8:47 AM   Tunneling Position ___ O'Clock 0 7/9/2020  8:47 AM   Undermining Starts ___ O'Clock 0 7/9/2020  8:47 AM   Undermining Ends___ O'Clock 0 7/9/2020  8:47 AM   Undermining Maxium Distance (cm) 0 7/9/2020  8:47 AM   Wound Assessment Red;Yellow 7/9/2020  8:47 AM   Drainage Amount Moderate 7/9/2020  8:47 AM   Drainage Description Serosanguinous 7/9/2020  8:47 AM   Odor None 7/9/2020  8:47 AM   Margins Defined edges 7/9/2020  8:47 AM   Ana-wound Assessment Maceration 7/9/2020  8:47 AM   Non-staged Wound Description Full thickness 7/9/2020  8:47 AM   [place]%Wound Bed 0 7/9/2020  8:47 AM   Red%Wound Bed 50 7/9/2020  8:47 AM   Yellow%Wound Bed 50 7/9/2020  8:47 AM   Black%Wound Bed 0 7/9/2020  8:47 AM   Purple%Wound Bed 0 7/9/2020  8:47 AM   Other%Wound Bed 0 7/9/2020  8:47 AM   Number of days: 377       Wound 07/09/20 Leg Right #15 Right Medial Lower Leg (Active)   Wound Image   7/9/2020  8:47 AM   Wound Cleansed Soap and water 7/9/2020  8:47 AM   Wound Length (cm) 5.8 cm 7/9/2020  8:47 AM   Wound Width (cm) 2 cm 7/9/2020  8:47 AM   Wound Depth (cm) 0.1 cm 7/9/2020  8:47 AM   Wound Surface Area (cm^2) 11.6 cm^2 7/9/2020  8:47 AM   Wound Volume (cm^3) 1.16 cm^3 7/9/2020  8:47 AM   Distance Tunneling (cm) 0 cm 7/9/2020  8:47 AM   Tunneling Position ___ O'Clock 0 7/9/2020  8:47 AM   Undermining Starts ___ O'Clock 0 7/9/2020  8:47 AM   Undermining Ends___ O'Clock 0 7/9/2020  8:47 AM   Undermining Maxium Distance (cm) 0 7/9/2020  8:47 AM   Wound Assessment Red 7/9/2020  8:47 AM   Drainage Amount Moderate 7/9/2020  8:47 AM   Drainage Description Serosanguinous 7/9/2020  8:47 AM   Odor None 7/9/2020  8:47 AM   Margins Defined edges 7/9/2020  8:47 AM   Ana-wound Assessment Pink 7/9/2020  8:47 AM   Non-staged Wound Description Full thickness 7/9/2020  8:47 AM   [place]%Wound Bed 0 7/9/2020  8:47 AM   Red%Wound Bed 100 7/9/2020  8:47 AM   Yellow%Wound Bed 0 7/9/2020  8:47 AM   Black%Wound Bed 0 7/9/2020  8:47 AM   Purple%Wound Bed 0 7/9/2020  8:47 AM   Other%Wound Bed 0 7/9/2020  8:47 AM   Number of days: 0       Wound 07/09/20 Pretibial Right #16 Right Posterior lower leg (Active)   Wound Image   7/9/2020  8:47 AM   Wound Cleansed Soap and water 7/9/2020  8:47 AM   Wound Length (cm) 5.8 cm 7/9/2020  8:47 AM   Wound Width (cm) 2 cm 7/9/2020  8:47 AM   Wound Depth (cm) 0.1 cm 7/9/2020  8:47 AM   Wound Surface Area (cm^2) 11.6 cm^2 7/9/2020 8:47 AM   Wound Volume (cm^3) 1.16 cm^3 7/9/2020  8:47 AM   Distance Tunneling (cm) 0 cm 7/9/2020  8:47 AM   Tunneling Position ___ O'Clock 0 7/9/2020  8:47 AM   Undermining Starts ___ O'Clock 0 7/9/2020  8:47 AM   Undermining Ends___ O'Clock 0 7/9/2020  8:47 AM   Undermining Maxium Distance (cm) 0 7/9/2020  8:47 AM   Wound Assessment Red 7/9/2020  8:47 AM   Drainage Amount Moderate 7/9/2020  8:47 AM   Drainage Description Serosanguinous 7/9/2020  8:47 AM   Odor None 7/9/2020  8:47 AM   Margins Defined edges 7/9/2020  8:47 AM   Ana-wound Assessment Pink 7/9/2020  8:47 AM   Non-staged Wound Description Full thickness 7/9/2020  8:47 AM   [place]%Wound Bed 0 7/9/2020  8:47 AM   Red%Wound Bed 100 7/9/2020  8:47 AM   Yellow%Wound Bed 0 7/9/2020  8:47 AM   Black%Wound Bed 0 7/9/2020  8:47 AM   Purple%Wound Bed 0 7/9/2020  8:47 AM   Other%Wound Bed 0 7/9/2020  8:47 AM   Number of days: 0       Percent of Wound(s) Debrided: approximately 100%    Total  Area  Debrided:  1.26 sq cm     Bleeding:  Minimal    Hemostasis Achieved:  by pressure    Procedural Pain:  0  / 10     Post Procedural Pain:  0 / 10     Response to treatment:  Well tolerated by patient. Status of wound progress and description from last visit:   Wound is appropriate moisture balance, but no significant change in size. Will need to wrap the right leg also in order to control the swelling and the new wound. He has no open wounds on his buttocks! All is healed! Barrier cream is appropriate at this point! Plan:       Discharge Instructions       NOTE: Upon discharge from the [address], you will receive a patient experience survey.  We would be grateful if you would take the time to fill this survey out.     Wound care order history:                 ANA CRISTINA's   Right       Left               Date               Vascular studies:   ordered on 7/25/19 Date  To be done 8/2/19 imaging center               Imaging:  Date               Cultures: obtained from left medal leg  Date 6/27/19--positive kles.                              [id number] from left medial lower leg on 8/15/19                               Obtained from left medial lower leg on 9/26/19                               Obtained from right leg on 10/17/19                               Obtained from left leg on 3/12/2020                               Obtained from right leg on 3/19/2020                                                                                                                           Obtained from right leg on 4/30/2020--scanty growth                                  Biopsy done 7/18/19                 Labs/ HbA1c  Date               Grafts: Date               HBO:                Antibiotics: Doxycycline for 14 days BID; [facility]                                    Doxycycline for 10 days BID and CIPRO 500 mg BID for 10 days ordered on 8/22/19                                     Doxycycline for 7 days on 9/5/19                                   Doxycycline for 7 days on 3/12/2020 continued on 3/19/2020 for 7 days                                    Bactrim DS for  10 days on 3/26/84080                                   Bactrim DS for 10 days on 6/25/2020              Earlier Wound care treatments:                [id number]:                        Consults:   Date 7/18/19 to Dr Chano Ramirez-- [facility] physician:      Continuing wound care orders and information:              Residence: private               Continue home health care with:none at this time               Your wound-care supplies will be provided by:  Gisele Patel provider:              RAKEL with              IGB loading: Date              SZCAW Medications: RX SANTYL GIVEN 8/1/19              BGNYT cleansing:                           HJ not scrub or use excessive force.                          Wash hands with soap and water before and

## 2020-07-16 ENCOUNTER — ANTI-COAG VISIT (OUTPATIENT)
Dept: PHARMACY | Age: 75
End: 2020-07-16
Payer: MEDICARE

## 2020-07-16 ENCOUNTER — HOSPITAL ENCOUNTER (OUTPATIENT)
Age: 75
Discharge: HOME | End: 2020-07-16
Payer: MEDICARE

## 2020-07-16 ENCOUNTER — HOSPITAL ENCOUNTER (OUTPATIENT)
Dept: WOUND CARE | Age: 75
Discharge: HOME | End: 2020-07-16
Payer: MEDICARE

## 2020-07-16 VITALS
HEART RATE: 80 BPM | SYSTOLIC BLOOD PRESSURE: 119 MMHG | DIASTOLIC BLOOD PRESSURE: 72 MMHG | TEMPERATURE: 98.5 F | RESPIRATION RATE: 18 BRPM

## 2020-07-16 LAB
INR BLD: 2.21 INDEX
PROTHROMBIN TIME: 26.9 SECONDS (ref 11.7–14.5)

## 2020-07-16 PROCEDURE — 85610 PROTHROMBIN TIME: CPT

## 2020-07-16 PROCEDURE — 99211 OFF/OP EST MAY X REQ PHY/QHP: CPT

## 2020-07-16 PROCEDURE — 11042 DBRDMT SUBQ TIS 1ST 20SQCM/<: CPT

## 2020-07-16 PROCEDURE — 36415 COLL VENOUS BLD VENIPUNCTURE: CPT

## 2020-07-16 NOTE — PROGRESS NOTES
Wound Care Center Progress Note With Procedure    Alisha Pulido  AGE: 69 y.o. GENDER: male  : 1945  EPISODE DATE:  2020     Subjective:     Chief Complaint   Patient presents with    Wound Check     bilateral legs         HISTORY of PRESENT ILLNESS      Alisha Pulido is a 69 y.o. male who presents today for wound evaluation of Chronic venous ulcer(s) of the left leg. The ulcer is of mild severity. The underlying cause of the wound is venous. He asks me to evaluate his buttocks this week. Last week, he had healed his buttock wounds.   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 Lucrecia Stinson Anemia 2011    Arm fracture Rise Blonder     Dr Roger [place] with also yearly DM exam    Cataract     worsening-Dr. Harleen Bonner    Cellulitis of left lower leg     Chronic venous hypertension with ulcer ([address] 2011    CKD (chronic kidney disease) 2012    Renal u/S normal     Colon polyps     Dr. Lucrecia Stinson COPD (chronic obstructive pulmonary disease) ([address]     Diabetes mellitus ([address]     Diabetes mellitus ([address]     Diabetes mellitus with peripheral circulatory disorder ([address] 10/2/2015    Diabetes mellitus with skin ulcer ([address] 10/2/2015    Diabetic peripheral neuropathy ([address]     + EMG, NCS    Diabetic skin ulcer associated with type 2 diabetes mellitus ([address]     Diverticulosis 12    mild, left colon    Femoral DVT (deep venous thrombosis) ([address] 2011    PARTIAL,CHRONIC-SPFLD HEART SURGEONS    GERD (gastroesophageal reflux disease)     Glaucoma     open angle-Dr. Chirag Givens H/O cardiac catheterization 6/3/14    EF55% normal study    H/O Doppler ultrasound 03/26/15    Carotid US- no significant stenosis noted bilaterally.  H/O echocardiogram 11/21/2019    EF50-55%, Mild AR. Moderately dilated right ventricle with negative Solis's sign.  H/O mumps orchitis     as a youth    Hemorrhoids 4/23/12    Dr. Linsey Lucas; repeat colonoscopy 3 years    History of nuclear stress test 11/21/2019    EF 60%, Normal study.  HLD (hyperlipidemia)     Hx of Doppler ultrasound 1/06/15    Lymph node seen in left groin area. No bilateral stenosis.     Hyperlipemia     Hyperlipidemia     Hypertension 1992    Hypertension     Idiopathic chronic venous hypertension of left lower extremity with ulcer and inflammation (HCC) 10/2/2015    Leg ulcer ([address] 1978-present    following at wound center, Dr Gretchen Aleman No diabetic retinopathy OU 11/12    Dr. Swapna Abel chronic ulcer of left lower leg with fat layer exposed ([address] 10/2/2015    Pulmonary embolism ([address] 11/13    patient on coumadin    Sleep apnea     doesnt always use cpap dt it drys him out    SOB (shortness of breath) Oct 2011    Stress test normal.     Tendinitis 1973    plantar tendons    Type II or unspecified type diabetes mellitus with other specified manifestations, not stated as uncontrolled 2/8/2013    Unspecified venous (peripheral) insufficiency     Urticaria     WD-Cellulitis of right anterior lower leg 9/5/2019    WD-Cellulitis of right lower extremity 3/26/2020    WD-Chronic venous hypertension (idiopathic) with ulcer of left lower extremity (CODE) ([address] 6/27/2019    WD-Chronic venous hypertension with inflammation, right 9/19/2019    WD-Decubitus ulcer of left buttock, stage 3 ([address] 6/25/2020    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 to breakdown of skin ([address] 3/26/2020    WD-Open wound of hand without complication, left, initial encounter 12/12/2019    WD-Pressure injury of left buttock, stage 2 ([address] 1/2/2020    WD-Pressure injury of left buttock, stage 3 ([facility] 3/12/2020    WD-Pressure injury of right buttock, stage 3 ([address] 3/12/2020    WD-Skin tear of right forearm without complication 7121    WD-Ulcer of right pretibial region, with fat layer exposed ([facility] 10/17/2019       PAST SURGICAL HISTORY    Past Surgical History:   Procedure Laterality Date    BREAST SURGERY  1970s    benign tumors bilaterally    CARDIAC CATHETERIZATION  6/3/14    EF55% normal study    CARPAL TUNNEL RELEASE Left     CARPAL TUNNEL RELEASE      CATARACT REMOVAL Right 3/11/2013    Dr. Casimiro Cheek Left 2013    Dr. Matt Odonnell      polyps    COLONOSCOPY  11    villous component--f/u colonoscopy will be needed in 6 months    COLONOSCOPY  12    mild diverticulosis, internal hemorrhoids; repeat in 3 years (Dr. Linda Briceno)    COLONOSCOPY  2016    mild diverticulosis, three colon polyps    HERNIA REPAIR  1970s    HERNIA REPAIR      SKIN GRAFT  -present    skin grafts to left ankle ulcers    SPLENECTOMY      SPLENECTOMY      TONSILLECTOMY      VARICOSE VEIN SURGERY Left 2009       FAMILY HISTORY    Family History   Problem Relation Age of Onset    Heart Disease Father     Cancer Father         prostate    High Blood Pressure Father     High Cholesterol Father     Cancer Brother     Diabetes Brother     Thyroid Disease Brother        SOCIAL HISTORY    Social History     Tobacco Use    Smoking status: Former Smoker     Packs/day: 2.00     Years: 35.00     Pack years: 70.00     Types: Cigarettes     Last attempt to quit:      Years since quittin.5    Smokeless tobacco: Never Used    Tobacco comment: chew--30 years.   Reviewed 2015   Substance Use Topics    Alcohol use: Yes     Comment: soc    Drug use: Never       ALLERGIES    Allergies   Allergen Reactions    Cavilon Durable Barrier [Mineral Oil-Dimeth-Coconut Oil]     Parabens Hives    Parabens Hives    Prinivil [Lisinopril] Swelling    Cortisone Rash    Cortisone Rash    Dilaudid [Hydromorphone Hcl] Rash    Penicillins Rash    Penicillins Rash    Sulfamethoxazole-Trimethoprim Nausea Only    Tape [Adhesive Tape] Rash       MEDICATIONS    Current Outpatient Medications on File Prior to Encounter   Medication Sig Dispense Refill    losartan (COZAAR) 50 MG tablet Take 1 tablet by mouth daily Patient tolerates diovan- HOLD if blood pressure less than 120/60 90 tablet 1    warfarin (COUMADIN) 5 MG tablet 5mg daily except 7.5mg on Mondays and Thursdays      buPROPion (WELLBUTRIN XL) 300 MG extended release tablet Take 1 tablet by mouth every morning 90 tablet 0    escitalopram (LEXAPRO) 20 MG tablet Take 1 tablet by mouth daily 90 tablet 0    omeprazole (PRILOSEC) 20 MG delayed release capsule TAKE ONE (1) CAPSULE BY MOUTH ONCE DAILY 90 capsule 1    ARIPiprazole (ABILIFY) 15 MG tablet Take 1 tablet by mouth daily 90 tablet 0    atorvastatin (LIPITOR) 40 MG tablet Take 1 tablet by mouth nightly 90 tablet 0    furosemide (LASIX) 20 MG tablet Take 1 tablet by mouth 2 times daily 180 tablet 0    metFORMIN (GLUCOPHAGE) 500 MG tablet Take 1 tablet by mouth daily (with breakfast) 90 tablet 0    glycopyrrolate-formoterol (BEVESPI AEROSPHERE) 9-4.8 MCG/ACT AERO Inhale 2 puffs into the lungs 2 times daily 1 Inhaler 5    Spacer/Aero-Holding Chambers (AEROCHAMBER MV) MISC 1 actuation by Does not apply route 2 times daily 1 each 0    Zinc Sulfate (ZINC 15 PO) Take 50 mg by mouth every other day      potassium chloride (MICRO-K) 10 MEQ extended release capsule Take 10 mEq by mouth daily      Polyethylene Glycol 3350 (MIRALAX PO) Take 17 g by mouth every other day      metoprolol tartrate (LOPRESSOR) 25 MG tablet Take 0.5 tablets by mouth 2 times daily 90 tablet 3    Zinc Oxide (DESITIN CREAMY EX) Apply 13 % topically      aspirin 81 MG chewable tablet Take 1 tablet by mouth daily 30 tablet 3    blood glucose test strips (ALISHA CONTOUR TEST) strip USE AS DIRECTED TO TEST BLOOD SUGAR FOUR TIMES DAILY AS NEEDED 100 strip 3    ALISHA CONTOUR TEST strip USE AS DIRECTED TO TEST BLOOD SUGAR FOUR TIMES DAILY 100 strip 5    Glucose Blood (BLOOD GLUCOSE TEST STRIPS) STRP Please give contour testing strips to test blood sugar 4x daily 200 strip 3    phytonadione (VITAMIN K) 5 MG tablet Take 1 tablet by mouth once for 1 dose Today on 6/9/2020 (Today's INR was 8.5) 1 tablet 0     No current facility-administered medications on file prior to encounter. REVIEW OF SYSTEMS    Pertinent items are noted in HPI. Constitutional: Negative for systemic symptoms including fever, chills and malaise. Objective:      /72   Pulse 80   Temp 98.5 °F (36.9 °C) (Temporal)   Resp 18     PHYSICAL EXAM    General: The patient is in no acute distress. Mental status:  Patient is appropriate, is  oriented to place and plan of care. Dermatologic exam: Visual inspection of the periwound reveals the skin to be normal in turgor and texture  Wound exam: see wound description below in procedure note      Assessment:     Problem List Items Addressed This Visit     WD-Ulcer of shin, left, with fat layer exposed ([address] - Primary    WD-Chronic venous hypertension (idiopathic) with ulcer of left lower extremity (CODE) ([address]    WD-Chronic venous hypertension with inflammation, right    WD-Decubitus ulcer of left buttock, stage 3 ([address]        Procedure Note    Indications:  Based on my examination of this patient's wound(s) today, sharp excision into necrotic epidermis, dermis and subcutaneous tissue is required to promote healing and evaluate the extent of previous healing.     Performed by: Iftikhar Jarquin MD    Consent obtained: Yes    Time out taken:  Yes    Pain Control: none       Debridement:Excisional Debridement    Using curette the wound(s) was/were sharply debrided down through and including the removal of epidermis, dermis and subcutaneous tissue. Devitalized Tissue Debrided:  fibrin, biofilm, slough and exudate    Pre Debridement Measurements:  Are located in the Wound Documentation Flow Sheet    All active wounds listed below with today's date are evaluated  Wound(s)    debrided this date include # : 6     Post  Debridement Measurements:  Wound 11/15/13 Other (Comment) Leg Inner erethema with a small puncture site (Active)   Number of days: 1608       Wound 06/27/19 #6 (onset 1 month) Left Medial Distal Ankle (Active)   Wound Image   07/09/20 0847   Wound Venous 07/16/20 0937   Offloading for Diabetic Foot Ulcers No 07/09/20 0847   Dressing Status Clean;Dry; Intact 07/16/20 1018   Dressing Changed Changed/New 07/16/20 1018   Dressing/Treatment ABD 03/05/20 1158   Wound Cleansed Soap and water 07/09/20 0847   Wound Length (cm) 1.3 cm 07/16/20 0937   Wound Width (cm) 1.3 cm 07/16/20 0937   Wound Depth (cm) 0.1 cm 07/16/20 0937   Wound Surface Area (cm^2) 1.69 cm^2 07/16/20 0937   Change in Wound Size % (l*w) 75.86 07/16/20 0937   Wound Volume (cm^3) 0.17 cm^3 07/16/20 0937   Wound Healing % 92 07/16/20 0937   Post-Procedure Length (cm) 1.3 cm 07/16/20 0957   Post-Procedure Width (cm) 1.3 cm 07/16/20 0957   Post-Procedure Depth (cm) 0.1 cm 07/16/20 0957   Post-Procedure Surface Area (cm^2) 1.69 cm^2 07/16/20 0957   Post-Procedure Volume (cm^3) 0.17 cm^3 07/16/20 0957   Distance Tunneling (cm) 0 cm 07/16/20 0937   Tunneling Position ___ O'Clock 0 07/16/20 0937   Undermining Starts ___ O'Clock 0 07/16/20 0937   Undermining Ends___ O'Clock 0 07/16/20 0937   Undermining Maxium Distance (cm) 0 07/16/20 0937   Wound Assessment Red;Yellow 07/16/20 0937   Drainage Amount Moderate 07/16/20 0937   Drainage Description Serosanguinous 07/16/20 0937   Odor None 07/16/20 0937   Margins Defined edges 07/16/20 0937   Ana-wound Assessment Maceration 07/16/20 0937   Non-staged Wound Description Full thickness 07/16/20 0937   Pink%Wound Bed 0 07/16/20 3478   Red%Wound Bed 10 07/16/20 0937   Yellow%Wound Bed 90 07/16/20 0937   Black%Wound Bed 0 07/16/20 0937   Purple%Wound Bed 0 07/16/20 0937   Other%Wound Bed 0 07/16/20 0937   Number of days: 384       Wound 07/09/20 Leg Right #15 Right Medial Lower Leg (Active)   Wound Image   07/09/20 0847   Wound Venous 07/16/20 0937   Dressing Status Clean;Dry; Intact 07/16/20 1018   Dressing Changed Changed/New 07/16/20 1018   Wound Cleansed Soap and water 07/09/20 0847   Wound Length (cm) 0.4 cm 07/16/20 0937   Wound Width (cm) 0.4 cm 07/16/20 0937   Wound Depth (cm) 0.1 cm 07/16/20 0937   Wound Surface Area (cm^2) 0.16 cm^2 07/16/20 0937   Change in Wound Size % (l*w) 98.62 07/16/20 0937   Wound Volume (cm^3) 0.02 cm^3 07/16/20 0937   Wound Healing % 98 07/16/20 0937   Distance Tunneling (cm) 2 cm 07/16/20 0937   Tunneling Position ___ O'Clock 0 07/16/20 0937   Undermining Starts ___ O'Clock 0 07/16/20 0937   Undermining Ends___ O'Clock 0 07/16/20 0937   Undermining Maxium Distance (cm) 0 07/16/20 0937   Wound Assessment Red 07/16/20 0937   Drainage Amount Moderate 07/16/20 0937   Drainage Description Serosanguinous 07/16/20 0937   Odor None 07/16/20 0937   Margins Defined edges 07/16/20 0937   Ana-wound Assessment Pink 07/16/20 0937   Non-staged Wound Description Full thickness 07/16/20 0937   Pink%Wound Bed 0 07/16/20 0937   Red%Wound Bed 100 07/16/20 0937   Yellow%Wound Bed 0 07/16/20 0937   Black%Wound Bed 0 07/16/20 0937   Purple%Wound Bed 0 07/16/20 0937   Other%Wound Bed 0 07/16/20 0937   Number of days: 7       Wound 07/09/20 Pretibial Right #16 Right Posterior lower leg (Active)   Wound Image   07/09/20 0847   Wound Venous 07/16/20 0937   Dressing Status Clean;Dry; Intact 07/16/20 1018   Dressing Changed Changed/New 07/16/20 1018   Wound Cleansed Soap and water 07/16/20 0937   Wound Length (cm) 0.5 cm 07/16/20 0937   Wound Width (cm) 0.6 cm 07/16/20 0937   Wound Depth (cm) 0.1 cm 07/16/20 0937   Wound Surface Area (cm^2) 0.3 cm^2 07/16/20 0937   Change in Wound Size % (l*w) 97.41 07/16/20 0937   Wound Volume (cm^3) 0.03 cm^3 07/16/20 0937   Wound Healing % 97 07/16/20 0937   Distance Tunneling (cm) 0 cm 07/16/20 0937   Tunneling Position ___ O'Clock 0 07/16/20 0937   Undermining Starts ___ O'Clock 0 07/16/20 0937   Undermining Ends___ O'Clock 0 07/16/20 0937   Undermining Maxium Distance (cm) 0 07/16/20 0937   Wound Assessment Red 07/16/20 0937   Drainage Amount Moderate 07/16/20 0937   Drainage Description Serosanguinous 07/16/20 0937   Odor None 07/16/20 0937   Margins Defined edges 07/16/20 0937   Ana-wound Assessment Pink 07/16/20 0937   Non-staged Wound Description Full thickness 07/16/20 0937   Pink%Wound Bed 0 07/16/20 0937   Red%Wound Bed 100 07/16/20 0937   Yellow%Wound Bed 0 07/16/20 0937   Black%Wound Bed 0 07/16/20 0937   Purple%Wound Bed 0 07/16/20 0937   Other%Wound Bed 0 07/16/20 0937   Number of days: 7       Percent of Wound(s) Debrided: approximately 100%    Total  Area  Debrided:  0.3 sq cm     Bleeding:  Minimal    Hemostasis Achieved:  by pressure    Procedural Pain:  0  / 10     Post Procedural Pain:  0 / 10     Response to treatment:  Well tolerated by patient. Status of wound progress and description from last visit:   Wound is almost exactly the same as last week. I did evaluate his buttocks- currently no open wounds, but he is stage 1. He has dry flaky skin- he needs to use moisturizing barrier cream.  The desitin is drying him out too much. Plan:       Discharge Instructions       NOTE: Upon discharge from the [address], you will receive a patient experience survey.  We would be grateful if you would take the time to fill this survey out.     Wound care order history:                 ANA CRISTINA's   Right       Left               Date               Vascular studies:   ordered on 7/25/19 Date  To be done 8/2/19 imaging center               Imaging:  Date               Cultures: obtained from dressing changes.                         Prior to applying a clean dressing, cleanse wound with normal saline,                                wound cleanser, or mild soap and water.                                     Daily Wound management:                                                Avoid standing for long periods of time.                          Apply wraps/stockings in AM and remove at bedtime.                          If swelling is present, elevate legs to the level of the heart or above for 30 minutes 4-5 times a day and/or when sitting.                                             When taking antibiotics take entire prescription as ordered by physician do not stop taking until medicine is all gone.                                                      Orders for this week: 7/16/2020    Length to knees Right :   50cm                              Left:    50 cm        Left medial ankle distal-- cleanse with  vashe in clinic today --for 5-10 minutes, pat dry. Lotrisone to fabricio wound(in clinic only). Lotion to leg, cover open wound and periwound with vacutex, abd, coban 2 lite--leave in place x 1 week until patient returns to clinic next week      Right Leg -- Cover with calcium alginate then wrap with  coban 2 lite--leave in place x 1 week until patient returns to clinic next week               Follow up [address] in 1 week on Thursday as scheduled in the wound care center  Call [phone] for any questions or concerns.   Physician Signature         Treatment Note Wound 06/27/19 #6 (onset 1 month) Left Medial Distal Ankle-Dressing/Treatment: (lotrisone;vaccutex;abd;coban 2 lite; tape)  Wound 07/09/20 Leg Right #15 Right Medial Lower Leg-Dressing/Treatment: (calcium alginatte,coban lite 2)  Wound 07/09/20 Pretibial Right #16 Right Posterior lower leg-Dressing/Treatment: (calcium alginate,coban lite 2 )    Written Patient Dismissal Instructions Given            Electronically signed by Kaleb Jeter MD on 7/16/2020 at 10:28 AM

## 2020-07-16 NOTE — PROGRESS NOTES
Medication Management Service  [address] [phone]  Anais linares [phone]    Visit Date: 7/16/2020   Subjective: All appointments have been changed to telephone visits at this time due to COVID-19. Vivienne Bolton is a 69 y.o. male who is having INR checked by [facility]. INR results were sent to the clinic for anticoagulation monitoring and adjustment. Patient results called in to clinic for warfarin management due to  Indication:   DVT and PE. INR goal: of 2.0-3.0. Duration of therapy: indefinite. Patient reports the following:   Adherent with regimen  Missed or extra doses:  None   Bleeding or thromboembolic side effects:  None  Significant medication changes:  None  Significant dietary changes: None  Significant alcohol or tobacco changes: None  Significant recent illness, disease state changes, or hospitalization:  None  Upcoming surgeries or procedures:  None  Falls: None           Assessment and Plan     PT/INR performed by Lab. INR today is 2.21, therapeutic. Plan: Will continue current regimen of warfarin 5mg daily except 7.5mg on Fridays. Recheck INR in 2 week(s). Patient has standing lab orders for PT/INR. Patient verbalized understanding of dosing directions and information discussed. Progress note sent to referring office. Patient acknowledges working in consult agreement with pharmacist as referred by his/her physician. Patient accepted that for purposes of billing, this is a virtual visit with your provider for which we will submit a claim for reimbursement with your insurance company. You may be responsible for any copays, coinsurance amounts or other amounts not covered by your insurance company.      Electronically signed by Pablo Joshi, [facility] on 7/16/20 at 11:19 AM EDT    CLINICAL PHARMACY CONSULT: MED RECONCILIATION/REVIEW Ghislaine  22. Tracking Only    PHSO: No  Total # of Interventions Recommended: 0    - Maintenance Safety Lab Monitoring #: 1    Total Interventions Accepted: 0  Time Spent (min): 15    Russell Wilkinson PharmD

## 2020-07-23 ENCOUNTER — HOSPITAL ENCOUNTER (OUTPATIENT)
Dept: WOUND CARE | Age: 75
Discharge: HOME | End: 2020-07-23
Payer: MEDICARE

## 2020-07-23 VITALS
TEMPERATURE: 98.3 F | RESPIRATION RATE: 16 BRPM | SYSTOLIC BLOOD PRESSURE: 136 MMHG | DIASTOLIC BLOOD PRESSURE: 83 MMHG | HEART RATE: 88 BPM

## 2020-07-23 PROCEDURE — 11042 DBRDMT SUBQ TIS 1ST 20SQCM/<: CPT

## 2020-07-23 NOTE — PLAN OF CARE
Problem: Wound:  Intervention: Assess ankle, calf, or foot circumference blilaterally  Note: See Flowsheet  Intervention: Assess pain status  Note: See Flowsheet  Intervention: Assess wound size, appearance and drainage  Note: See Flowsheet  Intervention: Assess pedal pulses bilaterally if patient has a foot or leg ulcer  Note: See Flowsheet  Intervention: Doppler if unable to palpate pedal pulse  Note: See Flowsheet
None

## 2020-07-23 NOTE — PROGRESS NOTES
Multilayer Compression Wrap   (Not Unna) Below the Knee    NAME:  Cr Beard OF BIRTH:  1945  MEDICAL RECORD NUMBER:  [id number]  DATE:  7/23/2020    Multilayer compression wrap: Removed old Multilayer wrap if indicated and wash leg with mild soap/water. Applied moisturizing agent to dry skin as needed. Applied primary and secondary dressing as ordered. Applied multilayered dressing below the knee to left lower leg. Instructed patient/caregiver not to remove dressing and to keep it clean and dry. Instructed patient/caregiver on complications to report to provider, such as pain, numbness in toes, heavy drainage, and slippage of dressing. Instructed patient on purpose of compression dressing and on activity and exercise recommendations.       Electronically signed by Fatmata Crane RN on 7/23/2020 at 10:38 AM

## 2020-07-23 NOTE — PROGRESS NOTES
Wound Care Center Progress Note With Procedure    Jon Lamas  AGE: 69 y.o. GENDER: male  : 1945  EPISODE DATE:  2020     Subjective:     Chief Complaint   Patient presents with    Wound Check     Bilateral lower legs         HISTORY of PRESENT ILLNESS      Jon Lamas is a 69 y.o. male who presents today for wound evaluation of Chronic venous ulcer(s) of the left leg. The ulcer is of mild severity. The underlying cause of the wound is venous hypertension. No new issues- doing better with the plain vacutex as opposed to having the stimulin gel.   Wound Pain Timing/Severity: none  Quality of pain: N/A  Severity of pain:  0 / 10   Modifying Factors: edema and venous stasis  Associated Signs/Symptoms: none, edema and drainage        PAST MEDICAL HISTORY        Diagnosis Date    Adenocarcinoma in situ in tubulovillous adenoma 2011    with high grade dysplasia=- C scope and removal per Dr. Osvaldo Mohan Anemia 2011    Arm fracture Sandy Philip Oshea with also yearly DM exam    Cataract     worsening-Dr. Cathy Álvarez    Cellulitis of left lower leg     Chronic venous hypertension with ulcer ([address] 2011    CKD (chronic kidney disease) 2012    Renal u/S normal     Colon polyps     Dr. Osvalod Mohan COPD (chronic obstructive pulmonary disease) ([address]     Diabetes mellitus ([address]     Diabetes mellitus ([address]     Diabetes mellitus with peripheral circulatory disorder ([address] 10/2/2015    Diabetes mellitus with skin ulcer ([address] 10/2/2015    Diabetic peripheral neuropathy ([address]     + EMG, NCS    Diabetic skin ulcer associated with type 2 diabetes mellitus ([address]     Diverticulosis 12    mild, left colon    Femoral DVT (deep venous thrombosis) ([address] 2011    PARTIAL,CHRONIC-SPF HEART SURGEONS    GERD (gastroesophageal reflux disease)     Glaucoma     open angle-Dr. Trinidad Black H/O cardiac catheterization 6/3/14    EF55% normal study    H/O Doppler ultrasound 03/26/15    Carotid US- no significant stenosis noted bilaterally.  H/O echocardiogram 11/21/2019    EF50-55%, Mild AR. Moderately dilated right ventricle with negative Solis's sign.  H/O mumps orchitis     as a youth    Hemorrhoids 4/23/12    Dr. Edwin Dawson; repeat colonoscopy 3 years    History of nuclear stress test 11/21/2019    EF 60%, Normal study.  HLD (hyperlipidemia)     Hx of Doppler ultrasound 1/06/15    Lymph node seen in left groin area. No bilateral stenosis.     Hyperlipemia     Hyperlipidemia     Hypertension 1992    Hypertension     Idiopathic chronic venous hypertension of left lower extremity with ulcer and inflammation (HCC) 10/2/2015    Leg ulcer ([address] 1978-present    following at wound center, Dr Chase Vaughan No diabetic retinopathy OU 11/12    Dr. Newton Robins chronic ulcer of left lower leg with fat layer exposed ([address] 10/2/2015    Pulmonary embolism ([address] 11/13    patient on coumadin    Sleep apnea     doesnt always use cpap dt it drys him out    SOB (shortness of breath) Oct 2011    Stress test normal.     Tendinitis 1973    plantar tendons    Type II or unspecified type diabetes mellitus with other specified manifestations, not stated as uncontrolled 2/8/2013    Unspecified venous (peripheral) insufficiency     Urticaria     WD-Cellulitis of right anterior lower leg 9/5/2019    WD-Cellulitis of right lower extremity 3/26/2020    WD-Chronic venous hypertension (idiopathic) with ulcer of left lower extremity (CODE) ([address] 6/27/2019    WD-Chronic venous hypertension with inflammation, right 9/19/2019    WD-Decubitus ulcer of left buttock, stage 3 ([address] 6/25/2020    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 to breakdown of skin ([address] 3/26/2020    WD-Open wound of hand without complication, left, initial encounter 12/12/2019    WD-Pressure injury of left buttock, Parabens Hives    Parabens Hives    Prinivil [Lisinopril] Swelling    Cortisone Rash    Cortisone Rash    Dilaudid [Hydromorphone Hcl] Rash    Penicillins Rash    Penicillins Rash    Sulfamethoxazole-Trimethoprim Nausea Only    Tape [Adhesive Tape] Rash       MEDICATIONS    Current Outpatient Medications on File Prior to Encounter   Medication Sig Dispense Refill    losartan (COZAAR) 50 MG tablet Take 1 tablet by mouth daily Patient tolerates diovan- HOLD if blood pressure less than 120/60 90 tablet 1    warfarin (COUMADIN) 5 MG tablet 5mg daily except 7.5mg on Mondays and Thursdays      buPROPion (WELLBUTRIN XL) 300 MG extended release tablet Take 1 tablet by mouth every morning 90 tablet 0    escitalopram (LEXAPRO) 20 MG tablet Take 1 tablet by mouth daily 90 tablet 0    omeprazole (PRILOSEC) 20 MG delayed release capsule TAKE ONE (1) CAPSULE BY MOUTH ONCE DAILY 90 capsule 1    ARIPiprazole (ABILIFY) 15 MG tablet Take 1 tablet by mouth daily 90 tablet 0    atorvastatin (LIPITOR) 40 MG tablet Take 1 tablet by mouth nightly 90 tablet 0    furosemide (LASIX) 20 MG tablet Take 1 tablet by mouth 2 times daily 180 tablet 0    metFORMIN (GLUCOPHAGE) 500 MG tablet Take 1 tablet by mouth daily (with breakfast) 90 tablet 0    phytonadione (VITAMIN K) 5 MG tablet Take 1 tablet by mouth once for 1 dose Today on 6/9/2020 (Today's INR was 8.5) 1 tablet 0    glycopyrrolate-formoterol (BEVESPI AEROSPHERE) 9-4.8 MCG/ACT AERO Inhale 2 puffs into the lungs 2 times daily 1 Inhaler 5    Spacer/Aero-Holding Chambers (AEROCHAMBER MV) MISC 1 actuation by Does not apply route 2 times daily 1 each 0    Zinc Sulfate (ZINC 15 PO) Take 50 mg by mouth every other day      potassium chloride (MICRO-K) 10 MEQ extended release capsule Take 10 mEq by mouth daily      Polyethylene Glycol 3350 (MIRALAX PO) Take 17 g by mouth every other day      metoprolol tartrate (LOPRESSOR) 25 MG tablet Take 0.5 tablets by mouth 2 times daily 90 tablet 3    Zinc Oxide (DESITIN CREAMY EX) Apply 13 % topically      aspirin 81 MG chewable tablet Take 1 tablet by mouth daily 30 tablet 3    blood glucose test strips (ALISHA CONTOUR TEST) strip USE AS DIRECTED TO TEST BLOOD SUGAR FOUR TIMES DAILY AS NEEDED 100 strip 3    ALISHA CONTOUR TEST strip USE AS DIRECTED TO TEST BLOOD SUGAR FOUR TIMES DAILY 100 strip 5    Glucose Blood (BLOOD GLUCOSE TEST STRIPS) STRP Please give contour testing strips to test blood sugar 4x daily 200 strip 3     No current facility-administered medications on file prior to encounter. REVIEW OF SYSTEMS    Pertinent items are noted in HPI. Constitutional: Negative for systemic symptoms including fever, chills and malaise. Objective:      /83   Pulse 88   Temp 98.3 °F (36.8 °C) (Temporal)   Resp 16     PHYSICAL EXAM      General: The patient is in no acute distress. Mental status:  Patient is appropriate, is  oriented to place and plan of care. Dermatologic exam: Visual inspection of the periwound reveals the skin to be normal in turgor and texture  Wound exam: see wound description below in procedure note      Assessment:     Problem List Items Addressed This Visit     WD-Ulcer of shin, left, with fat layer exposed ([address] - Primary    WD-Chronic venous hypertension (idiopathic) with ulcer of left lower extremity (CODE) ([address]    WD-Chronic venous hypertension with inflammation, right    WD-Decubitus ulcer of left buttock, stage 3 ([address]        Procedure Note    Indications:  Based on my examination of this patient's wound(s) today, sharp excision into necrotic epidermis, dermis and subcutaneous tissue is required to promote healing and evaluate the extent of previous healing.     Performed by: Arden Hemphill MD    Consent obtained: Yes    Time out taken:  Yes    Pain Control: none       Debridement:Excisional Debridement    Using curette the wound(s) was/were sharply debrided down through and including the removal of epidermis, dermis and subcutaneous tissue. Devitalized Tissue Debrided:  fibrin, biofilm, slough and exudate    Pre Debridement Measurements:  Are located in the Wound Documentation Flow Sheet    All active wounds listed below with today's date are evaluated  Wound(s)    debrided this date include # : 6     Post  Debridement Measurements:  Wound 11/15/13 Other (Comment) Leg Inner erethema with a small puncture site (Active)   Number of days: 2441       Wound 06/27/19 #6 (onset 1 month) Left Medial Distal Ankle (Active)   Wound Image   07/23/20 0831   Wound Venous 07/23/20 0831   Offloading for Diabetic Foot Ulcers No 07/09/20 0847   Dressing Status Clean;Dry; Intact 07/16/20 1018   Dressing Changed Changed/New 07/16/20 1018   Dressing/Treatment ABD 03/05/20 1158   Wound Cleansed Soap and water 07/23/20 0831   Wound Length (cm) 1.6 cm 07/23/20 0831   Wound Width (cm) 1.5 cm 07/23/20 0831   Wound Depth (cm) 0.1 cm 07/23/20 0831   Wound Surface Area (cm^2) 2.4 cm^2 07/23/20 0831   Change in Wound Size % (l*w) 65.71 07/23/20 0831   Wound Volume (cm^3) 0.24 cm^3 07/23/20 0831   Wound Healing % 89 07/23/20 0831   Post-Procedure Length (cm) 1.6 cm 07/23/20 0842   Post-Procedure Width (cm) 1.5 cm 07/23/20 0842   Post-Procedure Depth (cm) 0.1 cm 07/23/20 0842   Post-Procedure Surface Area (cm^2) 2.4 cm^2 07/23/20 0842   Post-Procedure Volume (cm^3) 0.24 cm^3 07/23/20 0842   Distance Tunneling (cm) 0 cm 07/23/20 0831   Tunneling Position ___ O'Clock 0 07/23/20 0831   Undermining Starts ___ O'Clock 0 07/23/20 0831   Undermining Ends___ O'Clock 0 07/23/20 0831   Undermining Maxium Distance (cm) 0 07/23/20 0831   Wound Assessment Red;Yellow 07/23/20 0831   Drainage Amount Moderate 07/23/20 0831   Drainage Description Serosanguinous 07/23/20 0831   Odor None 07/23/20 0831   Margins Defined edges 07/23/20 0831   Ana-wound Assessment Pink 07/23/20 0831   Non-staged Wound Description Full thickness 07/23/20 CIPRO 500 mg BID for 10 days ordered on 8/22/19                                     Doxycycline for 7 days on 9/5/19                                   Doxycycline for 7 days on 3/12/2020 continued on 3/19/2020 for 7 days                                    Bactrim DS for  10 days on 3/26/41809                                   Bactrim DS for 10 days on 6/25/2020              Earlier Wound care treatments:                YRLRGADFFLCASE:                        Consults:   Date 7/18/19 to Dr Yesenia Louise-- [facility] physician:      Continuing wound care orders and information:              Residence: private               Continue home health care with:none at this time               Your wound-care supplies will be provided by: Cal Mccracken provider:              [id number] with              WYK loading: Date              GBWEV Medications: RX SANTYL GIVEN 8/1/19              NYFIU cleansing:                           WG not scrub or use excessive force.                          Wash hands with soap and water before and after dressing changes.                           Prior to applying a clean dressing, cleanse wound with normal saline,                                wound cleanser, or mild soap and water.                                     Daily Wound management:                                                Avoid standing for long periods of time.                          Apply wraps/stockings in AM and remove at bedtime.                          If swelling is present, elevate legs to the level of the heart or above for 30 minutes 4-5 times a day and/or when sitting.                                             When taking antibiotics take entire prescription as ordered by physician do not stop taking until medicine is all gone.                                                      Orders for this week: 7/16/2020       Left medial ankle distal-- cleanse with  vashe in clinic today --for 5-10 minutes, pat dry. Lotrisone to fabricio wound(in clinic only). Lotion to leg, cover open wound and periwound with vacutex, abd, coban 2 lite--leave in place x 1 week until patient returns to clinic next week      Right Leg -- Apply Lotion and a Tubi  F.        Follow up [address] in 1 week on Thursday in the wound care center  Call [phone] for any questions or concerns.   Physician Signature                 Treatment Note      Written Patient Dismissal Instructions Given            Electronically signed by Kemal Fitch MD on 7/23/2020 at 8:46 AM

## 2020-07-30 ENCOUNTER — ANTI-COAG VISIT (OUTPATIENT)
Dept: PHARMACY | Age: 75
End: 2020-07-30
Payer: MEDICARE

## 2020-07-30 ENCOUNTER — HOSPITAL ENCOUNTER (OUTPATIENT)
Dept: WOUND CARE | Age: 75
Discharge: HOME | End: 2020-07-30
Payer: MEDICARE

## 2020-07-30 VITALS
RESPIRATION RATE: 18 BRPM | HEART RATE: 65 BPM | SYSTOLIC BLOOD PRESSURE: 139 MMHG | DIASTOLIC BLOOD PRESSURE: 63 MMHG | TEMPERATURE: 98.7 F

## 2020-07-30 VITALS — TEMPERATURE: 97.7 F

## 2020-07-30 LAB
INTERNATIONAL NORMALIZATION RATIO, POC: 1.9
POC INR: 1.9 INDEX
PROTHROMBIN TIME, POC: 23.2 SECONDS (ref 10–14.3)

## 2020-07-30 PROCEDURE — 85610 PROTHROMBIN TIME: CPT

## 2020-07-30 PROCEDURE — 99212 OFFICE O/P EST SF 10 MIN: CPT

## 2020-07-30 PROCEDURE — 29581 APPL MULTLAYER CMPRN SYS LEG: CPT

## 2020-07-30 PROCEDURE — 36416 COLLJ CAPILLARY BLOOD SPEC: CPT

## 2020-07-30 NOTE — PROGRESS NOTES
Medication Management Service  [place]  [phone]    Visit Date: 7/30/2020   Subjective:       Delisa Hayward is a 69 y.o. male who presents to clinic today for anticoagulation monitoring and adjustment. Patient seen in clinic for warfarin management due to  Indication:   DVT. INR goal: of 2.0-3.0. Duration of therapy: indefinite. Patient reports the following:   Adherent with regimen  Missed or extra doses:  None   Bleeding or thromboembolic side effects:  None  Significant medication changes:  None  Significant dietary changes: None  Significant alcohol or tobacco changes: None  Significant recent illness, disease state changes, or hospitalization:  None  Upcoming surgeries or procedures:  None  Falls: None           Assessment and Plan     PT/INR done in office per protocol. INR today is 1.9, therapeutic, but below goal range and trending down. Will resume dose on prior to last hospitalization. Plan:  Increase weekly dose by ~7% to warfarin 5mg daily except 7.5mg on Mondays and Thursdays. Recheck INR in 2 week(s). Patient verbalized understanding of dosing directions and information discussed. Dosing schedule given to patient including phone number, appointment date, and time. Progress note sent to referring office. Patient acknowledges working in consult agreement with pharmacist as referred by his/her physician.       Electronically signed by Maurisio Joseph, [facility] on 7/30/20 at 10:38 AM EDT    CLINICAL PHARMACY CONSULT: MED RECONCILIATION/REVIEW Ghislaine  22. Tracking Only    PHSO: No  Total # of Interventions Recommended: 1  - Increased Dose #: 1  - Maintenance Safety Lab Monitoring #: 1    Total Interventions Accepted: 1  Time Spent (min): Melyssa Mcqueen, ShinD

## 2020-07-30 NOTE — PROGRESS NOTES
Wound Care Center Progress Note       Jon Lamas  AGE: 69 y.o. GENDER: male  : 1945  TODAY'S DATE:  2020        Subjective:     Chief Complaint   Patient presents with    Wound Check     Left lower leg         HISTORY of PRESENT ILLNESS     Jon Lamas is a 69 y.o. male who presents today for wound evaluation of Chronic venous ulcer(s) of left leg and now he has re-opening of the wounds on the right leg with associated drainage. The ulcer is of mild severity. The underlying cause of the wound is venous stasis and lymphedema. He is in for f/u- has new wounds on right leg as noted.     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 Osvaldo Mohan Anemia 2011    Arm fracture [place] with also yearly DM exam    Cataract     worsening-Dr. Cathy Álvarez    Cellulitis of left lower leg     Chronic venous hypertension with ulcer ([address] 2011    CKD (chronic kidney disease) 2012    Renal u/S normal     Colon polyps     Dr. Osvaldo Mohan COPD (chronic obstructive pulmonary disease) ([address]     Diabetes mellitus ([address]     Diabetes mellitus ([address]     Diabetes mellitus with peripheral circulatory disorder ([address] 10/2/2015    Diabetes mellitus with skin ulcer ([address] 10/2/2015    Diabetic peripheral neuropathy ([address]     + EMG, NCS    Diabetic skin ulcer associated with type 2 diabetes mellitus ([address]     Diverticulosis 12    mild, left colon    Femoral DVT (deep venous thrombosis) ([address] 2011    PARTIAL,CHRONIC-SPFLD HEART SURGEONS    GERD (gastroesophageal reflux disease)     Glaucoma     open angle-Dr. Trinidad Black H/O cardiac left buttock, stage 2 ([address] 2020    WD-Pressure injury of left buttock, stage 3 ([address] 3/12/2020    WD-Pressure injury of right buttock, stage 3 ([address] 3/12/2020    WD-Skin tear of right forearm without complication     WD-Ulcer of right pretibial region, with fat layer exposed ([address] 10/17/2019       PAST SURGICAL HISTORY    Past Surgical History:   Procedure Laterality Date    BREAST SURGERY  1970s    benign tumors bilaterally    CARDIAC CATHETERIZATION  6/3/14    EF55% normal study    CARPAL TUNNEL RELEASE Left     CARPAL TUNNEL RELEASE      CATARACT REMOVAL Right 3/11/2013    Dr. Radha Garcia Left 2013    Dr. Marvin Lerner      polyps    COLONOSCOPY  11    villous component--f/u colonoscopy will be needed in 6 months    COLONOSCOPY  12    mild diverticulosis, internal hemorrhoids; repeat in 3 years (Dr. Lise Nur)    COLONOSCOPY  2016    mild diverticulosis, three colon polyps    HERNIA REPAIR  1970s    HERNIA REPAIR      SKIN GRAFT  -present    skin grafts to left ankle ulcers    SPLENECTOMY      SPLENECTOMY      TONSILLECTOMY      VARICOSE VEIN SURGERY Left 2009       FAMILY HISTORY    Family History   Problem Relation Age of Onset    Heart Disease Father     Cancer Father         prostate    High Blood Pressure Father     High Cholesterol Father     Cancer Brother     Diabetes Brother     Thyroid Disease Brother        SOCIAL HISTORY    Social History     Tobacco Use    Smoking status: Former Smoker     Packs/day: 2.00     Years: 35.00     Pack years: 70.00     Types: Cigarettes     Last attempt to quit:      Years since quittin.5    Smokeless tobacco: Never Used    Tobacco comment: chew--30 years.   Reviewed 2015   Substance Use Topics    Alcohol use: Yes     Comment: soc    Drug use: Never       ALLERGIES    Allergies   Allergen Reactions    Cavilon Durable Barrier Neri Flakita Oil-Dimeth-Coconut Oil]     Parabens Hives    Parabens Hives    Prinivil [Lisinopril] Swelling    Cortisone Rash    Cortisone Rash    Dilaudid [Hydromorphone Hcl] Rash    Penicillins Rash    Penicillins Rash    Sulfamethoxazole-Trimethoprim Nausea Only    Tape [Adhesive Tape] Rash       MEDICATIONS    Current Outpatient Medications on File Prior to Encounter   Medication Sig Dispense Refill    losartan (COZAAR) 50 MG tablet Take 1 tablet by mouth daily Patient tolerates diovan- HOLD if blood pressure less than 120/60 90 tablet 1    warfarin (COUMADIN) 5 MG tablet 5mg daily except 7.5mg on Mondays and Thursdays      buPROPion (WELLBUTRIN XL) 300 MG extended release tablet Take 1 tablet by mouth every morning 90 tablet 0    escitalopram (LEXAPRO) 20 MG tablet Take 1 tablet by mouth daily 90 tablet 0    omeprazole (PRILOSEC) 20 MG delayed release capsule TAKE ONE (1) CAPSULE BY MOUTH ONCE DAILY 90 capsule 1    ARIPiprazole (ABILIFY) 15 MG tablet Take 1 tablet by mouth daily 90 tablet 0    atorvastatin (LIPITOR) 40 MG tablet Take 1 tablet by mouth nightly 90 tablet 0    furosemide (LASIX) 20 MG tablet Take 1 tablet by mouth 2 times daily 180 tablet 0    metFORMIN (GLUCOPHAGE) 500 MG tablet Take 1 tablet by mouth daily (with breakfast) 90 tablet 0    glycopyrrolate-formoterol (BEVESPI AEROSPHERE) 9-4.8 MCG/ACT AERO Inhale 2 puffs into the lungs 2 times daily 1 Inhaler 5    Spacer/Aero-Holding Chambers (AEROCHAMBER MV) MISC 1 actuation by Does not apply route 2 times daily 1 each 0    Zinc Sulfate (ZINC 15 PO) Take 50 mg by mouth every other day      potassium chloride (MICRO-K) 10 MEQ extended release capsule Take 10 mEq by mouth daily      Polyethylene Glycol 3350 (MIRALAX PO) Take 17 g by mouth every other day      metoprolol tartrate (LOPRESSOR) 25 MG tablet Take 0.5 tablets by mouth 2 times daily 90 tablet 3    Zinc Oxide (DESITIN CREAMY EX) Apply 13 % topically      aspirin 81 MG chewable tablet Take 1 tablet by mouth daily 30 tablet 3    blood glucose test strips (ALISHA CONTOUR TEST) strip USE AS DIRECTED TO TEST BLOOD SUGAR FOUR TIMES DAILY AS NEEDED 100 strip 3    ALISHA CONTOUR TEST strip USE AS DIRECTED TO TEST BLOOD SUGAR FOUR TIMES DAILY 100 strip 5    Glucose Blood (BLOOD GLUCOSE TEST STRIPS) STRP Please give contour testing strips to test blood sugar 4x daily 200 strip 3    phytonadione (VITAMIN K) 5 MG tablet Take 1 tablet by mouth once for 1 dose Today on 6/9/2020 (Today's INR was 8.5) 1 tablet 0     No current facility-administered medications on file prior to encounter. REVIEW OF SYSTEMS    Pertinent items are noted in HPI. Constitutional: Negative for systemic symptoms including fever, chills and malaise. Objective:      /63   Pulse 65   Temp 98.7 °F (37.1 °C) (Oral)   Resp 18     PHYSICAL EXAM      General: The patient is in no acute distress. Mental status:  Patient is appropriate, is  oriented to place and plan of care. Dermatologic exam: Visual inspection of the periwound reveals the skin to be normal in turgor and texture. Wound exam:  see wound description below     All active wounds listed below with today's date are evaluated      Wound 11/15/13 Other (Comment) Leg Inner erethema with a small puncture site (Active)   Number of days: 2448       Wound 06/27/19 #6 (onset 1 month) Left Medial Distal Ankle (Active)   Wound Image   07/23/20 0831   Wound Venous 07/30/20 0840   Offloading for Diabetic Foot Ulcers No 07/30/20 0840   Dressing Status Clean;Dry; Intact 07/30/20 0916   Dressing Changed Changed/New 07/30/20 0916   Dressing/Treatment ABD 03/05/20 1158   Wound Cleansed Soap and water 07/30/20 0840   Wound Length (cm) 1.5 cm 07/30/20 0840   Wound Width (cm) 1.5 cm 07/30/20 0840   Wound Depth (cm) 0.1 cm 07/30/20 0840   Wound Surface Area (cm^2) 2.25 cm^2 07/30/20 0840   Change in Wound Size % (l*w) 67.86 07/30/20 0840   Wound Volume (cm^3) 0.22 cm^3 07/30/20 0840   Wound Healing % 90 07/30/20 0840   Post-Procedure Length (cm) 1.6 cm 07/23/20 0842   Post-Procedure Width (cm) 1.5 cm 07/23/20 0842   Post-Procedure Depth (cm) 0.1 cm 07/23/20 0842   Post-Procedure Surface Area (cm^2) 2.4 cm^2 07/23/20 0842   Post-Procedure Volume (cm^3) 0.24 cm^3 07/23/20 0842   Distance Tunneling (cm) 0 cm 07/23/20 0831   Tunneling Position ___ O'Clock 0 07/30/20 0840   Undermining Starts ___ O'Clock 0 07/30/20 0840   Undermining Ends___ O'Clock 0 07/30/20 0840   Undermining Max[place] Distance (cm) 0 07/30/20 0840   Wound Assessment Red;Yellow 07/30/20 0840   Drainage Amount Moderate 07/30/20 0840   Drainage Description Brown;Yellow 07/30/20 0840   Odor None 07/30/20 0840   Margins Defined edges 07/30/20 0840   Ana-wound Assessment Pink 07/30/20 0840   Non-staged Wound Description Full thickness 07/30/20 0840   Pink%Wound Bed 0 07/30/20 0840   Red%Wound Bed 50 07/30/20 0840   Yellow%Wound Bed 50 07/30/20 0840   Black%Wound Bed 0 07/30/20 0840   Purple%Wound Bed 0 07/30/20 0840   Other%Wound Bed 0 07/30/20 0840   Number of days: 398       Wound 07/30/20 Wound #7 Right Lower Leg Circumfir. (onset x 1 Week) (Active)   Wound Image   07/30/20 0846   Offloading for Diabetic Foot Ulcers No 07/30/20 0846   Dressing Status Clean;Dry; Intact 07/30/20 0916   Dressing Changed Changed/New 07/30/20 0916   Wound Cleansed Soap and water 07/30/20 0846   Wound Length (cm) 8.5 cm 07/30/20 0846   Wound Width (cm) 17 cm 07/30/20 0846   Wound Depth (cm) 0.1 cm 07/30/20 0846   Wound Surface Area (cm^2) 144.5 cm^2 07/30/20 0846   Wound Volume (cm^3) 14.45 cm^3 07/30/20 0846   Distance Tunneling (cm) 0 cm 07/30/20 0846   Tunneling Position ___ O'Clock 0 07/30/20 0846   Undermining Starts ___ O'Clock 0 07/30/20 0846   Undermining Ends___ O'Clock 0 07/30/20 0846   Undermining Maxium Distance (cm) 0 07/30/20 0846   Wound Assessment Red 07/30/20 0846   Drainage Amount Moderate 07/30/20 0846   Drainage Description Yellow 07/30/20 0846   Odor Mild 07/30/20 0846   Margins Undefined edges 07/30/20 0846   Ana-wound Assessment Red 07/30/20 0846   Non-staged Wound Description Full thickness 07/30/20 0846   [place]%Wound Bed 0 07/30/20 0846   Red%Wound Bed 100 07/30/20 0846   Yellow%Wound Bed 0 07/30/20 0846   Black%Wound Bed 0 07/30/20 0846   Purple%Wound Bed 0 07/30/20 0846   Other%Wound Bed 0 07/30/20 0846   Number of days: 0       Assessment:       Problem List Items Addressed This Visit     WD-Ulcer of shin, left, with fat layer exposed ([facility]    WD-Chronic venous hypertension (idiopathic) with ulcer of left lower extremity (CODE) ([facility]    WD-Chronic venous hypertension with inflammation, right    WD-Decubitus ulcer of left buttock, stage 3 ([facility]) - Primary          Status of wound progress and description from last visit: worse- now he has wounds on the right leg again. Left leg ulcer is about the same. Plan:     Discharge Instructions       NOTE: Upon discharge from the [address], you will receive a patient experience survey. We would be grateful if you would take the time to fill this survey out.     Wound care order history:                 ANA CRISTINA's   Right       Left               Date               Vascular studies:   ordered on 7/25/19 Date  To be done 8/2/19 imaging center               Imaging:  Date               Cultures: obtained from left medal leg  Date 6/27/19--positive kles.                                [id number] from left medial lower leg on 8/15/19                               Obtained from left medial lower leg on 9/26/19                               Obtained from right leg on 10/17/19                               Obtained from left leg on 3/12/2020                               Obtained from right leg on 3/19/2020                                                                                                                           Obtained from right leg on 4/30/2020--scanty growth                                  BKKTBL done 7/18/19                 Labs/ HbA1c  Date               Grafts: Date               HBO:                Antibiotics: Doxycycline for 14 days BID; phoned to [facility] 6/28/19                                    Doxycycline for 10 days BID and CIPRO 500 mg BID for 10 days ordered on 8/22/19                                     Doxycycline for 7 days on 9/5/19                                   Doxycycline for 7 days on 3/12/2020 continued on 3/19/2020 for 7 days                                    Bactrim DS for  10 days on 3/26/59365                                   Bactrim DS for 10 days on 6/25/2020              Earlier Wound care treatments:                LNYEEDVELOQUOT:                        Consults:   Date 7/18/19 to Dr Nikki David-- [facility] physician:      Continuing wound care orders and information:              Residence: private               Continue home health care with:none at this time               Your wound-care supplies will be provided by: Hardik Bertrand provider:              [facility] with              SD loading: Date              MFQRA Medications: RX SANTYL GIVEN 8/1/19              DJSUC cleansing:                           EO not scrub or use excessive force.                          Wash hands with soap and water before and after dressing changes.                         Prior to applying a clean dressing, cleanse wound with normal saline,                                wound cleanser, or mild soap and water.                                     Daily Wound management:                                                Avoid standing for long periods of time.                          Apply wraps/stockings in AM and remove at bedtime.                          If swelling is present, elevate legs to the level of the heart or above for 30 minutes 4-5 times a day and/or when sitting.                                             ROXI taking antibiotics take entire prescription as ordered by physician do not stop taking until medicine is all gone.                                                      Orders for this week: 7/16/2020        Left medial ankle distal-- cleanse with  vashe in clinic today --for 5-10 minutes, pat dry. Lotrisone to fabricio wound(in clinic only). Lotion to leg,apply santyl to open area then cover open wound and periwound with  vacutex, coban 2 lite--leave in place x 1 week until patient returns to clinic next week      Right Leg -- Apply calcium alginate to open areas and cover with ABD. Wrap with coban 2 lite leave in place until next visit         Follow up [address] in 1 week on Thursday in the wound care center  Call [phone] for any questions or concerns. Physician Signature         Treatment Note Wound 06/27/19 #6 (onset 1 month) Left Medial Distal Ankle-Dressing/Treatment: (lotrisone;santyl;vaccutex;coban2 lite)  Wound 07/30/20 Wound #7 Right Lower Leg Circumfir.  (onset x 1 Week)-Dressing/Treatment: (lotrisone;santyl;vaccutex;coban2 lite)    Written Patient Dismissal Instructions Given            Electronically signed by Matilda Rogers MD on 7/30/2020 at 9:37 AM

## 2020-08-06 ENCOUNTER — TELEPHONE (OUTPATIENT)
Dept: PHARMACY | Age: 75
End: 2020-08-06

## 2020-08-06 ENCOUNTER — HOSPITAL ENCOUNTER (OUTPATIENT)
Dept: WOUND CARE | Age: 75
Discharge: HOME | End: 2020-08-06
Payer: MEDICARE

## 2020-08-06 VITALS
DIASTOLIC BLOOD PRESSURE: 65 MMHG | SYSTOLIC BLOOD PRESSURE: 142 MMHG | RESPIRATION RATE: 16 BRPM | HEART RATE: 78 BPM | TEMPERATURE: 98.4 F

## 2020-08-06 PROCEDURE — 87077 CULTURE AEROBIC IDENTIFY: CPT

## 2020-08-06 PROCEDURE — 87075 CULTR BACTERIA EXCEPT BLOOD: CPT

## 2020-08-06 PROCEDURE — 87186 SC STD MICRODIL/AGAR DIL: CPT

## 2020-08-06 PROCEDURE — 11042 DBRDMT SUBQ TIS 1ST 20SQCM/<: CPT

## 2020-08-06 PROCEDURE — 87070 CULTURE OTHR SPECIMN AEROBIC: CPT

## 2020-08-06 NOTE — TELEPHONE ENCOUNTER
Patient left VM that he is starting Bactrim today. Returned call and no answer with no VM.      CLINICAL PHARMACY CONSULT: MED RECONCILIATION/REVIEW ADDENDUM    For Pharmacy Admin Tracking Only    PHSO: No  Total # of Interventions Recommended: 0  Total Interventions Accepted: 0  Time Spent (min): 5    Rk Jaimes, PharmD

## 2020-08-06 NOTE — PROGRESS NOTES
 GERD (gastroesophageal reflux disease)     Glaucoma 11/12    open angle-Dr. Jose C Mae H/O cardiac catheterization 6/3/14    EF55% normal study    H/O Doppler ultrasound 03/26/15    Carotid US- no significant stenosis noted bilaterally.  H/O echocardiogram 11/21/2019    EF50-55%, Mild AR. Moderately dilated right ventricle with negative Solis's sign.  H/O mumps orchitis     as a youth    Hemorrhoids 4/23/12    Dr. Lise Nur; repeat colonoscopy 3 years    History of nuclear stress test 11/21/2019    EF 60%, Normal study.  HLD (hyperlipidemia)     Hx of Doppler ultrasound 1/06/15    Lymph node seen in left groin area. No bilateral stenosis.     Hyperlipemia     Hyperlipidemia     Hypertension 1992    Hypertension     Idiopathic chronic venous hypertension of left lower extremity with ulcer and inflammation (HCC) 10/2/2015    Leg ulcer ([address] 1978-present    following at wound center, Dr Trinity De Santiago No diabetic retinopathy OU 11/12    Dr. Nancy Bishop chronic ulcer of left lower leg with fat layer exposed ([address] 10/2/2015    Pulmonary embolism ([address] 11/13    patient on coumadin    Sleep apnea     doesnt always use cpap dt it drys him out    SOB (shortness of breath) Oct 2011    Stress test normal.     Tendinitis 1973    plantar tendons    Type II or unspecified type diabetes mellitus with other specified manifestations, not stated as uncontrolled 2/8/2013    Unspecified venous (peripheral) insufficiency     Urticaria     WD-Cellulitis of right anterior lower leg 9/5/2019    WD-Cellulitis of right lower extremity 3/26/2020    WD-Chronic venous hypertension (idiopathic) with ulcer of left lower extremity (CODE) ([address] 6/27/2019    WD-Chronic venous hypertension with inflammation, right 9/19/2019    WD-Decubitus ulcer of left buttock, stage 3 ([address] 6/25/2020    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 to breakdown of skin ([address] 3/26/2020    WD-Open wound of hand without complication, left, initial encounter 2019    WD-Pressure injury of left buttock, stage 2 ([address] 2020    WD-Pressure injury of left buttock, stage 3 ([address] 3/12/2020    WD-Pressure injury of right buttock, stage 3 ([address] 3/12/2020    WD-Skin tear of right forearm without complication     WD-Ulcer of right pretibial region, with fat layer exposed ([address] 10/17/2019       PAST SURGICAL HISTORY    Past Surgical History:   Procedure Laterality Date    BREAST SURGERY  1970s    benign tumors bilaterally    CARDIAC CATHETERIZATION  6/3/14    EF55% normal study    CARPAL TUNNEL RELEASE Left     CARPAL TUNNEL RELEASE      CATARACT REMOVAL Right 3/11/2013    Dr. Clayton Cox Left 2013    Dr. Brinda Concepcion      polyps    COLONOSCOPY  11    villous component--f/u colonoscopy will be needed in 6 months    COLONOSCOPY  12    mild diverticulosis, internal hemorrhoids; repeat in 3 years (Dr. Radha Torres)    COLONOSCOPY  2016    mild diverticulosis, three colon polyps    HERNIA REPAIR  1970s    HERNIA REPAIR      SKIN GRAFT  -present    skin grafts to left ankle ulcers    SPLENECTOMY      SPLENECTOMY      TONSILLECTOMY      VARICOSE VEIN SURGERY Left        FAMILY HISTORY    Family History   Problem Relation Age of Onset    Heart Disease Father     Cancer Father         prostate    High Blood Pressure Father     High Cholesterol Father     Cancer Brother     Diabetes Brother     Thyroid Disease Brother        SOCIAL HISTORY    Social History     Tobacco Use    Smoking status: Former Smoker     Packs/day: 2.00     Years: 35.00     Pack years: 70.00     Types: Cigarettes     Last attempt to quit:      Years since quittin.6    Smokeless tobacco: Never Used    Tobacco comment: chew--30 years.   Reviewed 2015   Substance Use Topics    Alcohol use: Yes     Comment: soc    Drug use: Never       ALLERGIES    Allergies   Allergen Reactions    Cavilon Durable Barrier [Mineral Oil-Dimeth-Coconut Oil]     Parabens Hives    Parabens Hives    Prinivil [Lisinopril] Swelling    Cortisone Rash    Cortisone Rash    Dilaudid [Hydromorphone Hcl] Rash    Penicillins Rash    Penicillins Rash    Sulfamethoxazole-Trimethoprim Nausea Only    Tape [Adhesive Tape] Rash       MEDICATIONS    Current Outpatient Medications on File Prior to Encounter   Medication Sig Dispense Refill    losartan (COZAAR) 50 MG tablet Take 1 tablet by mouth daily Patient tolerates diovan- HOLD if blood pressure less than 120/60 90 tablet 1    warfarin (COUMADIN) 5 MG tablet 5mg daily except 7.5mg on Mondays and Thursdays      buPROPion (WELLBUTRIN XL) 300 MG extended release tablet Take 1 tablet by mouth every morning 90 tablet 0    escitalopram (LEXAPRO) 20 MG tablet Take 1 tablet by mouth daily 90 tablet 0    omeprazole (PRILOSEC) 20 MG delayed release capsule TAKE ONE (1) CAPSULE BY MOUTH ONCE DAILY 90 capsule 1    ARIPiprazole (ABILIFY) 15 MG tablet Take 1 tablet by mouth daily 90 tablet 0    atorvastatin (LIPITOR) 40 MG tablet Take 1 tablet by mouth nightly 90 tablet 0    furosemide (LASIX) 20 MG tablet Take 1 tablet by mouth 2 times daily 180 tablet 0    metFORMIN (GLUCOPHAGE) 500 MG tablet Take 1 tablet by mouth daily (with breakfast) 90 tablet 0    glycopyrrolate-formoterol (BEVESPI AEROSPHERE) 9-4.8 MCG/ACT AERO Inhale 2 puffs into the lungs 2 times daily 1 Inhaler 5    Spacer/Aero-Holding Chambers (AEROCHAMBER MV) MISC 1 actuation by Does not apply route 2 times daily 1 each 0    Zinc Sulfate (ZINC 15 PO) Take 50 mg by mouth every other day      potassium chloride (MICRO-K) 10 MEQ extended release capsule Take 10 mEq by mouth daily      Polyethylene Glycol 3350 (MIRALAX PO) Take 17 g by mouth every other day      metoprolol tartrate (LOPRESSOR) 25 MG tablet Take 0.5 tablets by mouth 2 times daily 90 tablet 3    Zinc Oxide (DESITIN CREAMY EX) Apply 13 % topically      aspirin 81 MG chewable tablet Take 1 tablet by mouth daily 30 tablet 3    blood glucose test strips (ALISHA CONTOUR TEST) strip USE AS DIRECTED TO TEST BLOOD SUGAR FOUR TIMES DAILY AS NEEDED 100 strip 3    ALISHA CONTOUR TEST strip USE AS DIRECTED TO TEST BLOOD SUGAR FOUR TIMES DAILY 100 strip 5    Glucose Blood (BLOOD GLUCOSE TEST STRIPS) STRP Please give contour testing strips to test blood sugar 4x daily 200 strip 3    phytonadione (VITAMIN K) 5 MG tablet Take 1 tablet by mouth once for 1 dose Today on 6/9/2020 (Today's INR was 8.5) 1 tablet 0     No current facility-administered medications on file prior to encounter. REVIEW OF SYSTEMS    Pertinent items are noted in HPI. Constitutional: Negative for systemic symptoms including fever, chills and malaise. Objective:      BP (!) 142/65   Pulse 78   Temp 98.4 °F (36.9 °C)   Resp 16     PHYSICAL EXAM      General: The patient is in no acute distress. Mental status:  Patient is appropriate, is  oriented to place and plan of care. Dermatologic exam: Visual inspection of the periwound reveals the skin to be normal in turgor and texture  Wound exam: see wound description below in procedure note      Assessment:     Problem List Items Addressed This Visit     WD-Ulcer of shin, left, with fat layer exposed ([address] - Primary    WD-Chronic venous hypertension (idiopathic) with ulcer of left lower extremity (CODE) ([address]    WD-Chronic venous hypertension with inflammation, right        Procedure Note    Indications:  Based on my examination of this patient's wound(s) today, sharp excision into necrotic epidermis, dermis and subcutaneous tissue is required to promote healing and evaluate the extent of previous healing.     Performed by: Bianca Medina MD    Consent obtained: Yes    Time out taken:  Yes    Pain Control: none       Debridement:Excisional Debridement    Using curette the wound(s) was/were sharply debrided down through and including the removal of epidermis, dermis and subcutaneous tissue. Devitalized Tissue Debrided:  fibrin, biofilm and slough    Pre Debridement Measurements:  Are located in the Wound Documentation Flow Sheet    All active wounds listed below with today's date are evaluated  Wound(s)    debrided this date include # : 6     Post  Debridement Measurements:  Wound 11/15/13 Other (Comment) Leg Inner erethema with a small puncture site (Active)   Number of days: 3641       Wound 06/27/19 #6 (onset 1 month) Left Medial Distal Ankle (Active)   Wound Image   07/23/20 0831   Wound Venous 08/06/20 0914   Offloading for Diabetic Foot Ulcers No 08/06/20 0914   Dressing Status Clean;Dry; Intact 07/30/20 0916   Dressing Changed Changed/New 07/30/20 0916   Dressing/Treatment ABD 03/05/20 1158   Wound Cleansed Soap and water;Rinsed/Irrigated with saline 08/06/20 0914   Wound Length (cm) 1.6 cm 08/06/20 0914   Wound Width (cm) 1.7 cm 08/06/20 0914   Wound Depth (cm) 0.1 cm 08/06/20 0914   Wound Surface Area (cm^2) 2.72 cm^2 08/06/20 0914   Change in Wound Size % (l*w) 61.14 08/06/20 0914   Wound Volume (cm^3) 0.27 cm^3 08/06/20 0914   Wound Healing % 87 08/06/20 0914   Post-Procedure Length (cm) 1.6 cm 08/06/20 0932   Post-Procedure Width (cm) 1.7 cm 08/06/20 0932   Post-Procedure Depth (cm) 0.1 cm 08/06/20 0932   Post-Procedure Surface Area (cm^2) 2.72 cm^2 08/06/20 0932   Post-Procedure Volume (cm^3) 0.27 cm^3 08/06/20 0932   Distance Tunneling (cm) 0 cm 08/06/20 0914   Tunneling Position ___ O'Clock 0 08/06/20 0914   Undermining Starts ___ O'Clock 0 08/06/20 0914   Undermining Ends___ O'Clock 0 08/06/20 0914   Undermining Maxium Distance (cm) 0 08/06/20 0914   Wound Assessment Red;Yellow 08/06/20 0914   Drainage Amount Moderate 08/06/20 0914   Drainage Description Brown 08/06/20 0914   Odor Mild 08/06/20 0914   Margins Defined edges; Unattached edges 08/06/20 0914 Ana-wound Assessment Maceration 08/06/20 0914   Non-staged Wound Description Full thickness 08/06/20 0914   Pink%Wound Bed 0 08/06/20 0914   Red%Wound Bed 20 08/06/20 0914   Yellow%Wound Bed 80 08/06/20 0914   Black%Wound Bed 0 08/06/20 0914   Purple%Wound Bed 0 08/06/20 0914   Other%Wound Bed 0 08/06/20 0914   Number of days: 405       Wound 07/30/20 #7 (onset 1 week) Right Lower Leg Circumferential (Active)   Wound Image   07/30/20 0846   Offloading for Diabetic Foot Ulcers No 08/06/20 0914   Dressing Status Clean;Dry; Intact 07/30/20 0916   Dressing Changed Changed/New 07/30/20 0916   Wound Cleansed Soap and water;Rinsed/Irrigated with saline 08/06/20 0914   Wound Length (cm) 28.4 cm 08/06/20 0914   Wound Width (cm) 13 cm 08/06/20 0914   Wound Depth (cm) 0.1 cm 08/06/20 0914   Wound Surface Area (cm^2) 369.2 cm^2 08/06/20 0914   Change in Wound Size % (l*w) -155.5 08/06/20 0914   Wound Volume (cm^3) 36.92 cm^3 08/06/20 0914   Wound Healing % -156 08/06/20 0914   Post-Procedure Length (cm) 28.4 cm 08/06/20 0932   Post-Procedure Width (cm) 13 cm 08/06/20 0932   Post-Procedure Depth (cm) 0.1 cm 08/06/20 0932   Post-Procedure Surface Area (cm^2) 369.2 cm^2 08/06/20 0932   Post-Procedure Volume (cm^3) 36.92 cm^3 08/06/20 0932   Distance Tunneling (cm) 0 cm 08/06/20 0914   Tunneling Position ___ O'Clock 0 08/06/20 0914   Undermining Starts ___ O'Clock 0 08/06/20 0914   Undermining Ends___ O'Clock 0 08/06/20 0914   Undermining Maxium Distance (cm) 0 08/06/20 0914   Wound Assessment Red 08/06/20 0914   Drainage Amount Large 08/06/20 0914   Drainage Description Brown 08/06/20 0914   Odor Mild 08/06/20 0914   Margins Unattached edges; Undefined edges 08/06/20 0914   Ana-wound Assessment Maceration; White 08/06/20 0914   Non-staged Wound Description Full thickness 08/06/20 0914   Pink%Wound Bed 0 08/06/20 0914   Red%Wound Bed 100 08/06/20 0914   Yellow%Wound Bed 0 08/06/20 0914   Black%Wound Bed 0 08/06/20 0914 9/5/19                                   Doxycycline for 7 days on 3/12/2020 continued on 3/19/2020 for 7 days                                    Bactrim DS for  10 days on 3/26/88211                                   Bactrim DS for 10 days on 6/25/2020              Earlier Wound care treatments:                [id number]:                        Consults:   Date 7/18/19 to Dr Chano Ramirez-- [facility] Nose care physician:      Continuing wound care orders and information:              Residence: private               Continue home health care with:none at this time               Your wound-care supplies will be provided by: Gisele Patel provider:              JOSI with              [facility] loading: Date              [facility] Medications: RX SANTYL GIVEN 8/1/19              FRA cleansing:                           WT not scrub or use excessive force.                          Wash hands with soap and water before and after dressing changes.                         Prior to applying a clean dressing, cleanse wound with normal saline,                                wound cleanser, or mild soap and water.                                     Daily Wound management:                                                Avoid standing for long periods of time.                          Apply wraps/stockings in AM and remove at bedtime.                          If swelling is present, elevate legs to the level of the heart or above for 30 minutes 4-5 times a day and/or when sitting.                                             When taking antibiotics take entire prescription as ordered by physician do not stop taking until medicine is all gone.                                                      Orders for this week: 8/6/2020      Left medial ankle distal-- cleanse with  vashe in clinic today --for 5-10 minutes, pat dry. Lotion to leg. Lotrisone to fabricio wound(in clinic only).  Apply endoform

## 2020-08-06 NOTE — PLAN OF CARE
Problem: Wound:  Goal: Will show signs of wound healing; wound closure and no evidence of infection  Description: Will show signs of wound healing; wound closure and no evidence of infection  Outcome: Ongoing     Problem: Wound:  Intervention: Assess ankle, calf, or foot circumference blilaterally  Note: See flowsheet  Intervention: Assess pain status  Note: See flowsheet  Intervention: Assess wound size, appearance and drainage  Note: See flowsheet  Intervention: Assess pedal pulses bilaterally if patient has a foot or leg ulcer  Note: See flowsheet  Intervention: Doppler if unable to palpate pedal pulse  Note: See flowsheet

## 2020-08-11 LAB
CULTURE: ABNORMAL
Lab: ABNORMAL
SPECIMEN: ABNORMAL

## 2020-08-13 ENCOUNTER — HOSPITAL ENCOUNTER (OUTPATIENT)
Dept: WOUND CARE | Age: 75
Discharge: HOME | End: 2020-08-13
Payer: MEDICARE

## 2020-08-13 ENCOUNTER — ANTI-COAG VISIT (OUTPATIENT)
Dept: PHARMACY | Age: 75
End: 2020-08-13
Payer: MEDICARE

## 2020-08-13 VITALS
SYSTOLIC BLOOD PRESSURE: 109 MMHG | RESPIRATION RATE: 16 BRPM | TEMPERATURE: 98.2 F | HEART RATE: 65 BPM | DIASTOLIC BLOOD PRESSURE: 65 MMHG

## 2020-08-13 PROBLEM — L89.323 DECUBITUS ULCER OF LEFT BUTTOCK, STAGE 3 (HCC): Status: RESOLVED | Noted: 2020-06-25 | Resolved: 2020-08-13

## 2020-08-13 LAB
INTERNATIONAL NORMALIZATION RATIO, POC: 2.7
POC INR: 2.7 INDEX
PROTHROMBIN TIME, POC: 32.4 SECONDS (ref 10–14.3)

## 2020-08-13 PROCEDURE — 36416 COLLJ CAPILLARY BLOOD SPEC: CPT

## 2020-08-13 PROCEDURE — 11042 DBRDMT SUBQ TIS 1ST 20SQCM/<: CPT

## 2020-08-13 PROCEDURE — 99211 OFF/OP EST MAY X REQ PHY/QHP: CPT

## 2020-08-13 PROCEDURE — 85610 PROTHROMBIN TIME: CPT

## 2020-08-13 PROCEDURE — 11042 DBRDMT SUBQ TIS 1ST 20SQCM/<: CPT | Performed by: NURSE PRACTITIONER

## 2020-08-13 NOTE — PROGRESS NOTES
Medication Management Service  [place]  [phone]    Visit Date: 8/13/2020   Subjective:       Jarvis Brown is a 69 y.o. male who presents to clinic today for anticoagulation monitoring and adjustment. Patient seen in clinic for warfarin management due to  Indication:   DVT. INR goal: of 2.0-3.0. Duration of therapy: indefinite. Patient reports the following:   Adherent with regimen  Missed or extra doses:  None   Bleeding or thromboembolic side effects:  None  Significant medication changes:  None  Significant dietary changes: None  Significant alcohol or tobacco changes: None  Significant recent illness, disease state changes, or hospitalization:  None  Upcoming surgeries or procedures:  None  Falls: None           Assessment and Plan     PT/INR done in office per protocol. INR today is 2.7, therapeutic, despite starting Bactrim last week, which interacts with warfarin. Plan: Will continue current regimen of warfarin 5mg daily except 7.5mg on Mondays and Thursdays. Recheck INR in 1 week(s). Patient verbalized understanding of dosing directions and information discussed. Dosing schedule given to patient including phone number, appointment date, and time. Progress note sent to referring office. Patient acknowledges working in consult agreement with pharmacist as referred by his/her physician.       Electronically signed by Vernetta Angelucci, [facility] on 8/13/20 at 11:42 AM EDT    CLINICAL PHARMACY CONSULT: MED RECONCILIATION/REVIEW Ghislaine  22. Tracking Only    PHSO: No  Total # of Interventions Recommended: 0    - Maintenance Safety Lab Monitoring #: 1  Total Interventions Accepted: 0  Time Spent (min): Deanne Hernandez, ShinD

## 2020-08-13 NOTE — PROGRESS NOTES
Wound Care Center Progress Note With Procedure    Marquis Contreras  AGE: 69 y.o. GENDER: male  : 1945  EPISODE DATE:  2020     Subjective:     Chief Complaint   Patient presents with    Wound Check     BLE         HISTORY of PRESENT ILLNESS      Marquis Contreras is a 69 y.o. male who presents today for wound evaluation of Chronic venous ulcer(s) of the bilateral lower legs. The ulcer is of moderate severity. The underlying cause of the wound is venous hypertension. Patient currently on Bactrim will need to add topical Gent to the regimen.   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 Gretel Mares Anemia 2011    Arm fracture Tito Imtiaz     Dr Fernando Alcala with also yearly DM exam    Cataract     worsening-Dr. Glen Sin    Cellulitis of left lower leg     Chronic venous hypertension with ulcer ([address] 2011    CKD (chronic kidney disease) 2012    Renal u/S normal     Colon polyps     Dr. Gretel Mares COPD (chronic obstructive pulmonary disease) ([address]     Diabetes mellitus ([address]     Diabetes mellitus ([address]     Diabetes mellitus with peripheral circulatory disorder ([address] 10/2/2015    Diabetes mellitus with skin ulcer ([address] 10/2/2015    Diabetic peripheral neuropathy ([address]     + EMG, NCS    Diabetic skin ulcer associated with type 2 diabetes mellitus ([address]     Diverticulosis 12    mild, left colon    Femoral DVT (deep venous thrombosis) ([address] 2011    PARTIAL,CHRONIC-SPFLD HEART SURGEONS    GERD (gastroesophageal reflux disease)     Glaucoma     open angle-Dr. Angela Nelson H/O cardiac catheterization 6/3/14    EF55% normal study    H/O Doppler ultrasound 03/26/15    Carotid US- no significant stenosis noted bilaterally.  H/O echocardiogram 11/21/2019    EF50-55%, Mild AR. Moderately dilated right ventricle with negative Solis's sign.  H/O mumps orchitis     as a youth    Hemorrhoids 4/23/12    Dr. Yomaira Argueta; repeat colonoscopy 3 years    History of nuclear stress test 11/21/2019    EF 60%, Normal study.  HLD (hyperlipidemia)     Hx of Doppler ultrasound 1/06/15    Lymph node seen in left groin area. No bilateral stenosis.     Hyperlipemia     Hyperlipidemia     Hypertension 1992    Hypertension     Idiopathic chronic venous hypertension of left lower extremity with ulcer and inflammation (HCC) 10/2/2015    Leg ulcer ([address] 1978-present    following at wound center, Dr Vale Astorga No diabetic retinopathy OU 11/12    Dr. Santo Castro chronic ulcer of left lower leg with fat layer exposed ([address] 10/2/2015    Pulmonary embolism ([address] 11/13    patient on coumadin    Sleep apnea     doesnt always use cpap dt it drys him out    SOB (shortness of breath) Oct 2011    Stress test normal.     Tendinitis 1973    plantar tendons    Type II or unspecified type diabetes mellitus with other specified manifestations, not stated as uncontrolled 2/8/2013    Unspecified venous (peripheral) insufficiency     Urticaria     WD-Cellulitis of right anterior lower leg 9/5/2019    WD-Cellulitis of right lower extremity 3/26/2020    WD-Chronic venous hypertension (idiopathic) with ulcer of left lower extremity (CODE) ([address] 6/27/2019    WD-Chronic venous hypertension with inflammation, right 9/19/2019    WD-Decubitus ulcer of left buttock, stage 3 ([address] 6/25/2020    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 to breakdown of skin ([address] 3/26/2020    WD-Open wound of hand without complication, left, initial encounter 12/12/2019    WD-Pressure injury of left buttock, stage 2 ([address] 2020    WD-Pressure injury of left buttock, stage 3 ([address] 3/12/2020    WD-Pressure injury of right buttock, stage 3 ([address] 3/12/2020    WD-Skin tear of right forearm without complication     WD-Ulcer of right pretibial region, with fat layer exposed ([address] 10/17/2019       PAST SURGICAL HISTORY    Past Surgical History:   Procedure Laterality Date    BREAST SURGERY  1970s    benign tumors bilaterally    CARDIAC CATHETERIZATION  6/3/14    EF55% normal study    CARPAL TUNNEL RELEASE Left     CARPAL TUNNEL RELEASE      CATARACT REMOVAL Right 3/11/2013    Dr. Edilia Matthews Left 2013    Dr. Shorty Cortez      polyps    COLONOSCOPY  11    villous component--f/u colonoscopy will be needed in 6 months    COLONOSCOPY  12    mild diverticulosis, internal hemorrhoids; repeat in 3 years (Dr. Brandon Salvador)    COLONOSCOPY  2016    mild diverticulosis, three colon polyps    HERNIA REPAIR  1970s    HERNIA REPAIR      SKIN GRAFT  -present    skin grafts to left ankle ulcers    SPLENECTOMY      SPLENECTOMY      TONSILLECTOMY      VARICOSE VEIN SURGERY Left 2009       FAMILY HISTORY    Family History   Problem Relation Age of Onset    Heart Disease Father     Cancer Father         prostate    High Blood Pressure Father     High Cholesterol Father     Cancer Brother     Diabetes Brother     Thyroid Disease Brother        SOCIAL HISTORY    Social History     Tobacco Use    Smoking status: Former Smoker     Packs/day: 2.00     Years: 35.00     Pack years: 70.00     Types: Cigarettes     Last attempt to quit:      Years since quittin.6    Smokeless tobacco: Never Used    Tobacco comment: chew--30 years.   Reviewed 2015   Substance Use Topics    Alcohol use: Yes     Comment: soc    Drug use: Never       ALLERGIES    Allergies   Allergen Reactions    Cavilon Durable Barrier Derrill Saber Oil-Dimeth-Coconut Oil]     Parabens Hives    Parabens Hives    Prinivil [Lisinopril] Swelling    Cortisone Rash    Cortisone Rash    Dilaudid [Hydromorphone Hcl] Rash    Penicillins Rash    Penicillins Rash    Sulfamethoxazole-Trimethoprim Nausea Only    Tape [Adhesive Tape] Rash       MEDICATIONS    Current Outpatient Medications on File Prior to Encounter   Medication Sig Dispense Refill    losartan (COZAAR) 50 MG tablet Take 1 tablet by mouth daily Patient tolerates diovan- HOLD if blood pressure less than 120/60 90 tablet 1    warfarin (COUMADIN) 5 MG tablet 5mg daily except 7.5mg on Mondays and Thursdays      buPROPion (WELLBUTRIN XL) 300 MG extended release tablet Take 1 tablet by mouth every morning 90 tablet 0    escitalopram (LEXAPRO) 20 MG tablet Take 1 tablet by mouth daily 90 tablet 0    omeprazole (PRILOSEC) 20 MG delayed release capsule TAKE ONE (1) CAPSULE BY MOUTH ONCE DAILY 90 capsule 1    ARIPiprazole (ABILIFY) 15 MG tablet Take 1 tablet by mouth daily 90 tablet 0    atorvastatin (LIPITOR) 40 MG tablet Take 1 tablet by mouth nightly 90 tablet 0    furosemide (LASIX) 20 MG tablet Take 1 tablet by mouth 2 times daily 180 tablet 0    metFORMIN (GLUCOPHAGE) 500 MG tablet Take 1 tablet by mouth daily (with breakfast) 90 tablet 0    glycopyrrolate-formoterol (BEVESPI AEROSPHERE) 9-4.8 MCG/ACT AERO Inhale 2 puffs into the lungs 2 times daily 1 Inhaler 5    Spacer/Aero-Holding Chambers (AEROCHAMBER MV) MISC 1 actuation by Does not apply route 2 times daily 1 each 0    Zinc Sulfate (ZINC 15 PO) Take 50 mg by mouth every other day      potassium chloride (MICRO-K) 10 MEQ extended release capsule Take 10 mEq by mouth daily      Polyethylene Glycol 3350 (MIRALAX PO) Take 17 g by mouth every other day      metoprolol tartrate (LOPRESSOR) 25 MG tablet Take 0.5 tablets by mouth 2 times daily 90 tablet 3    Zinc Oxide (DESITIN CREAMY EX) Apply 13 % topically      aspirin 81 MG chewable tablet Take 1 tablet by mouth daily 30 tablet 3    blood glucose test strips (ALISHA CONTOUR TEST) strip USE AS DIRECTED TO TEST BLOOD SUGAR FOUR TIMES DAILY AS NEEDED 100 strip 3    ALISHA CONTOUR TEST strip USE AS DIRECTED TO TEST BLOOD SUGAR FOUR TIMES DAILY 100 strip 5    Glucose Blood (BLOOD GLUCOSE TEST STRIPS) STRP Please give contour testing strips to test blood sugar 4x daily 200 strip 3    phytonadione (VITAMIN K) 5 MG tablet Take 1 tablet by mouth once for 1 dose Today on 6/9/2020 (Today's INR was 8.5) 1 tablet 0     No current facility-administered medications on file prior to encounter. REVIEW OF SYSTEMS    Pertinent items are noted in HPI. Constitutional: Negative for systemic symptoms including fever, chills and malaise. Objective:      /65   Pulse 65   Temp 98.2 °F (36.8 °C) (Temporal)   Resp 16     PHYSICAL EXAM  General: The patient is in no acute distress. Mental status:  Patient is appropriate, is  oriented to place and plan of care. Dermatologic exam: Visual inspection of the periwound reveals the skin to be dry and scaly  Wound exam: see wound description below in procedure note      Assessment:     Problem List Items Addressed This Visit     WD-Chronic venous hypertension (idiopathic) with ulcer of left lower extremity (CODE) ([facility]    WD-Chronic venous hypertension with inflammation, right    RESOLVED: WD-Decubitus ulcer of left buttock, stage 3 ([address] - Primary        Procedure Note    Indications:  Based on my examination of this patient's wound(s) today, sharp excision into necrotic subcutaneous tissue is required to promote healing and evaluate the extent of previous healing.     Performed by: WINDY Dunne - CNP    Consent obtained: Yes    Time out taken:  Yes    Pain Control: Anesthetic  Anesthetic: 4% Lidocaine Liquid Topical        Debridement:Excisional Debridement    Using curette the wound(s) was/were sharply debrided down through and including the removal of subcutaneous tissue. Devitalized Tissue Debrided:  slough and exudate    Pre Debridement Measurements:  Are located in the Wound Documentation Flow Sheet    All active wounds listed below with today's date are evaluated  Wound(s)    debrided this date include # : 6 and 7     Post  Debridement Measurements:  Wound 06/27/19 #6 (onset 1 month) Left Medial Distal Ankle (Active)   Wound Image   07/23/20 0831   Wound Venous 08/13/20 0950   Offloading for Diabetic Foot Ulcers No 08/13/20 0950   Dressing Status Clean;Dry; Intact 08/13/20 1021   Dressing Changed Changed/New 08/13/20 1021   Dressing/Treatment Moisturizing cream 08/06/20 1008   Wound Cleansed Soap and water;Rinsed/Irrigated with saline 08/13/20 0950   Wound Length (cm) 1.5 cm 08/13/20 0950   Wound Width (cm) 1.6 cm 08/13/20 0950   Wound Depth (cm) 0.1 cm 08/13/20 0950   Wound Surface Area (cm^2) 2.4 cm^2 08/13/20 0950   Change in Wound Size % (l*w) 65.71 08/13/20 0950   Wound Volume (cm^3) 0.24 cm^3 08/13/20 0950   Wound Healing % 89 08/13/20 0950   Post-Procedure Length (cm) 1.5 cm 08/13/20 1020   Post-Procedure Width (cm) 1.6 cm 08/13/20 1020   Post-Procedure Depth (cm) 0.1 cm 08/13/20 1020   Post-Procedure Surface Area (cm^2) 2.4 cm^2 08/13/20 1020   Post-Procedure Volume (cm^3) 0.24 cm^3 08/13/20 1020   Distance Tunneling (cm) 0 cm 08/13/20 0950   Tunneling Position ___ O'Clock 0 08/13/20 0950   Undermining Starts ___ O'Clock 0 08/13/20 0950   Undermining Ends___ O'Clock 0 08/13/20 0950   Undermining Maxium Distance (cm) 0 08/13/20 0950   Wound Assessment Red;Yellow 08/13/20 0950   Drainage Amount Moderate 08/13/20 0950   Drainage Description Yellow 08/13/20 0950   Odor None 08/13/20 0950   Margins Defined edges; Unattached edges 08/13/20 0950   Ana-wound Assessment Maceration; White 08/13/20 0950   Non-staged Wound Description Full thickness 08/13/20 0950   [place]%Wound Bed 0 08/13/20 0950   Red%Wound Bed 90 08/13/20 0950   Yellow%Wound Bed 10 08/13/20 0950   Black%Wound Bed 0 08/13/20 0950   Purple%Wound Bed 0 08/13/20 0950   Other%Wound Bed 0 08/13/20 0950   Number of days: 412       Wound 07/30/20 #7 (onset 1 week) Right Lower Leg Circumferential (Active)   Wound Image   07/30/20 0846   Wound Other 08/13/20 0950   Offloading for Diabetic Foot Ulcers No 08/13/20 0950   Dressing Status Clean;Dry; Intact 08/13/20 1021   Dressing Changed Changed/New 08/13/20 1021   Dressing/Treatment ABD; Alginate 08/06/20 1008   Wound Cleansed Soap and water;Rinsed/Irrigated with saline 08/13/20 0950   Wound Length (cm) 21.5 cm 08/13/20 0950   Wound Width (cm) 7.5 cm 08/13/20 0950   Wound Depth (cm) 0.1 cm 08/13/20 0950   Wound Surface Area (cm^2) 161.25 cm^2 08/13/20 0950   Change in Wound Size % (l*w) -11.59 08/13/20 0950   Wound Volume (cm^3) 16.12 cm^3 08/13/20 0950   Wound Healing % -12 08/13/20 0950   Post-Procedure Length (cm) 21.5 cm 08/13/20 1020   Post-Procedure Width (cm) 7.5 cm 08/13/20 1020   Post-Procedure Depth (cm) 0.1 cm 08/13/20 1020   Post-Procedure Surface Area (cm^2) 161.25 cm^2 08/13/20 1020   Post-Procedure Volume (cm^3) 16.12 cm^3 08/13/20 1020   Distance Tunneling (cm) 0 cm 08/13/20 0950   Tunneling Position ___ O'Clock 0 08/13/20 0950   Undermining Starts ___ O'Clock 0 08/13/20 0950   Undermining Ends___ O'Clock 0 08/13/20 0950   Undermining Maxium Distance (cm) 0 08/13/20 0950   Wound Assessment Pink 08/13/20 0950   Drainage Amount Large 08/13/20 0950   Drainage Description Yellow 08/13/20 0950   Odor None 08/13/20 0950   Margins Unattached edges; Undefined edges 08/13/20 0950   Ana-wound Assessment Maceration; White 08/13/20 0950   Non-staged Wound Description Full thickness 08/13/20 0950   [place]%Wound Bed 100 08/13/20 0950   Red%Wound Bed 0 08/13/20 0950   Yellow%Wound Bed 0 08/13/20 0950   Black%Wound Bed 0 08/13/20 0950   Purple%Wound Bed 0 08/13/20 0950   Other%Wound Bed 0 08/13/20 0950   Number of days: 14       Total  Area  Debrided: 20 sq cm     Bleeding:  Minimal    Hemostasis Achieved:  by pressure    Procedural Pain:  0  / 10     Post Procedural Pain:  0 / 10     Response to treatment:  Well tolerated by patient. Status of wound progress and description from last visit:   Improving. He is on Bactrim, will need to add topical Wava Victoria to cover culture findings. Plan:       Discharge Instructions       NOTE: Upon discharge from the [address], you will receive a patient experience survey. We would be grateful if you would take the time to fill this survey out.     Wound care order history:                 ANA CRISTINA's   Right       Left               Date               Vascular studies:   ordered on 7/25/19 Date  To be done 8/2/19 imaging center               Imaging:  Date               Cultures: obtained from left medal leg  Date 6/27/19--positive kles.                                HCEDFGWH from left medial lower leg on 8/15/19                               Obtained from left medial lower leg on 9/26/19                               Obtained from right leg on 10/17/19                               Obtained from left leg on 3/12/2020                               Obtained from right leg on 3/19/2020                                                                                                                           Obtained from right leg on 4/30/2020--scanty growth                                  Biopsy done 7/18/19                 Labs/ HbA1c  Date               Grafts: Date               HBO:                Antibiotics: Doxycycline for 14 days BID; phoned to [facility] 6/28/19                                    Doxycycline for 10 days BID and CIPRO 500 mg BID for 10 days ordered on 8/22/19                                     Doxycycline for 7 days on 9/5/19                                   Doxycycline for 7 days on 3/12/2020 continued on 3/19/2020 for 7 days                                    Bactrim DS for  10 days on 3/26/12380                                   Bactrim DS for 10 days on 6/25/2020              Earlier Wound care treatments:                [id number]:                        Consults:   Date 7/18/19 to Dr Sandy Brar-- [facility] physician:      Continuing wound care orders and information:              Residence: private               Continue home health care with:none at this time               Your wound-care supplies will be provided by: Ravi Zimmerman provider:              HOLGER with              NSV loading: Date              VKCMQ Medications: RX SANTYL GIVEN 8/1/19              JQCBR cleansing:                           VJ not scrub or use excessive force.                          Wash hands with soap and water before and after dressing changes.                         Prior to applying a clean dressing, cleanse wound with normal saline,                                wound cleanser, or mild soap and water.                                     Daily Wound management:                                                Avoid standing for long periods of time.                          Apply wraps/stockings in AM and remove at bedtime.                          If swelling is present, elevate legs to the level of the heart or above for 30 minutes 4-5 times a day and/or when sitting.                                             When taking antibiotics take entire prescription as ordered by physician do not stop taking until medicine is all gone.                                                      Orders for this week: 8/13/2020      Left medial ankle distal-- cleanse with  vashe in clinic today --for 5-10 minutes, pat dry. Lotion to leg. Lotrisone to fabricio wound(in clinic only).  Apply endoform to wound bed, cover wound and periwound with  vacutex, coban 2 lite --leave in place x 1 week until patient returns to clinic next week      Right Leg -- Apply calcium alginate to open areas and cover with ABD. Wrap with coban 2 lite leave in place until next visit          Follow up [address] in 1 week on Thursday in the wound care center  Call [phone] for any questions or concerns.   Physician Signature            Treatment Note Wound 07/30/20 #7 (onset 1 week) Right Lower Leg Circumferential-Dressing/Treatment: (ca alginate; abd; coban 2 lite; tape)  Wound 06/27/19 #6 (onset 1 month) Left Medial Distal Ankle-Dressing/Treatment: (endform; vacutex; abd; coban 2 lite; tape)    Written Patient Dismissal Instructions Given            Electronically signed by WINDY Shay CNP on 8/13/2020 at 10:59 AM

## 2020-08-13 NOTE — PROGRESS NOTES
Multilayer Compression Wrap   (Not Unna) Below the Knee    NAME:  Claudia Dorsey OF BIRTH:  1945  MEDICAL RECORD NUMBER:  [id number]  DATE:  8/13/2020    Multilayer compression wrap: Removed old Multilayer wrap if indicated and wash leg with mild soap/water. Applied moisturizing agent to dry skin as needed. Applied primary and secondary dressing as ordered. Applied multilayered dressing below the knee to right lower leg. Applied multilayered dressing below the knee to left lower leg. Instructed patient/caregiver not to remove dressing and to keep it clean and dry. Instructed patient/caregiver on complications to report to provider, such as pain, numbness in toes, heavy drainage, and slippage of dressing. Instructed patient on purpose of compression dressing and on activity and exercise recommendations.       Electronically signed by Demian Gonzalez RN on 8/13/2020 at 10:21 AM

## 2020-08-20 ENCOUNTER — HOSPITAL ENCOUNTER (OUTPATIENT)
Dept: WOUND CARE | Age: 75
Discharge: HOME | End: 2020-08-20
Payer: MEDICARE

## 2020-08-20 ENCOUNTER — ANTI-COAG VISIT (OUTPATIENT)
Dept: PHARMACY | Age: 75
End: 2020-08-20
Payer: MEDICARE

## 2020-08-20 VITALS
RESPIRATION RATE: 20 BRPM | HEART RATE: 84 BPM | DIASTOLIC BLOOD PRESSURE: 65 MMHG | SYSTOLIC BLOOD PRESSURE: 114 MMHG | TEMPERATURE: 98.9 F

## 2020-08-20 VITALS — TEMPERATURE: 97.4 F

## 2020-08-20 LAB
INR BLD: 3.8
POC INR: 3.8 INDEX
PROTHROMBIN TIME, POC: 45.6 SECONDS (ref 10–14.3)
PROTIME: 45.6 SECONDS

## 2020-08-20 PROCEDURE — 36416 COLLJ CAPILLARY BLOOD SPEC: CPT

## 2020-08-20 PROCEDURE — 85610 PROTHROMBIN TIME: CPT

## 2020-08-20 PROCEDURE — 99212 OFFICE O/P EST SF 10 MIN: CPT

## 2020-08-20 PROCEDURE — 11042 DBRDMT SUBQ TIS 1ST 20SQCM/<: CPT

## 2020-08-20 ASSESSMENT — PAIN SCALES - GENERAL: PAINLEVEL_OUTOF10: 7

## 2020-08-20 ASSESSMENT — PAIN DESCRIPTION - PROGRESSION: CLINICAL_PROGRESSION: GRADUALLY WORSENING

## 2020-08-20 ASSESSMENT — PAIN DESCRIPTION - DESCRIPTORS: DESCRIPTORS: BURNING

## 2020-08-20 ASSESSMENT — PAIN DESCRIPTION - LOCATION: LOCATION: BUTTOCKS

## 2020-08-20 ASSESSMENT — PAIN DESCRIPTION - ORIENTATION: ORIENTATION: RIGHT;LEFT

## 2020-08-20 ASSESSMENT — PAIN DESCRIPTION - ONSET: ONSET: ON-GOING

## 2020-08-20 ASSESSMENT — PAIN - FUNCTIONAL ASSESSMENT: PAIN_FUNCTIONAL_ASSESSMENT: PREVENTS OR INTERFERES SOME ACTIVE ACTIVITIES AND ADLS

## 2020-08-20 ASSESSMENT — PAIN DESCRIPTION - FREQUENCY: FREQUENCY: CONTINUOUS

## 2020-08-20 NOTE — PROGRESS NOTES
Medication Management Service  [facility]  [phone]    Visit Date: 8/20/2020   Subjective:       Elza Lyn is a 69 y.o. male who presents to clinic today for anticoagulation monitoring and adjustment. Patient seen in clinic for warfarin management due to  Indication:   DVT. INR goal: of 2.0-3.0. Duration of therapy: indefinite. Patient reports the following:   Adherent with regimen  Missed or extra doses:  None   Bleeding or thromboembolic side effects:  None  Significant medication changes:  None  Significant dietary changes: None  Significant alcohol or tobacco changes: None  Significant recent illness, disease state changes, or hospitalization:  None  Upcoming surgeries or procedures:  None  Falls: None           Assessment and Plan     PT/INR done in office per protocol. INR today is 3.8, supratherapeutic. Plan:  Take warfarin 2.5mg x1 then decrease weekly dose by ~6% to warfarin 5mg daily except 7.5mg on Mondays. Recheck INR in 1 week(s). Patient verbalized understanding of dosing directions and information discussed. Dosing schedule given to patient including phone number, appointment date, and time. Progress note sent to referring office. Patient acknowledges working in consult agreement with pharmacist as referred by his/her physician.       Electronically signed by NADEEN Galvez [facility] on 8/20/20 at 12:06 PM EDT    CLINICAL PHARMACY CONSULT: MED RECONCILIATION/REVIEW Ghislaine  22. Tracking Only    PHSO: No  Total # of Interventions Recommended: 1  - Decreased Dose #: 1  - Maintenance Safety Lab Monitoring #: 1    Total Interventions Accepted: 1  Time Spent (min): Too Pak, ShinD

## 2020-08-27 ENCOUNTER — ANTI-COAG VISIT (OUTPATIENT)
Dept: PHARMACY | Age: 75
End: 2020-08-27
Payer: MEDICARE

## 2020-08-27 ENCOUNTER — HOSPITAL ENCOUNTER (OUTPATIENT)
Dept: WOUND CARE | Age: 75
Discharge: HOME | End: 2020-08-27
Payer: MEDICARE

## 2020-08-27 VITALS
DIASTOLIC BLOOD PRESSURE: 75 MMHG | TEMPERATURE: 97.8 F | HEART RATE: 80 BPM | SYSTOLIC BLOOD PRESSURE: 134 MMHG | RESPIRATION RATE: 22 BRPM

## 2020-08-27 VITALS — TEMPERATURE: 98 F

## 2020-08-27 LAB
INTERNATIONAL NORMALIZATION RATIO, POC: 2.3
POC INR: 2.3 INDEX
PROTHROMBIN TIME, POC: 27.8 SECONDS (ref 10–14.3)

## 2020-08-27 PROCEDURE — 36416 COLLJ CAPILLARY BLOOD SPEC: CPT

## 2020-08-27 PROCEDURE — 11042 DBRDMT SUBQ TIS 1ST 20SQCM/<: CPT

## 2020-08-27 PROCEDURE — 85610 PROTHROMBIN TIME: CPT

## 2020-08-27 PROCEDURE — 99211 OFF/OP EST MAY X REQ PHY/QHP: CPT

## 2020-08-27 NOTE — PROGRESS NOTES
Wound Care Center Progress Note With Procedure    Alesha Gibbs  AGE: 69 y.o. GENDER: male  : 1945  EPISODE DATE:  2020     Subjective:     Chief Complaint   Patient presents with    Wound Check     wound         HISTORY of PRESENT ILLNESS      Alesha Gibbs is a 69 y.o. male who presents today for wound evaluation of Chronic venous ulcer(s) of the left leg and stage 2 pressure on the buttocks. The ulcer is of moderate severity. The underlying cause of the wound is venous. And pressure on the buttocks. He presents with stool hardened into his pants and his wound on the buttocks. He is not able to care for himself at home.     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 Helane Ormond Anemia 2011    Arm fracture Gradie Cramer     Dr Leslie Bauman with also yearly DM exam    Cataract     worsening-Dr. Damien Rob    Cellulitis of left lower leg     Chronic venous hypertension with ulcer ([address] 2011    CKD (chronic kidney disease) 2012    Renal u/S normal     Colon polyps     Dr. Helane Ormond COPD (chronic obstructive pulmonary disease) ([address]     Diabetes mellitus ([address]     Diabetes mellitus ([address]     Diabetes mellitus with peripheral circulatory disorder ([address] 10/2/2015    Diabetes mellitus with skin ulcer ([address] 10/2/2015    Diabetic peripheral neuropathy ([address]     + EMG, NCS    Diabetic skin ulcer associated with type 2 diabetes mellitus ([address]     Diverticulosis 12    mild, left colon    Femoral DVT (deep venous thrombosis) ([address] 2011    PARTIAL,CHRONIC-SPFLD HEART SURGEONS    GERD (gastroesophageal reflux complication, left, initial encounter 2019    WD-Pressure injury of left buttock, stage 2 ([address] 2020    WD-Pressure injury of left buttock, stage 3 ([address] 3/12/2020    WD-Pressure injury of right buttock, stage 3 ([address] 3/12/2020    WD-Skin tear of right forearm without complication 4332    WD-Ulcer of right pretibial region, with fat layer exposed ([address] 10/17/2019       PAST SURGICAL HISTORY    Past Surgical History:   Procedure Laterality Date    BREAST SURGERY  1970s    benign tumors bilaterally    CARDIAC CATHETERIZATION  6/3/14    EF55% normal study    CARPAL TUNNEL RELEASE Left     CARPAL TUNNEL RELEASE      CATARACT REMOVAL Right 3/11/2013    Dr. Miah Encarnacion Left 2013    Dr. Nahid Costa      polyps    COLONOSCOPY  11    villous component--f/u colonoscopy will be needed in 6 months    COLONOSCOPY  12    mild diverticulosis, internal hemorrhoids; repeat in 3 years (Dr. Kimberlee Romo)    COLONOSCOPY  2016    mild diverticulosis, three colon polyps    HERNIA REPAIR  1970s    HERNIA REPAIR      SKIN GRAFT  -present    skin grafts to left ankle ulcers    SPLENECTOMY      SPLENECTOMY      TONSILLECTOMY      VARICOSE VEIN SURGERY Left        FAMILY HISTORY    Family History   Problem Relation Age of Onset    Heart Disease Father     Cancer Father         prostate    High Blood Pressure Father     High Cholesterol Father     Cancer Brother     Diabetes Brother     Thyroid Disease Brother        SOCIAL HISTORY    Social History     Tobacco Use    Smoking status: Former Smoker     Packs/day: 2.00     Years: 35.00     Pack years: 70.00     Types: Cigarettes     Last attempt to quit:      Years since quittin.6    Smokeless tobacco: Never Used    Tobacco comment: chew--30 years.   Reviewed 2015   Substance Use Topics    Alcohol use: Yes     Comment: soc    Drug use: Never tablet by mouth once for 1 dose Today on 6/9/2020 (Today's INR was 8.5) 1 tablet 0    Spacer/Aero-Holding Chambers (AEROCHAMBER MV) MISC 1 actuation by Does not apply route 2 times daily 1 each 0    Zinc Sulfate (ZINC 15 PO) Take 50 mg by mouth every other day      blood glucose test strips (ALISHA CONTOUR TEST) strip USE AS DIRECTED TO TEST BLOOD SUGAR FOUR TIMES DAILY AS NEEDED 100 strip 3    ALISHA CONTOUR TEST strip USE AS DIRECTED TO TEST BLOOD SUGAR FOUR TIMES DAILY 100 strip 5    Glucose Blood (BLOOD GLUCOSE TEST STRIPS) STRP Please give contour testing strips to test blood sugar 4x daily 200 strip 3     No current facility-administered medications on file prior to encounter. REVIEW OF SYSTEMS    Pertinent items are noted in HPI. Constitutional: Negative for systemic symptoms including fever, chills and malaise. Objective:      /75   Pulse 80   Temp 97.8 °F (36.6 °C) (Temporal)   Resp 22     PHYSICAL EXAM      General: The patient is in no acute distress. Mental status:  Patient is appropriate, is  oriented to place and plan of care. Dermatologic exam: Visual inspection of the periwound reveals the skin to be normal in turgor and texture  Wound exam: see wound description below in procedure note      Assessment:     Problem List Items Addressed This Visit     WD-Ulcer of shin, left, with fat layer exposed ([address] - Primary    WD-Chronic venous hypertension (idiopathic) with ulcer of left lower extremity (CODE) ([address]    WD-Chronic venous hypertension with inflammation, right        Procedure Note    Indications:  Based on my examination of this patient's wound(s) today, sharp excision into necrotic subcutaneous tissue is required to promote healing and evaluate the extent of previous healing.     Performed by: Matilda Rogers MD    Consent obtained: Yes    Time out taken:  Yes    Pain Control:   none    Debridement:Excisional Debridement    Using curette the wound(s) was/were sharply debrided down through and including the removal of subcutaneous tissue. Devitalized Tissue Debrided:  fibrin, biofilm and slough    Pre Debridement Measurements:  Are located in the Wound Documentation Flow Sheet    All active wounds listed below with today's date are evaluated  Wound(s)    debrided this date include # : 6     Post  Debridement Measurements:  Wound 11/15/13 Other (Comment) Leg Inner erethema with a small puncture site (Active)   Number of days: 0677       Wound 06/27/19 #6 (onset 1 month) Left Medial Distal Ankle (Active)   Wound Image   08/20/20 1002   Wound Venous 08/20/20 1002   Offloading for Diabetic Foot Ulcers No 08/20/20 1002   Dressing Status Clean;Dry; Intact 08/27/20 0949   Dressing Changed Changed/New 08/27/20 0949   Dressing/Treatment Moisturizing cream 08/06/20 1008   Wound Cleansed Soap and water 08/27/20 0930   Wound Length (cm) 1.5 cm 08/27/20 0930   Wound Width (cm) 1.5 cm 08/27/20 0930   Wound Depth (cm) 0.1 cm 08/27/20 0930   Wound Surface Area (cm^2) 2.25 cm^2 08/27/20 0930   Change in Wound Size % (l*w) 67.86 08/27/20 0930   Wound Volume (cm^3) 0.22 cm^3 08/27/20 0930   Wound Healing % 90 08/27/20 0930   Post-Procedure Length (cm) 1.5 cm 08/27/20 0949   Post-Procedure Width (cm) 1.5 cm 08/27/20 0949   Post-Procedure Depth (cm) 0.1 cm 08/27/20 0949   Post-Procedure Surface Area (cm^2) 2.25 cm^2 08/27/20 0949   Post-Procedure Volume (cm^3) 0.22 cm^3 08/27/20 0949   Distance Tunneling (cm) 0 cm 08/27/20 0930   Tunneling Position ___ O'Clock 0 08/27/20 0930   Undermining Starts ___ O'Clock 0 08/27/20 0930   Undermining Ends___ O'Clock 0 08/27/20 0930   Undermining Maxium Distance (cm) 0 08/27/20 0930   Wound Assessment Red;Yellow 08/27/20 0930   Drainage Amount Moderate 08/27/20 0930   Drainage Description Serosanguinous 08/27/20 0930   Odor Mild 08/27/20 0930   Margins Defined edges 08/27/20 0930   Ana-wound Assessment Pink;Calloused 08/27/20 0930   Non-staged Wound Description Full thickness 08/27/20 0930   [place]%Wound Bed 0 08/27/20 0930   Red%Wound Bed 40 08/27/20 0930   Yellow%Wound Bed 60 08/27/20 0930   Black%Wound Bed 0 08/27/20 0930   Purple%Wound Bed 0 08/27/20 0930   Other%Wound Bed 0 08/27/20 0930   Number of days: 426       Wound 07/30/20 #7 (onset 1 week) Right Lower Leg Circumferential (Active)   Wound Image   08/20/20 1002   Wound Other 08/20/20 1002   Offloading for Diabetic Foot Ulcers No 08/20/20 1002   Dressing Status Clean;Dry; Intact 08/27/20 1026   Dressing Changed Changed/New 08/27/20 1026   Dressing/Treatment ABD; Alginate 08/06/20 1008   Wound Cleansed Soap and water 08/27/20 0930   Wound Length (cm) 19 cm 08/27/20 0930   Wound Width (cm) 4 cm 08/27/20 0930   Wound Depth (cm) 0.1 cm 08/27/20 0930   Wound Surface Area (cm^2) 76 cm^2 08/27/20 0930   Change in Wound Size % (l*w) 47.4 08/27/20 0930   Wound Volume (cm^3) 7.6 cm^3 08/27/20 0930   Wound Healing % 47 08/27/20 0930   Post-Procedure Length (cm) 21.5 cm 08/13/20 1020   Post-Procedure Width (cm) 7.5 cm 08/13/20 1020   Post-Procedure Depth (cm) 0.1 cm 08/13/20 1020   Post-Procedure Surface Area (cm^2) 161.25 cm^2 08/13/20 1020   Post-Procedure Volume (cm^3) 16.12 cm^3 08/13/20 1020   Distance Tunneling (cm) 0 cm 08/27/20 0930   Tunneling Position ___ O'Clock 0 08/27/20 0930   Undermining Starts ___ O'Clock 0 08/27/20 0930   Undermining Ends___ O'Clock 0 08/27/20 0930   Undermining Maxium Distance (cm) 0 08/27/20 0930   Wound Assessment Pink 08/27/20 0930   Drainage Amount Moderate 08/27/20 0930   Drainage Description Yellow 08/27/20 0930   Odor Mild 08/27/20 0930   Margins Unattached edges 08/27/20 0930   Ana-wound Assessment Pink 08/27/20 0930   Non-staged Wound Description Full thickness 08/27/20 0930   [place]%Wound Bed 100 08/27/20 0930   Red%Wound Bed 0 08/27/20 0930   Yellow%Wound Bed 0 08/27/20 0930   Black%Wound Bed 0 08/27/20 0930   Purple%Wound Bed 0 08/27/20 0930   Other%Wound Bed 0 08/27/20 0930   Number of days: 28       Wound 08/27/20 Pretibial Left;Distal #8 (Active)   Wound Image   08/27/20 0949   Dressing Status Clean;Dry; Intact 08/27/20 1026   Dressing Changed Changed/New 08/27/20 1026   Wound Cleansed Soap and water;Rinsed/Irrigated with saline 08/27/20 0949   Wound Length (cm) 2 cm 08/27/20 0949   Wound Width (cm) 1 cm 08/27/20 0949   Wound Depth (cm) 0.1 cm 08/27/20 0949   Wound Surface Area (cm^2) 2 cm^2 08/27/20 0949   Wound Volume (cm^3) 0.2 cm^3 08/27/20 0949   Tunneling Position ___ O'Clock 0 08/27/20 0949   Undermining Starts ___ O'Clock 0 08/27/20 0949   Undermining Ends___ O'Clock 0 08/27/20 0949   Undermining Maxium Distance (cm) 0 08/27/20 0949   Wound Assessment [place] 08/27/20 0949   Drainage Amount Moderate 08/27/20 0949   Drainage Description Serosanguinous 08/27/20 0949   Odor Mild 08/27/20 0949   Margins Attached edges; Unattached edges 08/27/20 0949   Ana-wound Assessment Pink 08/27/20 0949   Pink%Wound Bed 0 08/27/20 0949   Red%Wound Bed 0 08/27/20 0949   Yellow%Wound Bed 0 08/27/20 0949   Black%Wound Bed 0 08/27/20 0949   Purple%Wound Bed 0 08/27/20 0949   Other%Wound Bed 100 08/27/20 0949   Number of days: 0       Percent of Wound(s) Debrided: approximately 100%    Total  Area  Debrided:  2.25 sq cm     Bleeding:  Minimal    Hemostasis Achieved:  by pressure    Procedural Pain:  0  / 10     Post Procedural Pain:  0 / 10     Response to treatment:  Well tolerated    Status of wound progress and description from last visit:  Leg ulcer is much better today. Plan:       Discharge Instructions              NOTE: [facility] will receive a patient experience survey.  We would be grateful if you would take the time to fill this survey out.     Wound care order history:                 ANA CRISTINA's   Right       Left               Date               Vascular studies:   ordered on 7/25/19 Date  To be done 8/2/19 imaging center               Imaging:  Date             Cultures: obtained from left medal leg  Date 6/27/19--positive kles.                              [id number] from left medial lower leg on 8/15/19                               Obtained from left medial lower leg on 9/26/19                               Obtained from right leg on 10/17/19                               Obtained from left leg on 3/12/2020                               Obtained from right leg on 3/19/2020                                                                                                                           Obtained from right leg on 4/30/2020--scanty growth                                  Biopsy done 7/18/19                 Labs/ HbA1c  Date               Grafts: Date               HBO:                Antibiotics: Doxycycline for 14 days BID; phoned to [facility] 6/28/19                                    Doxycycline for 10 days BID and CIPRO 500 mg BID for 10 days ordered on 8/22/19                                     Doxycycline for 7 days on 9/5/19                                   Doxycycline for 7 days on 3/12/2020 continued on 3/19/2020 for 7 days                                    Bactrim DS for  10 days on 3/26/40413                                   Bactrim DS for 10 days on 6/25/2020              Earlier Wound care treatments:                [id number]:                        Consults:   Date 7/18/19 to Dr Yvon Miller-- Becky Flank care physician:      Continuing wound care orders and information:              Residence: private               Continue home health care with:none at this time               Your wound-care supplies will be provided by:  Kristina Bueno provider:              NAYANA with              VHB loading: Date              DCYAQ Medications: RX SANTYL GIVEN 8/1/19              PPQPS cleansing:                           RR not scrub or use excessive force.                          Wash hands with soap and water before and after dressing changes.                         Prior to applying a clean dressing, cleanse wound with normal saline,                                wound cleanser, or mild soap and water.                                     Daily Wound management:                                                Avoid standing for long periods of time.                          Apply wraps/stockings in AM and remove at bedtime.                          If swelling is present, elevate legs to the level of the heart or above for 30 minutes 4-5 times a day and/or when sitting.                                             When taking antibiotics take entire prescription as ordered by physician do not stop taking until medicine is all gone.                                                    Orders for this week: 8/27/2020          Left medial ankle distal-- cleanse with  vashe in clinic today --for 5 minutes, pat dry. Lotion to leg. Lotrisone to fabricio wound(in clinic only). Apply endoform to wound bed, cover wound and periwound with  vacutex, coban 2 lite --leave in place x 1 week until patient returns to clinic next week      Right Leg -- Apply calcium alginate to open areas and cover with ABD. Wrap with coban 2 lite leave in place until next visit          Buttock-- Apply Mepilex border. Change Every 7 days and as needed.      Follow up Susannah Padilla Dr in 1 week on Thursday in the wound care center  Call [phone] for any questions or concerns.   Physician Signature              Treatment Note Wound 07/30/20 #7 (onset 1 week) Right Lower Leg Circumferential-Dressing/Treatment: (CA ALGINATE; ABD;COBAN 2 LITE)  Wound 06/27/19 #6 (onset 1 month) Left Medial Distal Ankle-Dressing/Treatment: (LOTRISONE;ENDOFORM;VACUTEX;COBAN 2 LITE;TAPE)  Wound 08/27/20 Pretibial Left;Distal #8-Dressing/Treatment: (LOTRISONE;ENDOFORM;VACUTEX;COBAN 2 LITE;TAPE)    Written Patient Dismissal Instructions Given Electronically signed by Jesus Alberto Packer MD on 8/27/2020 at 12:42 PM

## 2020-08-27 NOTE — PROGRESS NOTES
Multilayer Compression Wrap   (Not Unna) Below the Knee    NAME:  Kuldip Elam OF BIRTH:  1945  MEDICAL RECORD NUMBER:  [id number]  DATE:  8/27/2020    Multilayer compression wrap: Removed old Multilayer wrap if indicated and wash leg with mild soap/water. Applied moisturizing agent to dry skin as needed. Applied primary and secondary dressing as ordered. Applied multilayered dressing below the knee to right lower leg. Applied multilayered dressing below the knee to left lower leg. Instructed patient/caregiver not to remove dressing and to keep it clean and dry. Instructed patient/caregiver on complications to report to provider, such as pain, numbness in toes, heavy drainage, and slippage of dressing. Instructed patient on purpose of compression dressing and on activity and exercise recommendations.       Electronically signed by Burton Giordano RN on 8/27/2020 at 10:33 AM

## 2020-08-31 ENCOUNTER — TELEPHONE (OUTPATIENT)
Dept: PHARMACY | Age: 75
End: 2020-08-31

## 2020-08-31 RX ORDER — WARFARIN SODIUM 5 MG/1
5 TABLET ORAL DAILY
Qty: 98 TABLET | Refills: 1 | Status: SHIPPED | OUTPATIENT
Start: 2020-08-31 | End: 2021-01-07 | Stop reason: SDUPTHER

## 2020-08-31 NOTE — TELEPHONE ENCOUNTER
Refill requested for 90 days. Erx sent to [facility].      CLINICAL PHARMACY CONSULT: MED RECONCILIATION/REVIEW ADDENDUM    For Pharmacy Admin Tracking Only    PHSO: No  Total # of Interventions Recommended: 0  - Refills Provided #: 1  Total Interventions Accepted: 0  Time Spent (min): 5    Ivar Balloon, PharmD

## 2020-09-03 ENCOUNTER — HOSPITAL ENCOUNTER (OUTPATIENT)
Dept: WOUND CARE | Age: 75
Discharge: HOME | End: 2020-09-03
Payer: MEDICARE

## 2020-09-03 VITALS
HEART RATE: 69 BPM | RESPIRATION RATE: 16 BRPM | TEMPERATURE: 97.8 F | SYSTOLIC BLOOD PRESSURE: 107 MMHG | DIASTOLIC BLOOD PRESSURE: 69 MMHG

## 2020-09-03 PROCEDURE — 11042 DBRDMT SUBQ TIS 1ST 20SQCM/<: CPT

## 2020-09-03 NOTE — PROGRESS NOTES
Multilayer Compression Wrap   (Not Unna) Below the Knee    NAME:  Arleen Carcamo  YOB: 1945  MEDICAL RECORD NUMBER:  [id number]  DATE:  9/3/2020    Multilayer compression wrap: Removed old Multilayer wrap if indicated and wash leg with mild soap/water. Applied moisturizing agent to dry skin as needed. Applied primary and secondary dressing as ordered. Applied multilayered dressing below the knee to right lower leg. Applied multilayered dressing below the knee to left lower leg. Instructed patient/caregiver not to remove dressing and to keep it clean and dry. Instructed patient/caregiver on complications to report to provider, such as pain, numbness in toes, heavy drainage, and slippage of dressing. Instructed patient on purpose of compression dressing and on activity and exercise recommendations.       Electronically signed by Tammi Camarillo RN on 9/3/2020 at 10:58 AM

## 2020-09-03 NOTE — PROGRESS NOTES
Wound Care Center Progress Note With Procedure    Dejah Mantilla  AGE: 69 y.o. GENDER: male  : 1945  EPISODE DATE:  9/3/2020     Subjective:     Chief Complaint   Patient presents with    Wound Check     BLE         HISTORY of PRESENT ILLNESS      Dejah Mantilla is a 69 y.o. male who presents today for wound evaluation of Chronic venous ulcer(s) of the left leg. The ulcer is of moderate severity. The underlying cause of the wound is pressure. He is in for f/u- no new issues- but not much better this week.   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 Angela Soto Anemia 2011    Arm fracture Selene Carhorace Murray with also yearly DM exam    Cataract     worsening-Dr. Liv Mishra    Cellulitis of left lower leg     Chronic venous hypertension with ulcer ([address] 2011    CKD (chronic kidney disease) 2012    Renal u/S normal     Colon polyps     Dr. Angela Soto COPD (chronic obstructive pulmonary disease) ([address]     Diabetes mellitus ([address]     Diabetes mellitus ([address]     Diabetes mellitus with peripheral circulatory disorder ([address] 10/2/2015    Diabetes mellitus with skin ulcer ([address] 10/2/2015    Diabetic peripheral neuropathy ([address]     + EMG, NCS    Diabetic skin ulcer associated with type 2 diabetes mellitus ([address]     Diverticulosis 12    mild, left colon    Femoral DVT (deep venous thrombosis) ([address] 2011    PARTIAL,CHRONIC-SPFLD HEART SURGEONS    GERD (gastroesophageal reflux disease)     Glaucoma     open angle-Dr. Aba Juan H/O cardiac catheterization 6/3/14    EF55% normal study    H/O Doppler ultrasound 03/26/15 Carotid US- no significant stenosis noted bilaterally.  H/O echocardiogram 11/21/2019    EF50-55%, Mild AR. Moderately dilated right ventricle with negative Solis's sign.  H/O mumps orchitis     as a youth    Hemorrhoids 4/23/12    Dr. Linda Briceno; repeat colonoscopy 3 years    History of nuclear stress test 11/21/2019    EF 60%, Normal study.  HLD (hyperlipidemia)     Hx of Doppler ultrasound 1/06/15    Lymph node seen in left groin area. No bilateral stenosis.     Hyperlipemia     Hyperlipidemia     Hypertension 1992    Hypertension     Idiopathic chronic venous hypertension of left lower extremity with ulcer and inflammation (HCC) 10/2/2015    Leg ulcer ([address] 1978-present    following at wound center, Dr Garcia Keep No diabetic retinopathy OU 11/12    Dr. Ilda Bellamy chronic ulcer of left lower leg with fat layer exposed ([address] 10/2/2015    Pulmonary embolism ([address] 11/13    patient on coumadin    Sleep apnea     doesnt always use cpap dt it drys him out    SOB (shortness of breath) Oct 2011    Stress test normal.     Tendinitis 1973    plantar tendons    Type II or unspecified type diabetes mellitus with other specified manifestations, not stated as uncontrolled 2/8/2013    Unspecified venous (peripheral) insufficiency     Urticaria     WD-Cellulitis of right anterior lower leg 9/5/2019    WD-Cellulitis of right lower extremity 3/26/2020    WD-Chronic venous hypertension (idiopathic) with ulcer of left lower extremity (CODE) ([address] 6/27/2019    WD-Chronic venous hypertension with inflammation, right 9/19/2019    WD-Decubitus ulcer of left buttock, stage 3 ([address] 6/25/2020    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 to breakdown of skin ([address] 3/26/2020    WD-Open wound of hand without complication, left, initial encounter 12/12/2019    WD-Pressure injury of left buttock, stage 2 ([address] 1/2/2020    WD-Pressure injury of left buttock, stage 3 ([facility] 3/12/2020    WD-Pressure injury of right buttock, stage 3 ([facility] 3/12/2020    WD-Skin tear of right forearm without complication     WD-Ulcer of right pretibial region, with fat layer exposed ([facility] 10/17/2019       PAST SURGICAL HISTORY    Past Surgical History:   Procedure Laterality Date    BREAST SURGERY  1970s    benign tumors bilaterally    CARDIAC CATHETERIZATION  6/3/14    EF55% normal study    CARPAL TUNNEL RELEASE Left     CARPAL TUNNEL RELEASE      CATARACT REMOVAL Right 3/11/2013    Dr. Konrad Bradford Left 2013    Dr. Farhan Ramirez  2006    polyps    COLONOSCOPY  11    villous component--f/u colonoscopy will be needed in 6 months    COLONOSCOPY  12    mild diverticulosis, internal hemorrhoids; repeat in 3 years (Dr. Adriel Nelson)    COLONOSCOPY  2016    mild diverticulosis, three colon polyps    HERNIA REPAIR  1970s    HERNIA REPAIR      SKIN GRAFT  -present    skin grafts to left ankle ulcers    SPLENECTOMY      SPLENECTOMY      TONSILLECTOMY      VARICOSE VEIN SURGERY Left 2009       FAMILY HISTORY    Family History   Problem Relation Age of Onset    Heart Disease Father     Cancer Father         prostate    High Blood Pressure Father     High Cholesterol Father     Cancer Brother     Diabetes Brother     Thyroid Disease Brother        SOCIAL HISTORY    Social History     Tobacco Use    Smoking status: Former Smoker     Packs/day: 2.00     Years: 35.00     Pack years: 70.00     Types: Cigarettes     Last attempt to quit:      Years since quittin.6    Smokeless tobacco: Never Used    Tobacco comment: chew--30 years.   Reviewed 2015   Substance Use Topics    Alcohol use: Yes     Comment: soc    Drug use: Never       ALLERGIES    Allergies   Allergen Reactions    Cavilon Durable Barrier [Mineral Oil-Dimeth-Coconut Oil]     Parabens Hives    Parabens Hives    Prinivil [Lisinopril] Swelling  Cortisone Rash    Cortisone Rash    Dilaudid [Hydromorphone Hcl] Rash    Penicillins Rash    Penicillins Rash    Sulfamethoxazole-Trimethoprim Nausea Only    Tape [Adhesive Tape] Rash       MEDICATIONS    Current Outpatient Medications on File Prior to Encounter   Medication Sig Dispense Refill    warfarin (COUMADIN) 5 MG tablet Take 1 tablet by mouth daily Except take 1 and 1/2 tablets by mouth on Mondays or as directed by clinic (Patient taking differently: Take 5 mg by mouth nightly Except take 1 and 1/2 tablets by mouth on Mondays or as directed by clinic) 98 tablet 1    losartan (COZAAR) 50 MG tablet Take 1 tablet by mouth daily Patient tolerates diovan- HOLD if blood pressure less than 120/60 90 tablet 1    buPROPion (WELLBUTRIN XL) 300 MG extended release tablet Take 1 tablet by mouth every morning 90 tablet 0    escitalopram (LEXAPRO) 20 MG tablet Take 1 tablet by mouth daily 90 tablet 0    omeprazole (PRILOSEC) 20 MG delayed release capsule TAKE ONE (1) CAPSULE BY MOUTH ONCE DAILY 90 capsule 1    ARIPiprazole (ABILIFY) 15 MG tablet Take 1 tablet by mouth daily 90 tablet 0    atorvastatin (LIPITOR) 40 MG tablet Take 1 tablet by mouth nightly 90 tablet 0    furosemide (LASIX) 20 MG tablet Take 1 tablet by mouth 2 times daily 180 tablet 0    metFORMIN (GLUCOPHAGE) 500 MG tablet Take 1 tablet by mouth daily (with breakfast) 90 tablet 0    glycopyrrolate-formoterol (BEVESPI AEROSPHERE) 9-4.8 MCG/ACT AERO Inhale 2 puffs into the lungs 2 times daily 1 Inhaler 5    Spacer/Aero-Holding Chambers (AEROCHAMBER MV) MISC 1 actuation by Does not apply route 2 times daily 1 each 0    Zinc Sulfate (ZINC 15 PO) Take 50 mg by mouth every other day      potassium chloride (MICRO-K) 10 MEQ extended release capsule Take 10 mEq by mouth daily      metoprolol tartrate (LOPRESSOR) 25 MG tablet Take 0.5 tablets by mouth 2 times daily 90 tablet 3    Zinc Oxide (DESITIN CREAMY EX) Apply 13 % topically      aspirin 81 MG chewable tablet Take 1 tablet by mouth daily 30 tablet 3    blood glucose test strips (ALISHA CONTOUR TEST) strip USE AS DIRECTED TO TEST BLOOD SUGAR FOUR TIMES DAILY AS NEEDED 100 strip 3    ALISHA CONTOUR TEST strip USE AS DIRECTED TO TEST BLOOD SUGAR FOUR TIMES DAILY 100 strip 5    Glucose Blood (BLOOD GLUCOSE TEST STRIPS) STRP Please give contour testing strips to test blood sugar 4x daily 200 strip 3    phytonadione (VITAMIN K) 5 MG tablet Take 1 tablet by mouth once for 1 dose Today on 6/9/2020 (Today's INR was 8.5) 1 tablet 0     No current facility-administered medications on file prior to encounter. REVIEW OF SYSTEMS    Pertinent items are noted in HPI. Constitutional: Negative for systemic symptoms including fever, chills and malaise. Objective:      /69   Pulse 69   Temp 97.8 °F (36.6 °C) (Temporal)   Resp 16     PHYSICAL EXAM      General: The patient is in no acute distress. Mental status:  Patient is appropriate, is  oriented to place and plan of care. Dermatologic exam: Visual inspection of the periwound reveals the skin to be normal in turgor and texture  Wound exam: see wound description below in procedure note      Assessment:     Problem List Items Addressed This Visit     WD-Ulcer of shin, left, with fat layer exposed ([address]    WD-Chronic venous hypertension (idiopathic) with ulcer of left lower extremity (CODE) ([facility] - Primary        Procedure Note    Indications:  Based on my examination of this patient's wound(s) today, sharp excision into necrotic epidermis, dermis and subcutaneous tissue is required to promote healing and evaluate the extent of previous healing. Performed by: Kemal Fitch MD    Consent obtained: Yes    Time out taken:  Yes    Pain Control:       Debridement:Excisional Debridement    Using curette the wound(s) was/were sharply debrided down through and including the removal of epidermis, dermis and subcutaneous tissue. Devitalized Tissue Debrided:  fibrin, biofilm, slough and exudate    Pre Debridement Measurements:  Are located in the Wound Documentation Flow Sheet    All active wounds listed below with today's date are evaluated  Wound(s)    debrided this date include # : 6     Post  Debridement Measurements:  Wound 11/15/13 Other (Comment) Leg Inner erethema with a small puncture site (Active)   Number of days: 7185       Wound 06/27/19 #6 (onset 1 month) Left Medial Distal Ankle (Active)   Wound Image   09/03/20 1013   Wound Other 09/03/20 1013   Offloading for Diabetic Foot Ulcers No 08/20/20 1002   Dressing Status Clean;Dry; Intact 09/03/20 1056   Dressing Changed Changed/New 09/03/20 1056   Dressing/Treatment Moisturizing cream 08/06/20 1008   Wound Cleansed Soap and water;Rinsed/Irrigated with saline 09/03/20 1013   Wound Length (cm) 1.4 cm 09/03/20 1013   Wound Width (cm) 1.5 cm 09/03/20 1013   Wound Depth (cm) 0.1 cm 09/03/20 1013   Wound Surface Area (cm^2) 2.1 cm^2 09/03/20 1013   Change in Wound Size % (l*w) 70 09/03/20 1013   Wound Volume (cm^3) 0.21 cm^3 09/03/20 1013   Wound Healing % 90 09/03/20 1013   Post-Procedure Length (cm) 1.5 cm 08/27/20 0949   Post-Procedure Width (cm) 1.5 cm 08/27/20 0949   Post-Procedure Depth (cm) 0.1 cm 08/27/20 0949   Post-Procedure Surface Area (cm^2) 2.25 cm^2 08/27/20 0949   Post-Procedure Volume (cm^3) 0.22 cm^3 08/27/20 0949   Distance Tunneling (cm) 0 cm 09/03/20 1013   Tunneling Position ___ O'Clock 0 09/03/20 1013   Undermining Starts ___ O'Clock 0 09/03/20 1013   Undermining Ends___ O'Clock 0 09/03/20 1013   Undermining Maxium Distance (cm) 0 09/03/20 1013   Wound Assessment Red;Yellow 09/03/20 1013   Drainage Amount Moderate 09/03/20 1013   Drainage Description Serosanguinous 09/03/20 1013   Odor None 09/03/20 1013   Margins Defined edges; Unattached edges 09/03/20 1013   Ana-wound Assessment Pink 09/03/20 1013   Non-staged Wound Description Full thickness 09/03/20 1013 [place]%Wound Bed 0 09/03/20 1013   Red%Wound Bed 40 09/03/20 1013   Yellow%Wound Bed 60 09/03/20 1013   Black%Wound Bed 0 09/03/20 1013   Purple%Wound Bed 0 09/03/20 1013   Other%Wound Bed 0 09/03/20 1013   Number of days: 433       Wound 07/30/20 #7 (onset 1 week) Right Lower Leg Circumferential (Active)   Wound Image   09/03/20 1013   Wound Other 09/03/20 1013   Offloading for Diabetic Foot Ulcers No 08/20/20 1002   Dressing Status Clean;Dry; Intact 09/03/20 1056   Dressing Changed Changed/New 09/03/20 1056   Dressing/Treatment ABD; Alginate 08/06/20 1008   Wound Cleansed Soap and water;Rinsed/Irrigated with saline 09/03/20 1013   Wound Length (cm) 1.3 cm 09/03/20 1013   Wound Width (cm) 9 cm 09/03/20 1013   Wound Depth (cm) 0.1 cm 09/03/20 1013   Wound Surface Area (cm^2) 11.7 cm^2 09/03/20 1013   Change in Wound Size % (l*w) 91.9 09/03/20 1013   Wound Volume (cm^3) 1.17 cm^3 09/03/20 1013   Wound Healing % 92 09/03/20 1013   Post-Procedure Length (cm) 21.5 cm 08/13/20 1020   Post-Procedure Width (cm) 7.5 cm 08/13/20 1020   Post-Procedure Depth (cm) 0.1 cm 08/13/20 1020   Post-Procedure Surface Area (cm^2) 161.25 cm^2 08/13/20 1020   Post-Procedure Volume (cm^3) 16.12 cm^3 08/13/20 1020   Distance Tunneling (cm) 0 cm 09/03/20 1013   Tunneling Position ___ O'Clock 0 09/03/20 1013   Undermining Starts ___ O'Clock 0 09/03/20 1013   Undermining Ends___ O'Clock 0 09/03/20 1013   Undermining Maxium Distance (cm) 0 08/27/20 0930   Wound Assessment [place] 09/03/20 1013   Drainage Amount Moderate 09/03/20 1013   Drainage Description Yellow 09/03/20 1013   Odor None 09/03/20 1013   Margins Defined edges; Unattached edges 09/03/20 1013   Ana-wound Assessment Pink 09/03/20 1013   Non-staged Wound Description Full thickness 09/03/20 1013   [place]%Wound Bed 100 09/03/20 1013   Red%Wound Bed 0 09/03/20 1013   Yellow%Wound Bed 0 09/03/20 1013   Black%Wound Bed 0 09/03/20 1013   Purple%Wound Bed 0 09/03/20 1013   Other%Wound Bed 0 09/03/20 1013   Number of days: 35       Wound 08/27/20 Pretibial Left;Distal #8 Left Distal Lower Leg (Active)   Wound Image   09/03/20 1013   Wound Other 09/03/20 1013   Dressing Status Clean;Dry; Intact 09/03/20 1056   Dressing Changed Changed/New 09/03/20 1056   Wound Cleansed Soap and water;Rinsed/Irrigated with saline 09/03/20 1013   Wound Length (cm) 5 cm 09/03/20 1013   Wound Width (cm) 1 cm 09/03/20 1013   Wound Depth (cm) 0.1 cm 09/03/20 1013   Wound Surface Area (cm^2) 5 cm^2 09/03/20 1013   Change in Wound Size % (l*w) -150 09/03/20 1013   Wound Volume (cm^3) 0.5 cm^3 09/03/20 1013   Wound Healing % -150 09/03/20 1013   Distance Tunneling (cm) 0 cm 09/03/20 1013   Tunneling Position ___ O'Clock 0 09/03/20 1013   Undermining Starts ___ O'Clock 0 09/03/20 1013   Undermining Ends___ O'Clock 0 09/03/20 1013   Undermining Maxium Distance (cm) 0 09/03/20 1013   Wound Assessment [place] 09/03/20 1013   Drainage Amount Moderate 09/03/20 1013   Drainage Description Serosanguinous 09/03/20 1013   Odor None 09/03/20 1013   Margins Defined edges; Unattached edges 09/03/20 1013   Ana-wound Assessment Pink 09/03/20 1013   Non-staged Wound Description Full thickness 09/03/20 1013   [place]%Wound Bed 100 09/03/20 1013   Red%Wound Bed 0 09/03/20 1013   Yellow%Wound Bed 0 09/03/20 1013   Black%Wound Bed 0 09/03/20 1013   Purple%Wound Bed 0 09/03/20 1013   Other%Wound Bed 0 09/03/20 1013   Number of days: 7       Percent of Wound(s) Debrided: approximately 100%    Total  Area  Debrided:  2 sq cm     Bleeding:  minimal    Hemostasis Achieved:  by pressure    Procedural Pain:  0  / 10     Post Procedural Pain:  0 / 10     Response to treatment:  Well tolerated by patient. Status of wound progress and description from last visit:  Not significant change. Plan:       Discharge Instructions       NOTE: Upon discharge from the [address], you will receive a patient experience survey.  We would be grateful if you would take provider:              Compression with              AFP loading: Date              DAUNK Medications: RX SANTYL GIVEN 8/1/19              JKFGL cleansing:                           DC not scrub or use excessive force.                          Wash hands with soap and water before and after dressing changes.                         Prior to applying a clean dressing, cleanse wound with normal saline,                                wound cleanser, or mild soap and water.                                     Daily Wound management:                                                Avoid standing for long periods of time.                          Apply wraps/stockings in AM and remove at bedtime.                          If swelling is present, elevate legs to the level of the heart or above for 30 minutes 4-5 times a day and/or when sitting.                                             When taking antibiotics take entire prescription as ordered by physician do not stop taking until medicine is all gone.                                                    Orders for this week: 9/3/2020            Left medial ankle distal and proximal leg -- cleanse with  vashe in clinic today --for 5 minutes, pat dry. Lotion to leg. Lotrisone to fabricio wound(in clinic only). Apply Ansept to dampen hydrofera blue cut to size  cover wound and periwound with ABD  , coban 2 lite --leave in place x 1 week until patient returns to clinic next week      Right Leg -- Apply calcium alginate to open areas and cover with ABD. Wrap with coban 2 lite leave in place until next visit          Buttock-- Apply Mepilex border. Change Every 7 days and as needed.      Follow up Susannah Padilla Dr in 1 week on Thursday in the wound care center  Call [phone] for any questions or concerns.   Physician Signature         Treatment Note Wound 08/27/20 Pretibial Left;Distal #8 Left Distal Lower Leg-Dressing/Treatment: (lotion;anasept;hydrofera blue;abd;coban 2 lite; tape)  Wound 07/30/20 #7 (onset 1 week) Right Lower Leg Circumferential-Dressing/Treatment: (ca alginate;abd; coban 2 lite; tape)  Wound 06/27/19 #6 (onset 1 month) Left Medial Distal Ankle-Dressing/Treatment: (lotion;anasept;hydrofera blue;abd;coban 2 lite; tape)    Written Patient Dismissal Instructions Given            Electronically signed by Mikel Devries MD on 9/3/2020 at 12:07 PM

## 2020-09-04 ENCOUNTER — CARE COORDINATION (OUTPATIENT)
Dept: CARE COORDINATION | Age: 75
End: 2020-09-04

## 2020-09-04 NOTE — CARE COORDINATION
Outreach call made to pt to review [id number] [address] program. No answer and unable to leave a message d/t no voicemail.

## 2020-09-10 ENCOUNTER — ANTI-COAG VISIT (OUTPATIENT)
Dept: PHARMACY | Age: 75
End: 2020-09-10
Payer: MEDICARE

## 2020-09-10 ENCOUNTER — HOSPITAL ENCOUNTER (OUTPATIENT)
Dept: WOUND CARE | Age: 75
Discharge: HOME | End: 2020-09-10
Payer: MEDICARE

## 2020-09-10 VITALS
SYSTOLIC BLOOD PRESSURE: 124 MMHG | TEMPERATURE: 97.6 F | HEART RATE: 96 BPM | RESPIRATION RATE: 16 BRPM | DIASTOLIC BLOOD PRESSURE: 71 MMHG

## 2020-09-10 VITALS — TEMPERATURE: 97.8 F

## 2020-09-10 LAB
INR BLD: 2
POC INR: 2 INDEX
PROTHROMBIN TIME, POC: 24.3 SECONDS (ref 10–14.3)
PROTIME: 24.3 SECONDS

## 2020-09-10 PROCEDURE — 36416 COLLJ CAPILLARY BLOOD SPEC: CPT

## 2020-09-10 PROCEDURE — 85610 PROTHROMBIN TIME: CPT

## 2020-09-10 PROCEDURE — 11042 DBRDMT SUBQ TIS 1ST 20SQCM/<: CPT

## 2020-09-10 PROCEDURE — 99211 OFF/OP EST MAY X REQ PHY/QHP: CPT

## 2020-09-10 NOTE — PROGRESS NOTES
Multilayer Compression Wrap   (Not Unna) Below the Knee    NAME:  Bernard Strauss OF BIRTH:  1945  MEDICAL RECORD NUMBER:  [id number]  DATE:  9/10/2020    Multilayer compression wrap: Removed old Multilayer wrap if indicated and wash leg with mild soap/water. Applied moisturizing agent to dry skin as needed. Applied primary and secondary dressing as ordered. Applied multilayered dressing below the knee to right lower leg. Applied multilayered dressing below the knee to left lower leg. Instructed patient/caregiver not to remove dressing and to keep it clean and dry. Instructed patient/caregiver on complications to report to provider, such as pain, numbness in toes, heavy drainage, and slippage of dressing. Instructed patient on purpose of compression dressing and on activity and exercise recommendations. Patient tolerated treatment well.       Electronically signed by Diane Graff RN on 9/10/2020 at 9:41 AM

## 2020-09-10 NOTE — PROGRESS NOTES
Wound Care Center Progress Note With Procedure    Edyta Patino  AGE: 69 y.o. GENDER: male  : 1945  EPISODE DATE:  9/10/2020     Subjective:     Chief Complaint   Patient presents with    Wound Check     BLE         HISTORY of PRESENT ILLNESS      Edyta Patino is a 69 y.o. male who presents today for wound evaluation of Chronic venous ulcer(s) of the left leg. The ulcer is of moderate severity. The underlying cause of the wound is venous. This wound has failed to heal after many months. Initially it did get better, but it has since stalled. He is very appropriate for [employer].   Will work on approval.    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 Stefani Stapleton Anemia 2011    Arm fracture Page Mantel     Dr Ghazal Duran with also yearly DM exam    Cataract     worsening-Dr. Elen Coronado    Cellulitis of left lower leg     Chronic venous hypertension with ulcer ([address] 2011    CKD (chronic kidney disease) 2012    Renal u/S normal     Colon polyps     Dr. Stefani Stapleton COPD (chronic obstructive pulmonary disease) ([address]     Diabetes mellitus ([address]     Diabetes mellitus ([address]     Diabetes mellitus with peripheral circulatory disorder ([address] 10/2/2015    Diabetes mellitus with skin ulcer ([address] 10/2/2015    Diabetic peripheral neuropathy ([address]     + EMG, NCS    Diabetic skin ulcer associated with type 2 diabetes mellitus ([address]     Diverticulosis 12    mild, left colon    Femoral DVT (deep venous thrombosis) ([address] 2011    PARTIAL,CHRONIC-SPFLD HEART SURGEONS    GERD (gastroesophageal reflux disease)     Glaucoma     open angle-Dr. John Ott H/O cardiac catheterization 6/3/14    EF55% normal study    H/O Doppler ultrasound 03/26/15    Carotid US- no significant stenosis noted bilaterally.  H/O echocardiogram 11/21/2019    EF50-55%, Mild AR. Moderately dilated right ventricle with negative Solis's sign.  H/O mumps orchitis     as a youth    Hemorrhoids 4/23/12    Dr. Aidan Johnston; repeat colonoscopy 3 years    History of nuclear stress test 11/21/2019    EF 60%, Normal study.  HLD (hyperlipidemia)     Hx of Doppler ultrasound 1/06/15    Lymph node seen in left groin area. No bilateral stenosis.     Hyperlipemia     Hyperlipidemia     Hypertension 1992    Hypertension     Idiopathic chronic venous hypertension of left lower extremity with ulcer and inflammation (HCC) 10/2/2015    Leg ulcer ([address] 1978-present    following at wound center, Dr Castle Dec No diabetic retinopathy OU 11/12    Dr. Vj Gee chronic ulcer of left lower leg with fat layer exposed ([address] 10/2/2015    Pulmonary embolism ([address] 11/13    patient on coumadin    Sleep apnea     doesnt always use cpap dt it drys him out    SOB (shortness of breath) Oct 2011    Stress test normal.     Tendinitis 1973    plantar tendons    Type II or unspecified type diabetes mellitus with other specified manifestations, not stated as uncontrolled 2/8/2013    Unspecified venous (peripheral) insufficiency     Urticaria     WD-Cellulitis of right anterior lower leg 9/5/2019    WD-Cellulitis of right lower extremity 3/26/2020    WD-Chronic venous hypertension (idiopathic) with ulcer of left lower extremity (CODE) ([address] 6/27/2019    WD-Chronic venous hypertension with inflammation, right 9/19/2019    WD-Decubitus ulcer of left buttock, stage 3 ([address] 6/25/2020    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 to breakdown of skin ([address] 3/26/2020    WD-Open wound of hand without complication, left, initial encounter 2019    WD-Pressure injury of left buttock, stage 2 ([address] 2020    WD-Pressure injury of left buttock, stage 3 ([address] 3/12/2020    WD-Pressure injury of right buttock, stage 3 ([address] 3/12/2020    WD-Skin tear of right forearm without complication 8306    WD-Ulcer of right pretibial region, with fat layer exposed ([address] 10/17/2019       PAST SURGICAL HISTORY    Past Surgical History:   Procedure Laterality Date    BREAST SURGERY  1970s    benign tumors bilaterally    CARDIAC CATHETERIZATION  6/3/14    EF55% normal study    CARPAL TUNNEL RELEASE Left     CARPAL TUNNEL RELEASE      CATARACT REMOVAL Right 3/11/2013    Dr. Josue Rob Left 2013    Dr. Montelongo Kimberlee      polyps    COLONOSCOPY  11    villous component--f/u colonoscopy will be needed in 6 months    COLONOSCOPY  12    mild diverticulosis, internal hemorrhoids; repeat in 3 years (Dr. Eugene Alston)    COLONOSCOPY  2016    mild diverticulosis, three colon polyps    HERNIA REPAIR  1970s    HERNIA REPAIR      SKIN GRAFT  -present    skin grafts to left ankle ulcers    SPLENECTOMY      SPLENECTOMY      TONSILLECTOMY      VARICOSE VEIN SURGERY Left 2009       FAMILY HISTORY    Family History   Problem Relation Age of Onset    Heart Disease Father     Cancer Father         prostate    High Blood Pressure Father     High Cholesterol Father     Cancer Brother     Diabetes Brother     Thyroid Disease Brother        SOCIAL HISTORY    Social History     Tobacco Use    Smoking status: Former Smoker     Packs/day: 2.00     Years: 35.00     Pack years: 70.00     Types: Cigarettes     Last attempt to quit:      Years since quittin.7    Smokeless tobacco: Never Used    Tobacco comment: chew--30 years.   Reviewed 2015   Substance Use Topics    Alcohol use: Yes     Comment: soc    Drug use: Never       ALLERGIES    Allergies   Allergen Reactions    Cavilon Blood (BLOOD GLUCOSE TEST STRIPS) STRP Please give contour testing strips to test blood sugar 4x daily 200 strip 3    escitalopram (LEXAPRO) 20 MG tablet Take 1 tablet by mouth daily 90 tablet 0    ARIPiprazole (ABILIFY) 15 MG tablet Take 1 tablet by mouth daily 90 tablet 0    atorvastatin (LIPITOR) 40 MG tablet Take 1 tablet by mouth nightly 90 tablet 0    furosemide (LASIX) 20 MG tablet Take 1 tablet by mouth 2 times daily 180 tablet 0    metFORMIN (GLUCOPHAGE) 500 MG tablet Take 1 tablet by mouth daily (with breakfast) 90 tablet 0    phytonadione (VITAMIN K) 5 MG tablet Take 1 tablet by mouth once for 1 dose Today on 6/9/2020 (Today's INR was 8.5) 1 tablet 0     No current facility-administered medications on file prior to encounter. REVIEW OF SYSTEMS    Pertinent items are noted in HPI. Constitutional: Negative for systemic symptoms including fever, chills and malaise. Objective:      /71   Pulse 96   Temp 97.6 °F (36.4 °C) (Temporal)   Resp 16     PHYSICAL EXAM      General: The patient is in no acute distress. Mental status:  Patient is appropriate, is  oriented to place and plan of care. Dermatologic exam: Visual inspection of the periwound reveals the skin to be normal in turgor and texture  Wound exam: see wound description below in procedure note      Assessment:     Problem List Items Addressed This Visit     WD-Ulcer of shin, left, with fat layer exposed ([address]    WD-Chronic venous hypertension (idiopathic) with ulcer of left lower extremity (CODE) ([address] - Primary    WD-Chronic venous hypertension with inflammation, right        Procedure Note    Indications:  Based on my examination of this patient's wound(s) today, sharp excision into necrotic subcutaneous tissue is required to promote healing and evaluate the extent of previous healing.     Performed by: Tuan rCystal MD    Consent obtained: Yes    Time out taken:  Yes    Pain Control: none Debridement:Excisional Debridement    Using curette the wound(s) was/were sharply debrided down through and including the removal of epidermis, dermis and subcutaneous tissue. Devitalized Tissue Debrided:  fibrin, biofilm, slough and exudate    Pre Debridement Measurements:  Are located in the Wound Documentation Flow Sheet    All active wounds listed below with today's date are evaluated  Wound(s)    debrided this date include # : 6     Post  Debridement Measurements:  Wound 11/15/13 Other (Comment) Leg Inner erethema with a small puncture site (Active)   Number of days: 2490       Wound 06/27/19 #6 (onset 1 month) Left Medial Distal Ankle (Active)   Wound Image   09/03/20 1013   Wound Other 09/10/20 0913   Offloading for Diabetic Foot Ulcers No 08/20/20 1002   Dressing Status Clean;Dry; Intact 09/03/20 1056   Dressing Changed Changed/New 09/03/20 1056   Dressing/Treatment Moisturizing cream 08/06/20 1008   Wound Cleansed Soap and water 09/10/20 0913   Wound Length (cm) 2 cm 09/10/20 0913   Wound Width (cm) 1.9 cm 09/10/20 0913   Wound Depth (cm) 0.1 cm 09/10/20 0913   Wound Surface Area (cm^2) 3.8 cm^2 09/10/20 0913   Change in Wound Size % (l*w) 45.71 09/10/20 0913   Wound Volume (cm^3) 0.38 cm^3 09/10/20 0913   Wound Healing % 82 09/10/20 0913   Post-Procedure Length (cm) 2 cm 09/10/20 0929   Post-Procedure Width (cm) 1.9 cm 09/10/20 0929   Post-Procedure Depth (cm) 0.1 cm 09/10/20 0929   Post-Procedure Surface Area (cm^2) 3.8 cm^2 09/10/20 0929   Post-Procedure Volume (cm^3) 0.38 cm^3 09/10/20 0929   Distance Tunneling (cm) 0 cm 09/10/20 0913   Tunneling Position ___ O'Clock 0 09/10/20 0913   Undermining Starts ___ O'Clock 0 09/10/20 0913   Undermining Ends___ O'Clock 0 09/10/20 0913   Undermining Maxium Distance (cm) 0 09/10/20 0913   Wound Assessment Red;Yellow 09/10/20 0913   Drainage Amount Large 09/10/20 0913   Drainage Description Serosanguinous 09/10/20 0913   Odor None 09/10/20 0913   Margins Achieved:  by pressure    Procedural Pain:  0  / 10     Post Procedural Pain:  0 / 10     Response to treatment:  Well tolerated by patient. Status of wound progress and description from last visit:   Slightly better. Plan:       Discharge Instructions       NOTE: Upon discharge from the [address], you will receive a patient experience survey. We would be grateful if you would take the time to fill this survey out.     Wound care order history:                 ANA CRISTINA's   Right       Left               Date               Vascular studies:   ordered on 7/25/19 Date  To be done 8/2/19 imaging center               Imaging:  Date               Cultures: obtained from left medal leg  Date 6/27/19--positive kles.                                RIFZAUNG from left medial lower leg on 8/15/19                               Obtained from left medial lower leg on 9/26/19                               Obtained from right leg on 10/17/19                               Obtained from left leg on 3/12/2020                               Obtained from right leg on 3/19/2020                                                                                                                           Obtained from right leg on 4/30/2020--scanty growth                                  Biopsy done 7/18/19                 Labs/ HbA1c  Date               Grafts: Date               HBO:                Antibiotics: Doxycycline for 14 days BID; phoned to [facility] [address] 6/28/19                                    Doxycycline for 10 days BID and CIPRO 500 mg BID for 10 days ordered on 8/22/19                                     Doxycycline for 7 days on 9/5/19                                   Doxycycline for 7 days on 3/12/2020 continued on 3/19/2020 for 7 days                                    Bactrim DS for  10 days on 3/26/51338                                   Bactrim DS for 10 days on 6/25/2020              Earlier Wound care treatments:                [id number]:                        Consults:   Date 7/18/19 to Dr Brinda Best-- Stephania Salazar care physician:      Continuing wound care orders and information:              Residence: private               Continue home health care with:none at this time               Your wound-care supplies will be provided by: Tootie Galvan provider:              [id number] with              SQU loading: Date              TUNMV Medications: RX SANTYL GIVEN 8/1/19              WJBVM cleansing:                           KU not scrub or use excessive force.                          Wash hands with soap and water before and after dressing changes.                         Prior to applying a clean dressing, cleanse wound with normal saline,                                wound cleanser, or mild soap and water.                                     Daily Wound management:                                                Avoid standing for long periods of time.                          Apply wraps/stockings in AM and remove at bedtime.                          If swelling is present, elevate legs to the level of the heart or above for 30 minutes 4-5 times a day and/or when sitting.                                             When taking antibiotics take entire prescription as ordered by physician do not stop taking until medicine is all gone.                                                    Orders for this week: 9/10/2020    Auth for graft on 9/10/2020          Left medial ankle distal and proximal leg -- cleanse with  vashe in clinic today --for 5 minutes, pat dry. Lotion to leg. Lotrisone to fabricio wound(in clinic only). Apply Ansept to dampen hydrofera blue cut to size  cover wound and periwound with ABD  , coban 2 lite --leave in place x 1 week until patient returns to clinic next week      Right Leg -- Apply calcium alginate to open areas and cover with ABD.  Wrap with coban 2

## 2020-09-17 ENCOUNTER — CARE COORDINATION (OUTPATIENT)
Dept: CARE COORDINATION | Age: 75
End: 2020-09-17

## 2020-09-17 ENCOUNTER — HOSPITAL ENCOUNTER (OUTPATIENT)
Dept: WOUND CARE | Age: 75
Discharge: HOME | End: 2020-09-17
Payer: MEDICARE

## 2020-09-17 VITALS
TEMPERATURE: 97.4 F | DIASTOLIC BLOOD PRESSURE: 71 MMHG | SYSTOLIC BLOOD PRESSURE: 157 MMHG | RESPIRATION RATE: 16 BRPM | HEART RATE: 79 BPM

## 2020-09-17 PROCEDURE — 15271 SKIN SUB GRAFT TRNK/ARM/LEG: CPT

## 2020-09-17 NOTE — LETTER
9/17/2020    [address] [place] [address] [address] [address]      Dear Vivienne Bolton,    My name is Tylor Louie and I am a registered nurse who partners with Erik Brito MD to improve patients' health. Erik Brito MD believes you would benefit from working with me. As a member of your health care team, I would work with other providers involved in your care, offer education for your specific health conditions, and connect you with additional resources as needed. I will collaborate with Erik Brito MD to support you in following your treatment plan. The additional support I provide is no additional cost to you. My primary focus is to help you achieve specific goals and improve your health. We are committed to walk with you on this journey and look forward to working with you. Please call me to further discuss your healthcare needs. You can reach me at [phone]. In good health,     KELLY Rosales RN  [address] [facility]  [phone] office/cell  [phone] fax  [email or web address]

## 2020-09-17 NOTE — PROGRESS NOTES
Wound Care Center Progress Note and Bioengineered Skin Application      Vivienne Bolton  AGE: 69 y.o. GENDER: male  : 1945  EPISODE DATE:  2020     Subjective:     Chief Complaint   Patient presents with    Wound Check     bilateral lower legs         HISTORY of PRESENT ILLNESS      Vivienne Bolton is a 69 y.o. male who presents today for wound evaluation of Chronic venous ulcer(s) of L lower leg anterior. The ulcer is of mild severity. The underlying cause of the wound is venous. He is in for f/u- minimal change this week in size, but the wound bed does appear better. He is appropriate for Nu-Shield application today as his wound will never heal unless he gets a skin substitute.     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 Gianna Herrera Anemia 2011    Arm fracture [facility]     Dr Lisa Castellon with also yearly DM exam    Cataract     worsening-Dr. Pipo Valle    Cellulitis of left lower leg     Chronic venous hypertension with ulcer ([address] 2011    CKD (chronic kidney disease) 2012    Renal u/S normal     Colon polyps     Dr. Gianna Herrera COPD (chronic obstructive pulmonary disease) ([address]     Diabetes mellitus ([address]     Diabetes mellitus ([address]     Diabetes mellitus with peripheral circulatory disorder ([address] 10/2/2015    Diabetes mellitus with skin ulcer ([address] 10/2/2015    Diabetic peripheral neuropathy ([address]     + EMG, NCS    Diabetic skin ulcer associated with type 2 diabetes mellitus ([address]     Diverticulosis 12    mild, left colon    Femoral DVT (deep venous thrombosis) ([address] 2011    PARTIAL,CHRONIC-SPFLD HEART SURGEONS    GERD (gastroesophageal reflux disease)     Glaucoma 11/12    open angle-Dr. Sarthak Donaldson H/O cardiac catheterization 6/3/14    EF55% normal study    H/O Doppler ultrasound 03/26/15    Carotid US- no significant stenosis noted bilaterally.  H/O echocardiogram 11/21/2019    EF50-55%, Mild AR. Moderately dilated right ventricle with negative Solis's sign.  H/O mumps orchitis     as a youth    Hemorrhoids 4/23/12    Dr. Prince Perez; repeat colonoscopy 3 years    History of nuclear stress test 11/21/2019    EF 60%, Normal study.  HLD (hyperlipidemia)     Hx of Doppler ultrasound 1/06/15    Lymph node seen in left groin area. No bilateral stenosis.     Hyperlipemia     Hyperlipidemia     Hypertension 1992    Hypertension     Idiopathic chronic venous hypertension of left lower extremity with ulcer and inflammation (HCC) 10/2/2015    Leg ulcer ([address] 1978-present    following at wound center, Dr Elida Cortes No diabetic retinopathy OU 11/12    Dr. Genet Sultana chronic ulcer of left lower leg with fat layer exposed ([address] 10/2/2015    Pulmonary embolism ([address] 11/13    patient on coumadin    Sleep apnea     doesnt always use cpap dt it drys him out    SOB (shortness of breath) Oct 2011    Stress test normal.     Tendinitis 1973    plantar tendons    Type II or unspecified type diabetes mellitus with other specified manifestations, not stated as uncontrolled 2/8/2013    Unspecified venous (peripheral) insufficiency     Urticaria     WD-Cellulitis of right anterior lower leg 9/5/2019    WD-Cellulitis of right lower extremity 3/26/2020    WD-Chronic venous hypertension (idiopathic) with ulcer of left lower extremity (CODE) ([address] 6/27/2019    WD-Chronic venous hypertension with inflammation, right 9/19/2019    WD-Decubitus ulcer of left buttock, stage 3 ([address] 6/25/2020    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 to breakdown of skin ([address] 3/26/2020    WD-Open wound of hand without complication, left, initial encounter 2019    WD-Pressure injury of left buttock, stage 2 ([address] 2020    WD-Pressure injury of left buttock, stage 3 ([address] 3/12/2020    WD-Pressure injury of right buttock, stage 3 ([address] 3/12/2020    WD-Skin tear of right forearm without complication     WD-Ulcer of right pretibial region, with fat layer exposed ([address] 10/17/2019       PAST SURGICAL HISTORY    Past Surgical History:   Procedure Laterality Date    BREAST SURGERY  1970s    benign tumors bilaterally    CARDIAC CATHETERIZATION  6/3/14    EF55% normal study    CARPAL TUNNEL RELEASE Left     CARPAL TUNNEL RELEASE      CATARACT REMOVAL Right 3/11/2013    Dr. Carlton Saravia Left 2013    Dr. Isidro Najera      polyps    COLONOSCOPY  11    villous component--f/u colonoscopy will be needed in 6 months    COLONOSCOPY  12    mild diverticulosis, internal hemorrhoids; repeat in 3 years (Dr. Renee Peralta)    COLONOSCOPY  2016    mild diverticulosis, three colon polyps    HERNIA REPAIR  1970s    HERNIA REPAIR      SKIN GRAFT  -present    skin grafts to left ankle ulcers    SPLENECTOMY      SPLENECTOMY      TONSILLECTOMY      VARICOSE VEIN SURGERY Left        FAMILY HISTORY    Family History   Problem Relation Age of Onset    Heart Disease Father     Cancer Father         prostate    High Blood Pressure Father     High Cholesterol Father     Cancer Brother     Diabetes Brother     Thyroid Disease Brother        SOCIAL HISTORY    Social History     Tobacco Use    Smoking status: Former Smoker     Packs/day: 2.00     Years: 35.00     Pack years: 70.00     Types: Cigarettes     Last attempt to quit:      Years since quittin.7    Smokeless tobacco: Never Used    Tobacco comment: chew--30 years.   Reviewed 2015   Substance Use Topics    Alcohol use: Yes     Comment: soc    Drug use: Never ALLERGIES    Allergies   Allergen Reactions    Cavilon Durable Barrier [Mineral Oil-Dimeth-Coconut Oil]     Parabens Hives    Parabens Hives    Prinivil [Lisinopril] Swelling    Cortisone Rash    Cortisone Rash    Dilaudid [Hydromorphone Hcl] Rash    Penicillins Rash    Penicillins Rash    Sulfamethoxazole-Trimethoprim Nausea Only    Tape [Adhesive Tape] Rash       MEDICATIONS    Current Outpatient Medications on File Prior to Encounter   Medication Sig Dispense Refill    warfarin (COUMADIN) 5 MG tablet Take 1 tablet by mouth daily Except take 1 and 1/2 tablets by mouth on Mondays or as directed by clinic (Patient taking differently: Take 5 mg by mouth nightly Except take 1 and 1/2 tablets by mouth on Mondays or as directed by clinic) 98 tablet 1    losartan (COZAAR) 50 MG tablet Take 1 tablet by mouth daily Patient tolerates diovan- HOLD if blood pressure less than 120/60 90 tablet 1    buPROPion (WELLBUTRIN XL) 300 MG extended release tablet Take 1 tablet by mouth every morning 90 tablet 0    omeprazole (PRILOSEC) 20 MG delayed release capsule TAKE ONE (1) CAPSULE BY MOUTH ONCE DAILY 90 capsule 1    glycopyrrolate-formoterol (BEVESPI AEROSPHERE) 9-4.8 MCG/ACT AERO Inhale 2 puffs into the lungs 2 times daily 1 Inhaler 5    Spacer/Aero-Holding Chambers (AEROCHAMBER MV) MISC 1 actuation by Does not apply route 2 times daily 1 each 0    Zinc Sulfate (ZINC 15 PO) Take 50 mg by mouth every other day      potassium chloride (MICRO-K) 10 MEQ extended release capsule Take 10 mEq by mouth daily      metoprolol tartrate (LOPRESSOR) 25 MG tablet Take 0.5 tablets by mouth 2 times daily 90 tablet 3    Zinc Oxide (DESITIN CREAMY EX) Apply 13 % topically      aspirin 81 MG chewable tablet Take 1 tablet by mouth daily 30 tablet 3    blood glucose test strips (ALISHA CONTOUR TEST) strip USE AS DIRECTED TO TEST BLOOD SUGAR FOUR TIMES DAILY AS NEEDED 100 strip 3    ALISHA CONTOUR TEST strip USE AS DIRECTED TO TEST BLOOD SUGAR FOUR TIMES DAILY 100 strip 5    Glucose Blood (BLOOD GLUCOSE TEST STRIPS) STRP Please give contour testing strips to test blood sugar 4x daily 200 strip 3    escitalopram (LEXAPRO) 20 MG tablet Take 1 tablet by mouth daily 90 tablet 0    ARIPiprazole (ABILIFY) 15 MG tablet Take 1 tablet by mouth daily 90 tablet 0    atorvastatin (LIPITOR) 40 MG tablet Take 1 tablet by mouth nightly 90 tablet 0    furosemide (LASIX) 20 MG tablet Take 1 tablet by mouth 2 times daily 180 tablet 0    metFORMIN (GLUCOPHAGE) 500 MG tablet Take 1 tablet by mouth daily (with breakfast) 90 tablet 0    phytonadione (VITAMIN K) 5 MG tablet Take 1 tablet by mouth once for 1 dose Today on 6/9/2020 (Today's INR was 8.5) 1 tablet 0     No current facility-administered medications on file prior to encounter. REVIEW OF SYSTEMS    Pertinent items are noted in HPI. Constitutional: Negative for systemic symptoms including fever, chills and malaise. Objective:      BP (!) 157/71   Pulse 79   Temp 97.4 °F (36.3 °C) (Temporal)   Resp 16     PHYSICAL EXAM      General: The patient is in no acute distress. Mental status:  Patient is appropriate, is  oriented to place and plan of care. Dermatologic exam: Visual inspection of the periwound reveals the skin to be normal in turgor and texture  Wound exam: see wound description below in procedure note      Assessment:     Problem List Items Addressed This Visit     WD-Ulcer of shin, left, with fat layer exposed ([address]    WD-Chronic venous hypertension (idiopathic) with ulcer of left lower extremity (CODE) ([facility] - Primary    WD-Chronic venous hypertension with inflammation, right          Conditions above and those listed in the past history are being treated appropriately and are considered under adequate control so as not to preclude the use of a graft.     SKIN SUBSTITUTES/GRAFT APPLICATIONS PROCEDURE NOTE    Indicaton:     Chronic ulcer(s) of the left leg  that has failed to heal with the usual wound protocol used for greater than 30 days. See Epic chart for previous modalities and details of previous treatment. The patient has no contraindications or evidence of infection. The patient's competency and support system is appropriate for proper care and follow up for the use of this graft, therefore a graft was placed as below. Procedure: The wound bed was cleansed and prepared as necessary to accept the graft. Debridement was not required.     Product Utilized:          [] APLIGRAF   []44 sq cm   []88 sq cm    []132 sq cm  []176 sq cm           [] DERMAGRAFT  [] 38 sq cm   []76 sq cm    []114 sq cm  []152 sq cm      [x] NUSHIELD  [] 1.6 sq/cm disc   [x] (2X3) 6 sq/cm    [] (2X4) 8 sq/cm         [] (3X4) 12 sq/cm  [] (4x4) 16 sq/cm   [] (4X6) 24 sq/cm         [] (6X6) 36 sq/cm        [] AFFINITY    [] (1.5 cm x 1.5cm) 2.25 cm   [] (2.5 cm x 2.5 cm) 6.25 cm         [] THERASKIN  [] 13 sq cm   []37.34 sq cm             [] EpiFix   [] 1.54 sq cm disc    [] 2 pieces (3.08 sq cm)    [] 3  pieces (4.62 sq  cm)   [] 6 sq/cm    [] 16 sq cm     [] 18 sq cm   Fenestrated        [] OASIS   [] 10.5 sq cm   []21 sq cm    []35 sq cm Tri-Matrix  []70 sq cm          Tri-Matrix                    [] PURAPLY   [] 1.6 sq cm disc     [] 4 sq cm   [] 8 sq cm   [] 25sq cm  [] 54 sq cm                  [] Grafix      [] 1.54 sq cm disc    [] 3 sq cm    [] 6 sq cm   [] 12 sq cm   [] 25 sq cm        Skin Substitute Applied:    Performed by: Talia Wadsworth MD    Consent obtained: Yes    Time out taken: Yes     Fenestrated: No    Skin Substitute was Applied to Wound Number(s):     6      Wound 11/15/13 Other (Comment) Leg Inner erethema with a small puncture site (Active)   Number of days: 2497       Wound 06/27/19 #6 (onset 1 month) Left Medial Distal Ankle (Active)   Wound Image   09/17/20 0910   Wound Other 09/17/20 0910   Offloading for Diabetic Foot Ulcers No 08/20/20 1002   Dressing (cm^3) 0.02 cm^3 09/17/20 0910   Wound Healing % 90 09/17/20 0910   Post-Procedure Length (cm) 3.9 cm 09/10/20 0929   Post-Procedure Width (c2m) 0.5 cm 09/10/20 0929   Post-Procedure Depth (cm) 0.1 cm 09/10/20 0929   Post-Procedure Surface Area (cm^2) 1.95 cm^2 09/10/20 0929   Post-Procedure Volume (cm^3) 0.2 cm^3 09/10/20 0929   Distance Tunneling (cm) 0 cm 09/17/20 0910   Tunneling Position ___ O'Clock 0 09/17/20 0910   Undermining Starts ___ O'Clock 0 09/17/20 0910   Undermining Ends___ O'Clock 0 09/17/20 0910   Undermining Maxium Distance (cm) 0 09/17/20 0910   Wound Assessment Yellow 09/17/20 0910   Drainage Amount Moderate 09/17/20 0910   Drainage Description Serosanguinous 09/17/20 0910   Odor None 09/17/20 0910   Margins Defined edges; Unattached edges 09/17/20 0910   Ana-wound Assessment Pink 09/17/20 0910   Non-staged Wound Description Full thickness 09/17/20 0910   Pink%Wound Bed 0 09/17/20 0910   Red%Wound Bed 0 09/17/20 0910   Yellow%Wound Bed 100 09/17/20 0910   Black%Wound Bed 0 09/17/20 0910   Purple%Wound Bed 0 09/17/20 0910   Other%Wound Bed 0 09/17/20 0910   Number of days: 21         Total Surface Area Covered 2 sq/cm    Was the Product Layered  Yes      Amount Wasted 0 sq/cm    Reason for Waste: material provided was greater than wound size      Secured: Yes    Instruments used to complete placement of graft::scissors and forceps    Secured With:   [] N/A  [x]Steri Strips    []Sutures     []Staples []Other    Procedural Pain: 0/10     Post Procedural Pain: 0 / 10    Response to Treatment:  Well tolerated by patient. Status of wound progress and description from last visit:   Marginally better. Plan:     Discharge instructions:  Discharge Instructions         NOTE: [facility] you will receive a patient experience survey.  We would be grateful if you would take the time to fill this survey out.     Wound care order history:                 ANA CRISTINA's   Right       Left               Date               Vascular studies:   ordered on 7/25/19 Date  To be done 8/2/19 imaging center               Imaging:  Date               Cultures: obtained from left medal leg  Date 6/27/19--positive kles.                              DXDCZAFS from left medial lower leg on 8/15/19                               Obtained from left medial lower leg on 9/26/19                               Obtained from right leg on 10/17/19                               Obtained from left leg on 3/12/2020                               Obtained from right leg on 3/19/2020                                                                                                                           Obtained from right leg on 4/30/2020--scanty growth                                  Biopsy done 7/18/19                 Labs/ HbA1c  Date               Grafts:   #1 Nushield applied on 9/17/2020                HBO:                Antibiotics: Doxycycline for 14 days BID; phoned to [facility] [address] 6/28/19                                    Doxycycline for 10 days BID and CIPRO 500 mg BID for 10 days ordered on 8/22/19                                     Doxycycline for 7 days on 9/5/19                                   Doxycycline for 7 days on 3/12/2020 continued on 3/19/2020 for 7 days                                    Bactrim DS for  10 days on 3/26/04371                                   Bactrim DS for 10 days on 6/25/2020                                                 Earlier Wound care treatments:                [id number]:                        Consults:   Date 7/18/19 to Dr Alonzo Stringer[facility]      Continuing wound care orders and information:              Residence: private               Continue home health care with:none at this time               Your wound-care supplies will be provided by:  Barbara Herman provider:  Delroy Hicks Circumferential-Dressing/Treatment: Moisturizing cream(lotion, coban 2 lite,)  Wound 08/27/20 Pretibial Left;Proximal #8 Left Proximal Lower Leg-Dressing/Treatment: Alginate(graft in place, huber alg, sofsorb, coban 2 lite,)    Written Patient Dismissal Instructions Given            Electronically signed by Yudelka Carrero MD on 9/17/2020 at 11:20 AM

## 2020-09-23 ENCOUNTER — CARE COORDINATION (OUTPATIENT)
Dept: CARE COORDINATION | Age: 75
End: 2020-09-23

## 2020-09-23 NOTE — CARE COORDINATION
Outreach to engage pt with Care Coordination. Pt has VM that is not set up, unable to LM. Will attempt outreach at another time. JAZMYN JamesN RN  Ambulatory Care Manager  [phone] office/cell  [phone] fax  [email or web address]

## 2020-09-24 ENCOUNTER — HOSPITAL ENCOUNTER (OUTPATIENT)
Dept: WOUND CARE | Age: 75
Discharge: HOME | End: 2020-09-24
Payer: MEDICARE

## 2020-09-24 VITALS
SYSTOLIC BLOOD PRESSURE: 117 MMHG | HEART RATE: 101 BPM | RESPIRATION RATE: 24 BRPM | TEMPERATURE: 97.8 F | DIASTOLIC BLOOD PRESSURE: 71 MMHG

## 2020-09-24 PROCEDURE — 15271 SKIN SUB GRAFT TRNK/ARM/LEG: CPT

## 2020-09-24 NOTE — PROGRESS NOTES
Multilayer Compression Wrap   (Not Unna) Below the Knee    NAME:  Sherri Estrella OF BIRTH:  1945  MEDICAL RECORD NUMBER:  [id number]  DATE:  9/24/2020    Multilayer compression wrap: Removed old Multilayer wrap if indicated and wash leg with mild soap/water. Applied moisturizing agent to dry skin as needed. Applied primary and secondary dressing as ordered. Applied multilayered dressing below the knee to right lower leg. Applied multilayered dressing below the knee to left lower leg. Instructed patient/caregiver not to remove dressing and to keep it clean and dry. Instructed patient/caregiver on complications to report to provider, such as pain, numbness in toes, heavy drainage, and slippage of dressing. Instructed patient on purpose of compression dressing and on activity and exercise recommendations.       Electronically signed by Ming Hickman RN on 9/24/2020 at 9:45 AM

## 2020-09-25 NOTE — PROGRESS NOTES
[facility] Progress Note and Bioengineered Skin Application      Grisel Alex  AGE: 69 y.o. GENDER: male  : 1945  EPISODE DATE:  2020     Subjective:     Chief Complaint   Patient presents with    Wound Check     BLE         HISTORY of PRESENT ILLNESS      Grisel Alex is a 69 y.o. male who presents today for wound evaluation of Chronic venous ulcer(s) of L lower leg anterior. The ulcer is of moderate severity. The underlying cause of the wound is venous. He is in for f/u- did well with Nushield last week, will re-apply today.     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 Laila Thompson Anemia 2011    Arm fracture Rosellen Natalia     Dr Jose Cavanaugh with also yearly DM exam    Cataract     worsening-Dr. Destiny Hernandez    Cellulitis of left lower leg     Chronic venous hypertension with ulcer ([address] 2011    CKD (chronic kidney disease) 2012    Renal u/S normal     Colon polyps     Dr. Laila Thompson COPD (chronic obstructive pulmonary disease) ([address]     Diabetes mellitus ([address]     Diabetes mellitus ([address]     Diabetes mellitus with peripheral circulatory disorder ([address] 10/2/2015    Diabetes mellitus with skin ulcer ([address] 10/2/2015    Diabetic peripheral neuropathy ([address]     + EMG, NCS    Diabetic skin ulcer associated with type 2 diabetes mellitus ([address]     Diverticulosis 12    mild, left colon    Femoral DVT (deep venous thrombosis) ([address] 2011    PARTIAL,CHRONIC-SPFLD HEART SURGEONS    GERD (gastroesophageal reflux disease)     Glaucoma     open angle-Dr. Cedrick Silver H/O cardiac catheterization 6/3/14 2020    WD-Pressure injury of left buttock, stage 3 ([facility] 3/12/2020    WD-Pressure injury of right buttock, stage 3 ([address] 3/12/2020    WD-Skin tear of right forearm without complication 924    WD-Ulcer of right pretibial region, with fat layer exposed ([address] 10/17/2019       PAST SURGICAL HISTORY    Past Surgical History:   Procedure Laterality Date    BREAST SURGERY  1970s    benign tumors bilaterally    CARDIAC CATHETERIZATION  6/3/14    EF55% normal study    CARPAL TUNNEL RELEASE Left     CARPAL TUNNEL RELEASE      CATARACT REMOVAL Right 3/11/2013    Dr. Shannon Schaefer Left 2013    Dr. Leopold Martinez      polyps    COLONOSCOPY  11    villous component--f/u colonoscopy will be needed in 6 months    COLONOSCOPY  12    mild diverticulosis, internal hemorrhoids; repeat in 3 years (Dr. Aidan Johnston)    COLONOSCOPY  2016    mild diverticulosis, three colon polyps    HERNIA REPAIR  1970s    HERNIA REPAIR      SKIN GRAFT  -present    skin grafts to left ankle ulcers    SPLENECTOMY      SPLENECTOMY      TONSILLECTOMY      VARICOSE VEIN SURGERY Left        FAMILY HISTORY    Family History   Problem Relation Age of Onset    Heart Disease Father     Cancer Father         prostate    High Blood Pressure Father     High Cholesterol Father     Cancer Brother     Diabetes Brother     Thyroid Disease Brother        SOCIAL HISTORY    Social History     Tobacco Use    Smoking status: Former Smoker     Packs/day: 2.00     Years: 35.00     Pack years: 70.00     Types: Cigarettes     Last attempt to quit:      Years since quittin.7    Smokeless tobacco: Never Used    Tobacco comment: chew--30 years.   Reviewed 2015   Substance Use Topics    Alcohol use: Yes     Comment: soc    Drug use: Never       ALLERGIES    Allergies   Allergen Reactions    Cavilon Durable Barrier [Mineral Oil-Dimeth-Coconut Oil]     Parabens contraindications or evidence of infection. The patient's competency and support system is appropriate for proper care and follow up for the use of this graft, therefore a graft was placed as below. Procedure: The wound bed was cleansed and prepared as necessary to accept the graft. Debridement was required. Consent obtained: Yes    Time out taken: Yes    Using curette sharp Excisional Debridement of the wound(s) was/were performed down through and including the removal of epidermis, dermis and subcutaneous tissue. Devitalized Tissue Debrided:  fibrin, slough and exudate      Bleeding:  Minimal    Hemostasis Achieved:  by pressure    Procedural Pain:  0  / 10     Post Procedural Pain:  0 / 10     Having prepared the wound bed, the skin graft was completed as below.     Product Utilized:          [] APLIGRAF   []44 sq cm   []88 sq cm    []132 sq cm  []176 sq cm           [] DERMAGRAFT  [] 38 sq cm   []76 sq cm    []114 sq cm  []152 sq cm      [x] NUSHIELD  [] 1.6 sq/cm disc   [x] (2X3) 6 sq/cm    [] (2X4) 8 sq/cm         [] (3X4) 12 sq/cm  [] (4x4) 16 sq/cm   [] (4X6) 24 sq/cm         [] (6X6) 36 sq/cm        [] AFFINITY    [] (1.5 cm x 1.5cm) 2.25 cm   [] (2.5 cm x 2.5 cm) 6.25 cm         [] THERASKIN  [] 13 sq cm   []37.34 sq cm             [] EpiFix   [] 1.54 sq cm disc    [] 2 pieces (3.08 sq cm)    [] 3  pieces (4.62 sq  cm)   [] 6 sq/cm    [] 16 sq cm     [] 18 sq cm   Fenestrated        [] OASIS   [] 10.5 sq cm   []21 sq cm    []35 sq cm Tri-Matrix  []70 sq cm          Tri-Matrix                    [] PURAPLY   [] 1.6 sq cm disc     [] 4 sq cm   [] 8 sq cm   [] 25sq cm  [] 54 sq cm                  [] Grafix      [] 1.54 sq cm disc    [] 3 sq cm    [] 6 sq cm   [] 12 sq cm   [] 25 sq cm      Lot Number: [id number], [id number], expiration: 10/08/2024  Skin Substitute Applied:    Performed by: Iftikhar Jarquin MD    Consent obtained: Yes    Time out taken: Yes     Fenestrated: No    Skin Substitute was Applied to Wound Number(s):     6      Wound 11/15/13 Other (Comment) Leg Inner erethema with a small puncture site (Active)   Number of days: 7057       Wound 06/27/19 #6 (onset 1 month) Left Medial Distal Ankle (Active)   Wound Image   09/24/20 0904   Wound Other 09/24/20 0904   Offloading for Diabetic Foot Ulcers No 08/20/20 1002   Dressing Status Clean;Dry; Intact 09/24/20 0944   Dressing Changed Changed/New 09/24/20 0944   Dressing/Treatment Moisturizing cream 08/06/20 1008   Wound Cleansed Soap and water 09/24/20 0904   Wound Length (cm) 1.8 cm 09/24/20 0904   Wound Width (cm) 1.7 cm 09/24/20 0904   Wound Depth (cm) 0.1 cm 09/24/20 0904   Wound Surface Area (cm^2) 3.06 cm^2 09/24/20 0904   Change in Wound Size % (l*w) 56.29 09/24/20 0904   Wound Volume (cm^3) 0.31 cm^3 09/24/20 0904   Wound Healing % 85 09/24/20 0904   Post-Procedure Length (cm) 1.8 cm 09/24/20 0926   Post-Procedure Width (cm) 1.7 cm 09/24/20 0926   Post-Procedure Depth (cm) 0.1 cm 09/24/20 0926   Post-Procedure Surface Area (cm^2) 3.06 cm^2 09/24/20 0926   Post-Procedure Volume (cm^3) 0.31 cm^3 09/24/20 0926   Distance Tunneling (cm) 0 cm 09/24/20 0904   Tunneling Position ___ O'Clock 0 09/24/20 0904   Undermining Starts ___ O'Clock 0 09/24/20 0904   Undermining Ends___ O'Clock 0 09/24/20 0904   Undermining Maxium Distance (cm) 0 09/24/20 0904   Wound Assessment Red;Yellow 09/24/20 0904   Drainage Amount Moderate 09/24/20 0904   Drainage Description Serosanguinous 09/24/20 0904   Odor None 09/24/20 0904   Margins Defined edges; Unattached edges 09/24/20 0904   Ana-wound Assessment Red 09/24/20 0904   Non-staged Wound Description Full thickness 09/24/20 0904   Pink%Wound Bed 0 09/24/20 0904   Red%Wound Bed 50 09/24/20 0904   Yellow%Wound Bed 50 09/24/20 0904   Black%Wound Bed 0 09/24/20 0904   Purple%Wound Bed 0 09/24/20 0904   Other%Wound Bed 0 09/24/20 0904   Number of days: 455       Wound 07/30/20 #7 (onset 1 week) Right Lower Leg Circumferential (Active)   Wound Image   09/03/20 1013   Wound Other 09/24/20 0904   Offloading for Diabetic Foot Ulcers No 09/17/20 0910   Dressing Status Clean;Dry; Intact 09/24/20 0944   Dressing Changed Changed/New 09/24/20 0944   Dressing/Treatment Moisturizing cream 09/17/20 0955   Wound Cleansed Soap and water 09/24/20 0904   Wound Length (cm) 0 cm 09/24/20 0904   Wound Width (cm) 0 cm 09/24/20 0904   Wound Depth (cm) 0 cm 09/24/20 0904   Wound Surface Area (cm^2) 0 cm^2 09/24/20 0904   Change in Wound Size % (l*w) 100 09/24/20 0904   Wound Volume (cm^3) 0 cm^3 09/24/20 0904   Wound Healing % 100 09/24/20 0904   Post-Procedure Length (cm) 21.5 cm 08/13/20 1020   Post-Procedure Width (cm) 7.5 cm 08/13/20 1020   Post-Procedure Depth (cm) 0.1 cm 08/13/20 1020   Post-Procedure Surface Area (cm^2) 161.25 cm^2 08/13/20 1020   Post-Procedure Volume (cm^3) 16.12 cm^3 08/13/20 1020   Distance Tunneling (cm) 0 cm 09/24/20 0904   Tunneling Position ___ O'Clock 0 09/24/20 0904   Undermining Starts ___ O'Clock 0 09/24/20 0904   Undermining Ends___ O'Clock 0 09/24/20 0904   Undermining Maxium Distance (cm) 0 09/24/20 0904   Wound Assessment Dry 09/24/20 0904   Drainage Amount None 09/24/20 0904   Drainage Description Yellow 09/03/20 1013   Odor None 09/24/20 0904   Margins Attached edges 09/24/20 0904   Ana-wound Assessment Red 09/24/20 0904   Non-staged Wound Description Not applicable 87/22/13 2925   [place]%Wound Bed 0 09/24/20 0904   Red%Wound Bed 0 09/24/20 0904   Yellow%Wound Bed 0 09/24/20 0904   Black%Wound Bed 0 09/24/20 0904   Purple%Wound Bed 0 09/24/20 0904   Other%Wound Bed 0 09/24/20 0904   Number of days: 56       Wound 08/27/20 Pretibial Left;Proximal #8 Left Proximal Lower Leg (Active)   Wound Image   09/24/20 0904   Wound Other 09/24/20 0904   Dressing Status Clean;Dry; Intact 09/17/20 0955   Dressing Changed Changed/New 09/17/20 0955   Dressing/Treatment Alginate 09/17/20 0955   Wound Cleansed Soap and water 09/24/20 0904   Wound Length (cm) 0.5 cm 09/24/20 0904   Wound Width (cm) 0.3 cm 09/24/20 0904   Wound Depth (cm) 0.1 cm 09/24/20 0904   Wound Surface Area (cm^2) 0.15 cm^2 09/24/20 0904   Change in Wound Size % (l*w) 92.5 09/24/20 0904   Wound Volume (cm^3) 0.02 cm^3 09/24/20 0904   Wound Healing % 90 09/24/20 0904   Post-Procedure Length (cm) 0.5 cm 09/24/20 0926   Post-Procedure Width (cm) 0.3 cm 09/24/20 0926   Post-Procedure Depth (cm) 0.1 cm 09/24/20 0926   Post-Procedure Surface Area (cm^2) 0.15 cm^2 09/24/20 0926   Post-Procedure Volume (cm^3) 0.02 cm^3 09/24/20 0926   Distance Tunneling (cm) 0 cm 09/24/20 0904   Tunneling Position ___ O'Clock 0 09/24/20 0904   Undermining Starts ___ O'Clock 0 09/24/20 0904   Undermining Ends___ O'Clock 0 09/24/20 0904   Undermining Maxium Distance (cm) 0 09/24/20 0904   Wound Assessment Red 09/24/20 0904   Drainage Amount Moderate 09/24/20 0904   Drainage Description Serosanguinous 09/24/20 0904   Odor None 09/24/20 0904   Margins Defined edges; Unattached edges 09/24/20 0904   Ana-wound Assessment Red 09/24/20 0904   Non-staged Wound Description Full thickness 09/24/20 0904   Pink%Wound Bed 0 09/24/20 0904   Red%Wound Bed 100 09/24/20 0904   Yellow%Wound Bed 0 09/24/20 0904   Black%Wound Bed 0 09/24/20 0904   Purple%Wound Bed 0 09/24/20 0904   Other%Wound Bed 0 09/24/20 0904   Number of days: 28         Total Surface Area Covered 6 sq/cm    Was the Product Layered  No      Amount Wasted 0 sq/cm    Reason for Waste: n/a     Secured: Yes    Instruments used to complete placement of graft::scissors and forceps    Secured With:   [] N/A  [x]Steri Strips    []Sutures     []Staples []Other    Procedural Pain: 0/10     Post Procedural Pain: 0 / 10    Response to Treatment:  Well tolerated by patient. Status of wound progress and description from last visit:   improved.       Plan:     Discharge instructions:  Discharge Instructions       NOTE: Upon discharge from the Wound information:              Residence: private               Continue home health care with:none at this time               Your wound-care supplies will be provided by: Amaya Obrien provider:              [id number] with              KZN loading: Date              ISVHV Medications: RX SANTYL GIVEN 8/1/19              SGKLW cleansing:                           RG not scrub or use excessive force.                          Wash hands with soap and water before and after dressing changes.                         Prior to applying a clean dressing, cleanse wound with normal saline,                                wound cleanser, or mild soap and water.                                     Daily Wound management:                                                Avoid standing for long periods of time.                          RTPUC wraps/stockings in AM and remove at bedtime.                          If swelling is present, elevate legs to the level of the heart or above for 30 minutes 4-5 times a day and/or when sitting.                                             When taking antibiotics take entire prescription as ordered by physician do not stop taking until medicine is all gone.                                                    Orders for this week: 9/24/2020     Auth for graft on 9/10/2020          Left medial ankle distal and proximal leg -- cleanse with  vashe in clinic today --for 5 minutes, pat dry. Lotion to leg. Lotrisone to fabricio wound(in clinic only). Apply #2 Nushield  To wound bed , cover with sorbact and steri strips to cover(leave in place until next visit). Mastisol to fabricio wound. Cover with calcium alginate and  ABD  , coban 2 lite --leave in place x 1 week until patient returns to clinic next week      Right Leg -- Apply  Lotrisone and A&D to intact skin.   Wrap with coban 2 lite leave in place until next visit          Buttock-- Apply Mepilex border as needed.      Follow up [address] in 1 week

## 2020-09-29 ENCOUNTER — CARE COORDINATION (OUTPATIENT)
Dept: CARE COORDINATION | Age: 75
End: 2020-09-29

## 2020-09-29 NOTE — CARE COORDINATION
3rd unsuccessful attempt to engage pt with Care Coordination. Call to only contact # available & VM is not set up. Pt has not responded to [facility] message either. No further outreach is planned at this time, ACM signing off. KELLY Yañez RN  Ambulatory Care Manager  [phone] office/cell  [phone] fax  [email or web address]

## 2020-10-01 ENCOUNTER — HOSPITAL ENCOUNTER (OUTPATIENT)
Dept: WOUND CARE | Age: 75
Discharge: HOME | End: 2020-10-01
Payer: MEDICARE

## 2020-10-01 ENCOUNTER — ANTI-COAG VISIT (OUTPATIENT)
Dept: PHARMACY | Age: 75
End: 2020-10-01
Payer: MEDICARE

## 2020-10-01 VITALS
RESPIRATION RATE: 20 BRPM | SYSTOLIC BLOOD PRESSURE: 112 MMHG | TEMPERATURE: 98.4 F | HEART RATE: 87 BPM | DIASTOLIC BLOOD PRESSURE: 69 MMHG

## 2020-10-01 LAB
INTERNATIONAL NORMALIZATION RATIO, POC: 1.8
POC INR: 1.8 INDEX
PROTHROMBIN TIME, POC: 22 SECONDS (ref 10–14.3)

## 2020-10-01 PROCEDURE — 99212 OFFICE O/P EST SF 10 MIN: CPT

## 2020-10-01 PROCEDURE — 36416 COLLJ CAPILLARY BLOOD SPEC: CPT

## 2020-10-01 PROCEDURE — 15271 SKIN SUB GRAFT TRNK/ARM/LEG: CPT

## 2020-10-01 PROCEDURE — 85610 PROTHROMBIN TIME: CPT

## 2020-10-01 NOTE — PROGRESS NOTES
Medication Management Service  [facility]  [phone]    Visit Date: 10/1/2020   Subjective:       Breanna Mcdonald is a 69 y.o. male who presents to clinic today for anticoagulation monitoring and adjustment. Patient seen in clinic for warfarin management due to  Indication:   DVT. INR goal: of 2.0-3.0. Duration of therapy: indefinite. Patient reports the following:   Adherent with regimen  Missed or extra doses:  None   Bleeding or thromboembolic side effects:  None  Significant medication changes:  None  Significant dietary changes: None  Significant alcohol or tobacco changes: None  Significant recent illness, disease state changes, or hospitalization:  None  Upcoming surgeries or procedures:  None  Falls: None           Assessment and Plan     PT/INR done in office per protocol. INR today is 1.8, subtherapeutic. Plan:  Increase weekly dose by ~7% to warfarin 5mg daily except 7.5mg on Mondays and Thursdays. Recheck INR in 2 week(s). Patient verbalized understanding of dosing directions and information discussed. Dosing schedule given to patient including phone number, appointment date, and time. Progress note sent to referring office. Patient acknowledges working in consult agreement with pharmacist as referred by his/her physician.       Electronically signed by NADEEN Henao [facility] on 10/1/20 at 11:14 AM EDT    CLINICAL PHARMACY CONSULT: MED RECONCILIATION/REVIEW Ghislaine  22. Tracking Only    PHSO: No  Total # of Interventions Recommended: 1  - Increased Dose #: 1  - Maintenance Safety Lab Monitoring #: 1  Total Interventions Accepted: 1  Time Spent (min): Zhou Fragoso, ShinD

## 2020-10-01 NOTE — PROGRESS NOTES
study    H/O Doppler ultrasound 03/26/15    Carotid US- no significant stenosis noted bilaterally.  H/O echocardiogram 11/21/2019    EF50-55%, Mild AR. Moderately dilated right ventricle with negative Solis's sign.  H/O mumps orchitis     as a youth    Hemorrhoids 4/23/12    Dr. Usama Greene; repeat colonoscopy 3 years    History of nuclear stress test 11/21/2019    EF 60%, Normal study.  HLD (hyperlipidemia)     Hx of Doppler ultrasound 1/06/15    Lymph node seen in left groin area. No bilateral stenosis.     Hyperlipemia     Hyperlipidemia     Hypertension 1992    Hypertension     Idiopathic chronic venous hypertension of left lower extremity with ulcer and inflammation (HCC) 10/2/2015    Leg ulcer ([address] 1978-present    following at wound center, Dr Hu Patel No diabetic retinopathy OU 11/12    Dr. Ortez Sessions chronic ulcer of left lower leg with fat layer exposed ([address] 10/2/2015    Pulmonary embolism ([address] 11/13    patient on coumadin    Sleep apnea     doesnt always use cpap dt it drys him out    SOB (shortness of breath) Oct 2011    Stress test normal.     Tendinitis 1973    plantar tendons    Type II or unspecified type diabetes mellitus with other specified manifestations, not stated as uncontrolled 2/8/2013    Unspecified venous (peripheral) insufficiency     Urticaria     WD-Cellulitis of right anterior lower leg 9/5/2019    WD-Cellulitis of right lower extremity 3/26/2020    WD-Chronic venous hypertension (idiopathic) with ulcer of left lower extremity (CODE) ([address] 6/27/2019    WD-Chronic venous hypertension with inflammation, right 9/19/2019    WD-Decubitus ulcer of left buttock, stage 3 ([address] 6/25/2020    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 to breakdown of skin ([address] 3/26/2020    WD-Open wound of hand without complication, left, initial encounter 12/12/2019    WD-Pressure injury of left buttock, stage 2 ([address] 1/2/2020    WD-Pressure injury of left buttock, stage 3 ([facility] 3/12/2020    WD-Pressure injury of right buttock, stage 3 ([address] 3/12/2020    WD-Skin tear of right forearm without complication     WD-Ulcer of right pretibial region, with fat layer exposed ([facility] 10/17/2019       PAST SURGICAL HISTORY    Past Surgical History:   Procedure Laterality Date    BREAST SURGERY  1970s    benign tumors bilaterally    CARDIAC CATHETERIZATION  6/3/14    EF55% normal study    CARPAL TUNNEL RELEASE Left     CARPAL TUNNEL RELEASE      CATARACT REMOVAL Right 3/11/2013    Dr. Maggie Adler Left 2013    Dr. Claribel Soriano      polyps    COLONOSCOPY  11    villous component--f/u colonoscopy will be needed in 6 months    COLONOSCOPY  12    mild diverticulosis, internal hemorrhoids; repeat in 3 years (Dr. Jian Mendez)    COLONOSCOPY  2016    mild diverticulosis, three colon polyps    HERNIA REPAIR  1970s    HERNIA REPAIR      SKIN GRAFT  -present    skin grafts to left ankle ulcers    SPLENECTOMY      SPLENECTOMY      TONSILLECTOMY      VARICOSE VEIN SURGERY Left 2009       FAMILY HISTORY    Family History   Problem Relation Age of Onset    Heart Disease Father     Cancer Father         prostate    High Blood Pressure Father     High Cholesterol Father     Cancer Brother     Diabetes Brother     Thyroid Disease Brother        SOCIAL HISTORY    Social History     Tobacco Use    Smoking status: Former Smoker     Packs/day: 2.00     Years: 35.00     Pack years: 70.00     Types: Cigarettes     Last attempt to quit:      Years since quittin.7    Smokeless tobacco: Never Used    Tobacco comment: chew--30 years.   Reviewed 2015   Substance Use Topics    Alcohol use: Yes     Comment: soc    Drug use: Never       ALLERGIES    Allergies   Allergen Reactions    Cavilon Durable Barrier [Mineral Oil-Dimeth-Coconut Oil]     Parabens Hives    Parabens Hives    Prinivil [Lisinopril] Swelling    Cortisone Rash    Cortisone Rash    Dilaudid [Hydromorphone Hcl] Rash    Penicillins Rash    Penicillins Rash    Sulfamethoxazole-Trimethoprim Nausea Only    Tape [Adhesive Tape] Rash       MEDICATIONS    Current Outpatient Medications on File Prior to Encounter   Medication Sig Dispense Refill    warfarin (COUMADIN) 5 MG tablet Take 1 tablet by mouth daily Except take 1 and 1/2 tablets by mouth on Mondays or as directed by clinic (Patient taking differently: Take 5 mg by mouth nightly Except take 1 and 1/2 tablets by mouth on Mondays or as directed by clinic) 98 tablet 1    losartan (COZAAR) 50 MG tablet Take 1 tablet by mouth daily Patient tolerates diovan- HOLD if blood pressure less than 120/60 90 tablet 1    buPROPion (WELLBUTRIN XL) 300 MG extended release tablet Take 1 tablet by mouth every morning 90 tablet 0    omeprazole (PRILOSEC) 20 MG delayed release capsule TAKE ONE (1) CAPSULE BY MOUTH ONCE DAILY 90 capsule 1    glycopyrrolate-formoterol (BEVESPI AEROSPHERE) 9-4.8 MCG/ACT AERO Inhale 2 puffs into the lungs 2 times daily 1 Inhaler 5    Spacer/Aero-Holding Chambers (AEROCHAMBER MV) MISC 1 actuation by Does not apply route 2 times daily 1 each 0    Zinc Sulfate (ZINC 15 PO) Take 50 mg by mouth every other day      potassium chloride (MICRO-K) 10 MEQ extended release capsule Take 10 mEq by mouth daily      metoprolol tartrate (LOPRESSOR) 25 MG tablet Take 0.5 tablets by mouth 2 times daily 90 tablet 3    Zinc Oxide (DESITIN CREAMY EX) Apply 13 % topically      aspirin 81 MG chewable tablet Take 1 tablet by mouth daily 30 tablet 3    blood glucose test strips (ALISHA CONTOUR TEST) strip USE AS DIRECTED TO TEST BLOOD SUGAR FOUR TIMES DAILY AS NEEDED 100 strip 3    ALISHA CONTOUR TEST strip USE AS DIRECTED TO TEST BLOOD SUGAR FOUR TIMES DAILY 100 strip 5    Glucose Blood (BLOOD GLUCOSE TEST STRIPS) STRP Please give contour testing strips previous treatment. The patient has no contraindications or evidence of infection. The patient's competency and support system is appropriate for proper care and follow up for the use of this graft, therefore a graft was placed as below. Procedure: The wound bed was cleansed and prepared as necessary to accept the graft. Debridement was required. Consent obtained: Yes    Time out taken: Yes    Using curette sharp Excisional Debridement of the wound(s) was/were performed down through and including the removal of epidermis, dermis and subcutaneous tissue. Devitalized Tissue Debrided:  fibrin, biofilm, slough and exudate      Bleeding:  None    Hemostasis Achieved:  by pressure    Procedural Pain:  0  / 10     Post Procedural Pain:  0 / 10     Having prepared the wound bed, the skin graft was completed as below. Product Utilized:          [] APLIGRAF   []44 sq cm   []88 sq cm    []132 sq cm  []176 sq cm           [] DERMAGRAFT  [] 38 sq cm   []76 sq cm    []114 sq cm  []152 sq cm      [x] NUSHIELD  [] 1.6 sq/cm disc   [x] (2X3) 6 sq/cm    [] (2X4) 8 sq/cm         [] (3X4) 12 sq/cm  [] (4x4) 16 sq/cm   [] (4X6) 24 sq/cm         [] (6X6) 36 sq/cm        [] AFFINITY    [] (1.5 cm x 1.5cm) 2.25 cm   [] (2.5 cm x 2.5 cm) 6.25 cm         [] THERASKIN  [] 13 sq cm   []37.34 sq cm             [] EpiFix   [] 1.54 sq cm disc    [] 2 pieces (3.08 sq cm)    [] 3  pieces (4.62 sq  cm)   [] 6 sq/cm    [] 16 sq cm     [] 18 sq cm   Fenestrated        [] OASIS   [] 10.5 sq cm   []21 sq cm    []35 sq cm Tri-Matrix  []70 sq cm          Tri-Matrix                    [] PURAPLY   [] 1.6 sq cm disc     [] 4 sq cm   [] 8 sq cm   [] 25sq cm  [] 54 sq cm                  [] Grafix      [] 1.54 sq cm disc    [] 3 sq cm    [] 6 sq cm   [] 12 sq cm   [] 25 sq cm    Lot: [id number], [id number] exp: 10/14/2024  Skin Substitute Applied:    Performed by: Lakshmi Olguin MD    Consent obtained: Yes    Time out taken:  Yes Fenestrated: no    Skin Substitute was Applied to Wound Number(s):     6      Wound 11/15/13 Other (Comment) Leg Inner erethema with a small puncture site (Active)   Number of days: 2901       Wound 06/27/19 #6 (onset 1 month) Left Medial Distal Ankle (Active)   Wound Image   09/24/20 0904   Wound Other 10/01/20 0929   Offloading for Diabetic Foot Ulcers No 10/01/20 0929   Dressing Status Clean;Dry; Intact 10/01/20 1041   Dressing Changed Changed/New 10/01/20 1041   Dressing/Treatment Moisturizing cream 08/06/20 1008   Wound Cleansed Soap and water 10/01/20 0929   Wound Length (cm) 1.4 cm 10/01/20 0929   Wound Width (cm) 1.7 cm 10/01/20 0929   Wound Depth (cm) 0.1 cm 10/01/20 0929   Wound Surface Area (cm^2) 2.38 cm^2 10/01/20 0929   Change in Wound Size % (l*w) 66 10/01/20 0929   Wound Volume (cm^3) 0.24 cm^3 10/01/20 0929   Wound Healing % 89 10/01/20 0929   Post-Procedure Length (cm) 1.4 cm 10/01/20 0953   Post-Procedure Width (cm) 1.7 cm 10/01/20 0953   Post-Procedure Depth (cm) 0.1 cm 10/01/20 0953   Post-Procedure Surface Area (cm^2) 2.38 cm^2 10/01/20 0953   Post-Procedure Volume (cm^3) 0.24 cm^3 10/01/20 0953   Distance Tunneling (cm) 0 cm 10/01/20 0929   Tunneling Position ___ O'Clock 0 10/01/20 0929   Undermining Starts ___ O'Clock 0 10/01/20 0929   Undermining Ends___ O'Clock 0 10/01/20 0929   Undermining Maxium Distance (cm) 0 10/01/20 0929   Wound Assessment Red;Yellow 10/01/20 0929   Drainage Amount Moderate 10/01/20 0929   Drainage Description Serosanguinous 10/01/20 0929   Odor None 10/01/20 0929   Margins Defined edges 10/01/20 0929   Ana-wound Assessment Red 10/01/20 0929   Non-staged Wound Description Full thickness 10/01/20 0929   Pink%Wound Bed 0 10/01/20 0929   Red%Wound Bed 30 10/01/20 0929   Yellow%Wound Bed 70 10/01/20 0929   Black%Wound Bed 0 10/01/20 0929   Purple%Wound Bed 0 10/01/20 0929   Other%Wound Bed 0 10/01/20 0929   Number of days: 461       Wound 07/30/20 #7 (onset 1 week) Right Lower Leg Circumferential (Active)   Wound Image   09/03/20 1013   Wound Other 10/01/20 0929   Offloading for Diabetic Foot Ulcers No 10/01/20 0929   Dressing Status Clean;Dry; Intact 10/01/20 1041   Dressing Changed Changed/New 10/01/20 1041   Dressing/Treatment Moisturizing cream 09/17/20 0955   Wound Cleansed Soap and water 10/01/20 0929   Wound Length (cm) 0 cm 10/01/20 0929   Wound Width (cm) 0 cm 10/01/20 0929   Wound Depth (cm) 0 cm 10/01/20 0929   Wound Surface Area (cm^2) 0 cm^2 10/01/20 0929   Change in Wound Size % (l*w) 100 10/01/20 0929   Wound Volume (cm^3) 0 cm^3 10/01/20 0929   Wound Healing % 100 10/01/20 0929   Post-Procedure Length (cm) 21.5 cm 08/13/20 1020   Post-Procedure Width (cm) 7.5 cm 08/13/20 1020   Post-Procedure Depth (cm) 0.1 cm 08/13/20 1020   Post-Procedure Surface Area (cm^2) 161.25 cm^2 08/13/20 1020   Post-Procedure Volume (cm^3) 16.12 cm^3 08/13/20 1020   Distance Tunneling (cm) 0 cm 10/01/20 0929   Tunneling Position ___ O'Clock 0 10/01/20 0929   Undermining Starts ___ O'Clock 0 10/01/20 0929   Undermining Ends___ O'Clock 0 10/01/20 0929   Undermining Maxium Distance (cm) 0 10/01/20 0929   Wound Assessment Clean;Dry; Intact 10/01/20 0929   Drainage Amount None 10/01/20 0929   Drainage Description Yellow 09/03/20 1013   Odor None 10/01/20 0929   Margins Attached edges 10/01/20 0929   Ana-wound Assessment Red 10/01/20 0929   Non-staged Wound Description Not applicable 80/71/90 3589   [place]%Wound Bed 100 10/01/20 0929   Red%Wound Bed 0 10/01/20 0929   Yellow%Wound Bed 0 10/01/20 0929   Black%Wound Bed 0 10/01/20 0929   Purple%Wound Bed 0 10/01/20 0929   Other%Wound Bed 0 10/01/20 0929   Number of days: 63       Wound 08/27/20 Pretibial Left;Proximal #8 Left Proximal Lower Leg (Active)   Wound Image   09/24/20 0904   Wound Other 09/24/20 0904   Dressing Status Clean;Dry; Intact 09/17/20 0955   Dressing Changed Changed/New 09/17/20 0955   Dressing/Treatment Alginate 09/17/20 0955 Wound Cleansed Soap and water 09/24/20 0904   Wound Length (cm) 0.5 cm 09/24/20 0904   Wound Width (cm) 0.3 cm 09/24/20 0904   Wound Depth (cm) 0.1 cm 09/24/20 0904   Wound Surface Area (cm^2) 0.15 cm^2 09/24/20 0904   Change in Wound Size % (l*w) 92.5 09/24/20 0904   Wound Volume (cm^3) 0.02 cm^3 09/24/20 0904   Wound Healing % 90 09/24/20 0904   Post-Procedure Length (cm) 0.5 cm 09/24/20 0926   Post-Procedure Width (cm) 0.3 cm 09/24/20 0926   Post-Procedure Depth (cm) 0.1 cm 09/24/20 0926   Post-Procedure Surface Area (cm^2) 0.15 cm^2 09/24/20 0926   Post-Procedure Volume (cm^3) 0.02 cm^3 09/24/20 0926   Distance Tunneling (cm) 0 cm 09/24/20 0904   Tunneling Position ___ O'Clock 0 09/24/20 0904   Undermining Starts ___ O'Clock 0 09/24/20 0904   Undermining Ends___ O'Clock 0 09/24/20 0904   Undermining Maxium Distance (cm) 0 09/24/20 0904   Wound Assessment Red 09/24/20 0904   Drainage Amount Moderate 09/24/20 0904   Drainage Description Serosanguinous 09/24/20 0904   Odor None 09/24/20 0904   Margins Defined edges; Unattached edges 09/24/20 0904   Ana-wound Assessment Red 09/24/20 0904   Non-staged Wound Description Full thickness 09/24/20 0904   Pink%Wound Bed 0 09/24/20 0904   Red%Wound Bed 100 09/24/20 0904   Yellow%Wound Bed 0 09/24/20 0904   Black%Wound Bed 0 09/24/20 0904   Purple%Wound Bed 0 09/24/20 0904   Other%Wound Bed 0 09/24/20 0904   Number of days: 35         Total Surface Area Covered 0 sq/cm    Was the Product Layered  Yes      Amount Wasted 0 sq/cm    Reason for Waste: material provided was greater than wound size      Secured: Yes    Instruments used to complete placement of graft::forceps and scissors    Secured With:   [] N/A  [x]Steri Strips    []Sutures     []Staples []Other    Procedural Pain: 0/10     Post Procedural Pain: 0 / 10    Response to Treatment:  Well tolerated by patient.       Status of wound progress and description from last visit:   Improved  I look at his buttocks- he has re-opened his stage ulcers. Plan:     Discharge instructions:  Discharge Instructions       NOTE: Upon discharge from the [address], you will receive a patient experience survey. We would be grateful if you would take the time to fill this survey out.     Wound care order history:                 ANA CRISTINA's   Right       Left               Date               Vascular studies:   ordered on 7/25/19 Date  To be done 8/2/19 imaging center               Imaging:  Date               Cultures: obtained from left medal leg  Date 6/27/19--positive kles.                                PPAPAYBS from left medial lower leg on 8/15/19                               Obtained from left medial lower leg on 9/26/19                               Obtained from right leg on 10/17/19                               Obtained from left leg on 3/12/2020                               Obtained from right leg on 3/19/2020                                                                                                                           Obtained from right leg on 4/30/2020--scanty growth                                  Biopsy done 7/18/19                 Labs/ HbA1c  Date               Grafts:   #1 Nushield applied on 9/17/2020  #2 Nushield applied on 9/24/2020  #3 Nushield applied on 10/1/2020                 HBO:                Antibiotics: Doxycycline for 14 days BID; phoned to [facility] [address] 6/28/19                                    Doxycycline for 10 days BID and CIPRO 500 mg BID for 10 days ordered on 8/22/19                                     Doxycycline for 7 days on 9/5/19                                   Doxycycline for 7 days on 3/12/2020 continued on 3/19/2020 for 7 days                                    Bactrim DS for  10 days on 3/26/11902                                   Bactrim DS for 10 days on 6/25/2020                                                 Earlier Wound care treatments:                [id number]:                        Consults:   [facility]      Continuing wound care orders and information:              Residence: private               Continue home health care with:none at this time               Your wound-care supplies will be provided by: Anton Bustos provider:              [id number] with              SOL loading: Date              NBCFT Medications: RX SANTYL GIVEN 8/1/19              IZDYG cleansing:                           BZ not scrub or use excessive force.                          Wash hands with soap and water before and after dressing changes.                         Prior to applying a clean dressing, cleanse wound with normal saline,                                wound cleanser, or mild soap and water.                                     Daily Wound management:                                                Avoid standing for long periods of time.                          UGGAD wraps/stockings in AM and remove at bedtime.                          If swelling is present, elevate legs to the level of the heart or above for 30 minutes 4-5 times a day and/or when sitting.                                             When taking antibiotics take entire prescription as ordered by physician do not stop taking until medicine is all gone.                                                    Orders for this week: 9/24/2020     Auth for graft on 9/10/2020          Left medial ankle distal and proximal leg -- cleanse with  vashe in clinic today --for 5 minutes, pat dry. Lotion to leg. Lotrisone to fabricio wound(in clinic only). Apply #3 Nushield  To wound bed , cover with sorbact and steri strips to cover(leave in place until next visit). Mastisol to fabricio wound.  Cover with calcium alginate and  ABD  , coban 2 lite --leave in place x 1 week until patient returns to clinic next week      Right Leg -- Apply  Lotrisone and A&D to intact skin.  Wrap with coban 2 lite leave in place until next visit          Buttock-- Apply Mepilex border as needed.      Follow up [address]annah [address] Dr in 1 week on Thursday in the wound care center  Call [phone] for any questions or concerns.   Physician Signature         Treatment Note Wound 06/27/19 #6 (onset 1 month) Left Medial Distal Ankle-Dressing/Treatment: (graft in place, calcium algainte, sofsorb, coban 2 lite )  Wound 07/30/20 #7 (onset 1 week) Right Lower Leg Circumferential-Dressing/Treatment: (A&D, coban 2 lite )    Written Patient Dismissal Instructions Given            Electronically signed by Gladys Hernandez MD on 10/1/2020 at 11:02 AM

## 2020-10-01 NOTE — PROGRESS NOTES
NuShieldTreatment Note    NAME:  Glenn Mobley OF BIRTH:  1945  MEDICAL RECORD NUMBER:  [id number]  DATE:  10/1/2020    Goal:  Patient will receive safe and proper application of skin substitute. Patient will comply with caring for dressing, and reporting complications. [x]Expiration date checked immediately prior to use. [x] Package intact prior to use and no damage noted. [x] Transport temperature controlled and acceptable. Nushield was removed from protective sterile packaging by physician and            applied Chorion side down to the prepared ulcer bed. Nushield was hydrated with sterile normal saline per physician. Nushield was applied to lfet lower leg  and affixed with steri-strips by the physician. [x] Nushield was covered with non-adherent ulcer dressing. [x] Applied sorbact over non-adherent. [x] Applied dry gauze and/or roll gauze. Patient/caregiver was instructed not to remove dressing and to keep it clean    and dry. Pt/family/caregiver was instructed on signs and symptoms of complications       to report such as draining through dressing, dressing falling down/slipping,           getting wet, or severe pain or tingling. NuShield may be applied a total of 10 times per wound over a 12 month period. Date of first application of NuShield for this current wound is September 17, 2020 .     Guidelines followed    Electronically signed by Ambar Crowe RN on 10/1/2020 at 10:47 AM

## 2020-10-01 NOTE — PROGRESS NOTES
Multilayer Compression Wrap   (Not Unna) Below the Knee    NAME:  Clint Xavier  YOB: 1945  MEDICAL RECORD NUMBER:  [id number]  DATE:  10/1/2020    Multilayer compression wrap: Removed old Multilayer wrap if indicated and wash leg with mild soap/water. Applied moisturizing agent to dry skin as needed. Applied primary and secondary dressing as ordered. Applied multilayered dressing below the knee to right lower leg. Applied multilayered dressing below the knee to left lower leg. Instructed patient/caregiver not to remove dressing and to keep it clean and dry. Instructed patient/caregiver on complications to report to provider, such as pain, numbness in toes, heavy drainage, and slippage of dressing. Instructed patient on purpose of compression dressing and on activity and exercise recommendations.       Electronically signed by Marcus Liz LPN on 10/6/6362 at 69:31 AM

## 2020-10-08 ENCOUNTER — HOSPITAL ENCOUNTER (OUTPATIENT)
Dept: WOUND CARE | Age: 75
Discharge: HOME | End: 2020-10-08
Payer: MEDICARE

## 2020-10-08 VITALS
RESPIRATION RATE: 18 BRPM | HEART RATE: 83 BPM | DIASTOLIC BLOOD PRESSURE: 76 MMHG | TEMPERATURE: 98.2 F | SYSTOLIC BLOOD PRESSURE: 128 MMHG

## 2020-10-08 PROCEDURE — 15271 SKIN SUB GRAFT TRNK/ARM/LEG: CPT

## 2020-10-08 NOTE — PROGRESS NOTES
Multilayer Compression Wrap   (Not Unna) Below the Knee    NAME:  Raeann Stone OF BIRTH:  1945  MEDICAL RECORD NUMBER:  [id number]  DATE:  10/8/2020    Multilayer compression wrap: Removed old Multilayer wrap if indicated and wash leg with mild soap/water. Applied moisturizing agent to dry skin as needed. Applied primary and secondary dressing as ordered. Applied multilayered dressing below the knee to right lower leg. Applied multilayered dressing below the knee to left lower leg. Instructed patient/caregiver not to remove dressing and to keep it clean and dry. Instructed patient/caregiver on complications to report to provider, such as pain, numbness in toes, heavy drainage, and slippage of dressing. Instructed patient on purpose of compression dressing and on activity and exercise recommendations.       Electronically signed by Rufino Nevarez LPN on 36/9/0300 at 55:79 AM

## 2020-10-08 NOTE — PROGRESS NOTES
Wound Care Center Progress Note and Bioengineered Skin Application      Jaquelin Correa  AGE: 69 y.o. GENDER: male  : 1945  EPISODE DATE:  10/8/2020     Subjective:     Chief Complaint   Patient presents with    Wound Check     Bilateral lower legs         HISTORY of PRESENT ILLNESS      Jaquelin Correa is a 69 y.o. male who presents today for wound evaluation of Chronic venous ulcer(s) of L lower leg anterior. The ulcer is of mild severity. The underlying cause of the wound is venous. He is in for f/u- did well with [facility],  If appropriate skin graft will be applied.   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 Javier Gimenez Anemia 2011    Arm fracture Mellody Body     Dr Calixto Daley with also yearly DM exam    Cataract     worsening-Dr. Yamini Wilkes    Cellulitis of left lower leg     Chronic venous hypertension with ulcer ([address] 2011    CKD (chronic kidney disease) 2012    Renal u/S normal     Colon polyps     Dr. Javier Gimenez COPD (chronic obstructive pulmonary disease) ([address]     Diabetes mellitus ([address]     Diabetes mellitus ([address]     Diabetes mellitus with peripheral circulatory disorder ([address] 10/2/2015    Diabetes mellitus with skin ulcer ([address] 10/2/2015    Diabetic peripheral neuropathy ([address]     + EMG, NCS    Diabetic skin ulcer associated with type 2 diabetes mellitus ([address]     Diverticulosis 12    mild, left colon    Femoral DVT (deep venous thrombosis) ([address] 2011    PARTIAL,CHRONIC-SPFLD HEART SURGEONS    GERD (gastroesophageal reflux disease)     Glaucoma     open angle-Dr. Humaira Ansari H/O cardiac catheterization 6/3/14    EF55% normal study    H/O Doppler ultrasound 03/26/15    Carotid US- no significant stenosis noted bilaterally.  H/O echocardiogram 11/21/2019    EF50-55%, Mild AR. Moderately dilated right ventricle with negative Solis's sign.  H/O mumps orchitis     as a youth    Hemorrhoids 4/23/12    Dr. Heather Guzman; repeat colonoscopy 3 years    History of nuclear stress test 11/21/2019    EF 60%, Normal study.  HLD (hyperlipidemia)     Hx of Doppler ultrasound 1/06/15    Lymph node seen in left groin area. No bilateral stenosis.     Hyperlipemia     Hyperlipidemia     Hypertension 1992    Hypertension     Idiopathic chronic venous hypertension of left lower extremity with ulcer and inflammation (HCC) 10/2/2015    Leg ulcer ([address] 1978-present    following at wound center, Dr Angela Carlos No diabetic retinopathy OU 11/12    Dr. Florentin Carreon chronic ulcer of left lower leg with fat layer exposed ([address] 10/2/2015    Pulmonary embolism ([address] 11/13    patient on coumadin    Sleep apnea     doesnt always use cpap dt it drys him out    SOB (shortness of breath) Oct 2011    Stress test normal.     Tendinitis 1973    plantar tendons    Type II or unspecified type diabetes mellitus with other specified manifestations, not stated as uncontrolled 2/8/2013    Unspecified venous (peripheral) insufficiency     Urticaria     WD-Cellulitis of right anterior lower leg 9/5/2019    WD-Cellulitis of right lower extremity 3/26/2020    WD-Chronic venous hypertension (idiopathic) with ulcer of left lower extremity (CODE) ([address] 6/27/2019    WD-Chronic venous hypertension with inflammation, right 9/19/2019    WD-Decubitus ulcer of left buttock, stage 3 ([address] 6/25/2020    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 to breakdown of skin ([address] 3/26/2020    WD-Open wound of hand without complication, left, initial encounter 12/12/2019    WD-Pressure injury of left buttock, stage 2 ([address] 2020    WD-Pressure injury of left buttock, stage 3 ([address] 3/12/2020    WD-Pressure injury of right buttock, stage 3 ([address] 3/12/2020    WD-Skin tear of right forearm without complication     WD-Ulcer of right pretibial region, with fat layer exposed ([address] 10/17/2019       PAST SURGICAL HISTORY    Past Surgical History:   Procedure Laterality Date    BREAST SURGERY  1970s    benign tumors bilaterally    CARDIAC CATHETERIZATION  6/3/14    EF55% normal study    CARPAL TUNNEL RELEASE Left     CARPAL TUNNEL RELEASE      CATARACT REMOVAL Right 3/11/2013    Dr. Perales Shoulder Left 2013    Dr. Petra Alexandre      polyps    COLONOSCOPY  11    villous component--f/u colonoscopy will be needed in 6 months    COLONOSCOPY  12    mild diverticulosis, internal hemorrhoids; repeat in 3 years (Dr. Jordan Tyler)    COLONOSCOPY  2016    mild diverticulosis, three colon polyps    HERNIA REPAIR  1970s    HERNIA REPAIR      SKIN GRAFT  -present    skin grafts to left ankle ulcers    SPLENECTOMY      SPLENECTOMY      TONSILLECTOMY      VARICOSE VEIN SURGERY Left 2009       FAMILY HISTORY    Family History   Problem Relation Age of Onset    Heart Disease Father     Cancer Father         prostate    High Blood Pressure Father     High Cholesterol Father     Cancer Brother     Diabetes Brother     Thyroid Disease Brother        SOCIAL HISTORY    Social History     Tobacco Use    Smoking status: Former Smoker     Packs/day: 2.00     Years: 35.00     Pack years: 70.00     Types: Cigarettes     Last attempt to quit:      Years since quittin.7    Smokeless tobacco: Never Used    Tobacco comment: chew--30 years.   Reviewed 2015   Substance Use Topics    Alcohol use: Yes     Comment: soc    Drug use: Never       ALLERGIES    Allergies   Allergen Reactions    [facility] Oil-Dimeth-Coconut Oil]     Parabens Hives    Parabens Hives    Prinivil [Lisinopril] Swelling    Cortisone Rash    Cortisone Rash    Dilaudid [Hydromorphone Hcl] Rash    Penicillins Rash    Penicillins Rash    Sulfamethoxazole-Trimethoprim Nausea Only    Tape [Adhesive Tape] Rash       MEDICATIONS    Current Outpatient Medications on File Prior to Encounter   Medication Sig Dispense Refill    warfarin (COUMADIN) 5 MG tablet Take 1 tablet by mouth daily Except take 1 and 1/2 tablets by mouth on Mondays or as directed by clinic (Patient taking differently: Take 5 mg by mouth nightly Except take 1 and 1/2 tablets by mouth on Mondays or as directed by clinic) 98 tablet 1    losartan (COZAAR) 50 MG tablet Take 1 tablet by mouth daily Patient tolerates diovan- HOLD if blood pressure less than 120/60 90 tablet 1    buPROPion (WELLBUTRIN XL) 300 MG extended release tablet Take 1 tablet by mouth every morning 90 tablet 0    omeprazole (PRILOSEC) 20 MG delayed release capsule TAKE ONE (1) CAPSULE BY MOUTH ONCE DAILY 90 capsule 1    glycopyrrolate-formoterol (BEVESPI AEROSPHERE) 9-4.8 MCG/ACT AERO Inhale 2 puffs into the lungs 2 times daily 1 Inhaler 5    Spacer/Aero-Holding Chambers (AEROCHAMBER MV) MISC 1 actuation by Does not apply route 2 times daily 1 each 0    Zinc Sulfate (ZINC 15 PO) Take 50 mg by mouth every other day      potassium chloride (MICRO-K) 10 MEQ extended release capsule Take 10 mEq by mouth daily      metoprolol tartrate (LOPRESSOR) 25 MG tablet Take 0.5 tablets by mouth 2 times daily 90 tablet 3    Zinc Oxide (DESITIN CREAMY EX) Apply 13 % topically      aspirin 81 MG chewable tablet Take 1 tablet by mouth daily 30 tablet 3    blood glucose test strips (ALISHA CONTOUR TEST) strip USE AS DIRECTED TO TEST BLOOD SUGAR FOUR TIMES DAILY AS NEEDED 100 strip 3    ALISHA CONTOUR TEST strip USE AS DIRECTED TO TEST BLOOD SUGAR FOUR TIMES DAILY 100 strip 5    Glucose Blood (BLOOD GLUCOSE TEST contraindications or evidence of infection. The patient's competency and support system is appropriate for proper care and follow up for the use of this graft, therefore a graft was placed as below. Procedure: The wound bed was cleansed and prepared as necessary to accept the graft. .  Consent obtained: Yes    Time out taken: Yes    Using curette sharp Excisional Debridement of the wound(s) was/were performed down through and including the removal of epidermis. Devitalized Tissue Debrided:  fibrin, biofilm, slough and exudate      Bleeding:  Minimal    Hemostasis Achieved:  by pressure    Procedural Pain:  0  / 10     Post Procedural Pain:  0 / 10     Having prepared the wound bed, the skin graft was completed as below.     Product Utilized:          [] APLIGRAF   []44 sq cm   []88 sq cm    []132 sq cm  []176 sq cm           [] DERMAGRAFT  [] 38 sq cm   []76 sq cm    []114 sq cm  []152 sq cm      [x] NUSHIELD  [] 1.6 sq/cm disc   [x] (2X3) 6 sq/cm    [] (2X4) 8 sq/cm         [] (3X4) 12 sq/cm  [] (4x4) 16 sq/cm   [] (4X6) 24 sq/cm         [] (6X6) 36 sq/cm        [] AFFINITY    [] (1.5 cm x 1.5cm) 2.25 cm   [] (2.5 cm x 2.5 cm) 6.25 cm         [] THERASKIN  [] 13 sq cm   []37.34 sq cm             [] EpiFix   [] 1.54 sq cm disc    [] 2 pieces (3.08 sq cm)    [] 3  pieces (4.62 sq  cm)   [] 6 sq/cm    [] 16 sq cm     [] 18 sq cm   Fenestrated        [] OASIS   [] 10.5 sq cm   []21 sq cm    []35 sq cm Tri-Matrix  []70 sq cm          Tri-Matrix                    [] PURAPLY   [] 1.6 sq cm disc     [] 4 sq cm   [] 8 sq cm   [] 25sq cm  [] 54 sq cm                  [] Grafix      [] 1.54 sq cm disc    [] 3 sq cm    [] 6 sq cm   [] 12 sq cm   [] 25 sq cm    ID [id number], expiration 10/16/2024, product NO-1230    Skin Substitute Applied:    Performed by: Susannah Chamorro MD    Consent obtained: Yes    Time out taken: Yes     Fenestrated: No    Skin Substitute was Applied to Wound Number(s):     6      Wound 11/15/13 Other (Comment) Leg Inner erethema with a small puncture site (Active)   Number of days: 2518       Wound 06/27/19 #6 (onset 1 month) Left Medial Distal Ankle (Active)   Wound Image   09/24/20 0904   Wound Etiology Other 10/01/20 0929   Dressing Status New dressing applied;Clean;Dry; Intact 10/08/20 1003   Wound Cleansed Soap and water 10/08/20 0859   Dressing/Treatment Moisturizing cream 08/06/20 1008   Offloading for Diabetic Foot Ulcers No 10/01/20 0929   Wound Length (cm) 1.7 cm 10/08/20 0859   Wound Width (cm) 1.5 cm 10/08/20 0859   Wound Depth (cm) 0.1 cm 10/08/20 0859   Wound Surface Area (cm^2) 2.55 cm^2 10/08/20 0859   Change in Wound Size % (l*w) 63.57 10/08/20 0859   Wound Volume (cm^3) 0.26 cm^3 10/08/20 0859   Wound Healing % 88 10/08/20 0859   Post-Procedure Length (cm) 1.7 cm 10/08/20 0930   Post-Procedure Width (cm) 1.5 cm 10/08/20 0930   Post-Procedure Depth (cm) 0.1 cm 10/08/20 0930   Post-Procedure Surface Area (cm^2) 2.55 cm^2 10/08/20 0930   Post-Procedure Volume (cm^3) 0.26 cm^3 10/08/20 0930   Distance Tunneling (cm) 0 cm 10/08/20 0859   Tunneling Position ___ O'Clock 0 10/08/20 0859   Undermining Starts ___ O'Clock 0 10/08/20 0859   Undermining Ends___ O'Clock 0 10/08/20 0859   Undermining Maxium Distance (cm) 0 10/08/20 0859   Wound Assessment Granulation tissue 10/08/20 0859   Drainage Amount Moderate 10/08/20 0859   Drainage Description Serosanguinous 10/08/20 0859   Odor None 10/08/20 0859   Ana-wound Assessment Maceration 10/08/20 0859   Margins Defined edges 10/08/20 0859   Wound Thickness Description not for Pressure Injury Full thickness 10/08/20 0859   Number of days: 948       Wound 07/30/20 #7 (onset 1 week) Right Lower Leg Circumferential (Active)   Wound Image   09/03/20 1013   Wound Etiology Other 10/01/20 0929   Dressing Status New dressing applied;Clean;Dry; Intact 10/08/20 1003   Wound Cleansed Soap and water 10/01/20 0929   Dressing/Treatment Moisturizing cream 09/17/20 0955   Offloading for Diabetic Foot Ulcers No 10/01/20 0929   Wound Length (cm) 0 cm 10/08/20 0859   Wound Width (cm) 0 cm 10/08/20 0859   Wound Depth (cm) 0 cm 10/08/20 0859   Wound Surface Area (cm^2) 0 cm^2 10/08/20 0859   Change in Wound Size % (l*w) 100 10/08/20 0859   Wound Volume (cm^3) 0 cm^3 10/08/20 0859   Wound Healing % 100 10/08/20 0859   Post-Procedure Length (cm) 21.5 cm 08/13/20 1020   Post-Procedure Width (cm) 7.5 cm 08/13/20 1020   Post-Procedure Depth (cm) 0.1 cm 08/13/20 1020   Post-Procedure Surface Area (cm^2) 161.25 cm^2 08/13/20 1020   Post-Procedure Volume (cm^3) 16.12 cm^3 08/13/20 1020   Distance Tunneling (cm) 0 cm 10/08/20 0859   Tunneling Position ___ O'Clock 0 10/08/20 0859   Undermining Starts ___ O'Clock 0 10/08/20 0859   Undermining Ends___ O'Clock 0 10/08/20 0859   Undermining Maxium Distance (cm) 0 10/08/20 0859   Wound Assessment Epithelialization 10/08/20 0859   Drainage Amount None 10/08/20 0859   Ana-wound Assessment Dry/flaky 10/08/20 0859   Wound Thickness Description not for Pressure Injury Not applicable 06/20/51 6574   Number of days: 70       Wound 08/27/20 Pretibial Left;Proximal #8 Left Proximal Lower Leg (Active)   Wound Image   09/24/20 0904   Wound Etiology Other 09/24/20 0904   Wound Cleansed Soap and water 10/08/20 0859   Dressing/Treatment Alginate 09/17/20 0955   Wound Length (cm) 0.6 cm 10/08/20 0859   Wound Width (cm) 0.5 cm 10/08/20 0859   Wound Depth (cm) 0.1 cm 10/08/20 0859   Wound Surface Area (cm^2) 0.3 cm^2 10/08/20 0859   Change in Wound Size % (l*w) 85 10/08/20 0859   Wound Volume (cm^3) 0.03 cm^3 10/08/20 0859   Wound Healing % 85 10/08/20 0859   Post-Procedure Length (cm) 0.6 cm 10/08/20 0930   Post-Procedure Width (cm) 0.5 cm 10/08/20 0930   Post-Procedure Depth (cm) 0.1 cm 10/08/20 0930   Post-Procedure Surface Area (cm^2) 0.3 cm^2 10/08/20 0930   Post-Procedure Volume (cm^3) 0.03 cm^3 10/08/20 0930   Distance Tunneling (cm) 0 cm 3/19/2020                                                                                                                           Obtained from right leg on 4/30/2020--scanty growth                                  Biopsy done 7/18/19                 Labs/ HbA1c  Date               Grafts:   #1 Nushield applied on 9/17/2020  #2 Nushield applied on 9/24/2020  #3 Nushield applied on 10/1/2020  #4 Nushield applied on 10/8/2020                 HBO:                Antibiotics: Doxycycline for 14 days BID; phoned to [facility] [address] 6/28/19                                    Doxycycline for 10 days BID and CIPRO 500 mg BID for 10 days ordered on 8/22/19                                     Doxycycline for 7 days on 9/5/19                                   Doxycycline for 7 days on 3/12/2020 continued on 3/19/2020 for 7 days                                    Bactrim DS for  10 days on 3/26/99992                                   Bactrim DS for 10 days on 6/25/2020                                                 Earlier Wound care treatments:                [id number]:                        Consults:   Date 7/18/19 to Dr Salvatore Lindsay-- [facility] care physician:      Continuing wound care orders and information:              Residence: private               Continue home health care with:none at this time               Your wound-care supplies will be provided by: Cynthia Ramachandran provider:              JAZZ with              NBJ loading: Date              XMZDI Medications: RX SANTYL GIVEN 8/1/19              GCQWC cleansing:                           ZB not scrub or use excessive force.                          Wash hands with soap and water before and after dressing changes.                         Prior to applying a clean dressing, cleanse wound with normal saline,                                wound cleanser, or mild soap and water.                                   Daily Wound management:                                                Avoid standing for long periods of time.                          NTLHY wraps/stockings in AM and remove at bedtime.                          If swelling is present, elevate legs to the level of the heart or above for 30 minutes 4-5 times a day and/or when sitting.                                             When taking antibiotics take entire prescription as ordered by physician do not stop taking until medicine is all gone.                                                    Orders for this week: 10/8/2020     Auth for graft on 9/10/2020          Left medial ankle distal and proximal leg -- cleanse with  vashe in clinic today --for 5 minutes, pat dry. Lotion to leg. Lotrisone to fabricio wound(in clinic only). Apply #4 Nushield  To wound bed , cover with sorbact and steri strips to cover(leave in place until next visit). Mastisol to fabricio wound. Cover with calcium alginate and  ABD  , coban 2 lite --leave in place x 1 week until patient returns to clinic next week      Right Leg -- Apply  Lotrisone and A&D to intact skin.  Wrap with coban 2 lite leave in place until next visit          Buttock-- Apply Mepilex border as needed.      Follow up [address] in 1 week on Thursday in the wound care center  Call [phone] for any questions or concerns.   Physician Signature         Treatment Note Wound 07/30/20 #7 (onset 1 week) Right Lower Leg Circumferential-Dressing/Treatment: (calcium algainte, abd, coban 2 lite)  Wound 06/27/19 #6 (onset 1 month) Left Medial Distal Ankle-Dressing/Treatment: (calcium algainte, abd, coban 2 lite)    Written Patient Dismissal Instructions Given            Electronically signed by Tara Prieto MD on 10/8/2020 at 7:22 PM

## 2020-10-14 RX ORDER — LIDOCAINE 50 MG/G
OINTMENT TOPICAL ONCE
Status: CANCELLED | OUTPATIENT
Start: 2020-10-15

## 2020-10-14 RX ORDER — BACITRACIN ZINC AND POLYMYXIN B SULFATE 500; 1000 [USP'U]/G; [USP'U]/G
OINTMENT TOPICAL ONCE
Status: CANCELLED | OUTPATIENT
Start: 2020-10-15

## 2020-10-14 RX ORDER — LIDOCAINE HYDROCHLORIDE 40 MG/ML
SOLUTION TOPICAL ONCE
Status: CANCELLED | OUTPATIENT
Start: 2020-10-15

## 2020-10-14 RX ORDER — GINSENG 100 MG
CAPSULE ORAL ONCE
Status: CANCELLED | OUTPATIENT
Start: 2020-10-15

## 2020-10-14 RX ORDER — BACITRACIN, NEOMYCIN, POLYMYXIN B 400; 3.5; 5 [USP'U]/G; MG/G; [USP'U]/G
OINTMENT TOPICAL ONCE
Status: CANCELLED | OUTPATIENT
Start: 2020-10-15

## 2020-10-15 ENCOUNTER — HOSPITAL ENCOUNTER (OUTPATIENT)
Dept: WOUND CARE | Age: 75
Discharge: HOME | End: 2020-10-15
Payer: MEDICARE

## 2020-10-15 ENCOUNTER — ANTI-COAG VISIT (OUTPATIENT)
Dept: PHARMACY | Age: 75
End: 2020-10-15
Payer: MEDICARE

## 2020-10-15 VITALS — TEMPERATURE: 97.2 F

## 2020-10-15 LAB
INTERNATIONAL NORMALIZATION RATIO, POC: 2.4
POC INR: 2.4 INDEX
PROTHROMBIN TIME, POC: 28.8 SECONDS (ref 10–14.3)

## 2020-10-15 PROCEDURE — 87075 CULTR BACTERIA EXCEPT BLOOD: CPT

## 2020-10-15 PROCEDURE — 99211 OFF/OP EST MAY X REQ PHY/QHP: CPT

## 2020-10-15 PROCEDURE — 87070 CULTURE OTHR SPECIMN AEROBIC: CPT

## 2020-10-15 PROCEDURE — 11042 DBRDMT SUBQ TIS 1ST 20SQCM/<: CPT

## 2020-10-15 PROCEDURE — 36416 COLLJ CAPILLARY BLOOD SPEC: CPT

## 2020-10-15 PROCEDURE — 85610 PROTHROMBIN TIME: CPT

## 2020-10-15 PROCEDURE — 87077 CULTURE AEROBIC IDENTIFY: CPT

## 2020-10-15 PROCEDURE — 87186 SC STD MICRODIL/AGAR DIL: CPT

## 2020-10-15 RX ORDER — LIDOCAINE 50 MG/G
OINTMENT TOPICAL ONCE
Status: CANCELLED | OUTPATIENT
Start: 2020-10-15

## 2020-10-15 RX ORDER — BACITRACIN ZINC AND POLYMYXIN B SULFATE 500; 1000 [USP'U]/G; [USP'U]/G
OINTMENT TOPICAL ONCE
Status: CANCELLED | OUTPATIENT
Start: 2020-10-15

## 2020-10-15 RX ORDER — BACITRACIN, NEOMYCIN, POLYMYXIN B 400; 3.5; 5 [USP'U]/G; MG/G; [USP'U]/G
OINTMENT TOPICAL ONCE
Status: CANCELLED | OUTPATIENT
Start: 2020-10-15

## 2020-10-15 RX ORDER — LIDOCAINE HYDROCHLORIDE 40 MG/ML
SOLUTION TOPICAL ONCE
Status: DISCONTINUED | OUTPATIENT
Start: 2020-10-15 | End: 2020-10-16 | Stop reason: HOSPADM

## 2020-10-15 RX ORDER — LIDOCAINE HYDROCHLORIDE 40 MG/ML
SOLUTION TOPICAL ONCE
Status: CANCELLED | OUTPATIENT
Start: 2020-10-15

## 2020-10-15 RX ORDER — GINSENG 100 MG
CAPSULE ORAL ONCE
Status: CANCELLED | OUTPATIENT
Start: 2020-10-15

## 2020-10-15 NOTE — PROGRESS NOTES
Wound Care Center Progress Note With Procedure    Clint Xavier  AGE: 69 y.o. GENDER: male  : 1945  EPISODE DATE:  10/15/2020     Subjective:     Chief Complaint   Patient presents with    Wound Check     bilateral lower legs         HISTORY of PRESENT ILLNESS      Clint Xavier is a 69 y.o. male who presents today for wound evaluation of Chronic venous ulcer(s) of the left leg. The ulcer is of mild severity. The underlying cause of the wound is venous. He is in for f/u- the wounds are worse. No significant pain, there is unchanged redness.   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 Magan Herrera Anemia 2011    Arm fracture Cydne Rosa Maria     Dr Breanna Nair with also yearly DM exam    Cataract     worsening-Dr. Lucho Bruce    Cellulitis of left lower leg     Chronic venous hypertension with ulcer ([address] 2011    CKD (chronic kidney disease) 2012    Renal u/S normal     Colon polyps     Dr. Magan Herrera COPD (chronic obstructive pulmonary disease) ([address]     Diabetes mellitus ([address]     Diabetes mellitus ([address]     Diabetes mellitus with peripheral circulatory disorder ([address] 10/2/2015    Diabetes mellitus with skin ulcer ([address] 10/2/2015    Diabetic peripheral neuropathy ([address]     + EMG, NCS    Diabetic skin ulcer associated with type 2 diabetes mellitus ([address]     Diverticulosis 12    mild, left colon    Femoral DVT (deep venous thrombosis) ([address] 2011    PARTIAL,CHRONIC-SPFLD HEART SURGEONS    GERD (gastroesophageal reflux disease)     Glaucoma     open angle-Dr. Kamran Hood H/O cardiac catheterization 6/3/14 EF55% normal study    H/O Doppler ultrasound 03/26/15    Carotid US- no significant stenosis noted bilaterally.  H/O echocardiogram 11/21/2019    EF50-55%, Mild AR. Moderately dilated right ventricle with negative Solis's sign.  H/O mumps orchitis     as a youth    Hemorrhoids 4/23/12    Dr. Petros Josue; repeat colonoscopy 3 years    History of nuclear stress test 11/21/2019    EF 60%, Normal study.  HLD (hyperlipidemia)     Hx of Doppler ultrasound 1/06/15    Lymph node seen in left groin area. No bilateral stenosis.     Hyperlipemia     Hyperlipidemia     Hypertension 1992    Hypertension     Idiopathic chronic venous hypertension of left lower extremity with ulcer and inflammation (HCC) 10/2/2015    Leg ulcer ([address] 1978-present    following at wound center, Dr Ronnie Woodward No diabetic retinopathy OU 11/12    Dr. Fernando Granado chronic ulcer of left lower leg with fat layer exposed ([address] 10/2/2015    Pulmonary embolism ([address] 11/13    patient on coumadin    Sleep apnea     doesnt always use cpap dt it drys him out    SOB (shortness of breath) Oct 2011    Stress test normal.     Tendinitis 1973    plantar tendons    Type II or unspecified type diabetes mellitus with other specified manifestations, not stated as uncontrolled 2/8/2013    Unspecified venous (peripheral) insufficiency     Urticaria     WD-Cellulitis of right anterior lower leg 9/5/2019    WD-Cellulitis of right lower extremity 3/26/2020    WD-Chronic venous hypertension (idiopathic) with ulcer of left lower extremity (CODE) ([address] 6/27/2019    WD-Chronic venous hypertension with inflammation, right 9/19/2019    WD-Decubitus ulcer of left buttock, stage 3 ([address] 6/25/2020    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 to breakdown of skin ([address] 3/26/2020    WD-Open wound of hand without complication, left, initial encounter 12/12/2019    WD-Pressure injury of left buttock, stage 2 ([address] 2020    WD-Pressure injury of left buttock, stage 3 ([address] 3/12/2020    WD-Pressure injury of right buttock, stage 3 ([address] 3/12/2020    WD-Skin tear of right forearm without complication     WD-Ulcer of right pretibial region, with fat layer exposed ([address] 10/17/2019       PAST SURGICAL HISTORY    Past Surgical History:   Procedure Laterality Date    BREAST SURGERY  1970s    benign tumors bilaterally    CARDIAC CATHETERIZATION  6/3/14    EF55% normal study    CARPAL TUNNEL RELEASE Left     CARPAL TUNNEL RELEASE      CATARACT REMOVAL Right 3/11/2013    Dr. Stefani Mart Left 2013    Dr. Candida Sexton      polyps    COLONOSCOPY  11    villous component--f/u colonoscopy will be needed in 6 months    COLONOSCOPY  12    mild diverticulosis, internal hemorrhoids; repeat in 3 years (Dr. Ace Contreras)    COLONOSCOPY  2016    mild diverticulosis, three colon polyps    HERNIA REPAIR  1970s    HERNIA REPAIR      SKIN GRAFT  -present    skin grafts to left ankle ulcers    SPLENECTOMY      SPLENECTOMY      TONSILLECTOMY      VARICOSE VEIN SURGERY Left 2009       FAMILY HISTORY    Family History   Problem Relation Age of Onset    Heart Disease Father     Cancer Father         prostate    High Blood Pressure Father     High Cholesterol Father     Cancer Brother     Diabetes Brother     Thyroid Disease Brother        SOCIAL HISTORY    Social History     Tobacco Use    Smoking status: Former Smoker     Packs/day: 2.00     Years: 35.00     Pack years: 70.00     Types: Cigarettes     Last attempt to quit:      Years since quittin.8    Smokeless tobacco: Never Used    Tobacco comment: chew--30 years.   Reviewed 2015   Substance Use Topics    Alcohol use: Yes     Comment: soc    Drug use: Never       ALLERGIES    Allergies   Allergen Reactions    Cavilon Durable Barrier [Mineral Oil-Dimeth-Coconut Oil]     Parabens Hives    Parabens Hives    Prinivil [Lisinopril] Swelling    Cortisone Rash    Cortisone Rash    Dilaudid [Hydromorphone Hcl] Rash    Penicillins Rash    Penicillins Rash    Sulfamethoxazole-Trimethoprim Nausea Only    Tape [Adhesive Tape] Rash       MEDICATIONS    Current Outpatient Medications on File Prior to Encounter   Medication Sig Dispense Refill    warfarin (COUMADIN) 5 MG tablet Take 1 tablet by mouth daily Except take 1 and 1/2 tablets by mouth on Mondays or as directed by clinic (Patient taking differently: Take 5 mg by mouth nightly Except take 1 and 1/2 tablets by mouth on Mondays or as directed by clinic) 98 tablet 1    losartan (COZAAR) 50 MG tablet Take 1 tablet by mouth daily Patient tolerates diovan- HOLD if blood pressure less than 120/60 90 tablet 1    buPROPion (WELLBUTRIN XL) 300 MG extended release tablet Take 1 tablet by mouth every morning 90 tablet 0    omeprazole (PRILOSEC) 20 MG delayed release capsule TAKE ONE (1) CAPSULE BY MOUTH ONCE DAILY 90 capsule 1    glycopyrrolate-formoterol (BEVESPI AEROSPHERE) 9-4.8 MCG/ACT AERO Inhale 2 puffs into the lungs 2 times daily 1 Inhaler 5    Spacer/Aero-Holding Chambers (AEROCHAMBER MV) MISC 1 actuation by Does not apply route 2 times daily 1 each 0    Zinc Sulfate (ZINC 15 PO) Take 50 mg by mouth every other day      potassium chloride (MICRO-K) 10 MEQ extended release capsule Take 10 mEq by mouth daily      metoprolol tartrate (LOPRESSOR) 25 MG tablet Take 0.5 tablets by mouth 2 times daily 90 tablet 3    Zinc Oxide (DESITIN CREAMY EX) Apply 13 % topically      aspirin 81 MG chewable tablet Take 1 tablet by mouth daily 30 tablet 3    blood glucose test strips (ALISHA CONTOUR TEST) strip USE AS DIRECTED TO TEST BLOOD SUGAR FOUR TIMES DAILY AS NEEDED 100 strip 3    ALISHA CONTOUR TEST strip USE AS DIRECTED TO TEST BLOOD SUGAR FOUR TIMES DAILY 100 strip 5    Glucose Blood (BLOOD GLUCOSE TEST STRIPS) STRP Please give contour testing strips to test blood sugar 4x daily 200 strip 3    escitalopram (LEXAPRO) 20 MG tablet Take 1 tablet by mouth daily 90 tablet 0    ARIPiprazole (ABILIFY) 15 MG tablet Take 1 tablet by mouth daily 90 tablet 0    atorvastatin (LIPITOR) 40 MG tablet Take 1 tablet by mouth nightly 90 tablet 0    furosemide (LASIX) 20 MG tablet Take 1 tablet by mouth 2 times daily 180 tablet 0    metFORMIN (GLUCOPHAGE) 500 MG tablet Take 1 tablet by mouth daily (with breakfast) 90 tablet 0    phytonadione (VITAMIN K) 5 MG tablet Take 1 tablet by mouth once for 1 dose Today on 6/9/2020 (Today's INR was 8.5) 1 tablet 0     No current facility-administered medications on file prior to encounter. REVIEW OF SYSTEMS    Pertinent items are noted in HPI. Constitutional: Negative for systemic symptoms including fever, chills and malaise. Objective: There were no vitals taken for this visit. PHYSICAL EXAM      General: The patient is in no acute distress. Mental status:  Patient is appropriate, is  oriented to place and plan of care.   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)    Relevant Medications    lidocaine (XYLOCAINE) 4 % external solution    Other Relevant Orders    Supply: Wound Cleanser    Supply: Wound Dressings    Supply: Cover and Secure    Diabetes mellitus with skin ulcer ([facility] - Primary    Relevant Medications    lidocaine (XYLOCAINE) 4 % external solution    Other Relevant Orders    Supply: Wound Cleanser    Supply: Wound Dressings    Supply: Cover and Secure    WD-Ulcer of shin, left, with fat layer exposed ([address]    Relevant Medications    lidocaine (XYLOCAINE) 4 % external solution    Other Relevant Orders    Supply: Wound Cleanser    Supply: Wound Dressings    Supply: Cover and Secure    Culture, Wound    Venous (peripheral) insufficiency    Relevant Medications    lidocaine (XYLOCAINE) 4 % external solution    Other Relevant Orders    Supply: Wound Cleanser    Supply: Wound Dressings    Supply: Cover and Secure    WD-Chronic venous hypertension (idiopathic) with ulcer of left lower extremity (CODE) (HCC)    Relevant Medications    lidocaine (XYLOCAINE) 4 % external solution    Other Relevant Orders    Supply: Wound Cleanser    Supply: Wound Dressings    Supply: Cover and Secure    Culture, Wound    WD-Chronic venous hypertension with inflammation, right    Relevant Medications    lidocaine (XYLOCAINE) 4 % external solution    Other Relevant Orders    Supply: Wound Cleanser    Supply: Wound Dressings    Supply: Cover and Secure        Procedure Note    Indications:  Based on my examination of this patient's wound(s) today, sharp excision into necrotic subcutaneous tissue is required to promote healing and evaluate the extent of previous healing. Performed by: Andrew Benson MD    Consent obtained: Yes    Time out taken:  Yes    Pain Control: none       Debridement:Excisional Debridement    Using curette the wound(s) was/were sharply debrided down through and including the removal of epidermis, dermis and subcutaneous tissue.         Devitalized Tissue Debrided:  fibrin, biofilm, slough and exudate    Pre Debridement Measurements:  Are located in the Wound Documentation Flow Sheet    All active wounds listed below with today's date are evaluated  Wound(s)    debrided this date include # : 6     Post  Debridement Measurements:  Wound 11/15/13 Other (Comment) Leg Inner erethema with a small puncture site Wound Width (cm) 0 cm 10/15/20 0907   Wound Depth (cm) 0 cm 10/15/20 0907   Wound Surface Area (cm^2) 0 cm^2 10/15/20 0907   Change in Wound Size % (l*w) 100 10/15/20 0907   Wound Volume (cm^3) 0 cm^3 10/15/20 0907   Wound Healing % 100 10/15/20 0907   Post-Procedure Length (cm) 21.5 cm 08/13/20 1020   Post-Procedure Width (cm) 7.5 cm 08/13/20 1020   Post-Procedure Depth (cm) 0.1 cm 08/13/20 1020   Post-Procedure Surface Area (cm^2) 161.25 cm^2 08/13/20 1020   Post-Procedure Volume (cm^3) 16.12 cm^3 08/13/20 1020   Distance Tunneling (cm) 0 cm 10/15/20 0907   Tunneling Position ___ O'Clock 0 10/15/20 0907   Undermining Starts ___ O'Clock 0 10/15/20 0907   Undermining Ends___ O'Clock 0 10/15/20 0907   Undermining Maxium Distance (cm) 0 10/15/20 0907   Wound Assessment Epithelialization 10/15/20 0907   Drainage Amount None 10/15/20 0907   Odor None 10/15/20 0907   Ana-wound Assessment Dry/flaky; Intact 10/15/20 0907   Margins Defined edges 10/15/20 0907   Wound Thickness Description not for Pressure Injury Not applicable 04/20/49 5494   Number of days: 77       Wound 08/27/20 Pretibial Left;Proximal #8 Left Proximal Lower Leg (Active)   Wound Image   09/24/20 0904   Wound Etiology Other 09/24/20 0904   Dressing Status Clean;Dry; Intact 10/15/20 1017   Wound Cleansed Soap and water 10/15/20 0907   Dressing/Treatment Alginate 09/17/20 0955   Offloading for Diabetic Foot Ulcers Walker 10/15/20 0907   Wound Length (cm) 0.8 cm 10/15/20 0907   Wound Width (cm) 0.9 cm 10/15/20 0907   Wound Depth (cm) 0.1 cm 10/15/20 0907   Wound Surface Area (cm^2) 0.72 cm^2 10/15/20 0907   Change in Wound Size % (l*w) 64 10/15/20 0907   Wound Volume (cm^3) 0.07 cm^3 10/15/20 0907   Wound Healing % 65 10/15/20 0907   Post-Procedure Length (cm) 0.8 cm 10/15/20 0955   Post-Procedure Width (cm) 0.9 cm 10/15/20 0955   Post-Procedure Depth (cm) 0.1 cm 10/15/20 0955   Post-Procedure Surface Area (cm^2) 0.72 cm^2 10/15/20 0955   Post-Procedure Volume (cm^3) 0.07 cm^3 10/15/20 0955   Distance Tunneling (cm) 0 cm 10/15/20 0907   Tunneling Position ___ O'Clock 0 10/15/20 0907   Undermining Starts ___ O'Clock 0 10/15/20 0907   Undermining Ends___ O'Clock 0 10/15/20 0907   Undermining Maxium Distance (cm) 0 10/15/20 0907   Wound Assessment Granulation tissue 10/15/20 0907   Drainage Amount Moderate 10/15/20 0907   Drainage Description Serosanguinous 10/15/20 0907   Odor Mild 10/15/20 0907   Ana-wound Assessment Intact 10/15/20 0907   Margins Defined edges 10/15/20 0907   Wound Thickness Description not for Pressure Injury Full thickness 10/15/20 0907   Number of days: 49       Percent of Wound(s) Debrided: approximately 100%    Total  Area  Debrided:  3.8 sq cm     Bleeding:  Minimal    Hemostasis Achieved:  by pressure and silver nitrate stick    Procedural Pain:  0  / 10     Post Procedural Pain:  0 / 10     Response to treatment:  Well tolerated by patient. Status of wound progress and description from last visit:  Worse today- holding graft. Culture obtained also. May need to have antibiotics depending on culture result. Plan:       Discharge Instructions            NOTE: Upon discharge from the [address], you will receive a patient experience survey. We would be grateful if you would take the time to fill this survey out.     Wound care order history:                 ANA CRISTINA's   Right       Left               Date               Vascular studies:   ordered on 7/25/19 Date  To be done 8/2/19 imaging center               Imaging:  Date               Cultures: obtained from left medal leg  Date 6/27/19--positive kles.                                VUZHDECH from left medial lower leg on 8/15/19                               Obtained from left medial lower leg on 9/26/19                               Obtained from right leg on 10/17/19                               Obtained from left leg on 3/12/2020                               Obtained from right                                   Daily Wound management:                                                Avoid standing for long periods of time.                          XCFVS wraps/stockings in AM and remove at bedtime.                          If swelling is present, elevate legs to the level of the heart or above for 30 minutes 4-5 times a day and/or when sitting.                                             When taking antibiotics take entire prescription as ordered by physician do not stop taking until medicine is all gone.                                                    Orders for this week: 10/15/2020     Auth for graft on 9/10/2020          Left medial ankle distal and proximal leg -- cleanse with  vashe in clinic today --for 5 minutes. Apply Betadine to wound bed painted,  Cover with Silver calcium alginate and  ABD  , coban 2 lite --leave in place x 1 week until patient returns to clinic next week      Right Leg -- Apply  Lotrisone and A&D to intact skin.  Wrap with coban 2 lite leave in place until next visit          Buttock-- Apply Mepilex border as needed.      Follow up Susannah Padilla Dr in 1 week on Thursday in the wound care center  Call [phone] for any questions or concerns.   Physician Signature           Treatment Note Wound 07/30/20 #7 (onset 1 week) Right Lower Leg Circumferential-Dressing/Treatment: (betadine;alginate;abd; coban 2 lite; tape)  Wound 06/27/19 #6 (onset 1 month) Left Medial Distal Ankle-Dressing/Treatment: (betadine;alginate;abd; coban 2 lite; tape)  Wound 08/27/20 Pretibial Left;Proximal #8 Left Proximal Lower Leg-Dressing/Treatment: (betadine;alginate;abd; coban 2 lite; tape)    Written Patient Dismissal Instructions Given            Electronically signed by Ilsa Aase, MD on 10/15/2020 at 10:53 AM

## 2020-10-15 NOTE — PROGRESS NOTES
Multilayer Compression Wrap   (Not Unna) Below the Knee    NAME:  Raeann Stone OF BIRTH:  1945  MEDICAL RECORD NUMBER:  [id number]  DATE:  10/15/2020    Multilayer compression wrap: Removed old Multilayer wrap if indicated and wash leg with mild soap/water. Applied moisturizing agent to dry skin as needed. Applied primary and secondary dressing as ordered. Applied multilayered dressing below the knee to right lower leg. Applied multilayered dressing below the knee to left lower leg. Instructed patient/caregiver not to remove dressing and to keep it clean and dry. Instructed patient/caregiver on complications to report to provider, such as pain, numbness in toes, heavy drainage, and slippage of dressing. Instructed patient on purpose of compression dressing and on activity and exercise recommendations.       Electronically signed by Bulmaro Fung RN on 10/15/2020 at 10:19 AM

## 2020-10-15 NOTE — PROGRESS NOTES
Medication Management Service  [facility]  [phone]    Visit Date: 10/15/2020   Subjective:       Dorina Duran is a 69 y.o. male who presents to clinic today for anticoagulation monitoring and adjustment. Patient seen in clinic for warfarin management due to  Indication:   DVT and PE. INR goal: of 2.0-3.0. Duration of therapy: indefinite. Patient reports the following:   Adherent with regimen  Missed or extra doses:  None   Bleeding or thromboembolic side effects:  None  Significant medication changes:  None  Significant dietary changes: None  Significant alcohol or tobacco changes: None  Significant recent illness, disease state changes, or hospitalization:  None  Upcoming surgeries or procedures:  None  Falls: None           Assessment and Plan     PT/INR done in office per protocol. INR today is 2.4, therapeutic. Plan: Will continue current regimen of warfarin 5mg daily except 7.5mg on Mondays and Thursdays. Recheck INR in 3 week(s). Patient verbalized understanding of dosing directions and information discussed. Dosing schedule given to patient including phone number, appointment date, and time. Progress note sent to referring office. Patient acknowledges working in consult agreement with pharmacist as referred by his/her physician.       Electronically signed by Halie Tavares, [facility] on 10/15/20 at 11:21 AM EDT    CLINICAL PHARMACY CONSULT: MED RECONCILIATION/REVIEW Ghislaine  22. Tracking Only    PHSO: No  Total # of Interventions Recommended: 0    - Maintenance Safety Lab Monitoring #: 1    Total Interventions Accepted: 0  Time Spent (min): Cristian Zaman PharmD

## 2020-10-21 LAB
CULTURE: ABNORMAL
Lab: ABNORMAL
SPECIMEN: ABNORMAL

## 2020-10-22 ENCOUNTER — HOSPITAL ENCOUNTER (OUTPATIENT)
Dept: WOUND CARE | Age: 75
Discharge: HOME | End: 2020-10-22
Payer: MEDICARE

## 2020-10-22 VITALS
TEMPERATURE: 98.7 F | DIASTOLIC BLOOD PRESSURE: 79 MMHG | SYSTOLIC BLOOD PRESSURE: 119 MMHG | RESPIRATION RATE: 16 BRPM | HEART RATE: 94 BPM

## 2020-10-22 PROCEDURE — 11042 DBRDMT SUBQ TIS 1ST 20SQCM/<: CPT

## 2020-10-22 RX ORDER — LIDOCAINE 50 MG/G
OINTMENT TOPICAL ONCE
Status: CANCELLED | OUTPATIENT
Start: 2020-10-22

## 2020-10-22 RX ORDER — LIDOCAINE HYDROCHLORIDE 40 MG/ML
SOLUTION TOPICAL ONCE
Status: CANCELLED | OUTPATIENT
Start: 2020-10-22

## 2020-10-22 RX ORDER — GINSENG 100 MG
CAPSULE ORAL ONCE
Status: CANCELLED | OUTPATIENT
Start: 2020-10-22

## 2020-10-22 RX ORDER — BACITRACIN ZINC AND POLYMYXIN B SULFATE 500; 1000 [USP'U]/G; [USP'U]/G
OINTMENT TOPICAL ONCE
Status: CANCELLED | OUTPATIENT
Start: 2020-10-22

## 2020-10-22 RX ORDER — BACITRACIN, NEOMYCIN, POLYMYXIN B 400; 3.5; 5 [USP'U]/G; MG/G; [USP'U]/G
OINTMENT TOPICAL ONCE
Status: CANCELLED | OUTPATIENT
Start: 2020-10-22

## 2020-10-22 ASSESSMENT — PAIN - FUNCTIONAL ASSESSMENT: PAIN_FUNCTIONAL_ASSESSMENT: PREVENTS OR INTERFERES SOME ACTIVE ACTIVITIES AND ADLS

## 2020-10-22 ASSESSMENT — PAIN DESCRIPTION - FREQUENCY: FREQUENCY: CONTINUOUS

## 2020-10-22 ASSESSMENT — PAIN SCALES - GENERAL: PAINLEVEL_OUTOF10: 6

## 2020-10-22 ASSESSMENT — PAIN DESCRIPTION - PAIN TYPE: TYPE: CHRONIC PAIN

## 2020-10-22 ASSESSMENT — PAIN DESCRIPTION - ONSET: ONSET: ON-GOING

## 2020-10-22 ASSESSMENT — PAIN DESCRIPTION - LOCATION: LOCATION: BUTTOCKS

## 2020-10-22 ASSESSMENT — PAIN DESCRIPTION - ORIENTATION: ORIENTATION: RIGHT;LEFT

## 2020-10-22 ASSESSMENT — PAIN DESCRIPTION - PROGRESSION: CLINICAL_PROGRESSION: NOT CHANGED

## 2020-10-22 ASSESSMENT — PAIN DESCRIPTION - DESCRIPTORS: DESCRIPTORS: ACHING

## 2020-10-22 NOTE — PROGRESS NOTES
Wound Care Center Progress Note With Procedure    Jovany Rizo  AGE: 69 y.o. GENDER: male  : 1945  EPISODE DATE:  10/22/2020     Subjective:     Chief Complaint   Patient presents with    Wound Check     BLE         HISTORY of PRESENT ILLNESS      Jovany Rizo is a 69 y.o. male who presents today for wound evaluation of Chronic venous ulcer(s) of the left leg. The ulcer is of mild severity. The underlying cause of the wound is venous and diabetic. He Is in for f/u- the leg is much less red today- the wound is stable. Culture grew heavy enterococci- we had treated topically and this seems to have helped. Will continue current treatment and then consider new graft next week.   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 Ricky Brambila Anemia 2011    [facility]    Cataract     worsening-Dr. Aron Romero    Cellulitis of left lower leg     Chronic venous hypertension with ulcer ([address] 2011    CKD (chronic kidney disease) 2012    Renal u/S normal     Colon polyps     Dr. Ricky Brambila COPD (chronic obstructive pulmonary disease) ([address]     Diabetes mellitus ([address]     Diabetes mellitus ([address]     Diabetes mellitus with peripheral circulatory disorder ([address] 10/2/2015    Diabetes mellitus with skin ulcer ([address] 10/2/2015    Diabetic peripheral neuropathy ([address]     + EMG, NCS    Diabetic skin ulcer associated with type 2 diabetes mellitus ([address]     Diverticulosis 12    mild, left colon    Femoral DVT (deep venous thrombosis) ([address] 2011    PARTIAL,CHRONIC-SPFLD HEART SURGEONS    GERD (gastroesophageal reflux disease)     Glaucoma 11/12    open angle-Dr. Layton Brennan H/O cardiac catheterization 6/3/14    EF55% normal study    H/O Doppler ultrasound 03/26/15    Carotid US- no significant stenosis noted bilaterally.  H/O echocardiogram 11/21/2019    EF50-55%, Mild AR. Moderately dilated right ventricle with negative Solis's sign.  H/O mumps orchitis     as a youth    Hemorrhoids 4/23/12    Dr. Pedro Hermosillo; repeat colonoscopy 3 years    History of nuclear stress test 11/21/2019    EF 60%, Normal study.  HLD (hyperlipidemia)     Hx of Doppler ultrasound 1/06/15    Lymph node seen in left groin area. No bilateral stenosis.     Hyperlipemia     Hyperlipidemia     Hypertension 1992    Hypertension     Idiopathic chronic venous hypertension of left lower extremity with ulcer and inflammation (HCC) 10/2/2015    Leg ulcer ([address] 1978-present    following at wound center, Dr Latrell Vleazquez No diabetic retinopathy OU 11/12    Dr. Arriaga Show chronic ulcer of left lower leg with fat layer exposed ([address] 10/2/2015    Pulmonary embolism ([address] 11/13    patient on coumadin    Sleep apnea     doesnt always use cpap dt it drys him out    SOB (shortness of breath) Oct 2011    Stress test normal.     Tendinitis 1973    plantar tendons    Type II or unspecified type diabetes mellitus with other specified manifestations, not stated as uncontrolled 2/8/2013    Unspecified venous (peripheral) insufficiency     Urticaria     WD-Cellulitis of right anterior lower leg 9/5/2019    WD-Cellulitis of right lower extremity 3/26/2020    WD-Chronic venous hypertension (idiopathic) with ulcer of left lower extremity (CODE) ([address] 6/27/2019    WD-Chronic venous hypertension with inflammation, right 9/19/2019    WD-Decubitus ulcer of left buttock, stage 3 ([address] 6/25/2020    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 to breakdown of skin ([address] 3/26/2020    WD-Open ALLERGIES    Allergies   Allergen Reactions    Cavilon Durable Barrier [Mineral Oil-Dimeth-Coconut Oil]     Parabens Hives    Parabens Hives    Prinivil [Lisinopril] Swelling    Cortisone Rash    Cortisone Rash    Dilaudid [Hydromorphone Hcl] Rash    Penicillins Rash    Penicillins Rash    Sulfamethoxazole-Trimethoprim Nausea Only    Tape [Adhesive Tape] Rash       MEDICATIONS    Current Outpatient Medications on File Prior to Encounter   Medication Sig Dispense Refill    warfarin (COUMADIN) 5 MG tablet Take 1 tablet by mouth daily Except take 1 and 1/2 tablets by mouth on Mondays or as directed by clinic (Patient taking differently: Take 5 mg by mouth nightly Except take 1 and 1/2 tablets by mouth on Mondays or as directed by clinic) 98 tablet 1    losartan (COZAAR) 50 MG tablet Take 1 tablet by mouth daily Patient tolerates diovan- HOLD if blood pressure less than 120/60 90 tablet 1    buPROPion (WELLBUTRIN XL) 300 MG extended release tablet Take 1 tablet by mouth every morning 90 tablet 0    escitalopram (LEXAPRO) 20 MG tablet Take 1 tablet by mouth daily 90 tablet 0    omeprazole (PRILOSEC) 20 MG delayed release capsule TAKE ONE (1) CAPSULE BY MOUTH ONCE DAILY 90 capsule 1    ARIPiprazole (ABILIFY) 15 MG tablet Take 1 tablet by mouth daily 90 tablet 0    atorvastatin (LIPITOR) 40 MG tablet Take 1 tablet by mouth nightly 90 tablet 0    metFORMIN (GLUCOPHAGE) 500 MG tablet Take 1 tablet by mouth daily (with breakfast) 90 tablet 0    glycopyrrolate-formoterol (BEVESPI AEROSPHERE) 9-4.8 MCG/ACT AERO Inhale 2 puffs into the lungs 2 times daily 1 Inhaler 5    Spacer/Aero-Holding Chambers (AEROCHAMBER MV) MISC 1 actuation by Does not apply route 2 times daily 1 each 0    Zinc Sulfate (ZINC 15 PO) Take 50 mg by mouth every other day      potassium chloride (MICRO-K) 10 MEQ extended release capsule Take 10 mEq by mouth daily      metoprolol tartrate (LOPRESSOR) 25 MG tablet Take 0.5 tablets by mouth 2 times daily 90 tablet 3    Zinc Oxide (DESITIN CREAMY EX) Apply 13 % topically      aspirin 81 MG chewable tablet Take 1 tablet by mouth daily 30 tablet 3    blood glucose test strips (ALISHA CONTOUR TEST) strip USE AS DIRECTED TO TEST BLOOD SUGAR FOUR TIMES DAILY AS NEEDED 100 strip 3    ALISHA CONTOUR TEST strip USE AS DIRECTED TO TEST BLOOD SUGAR FOUR TIMES DAILY 100 strip 5    Glucose Blood (BLOOD GLUCOSE TEST STRIPS) STRP Please give contour testing strips to test blood sugar 4x daily 200 strip 3    furosemide (LASIX) 20 MG tablet Take 1 tablet by mouth 2 times daily 180 tablet 0    phytonadione (VITAMIN K) 5 MG tablet Take 1 tablet by mouth once for 1 dose Today on 6/9/2020 (Today's INR was 8.5) 1 tablet 0     No current facility-administered medications on file prior to encounter. REVIEW OF SYSTEMS    Pertinent items are noted in HPI. Constitutional: Negative for systemic symptoms including fever, chills and malaise. Objective:      /79   Pulse 94   Temp 98.7 °F (37.1 °C) (Temporal)   Resp 16     PHYSICAL EXAM  {GEN    General: The patient is in no acute distress. Mental status:  Patient is appropriate, is  oriented to place and plan of care.   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)    Relevant Orders    Supply: Wound Cleanser    Supply: Wound Dressings    Supply: Cover and Secure    Supply: Edema Control    Diabetes mellitus with skin ulcer ([facility] - Primary    Relevant Orders    Supply: Wound Cleanser    Supply: Wound Dressings    Supply: Cover and Secure    Supply: Edema Control    WD-Ulcer of shin, left, with fat layer exposed ([address]    Relevant Orders    Supply: Wound Cleanser    Supply: Wound Dressings    Supply: Cover and Secure    Supply: Edema Control    Venous (peripheral) insufficiency    Relevant Orders    Supply: Wound Cleanser    Supply: Wound Dressings    Supply: Cover and Secure    Supply: Edema Control    WD-Chronic venous hypertension (idiopathic) with ulcer of left lower extremity (CODE) (HCC)    Relevant Orders    Supply: Wound Cleanser    Supply: Wound Dressings    Supply: Cover and Secure    Supply: Edema Control    WD-Chronic venous hypertension with inflammation, right    Relevant Orders    Supply: Wound Cleanser    Supply: Wound Dressings    Supply: Cover and Secure    Supply: Edema Control        Procedure Note    Indications:  Based on my examination of this patient's wound(s) today, sharp excision into necrotic subcutaneous tissue is required to promote healing and evaluate the extent of previous healing. Performed by: Gladys Hernandez MD    Consent obtained: Yes    Time out taken:  Yes    Pain Control: none       Debridement:Excisional Debridement    Using curette the wound(s) was/were sharply debrided down through and including the removal of subcutaneous tissue. Devitalized Tissue Debrided:  fibrin, biofilm, slough and exudate    Pre Debridement Measurements:  Are located in the Wound Documentation Flow Sheet    All active wounds listed below with today's date are evaluated  Wound(s)    debrided this date include # : 6 and 8    Post  Debridement Measurements:  Wound 11/15/13 Other (Comment) Leg Inner erethema with a small puncture site (Active)   Number of days: 5145       Wound 06/27/19 #6 (onset 1 month) Left Medial Distal Ankle (Active)   Wound Image   10/15/20 0907   Wound Etiology Other 10/22/20 0903   Dressing Status New dressing applied;Clean;Dry; Intact 10/22/20 0935   Wound Cleansed Soap and water;Irrigated with saline 10/22/20 0903   Dressing/Treatment Moisturizing cream 08/06/20 1008   Offloading for Diabetic Foot Ulcers Walker 10/22/20 0903   Wound Length (cm) 2 cm 10/22/20 0903   Wound Width (cm) 2.4 cm 10/22/20 0903   Wound Depth (cm) 0.1 cm 10/22/20 0903   Wound Surface Area (cm^2) 4.8 cm^2 10/22/20 0903   Change in Wound Size % (l*w) 31.43 10/22/20 0903   Wound Volume (cm^3) 0.48 cm^3 10/22/20 0903   Wound Healing % 77 10/22/20 0903   Post-Procedure Length (cm) 2 cm 10/22/20 0930   Post-Procedure Width (cm) 2.4 cm 10/22/20 0930   Post-Procedure Depth (cm) 0.1 cm 10/22/20 0930   Post-Procedure Surface Area (cm^2) 4.8 cm^2 10/22/20 0930   Post-Procedure Volume (cm^3) 0.48 cm^3 10/22/20 0930   Distance Tunneling (cm) 0 cm 10/22/20 0903   Tunneling Position ___ O'Clock 0 10/22/20 0903   Undermining Starts ___ O'Clock 0 10/22/20 0903   Undermining Ends___ O'Clock 0 10/22/20 0903   Undermining Maxium Distance (cm) 0 10/22/20 0903   Wound Assessment Granulation tissue 10/22/20 0903   Drainage Amount Moderate 10/22/20 0903   Drainage Description Serosanguinous 10/22/20 0903   Odor None 10/22/20 0903   Ana-wound Assessment Hyperpigmented 10/22/20 0903   Margins Defined edges; Unattached edges 10/22/20 0903   Wound Thickness Description not for Pressure Injury Full thickness 10/22/20 0903   Number of days: 482       Wound 07/30/20 #7 (onset 1 week) Right Lower Leg Circumferential (Active)   Wound Image   10/15/20 0907   Wound Etiology Other 10/22/20 0903   Dressing Status Other (Comment) 10/22/20 0935   Wound Cleansed Soap and water 10/22/20 0903   Dressing/Treatment Moisturizing cream 09/17/20 0955   Offloading for Diabetic Foot Ulcers Walker 10/22/20 0903   Wound Length (cm) 0 cm 10/22/20 0903   Wound Width (cm) 0 cm 10/22/20 0903   Wound Depth (cm) 0 cm 10/22/20 0903   Wound Surface Area (cm^2) 0 cm^2 10/22/20 0903   Change in Wound Size % (l*w) 100 10/22/20 0903   Wound Volume (cm^3) 0 cm^3 10/22/20 0903   Wound Healing % 100 10/22/20 0903   Post-Procedure Length (cm) 21.5 cm 08/13/20 1020   Post-Procedure Width (cm) 7.5 cm 08/13/20 1020   Post-Procedure Depth (cm) 0.1 cm 08/13/20 1020   Post-Procedure Surface Area (cm^2) 161.25 cm^2 08/13/20 1020   Post-Procedure Volume (cm^3) 16.12 cm^3 08/13/20 1020   Distance Tunneling (cm) 0 cm 10/22/20 0903   Tunneling Position ___ O'Clock 0 10/22/20 0903   Undermining Starts ___ O'Clock 0 10/22/20 0903   Undermining Ends___ O'Clock 0 10/22/20 0903   Undermining Maxium Distance (cm) 0 10/22/20 0903   Wound Assessment Dry 10/22/20 0903   Drainage Amount None 10/22/20 0903   Odor None 10/22/20 0903   Ana-wound Assessment Fragile 10/22/20 0903   Margins Attached edges 10/22/20 0903   Wound Thickness Description not for Pressure Injury Not applicable 75/40/03 6341   Number of days: 84       Wound 08/27/20 Pretibial Left;Proximal #8 Left Proximal Lower Leg (Active)   Wound Image   09/24/20 0904   Wound Etiology Other 10/22/20 6260   Dressing Status New dressing applied;Clean;Dry; Intact 10/22/20 0935   Wound Cleansed Soap and water;Irrigated with saline 10/22/20 0903   Dressing/Treatment Alginate 09/17/20 0955   Offloading for Diabetic Foot Ulcers Walker 10/22/20 0903   Wound Length (cm) 0.5 cm 10/22/20 0903   Wound Width (cm) 0.7 cm 10/22/20 0903   Wound Depth (cm) 0.1 cm 10/22/20 0903   Wound Surface Area (cm^2) 0.35 cm^2 10/22/20 0903   Change in Wound Size % (l*w) 82.5 10/22/20 0903   Wound Volume (cm^3) 0.04 cm^3 10/22/20 0903   Wound Healing % 80 10/22/20 0903   Post-Procedure Length (cm) 0.5 cm 10/22/20 0930   Post-Procedure Width (cm) 0.7 cm 10/22/20 0930   Post-Procedure Depth (cm) 0.1 cm 10/22/20 0930   Post-Procedure Surface Area (cm^2) 0.35 cm^2 10/22/20 0930   Post-Procedure Volume (cm^3) 0.04 cm^3 10/22/20 0930   Distance Tunneling (cm) 0 cm 10/22/20 0903   Tunneling Position ___ O'Clock 0 10/22/20 0903   Undermining Starts ___ O'Clock 0 10/22/20 2114 Undermining Ends___ O'Clock 0 10/22/20 0903   Undermining Maxium Distance (cm) 0 10/22/20 0903   Wound Assessment [facility] 10/22/20 0903   Drainage Amount Moderate 10/22/20 0903   Drainage Description Yellow 10/22/20 0903   Odor None 10/22/20 0903   Ana-wound Assessment Fragile 10/22/20 0903   Margins Defined edges; Unattached edges 10/22/20 8591   Wound Thickness Description not for Pressure Injury Full thickness 10/22/20 0903   Number of days: 56       Percent of Wound(s) Debrided: approximately 100%    Total  Area  Debrided:  5 sq cm     Bleeding:  Minimal    Hemostasis Achieved:  by pressure    Procedural Pain:  0  / 10     Post Procedural Pain:  0 / 10     Response to treatment:  Well tolerated by patient. Status of wound progress and description from last visit:   Wounds do not appear infected, but the size is the same today. Plan:       Discharge Instructions       NOTE: Upon discharge from the [address], you will receive a patient experience survey. We would be grateful if you would take the time to fill this survey out.     Wound care order history:                 ANA CRISTINA's   Right       Left               Date               Vascular studies:   ordered on 7/25/19 Date  To be done 8/2/19 imaging center               Imaging:  Date               Cultures: obtained from left medal leg  Date 6/27/19--positive kles.                                OKHBIOUA from left medial lower leg on 8/15/19                               Obtained from left medial lower leg on 9/26/19                               Obtained from right leg on 10/17/19                               Obtained from left leg on 3/12/2020                               Obtained from right leg on 3/19/2020                                                                                                                           Obtained from right leg on 4/30/2020--scanty growth                                  Biopsy done 7/18/19 bedtime.                          If swelling is present, elevate legs to the level of the heart or above for 30 minutes 4-5 times a day and/or when sitting.                                             When taking antibiotics take entire prescription as ordered by physician do not stop taking until medicine is all gone.                                                    Orders for this week: 10/22/2020     Auth for graft on 9/10/2020          Left medial ankle distal and proximal leg -- cleanse with  vashe in clinic today --for 5 minutes. Apply Betadine to wound bed painted,  Cover with Silver calcium alginate and  ABD  , coban 2 lite --leave in place x 1 week until patient returns to clinic next week      Right Leg -- Apply  Lotrisone and A&D to intact skin.  Wrap with coban 2 lite leave in place until next visit          Buttock-- Apply Mepilex border as needed.      Follow up Susannah [address]illa Dr in 1 week on Thursday in the wound care center  Call [phone] for any questions or concerns.   Physician Signature         Treatment Note Wound 08/27/20 Pretibial Left;Proximal #8 Left Proximal Lower Leg-Dressing/Treatment: (betadine moist, coban 2 lite)  Wound 07/30/20 #7 (onset 1 week) Right Lower Leg Circumferential-Dressing/Treatment: (Coban 2 lite )  Wound 06/27/19 #6 (onset 1 month) Left Medial Distal Ankle-Dressing/Treatment: (betadine moist, coban 2 lite)    Written Patient Dismissal Instructions Given            Electronically signed by John Rock MD on 10/22/2020 at 12:09 PM

## 2020-10-22 NOTE — PROGRESS NOTES
Multilayer Compression Wrap   (Not Unna) Below the Knee    NAME:  Raeann Stone OF BIRTH:  1945  MEDICAL RECORD NUMBER:  [id number]  DATE:  10/22/2020    Multilayer compression wrap: Removed old Multilayer wrap if indicated and wash leg with mild soap/water. Applied moisturizing agent to dry skin as needed. Applied primary and secondary dressing as ordered. Applied multilayered dressing below the knee to right lower leg. Applied multilayered dressing below the knee to left lower leg. Instructed patient/caregiver not to remove dressing and to keep it clean and dry. Instructed patient/caregiver on complications to report to provider, such as pain, numbness in toes, heavy drainage, and slippage of dressing. Instructed patient on purpose of compression dressing and on activity and exercise recommendations.       Electronically signed by Rufino Nevarez LPN on 52/18/[id number] at 9:36 AM

## 2020-10-29 ENCOUNTER — HOSPITAL ENCOUNTER (OUTPATIENT)
Dept: WOUND CARE | Age: 75
Discharge: HOME | End: 2020-10-29
Payer: MEDICARE

## 2020-10-29 VITALS
HEART RATE: 86 BPM | DIASTOLIC BLOOD PRESSURE: 71 MMHG | SYSTOLIC BLOOD PRESSURE: 111 MMHG | TEMPERATURE: 97.7 F | RESPIRATION RATE: 12 BRPM

## 2020-10-29 PROCEDURE — 11042 DBRDMT SUBQ TIS 1ST 20SQCM/<: CPT

## 2020-10-29 RX ORDER — GINSENG 100 MG
CAPSULE ORAL ONCE
Status: CANCELLED | OUTPATIENT
Start: 2020-10-29

## 2020-10-29 RX ORDER — LIDOCAINE HYDROCHLORIDE 40 MG/ML
SOLUTION TOPICAL ONCE
Status: CANCELLED | OUTPATIENT
Start: 2020-10-29

## 2020-10-29 RX ORDER — BACITRACIN ZINC AND POLYMYXIN B SULFATE 500; 1000 [USP'U]/G; [USP'U]/G
OINTMENT TOPICAL ONCE
Status: CANCELLED | OUTPATIENT
Start: 2020-10-29

## 2020-10-29 RX ORDER — BACITRACIN, NEOMYCIN, POLYMYXIN B 400; 3.5; 5 [USP'U]/G; MG/G; [USP'U]/G
OINTMENT TOPICAL ONCE
Status: CANCELLED | OUTPATIENT
Start: 2020-10-29

## 2020-10-29 RX ORDER — LIDOCAINE 50 MG/G
OINTMENT TOPICAL ONCE
Status: CANCELLED | OUTPATIENT
Start: 2020-10-29

## 2020-10-29 ASSESSMENT — PAIN DESCRIPTION - PAIN TYPE: TYPE: CHRONIC PAIN

## 2020-10-29 ASSESSMENT — PAIN DESCRIPTION - ORIENTATION: ORIENTATION: RIGHT;LEFT

## 2020-10-29 ASSESSMENT — PAIN DESCRIPTION - PROGRESSION: CLINICAL_PROGRESSION: GRADUALLY WORSENING

## 2020-10-29 ASSESSMENT — PAIN DESCRIPTION - LOCATION: LOCATION: BUTTOCKS

## 2020-10-29 ASSESSMENT — PAIN - FUNCTIONAL ASSESSMENT: PAIN_FUNCTIONAL_ASSESSMENT: PREVENTS OR INTERFERES SOME ACTIVE ACTIVITIES AND ADLS

## 2020-10-29 ASSESSMENT — PAIN DESCRIPTION - DESCRIPTORS: DESCRIPTORS: ACHING;OTHER (COMMENT)

## 2020-10-29 ASSESSMENT — PAIN SCALES - GENERAL: PAINLEVEL_OUTOF10: 6

## 2020-10-29 ASSESSMENT — PAIN DESCRIPTION - FREQUENCY: FREQUENCY: INTERMITTENT

## 2020-10-29 ASSESSMENT — PAIN DESCRIPTION - ONSET: ONSET: ON-GOING

## 2020-10-29 NOTE — PROGRESS NOTES
Wound Care Center Progress Note With Procedure    Jaquelin Correa  AGE: 69 y.o. GENDER: male  : 1945  EPISODE DATE:  10/29/2020     Subjective:     Chief Complaint   Patient presents with    Wound Check     LLE         HISTORY of PRESENT ILLNESS      Jaquelin Correa is a 69 y.o. male who presents today for wound evaluation of Chronic venous ulcer(s) of the left leg. The ulcer is of moderate severity. The underlying cause of the wound is venous hypertension . He is in for f/u- the wounds are a little worse today. I suspect bioburden- will apply liquid silver nitrate today.     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 Javier Gimenez Anemia 2011    Arm fracture Mellody Body     Dr Calixto Daley with also yearly DM exam    Cataract     worsening-Dr. Yamini Wilkes    Cellulitis of left lower leg     Chronic venous hypertension with ulcer ([address] 2011    CKD (chronic kidney disease) 2012    Renal u/S normal     Colon polyps     Dr. Javier Gimenez COPD (chronic obstructive pulmonary disease) ([address]     Diabetes mellitus ([address]     Diabetes mellitus ([address]     Diabetes mellitus with peripheral circulatory disorder ([address] 10/2/2015    Diabetes mellitus with skin ulcer ([address] 10/2/2015    Diabetic peripheral neuropathy ([address]     + EMG, NCS    Diabetic skin ulcer associated with type 2 diabetes mellitus ([address]     Diverticulosis 12    mild, left colon    Femoral DVT (deep venous thrombosis) ([address] 2011    PARTIAL,CHRONIC-SPFLD HEART SURGEONS    GERD (gastroesophageal reflux disease)     Glaucoma     open angle-Dr. Humaira Ansari injury of left buttock, stage 2 ([address] 2020    WD-Pressure injury of left buttock, stage 3 ([address] 3/12/2020    WD-Pressure injury of right buttock, stage 3 ([address] 3/12/2020    WD-Skin tear of right forearm without complication 3/34/4439    WD-Ulcer of right pretibial region, with fat layer exposed ([address] 10/17/2019       PAST SURGICAL HISTORY    Past Surgical History:   Procedure Laterality Date    BREAST SURGERY  1970s    benign tumors bilaterally    CARDIAC CATHETERIZATION  6/3/14    EF55% normal study    CARPAL TUNNEL RELEASE Left     CARPAL TUNNEL RELEASE      CATARACT REMOVAL Right 3/11/2013    Dr. Chad Benítez Left 2013    Dr. Karlo Kennedy      polyps    COLONOSCOPY  11    villous component--f/u colonoscopy will be needed in 6 months    COLONOSCOPY  12    mild diverticulosis, internal hemorrhoids; repeat in 3 years (Dr. Mega Easley)    COLONOSCOPY  2016    mild diverticulosis, three colon polyps    HERNIA REPAIR  1970s    HERNIA REPAIR      SKIN GRAFT  -present    skin grafts to left ankle ulcers    SPLENECTOMY      SPLENECTOMY      TONSILLECTOMY      VARICOSE VEIN SURGERY Left 2009       FAMILY HISTORY    Family History   Problem Relation Age of Onset    Heart Disease Father     Cancer Father         prostate    High Blood Pressure Father     High Cholesterol Father     Cancer Brother     Diabetes Brother     Thyroid Disease Brother        SOCIAL HISTORY    Social History     Tobacco Use    Smoking status: Former Smoker     Packs/day: 2.00     Years: 35.00     Pack years: 70.00     Types: Cigarettes     Last attempt to quit:      Years since quittin.8    Smokeless tobacco: Never Used    Tobacco comment: chew--30 years.   Reviewed 2015   Substance Use Topics    Alcohol use: Yes     Comment: soc    Drug use: Never       ALLERGIES    Allergies   Allergen Reactions    Cavilon Durable Barrier Wyjayleen Black Oil-Dimeth-Coconut Oil]     Parabens Hives    Parabens Hives    Prinivil [Lisinopril] Swelling    Cortisone Rash    Cortisone Rash    Dilaudid [Hydromorphone Hcl] Rash    Penicillins Rash    Penicillins Rash    Sulfamethoxazole-Trimethoprim Nausea Only    Tape [Adhesive Tape] Rash       MEDICATIONS    Current Outpatient Medications on File Prior to Encounter   Medication Sig Dispense Refill    warfarin (COUMADIN) 5 MG tablet Take 1 tablet by mouth daily Except take 1 and 1/2 tablets by mouth on Mondays or as directed by clinic (Patient taking differently: Take 5 mg by mouth nightly Except take 1 and 1/2 tablets by mouth on Mondays or as directed by clinic) 98 tablet 1    losartan (COZAAR) 50 MG tablet Take 1 tablet by mouth daily Patient tolerates diovan- HOLD if blood pressure less than 120/60 90 tablet 1    buPROPion (WELLBUTRIN XL) 300 MG extended release tablet Take 1 tablet by mouth every morning 90 tablet 0    omeprazole (PRILOSEC) 20 MG delayed release capsule TAKE ONE (1) CAPSULE BY MOUTH ONCE DAILY 90 capsule 1    furosemide (LASIX) 20 MG tablet Take 1 tablet by mouth 2 times daily 180 tablet 0    glycopyrrolate-formoterol (BEVESPI AEROSPHERE) 9-4.8 MCG/ACT AERO Inhale 2 puffs into the lungs 2 times daily 1 Inhaler 5    Spacer/Aero-Holding Chambers (AEROCHAMBER MV) MISC 1 actuation by Does not apply route 2 times daily 1 each 0    Zinc Sulfate (ZINC 15 PO) Take 50 mg by mouth every other day      potassium chloride (MICRO-K) 10 MEQ extended release capsule Take 10 mEq by mouth daily      metoprolol tartrate (LOPRESSOR) 25 MG tablet Take 0.5 tablets by mouth 2 times daily 90 tablet 3    Zinc Oxide (DESITIN CREAMY EX) Apply 13 % topically      aspirin 81 MG chewable tablet Take 1 tablet by mouth daily 30 tablet 3    blood glucose test strips (ALISHA CONTOUR TEST) strip USE AS DIRECTED TO TEST BLOOD SUGAR FOUR TIMES DAILY AS NEEDED 100 strip 3    ALISHA CONTOUR TEST strip USE AS DIRECTED TO TEST BLOOD SUGAR FOUR TIMES DAILY 100 strip 5    Glucose Blood (BLOOD GLUCOSE TEST STRIPS) STRP Please give contour testing strips to test blood sugar 4x daily 200 strip 3    escitalopram (LEXAPRO) 20 MG tablet Take 1 tablet by mouth daily 90 tablet 0    ARIPiprazole (ABILIFY) 15 MG tablet Take 1 tablet by mouth daily 90 tablet 0    atorvastatin (LIPITOR) 40 MG tablet Take 1 tablet by mouth nightly 90 tablet 0    metFORMIN (GLUCOPHAGE) 500 MG tablet Take 1 tablet by mouth daily (with breakfast) 90 tablet 0    phytonadione (VITAMIN K) 5 MG tablet Take 1 tablet by mouth once for 1 dose Today on 6/9/2020 (Today's INR was 8.5) 1 tablet 0     No current facility-administered medications on file prior to encounter. REVIEW OF SYSTEMS    Pertinent items are noted in HPI. Constitutional: Negative for systemic symptoms including fever, chills and malaise. Objective:      /71   Pulse 86   Temp 97.7 °F (36.5 °C) (Temporal)   Resp 12     PHYSICAL EXAM      General: The patient is in no acute distress. Mental status:  Patient is appropriate, is  oriented to place and plan of care.   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)    Relevant Orders    Supply: Wound Cleanser    Supply: Ana Wound    Supply: Wound Dressings    Supply: Cover and Secure    Supply: Edema Control    Diabetes mellitus with skin ulcer ([facility] - Primary    Relevant Orders    Supply: Wound Cleanser    Supply: Ana Wound    Supply: Wound Dressings    Supply: Cover and Secure    Supply: Edema Control    WD-Ulcer of shin, left, with fat layer exposed ([address]    Relevant Orders    Supply: Wound Cleanser    Supply: Ana Wound    Supply: Wound Dressings    Supply: Cover and Secure    Supply: Edema Control    Venous (peripheral) insufficiency    Relevant Orders    Supply: Wound Cleanser    Supply: Ana Wound    Supply: Wound Dressings    Supply: Cover and Secure    Supply: Edema Control    WD-Chronic venous hypertension (idiopathic) with ulcer of left lower extremity (CODE) (HCC)    Relevant Orders    Supply: Wound Cleanser    Supply: Ana Wound    Supply: Wound Dressings    Supply: Cover and Secure    Supply: Edema Control    WD-Chronic venous hypertension with inflammation, right    Relevant Orders    Supply: Wound Cleanser    Supply: Ana Wound    Supply: Wound Dressings    Supply: Cover and Secure    Supply: Edema Control        Procedure Note    Indications:  Based on my examination of this patient's wound(s) today, sharp excision into necrotic subcutaneous tissue is required to promote healing and evaluate the extent of previous healing. Performed by: Helena Robles MD    Consent obtained: Yes    Time out taken:  Yes    Pain Control: none       Debridement:Excisional Debridement    Using curette the wound(s) was/were sharply debrided down through and including the removal of subcutaneous tissue.         Devitalized Tissue Debrided:  fibrin, biofilm and slough    Pre Debridement Measurements:  Are located in the Wound Documentation Flow Sheet    All active wounds listed below with today's date are evaluated  Wound(s)    debrided this date include # : 6  And 8    Post  Debridement Measurements:  Wound 11/15/13 Other (Comment) Leg Inner erethema with a small puncture site (Active)   Number of days: 2539       Wound 06/27/19 #6 (onset 1 month) Left Distal Medial Ankle (Active)   Wound Image   10/29/20 0908   Wound Etiology Other 10/29/20 0908   Dressing Status New dressing applied;Clean;Dry; Intact 10/22/20 0935   Wound Cleansed Soap and water;Irrigated with saline 10/29/20 0908   Dressing/Treatment Moisturizing cream 08/06/20 1008   Offloading for Diabetic Foot Ulcers Walker 10/22/20 0903   Wound Length (cm) 2.4 cm 10/29/20 0908   Wound Width (cm) 3 cm 10/29/20 0908   Wound Depth (cm) 0.1 cm 10/29/20 0908   Wound Surface Area (cm^2) 7.2 cm^2 10/29/20 0908   Change in Wound Size % (l*w) -2.86 10/29/20 0908   Wound Volume (cm^3) 0.72 cm^3 10/29/20 0908   Wound Healing % 66 10/29/20 0908   Post-Procedure Length (cm) 2.4 cm 10/29/20 0939   Post-Procedure Width (cm) 3 cm 10/29/20 0939   Post-Procedure Depth (cm) 0.1 cm 10/29/20 0939   Post-Procedure Surface Area (cm^2) 7.2 cm^2 10/29/20 0939   Post-Procedure Volume (cm^3) 0.72 cm^3 10/29/20 0939   Distance Tunneling (cm) 0 cm 10/29/20 0908   Tunneling Position ___ O'Clock 0 10/29/20 0908   Undermining Starts ___ O'Clock 0 10/29/20 0908   Undermining Ends___ O'Clock 0 10/29/20 0908   Undermining Maxium Distance (cm) 0 10/29/20 0908   Wound Assessment Granulation tissue;Slough 10/29/20 0908   Drainage Amount Moderate 10/29/20 0908   Drainage Description Brown 10/29/20 0908   Odor Mild 10/29/20 0908   Ana-wound Assessment Maceration;Fragile; Hyperpigmented 10/29/20 0908   Margins Defined edges; Unattached edges 10/29/20 0908   Wound Thickness Description not for Pressure Injury Full thickness 10/29/20 0908   Number of days: 489       Wound 07/30/20 #7 (onset 1 week) Right Lower Leg Circumferential (Active)   Wound Image   10/29/20 0908   Wound Etiology Other 10/29/20 0908   Dressing Status Other (Comment) 10/22/20 0935   Wound Cleansed Soap and water 10/29/20 0908   Dressing/Treatment Moisturizing cream 09/17/20 0955   Offloading for Diabetic Foot Ulcers Walker 10/22/20 0903   Wound Length (cm) 0 cm 10/29/20 0908   Wound Width (cm) 0 cm 10/29/20 0908   Wound Depth (cm) 0 cm 10/29/20 0908   Wound Surface Area (cm^2) 0 cm^2 10/29/20 0908 ___ O'Clock 0 10/29/20 0908   Undermining Starts ___ O'Clock 0 10/29/20 0908   Undermining Ends___ O'Clock 0 10/29/20 0908   Undermining Maxium Distance (cm) 0 10/29/20 0908   Wound Assessment Slough;Granulation tissue 10/29/20 0908   Drainage Amount Moderate 10/29/20 0908   Drainage Description Yellow 10/29/20 0908   Odor None 10/29/20 0908   Ana-wound Assessment Fragile; Maceration; Hyperpigmented 10/29/20 0908   Margins Defined edges; Unattached edges 10/29/20 0908   Wound Thickness Description not for Pressure Injury Full thickness 10/29/20 0908   Number of days: 63       Percent of Wound(s) Debrided: approximately 100%    Total  Area  Debrided:  8.8 sq cm     Bleeding:  None    Hemostasis Achieved:  by pressure    Procedural Pain:  0  / 10     Post Procedural Pain:  0 / 10     Response to treatment:  Well tolerated by patient. Status of wound progress and description from last visit:   Wounds a little worse. Addendum- I look at his bottom today- he has 2 small stage 3 pressure ulcers there. He is unable to get any soft pants to help out. He really just needs to get home help as he is unable to self-care. Plan:       Discharge Instructions       NOTE: Upon discharge from the [address], you will receive a patient experience survey. We would be grateful if you would take the time to fill this survey out.     Wound care order history:                 ANA CRISTINA's   Right       Left               Date               Vascular studies:   ordered on 7/25/19 Date  To be done 8/2/19 imaging center               Imaging:  Date               Cultures: obtained from left medal leg  Date 6/27/19--positive kles.                                [id number] from left medial lower leg on 8/15/19                               Obtained from left medial lower leg on 9/26/19                               Obtained from right leg on 10/17/19                               Obtained from left leg on 3/12/2020                               Obtained from right leg on 3/19/2020                                                                                                                           Obtained from right leg on 4/30/2020--scanty growth                                  Biopsy done 7/18/19                                Obtained culture 10/15/2020                 Labs/ HbA1c  Date               Grafts:   #1 Nushield applied on 9/17/2020  #2 Nushield applied on 9/24/2020  #3 Nushield applied on 10/1/2020  #4 Nushield applied on 10/8/2020                    HBO:                Antibiotics: Doxycycline for 14 days BID; phoned to [facility] 6/28/19                                    Doxycycline for 10 days BID and CIPRO 500 mg BID for 10 days ordered on 8/22/19                                     Doxycycline for 7 days on 9/5/19                                   Doxycycline for 7 days on 3/12/2020 continued on 3/19/2020 for 7 days                                    Bactrim DS for  10 days on 3/26/52571                                   Bactrim DS for 10 days on 6/25/2020                                                 Earlier Wound care treatments:                [id number]:                        Consults:   Date 7/18/19 to Dr Kamari Castellanos[facility]      Continuing wound care orders and information:              Residence: private               Continue home health care with:none at this time               Your wound-care supplies will be provided by: Troy Ferro provider:              [id number] with              PCG loading: Date              WJKTJ Medications: RX SANTYL GIVEN 8/1/19              UFXFH cleansing:                           JU not scrub or use excessive force.                          Wash hands with soap and water before and after dressing changes.                           Prior to applying a clean dressing, cleanse wound

## 2020-11-05 ENCOUNTER — HOSPITAL ENCOUNTER (OUTPATIENT)
Dept: WOUND CARE | Age: 75
Discharge: HOME | End: 2020-11-05
Payer: MEDICARE

## 2020-11-05 ENCOUNTER — ANTI-COAG VISIT (OUTPATIENT)
Dept: PHARMACY | Age: 75
End: 2020-11-05
Payer: MEDICARE

## 2020-11-05 VITALS
HEART RATE: 103 BPM | RESPIRATION RATE: 16 BRPM | SYSTOLIC BLOOD PRESSURE: 108 MMHG | TEMPERATURE: 99.9 F | DIASTOLIC BLOOD PRESSURE: 69 MMHG

## 2020-11-05 LAB
INTERNATIONAL NORMALIZATION RATIO, POC: 1.8
POC INR: 1.8 INDEX
PROTHROMBIN TIME, POC: 21.8 SECONDS (ref 10–14.3)

## 2020-11-05 PROCEDURE — 36416 COLLJ CAPILLARY BLOOD SPEC: CPT

## 2020-11-05 PROCEDURE — 99211 OFF/OP EST MAY X REQ PHY/QHP: CPT

## 2020-11-05 PROCEDURE — 11042 DBRDMT SUBQ TIS 1ST 20SQCM/<: CPT

## 2020-11-05 PROCEDURE — 85610 PROTHROMBIN TIME: CPT

## 2020-11-05 RX ORDER — LIDOCAINE HYDROCHLORIDE 40 MG/ML
SOLUTION TOPICAL ONCE
Status: CANCELLED | OUTPATIENT
Start: 2020-11-05

## 2020-11-05 RX ORDER — BACITRACIN ZINC AND POLYMYXIN B SULFATE 500; 1000 [USP'U]/G; [USP'U]/G
OINTMENT TOPICAL ONCE
Status: CANCELLED | OUTPATIENT
Start: 2020-11-05

## 2020-11-05 RX ORDER — GINSENG 100 MG
CAPSULE ORAL ONCE
Status: CANCELLED | OUTPATIENT
Start: 2020-11-05

## 2020-11-05 RX ORDER — LIDOCAINE 50 MG/G
OINTMENT TOPICAL ONCE
Status: CANCELLED | OUTPATIENT
Start: 2020-11-05

## 2020-11-05 RX ORDER — BACITRACIN, NEOMYCIN, POLYMYXIN B 400; 3.5; 5 [USP'U]/G; MG/G; [USP'U]/G
OINTMENT TOPICAL ONCE
Status: CANCELLED | OUTPATIENT
Start: 2020-11-05

## 2020-11-05 NOTE — PROGRESS NOTES
Wound Care Center Progress Note With Procedure    Breanna Mcdonald  AGE: 69 y.o. GENDER: male  : 1945  EPISODE DATE:  2020     Subjective:     Chief Complaint   Patient presents with    Wound Check     LLE         HISTORY of PRESENT ILLNESS      Breanna Mcdonald is a 69 y.o. male who presents today for wound evaluation of Chronic venous ulcer(s) of the left leg. The ulcer is of mild severity. The underlying cause of the wound is venous. He is in for f/u- we did liquid silver nitrate last week- this has improved his granulation tissue. Will repeat this week.   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 Stephenie Alfaro Anemia 2011    Arm fracture Uma Zulema     Dr Sue Theodore with also yearly DM exam    Cataract     worsening-Dr. Kalyn Roth    Cellulitis of left lower leg     Chronic venous hypertension with ulcer ([address] 2011    CKD (chronic kidney disease) 2012    Renal u/S normal     Colon polyps     Dr. Stephenie Alfaro COPD (chronic obstructive pulmonary disease) ([address]     Diabetes mellitus ([address]     Diabetes mellitus ([address]     Diabetes mellitus with peripheral circulatory disorder ([address] 10/2/2015    Diabetes mellitus with skin ulcer ([address] 10/2/2015    Diabetic peripheral neuropathy ([address]     + EMG, NCS    Diabetic skin ulcer associated with type 2 diabetes mellitus ([address]     Diverticulosis 12    mild, left colon    Femoral DVT (deep venous thrombosis) ([address] 2011    PARTIAL,CHRONIC-SPFLD HEART SURGEONS    GERD (gastroesophageal reflux disease)     Glaucoma     open angle-Dr. Mylo Leyden H/O cardiac catheterization 6/3/14    EF55% normal study    H/O Doppler ultrasound 03/26/15    Carotid US- no significant stenosis noted bilaterally.  H/O echocardiogram 11/21/2019    EF50-55%, Mild AR. Moderately dilated right ventricle with negative Solis's sign.  H/O mumps orchitis     as a youth    Hemorrhoids 4/23/12    Dr. Za Villagran; repeat colonoscopy 3 years    History of nuclear stress test 11/21/2019    EF 60%, Normal study.  HLD (hyperlipidemia)     Hx of Doppler ultrasound 1/06/15    Lymph node seen in left groin area. No bilateral stenosis.     Hyperlipemia     Hyperlipidemia     Hypertension 1992    Hypertension     Idiopathic chronic venous hypertension of left lower extremity with ulcer and inflammation (HCC) 10/2/2015    Leg ulcer ([address] 1978-present    following at wound center, Dr Neelima Baird No diabetic retinopathy OU 11/12    Dr. Allen Garcia chronic ulcer of left lower leg with fat layer exposed ([address] 10/2/2015    Pulmonary embolism ([address] 11/13    patient on coumadin    Sleep apnea     doesnt always use cpap dt it drys him out    SOB (shortness of breath) Oct 2011    Stress test normal.     Tendinitis 1973    plantar tendons    Type II or unspecified type diabetes mellitus with other specified manifestations, not stated as uncontrolled 2/8/2013    Unspecified venous (peripheral) insufficiency     Urticaria     WD-Cellulitis of right anterior lower leg 9/5/2019    WD-Cellulitis of right lower extremity 3/26/2020    WD-Chronic venous hypertension (idiopathic) with ulcer of left lower extremity (CODE) ([address] 6/27/2019    WD-Chronic venous hypertension with inflammation, right 9/19/2019    WD-Decubitus ulcer of left buttock, stage 3 ([address] 6/25/2020    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 to breakdown of skin ([address] 3/26/2020    WD-Open wound of hand without complication, left, initial encounter 12/12/2019    WD-Pressure injury of left buttock, stage 2 ([address] 2020    WD-Pressure injury of left buttock, stage 3 ([address] 3/12/2020    WD-Pressure injury of right buttock, stage 3 ([address] 3/12/2020    WD-Skin tear of right forearm without complication 3/70/9502    WD-Ulcer of right pretibial region, with fat layer exposed ([address] 10/17/2019       PAST SURGICAL HISTORY    Past Surgical History:   Procedure Laterality Date    BREAST SURGERY  1970s    benign tumors bilaterally    CARDIAC CATHETERIZATION  6/3/14    EF55% normal study    CARPAL TUNNEL RELEASE Left     CARPAL TUNNEL RELEASE      CATARACT REMOVAL Right 3/11/2013    Dr. Gudelia Gresham Left 2013    Dr. Evie Barba      polyps    COLONOSCOPY  11    villous component--f/u colonoscopy will be needed in 6 months    COLONOSCOPY  12    mild diverticulosis, internal hemorrhoids; repeat in 3 years (Dr. Becka Coy)    COLONOSCOPY  2016    mild diverticulosis, three colon polyps    HERNIA REPAIR  1970s    HERNIA REPAIR      SKIN GRAFT  -present    skin grafts to left ankle ulcers    SPLENECTOMY      SPLENECTOMY      TONSILLECTOMY      VARICOSE VEIN SURGERY Left        FAMILY HISTORY    Family History   Problem Relation Age of Onset    Heart Disease Father     Cancer Father         prostate    High Blood Pressure Father     High Cholesterol Father     Cancer Brother     Diabetes Brother     Thyroid Disease Brother        SOCIAL HISTORY    Social History     Tobacco Use    Smoking status: Former Smoker     Packs/day: 2.00     Years: 35.00     Pack years: 70.00     Types: Cigarettes     Last attempt to quit:      Years since quittin.8    Smokeless tobacco: Never Used    Tobacco comment: chew--30 years.   Reviewed 2015   Substance Use Topics    Alcohol use: Yes     Comment: soc    Drug use: Never       ALLERGIES    Allergies   Allergen Reactions    Cavilon Durable Barrier [Mineral Oil-Dimeth-Coconut Oil]     Parabens Hives    Parabens Hives    Prinivil [Lisinopril] Swelling    Cortisone Rash    Cortisone Rash    Dilaudid [Hydromorphone Hcl] Rash    Penicillins Rash    Penicillins Rash    Sulfamethoxazole-Trimethoprim Nausea Only    Tape [Adhesive Tape] Rash       MEDICATIONS    Current Outpatient Medications on File Prior to Encounter   Medication Sig Dispense Refill    warfarin (COUMADIN) 5 MG tablet Take 1 tablet by mouth daily Except take 1 and 1/2 tablets by mouth on Mondays or as directed by clinic (Patient taking differently: Take 5 mg by mouth nightly Except take 1 and 1/2 tablets by mouth on Mondays or as directed by clinic) 98 tablet 1    losartan (COZAAR) 50 MG tablet Take 1 tablet by mouth daily Patient tolerates diovan- HOLD if blood pressure less than 120/60 90 tablet 1    buPROPion (WELLBUTRIN XL) 300 MG extended release tablet Take 1 tablet by mouth every morning 90 tablet 0    omeprazole (PRILOSEC) 20 MG delayed release capsule TAKE ONE (1) CAPSULE BY MOUTH ONCE DAILY 90 capsule 1    glycopyrrolate-formoterol (BEVESPI AEROSPHERE) 9-4.8 MCG/ACT AERO Inhale 2 puffs into the lungs 2 times daily 1 Inhaler 5    Spacer/Aero-Holding Chambers (AEROCHAMBER MV) MISC 1 actuation by Does not apply route 2 times daily 1 each 0    Zinc Sulfate (ZINC 15 PO) Take 50 mg by mouth every other day      potassium chloride (MICRO-K) 10 MEQ extended release capsule Take 10 mEq by mouth daily      metoprolol tartrate (LOPRESSOR) 25 MG tablet Take 0.5 tablets by mouth 2 times daily 90 tablet 3    Zinc Oxide (DESITIN CREAMY EX) Apply 13 % topically      aspirin 81 MG chewable tablet Take 1 tablet by mouth daily 30 tablet 3    blood glucose test strips (ALISHA CONTOUR TEST) strip USE AS DIRECTED TO TEST BLOOD SUGAR FOUR TIMES DAILY AS NEEDED 100 strip 3    ALISHA CONTOUR TEST strip USE AS DIRECTED TO TEST BLOOD SUGAR FOUR TIMES DAILY 100 strip 5    Glucose Blood (BLOOD GLUCOSE TEST STRIPS) STRP Please give contour testing strips to test blood sugar 4x daily 200 strip 3    escitalopram (LEXAPRO) 20 MG tablet Take 1 tablet by mouth daily 90 tablet 0    ARIPiprazole (ABILIFY) 15 MG tablet Take 1 tablet by mouth daily 90 tablet 0    atorvastatin (LIPITOR) 40 MG tablet Take 1 tablet by mouth nightly 90 tablet 0    furosemide (LASIX) 20 MG tablet Take 1 tablet by mouth 2 times daily 180 tablet 0    metFORMIN (GLUCOPHAGE) 500 MG tablet Take 1 tablet by mouth daily (with breakfast) 90 tablet 0    phytonadione (VITAMIN K) 5 MG tablet Take 1 tablet by mouth once for 1 dose Today on 6/9/2020 (Today's INR was 8.5) 1 tablet 0     No current facility-administered medications on file prior to encounter. REVIEW OF SYSTEMS    Pertinent items are noted in HPI. Constitutional: Negative for systemic symptoms including fever, chills and malaise. Objective:      /69   Pulse 103   Temp 99.9 °F (37.7 °C) (Temporal)   Resp 16     PHYSICAL EXAM    General: The patient is in no acute distress. Mental status:  Patient is appropriate, is  oriented to place and plan of care.   Dermatologic exam: Visual inspection of the periwound reveals the skin to be normal in turgor and texture  Wound exam: see wound description below in procedure note      Assessment:   Leg wounds are a little better  Problem List Items Addressed This Visit     Diabetic skin ulcer associated with type 2 diabetes mellitus (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 - Primary    Relevant Orders    Supply: Wound Cleanser    Supply: Ana Wound    Supply: Wound Dressings    Supply: Cover and Secure    Supply: Edema Control    WD-Ulcer of shin, left, with fat layer exposed ([address]    Relevant Orders    Supply: Wound Cleanser    Supply: Ana Wound    Supply: Wound Dressings    Supply: Cover and Secure    Supply: Edema Control    Venous (peripheral) insufficiency    Relevant Orders    Supply: Wound Cleanser    Supply: Ana Wound    Supply: Wound Dressings    Supply: Cover and Secure    Supply: Edema Control    WD-Chronic venous hypertension (idiopathic) with ulcer of left lower extremity (CODE) (HCC)    Relevant Orders    Supply: Wound Cleanser    Supply: Ana Wound    Supply: Wound Dressings    Supply: Cover and Secure    Supply: Edema Control    WD-Chronic venous hypertension with inflammation, right    Relevant Orders    Supply: Wound Cleanser    Supply: Ana Wound    Supply: Wound Dressings    Supply: Cover and Secure    Supply: Edema Control        Procedure Note    Indications:  Based on my examination of this patient's wound(s) today, sharp excision into necrotic epidermis, dermis and subcutaneous tissue is required to promote healing and evaluate the extent of previous healing. Performed by: Heloise Peabody, MD    Consent obtained: Yes    Time out taken:  Yes    Pain Control: none       Debridement:Excisional Debridement    Using curette the wound(s) was/were sharply debrided down through and including the removal of subcutaneous tissue.         Devitalized Tissue Debrided:  fibrin, biofilm, slough and exudate    Pre Debridement Measurements:  Are located in the Wound Documentation Flow Sheet    All active wounds listed below with today's date are evaluated  Wound(s)    debrided this date include # : 6     Post  Debridement Measurements:  Wound 11/15/13 Other (Comment) Leg Inner erethema with a small puncture site (Active)   Number of days: 4453       Wound 06/27/19 #6 (onset 1 month) Left Distal Medial Ankle (Active)   Wound Image   10/29/20 0908   Wound Etiology Other 11/05/20 0904   Dressing Status New dressing applied;Clean;Dry; Intact 10/22/20 0935   Wound Cleansed Soap and water; Wound cleanser 11/05/20 0904   Dressing/Treatment Moisturizing cream 08/06/20 1008   Offloading for Diabetic Foot Ulcers Walker 11/05/20 0904   Wound Length (cm) 2.2 cm 11/05/20 0904   Wound Width (cm) 2.6 cm 11/05/20 0904   Wound Depth (cm) 0.2 cm 11/05/20 0904   Wound Surface Area (cm^2) 5.72 cm^2 11/05/20 0904   Change in Wound Size % (l*w) 18.29 11/05/20 0904   Wound Volume (cm^3) 1.14 cm^3 11/05/20 0904   Wound Healing % 46 11/05/20 0904   Post-Procedure Length (cm) 2.4 cm 10/29/20 0939   Post-Procedure Width (cm) 3 cm 10/29/20 0939   Post-Procedure Depth (cm) 0.1 cm 10/29/20 0939   Post-Procedure Surface Area (cm^2) 7.2 cm^2 10/29/20 0939   Post-Procedure Volume (cm^3) 0.72 cm^3 10/29/20 0939   Distance Tunneling (cm) 0 cm 11/05/20 0904   Tunneling Position ___ O'Clock 0 11/05/20 0904   Undermining Starts ___ O'Clock 0 11/05/20 0904   Undermining Ends___ O'Clock 0 11/05/20 0904   Undermining Maxium Distance (cm) 0 11/05/20 0904   Wound Assessment Granulation tissue;Slough 11/05/20 0904   Drainage Amount Moderate 11/05/20 0904   Drainage Description Yellow 11/05/20 0904   Odor Mild 11/05/20 0904   Ana-wound Assessment Fragile; Maceration 11/05/20 0904   Margins Defined edges; Unattached edges 11/05/20 0904   Wound Thickness Description not for Pressure Injury Full thickness 11/05/20 0904   Number of days: 496       Wound 07/30/20 #7 (onset 1 week) Right Lower Leg Circumferential (Active)   Wound Image   10/29/20 0908   Wound Etiology Other 11/05/20 0904   Dressing Status Other (Comment) 10/22/20 0935   Wound Cleansed Soap and water; Wound cleanser 11/05/20 0904   Dressing/Treatment Moisturizing cream 09/17/20 0955   Offloading for Diabetic Foot Ulcers Walker 11/05/20 0904   Wound Length (cm) 0 cm 11/05/20 0904   Wound Width (cm) 0 cm 11/05/20 0904   Wound Depth (cm) 0 cm 11/05/20 0904   Wound Surface Area (cm^2) 0 cm^2 11/05/20 0904   Change in Wound Size % (l*w) 100 11/05/20 0904   Wound Volume (cm^3) 0 cm^3 11/05/20 0904   Wound Healing % 100 11/05/20 0904   Post-Procedure Length (cm) 21.5 cm 08/13/20 1020   Post-Procedure Width (cm) 7.5 cm 08/13/20 1020   Post-Procedure Depth (cm) 0.1 cm 08/13/20 1020   Post-Procedure Surface Area (cm^2) 161.25 cm^2 08/13/20 1020   Post-Procedure Volume (cm^3) 16.12 cm^3 08/13/20 1020   Distance Tunneling (cm) 0 cm 11/05/20 0904   Tunneling Position ___ O'Clock 0 11/05/20 0904   Undermining Starts ___ O'Clock 0 11/05/20 0904   Undermining Ends___ O'Clock 0 11/05/20 0904   Undermining Maxium Distance (cm) 0 11/05/20 0904   Wound Assessment Dry 11/05/20 0904   Drainage Amount None 11/05/20 0904   Odor None 11/05/20 0904   Ana-wound Assessment Fragile 11/05/20 0904   Margins Attached edges 11/05/20 0904   Wound Thickness Description not for Pressure Injury Not applicable 55/52/64 8646   Number of days: 98       Wound 08/27/20 Pretibial Left;Proximal #8 Left Proximal Lower Leg (Active)   Wound Image   10/29/20 0908   Wound Etiology Other 11/05/20 0904   Dressing Status New dressing applied;Clean;Dry; Intact 10/22/20 0935   Wound Cleansed Soap and water; Wound cleanser 11/05/20 0904   Dressing/Treatment Alginate 09/17/20 0955   Offloading for Diabetic Foot Ulcers Walker 11/05/20 0904   Wound Length (cm) 1 cm 11/05/20 0904   Wound Width (cm) 0.9 cm 11/05/20 0904   Wound Depth (cm) 0.2 cm 11/05/20 0904   Wound Surface Area (cm^2) 0.9 cm^2 11/05/20 0904   Change in Wound Size % (l*w) 55 11/05/20 0904   Wound Volume (cm^3) 0.18 cm^3 11/05/20 0904   Wound Healing % 10 11/05/20 0904   Post-Procedure Length (cm) 1.1 cm 10/29/20 0939   Post-Procedure Width (cm) 1.5 cm 10/29/20 0939   Post-Procedure Depth (cm) 0.1 cm 10/29/20 0939   Post-Procedure Surface Area (cm^2) 1.65 cm^2 10/29/20 0939   Post-Procedure Volume (cm^3) 0.16 cm^3 10/29/20 0939   Distance Tunneling (cm) 0 cm 11/05/20 7779   Tunneling Position ___ O'Clock 0 11/05/20 0904   Undermining Starts ___ O'Clock 0 11/05/20 0904   Undermining Ends___ O'Clock 0 11/05/20 0904   Undermining Maxium Distance (cm) 0 11/05/20 0904   Wound Assessment Slough;Granulation tissue 11/05/20 0904   Drainage Amount Moderate 11/05/20 0904   Drainage Description Yellow 11/05/20 0904   Odor None 11/05/20 0904   Ana-wound Assessment Fragile; Maceration 11/05/20 0904   Margins Defined edges; Unattached edges 10/29/20 0908   Wound Thickness Description not for Pressure Injury Full thickness 10/29/20 0908   Number of days: 70       Percent of Wound(s) Debrided: approximately 100%    Total  Area  Debrided:  2 sq cm     Bleeding:  Minimal    Hemostasis Achieved:  by pressure    Procedural Pain:  0  / 10     Post Procedural Pain:  0 / 10     Response to treatment:  Well tolerated by patient. Status of wound progress and description from last visit:   Legs slightly better, still having issues with his buttocks, seems to improve with foam border placement. Plan:       Discharge Instructions       NOTE: Upon discharge from the [address], you will receive a patient experience survey. We would be grateful if you would take the time to fill this survey out.     Wound care order history:                 ANA CRISTINA's   Right       Left               Date               Vascular studies:   ordered on 7/25/19 Date  To be done 8/2/19 imaging center               Imaging:  Date               Cultures: obtained from left medal leg  Date 6/27/19--positive kles.                                EPEDZXDP from left medial lower leg on 8/15/19                               Obtained from left medial lower leg on 9/26/19                               Obtained from right leg on 10/17/19                               Obtained from left leg on 3/12/2020                               Obtained from right leg on 3/19/2020                                                                                                                           Obtained from right leg on 4/30/2020--scanty growth                                  Biopsy done 7/18/19                                Obtained culture 10/15/2020                 Labs/ HbA1c  Date               Grafts:   #1 [place] applied on 9/17/2020  #2 [place] applied on 9/24/2020  #3 [place] applied on 10/1/2020  #4 [place] applied on 10/8/2020                    HBO:                Antibiotics: Doxycycline for 14 days BID; phoned to [address] [place] [address] [address] 6/28/19                                    Doxycycline for 10 days BID and CIPRO 500 mg BID for 10 days ordered on 8/22/19                                     Doxycycline for 7 days on 9/5/19                                   Doxycycline for 7 days on 3/12/2020 continued on 3/19/2020 for 7 days                                    Bactrim DS for  10 days on 3/26/95806                                   Bactrim DS for 10 days on 6/25/2020                                                 Earlier Wound care treatments:                [id number]:                        Consults:   Date 7/18/19 to Dr Stepan Cherry-- [facility] physician:      Continuing wound care orders and information:              Residence: private               Continue home health care with:none at this time               Your wound-care supplies will be provided by: Boby Keane provider:              [id number] with              HNL loading: Date              [facility]: RX SANTYL GIVEN 8/1/19              JHUDF cleansing:                           EP not scrub or use excessive force.                          Wash hands with soap and water before and after dressing changes.                           Prior to applying a clean dressing, cleanse wound with normal saline,                                wound cleanser, or mild soap and water.                                     Daily Wound management:                                                Avoid standing for long periods of time.                          TIYXC wraps/stockings in AM and remove at bedtime.                          If swelling is present, elevate legs to the level of the heart or above for 30 minutes 4-5 times a day and/or when sitting.                                             When taking antibiotics take entire prescription as ordered by physician do not stop taking until medicine is all gone.                                                    Orders for this week: 11/5/2020     Auth for graft on 9/10/2020          Left medial ankle distal and proximal leg -- cleanse with  vashe in clinic today --for 5 minutes. Apply silver nitrate dampened 4x4 and  Cover with Silver calcium alginate and  ABD  , coban 2 lite --leave in place till nurse visit on Monday      Right Leg -- Apply  Lotrisone  to intact skin.  Wrap with coban 2 lite leave in place until next visit          Buttock-- Apply Mepilex border as needed.      Nurse visit on Monday      Follow up Susannah Padilla Dr in 1 week on Thursday in the wound care center  Call [phone] for any questions or concerns.   Physician Signature         Treatment Note      Written Patient Dismissal Instructions Given            Electronically signed by Enio Acosta MD on 11/5/2020 at 9:26 AM

## 2020-11-05 NOTE — PROGRESS NOTES
Multilayer Compression Wrap   (Not Unna) Below the Knee    NAME:  Glenn Mobley OF BIRTH:  1945  MEDICAL RECORD NUMBER:  [id number]  DATE:  11/5/2020    Multilayer compression wrap: Removed old Multilayer wrap if indicated and wash leg with mild soap/water. Applied moisturizing agent to dry skin as needed. Applied primary and secondary dressing as ordered. Applied multilayered dressing below the knee to right lower leg. Applied multilayered dressing below the knee to left lower leg. Instructed patient/caregiver not to remove dressing and to keep it clean and dry. Instructed patient/caregiver on complications to report to provider, such as pain, numbness in toes, heavy drainage, and slippage of dressing. Instructed patient on purpose of compression dressing and on activity and exercise recommendations.       Electronically signed by Masood Willams LPN on 85/2/7142 at 5:77 AM

## 2020-11-11 ENCOUNTER — APPOINTMENT (OUTPATIENT)
Dept: GENERAL RADIOLOGY | Age: 75
DRG: 683 | End: 2020-11-11
Payer: MEDICARE

## 2020-11-11 ENCOUNTER — APPOINTMENT (OUTPATIENT)
Dept: ULTRASOUND IMAGING | Age: 75
DRG: 683 | End: 2020-11-11
Payer: MEDICARE

## 2020-11-11 ENCOUNTER — HOSPITAL ENCOUNTER (INPATIENT)
Age: 75
LOS: 7 days | Discharge: NURSING, REHAB OR LONG-TERM CARE | DRG: 683 | End: 2020-11-18
Attending: EMERGENCY MEDICINE | Admitting: HOSPITALIST
Payer: MEDICARE

## 2020-11-11 PROBLEM — N17.9 AKI (ACUTE KIDNEY INJURY) (HCC): Status: ACTIVE | Noted: 2020-11-11

## 2020-11-11 LAB
ALBUMIN SERPL-MCNC: 3.2 GM/DL (ref 3.4–5)
ALP BLD-CCNC: 79 IU/L (ref 40–129)
ALT SERPL-CCNC: 14 U/L (ref 10–40)
ANION GAP SERPL CALCULATED.3IONS-SCNC: 15 MMOL/L (ref 4–16)
ANISOCYTOSIS: ABNORMAL
AST SERPL-CCNC: 27 IU/L (ref 15–37)
BACTERIA: NEGATIVE /HPF
BANDED NEUTROPHILS ABSOLUTE COUNT: 2.69 K/CU MM
BANDED NEUTROPHILS RELATIVE PERCENT: 17 % (ref 5–11)
BASOPHILS ABSOLUTE: 0.2 K/CU MM
BASOPHILS RELATIVE PERCENT: 1 % (ref 0–1)
BILIRUB SERPL-MCNC: 0.8 MG/DL (ref 0–1)
BILIRUBIN URINE: NEGATIVE MG/DL
BLOOD, URINE: ABNORMAL
BUN BLDV-MCNC: 22 MG/DL (ref 6–23)
CALCIUM SERPL-MCNC: 8.7 MG/DL (ref 8.3–10.6)
CHLORIDE BLD-SCNC: 92 MMOL/L (ref 99–110)
CLARITY: CLEAR
CO2: 22 MMOL/L (ref 21–32)
COLOR: YELLOW
CREAT SERPL-MCNC: 1.7 MG/DL (ref 0.9–1.3)
CREATININE URINE: 66 MG/DL
DIFFERENTIAL TYPE: ABNORMAL
EKG ATRIAL RATE: 76 BPM
EKG DIAGNOSIS: NORMAL
EKG P AXIS: 63 DEGREES
EKG P-R INTERVAL: 186 MS
EKG Q-T INTERVAL: 424 MS
EKG QRS DURATION: 102 MS
EKG QTC CALCULATION (BAZETT): 477 MS
EKG R AXIS: -24 DEGREES
EKG T AXIS: 4 DEGREES
EKG VENTRICULAR RATE: 76 BPM
GFR AFRICAN AMERICAN: 48 ML/MIN/1.73M2
GFR NON-AFRICAN AMERICAN: 40 ML/MIN/1.73M2
GLUCOSE BLD-MCNC: 422 MG/DL (ref 70–99)
GLUCOSE BLD-MCNC: 450 MG/DL (ref 70–99)
GLUCOSE BLD-MCNC: 479 MG/DL (ref 70–99)
GLUCOSE, URINE: >500 MG/DL
HCT VFR BLD CALC: 41 % (ref 42–52)
HEMOGLOBIN: 13.2 GM/DL (ref 13.5–18)
HOWELL-JOLLY BODIES: PRESENT
INR BLD: 1.87 INDEX
KETONES, URINE: ABNORMAL MG/DL
LACTATE: 1.6 MMOL/L (ref 0.4–2)
LEUKOCYTE ESTERASE, URINE: NEGATIVE
LYMPHOCYTES ABSOLUTE: 1.6 K/CU MM
LYMPHOCYTES RELATIVE PERCENT: 10 % (ref 24–44)
MAGNESIUM: 2.2 MG/DL (ref 1.8–2.4)
MCH RBC QN AUTO: 29.7 PG (ref 27–31)
MCHC RBC AUTO-ENTMCNC: 32.2 % (ref 32–36)
MCV RBC AUTO: 92.3 FL (ref 78–100)
MONOCYTES ABSOLUTE: 1.1 K/CU MM
MONOCYTES RELATIVE PERCENT: 7 % (ref 0–4)
MUCUS: ABNORMAL HPF
NITRITE URINE, QUANTITATIVE: NEGATIVE
OSMOLALITY URINE: 600 MOS/L (ref 292–1090)
PAPPENHEIMER BODIES: PRESENT
PDW BLD-RTO: 16.6 % (ref 11.7–14.9)
PH, URINE: 5 (ref 5–8)
PLATELET # BLD: 167 K/CU MM (ref 140–440)
PMV BLD AUTO: 13.2 FL (ref 7.5–11.1)
POTASSIUM SERPL-SCNC: 5.1 MMOL/L (ref 3.5–5.1)
PRO-BNP: 1196 PG/ML
PROTEIN UA: NEGATIVE MG/DL
PROTHROMBIN TIME: 22.8 SECONDS (ref 11.7–14.5)
RBC # BLD: 4.44 M/CU MM (ref 4.6–6.2)
RBC # BLD: ABNORMAL 10*6/UL
RBC URINE: 1 /HPF (ref 0–3)
SARS-COV-2, NAAT: NOT DETECTED
SEGMENTED NEUTROPHILS ABSOLUTE COUNT: 10.2 K/CU MM
SEGMENTED NEUTROPHILS RELATIVE PERCENT: 65 % (ref 36–66)
SODIUM BLD-SCNC: 129 MMOL/L (ref 135–145)
SODIUM URINE: 20 MMOL/L (ref 35–167)
SOURCE: NORMAL
SPECIFIC GRAVITY UA: 1.03 (ref 1–1.03)
SQUAMOUS EPITHELIAL: <1 /HPF
TOTAL CK: 1293 IU/L (ref 38–174)
TOTAL PROTEIN: 6.4 GM/DL (ref 6.4–8.2)
TRICHOMONAS: ABNORMAL /HPF
TROPONIN T: 0.08 NG/ML
TROPONIN T: 0.09 NG/ML
TROPONIN T: 0.12 NG/ML
UROBILINOGEN, URINE: NORMAL MG/DL (ref 0.2–1)
WBC # BLD: 15.8 K/CU MM (ref 4–10.5)
WBC UA: 1 /HPF (ref 0–2)

## 2020-11-11 PROCEDURE — 87040 BLOOD CULTURE FOR BACTERIA: CPT

## 2020-11-11 PROCEDURE — 6370000000 HC RX 637 (ALT 250 FOR IP): Performed by: HOSPITALIST

## 2020-11-11 PROCEDURE — 71045 X-RAY EXAM CHEST 1 VIEW: CPT

## 2020-11-11 PROCEDURE — 73564 X-RAY EXAM KNEE 4 OR MORE: CPT

## 2020-11-11 PROCEDURE — 6360000002 HC RX W HCPCS: Performed by: EMERGENCY MEDICINE

## 2020-11-11 PROCEDURE — 85007 BL SMEAR W/DIFF WBC COUNT: CPT

## 2020-11-11 PROCEDURE — 83735 ASSAY OF MAGNESIUM: CPT

## 2020-11-11 PROCEDURE — 83880 ASSAY OF NATRIURETIC PEPTIDE: CPT

## 2020-11-11 PROCEDURE — 1200000000 HC SEMI PRIVATE

## 2020-11-11 PROCEDURE — 85027 COMPLETE CBC AUTOMATED: CPT

## 2020-11-11 PROCEDURE — 84484 ASSAY OF TROPONIN QUANT: CPT

## 2020-11-11 PROCEDURE — 82550 ASSAY OF CK (CPK): CPT

## 2020-11-11 PROCEDURE — 80053 COMPREHEN METABOLIC PANEL: CPT

## 2020-11-11 PROCEDURE — 81001 URINALYSIS AUTO W/SCOPE: CPT

## 2020-11-11 PROCEDURE — 82570 ASSAY OF URINE CREATININE: CPT

## 2020-11-11 PROCEDURE — 83935 ASSAY OF URINE OSMOLALITY: CPT

## 2020-11-11 PROCEDURE — 6370000000 HC RX 637 (ALT 250 FOR IP): Performed by: INTERNAL MEDICINE

## 2020-11-11 PROCEDURE — 36415 COLL VENOUS BLD VENIPUNCTURE: CPT

## 2020-11-11 PROCEDURE — 76775 US EXAM ABDO BACK WALL LIM: CPT

## 2020-11-11 PROCEDURE — 82962 GLUCOSE BLOOD TEST: CPT

## 2020-11-11 PROCEDURE — 93010 ELECTROCARDIOGRAM REPORT: CPT | Performed by: INTERNAL MEDICINE

## 2020-11-11 PROCEDURE — 85610 PROTHROMBIN TIME: CPT

## 2020-11-11 PROCEDURE — 6370000000 HC RX 637 (ALT 250 FOR IP): Performed by: NURSE PRACTITIONER

## 2020-11-11 PROCEDURE — 2580000003 HC RX 258: Performed by: NURSE PRACTITIONER

## 2020-11-11 PROCEDURE — 73070 X-RAY EXAM OF ELBOW: CPT

## 2020-11-11 PROCEDURE — 2580000003 HC RX 258: Performed by: EMERGENCY MEDICINE

## 2020-11-11 PROCEDURE — 84300 ASSAY OF URINE SODIUM: CPT

## 2020-11-11 PROCEDURE — 99223 1ST HOSP IP/OBS HIGH 75: CPT | Performed by: INTERNAL MEDICINE

## 2020-11-11 PROCEDURE — U0002 COVID-19 LAB TEST NON-CDC: HCPCS

## 2020-11-11 PROCEDURE — APPNB180 APP NON BILLABLE TIME > 60 MINS: Performed by: NURSE PRACTITIONER

## 2020-11-11 PROCEDURE — 93005 ELECTROCARDIOGRAM TRACING: CPT | Performed by: EMERGENCY MEDICINE

## 2020-11-11 PROCEDURE — 83605 ASSAY OF LACTIC ACID: CPT

## 2020-11-11 PROCEDURE — 99285 EMERGENCY DEPT VISIT HI MDM: CPT

## 2020-11-11 RX ORDER — DEXTROSE MONOHYDRATE 25 G/50ML
12.5 INJECTION, SOLUTION INTRAVENOUS PRN
Status: DISCONTINUED | OUTPATIENT
Start: 2020-11-11 | End: 2020-11-18 | Stop reason: HOSPADM

## 2020-11-11 RX ORDER — PROMETHAZINE HYDROCHLORIDE 25 MG/1
12.5 TABLET ORAL EVERY 6 HOURS PRN
Status: DISCONTINUED | OUTPATIENT
Start: 2020-11-11 | End: 2020-11-18 | Stop reason: HOSPADM

## 2020-11-11 RX ORDER — BUPROPION HYDROCHLORIDE 150 MG/1
300 TABLET ORAL EVERY MORNING
Status: DISCONTINUED | OUTPATIENT
Start: 2020-11-12 | End: 2020-11-18 | Stop reason: HOSPADM

## 2020-11-11 RX ORDER — NICOTINE POLACRILEX 4 MG
15 LOZENGE BUCCAL PRN
Status: DISCONTINUED | OUTPATIENT
Start: 2020-11-11 | End: 2020-11-18 | Stop reason: HOSPADM

## 2020-11-11 RX ORDER — ONDANSETRON 2 MG/ML
4 INJECTION INTRAMUSCULAR; INTRAVENOUS EVERY 6 HOURS PRN
Status: DISCONTINUED | OUTPATIENT
Start: 2020-11-11 | End: 2020-11-11

## 2020-11-11 RX ORDER — SODIUM CHLORIDE 0.9 % (FLUSH) 0.9 %
10 SYRINGE (ML) INJECTION PRN
Status: DISCONTINUED | OUTPATIENT
Start: 2020-11-11 | End: 2020-11-18 | Stop reason: HOSPADM

## 2020-11-11 RX ORDER — ASPIRIN 81 MG/1
81 TABLET, CHEWABLE ORAL DAILY
Status: DISCONTINUED | OUTPATIENT
Start: 2020-11-11 | End: 2020-11-18 | Stop reason: HOSPADM

## 2020-11-11 RX ORDER — PANTOPRAZOLE SODIUM 40 MG/1
40 TABLET, DELAYED RELEASE ORAL
Status: DISCONTINUED | OUTPATIENT
Start: 2020-11-12 | End: 2020-11-18 | Stop reason: HOSPADM

## 2020-11-11 RX ORDER — SODIUM CHLORIDE 9 MG/ML
INJECTION, SOLUTION INTRAVENOUS CONTINUOUS
Status: DISCONTINUED | OUTPATIENT
Start: 2020-11-11 | End: 2020-11-12

## 2020-11-11 RX ORDER — ACETAMINOPHEN 650 MG/1
650 SUPPOSITORY RECTAL EVERY 6 HOURS PRN
Status: DISCONTINUED | OUTPATIENT
Start: 2020-11-11 | End: 2020-11-18 | Stop reason: HOSPADM

## 2020-11-11 RX ORDER — DEXTROSE MONOHYDRATE 50 MG/ML
100 INJECTION, SOLUTION INTRAVENOUS PRN
Status: DISCONTINUED | OUTPATIENT
Start: 2020-11-11 | End: 2020-11-18 | Stop reason: HOSPADM

## 2020-11-11 RX ORDER — ACETAMINOPHEN 325 MG/1
650 TABLET ORAL EVERY 6 HOURS PRN
Status: DISCONTINUED | OUTPATIENT
Start: 2020-11-11 | End: 2020-11-18 | Stop reason: HOSPADM

## 2020-11-11 RX ORDER — ATORVASTATIN CALCIUM 40 MG/1
40 TABLET, FILM COATED ORAL NIGHTLY
Status: DISCONTINUED | OUTPATIENT
Start: 2020-11-11 | End: 2020-11-18 | Stop reason: HOSPADM

## 2020-11-11 RX ORDER — ESCITALOPRAM OXALATE 10 MG/1
20 TABLET ORAL DAILY
Status: DISCONTINUED | OUTPATIENT
Start: 2020-11-11 | End: 2020-11-18 | Stop reason: HOSPADM

## 2020-11-11 RX ORDER — SODIUM CHLORIDE 0.9 % (FLUSH) 0.9 %
10 SYRINGE (ML) INJECTION EVERY 12 HOURS SCHEDULED
Status: DISCONTINUED | OUTPATIENT
Start: 2020-11-11 | End: 2020-11-18 | Stop reason: HOSPADM

## 2020-11-11 RX ORDER — WARFARIN SODIUM 7.5 MG/1
7.5 TABLET ORAL
Status: COMPLETED | OUTPATIENT
Start: 2020-11-11 | End: 2020-11-11

## 2020-11-11 RX ORDER — INSULIN GLARGINE 100 [IU]/ML
40 INJECTION, SOLUTION SUBCUTANEOUS NIGHTLY
Status: DISCONTINUED | OUTPATIENT
Start: 2020-11-11 | End: 2020-11-12

## 2020-11-11 RX ORDER — POTASSIUM CHLORIDE 750 MG/1
10 TABLET, FILM COATED, EXTENDED RELEASE ORAL DAILY
Status: DISCONTINUED | OUTPATIENT
Start: 2020-11-11 | End: 2020-11-11

## 2020-11-11 RX ADMIN — METOPROLOL TARTRATE 12.5 MG: 25 TABLET, FILM COATED ORAL at 21:13

## 2020-11-11 RX ADMIN — VANCOMYCIN HYDROCHLORIDE 2000 MG: 1 INJECTION, POWDER, LYOPHILIZED, FOR SOLUTION INTRAVENOUS at 10:45

## 2020-11-11 RX ADMIN — SODIUM CHLORIDE: 9 INJECTION, SOLUTION INTRAVENOUS at 21:16

## 2020-11-11 RX ADMIN — ESCITALOPRAM OXALATE 20 MG: 10 TABLET ORAL at 18:25

## 2020-11-11 RX ADMIN — WARFARIN SODIUM 7.5 MG: 7.5 TABLET ORAL at 18:24

## 2020-11-11 RX ADMIN — ARIPIPRAZOLE 15 MG: 10 TABLET ORAL at 18:24

## 2020-11-11 RX ADMIN — INSULIN LISPRO 18 UNITS: 100 INJECTION, SOLUTION INTRAVENOUS; SUBCUTANEOUS at 17:44

## 2020-11-11 RX ADMIN — ATORVASTATIN CALCIUM 40 MG: 40 TABLET, FILM COATED ORAL at 21:14

## 2020-11-11 RX ADMIN — ASPIRIN 81 MG: 81 TABLET, CHEWABLE ORAL at 18:24

## 2020-11-11 RX ADMIN — SODIUM CHLORIDE: 9 INJECTION, SOLUTION INTRAVENOUS at 13:13

## 2020-11-11 RX ADMIN — INSULIN GLARGINE 40 UNITS: 100 INJECTION, SOLUTION SUBCUTANEOUS at 21:14

## 2020-11-11 ASSESSMENT — PAIN SCALES - WONG BAKER
WONGBAKER_NUMERICALRESPONSE: 0

## 2020-11-11 ASSESSMENT — ENCOUNTER SYMPTOMS
SINUS PAIN: 0
DIARRHEA: 0
BLOOD IN STOOL: 0
PHOTOPHOBIA: 0
CONSTIPATION: 0
NAUSEA: 0
COUGH: 0
COLOR CHANGE: 0
VOMITING: 0
ABDOMINAL PAIN: 0
SHORTNESS OF BREATH: 0

## 2020-11-11 ASSESSMENT — PAIN DESCRIPTION - LOCATION
LOCATION: KNEE
LOCATION: KNEE

## 2020-11-11 ASSESSMENT — PAIN DESCRIPTION - PAIN TYPE: TYPE: CHRONIC PAIN

## 2020-11-11 ASSESSMENT — PAIN DESCRIPTION - ORIENTATION
ORIENTATION: RIGHT
ORIENTATION: RIGHT;LEFT

## 2020-11-11 ASSESSMENT — PAIN SCALES - GENERAL
PAINLEVEL_OUTOF10: 0
PAINLEVEL_OUTOF10: 5

## 2020-11-11 NOTE — ED PROVIDER NOTES
I independently examined and evaluated Breanna Mcdonald. In brief, 71-year-old male presents with increased fatigue and weakness over the last 1 to 2 days, no focal weakness. He fell yesterday, laid on the floor for about 6 hours. Typically uses a cane or walker. He has bilateral leg wraps that are done every week up in [place], has a chronic ulcer on his left leg that they have been dressing as well. Does not think he is currently on antibiotics. No cough or shortness of breath. No chest pain. No abdominal pain. No vomiting or diarrhea. .    Focused exam revealed patient appears chronically ill, nontoxic, regular rate and rhythm, nonlabored respirations. Alert and appropriate. Abdomen soft and nondistended. Bilateral lower extremities the wrappings were taken off, erythema, chronic venous changes, chronic ulcer on left lge. ED course: patient feels like legs are more red than typical, falls, fatigue, chronic wound, plan for sepsis workup, electrolytes, etc and reassess. EKG:  Normal sinus rhythm with rate of 76 bpm, normal intervals. No ST elevation. Normal EKG. No previous to compare    All diagnostic, treatment, and disposition decisions were made by myself in conjunction with the advanced practice provider. For all further details of the patient's emergency department visit, please see the advanced practice provider's documentation. Comment: Please note this report has been produced using speech recognition software and may contain errors related to that system including errors in grammar, punctuation, and spelling, as well as words and phrases that may be inappropriate. If there are any questions or concerns please feel free to contact the dictating provider for clarification. Christel Bowman MD  11/11/20 4557      Patient with KIRSTEN, elevated CK, elevated white count, plan for admission, receiving antibiotics for ulceration and possible cellulitis.        Christel Bowman

## 2020-11-11 NOTE — ED NOTES
Pt does not wish to wear a sling at this time.  Pt states he is comfortable in current position      Dave Crane RN  11/11/20 1200

## 2020-11-11 NOTE — CONSULTS
CARDIOLOGY CONSULT NOTE   Reason for consultation:  Abnormal troponin    Referring physician:  Jasmin Benítez MD     Primary care physician: Shanna Sales MD      Dear  Dr. Jasmin Benítez MD   Thanks for the consult. Chief Complaints :  Chief Complaint   Patient presents with    Fatigue        History of present illness:Claudio is a 69 y. o.year old who presents with after a fall at home which he thinks he was unable to take care of himself due to leg ulcers and weakness he was down for several hours. . Labs suggest elevated CK levels cardiology was consulted due to abnormal troponin levels. He does have acute on chronic renal failure. He denies chest pain or feeling dizzy he says he just lost balance.   Echo last year showed preserved EF nuclear stress test November 2019 was normal.    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, Anemia, Arm fracture, Arthritis, Cataract, Cataract, Cellulitis of left lower leg, Chronic venous hypertension with ulcer ([address] CKD (chronic kidney disease), Colon polyps, COPD (chronic obstructive pulmonary disease) ([address] Diabetes mellitus ([address] Diabetes mellitus ([address] Diabetes mellitus with peripheral circulatory disorder ([address] Diabetes mellitus with skin ulcer ([address] Diabetic peripheral neuropathy ([address] Diabetic skin ulcer associated with type 2 diabetes mellitus ([address] Diverticulosis, Femoral DVT (deep venous thrombosis) ([address] GERD (gastroesophageal reflux disease), Glaucoma, H/O cardiac catheterization, H/O Doppler ultrasound, H/O echocardiogram, H/O mumps orchitis, Hemorrhoids, History of nuclear stress test, HLD (hyperlipidemia), Hx of Doppler ultrasound, Hyperlipemia, Hyperlipidemia, Hypertension, Hypertension, Idiopathic chronic venous hypertension of left lower extremity with ulcer and inflammation ([address] Leg ulcer ([address] No diabetic retinopathy OU, Non-pressure chronic ulcer of left lower leg with fat layer exposed ([address] Pulmonary embolism ([address] Sleep apnea, SOB (shortness of breath), Tendinitis, Type II or unspecified type diabetes mellitus with other specified manifestations, not stated as uncontrolled, Unspecified venous (peripheral) insufficiency, Urticaria, WD-Cellulitis of right anterior lower leg, WD-Cellulitis of right lower extremity, WD-Chronic venous hypertension (idiopathic) with ulcer of left lower extremity (CODE) ([address] WD-Chronic venous hypertension with inflammation, right, WD-Decubitus ulcer of left buttock, stage 3 ([address] 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 to breakdown of skin ([address] WD-Open wound of hand without complication, left, initial encounter, WD-Pressure injury of left buttock, stage 2 ([address] WD-Pressure injury of left buttock, stage 3 ([address] WD-Pressure injury of right buttock, stage 3 ([address] WD-Skin tear of right forearm without complication, and WD-Ulcer of right pretibial region, with fat layer exposed ([address] Past surgical history:   has a past surgical history that includes Tonsillectomy (1953); Splenectomy (1958); Breast surgery (1970s); hernia repair (1970s); Skin graft (1978-present); Cataract removal (Right, 3/11/2013); Cataract removal (Left, 2/25/2013); Varicose vein surgery (Left, 2009); Carpal tunnel release (Left, 1993); Cardiac catheterization (6/3/14); Colonoscopy (2006); Colonoscopy (11/21/11); Colonoscopy (4/23/12); Colonoscopy (08/04/2016); Splenectomy; hernia repair; and Carpal tunnel release. Social History:   reports that he quit smoking about 27 years ago. His smoking use included cigarettes. He has a 70.00 pack-year smoking history. He has never used smokeless tobacco. He reports current alcohol use. He reports that he does not use drugs.   Family history:   no family history of CAD, STROKE of DM at early age    Allergies   Allergen Reactions    Cavilon Durable Barrier [Mineral Oil-Dimeth-Coconut Oil]     Parabens Hives    Parabens Hives    Prinivil [Lisinopril] Swelling    Cortisone Rash    Cortisone Rash    Dilaudid [Hydromorphone Hcl] Rash    Penicillins Rash    Penicillins Rash    Sulfamethoxazole-Trimethoprim Nausea Only    Tape [Adhesive Tape] Rash       sodium chloride flush 0.9 % injection 10 mL, 2 times per day  sodium chloride flush 0.9 % injection 10 mL, PRN  acetaminophen (TYLENOL) tablet 650 mg, Q6H PRN    Or  acetaminophen (TYLENOL) suppository 650 mg, Q6H PRN  promethazine (PHENERGAN) tablet 12.5 mg, Q6H PRN  insulin lispro (HUMALOG) injection vial 0-18 Units, TID WC  insulin lispro (HUMALOG) injection vial 0-9 Units, Nightly  0.9 % sodium chloride infusion, Continuous  potassium chloride (MICRO-K) extended release capsule 10 mEq, Daily  ARIPiprazole (ABILIFY) tablet 15 mg, Daily  aspirin chewable tablet 81 mg, Daily  atorvastatin (LIPITOR) tablet 40 mg, Nightly  [START ON 11/12/2020] buPROPion (WELLBUTRIN XL) extended release tablet 300 mg, QAM  escitalopram (LEXAPRO) tablet 20 mg, Daily  ipratropium (ATROVENT HFA) 17 MCG/ACT inhaler 2 puff, 4x daily  glycopyrrolate-formoterol (BEVESPI) 9-4.8 MCG/ACT inhaler 2 puff, BID  metoprolol tartrate (LOPRESSOR) tablet 12.5 mg, BID  [START ON 11/12/2020] pantoprazole (PROTONIX) tablet 40 mg, QAM AC  warfarin (COUMADIN) tablet 7.5 mg, Once  [START ON 11/12/2020] warfarin (COUMADIN) daily dosing (placeholder), RX Placeholder  [START ON 11/12/2020] vancomycin (VANCOCIN) intermittent dosing (placeholder), RX Placeholder  glucose (GLUTOSE) 40 % oral gel 15 g, PRN  dextrose 50 % IV solution, PRN  glucagon (rDNA) injection 1 mg, PRN  dextrose 5 % solution, PRN      Current Facility-Administered Medications   Medication Dose Route Frequency Provider Last Rate Last Dose    sodium chloride flush 0.9 % injection 10 mL  10 mL Intravenous 2 times per day WINDY Walton - CNP        sodium chloride flush 0.9 % injection 10 mL  10 mL Intravenous PRN Tanisha Patel, APRN - CNP        acetaminophen (TYLENOL) tablet 650 mg  650 mg Oral Q6H PRN Paticia Cools, APRN - CNP        Or    acetaminophen (TYLENOL) suppository 650 mg  650 mg Rectal Q6H PRN Paticia Cools, APRN - CNP        promethazine (PHENERGAN) tablet 12.5 mg  12.5 mg Oral Q6H PRN Paticia Cools, APRN - CNP        insulin lispro (HUMALOG) injection vial 0-18 Units  0-18 Units Subcutaneous TID WC Paticia Cools, APRN - CNP   18 Units at 11/11/20 1744    insulin lispro (HUMALOG) injection vial 0-9 Units  0-9 Units Subcutaneous Nightly Paticia Cools, APRN - CNP        0.9 % sodium chloride infusion   Intravenous Continuous Paticia Cools, APRN -  mL/hr at 11/11/20 1313      potassium chloride (MICRO-K) extended release capsule 10 mEq  10 mEq Oral Daily Paticia Cools, APRN - CNP        ARIPiprazole (ABILIFY) tablet 15 mg  15 mg Oral Daily Paticia Cools, APRN - CNP        aspirin chewable tablet 81 mg  81 mg Oral Daily Paticia Cools, APRN - CNP        atorvastatin (LIPITOR) tablet 40 mg  40 mg Oral Nightly Paticia Cools, APRN - CNP        [START ON 11/12/2020] buPROPion (WELLBUTRIN XL) extended release tablet 300 mg  300 mg Oral QAM [facility], APRN - CNP        escitalopram (LEXAPRO) tablet 20 mg  20 mg Oral Daily Georgiadevendra Yeh, APRN - CNP        ipratropium (ATROVENT HFA) 17 MCG/ACT inhaler 2 puff  2 puff Inhalation 4x daily Georgiadevendra Yeh, APRN - CNP        glycopyrrolate-formoterol (BEVESPI) 9-4.8 MCG/ACT inhaler 2 puff  2 puff Inhalation BID Paticia Cools, APRN - CNP        metoprolol tartrate (LOPRESSOR) tablet 12.5 mg  12.5 mg Oral BID Paticia Cools, APRN - CNP        [START ON 11/12/2020] pantoprazole (PROTONIX) tablet 40 mg  40 mg Oral QAM AC [place] Yeh, APRN - CNP        warfarin (COUMADIN) tablet 7.5 mg  7.5 mg Oral Once MD Ashwin Whitman [START ON 11/12/2020] warfarin (COUMADIN) daily dosing (placeholder)   Other Alvaro Moreira MD  [START ON 11/12/2020] vancomycin (VANCOCIN) intermittent dosing (placeholder)   Other RX Placeholder Hayley Suggs MD        glucose (GLUTOSE) 40 % oral gel 15 g  15 g Oral PRN Hayley Suggs MD        dextrose 50 % IV solution  12.5 g Intravenous PRN Avelino Blizzard, MD        glucagon (rDNA) injection 1 mg  1 mg Intramuscular PRN Avelino Blizzard, MD        dextrose 5 % solution  100 mL/hr Intravenous PRN Avelino Blizzard, MD         Review of Systems:   · Constitutional: No Fever or Weight Loss   · Eyes: No Decreased Vision  · ENT: No Headaches, Hearing Loss or Vertigo  · Cardiovascular: As per HPI  · Respiratory: As per HPI  · Gastrointestinal: No abdominal pain, appetite loss, blood in stools, constipation, diarrhea or heartburn  · Genitourinary: No dysuria, trouble voiding, or hematuria  · Musculoskeletal:  No gait disturbance, weakness or joint complaints  · Integumentary: No rash or pruritis  · Neurological: No TIA or stroke symptoms  · Psychiatric: No anxiety or depression  · Endocrine: No malaise, fatigue or temperature intolerance  · Hematologic/Lymphatic: No bleeding problems, blood clots or swollen lymph nodes  · Allergic/Immunologic: No nasal congestion or hives  All systems negative except as marked. Physical Examination:    Vitals:    11/11/20 1400 11/11/20 1430 11/11/20 1500 11/11/20 1615   BP: (!) 118/58 (!) 122/56 111/61 136/62   Pulse:  76 73 76   Resp:  19 16 14   Temp:    98.3 °F (36.8 °C)   TempSrc:    Oral   SpO2: 94% 95% 96% 97%       General Appearance:  No distress, conversant    Constitutional:  Well developed, Well nourished, No acute distress, Non-toxic appearance. HENT:  Normocephalic, Atraumatic, Bilateral external ears normal, Oropharynx moist, No oral exudates, Nose normal. Neck- Normal range of motion, No tenderness, Supple, No stridor,no apical-carotid delay  Lymphatics : no palpable lymph nodes  Eyes:  PERRL, EOMI, Conjunctiva normal, No discharge.    Respiratory: Normal breath sounds, No respiratory distress, No wheezing, No chest tenderness. ,no use of accessory muscles, crackles Absent   Cardiovascular: (PMI) apex non displaced,no lifts no thrills, ankle swelling Present , 1+, s1 and s2 audible,Murmur. Present, JVD not noted    Abdomen /GI:  Bowel sounds normal, Soft, No tenderness, No masses, No gross visceromegaly   :  No costovertebral angle tenderness   Musculoskeletal: Calf ulcers both legs no edema, no tenderness, no deformities. Back- no tenderness  Integument:  Well hydrated, no rash   Lymphatic:  No lymphadenopathy noted   Neurologic:  Alert & oriented x 3, CN 2-12 normal, normal motor function, normal sensory function, no focal deficits noted           Medical decision making and Data review:    Lab Review   Recent Labs     11/11/20  1002   WBC 15.8*   HGB 13.2*   HCT 41.0*         Recent Labs     11/11/20  1002   *   K 5.1   CL 92*   CO2 22   BUN 22   CREATININE 1.7*     Recent Labs     11/11/20  1002   AST 27   ALT 14   BILITOT 0.8   ALKPHOS 79     Recent Labs     11/11/20  1002   TROPONINT 0.125*       Recent Labs     11/11/20  1002   PROBNP 1,196*     Lab Results   Component Value Date    INR 1.87 11/11/2020    PROTIME 22.8 (H) 11/11/2020       EKG: (reviewed by myself)    ECHO:(reviewed by myself)    Chest Xray:(reviewed by myself)  Xr Elbow Left (2 Views)    Result Date: 11/11/2020  EXAMINATION: TWO XRAY VIEWS OF THE LEFT ELBOW 11/11/2020 10:07 am COMPARISON: None. HISTORY: ORDERING SYSTEM PROVIDED HISTORY: trauma TECHNOLOGIST PROVIDED HISTORY: Reason for exam:->trauma Reason for Exam: trauma Acuity: Acute Type of Exam: Initial Mechanism of Injury: Nurse removed bilateral leg dressings. Pt states they are applied every week at his doctors office in [facility]. Legs are red and swollen. Stage 2 ulcer on left calf, approx 3 cm in diameter. Pt states he has been seen by the wound clinic FINDINGS: No acute fracture or dislocation is identified.   No joint effusion is identified. No radiopaque foreign body is identified. No acute abnormality. Xr Elbow Right (2 Views)    Result Date: 11/11/2020  EXAMINATION: TWO XRAY VIEWS OF THE RIGHT ELBOW 11/11/2020 10:07 am COMPARISON: Right forearm series 11/03/2014. HISTORY: ORDERING SYSTEM PROVIDED HISTORY: trauma TECHNOLOGIST PROVIDED HISTORY: Reason for exam:->trauma Reason for Exam: trauma Acuity: Acute Type of Exam: Initial Mechanism of Injury: Nurse removed bilateral leg dressings. Pt states they are applied every week at his doctors office in [facility]. Legs are red and swollen. Stage 2 ulcer on left calf, approx 3 cm in diameter. Pt states he has been seen by the wound clinic FINDINGS: Small elbow effusion. Mature ossification is noted about the coronoid process on the lateral view. There is mild enthesophyte formation about the lateral epicondyle. No malalignment. 1.  Small elbow effusion. 2.  Age indeterminate fracture fragment about the tip of the coronoid process. Xr Knee Left (min 4 Views)    Result Date: 11/11/2020  EXAMINATION: FOUR XRAY VIEWS OF THE LEFT KNEE 11/11/2020 9:19 am COMPARISON: None. HISTORY: ORDERING SYSTEM PROVIDED HISTORY: trauma TECHNOLOGIST PROVIDED HISTORY: Reason for exam:->trauma Reason for Exam: trauma Acuity: Acute Type of Exam: Initial Mechanism of Injury: Nurse removed bilateral leg dressings. Pt states they are applied every week at his doctors office in [facility]. Legs are red and swollen. Stage 2 ulcer on left calf, approx 3 cm in diameter. Pt states he has been seen by the wound clinic FINDINGS: No fracture, cortical erosion or joint effusion were noted. No patellofemoral or femoral tibial joint compartment narrowing were identified. The bony mineralization was normal.     No fracture, joint effusion or joint space narrowing.      Xr Knee Right (min 4 Views)    Result Date: 11/11/2020  EXAMINATION: FOUR XRAY VIEWS OF THE RIGHT KNEE 11/11/2020 9:19 am COMPARISON: None. HISTORY: ORDERING SYSTEM PROVIDED HISTORY: trauma TECHNOLOGIST PROVIDED HISTORY: Reason for exam:->trauma Reason for Exam: trauma Acuity: Acute Type of Exam: Initial Mechanism of Injury: Nurse removed bilateral leg dressings. Pt states they are applied every week at his doctors office in [facility]. Legs are red and swollen. Stage 2 ulcer on left calf, approx 3 cm in diameter. Pt states he has been seen by the wound clinic FINDINGS: A small to moderate right knee effusion was noted without fracture or cortical erosion. Moderate degenerative narrowing involves the medial femoral tibial joint compartment. Small to moderate right knee effusion without fracture or cortical erosion. Moderate degenerative narrowing involves the medial femoral tibial joint compartment. Xr Chest Portable    Result Date: 11/11/2020  EXAMINATION: ONE XRAY VIEW OF THE CHEST 11/11/2020 9:19 am COMPARISON: 07/02/2020, 05/01/2020 HISTORY: ORDERING SYSTEM PROVIDED HISTORY: weakness TECHNOLOGIST PROVIDED HISTORY: Reason for exam:->weakness Reason for Exam: weakness Acuity: Acute Type of Exam: Initial Additional signs and symptoms: Nurse removed bilateral leg dressings. Pt states they are applied every week at his doctors office in [facility]. Legs are red and swollen. Stage 2 ulcer on left calf, approx 3 cm in diameter. Pt states he has been seen by the wound clinic FINDINGS: Shallow inflation. Obscuration of the lung apices by the patient's neck. The cardiomediastinal silhouette is unchanged in appearance. Chronic appearing vascular congestion. There is no consolidation, pneumothorax, or evidence of edema. No effusion is appreciated. The osseous structures are unchanged in appearance. Chronic findings in the chest without acute airspace disease identified. All labs, medications and tests reviewed by myself including data  from outside source , patient and available family .   Continue all other medications of all above medical condition listed as is. Impression:  Active Problems:    Hypertension in stage 3 chronic kidney disease due to type 2 diabetes mellitus (HCC)    DM (diabetes mellitus) type II, controlled, with peripheral vascular disorder (HCC)    Troponin I above reference range    KIRSTEN (acute kidney injury) ([address]  Resolved Problems:    * No resolved hospital problems. *      Assessment: 69 y. 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, Anemia, Arm fracture, Arthritis, Cataract, Cataract, Cellulitis of left lower leg, Chronic venous hypertension with ulcer ([address] CKD (chronic kidney disease), Colon polyps, COPD (chronic obstructive pulmonary disease) ([address] Diabetes mellitus ([address] Diabetes mellitus ([address] Diabetes mellitus with peripheral circulatory disorder ([address] Diabetes mellitus with skin ulcer ([address] Diabetic peripheral neuropathy ([address] Diabetic skin ulcer associated with type 2 diabetes mellitus ([address] Diverticulosis, Femoral DVT (deep venous thrombosis) ([address] GERD (gastroesophageal reflux disease), Glaucoma, H/O cardiac catheterization, H/O Doppler ultrasound, H/O echocardiogram, H/O mumps orchitis, Hemorrhoids, History of nuclear stress test, HLD (hyperlipidemia), Hx of Doppler ultrasound, Hyperlipemia, Hyperlipidemia, Hypertension, Hypertension, Idiopathic chronic venous hypertension of left lower extremity with ulcer and inflammation ([address] Leg ulcer ([address] No diabetic retinopathy OU, Non-pressure chronic ulcer of left lower leg with fat layer exposed ([address] Pulmonary embolism ([address] Sleep apnea, SOB (shortness of breath), Tendinitis, Type II or unspecified type diabetes mellitus with other specified manifestations, not stated as uncontrolled, Unspecified venous (peripheral) insufficiency, Urticaria, WD-Cellulitis of right anterior lower leg, WD-Cellulitis of right lower extremity, WD-Chronic venous hypertension (idiopathic) with ulcer of left lower extremity (CODE) ([address] WD-Chronic venous hypertension with inflammation, right, WD-Decubitus ulcer of left buttock, stage 3 (HCC), 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 to breakdown of skin ([address] WD-Open wound of hand without complication, left, initial encounter, WD-Pressure injury of left buttock, stage 2 ([address] WD-Pressure injury of left buttock, stage 3 ([address] WD-Pressure injury of right buttock, stage 3 ([address] WD-Skin tear of right forearm without complication, and WD-Ulcer of right pretibial region, with fat layer exposed ([address] Plan and Recommendations:    Elevated Troponin : In setting of renal failure rhabdo elevated CK level probably noncardiac. Neck echo  Rhabdo as per nephrology IV hydration  Nonhealing leg ulcers probably venous arterial studies in 2019 did not show any significant disease although he had biphasic flow  Physical therapy occupational therapy as per primary team for  deconditioning leg weakness  DVT prophylaxis if no contraindication  6. Dyslipidemia: continue statins           Thank you  much for consult and giving us the opportunity in contributing in the care of this patient. Please feel free to call me for any questions.        Elgin Valentino MD, 11/11/2020 6:03 PM

## 2020-11-11 NOTE — ED NOTES
Nurse removed bilateral leg dressings. Pt states they are applied every week at his doctors office in [facility]. Legs are red and swollen. Stage 2 ulcer on left calf, approx 3 cm in diameter. Pt states he has been seen by the wound clinic. Denies antibiotic use at this time. Yellow thick drainage is noted.  Pt c/o pain in legs bilaterally      Dave Crane RN  11/11/20 [id number]

## 2020-11-11 NOTE — CONSULTS
[address] [facility]  consulted by Dr. Oksana Saleem for monitoring and adjustment. Indication for treatment: Cellulitis  Goal trough: 10-15 mcg/mL     Pertinent Laboratory Values:   Temp Readings from Last 3 Encounters:   11/11/20 98.3 °F (36.8 °C) (Oral)   11/05/20 99.9 °F (37.7 °C) (Temporal)   10/29/20 97.7 °F (36.5 °C) (Temporal)     Recent Labs     11/11/20  1002   WBC 15.8*   LACTATE 1.6     Recent Labs     11/11/20  1002   BUN 22   CREATININE 1.7*     CrCl cannot be calculated (Unknown ideal weight.). No intake or output data in the 24 hours ending 11/11/20 1731    Pertinent Cultures:  Date    Source    Results  11/11   Blood    Ordered  11/11   COVID-19   Negative    Vancomycin level:   TROUGH:  No results for input(s): VANCOTROUGH in the last 72 hours. RANDOM:  No results for input(s): VANCORANDOM in the last 72 hours. Assessment:  WBC and temperature: WBC elevated @15.8, pt afebrile  SCr, BUN, and urine output: KIRSTEN, SCR elevated @1.7, no output data  Day(s) of therapy:  1  Vancomycin level: random level ordered for tomorrow 11/12 @08:00    Plan:  Dosing comments: The patient received vancomycin 2gm ivpb x1 dose earlier this morning  Due to KIRSTEN, intermittent vancomycin dosing is appropriate at this time  Check the vancomycin level tomorrow 11/12 @08:00 and will dose per levels and renal trends  Pharmacy will continue to monitor patient and adjust therapy as indicated    VANCOMYCIN RANDOM SCHEDULED FOR 11/12 @08:00    Thank you for the consult. Sil Dorsey  11/11/2020 5:31 PM

## 2020-11-11 NOTE — H&P
extended release tablet, Take 1 tablet by mouth every morning, Disp: 90 tablet, Rfl: 0    escitalopram (LEXAPRO) 20 MG tablet, Take 1 tablet by mouth daily, Disp: 90 tablet, Rfl: 0    omeprazole (PRILOSEC) 20 MG delayed release capsule, TAKE ONE (1) CAPSULE BY MOUTH ONCE DAILY, Disp: 90 capsule, Rfl: 1    ARIPiprazole (ABILIFY) 15 MG tablet, Take 1 tablet by mouth daily, Disp: 90 tablet, Rfl: 0    atorvastatin (LIPITOR) 40 MG tablet, Take 1 tablet by mouth nightly, Disp: 90 tablet, Rfl: 0    furosemide (LASIX) 20 MG tablet, Take 1 tablet by mouth 2 times daily, Disp: 180 tablet, Rfl: 0    metFORMIN (GLUCOPHAGE) 500 MG tablet, Take 1 tablet by mouth daily (with breakfast), Disp: 90 tablet, Rfl: 0    phytonadione (VITAMIN K) 5 MG tablet, Take 1 tablet by mouth once for 1 dose Today on 6/9/2020 (Today's INR was 8.5), Disp: 1 tablet, Rfl: 0    glycopyrrolate-formoterol (BEVESPI AEROSPHERE) 9-4.8 MCG/ACT AERO, Inhale 2 puffs into the lungs 2 times daily, Disp: 1 Inhaler, Rfl: 5    Spacer/Aero-Holding Chambers (AEROCHAMBER MV) MISC, 1 actuation by Does not apply route 2 times daily, Disp: 1 each, Rfl: 0    Zinc Sulfate (ZINC 15 PO), Take 50 mg by mouth every other day, Disp: , Rfl:     potassium chloride (MICRO-K) 10 MEQ extended release capsule, Take 10 mEq by mouth daily, Disp: , Rfl:     metoprolol tartrate (LOPRESSOR) 25 MG tablet, Take 0.5 tablets by mouth 2 times daily, Disp: 90 tablet, Rfl: 3    Zinc Oxide (DESITIN CREAMY EX), Apply 13 % topically, Disp: , Rfl:     aspirin 81 MG chewable tablet, Take 1 tablet by mouth daily, Disp: 30 tablet, Rfl: 3    blood glucose test strips (ALISHA CONTOUR TEST) strip, USE AS DIRECTED TO TEST BLOOD SUGAR FOUR TIMES DAILY AS NEEDED, Disp: 100 strip, Rfl: 3    ALISHA CONTOUR TEST strip, USE AS DIRECTED TO TEST BLOOD SUGAR FOUR TIMES DAILY, Disp: 100 strip, Rfl: 5    Glucose Blood (BLOOD GLUCOSE TEST STRIPS) STRP, Please give contour testing strips to test blood sugar 4x daily, Disp: 200 strip, Rfl: 3    History of present illness     Chief Complaint: Fatigue      Minh Collins is a 69 y.o.  male  who presents with fatigue for the past couple days. Patient stated he is generally weak and having trouble walking. Patient fell at home yesterday at 4 PM and was laying on the floor until 10 PM when family member arrived to help him. Patient denies hitting his head and report 5 out of 10 knee and elbow throbbing nonoradiating pain. Denies any neck pain, abdominal pain, N/V/D, or urinary symptoms. Hx of chronic venous ulcer, DMII, APRIL, PE, DVT, HTN, HLD, GERD, CKD, COPD, and obesity. Review of Systems   Ten point ROS reviewed and negative, unless as noted below. GENERAL:  Denies fever, chills, night sweats, or changes in weight. + Fatigue  EYES:  Denies recent visual changes. ENT:  Denies ear pain, hearing loss or tinnitus  RESP:  Denies any cough, dyspnea, or wheezing. CV:  Denies any chest pain with exertion or at rest, palpitations, syncope, or edema. GI:  Denies any dysphagia, nausea, vomiting, abdominal pain, heartburn, changes in bowel habit, melena or rectal bleeding  MUSCULOSKELETAL:  Denies any joint swelling, joint pain, or loss of range of motion. + Fall  NEURO:  Denies any headaches, tremors, dizziness, vertigo, memory loss, confusion, weakness, numbness or tingling. PSYCH:  Denies any sleeping problems, history of abuse, marital discord. HEME/LYMPHATIC/IMMUNO:  Denies , bruising, bleeding abnormalities   ENDO:  Denies any heat or cold intolerance, panemiaolyuria or polydipsia. Objective:   No intake or output data in the 24 hours ending 11/11/20 1205   Vitals:   Vitals:    11/11/20 1100   BP: (!) 121/59   Pulse: 84   Resp:    Temp:    SpO2: 96%     Physical Exam:   Gen:  awake, alert, cooperative, no apparent distress.  Chronic ill appearing  EYES:Lids and lashes normal, pupils equal, round ,extra ocular muscles intact, sclera clear, conjunctiva Doppler ultrasound 03/26/15    Carotid US- no significant stenosis noted bilaterally.  H/O echocardiogram 11/21/2019    EF50-55%, Mild AR. Moderately dilated right ventricle with negative Solis's sign.  H/O mumps orchitis     as a youth    Hemorrhoids 4/23/12    Dr. Ernesto Díaz; repeat colonoscopy 3 years    History of nuclear stress test 11/21/2019    EF 60%, Normal study.  HLD (hyperlipidemia)     Hx of Doppler ultrasound 1/06/15    Lymph node seen in left groin area. No bilateral stenosis.     Hyperlipemia     Hyperlipidemia     Hypertension 1992    Hypertension     Idiopathic chronic venous hypertension of left lower extremity with ulcer and inflammation (HCC) 10/2/2015    Leg ulcer ([address] 1978-present    following at wound center, Dr Michelle Espinosa No diabetic retinopathy OU 11/12    Dr. Isi Lyle chronic ulcer of left lower leg with fat layer exposed ([address] 10/2/2015    Pulmonary embolism ([address] 11/13    patient on coumadin    Sleep apnea     doesnt always use cpap dt it drys him out    SOB (shortness of breath) Oct 2011    Stress test normal.     Tendinitis 1973    plantar tendons    Type II or unspecified type diabetes mellitus with other specified manifestations, not stated as uncontrolled 2/8/2013    Unspecified venous (peripheral) insufficiency     Urticaria     WD-Cellulitis of right anterior lower leg 9/5/2019    WD-Cellulitis of right lower extremity 3/26/2020    WD-Chronic venous hypertension (idiopathic) with ulcer of left lower extremity (CODE) ([address] 6/27/2019    WD-Chronic venous hypertension with inflammation, right 9/19/2019    WD-Decubitus ulcer of left buttock, stage 3 ([address] 6/25/2020    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 to breakdown of skin ([address] 3/26/2020    WD-Open wound of hand without complication, left, initial encounter 12/12/2019    WD-Pressure injury of left buttock, stage 2 ([address] 1/2/2020    WD-Pressure injury of left buttock, stage 3 ([facility] 3/12/2020    WD-Pressure injury of right buttock, stage 3 ([facility] 3/12/2020    WD-Skin tear of right forearm without complication 1/04/0759    WD-Ulcer of right pretibial region, with fat layer exposed ([facility] 10/17/2019     PSHX:  has a past surgical history that includes Tonsillectomy (1953); Splenectomy (1958); Breast surgery (1970s); hernia repair (1970s); Skin graft (1978-present); Cataract removal (Right, 3/11/2013); Cataract removal (Left, 2/25/2013); Varicose vein surgery (Left, 2009); Carpal tunnel release (Left, 1993); Cardiac catheterization (6/3/14); Colonoscopy (2006); Colonoscopy (11/21/11); Colonoscopy (4/23/12); Colonoscopy (08/04/2016); Splenectomy; hernia repair; and Carpal tunnel release. Allergies: Allergies   Allergen Reactions    Cavilon Durable Barrier [Mineral Oil-Dimeth-Coconut Oil]     Parabens Hives    Parabens Hives    Prinivil [Lisinopril] Swelling    Cortisone Rash    Cortisone Rash    Dilaudid [Hydromorphone Hcl] Rash    Penicillins Rash    Penicillins Rash    Sulfamethoxazole-Trimethoprim Nausea Only    Tape Enrique Theresa Tape] Rash       FAM HX: family history includes Cancer in his brother and father; Diabetes in his brother; Heart Disease in his father; High Blood Pressure in his father; High Cholesterol in his father; Thyroid Disease in his brother. Family history reviewed and is essentially negative unless as stated above.      Soc HX:   Social History     Socioeconomic History    Marital status: Single     Spouse name: None    Number of children: None    Years of education: None    Highest education level: None   Occupational History    None   Social Needs    Financial resource strain: None    Food insecurity     Worry: None     Inability: None    Transportation needs     Medical: None     Non-medical: None   Tobacco Use    Smoking status: Former Smoker     Packs/day: 2.00     Years: 35.00     Pack years: 70.00     Types: Cigarettes     Last attempt to quit:      Years since quittin.8    Smokeless tobacco: Never Used    Tobacco comment: chew--30 years.   Reviewed 2015   Substance and Sexual Activity    Alcohol use: Yes     Comment: soc    Drug use: Never    Sexual activity: None   Lifestyle    Physical activity     Days per week: None     Minutes per session: None    Stress: None   Relationships    Social connections     Talks on phone: None     Gets together: None     Attends Denominational service: None     Active member of club or organization: None     Attends meetings of clubs or organizations: None     Relationship status: None    Intimate partner violence     Fear of current or ex partner: None     Emotionally abused: None     Physically abused: None     Forced sexual activity: None   Other Topics Concern    None   Social History Narrative    ** Merged History Encounter **            Electronically signed by WINDY Bautista CNP on 2020 at 12:05 PM

## 2020-11-11 NOTE — ED NOTES
Dry sterile dressing applied to right elbow for skin tear for pt comfort      Janna Gastelum RN  11/11/20 0481

## 2020-11-11 NOTE — ED NOTES
Pt presents to ED via EMS for weakness and a fall yesterday. Pt states he walks with a cane and walker but fell yesterday. Pt states increased weakness for 1-2 days. Pt is A&O.       Jessica Carranza RN  11/11/20 0900

## 2020-11-11 NOTE — ED PROVIDER NOTES
Patient Identification  Shawna Banks is a 69 y.o. male    Chief Complaint  Fatigue      HPI  (History provided by patient)  This is a 69 y.o. male who was brought in by EMS for chief complaint of fatigue. Patient reports over the last few days he has been having trouble walking. States that this is happened before, is not sure why. Feels generally weak. Has chronic wound in left lower leg that he receives his family doctor weekly for and has dressing changes performed at that time. Notes that the redness in his legs is chronic but is brighter and more red than usual.  He is diabetic, states blood sugars have been well controlled in the 110's. Denies any fevers. Had a fall yesterday at 4 PM and was laying on the floor until 10 PM when a family member came to help him. Injured both knees and both elbows during that time. Reports pain is moderate, denies hitting his head, no headache now or loss of consciousness. Denies chest pain or abdominal pain. No dysuria or urinary frequency or urgency. No coughing. No back pain. No incontinence. REVIEW OF SYSTEMS    Constitutional:  Denies fever, chills.  + general weakness  HENT:  Denies sore throat or ear pain   Eyes: Denies vision changes, eye pain  Cardiovascular:  Denies chest pain, syncope  Respiratory:  Denies shortness of breath, cough   GI:  Denies abdominal pain, nausea, vomiting  :  Denies dysuria, discharge  Musculoskeletal:  Denies back pain.  + knee and elbow pain  Skin:  + rash  Neurologic:  Denies headache, focal weakness, or sensory changes     See HPI and nursing notes for additional information     I have reviewed the following nursing documentation:  Allergies:    Allergies   Allergen Reactions    Cavilon Durable Barrier [Mineral Oil-Dimeth-Coconut Oil]     Parabens Hives    Parabens Hives    Prinivil [Lisinopril] Swelling    Cortisone Rash    Cortisone Rash    Dilaudid [Hydromorphone Hcl] Rash    Penicillins Rash    left lower extremity (CODE) ([address] (6/27/2019), WD-Chronic venous hypertension with inflammation, right (9/19/2019), WD-Decubitus ulcer of left buttock, stage 3 ([address] (6/25/2020), 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 to breakdown of skin ([address] (3/26/2020), WD-Open wound of hand without complication, left, initial encounter (12/12/2019), WD-Pressure injury of left buttock, stage 2 ([address] (1/2/2020), WD-Pressure injury of left buttock, stage 3 ([address] (3/12/2020), WD-Pressure injury of right buttock, stage 3 ([address] (3/12/2020), WD-Skin tear of right forearm without complication (0/30/4076), and WD-Ulcer of right pretibial region, with fat layer exposed ([address] (10/17/2019). Past surgical history:  has a past surgical history that includes Tonsillectomy (1953); Splenectomy (1958); Breast surgery (1970s); hernia repair (1970s); Skin graft (1978-present); Cataract removal (Right, 3/11/2013); Cataract removal (Left, 2/25/2013); Varicose vein surgery (Left, 2009); Carpal tunnel release (Left, 1993); Cardiac catheterization (6/3/14); Colonoscopy (2006); Colonoscopy (11/21/11); Colonoscopy (4/23/12); Colonoscopy (08/04/2016); Splenectomy; hernia repair; and Carpal tunnel release. Home medications:   Prior to Admission medications    Medication Sig Start Date End Date Taking? Authorizing Provider   warfarin (COUMADIN) 5 MG tablet Take 1 tablet by mouth daily Except take 1 and 1/2 tablets by mouth on Mondays or as directed by clinic  Patient taking differently: Take by mouth See Admin Instructions 11/11/2020 Per [facility] patient to take 5 mg on Sun/Tues/Wed/Fri/Sat and 7.5 mg on Mondays and Thursdays. Patient states he takes in the morning.  Last dose 5 mg 11/10/2020 8/31/20  Yes Elizabeth Rinaldi MD   losartan (COZAAR) 50 MG tablet Take 1 tablet by mouth daily Patient tolerates diovan- HOLD if blood pressure less than 120/60 7/9/20  Yes Elizabeth Rinaldi MD buPROPion (WELLBUTRIN XL) 300 MG extended release tablet Take 1 tablet by mouth every morning 6/10/20  Yes Illa Boas, APRN - NP   escitalopram (LEXAPRO) 20 MG tablet Take 1 tablet by mouth daily 6/10/20 11/11/20 Yes Illa Boas, APRN - NP   ARIPiprazole (ABILIFY) 15 MG tablet Take 1 tablet by mouth daily 6/10/20 11/11/20 Yes Illa Boas, APRN - NP   atorvastatin (LIPITOR) 40 MG tablet Take 1 tablet by mouth nightly 6/10/20 11/11/20 Yes Illa Boas, APRN - NP   furosemide (LASIX) 20 MG tablet Take 1 tablet by mouth 2 times daily 6/10/20 11/11/20 Yes Illa Boas, APRN - NP   metFORMIN (GLUCOPHAGE) 500 MG tablet Take 1 tablet by mouth daily (with breakfast) 6/10/20 11/11/20 Yes Illa Boas, APRN - NP   glycopyrrolate-formoterol (BEVESPI AEROSPHERE) 9-4.8 MCG/ACT AERO Inhale 2 puffs into the lungs 2 times daily 6/3/20  Yes Zulay Mdcuffie MD   metoprolol tartrate (LOPRESSOR) 25 MG tablet Take 0.5 tablets by mouth 2 times daily 1/9/20  Yes Keira Robin MD   aspirin 81 MG chewable tablet Take 1 tablet by mouth daily 11/25/19  Yes Shashank Young MD   omeprazole (PRILOSEC) 20 MG delayed release capsule TAKE ONE (1) CAPSULE BY MOUTH ONCE DAILY 6/10/20   Illa Boas, APRN - NP   potassium chloride (MICRO-K) 10 MEQ extended release capsule Take 10 mEq by mouth daily    Historical Provider, MD   Zinc Oxide (DESITIN CREAMY EX) Apply 13 % topically 11/11/2020 Patient states he applies this to lower bilat lower legs    Historical Provider, MD       Social history:  reports that he quit smoking about 27 years ago. His smoking use included cigarettes. He has a 70.00 pack-year smoking history. He has never used smokeless tobacco. He reports current alcohol use. He reports that he does not use drugs.     Family history:    Family History   Problem Relation Age of Onset    Heart Disease Father     Cancer Father         prostate    High Blood Pressure Father     High Cholesterol Father     Cancer Brother     Diabetes Brother     Thyroid Disease Brother          Exam  BP (!) 129/58   Pulse 92   Temp 98.5 °F (36.9 °C) (Oral)   Resp 18   SpO2 98%   Nursing note and vitals reviewed. Constitutional: Well developed, well nourished. No acute distress. HENT:      Head: Normocephalic and atraumatic. Ears: External ears normal.      Nose: Nose normal.     Mouth: Membrane mucosa moist and pink. No posterior oropharynx erythema or tonsillar edema  Eyes: Anicteric sclera. No discharge, PERRL  Neck: Supple. Trachea midline. Cardiovascular: RRR, no murmurs, rubs, or gallops, radial pulses 2+ bilaterally. Dorsalis pedis posterior tibialis pulses are 2+ bilaterally. Pulmonary/Chest: Effort normal. No respiratory distress. CTAB. No stridor. No wheezes. No rales. Abdominal: Soft. Nontender to palpation. No distension. No guarding, rebound tenderness, or evidence of ascites. : No CVA tenderness. Musculoskeletal: Moves all extremities. No gross deformity. Neurological: Alert and oriented to person, place, and time. Normal muscle tone. Skin: Warm and dry. Patient has rash over bilateral lower legs from just below the knees to ankles concerning for chronic venous stasis dermatitis. Clinically there appears to be an overlying cellulitis, has venous stasis ulcer over the medial left lower leg above the ankle, no drainage. Psychiatric: Normal mood and affect. Behavior is normal.      EKG   Please see Dr. Jason Peralta' note for EKG read. Radiographs (if obtained):  [] The following radiograph was interpreted by myself in the absence of a radiologist:   [x] Radiologist's Report Reviewed:  XR ELBOW LEFT (2 VIEWS)   Final Result   No acute abnormality. XR ELBOW RIGHT (2 VIEWS)   Final Result   1. Small elbow effusion. 2.  Age indeterminate fracture fragment about the tip of the coronoid process.          XR KNEE RIGHT (MIN 4 VIEWS)   Final Result Small to moderate right knee effusion without fracture or cortical erosion. Moderate degenerative narrowing involves the medial femoral tibial joint   compartment. XR KNEE LEFT (MIN 4 VIEWS)   Final Result   No fracture, joint effusion or joint space narrowing. XR CHEST PORTABLE   Final Result   Chronic findings in the chest without acute airspace disease identified.                 Labs  Results for orders placed or performed during the hospital encounter of 11/11/20   CBC Auto Differential   Result Value Ref Range    WBC 15.8 (H) 4.0 - 10.5 K/CU MM    RBC 4.44 (L) 4.6 - 6.2 M/CU MM    Hemoglobin 13.2 (L) 13.5 - 18.0 GM/DL    Hematocrit 41.0 (L) 42 - 52 %    MCV 92.3 78 - 100 FL    MCH 29.7 27 - 31 PG    MCHC 32.2 32.0 - 36.0 %    RDW 16.6 (H) 11.7 - 14.9 %    Platelets 976 559 - 519 K/CU MM    MPV 13.2 (H) 7.5 - 11.1 FL    Bands Relative 17 (H) 5 - 11 %    Segs Relative 65.0 36 - 66 %    Basophils % 1.0 0 - 1 %    Lymphocytes % 10.0 (L) 24 - 44 %    Monocytes % 7.0 (H) 0 - 4 %    Bands Absolute 2.69 K/CU MM    Segs Absolute 10.2 K/CU MM    Basophils Absolute 0.2 K/CU MM    Lymphocytes Absolute 1.6 K/CU MM    Monocytes Absolute 1.1 K/CU MM    Differential Type MANUAL DIFFERENTIAL     RBC Morphology OCCASIONAL     Anisocytosis 1+     Sorto-Jolly Bodies PRESENT     Pappenheimer Bodies PRESENT    CMP   Result Value Ref Range    Sodium 129 (L) 135 - 145 MMOL/L    Potassium 5.1 3.5 - 5.1 MMOL/L    Chloride 92 (L) 99 - 110 mMol/L    CO2 22 21 - 32 MMOL/L    BUN 22 6 - 23 MG/DL    CREATININE 1.7 (H) 0.9 - 1.3 MG/DL    Glucose 479 (HH) 70 - 99 MG/DL    Calcium 8.7 8.3 - 10.6 MG/DL    Alb 3.2 (L) 3.4 - 5.0 GM/DL    Total Protein 6.4 6.4 - 8.2 GM/DL    Total Bilirubin 0.8 0.0 - 1.0 MG/DL    ALT 14 10 - 40 U/L    AST 27 15 - 37 IU/L    Alkaline Phosphatase 79 40 - 129 IU/L    GFR Non- 40 (L) >60 mL/min/1.73m2    GFR  48 (L) >60 mL/min/1.73m2    Anion Gap 15 4 - 16 Lactic Acid, Plasma   Result Value Ref Range    Lactate 1.6 0.4 - 2.0 mMOL/L   COVID-19    Specimen: Nasopharyngeal Swab   Result Value Ref Range    Source UNKNOWN     SARS-CoV-2, NAAT NOT DETECTED    Troponin   Result Value Ref Range    Troponin T 0.125 (HH) <0.01 NG/ML   Urinalysis   Result Value Ref Range    Color, UA YELLOW YELLOW    Clarity, UA CLEAR CLEAR    Glucose, Urine >500 (A) NEGATIVE MG/DL    Bilirubin Urine NEGATIVE NEGATIVE MG/DL    Ketones, Urine MODERATE (A) NEGATIVE MG/DL    Specific Gravity, UA 1.027 1.001 - 1.035    Blood, Urine SMALL (A) NEGATIVE    pH, Urine 5.0 5.0 - 8.0    Protein, UA NEGATIVE NEGATIVE MG/DL    Urobilinogen, Urine NORMAL 0.2 - 1.0 MG/DL    Nitrite Urine, Quantitative NEGATIVE NEGATIVE    Leukocyte Esterase, Urine NEGATIVE NEGATIVE    RBC, UA 1 0 - 3 /HPF    WBC, UA 1 0 - 2 /HPF    Bacteria, UA NEGATIVE NEGATIVE /HPF    Squam Epithel, UA <1 /HPF    Mucus, UA RARE (A) NEGATIVE HPF    Trichomonas, UA NONE SEEN NONE SEEN /HPF   Brain Natriuretic Peptide   Result Value Ref Range    Pro-BNP 1,196 (H) <300 PG/ML   PT - INR   Result Value Ref Range    Protime 22.8 (H) 11.7 - 14.5 SECONDS    INR 1.87 INDEX   CK   Result Value Ref Range    Total CK 1,293 (H) 38 - 174 IU/L   EKG 12 Lead   Result Value Ref Range    Ventricular Rate 76 BPM    Atrial Rate 76 BPM    P-R Interval 186 ms    QRS Duration 102 ms    Q-T Interval 424 ms    QTc Calculation (Bazett) 477 ms    P Axis 63 degrees    R Axis -24 degrees    T Axis 4 degrees    Diagnosis       Normal sinus rhythm  Normal ECG  When compared with ECG of 01-MAY-2020 13:02,  WA interval has decreased           MDM  Patient presents for general weakness, had fall yesterday and was on the ground for 6 hours, appears to have cellulitis of both legs. Laboratory work-up reveals what appears to be a chronic troponin elevation, and elevated CK, mild KIRSTEN, leukocytosis, hyperglycemia. Patient started on vancomycin in ED for cellulitis.   Discussed results with patient. Plan is to admit for IV antibiotics, PT and OT evaluation, IV fluid hydration. Assessment and plan discussed with patient understands and agrees. Consult placed to hospitalist, spoke with Rolanda Oneal CNP who agreed to admit. This patient was also seen and evaluated by Dr. Neva Arguelles. Final Impression  1. Cellulitis of lower extremity, unspecified laterality    2. Elevated troponin    3. Elevated CK    4. KIRSTEN (acute kidney injury) ([facility]    5. General weakness        Blood pressure (!) 129/58, pulse 92, temperature 98.5 °F (36.9 °C), temperature source Oral, resp. rate 18, SpO2 98 %. Disposition:  Admit to med/surg in stable condition. Patient was given scripts for the following medications. I counseled patient how to take these medications. New Prescriptions    No medications on file       This chart was generated using the [address] [place] [address] dictation system. I created this record but it may contain dictation errors given the limitations of this technology.        Lourdes Finch PA-C  11/11/20 2647

## 2020-11-11 NOTE — CONSULTS
PHARMACY ANTICOAGULATION MONITORING SERVICE    Niesha Carrion is a 69 y.o. male on warfarin therapy for DVT and PE. Pharmacy consulted by Dr. Allie Holter for monitoring and adjustment of treatment. Indication for anticoagulation: DVT and PE  INR goal: 2-3  Warfarin dose prior to admission: 5mg daily except 7.5mg on Monday and Thursday    Pertinent Laboratory Values   Recent Labs     11/11/20  1002   INR 1.87   HGB 13.2*   HCT 41.0*          Assessment/Plan:  Drug Interactions: aspirin (home med)  INR subtherapeutic on admission @1.87  Last dose taken was warfarin 5mg on 11/10 (Tuesday)  Due to subtherapeutic INR, will give warfarin 7.5mg dose tonight, then follow INR trends tomorrow  Pharmacy will continue to monitor and adjust warfarin therapy as indicated    Thank you for the consult. Naomi Rutledge  11/11/2020 5:24 PM

## 2020-11-11 NOTE — ED NOTES
Bed: ED-19  Expected date:   Expected time:   Means of arrival:   Comments:  EMS     Amna Ansari RN  11/11/20 0154

## 2020-11-11 NOTE — PROGRESS NOTES
[facility] [facility],Third Floor  Phone: [phone]    Fax [phone]                  Elsy Sen MD, Nayely Schaefer MD, Brandon Hampton MD, MD Vernon Crocker MD Millie Rundle, MD Geno Mohair, MD Hayward Brine, WINDY Chisholm, WINDY Alanis, WINDY Torre, WINDY    CARDIOLOGY CONSULT NOTE     Reason for consultation: Elevated troponin    Referring physician:  Cam Berman MD     Primary care physician: Rubin Johnson MD      Dear  Dr. Cam Berman MD   Thanks for the consult. Chief Complaints :  Chief Complaint   Patient presents with    Fatigue        History of present illness:  Shawna Banks is a 69 y.o. male him has an history of DVT in lower extremities, chronic leg ulcers, COPD, hypertension, hyperlipidemia, obesity, previous smoker, PVD, cataracts, diabetes mellitus, chronic kidney disease, GERD, obstructive sleep apnea, anemia, diverticulitis this, and gait disturbance. Who presents for evaluation because he lost his balance at home and fell and was on the ground in his home for more than 6 hours. Patient states that his cell phone was in his car and he did not have the ability to get himself up off the ground. He believes he fell around 4 PM and someone came by to check on him at 10 PM at night. Patient states that he did not hit his head nor did he lose consciousness. Patient denies any chest pain shortness of breath palpitations dizziness or syncopal episodes. Patient had a echocardiogram in November 2019 that showed an ejection fraction of 50 to 55% mild left ventricular hypertrophy, moderately dilated right ventricle, sclerotic but not stenotic aortic valve, and mild aortic regurgitation. He had a nuc med stress test November 2019 which was normal.  Patient had left heart cath June 2014 which did not reveal significant coronary artery disease. Patient has been seen in our office before, but does not follow routinely with cardiology.     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, Anemia, Arm fracture, Arthritis, Cataract, Cataract, Cellulitis of left lower leg, Chronic venous hypertension with ulcer ([address] CKD (chronic kidney disease), Colon polyps, COPD (chronic obstructive pulmonary disease) ([address] Diabetes mellitus ([address] Diabetes mellitus ([address] Diabetes mellitus with peripheral circulatory disorder ([address] Diabetes mellitus with skin ulcer ([address] Diabetic peripheral neuropathy ([address] Diabetic skin ulcer associated with type 2 diabetes mellitus ([address] Diverticulosis, Femoral DVT (deep venous thrombosis) ([address] GERD (gastroesophageal reflux disease), Glaucoma, H/O cardiac catheterization, H/O Doppler ultrasound, H/O echocardiogram, H/O mumps orchitis, Hemorrhoids, History of nuclear stress test, HLD (hyperlipidemia), Hx of Doppler ultrasound, Hyperlipemia, Hyperlipidemia, Hypertension, Hypertension, Idiopathic chronic venous hypertension of left lower extremity with ulcer and inflammation ([address] Leg ulcer ([address] No diabetic retinopathy OU, Non-pressure chronic ulcer of left lower leg with fat layer exposed ([address] Pulmonary embolism ([address] Sleep apnea, SOB (shortness of breath), Tendinitis, Type II or unspecified type diabetes mellitus with other specified manifestations, not stated as uncontrolled, Unspecified venous (peripheral) insufficiency, Urticaria, WD-Cellulitis of right anterior lower leg, WD-Cellulitis of right lower extremity, WD-Chronic venous hypertension (idiopathic) with ulcer of left lower extremity (CODE) ([address] WD-Chronic venous hypertension with inflammation, right, WD-Decubitus ulcer of left buttock, stage 3 ([address] 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 to breakdown of skin ([address] WD-Open wound of hand without complication, left, initial encounter, WD-Pressure injury of left buttock, stage 2 ([address] WD-Pressure injury of left buttock, stage 3 ([address] WD-Pressure injury of right buttock, stage 3 ([address] WD-Skin tear of right forearm without complication, and WD-Ulcer of right pretibial region, with fat layer exposed ([address] Past surgical history:   has a past surgical history that includes Tonsillectomy (1953); Splenectomy (1958); Breast surgery (1970s); hernia repair (1970s); Skin graft (1978-present); Cataract removal (Right, 3/11/2013); Cataract removal (Left, 2/25/2013); Varicose vein surgery (Left, 2009); Carpal tunnel release (Left, 1993); Cardiac catheterization (6/3/14); Colonoscopy (2006); Colonoscopy (11/21/11); Colonoscopy (4/23/12); Colonoscopy (08/04/2016); Splenectomy; hernia repair; and Carpal tunnel release. Social History:   reports that he quit smoking about 27 years ago. His smoking use included cigarettes. He has a 70.00 pack-year smoking history. He has never used smokeless tobacco. He reports current alcohol use. He reports that he does not use drugs.   Family history:   no family history of CAD, STROKE, sudden cardiac death or DM at early age    Allergies   Allergen Reactions    Cavilon Durable Barrier [Mineral Oil-Dimeth-Coconut Oil]     Parabens Hives    Parabens Hives    Prinivil [Lisinopril] Swelling    Cortisone Rash    Cortisone Rash    Dilaudid [Hydromorphone Hcl] Rash    Penicillins Rash    Penicillins Rash    Sulfamethoxazole-Trimethoprim Nausea Only    Tape [Adhesive Tape] Rash       sodium chloride flush 0.9 % injection 10 mL, 2 times per day  sodium chloride flush 0.9 % injection 10 mL, PRN  acetaminophen (TYLENOL) tablet 650 mg, Q6H PRN    Or  acetaminophen (TYLENOL) suppository 650 mg, Q6H PRN  promethazine (PHENERGAN) tablet 12.5 mg, Q6H PRN    Or  ondansetron (ZOFRAN) injection 4 mg, Q6H PRN  insulin lispro (HUMALOG) injection vial 0-18 Units, TID WC  insulin lispro (HUMALOG) injection vial 0-9 Units, Nightly  0.9 % sodium chloride infusion, Continuous  potassium chloride (MICRO-K) extended release capsule 10 mEq, Daily  ARIPiprazole (ABILIFY) tablet 15 mg, Daily  aspirin chewable tablet 81 mg, Daily  atorvastatin (LIPITOR) tablet 40 mg, Nightly  [START ON 11/12/2020] buPROPion (WELLBUTRIN XL) extended release tablet 300 mg, QAM  escitalopram (LEXAPRO) tablet 20 mg, Daily  ipratropium (ATROVENT HFA) 17 MCG/ACT inhaler 2 puff, 4x daily  glycopyrrolate-formoterol (BEVESPI) 9-4.8 MCG/ACT inhaler 2 puff, BID  metoprolol tartrate (LOPRESSOR) tablet 12.5 mg, BID  [START ON 11/12/2020] pantoprazole (PROTONIX) tablet 40 mg, QAM AC      Current Facility-Administered Medications   Medication Dose Route Frequency Provider Last Rate Last Dose    sodium chloride flush 0.9 % injection 10 mL  10 mL Intravenous 2 times per day Steven Mart, APRN - CNP        sodium chloride flush 0.9 % injection 10 mL  10 mL Intravenous PRN Steven Barbed, APRN - CNP        acetaminophen (TYLENOL) tablet 650 mg  650 mg Oral Q6H PRN Steven Hammed, APRN - CNP        Or    acetaminophen (TYLENOL) suppository 650 mg  650 mg Rectal Q6H PRN Steven Hammed, APRN - CNP        promethazine (PHENERGAN) tablet 12.5 mg  12.5 mg Oral Q6H PRN Steven Hammed, APRN - CNP        Or    ondansetron (ZOFRAN) injection 4 mg  4 mg Intravenous Q6H PRN Steven Dayed, APRN - CNP        insulin lispro (HUMALOG) injection vial 0-18 Units  0-18 Units Subcutaneous TID  Georgia Yeh, APRN - CNP        insulin lispro (HUMALOG) injection vial 0-9 Units  0-9 Units Subcutaneous Nightly Georgia Yeh, APRN - CNP        0.9 % sodium chloride infusion   Intravenous Continuous Steven Brittny, APRN -  mL/hr at 11/11/20 1313      potassium chloride (MICRO-K) extended release capsule 10 mEq  10 mEq Oral Daily Steven Barbed, APRN - CNP        ARIPiprazole (ABILIFY) tablet 15 mg  15 mg Oral Daily Steven Hammed, APRN - CNP        aspirin chewable tablet 81 mg  81 mg Oral Daily WINDY Rowland CNP        atorvastatin (LIPITOR) tablet 40 mg  40 mg Oral Nightly WINDY Rowland CNP        [START ON 11/12/2020] buPROPion (WELLBUTRIN XL) extended release tablet 300 mg  300 mg Oral QAM WINDY Longoria CNP        escitalopram (LEXAPRO) tablet 20 mg  20 mg Oral Daily WINDY Longoria CNP        ipratropium (ATROVENT HFA) 17 MCG/ACT inhaler 2 puff  2 puff Inhalation 4x daily WINDY Longoria CNP        glycopyrrolate-formoterol (BEVESPI) 9-4.8 MCG/ACT inhaler 2 puff  2 puff Inhalation BID WINDY Rowland CNP        metoprolol tartrate (LOPRESSOR) tablet 12.5 mg  12.5 mg Oral BID WINDY Rowland CNP        [START ON 11/12/2020] pantoprazole (PROTONIX) tablet 40 mg  40 mg Oral QAM AC WINDY Longoria CNP           Review of Systems   Constitutional: Positive for fatigue. Negative for fever. HENT: Negative for ear pain and sinus pain. Eyes: Negative for photophobia and visual disturbance. Respiratory: Negative for cough and shortness of breath. Cardiovascular: Positive for leg swelling ( Chronic). Negative for chest pain and palpitations. Gastrointestinal: Negative for abdominal pain, blood in stool, constipation, diarrhea, nausea and vomiting. Endocrine: Negative for polyphagia and polyuria. Genitourinary: Negative for decreased urine volume and difficulty urinating. Musculoskeletal: Positive for gait problem ( Chronic). Negative for arthralgias. Skin: Negative for color change and wound. Neurological: Positive for weakness. Negative for dizziness, syncope, light-headedness and headaches. Psychiatric/Behavioral: Negative for agitation. The patient is not hyperactive.            Physical Examination:    Vitals:    11/11/20 1400 11/11/20 1430 11/11/20 1500 11/11/20 1615   BP: (!) 118/58 (!) 122/56 111/61 136/62   Pulse:  76 73 76   Resp:  19 16 14   Temp:    98.3 °F (36.8 °C) TempSrc:    Oral   SpO2: 94% 95% 96% 97%       Physical Exam  Vitals signs reviewed. Constitutional:       General: He is not in acute distress. Appearance: Normal appearance. He is obese. He is not ill-appearing. HENT:      Head: Normocephalic and atraumatic. Eyes:      Conjunctiva/sclera: Conjunctivae normal.      Pupils: Pupils are equal, round, and reactive to light. Neck:      Musculoskeletal: Neck supple. No muscular tenderness. Vascular: No carotid bruit. Cardiovascular:      Rate and Rhythm: Normal rate and regular rhythm. Pulses: Normal pulses. Heart sounds: Normal heart sounds. No murmur. Pulmonary:      Effort: Pulmonary effort is normal. No respiratory distress. Breath sounds: Normal breath sounds. Musculoskeletal:         General: No deformity. Right lower le+ Pitting Edema present. Left lower le+ Pitting Edema present. Skin:     General: Skin is warm and dry. Capillary Refill: Capillary refill takes less than 2 seconds. Findings: Erythema ( Bilateral lower extremities) present. Comments: Open wound noted to left posterior distal calf and to right posterior mid calf with multiple stages of healing noted   Neurological:      Mental Status: He is alert and oriented to person, place, and time. Psychiatric:         Mood and Affect: Mood normal.         Behavior: Behavior normal.         Thought Content:  Thought content normal.         Judgment: Judgment normal.         Medical decision making and Data review:    Lab Review   Recent Labs     20  1002   WBC 15.8*   HGB 13.2*   HCT 41.0*         Recent Labs     20  1002   *   K 5.1   CL 92*   CO2 22   BUN 22   CREATININE 1.7*     Recent Labs     20  1002   AST 27   ALT 14   BILITOT 0.8   ALKPHOS 79     Recent Labs     20  1002   TROPONINT 0.125*       Recent Labs     20  1002   PROBNP 1,196*     Lab Results   Component Value Date    INR 1.87 11/11/2020    PROTIME 22.8 (H) 11/11/2020       EKG:     ECHO:11/2019  Summary   Left ventricular systolic function is normal.   Ejection fraction is visually estimated at 50-55%. Mild left ventricular hypertrophy. Moderately dilated right ventricle with negative Solis's sign. Sclerotic, but non-stenotic aortic valve. Mild aortic regurgitation; PHT: 509msec. No evidence of any pericardial effusion. Chest Xray:(reviewed by myself)  Xr Elbow Left (2 Views)    Result Date: 11/11/2020  EXAMINATION: TWO XRAY VIEWS OF THE LEFT ELBOW 11/11/2020 10:07 am COMPARISON: None. HISTORY: ORDERING SYSTEM PROVIDED HISTORY: trauma TECHNOLOGIST PROVIDED HISTORY: Reason for exam:->trauma Reason for Exam: trauma Acuity: Acute Type of Exam: Initial Mechanism of Injury: Nurse removed bilateral leg dressings. Pt states they are applied every week at his doctors office in [facility]. Legs are red and swollen. Stage 2 ulcer on left calf, approx 3 cm in diameter. Pt states he has been seen by the wound clinic FINDINGS: No acute fracture or dislocation is identified. No joint effusion is identified. No radiopaque foreign body is identified. No acute abnormality. Xr Elbow Right (2 Views)    Result Date: 11/11/2020  EXAMINATION: TWO XRAY VIEWS OF THE RIGHT ELBOW 11/11/2020 10:07 am COMPARISON: Right forearm series 11/03/2014. HISTORY: ORDERING SYSTEM PROVIDED HISTORY: trauma TECHNOLOGIST PROVIDED HISTORY: Reason for exam:->trauma Reason for Exam: trauma Acuity: Acute Type of Exam: Initial Mechanism of Injury: Nurse removed bilateral leg dressings. Pt states they are applied every week at his doctors office in [facility]. Legs are red and swollen. Stage 2 ulcer on left calf, approx 3 cm in diameter. Pt states he has been seen by the wound clinic FINDINGS: Small elbow effusion. Mature ossification is noted about the coronoid process on the lateral view. There is mild enthesophyte formation about the lateral epicondyle.   No malalignment. 1.  Small elbow effusion. 2.  Age indeterminate fracture fragment about the tip of the coronoid process. Xr Knee Left (min 4 Views)    Result Date: 11/11/2020  EXAMINATION: FOUR XRAY VIEWS OF THE LEFT KNEE 11/11/2020 9:19 am COMPARISON: None. HISTORY: ORDERING SYSTEM PROVIDED HISTORY: trauma TECHNOLOGIST PROVIDED HISTORY: Reason for exam:->trauma Reason for Exam: trauma Acuity: Acute Type of Exam: Initial Mechanism of Injury: Nurse removed bilateral leg dressings. Pt states they are applied every week at his doctors office in [facility]. Legs are red and swollen. Stage 2 ulcer on left calf, approx 3 cm in diameter. Pt states he has been seen by the wound clinic FINDINGS: No fracture, cortical erosion or joint effusion were noted. No patellofemoral or femoral tibial joint compartment narrowing were identified. The bony mineralization was normal.     No fracture, joint effusion or joint space narrowing. Xr Knee Right (min 4 Views)    Result Date: 11/11/2020  EXAMINATION: FOUR XRAY VIEWS OF THE RIGHT KNEE 11/11/2020 9:19 am COMPARISON: None. HISTORY: ORDERING SYSTEM PROVIDED HISTORY: trauma TECHNOLOGIST PROVIDED HISTORY: Reason for exam:->trauma Reason for Exam: trauma Acuity: Acute Type of Exam: Initial Mechanism of Injury: Nurse removed bilateral leg dressings. Pt states they are applied every week at his doctors office in [facility]. Legs are red and swollen. Stage 2 ulcer on left calf, approx 3 cm in diameter. Pt states he has been seen by the wound clinic FINDINGS: A small to moderate right knee effusion was noted without fracture or cortical erosion. Moderate degenerative narrowing involves the medial femoral tibial joint compartment. Small to moderate right knee effusion without fracture or cortical erosion. Moderate degenerative narrowing involves the medial femoral tibial joint compartment.      Xr Chest Portable    Result Date: 11/11/2020  EXAMINATION: ONE XRAY VIEW OF THE CHEST 11/11/2020 9:19 am COMPARISON: 07/02/2020, 05/01/2020 HISTORY: ORDERING SYSTEM PROVIDED HISTORY: weakness TECHNOLOGIST PROVIDED HISTORY: Reason for exam:->weakness Reason for Exam: weakness Acuity: Acute Type of Exam: Initial Additional signs and symptoms: Nurse removed bilateral leg dressings. Pt states they are applied every week at his doctors office in [facility]. Legs are red and swollen. Stage 2 ulcer on left calf, approx 3 cm in diameter. Pt states he has been seen by the wound clinic FINDINGS: Shallow inflation. Obscuration of the lung apices by the patient's neck. The cardiomediastinal silhouette is unchanged in appearance. Chronic appearing vascular congestion. There is no consolidation, pneumothorax, or evidence of edema. No effusion is appreciated. The osseous structures are unchanged in appearance. Chronic findings in the chest without acute airspace disease identified. All labs, medications and tests reviewed by myself including data  from outside source , patient and available family . Continue all other medications of all above medical condition listed as is. Impression:  Active Problems:    KIRSTEN (acute kidney injury) ([address]  Resolved Problems:    * No resolved hospital problems. *      Assessment: 69 y. 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, Anemia, Arm fracture, Arthritis, Cataract, Cataract, Cellulitis of left lower leg, Chronic venous hypertension with ulcer ([address] CKD (chronic kidney disease), Colon polyps, COPD (chronic obstructive pulmonary disease) ([address] Diabetes mellitus ([address] Diabetes mellitus ([address] Diabetes mellitus with peripheral circulatory disorder ([address] Diabetes mellitus with skin ulcer ([address] Diabetic peripheral neuropathy ([address] Diabetic skin ulcer associated with type 2 diabetes mellitus ([address] Diverticulosis, Femoral DVT (deep venous thrombosis) ([address] GERD (gastroesophageal reflux disease), Glaucoma, H/O cardiac catheterization, H/O Doppler ultrasound, H/O echocardiogram, H/O mumps orchitis, Hemorrhoids, History of nuclear stress test, HLD (hyperlipidemia), Hx of Doppler ultrasound, Hyperlipemia, Hyperlipidemia, Hypertension, Hypertension, Idiopathic chronic venous hypertension of left lower extremity with ulcer and inflammation (HCC), Leg ulcer ([address] No diabetic retinopathy OU, Non-pressure chronic ulcer of left lower leg with fat layer exposed ([address] Pulmonary embolism ([address] Sleep apnea, SOB (shortness of breath), Tendinitis, Type II or unspecified type diabetes mellitus with other specified manifestations, not stated as uncontrolled, Unspecified venous (peripheral) insufficiency, Urticaria, WD-Cellulitis of right anterior lower leg, WD-Cellulitis of right lower extremity, WD-Chronic venous hypertension (idiopathic) with ulcer of left lower extremity (CODE) ([address] WD-Chronic venous hypertension with inflammation, right, WD-Decubitus ulcer of left buttock, stage 3 ([address] 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 to breakdown of skin ([address] WD-Open wound of hand without complication, left, initial encounter, WD-Pressure injury of left buttock, stage 2 ([address] WD-Pressure injury of left buttock, stage 3 ([address] WD-Pressure injury of right buttock, stage 3 ([address] WD-Skin tear of right forearm without complication, and WD-Ulcer of right pretibial region, with fat layer exposed ([address] Plan and Recommendations:    1. Elevated Troponin without chest pain or prior history of coronary artery disease: in setting of renal failure, possible rhabdomyolysis and acute infection, Doubt ACS most probably Demand ischemia related leak, No further work up at this time. 2. Elevated BUN: Likely is related to possible rhabdomyolysis. Patient CK is additionally elevated and he is known to have fallen and in the same position for more than 6 hours.   3. Rhabdomyolysis is likely as patient has fallen and has been down for more than 6 hours creatinine is elevated at 1.7 total CK is 1293, proBNP 1196, and troponin is 0.125.  4. HTN: stable, continue present medications  5. Peripheral vascular disease: Patient states that he had work-up previously done but no one was able to identify the cause of his wounds. Lower extremity arterial Doppler November 2019 did show moderate stenosis patient reports his last angiogram of his legs was more than 3 years ago. He does not think that he has stents placed in his legs  6. Dyslipidemia: patient is at goal. continue statins   7. Patient has a history of DVT in the past and is on chronic Coumadin therapy      Thank you  much for consult and giving us the opportunity in contributing in the care of this patient. Please feel free to call me for any questions.        Jameel De Oliveira, APRN - CNP, 11/11/2020 5:09 PM

## 2020-11-11 NOTE — PROGRESS NOTES
Medication History  [facility]    Patient Name: Kosta Smith 1945     Medication history has been completed by: Lela Su CPhT    Source(s) of information: patient and insurance claims coumadin clinic notes     Primary Care Physician: Enedina Ash MD     Pharmacy: [address] as of 11/11/2020 - Review Complete 11/11/2020   Allergen Reaction Noted    Cavilon durable barrier [mineral oil-dimeth-coconut oil]  05/04/2012    Parabens Hives 11/29/2010    Parabens Hives 11/21/2019    Prinivil [lisinopril] Swelling 11/29/2010    Cortisone Rash 11/29/2010    Cortisone Rash 11/21/2019    Dilaudid [hydromorphone hcl] Rash 11/13/2011    Penicillins Rash 11/29/2010    Penicillins Rash 11/21/2019    Sulfamethoxazole-trimethoprim Nausea Only 10/01/2012    Tape [adhesive tape] Rash 09/11/2014        Prior to Admission medications    Medication Sig Start Date End Date Taking? Authorizing Provider   warfarin (COUMADIN) 5 MG tablet Take 1 tablet by mouth daily Except take 1 and 1/2 tablets by mouth on Mondays or as directed by clinic  Patient taking differently: Take by mouth See Admin Instructions 11/11/2020 Per [facility] patient to take 5 mg on Sun/Tues/Thurs/Fri/Sat and 7.5 mg on Mondays and Thursdays. Patient states he takes in the morning.  Last dose 5 mg 11/10/2020 8/31/20  Yes Enedina Ash MD   losartan (COZAAR) 50 MG tablet Take 1 tablet by mouth daily Patient tolerates diovan- HOLD if blood pressure less than 120/60 7/9/20  Yes Enedina Ash MD   buPROPion (WELLBUTRIN XL) 300 MG extended release tablet Take 1 tablet by mouth every morning 6/10/20  Yes WINDY Guy NP   escitalopram (LEXAPRO) 20 MG tablet Take 1 tablet by mouth daily 6/10/20 11/11/20 Yes WINDY Guy NP   ARIPiprazole (ABILIFY) 15 MG tablet Take 1 tablet by mouth daily 6/10/20 11/11/20 Yes WINDY Guy NP   atorvastatin (LIPITOR) 40 MG tablet Take 1 tablet by mouth nightly 6/10/20 11/11/20 Yes WINDY Andrew NP   furosemide (LASIX) 20 MG tablet Take 1 tablet by mouth 2 times daily 6/10/20 11/11/20 Yes WINDY Andrew NP   metFORMIN (GLUCOPHAGE) 500 MG tablet Take 1 tablet by mouth daily (with breakfast) 6/10/20 11/11/20 Yes WINDY Andrew NP   glycopyrrolate-formoterol (BEVESPI AEROSPHERE) 9-4.8 MCG/ACT AERO Inhale 2 puffs into the lungs 2 times daily 6/3/20  Yes Edmar Olivera MD   metoprolol tartrate (LOPRESSOR) 25 MG tablet Take 0.5 tablets by mouth 2 times daily 1/9/20  Yes Shanna Sales MD   aspirin 81 MG chewable tablet Take 1 tablet by mouth daily 11/25/19  Yes Donnell Mcgowan MD   omeprazole (PRILOSEC) 20 MG delayed release capsule TAKE ONE (1) CAPSULE BY MOUTH ONCE DAILY 6/10/20   WINDY Andrew NP   Zinc Oxide (DESITIN CREAMY EX) Apply 13 % topically 11/11/2020 Patient states he applies this to lower bilat lower legs    Historical Provider, MD     Medications added or changed (ex. new medication, dosage change, interval change, formulation change): Warfarin dosage clarified    Medications removed from list (include reason, ex. noncompliance, medication cost, therapy complete etc.):   Zinc sulfate patient no longer taking this  Vitamin K patient no longer taking  Potassium patient no longer taking flagged for removal  Zheng contour test strip clean up list  BS test strips clean up list  Spacer chamber clean up list    Comments:  Medication list reviewed with patient and insurance claims verified. Patient reports he chaney taken no meds piror to ER visit. Warfarin dosage clarified as patient takes 5 mg Sun/Tues/Wed/Fri/Sat and 7.5 mg on Mondays/Thursdays. Last dose 11/10/2020 of 5 mg. Patient states he takes this in the morning.     To my knowledge the above medication history is accurate as of 11/11/2020 12:28 PM.   Edwige Cervantes CPhT   11/11/2020 12:28

## 2020-11-12 LAB
ANION GAP SERPL CALCULATED.3IONS-SCNC: 12 MMOL/L (ref 4–16)
BASOPHILS ABSOLUTE: 0 K/CU MM
BASOPHILS RELATIVE PERCENT: 0.1 % (ref 0–1)
BUN BLDV-MCNC: 21 MG/DL (ref 6–23)
CALCIUM SERPL-MCNC: 8 MG/DL (ref 8.3–10.6)
CHLORIDE BLD-SCNC: 95 MMOL/L (ref 99–110)
CO2: 21 MMOL/L (ref 21–32)
CREAT SERPL-MCNC: 1.3 MG/DL (ref 0.9–1.3)
DIFFERENTIAL TYPE: ABNORMAL
DOSE AMOUNT: ABNORMAL
DOSE TIME: ABNORMAL
EKG ATRIAL RATE: 68 BPM
EKG DIAGNOSIS: NORMAL
EKG P AXIS: 84 DEGREES
EKG P-R INTERVAL: 212 MS
EKG Q-T INTERVAL: 452 MS
EKG QRS DURATION: 110 MS
EKG QTC CALCULATION (BAZETT): 480 MS
EKG R AXIS: 204 DEGREES
EKG T AXIS: 191 DEGREES
EKG VENTRICULAR RATE: 68 BPM
EOSINOPHILS ABSOLUTE: 0.1 K/CU MM
EOSINOPHILS RELATIVE PERCENT: 0.4 % (ref 0–3)
GFR AFRICAN AMERICAN: >60 ML/MIN/1.73M2
GFR NON-AFRICAN AMERICAN: 54 ML/MIN/1.73M2
GLUCOSE BLD-MCNC: 216 MG/DL (ref 70–99)
GLUCOSE BLD-MCNC: 221 MG/DL (ref 70–99)
GLUCOSE BLD-MCNC: 277 MG/DL (ref 70–99)
GLUCOSE BLD-MCNC: 293 MG/DL (ref 70–99)
GLUCOSE BLD-MCNC: 330 MG/DL (ref 70–99)
GLUCOSE BLD-MCNC: 457 MG/DL (ref 70–99)
HCT VFR BLD CALC: 37.5 % (ref 42–52)
HEMOGLOBIN: 12.4 GM/DL (ref 13.5–18)
IMMATURE NEUTROPHIL %: 0.4 % (ref 0–0.43)
INR BLD: 2.21 INDEX
LV EF: 50 %
LVEF MODALITY: NORMAL
LYMPHOCYTES ABSOLUTE: 1.9 K/CU MM
LYMPHOCYTES RELATIVE PERCENT: 15.2 % (ref 24–44)
MCH RBC QN AUTO: 30.1 PG (ref 27–31)
MCHC RBC AUTO-ENTMCNC: 33.1 % (ref 32–36)
MCV RBC AUTO: 91 FL (ref 78–100)
MONOCYTES ABSOLUTE: 1 K/CU MM
MONOCYTES RELATIVE PERCENT: 7.6 % (ref 0–4)
NUCLEATED RBC %: 0 %
OSMOLALITY: 286 MOS/L (ref 280–300)
PDW BLD-RTO: 16.7 % (ref 11.7–14.9)
PLATELET # BLD: 164 K/CU MM (ref 140–440)
PMV BLD AUTO: 13.7 FL (ref 7.5–11.1)
POTASSIUM SERPL-SCNC: 3.8 MMOL/L (ref 3.5–5.1)
PROTHROMBIN TIME: 27 SECONDS (ref 11.7–14.5)
RBC # BLD: 4.12 M/CU MM (ref 4.6–6.2)
SEGMENTED NEUTROPHILS ABSOLUTE COUNT: 9.6 K/CU MM
SEGMENTED NEUTROPHILS RELATIVE PERCENT: 76.3 % (ref 36–66)
SODIUM BLD-SCNC: 128 MMOL/L (ref 135–145)
TOTAL CK: 708 IU/L (ref 38–174)
TOTAL IMMATURE NEUTOROPHIL: 0.05 K/CU MM
TOTAL NUCLEATED RBC: 0 K/CU MM
TROPONIN T: 0.09 NG/ML
VANCOMYCIN TROUGH: 7.7 UG/ML (ref 10–20)
WBC # BLD: 12.6 K/CU MM (ref 4–10.5)

## 2020-11-12 PROCEDURE — 93010 ELECTROCARDIOGRAM REPORT: CPT | Performed by: INTERNAL MEDICINE

## 2020-11-12 PROCEDURE — 36415 COLL VENOUS BLD VENIPUNCTURE: CPT

## 2020-11-12 PROCEDURE — 6360000002 HC RX W HCPCS: Performed by: HOSPITALIST

## 2020-11-12 PROCEDURE — 1200000000 HC SEMI PRIVATE

## 2020-11-12 PROCEDURE — 6370000000 HC RX 637 (ALT 250 FOR IP): Performed by: HOSPITALIST

## 2020-11-12 PROCEDURE — 2580000003 HC RX 258: Performed by: HOSPITALIST

## 2020-11-12 PROCEDURE — 80202 ASSAY OF VANCOMYCIN: CPT

## 2020-11-12 PROCEDURE — APPSS45 APP SPLIT SHARED TIME 31-45 MINUTES: Performed by: NURSE PRACTITIONER

## 2020-11-12 PROCEDURE — 82550 ASSAY OF CK (CPK): CPT

## 2020-11-12 PROCEDURE — 80048 BASIC METABOLIC PNL TOTAL CA: CPT

## 2020-11-12 PROCEDURE — 99211 OFF/OP EST MAY X REQ PHY/QHP: CPT

## 2020-11-12 PROCEDURE — 99233 SBSQ HOSP IP/OBS HIGH 50: CPT | Performed by: INTERNAL MEDICINE

## 2020-11-12 PROCEDURE — 83930 ASSAY OF BLOOD OSMOLALITY: CPT

## 2020-11-12 PROCEDURE — 99222 1ST HOSP IP/OBS MODERATE 55: CPT | Performed by: INTERNAL MEDICINE

## 2020-11-12 PROCEDURE — 6370000000 HC RX 637 (ALT 250 FOR IP): Performed by: INTERNAL MEDICINE

## 2020-11-12 PROCEDURE — 93306 TTE W/DOPPLER COMPLETE: CPT

## 2020-11-12 PROCEDURE — 85610 PROTHROMBIN TIME: CPT

## 2020-11-12 PROCEDURE — 84484 ASSAY OF TROPONIN QUANT: CPT

## 2020-11-12 PROCEDURE — 85025 COMPLETE CBC W/AUTO DIFF WBC: CPT

## 2020-11-12 PROCEDURE — 94640 AIRWAY INHALATION TREATMENT: CPT

## 2020-11-12 PROCEDURE — 6370000000 HC RX 637 (ALT 250 FOR IP): Performed by: NURSE PRACTITIONER

## 2020-11-12 PROCEDURE — 2580000003 HC RX 258: Performed by: NURSE PRACTITIONER

## 2020-11-12 PROCEDURE — 94761 N-INVAS EAR/PLS OXIMETRY MLT: CPT

## 2020-11-12 PROCEDURE — 82962 GLUCOSE BLOOD TEST: CPT

## 2020-11-12 PROCEDURE — 93005 ELECTROCARDIOGRAM TRACING: CPT | Performed by: NURSE PRACTITIONER

## 2020-11-12 PROCEDURE — 99221 1ST HOSP IP/OBS SF/LOW 40: CPT | Performed by: SURGERY

## 2020-11-12 RX ORDER — WARFARIN SODIUM 5 MG/1
5 TABLET ORAL
Status: COMPLETED | OUTPATIENT
Start: 2020-11-12 | End: 2020-11-12

## 2020-11-12 RX ORDER — INSULIN GLARGINE 100 [IU]/ML
50 INJECTION, SOLUTION SUBCUTANEOUS NIGHTLY
Status: DISCONTINUED | OUTPATIENT
Start: 2020-11-12 | End: 2020-11-13

## 2020-11-12 RX ORDER — LIDOCAINE HYDROCHLORIDE 40 MG/ML
SOLUTION TOPICAL ONCE
Status: COMPLETED | OUTPATIENT
Start: 2020-11-13 | End: 2020-11-13

## 2020-11-12 RX ADMIN — SODIUM CHLORIDE: 9 INJECTION, SOLUTION INTRAVENOUS at 14:21

## 2020-11-12 RX ADMIN — ESCITALOPRAM OXALATE 20 MG: 10 TABLET ORAL at 10:11

## 2020-11-12 RX ADMIN — METOPROLOL TARTRATE 12.5 MG: 25 TABLET, FILM COATED ORAL at 10:16

## 2020-11-12 RX ADMIN — INSULIN HUMAN 15 UNITS: 100 INJECTION, SUSPENSION SUBCUTANEOUS at 13:33

## 2020-11-12 RX ADMIN — ASPIRIN 81 MG: 81 TABLET, CHEWABLE ORAL at 10:10

## 2020-11-12 RX ADMIN — GLYCOPYRROLATE AND FORMOTEROL FUMARATE 2 PUFF: 9; 4.8 AEROSOL, METERED RESPIRATORY (INHALATION) at 07:25

## 2020-11-12 RX ADMIN — ACETAMINOPHEN 650 MG: 325 TABLET ORAL at 11:07

## 2020-11-12 RX ADMIN — INSULIN GLARGINE 50 UNITS: 100 INJECTION, SOLUTION SUBCUTANEOUS at 22:01

## 2020-11-12 RX ADMIN — SODIUM CHLORIDE, PRESERVATIVE FREE 10 ML: 5 INJECTION INTRAVENOUS at 22:02

## 2020-11-12 RX ADMIN — SODIUM CHLORIDE, PRESERVATIVE FREE 10 ML: 5 INJECTION INTRAVENOUS at 10:08

## 2020-11-12 RX ADMIN — METOPROLOL TARTRATE 12.5 MG: 25 TABLET, FILM COATED ORAL at 22:01

## 2020-11-12 RX ADMIN — VANCOMYCIN HYDROCHLORIDE 1750 MG: 5 INJECTION, POWDER, LYOPHILIZED, FOR SOLUTION INTRAVENOUS at 14:47

## 2020-11-12 RX ADMIN — ATORVASTATIN CALCIUM 40 MG: 40 TABLET, FILM COATED ORAL at 22:01

## 2020-11-12 RX ADMIN — GLYCOPYRROLATE AND FORMOTEROL FUMARATE 2 PUFF: 9; 4.8 AEROSOL, METERED RESPIRATORY (INHALATION) at 19:41

## 2020-11-12 RX ADMIN — WARFARIN SODIUM 5 MG: 5 TABLET ORAL at 17:55

## 2020-11-12 RX ADMIN — BUPROPION HYDROCHLORIDE 300 MG: 150 TABLET, FILM COATED, EXTENDED RELEASE ORAL at 10:10

## 2020-11-12 RX ADMIN — ARIPIPRAZOLE 15 MG: 10 TABLET ORAL at 10:11

## 2020-11-12 ASSESSMENT — PAIN DESCRIPTION - PAIN TYPE
TYPE: CHRONIC PAIN
TYPE: CHRONIC PAIN

## 2020-11-12 ASSESSMENT — PAIN SCALES - GENERAL
PAINLEVEL_OUTOF10: 0
PAINLEVEL_OUTOF10: 4
PAINLEVEL_OUTOF10: 3
PAINLEVEL_OUTOF10: 6

## 2020-11-12 ASSESSMENT — PAIN DESCRIPTION - ORIENTATION: ORIENTATION: RIGHT;LEFT

## 2020-11-12 ASSESSMENT — PAIN SCALES - WONG BAKER
WONGBAKER_NUMERICALRESPONSE: 0

## 2020-11-12 ASSESSMENT — PAIN DESCRIPTION - PROGRESSION: CLINICAL_PROGRESSION: NOT CHANGED

## 2020-11-12 ASSESSMENT — PAIN DESCRIPTION - LOCATION
LOCATION: KNEE
LOCATION: KNEE

## 2020-11-12 NOTE — CONSULTS
[address] [id number] Continence Nurse  Consult Note       Twin Dickson  AGE: 69 y.o. GENDER: male  : 1945  TODAY'S DATE:  2020    Subjective:     Reason for  Evaluation and Assessment:  Wound care scot Dickson is a 69 y.o. 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 Talia Wagoner Anemia 2011    Arm fracture Ramiro Chin     Dr Joe Kitchen with also yearly DM exam    Cataract     worsening-Dr. Indira Hatfield    Cellulitis of left lower leg     Chronic venous hypertension with ulcer ([address] 2011    CKD (chronic kidney disease) 2012    Renal u/S normal     Colon polyps     Dr. Talia Wagoner COPD (chronic obstructive pulmonary disease) ([address]     Diabetes mellitus ([address]     Diabetes mellitus ([address]     Diabetes mellitus with peripheral circulatory disorder ([address] 10/2/2015    Diabetes mellitus with skin ulcer ([address] 10/2/2015    Diabetic peripheral neuropathy ([address]     + EMG, NCS    Diabetic skin ulcer associated with type 2 diabetes mellitus ([address]     Diverticulosis 12    mild, left colon    Femoral DVT (deep venous thrombosis) ([address] 2011    PARTIAL,CHRONIC-SPF HEART SURGEONS    GERD (gastroesophageal reflux disease)     Glaucoma     open angle-Dr. Graciela Avalos H/O cardiac catheterization 6/3/14    EF55% normal study    H/O Doppler ultrasound 03/26/15    Carotid US- no significant stenosis noted bilaterally.  H/O echocardiogram 2019    EF50-55%, Mild AR. Moderately dilated right ventricle with negative Solis's sign.     H/O mumps orchitis     as a youth   [email or web address] 10/17/2019       PAST SURGICAL HISTORY    Past Surgical History:   Procedure Laterality Date    BREAST SURGERY  1970s    benign tumors bilaterally    CARDIAC CATHETERIZATION  6/3/14    EF55% normal study    CARPAL TUNNEL RELEASE Left     CARPAL TUNNEL RELEASE      CATARACT REMOVAL Right 3/11/2013    Dr. Shadi Stephenson Left 2013    Dr. Billy Conner  2006    polyps    COLONOSCOPY  11    villous component--f/u colonoscopy will be needed in 6 months    COLONOSCOPY  12    mild diverticulosis, internal hemorrhoids; repeat in 3 years (Dr. Za Villagran)    COLONOSCOPY  2016    mild diverticulosis, three colon polyps    HERNIA REPAIR  1970s    HERNIA REPAIR      SKIN GRAFT  -present    skin grafts to left ankle ulcers    SPLENECTOMY      SPLENECTOMY      TONSILLECTOMY      VARICOSE VEIN SURGERY Left 2009       FAMILY HISTORY    Family History   Problem Relation Age of Onset    Heart Disease Father     Cancer Father         prostate    High Blood Pressure Father     High Cholesterol Father     Cancer Brother     Diabetes Brother     Thyroid Disease Brother        SOCIAL HISTORY    Social History     Tobacco Use    Smoking status: Former Smoker     Packs/day: 2.00     Years: 35.00     Pack years: 70.00     Types: Cigarettes     Last attempt to quit:      Years since quittin.8    Smokeless tobacco: Never Used    Tobacco comment: chew--30 years.   Reviewed 2015   Substance Use Topics    Alcohol use: Yes     Comment: soc    Drug use: Never       ALLERGIES    Allergies   Allergen Reactions    Cavilon Durable Barrier [Mineral Oil-Dimeth-Coconut Oil]     Parabens Hives    Parabens Hives    Prinivil [Lisinopril] Swelling    Cortisone Rash    Cortisone Rash    Dilaudid [Hydromorphone Hcl] Rash    Penicillins Rash    Penicillins Rash    Sulfamethoxazole-Trimethoprim Nausea Only    Tape [Adhesive Tape] Rash MEDICATIONS    No current facility-administered medications on file prior to encounter. Current Outpatient Medications on File Prior to Encounter   Medication Sig Dispense Refill    warfarin (COUMADIN) 5 MG tablet Take 1 tablet by mouth daily Except take 1 and 1/2 tablets by mouth on Mondays or as directed by clinic (Patient taking differently: Take by mouth See Admin Instructions 11/11/2020 Per [facility]-Ag clinic patient to take 5 mg on Sun/Tues/Wed/Fri/Sat and 7.5 mg on Mondays and Thursdays. Patient states he takes in the morning.  Last dose 5 mg 11/10/2020) 98 tablet 1    losartan (COZAAR) 50 MG tablet Take 1 tablet by mouth daily Patient tolerates diovan- HOLD if blood pressure less than 120/60 90 tablet 1    buPROPion (WELLBUTRIN XL) 300 MG extended release tablet Take 1 tablet by mouth every morning 90 tablet 0    escitalopram (LEXAPRO) 20 MG tablet Take 1 tablet by mouth daily 90 tablet 0    ARIPiprazole (ABILIFY) 15 MG tablet Take 1 tablet by mouth daily 90 tablet 0    atorvastatin (LIPITOR) 40 MG tablet Take 1 tablet by mouth nightly 90 tablet 0    furosemide (LASIX) 20 MG tablet Take 1 tablet by mouth 2 times daily 180 tablet 0    metFORMIN (GLUCOPHAGE) 500 MG tablet Take 1 tablet by mouth daily (with breakfast) 90 tablet 0    glycopyrrolate-formoterol (BEVESPI AEROSPHERE) 9-4.8 MCG/ACT AERO Inhale 2 puffs into the lungs 2 times daily 1 Inhaler 5    metoprolol tartrate (LOPRESSOR) 25 MG tablet Take 0.5 tablets by mouth 2 times daily 90 tablet 3    aspirin 81 MG chewable tablet Take 1 tablet by mouth daily 30 tablet 3    omeprazole (PRILOSEC) 20 MG delayed release capsule TAKE ONE (1) CAPSULE BY MOUTH ONCE DAILY 90 capsule 1    potassium chloride (MICRO-K) 10 MEQ extended release capsule Take 10 mEq by mouth daily      Zinc Oxide (DESITIN CREAMY EX) Apply 13 % topically 11/11/2020 Patient states he applies this to lower bilat lower legs           Objective:      BP (!) 109/47   Pulse 78 Temp 99.2 °F (37.3 °C) (Oral)   Resp 17   Ht 6' 5\" (1.956 m)   Wt 285 lb 6.4 oz (129.5 kg)   SpO2 90%   BMI 33.84 kg/m²   Dao Risk Score: Dao Scale Score: 16    LABS    CBC:   Lab Results   Component Value Date    WBC 12.6 11/12/2020    RBC 4.12 11/12/2020    HGB 12.4 11/12/2020    HCT 37.5 11/12/2020    MCV 91.0 11/12/2020    MCH 30.1 11/12/2020    MCHC 33.1 11/12/2020    RDW 16.7 11/12/2020     11/12/2020    MPV 13.7 11/12/2020     CMP:    Lab Results   Component Value Date     11/12/2020    K 3.8 11/12/2020    CL 95 11/12/2020    CO2 21 11/12/2020    BUN 21 11/12/2020    CREATININE 1.3 11/12/2020    GFRAA >60 11/12/2020    GFRAA >60 03/19/2013    AGRATIO 1.1 10/21/2019    LABGLOM 54 11/12/2020    GLUCOSE 216 11/12/2020    PROT 6.4 11/11/2020    PROT 7.4 09/17/2012    LABALBU 3.2 11/11/2020    CALCIUM 8.0 11/12/2020    BILITOT 0.8 11/11/2020    ALKPHOS 79 11/11/2020    AST 27 11/11/2020    ALT 14 11/11/2020     Albumin:    Lab Results   Component Value Date    LABALBU 3.2 11/11/2020     PT/INR:    Lab Results   Component Value Date    PROTIME 27.0 11/12/2020    PROTIME 21.8 11/05/2020    INR 2.21 11/12/2020     HgBA1c:    Lab Results   Component Value Date    LABA1C 8.3 11/22/2019         Assessment:     Patient Active Problem List   Diagnosis    Gastroesophageal reflux disease without esophagitis    Hypertension in stage 3 chronic kidney disease due to type 2 diabetes mellitus ([facility]    DM (diabetes mellitus) type II, controlled, with peripheral vascular disorder ([facility]    Combined hyperlipidemia associated with type 2 diabetes mellitus ([facility]    Diabetic skin ulcer associated with type 2 diabetes mellitus (HCC)    CKD (chronic kidney disease)    History of DVT (deep vein thrombosis)- left femoral    History of pulmonary embolism    Long term current use of anticoagulants with INR goal of 2.0-3.0    COPD (chronic obstructive pulmonary disease) (HCC)    Severe recurrent major Wound Healing % 50 11/12/20 1030   Post-Procedure Length (cm) 1.1 cm 10/29/20 0939   Post-Procedure Width (cm) 1.5 cm 10/29/20 0939   Post-Procedure Depth (cm) 0.1 cm 10/29/20 0939   Post-Procedure Surface Area (cm^2) 1.65 cm^2 10/29/20 0939   Post-Procedure Volume (cm^3) 0.16 cm^3 10/29/20 0939   Distance Tunneling (cm) 0 cm 11/12/20 1030   Tunneling Position ___ O'Clock 0 11/12/20 1030   Undermining Starts ___ O'Clock 0 11/12/20 1030   Undermining Ends___ O'Clock 0 11/12/20 1030   Undermining Maxium Distance (cm) 0 11/12/20 1030   Wound Assessment Slough;Granulation tissue 11/05/20 0904   Drainage Amount Moderate 11/12/20 1030   Drainage Description Yellow 11/12/20 1030   Odor None 11/12/20 1030   Ana-wound Assessment Dry/flaky 11/12/20 1030   Margins Defined edges 11/12/20 1030   Wound Thickness Description not for Pressure Injury Full thickness 11/12/20 1030   Number of days: 77       Wound 11/12/20 Elbow Right;Posterior (Active)   Wound Etiology Traumatic 11/12/20 1030   Dressing Status New dressing applied 11/12/20 1030   Wound Cleansed Cleansed with saline 11/12/20 1030   Wound Length (cm) 2 cm 11/12/20 1030   Wound Width (cm) 1.8 cm 11/12/20 1030   Wound Depth (cm) 0.1 cm 11/12/20 1030   Wound Surface Area (cm^2) 3.6 cm^2 11/12/20 1030   Wound Volume (cm^3) 0.36 cm^3 11/12/20 1030   Distance Tunneling (cm) 0 cm 11/12/20 1030   Tunneling Position ___ O'Clock 0 11/12/20 1030   Undermining Starts ___ O'Clock 0 11/12/20 1030   Undermining Ends___ O'Clock 0 11/12/20 1030   Undermining Maxium Distance (cm) 0 11/12/20 1030   Drainage Amount Moderate 11/12/20 1030   Drainage Description Serosanguinous; Yellow 11/12/20 1030   Odor None 11/12/20 1030   Ana-wound Assessment Fragile 11/12/20 1030   Margins Defined edges 11/12/20 1030   Wound Thickness Description not for Pressure Injury Full thickness 11/12/20 1030   Number of days: 0       Wound 11/12/20 Buttocks Left cluster (Active)   Wound Etiology Deep tissue/Injury 11/12/20 1030   Dressing Status New dressing applied 11/12/20 1030   Wound Cleansed Cleansed with saline 11/12/20 1030   Dressing/Treatment Collagen 11/12/20 1030   Wound Length (cm) 1.6 cm 11/12/20 1030   Wound Width (cm) 2 cm 11/12/20 1030   Wound Depth (cm) 0.1 cm 11/12/20 1030   Wound Surface Area (cm^2) 3.2 cm^2 11/12/20 1030   Wound Volume (cm^3) 0.32 cm^3 11/12/20 1030   Distance Tunneling (cm) 0 cm 11/12/20 1030   Tunneling Position ___ O'Clock 0 11/12/20 1030   Undermining Starts ___ O'Clock 0 11/12/20 1030   Undermining Ends___ O'Clock 0 11/12/20 1030   Undermining Maxium Distance (cm) 0 11/12/20 1030   Drainage Amount Small 11/12/20 1030   Drainage Description Serosanguinous 11/12/20 1030   Odor None 11/12/20 1030   Margins Defined edges 11/12/20 1030   Wound Thickness Description not for Pressure Injury Full thickness 11/12/20 1030   Number of days: 0       Response to treatment:  Well tolerated by patient. Pain Assessment:  Severity:  0  Quality of pain: none  Wound Pain Timing/Severity:   Premedicated: no    Plan:     Plan of Care: Wound 08/27/20 Pretibial Left;Proximal #8 Left Proximal Lower Leg-Dressing/Treatment: (aquacel ag abd kerlix tape)  Wound 06/27/19 #6 (onset 1 month) Left Distal Medial Ankle-Dressing/Treatment: (alphabath to leg aquacel ag abd kerlix tape )  Wound 11/12/20 Elbow Right;Posterior-Dressing/Treatment: (versatel aquacel ag optifoam border)  Wound 11/12/20 Buttocks Left cluster-Dressing/Treatment: Collagen(sacral border)     Pt in bed agreeable to wound care eval. Pt has chronic wounds to left lower leg washed legs with alphabath rinsed and dried measured and pictured dressing as above. Pt goes to the outpatient wound clinic. Dr diop ordered for double ace wraps. Wound to rt elbow skin tear dressing removed cleansed with NS measured and pictured dressing as above.  Left buttock wound pressure deep tissue injury and stage 2 cleansed with NS measured and pictured dressing as above. Rt buttock purple and blanching. Heels intact. Pt is at mild risk for skin breakdown AEB nguyen score. Observe nguyen orders. Atmos pump placed to bed. Heels floated. Pt turned to lt side. Specialty Bed Required :  yes  [] Low Air Loss   [x] Pressure Redistribution  [] Fluid Immersion  [] Bariatric  [] Total Pressure Relief  [] Other:     Discharge Plan:  Placement for patient upon discharge: tbd  Hospice Care: no  Patient appropriate for Outpatient [address] [place] [address]:  Yes pt to continue after discharge. Patient/Caregiver Teaching:  Level of patient/caregiver understanding able to:  Pt verbalized understanding of dressing changes. Electronically signed by Hanna Jacobo.  SAMI Lang,  on 11/12/2020 at 1:32 PM

## 2020-11-12 NOTE — CONSULTS
Department of General Surgery   Surgical Service Dr. Kent Roles   Consult Note    Date of Consult: 11/12/20    Reason for Consult:  Left leg venous ulcers  Requesting Physician:  Dr. Marybel Mckeon:  Leg wounds, fatigue    History Obtained From:  patient    HISTORY OF PRESENT ILLNESS:    The patient is a 69 y.o. male who presented with fatigue, KIRSTEN, elevated troponin and chronic venous leg ulcers. He reports venous problems in his legs and ulcers intermittently since 1978. He reported to the hospital for fatigue which led him to have falls at home. He follows with Dr. Rosendo Mcburney in the wound care clinic and does compression wraps for his venous stasis disease and ulcers. He denies significant worsening in his wounds.        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 Estrella Mensah Anemia 11/8/2011    Arm fracture Sangita Marker     Dr Liz Sampson with also yearly DM exam    Cataract 11/12    worsening-Dr. Maricel Macedo    Cellulitis of left lower leg     Chronic venous hypertension with ulcer ([address] 11/18/2011    CKD (chronic kidney disease) Feb 2012    Renal u/S normal     Colon polyps     Dr. Estrella Mensah COPD (chronic obstructive pulmonary disease) ([address]     Diabetes mellitus ([address] 1993    Diabetes mellitus ([address]     Diabetes mellitus with peripheral circulatory disorder ([address] 10/2/2015    Diabetes mellitus with skin ulcer ([address] 10/2/2015    Diabetic peripheral neuropathy ([address] 11/12    + EMG, NCS    Diabetic skin ulcer associated with type 2 diabetes mellitus ([address]     Diverticulosis 4/23/12    mild, left colon    Femoral DVT (deep venous thrombosis) ([address] 09/2011    PARTIAL,CHRONIC-SPFLD HEART SURGEONS    GERD (gastroesophageal reflux disease)     Glaucoma 11/12    open angle-Dr. Patience Alberts H/O cardiac catheterization 6/3/14    EF55% normal study    H/O Doppler ultrasound 03/26/15    Carotid US- no significant stenosis noted bilaterally.  H/O echocardiogram 11/21/2019    EF50-55%, Mild AR. Moderately dilated right ventricle with negative Solis's sign.  H/O mumps orchitis     as a youth    Hemorrhoids 4/23/12    Dr. Mega Easley; repeat colonoscopy 3 years    History of nuclear stress test 11/21/2019    EF 60%, Normal study.  HLD (hyperlipidemia)     Hx of Doppler ultrasound 1/06/15    Lymph node seen in left groin area. No bilateral stenosis.     Hyperlipemia     Hyperlipidemia     Hypertension 1992    Hypertension     Idiopathic chronic venous hypertension of left lower extremity with ulcer and inflammation (HCC) 10/2/2015    Leg ulcer ([address] 1978-present    following at wound center, Dr Raza Pradhan No diabetic retinopathy OU 11/12    Dr. Inocencio Rome chronic ulcer of left lower leg with fat layer exposed ([address] 10/2/2015    Pulmonary embolism ([address] 11/13    patient on coumadin    Sleep apnea     doesnt always use cpap dt it drys him out    SOB (shortness of breath) Oct 2011    Stress test normal.     Tendinitis 1973    plantar tendons    Type II or unspecified type diabetes mellitus with other specified manifestations, not stated as uncontrolled 2/8/2013    Unspecified venous (peripheral) insufficiency     Urticaria     WD-Cellulitis of right anterior lower leg 9/5/2019    WD-Cellulitis of right lower extremity 3/26/2020    WD-Chronic venous hypertension (idiopathic) with ulcer of left lower extremity (CODE) ([address] 6/27/2019    WD-Chronic venous hypertension with inflammation, right 9/19/2019    WD-Decubitus ulcer of left buttock, stage 3 ([address] 6/25/2020    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 to breakdown of skin ([address] 3/26/2020    WD-Open wound of hand without complication, left, initial encounter mL Intravenous PRN Courtney Cork, APRN - CNP        acetaminophen (TYLENOL) tablet 650 mg  650 mg Oral Q6H PRN Courtney Cork, APRN - CNP   650 mg at 11/12/20 1107    Or    acetaminophen (TYLENOL) suppository 650 mg  650 mg Rectal Q6H PRN Courtney Cork, APRN - CNP        promethazine (PHENERGAN) tablet 12.5 mg  12.5 mg Oral Q6H PRN Courtney Cork, APRN - CNP        0.9 % sodium chloride infusion   Intravenous Continuous Courtney Cork, APRN -  mL/hr at 11/11/20 2116      ARIPiprazole (ABILIFY) tablet 15 mg  15 mg Oral Daily Courtney Cork, APRN - CNP   15 mg at 11/12/20 1011    aspirin chewable tablet 81 mg  81 mg Oral Daily Courtney Cork, APRN - CNP   81 mg at 11/12/20 1010    atorvastatin (LIPITOR) tablet 40 mg  40 mg Oral Nightly Courtney Cork, APRN - CNP   40 mg at 11/11/20 2114    buPROPion (WELLBUTRIN XL) extended release tablet 300 mg  300 mg Oral QAM Courtney Cork, APRN - CNP   300 mg at 11/12/20 1010    escitalopram (LEXAPRO) tablet 20 mg  20 mg Oral Daily Courtney Cork, APRN - CNP   20 mg at 11/12/20 1011    glycopyrrolate-formoterol (BEVESPI) 9-4.8 MCG/ACT inhaler 2 puff  2 puff Inhalation BID Courtney Cork, APRN - CNP   2 puff at 11/12/20 0725    metoprolol tartrate (LOPRESSOR) tablet 12.5 mg  12.5 mg Oral BID Courtney Cork, APRN - CNP   12.5 mg at 11/12/20 1016    pantoprazole (PROTONIX) tablet 40 mg  40 mg Oral QAM AC Georgia Yeh APRN - CNP        warfarin (COUMADIN) daily dosing (placeholder)   Other RX Placeholder Fransisco Harper MD        vancomycin (VANCOCIN) intermittent dosing (placeholder)   Other RX Placeholder Fransisco Harper MD        glucose (GLUTOSE) 40 % oral gel 15 g  15 g Oral PRN Fransisco Harper MD        dextrose 50 % IV solution  12.5 g Intravenous PRN Fransisco Harper MD        glucagon (rDNA) injection 1 mg  1 mg Intramuscular PRN Fransisco Harper MD        dextrose 5 % solution  100 mL/hr Intravenous PRN Fransisco Harper MD        insulin lispro (HUMALOG) injection vial 15 Units  15 Units Subcutaneous TID  Candy Brewster MD   15 Units at 20 1026    insulin lispro (HUMALOG) injection vial 0-12 Units  0-12 Units Subcutaneous TID  Candy Brewster MD   4 Units at 20 1010    insulin lispro (HUMALOG) injection vial 0-12 Units  0-12 Units Subcutaneous 2 times per day Candy Brewster MD   6 Units at 20 0301    ipratropium (ATROVENT HFA) 17 MCG/ACT inhaler 2 puff  2 puff Inhalation PRN Selvin Williamson MD           Allergies:  Cavilon durable barrier [mineral oil-dimeth-coconut oil]; Parabens; Parabens; Prinivil [lisinopril]; Cortisone; Cortisone; Dilaudid [hydromorphone hcl]; Penicillins; Penicillins; Sulfamethoxazole-trimethoprim; and Tape Osker Chute tape]    Social History:   Social History     Socioeconomic History    Marital status: Single     Spouse name: None    Number of children: None    Years of education: None    Highest education level: None   Occupational History    None   Social Needs    Financial resource strain: None    Food insecurity     Worry: None     Inability: None    Transportation needs     Medical: None     Non-medical: None   Tobacco Use    Smoking status: Former Smoker     Packs/day: 2.00     Years: 35.00     Pack years: 70.00     Types: Cigarettes     Last attempt to quit:      Years since quittin.8    Smokeless tobacco: Never Used    Tobacco comment: chew--30 years.   Reviewed 2015   Substance and Sexual Activity    Alcohol use: Yes     Comment: soc    Drug use: Never    Sexual activity: None   Lifestyle    Physical activity     Days per week: None     Minutes per session: None    Stress: None   Relationships    Social connections     Talks on phone: None     Gets together: None     Attends [employer] service: None     Active member of club or organization: None     Attends meetings of clubs or organizations: None     Relationship status: None    Intimate partner violence Fear of current or ex partner: None     Emotionally abused: None     Physically abused: None     Forced sexual activity: None   Other Topics Concern    None   Social History Narrative    ** Merged History Encounter **            Family History:   Family History   Problem Relation Age of Onset    Heart Disease Father     Cancer Father         prostate    High Blood Pressure Father     High Cholesterol Father     Cancer Brother     Diabetes Brother     Thyroid Disease Brother        REVIEW OFSYSTEMS:    Review of Systems   Constitutional: positive for fatigue causing falls as per HPI   HENT: Negative for congestion. Negative for rhinorrhea. Respiratory: Negative for cough. Negative for shortness of breath. Negative for wheezing. Cardiovascular: Negative for chest pain. Gastrointestinal:Negative for nausea and vomiting. Genitourinary: Negative for difficulty urinating. Neurological: Negative for dizziness, syncope and numbness. Hematological: Does not bruise/bleed easily. PHYSICAL EXAM:  Vitals:    11/11/20 2102 11/12/20 0257 11/12/20 0728 11/12/20 1000   BP: (!) 112/50 (!) 109/56  (!) 109/47   Pulse: 78 70  78   Resp: 21 20 16 17   Temp: 98.6 °F (37 °C) 98.3 °F (36.8 °C)  99.2 °F (37.3 °C)   TempSrc: Oral Oral  Oral   SpO2: 94% 95% 92% 90%   Weight:  285 lb 6.4 oz (129.5 kg)     Height:  6' 5\" (1.956 m)         Physical Exam  General: awake, alert, in no acute distress  HEENT: mucous membranes moist  Respiratory: normal effort, no wheezes appreciated  CV: appears well perfused, regular rate and rhythm  Skin: warm and dry  Extremities: bilateral lower extremities with chronic skin reddening and thickening consistent with long standing venous stasis disease. Open ulcers present on the left lower leg anterior surface with small amount of fibrinous exudate.   Moderate edema noted  Neuro: no focal deficits noted  Psych: mood normal        DATA:    Lab Results   Component Value Date    WBC 12.6 (H) 11/12/2020    HGB 12.4 (L) 11/12/2020    HCT 37.5 (L) 11/12/2020    MCV 91.0 11/12/2020     11/12/2020     Lab Results   Component Value Date     11/12/2020    K 3.8 11/12/2020    CL 95 11/12/2020    CO2 21 11/12/2020    BUN 21 11/12/2020    CREATININE 1.3 11/12/2020    GLUCOSE 216 11/12/2020    CALCIUM 8.0 11/12/2020        IMPRESSION:    69 y.o. male with long standing venous stasis disease and ulcers of the left lower leg. He may have some acute cellulitis vs erythema from his baseline venous disease. Wounds appear fairly clean but will benefit from gentle debridement.     Patient Active Problem List:     Gastroesophageal reflux disease without esophagitis     Hypertension in stage 3 chronic kidney disease due to type 2 diabetes mellitus (HCC)     DM (diabetes mellitus) type II, controlled, with peripheral vascular disorder (HCC)     Combined hyperlipidemia associated with type 2 diabetes mellitus ([address]     Diabetic skin ulcer associated with type 2 diabetes mellitus (HCC)     CKD (chronic kidney disease)     History of DVT (deep vein thrombosis)- left femoral     History of pulmonary embolism     Long term current use of anticoagulants with INR goal of 2.0-3.0     COPD (chronic obstructive pulmonary disease) (HCC)     Severe recurrent major depressive disorder with psychotic features ([address]     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)     Venous (peripheral) insufficiency     Uncontrolled secondary diabetes mellitus with stage 3 CKD (GFR 30-59) (HCC)     Diabetes mellitus with skin ulcer (HCC)     WD-Ulcer of shin, left, with fat layer exposed ([address]     History of colon polyps     Personal history of PE (pulmonary embolism)     WD-Chronic venous hypertension (idiopathic) with ulcer of left lower extremity (CODE) ([address]     WD-Chronic venous hypertension with inflammation, right     Troponin I above reference range     KIRSTEN (acute kidney injury) ([address]        PLAN:  - continue local wound cares to left lower extremity ulcers  - compression with ACE wrap from the foot to knee bilaterally, keep lower legs elevated on pillows while in bed  - will do superficial wound debridement of the left lower leg ulcers at bedside tomorrow  - will continue to follow        Electronically signed by Roby Tabor MD on 11/12/2020 at 11:58 AM

## 2020-11-12 NOTE — CONSULTS
Endocrinology   Consult Note  Dear Doctor Nandini Islas     Thank You for the Consult     Pt. Was Admitted for : Increasing fatigue and high blood glucose    Reason for Consult: Better control of blood glucose      History Obtained From:  Patient/ EMR       HISTORY OF PRESENT ILLNESS:                The patient is a 69 y.o. male with significant past medical history of adenocarcinoma of bowel, anemia, CKD, diabetes mellitus, COPD, DVT, pulmonary emboli, hypertension, hyperlipidemia, obesity status post splenectomy was admitted to hospital for increasing fatigue shortness of breath and high blood glucose I was  consulted for better control of blood glucose. Also has complaining of swelling and tenderness of the left leg possible cellulitis      ROS:   Pt's ROS done in detail. 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 Ruth Lind Anemia 11/8/2011    Arm fracture [facility]     Dr Glenda Hannon with also yearly DM exam    Cataract 11/12    worsening-Dr. Mariajose Norton    Cellulitis of left lower leg     Chronic venous hypertension with ulcer ([address] 11/18/2011    CKD (chronic kidney disease) Feb 2012    Renal u/S normal     Colon polyps     Dr. Ruth Lind COPD (chronic obstructive pulmonary disease) ([address]     Diabetes mellitus ([address] 1993    Diabetes mellitus ([address]     Diabetes mellitus with peripheral circulatory disorder ([address] 10/2/2015    Diabetes mellitus with skin ulcer ([address] 10/2/2015    Diabetic peripheral neuropathy ([address] 11/12    + EMG, NCS    Diabetic skin ulcer associated with type 2 diabetes mellitus ([address]     Diverticulosis 4/23/12    mild, left colon    Femoral DVT (deep venous thrombosis) ([address] 09/2011    PARTIAL,CHRONIC-SPFLD HEART SURGEONS    GERD (gastroesophageal reflux disease)     Glaucoma 11/12    open angle-Dr. Maria Teresa Hernandez H/O cardiac catheterization 6/3/14    EF55% normal study    H/O Doppler ultrasound 03/26/15    Carotid US- no significant stenosis noted bilaterally.  H/O echocardiogram 11/21/2019    EF50-55%, Mild AR. Moderately dilated right ventricle with negative Solis's sign.  H/O mumps orchitis     as a youth    Hemorrhoids 4/23/12    Dr. Stefany Mendenhall; repeat colonoscopy 3 years    History of nuclear stress test 11/21/2019    EF 60%, Normal study.  HLD (hyperlipidemia)     Hx of Doppler ultrasound 1/06/15    Lymph node seen in left groin area. No bilateral stenosis.     Hyperlipemia     Hyperlipidemia     Hypertension 1992    Hypertension     Idiopathic chronic venous hypertension of left lower extremity with ulcer and inflammation (HCC) 10/2/2015    Leg ulcer ([address] 1978-present    following at wound center, Dr Torri Hall No diabetic retinopathy OU 11/12    Dr. Bren Gould chronic ulcer of left lower leg with fat layer exposed ([address] 10/2/2015    Pulmonary embolism ([address] 11/13    patient on coumadin    Sleep apnea     doesnt always use cpap dt it drys him out    SOB (shortness of breath) Oct 2011    Stress test normal.     Tendinitis 1973    plantar tendons    Type II or unspecified type diabetes mellitus with other specified manifestations, not stated as uncontrolled 2/8/2013    Unspecified venous (peripheral) insufficiency     Urticaria     WD-Cellulitis of right anterior lower leg 9/5/2019    WD-Cellulitis of right lower extremity 3/26/2020    WD-Chronic venous hypertension (idiopathic) with ulcer of left lower extremity (CODE) ([address] 6/27/2019    WD-Chronic venous hypertension with inflammation, right 9/19/2019    WD-Decubitus ulcer of left buttock, stage 3 ([address] 6/25/2020    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 to breakdown of skin ([address] 3/26/2020    WD-Open awake, alert, cooperative, appears stated age   EYES:  vision intact Fundoscopic Exam not performed   ENT:Normal  NECK:  Supple, No JVD. Thyroid Exam:Normal   LUNGS:  Has Vesicular Breath Sounds,   CARDIOVASCULAR:  Normal apical impulse, regular rate and rhythm, normal S1 and S2, no S3 or S4, and has no  murmur   ABDOMEN:  No scars, normal bowel sounds, soft, non-distended, non-tender, no masses palpated, no hepatolienomegaly  Musculoskeletal: Normal  Extremities: Normal, peripheral pulses normal, , has yes edema   NEUROLOGIC:  Awake, alert, oriented to name, place and time. Cranial nerves II-XII are grossly intact. Motor is  intact. Sensory is neuropathy,  and gait is abnormal.    DATA:    CBC:   Recent Labs     11/11/20  1002   WBC 15.8*   HGB 13.2*       CMP:  Recent Labs     11/11/20  1002   *   K 5.1   CL 92*   CO2 22   BUN 22   CREATININE 1.7*   CALCIUM 8.7   PROT 6.4   LABALBU 3.2*   BILITOT 0.8   ALKPHOS 79   AST 27   ALT 14     Lipids:   Lab Results   Component Value Date    CHOL 107 11/22/2019    CHOL 175 05/05/2015    HDL 46 11/22/2019    TRIG 81 11/22/2019     Glucose:   Recent Labs     11/11/20  1641 11/11/20  2107   POCGLU 450* 422*     Hemoglobin A1C:   Lab Results   Component Value Date    LABA1C 8.3 11/22/2019     Free T4:   Lab Results   Component Value Date    T4FREE 1.14 11/21/2019     Free T3: No results found for: FT3  TSH High Sensitivity:   Lab Results   Component Value Date    TSHHS 1.770 11/21/2019       Xr Elbow Left (2 Views)    Result Date: 11/11/2020  EXAMINATION: TWO XRAY VIEWS OF THE LEFT ELBOW 11/11/2020 10:07 am COMPARISON: None. HISTORY: ORDERING SYSTEM PROVIDED HISTORY: trauma TECHNOLOGIST PROVIDED HISTORY: Reason for exam:->trauma Reason for Exam: trauma Acuity: Acute Type of Exam: Initial Mechanism of Injury: Nurse removed bilateral leg dressings. Pt states they are applied every week at his doctors office in [facility]. Legs are red and swollen.  Stage 2 ulcer on narrowing. Xr Knee Right (min 4 Views)    Result Date: 11/11/2020  EXAMINATION: FOUR XRAY VIEWS OF THE RIGHT KNEE 11/11/2020 9:19 am COMPARISON: None. HISTORY: ORDERING SYSTEM PROVIDED HISTORY: trauma TECHNOLOGIST PROVIDED HISTORY: Reason for exam:->trauma Reason for Exam: trauma Acuity: Acute Type of Exam: Initial Mechanism of Injury: Nurse removed bilateral leg dressings. Pt states they are applied every week at his doctors office in [facility]. Legs are red and swollen. Stage 2 ulcer on left calf, approx 3 cm in diameter. Pt states he has been seen by the wound clinic FINDINGS: A small to moderate right knee effusion was noted without fracture or cortical erosion. Moderate degenerative narrowing involves the medial femoral tibial joint compartment. Small to moderate right knee effusion without fracture or cortical erosion. Moderate degenerative narrowing involves the medial femoral tibial joint compartment. Xr Chest Portable    Result Date: 11/11/2020  EXAMINATION: ONE XRAY VIEW OF THE CHEST 11/11/2020 9:19 am COMPARISON: 07/02/2020, 05/01/2020 HISTORY: ORDERING SYSTEM PROVIDED HISTORY: weakness        Chronic findings in the chest without acute airspace disease identified. Us Retroperitoneal Limited    Result Date: 11/11/2020  EXAMINATION: ULTRASOUND OF THE KIDNEYS 11/11/2020 6:50 pm COMPARISON: 02/24/2012 HISTORY: ORDERING SYSTEM PROVIDED HISTORY: kirsten TECHNOLOGIST PROVIDED HISTORY: Reason for exam:->kirsten Reason for Exam: KIRSTEN; abnormal labs Type of Exam: Initial FINDINGS: The right kidney measures 10.6 cm in length and the left kidney measures 9.6 cm in length. Kidneys demonstrate normal cortical echogenicity. No hydronephrosis or intrarenal stones. No focal lesions. Unremarkable ultrasound of the kidneys.        Scheduled Medicines   Medications:    sodium chloride flush  10 mL Intravenous 2 times per day    ARIPiprazole  15 mg Oral Daily    aspirin  81 mg Oral Daily    atorvastatin  40 mg Oral Nightly    [START ON 11/12/2020] buPROPion  300 mg Oral QAM    escitalopram  20 mg Oral Daily    glycopyrrolate-formoterol  2 puff Inhalation BID    metoprolol tartrate  12.5 mg Oral BID    [START ON 11/12/2020] pantoprazole  40 mg Oral QAM AC    [START ON 11/12/2020] warfarin (COUMADIN) daily dosing (placeholder)   Other RX Placeholder    [START ON 11/12/2020] vancomycin (VANCOCIN) intermittent dosing (placeholder)   Other RX Placeholder    [START ON 11/12/2020] insulin lispro  15 Units Subcutaneous TID WC    insulin glargine  40 Units Subcutaneous Nightly    [START ON 11/12/2020] insulin lispro  0-12 Units Subcutaneous TID WC    insulin lispro  0-12 Units Subcutaneous 2 times per day      Infusions:    sodium chloride 125 mL/hr at 11/11/20 2116    dextrose           IMPRESSION    Patient Active Problem List   Diagnosis    Gastroesophageal reflux disease without esophagitis    Hypertension in stage 3 chronic kidney disease due to type 2 diabetes mellitus ([address]    DM (diabetes mellitus) type II, controlled, with peripheral vascular disorder ([address]    Combined hyperlipidemia associated with type 2 diabetes mellitus ([address]    Diabetic skin ulcer associated with type 2 diabetes mellitus (HCC)    CKD (chronic kidney disease)    History of DVT (deep vein thrombosis)- left femoral    History of pulmonary embolism    Long term current use of anticoagulants with INR goal of 2.0-3.0    COPD (chronic obstructive pulmonary disease) (HCC)    Severe recurrent major depressive disorder with psychotic features ([address]    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)    Venous (peripheral) insufficiency    Uncontrolled secondary diabetes mellitus with stage 3 CKD (GFR 30-59) ([address]    Diabetes mellitus with skin ulcer ([address]    WD-Ulcer of shin, left, with fat layer exposed ([address]    History of colon polyps    Personal history of PE (pulmonary embolism)    WD-Chronic venous hypertension (idiopathic) with ulcer of left lower extremity (CODE) [facility]    WD-Chronic venous hypertension with inflammation, right    Troponin I above reference range    KIRSTEN (acute kidney injury) ([facility]         RECOMMENDATIONS:      1. Reviewed POC blood glucose . Labs and X ray results   2. Reviewed Home and Current Medicines   3. Will Start On meal/ Correction bolus Humalog/ Lantus Insulin regime   4. Monitor Blood glucose frequently   5. Modify  the dose of Insulin  as needed        Will follow with you  Again thank you for sharing pt's care with me.      Truly yours,       Indio Gipson MD

## 2020-11-12 NOTE — PROGRESS NOTES
CHANDANA ([facility] PHYSICAL REHABILITATION [place]  [address], [address]  Phone: [phone]    Fax [phone]                  Shawn Hammer MD, Cecile Garza MD, Junie Appiah MD, MD Kati Mcdaniels MD Gearold Scott, MD Algie Marion, MD [facility], [facility], APRN Dyanna Gitelman, [facility], [facility]    Cardiology Progress Note     Today's Plan: sign off    Admit Date:  11/11/2020    Consult reason/ Seen today for:  Elevated troponin    Subjective and  Overnight Events:  Patient states that he does not know if he is going to be able to go home, he feels his legs are getting a lot weaker. He would be willing to go to [facility] for rehab. Assessment / Plan / Recommendation:     1. Elevated Troponin: In setting of renal failure rhabdo elevated CK level probably noncardiac. Shows preserved EF no significant valvular disease or wall motion abnormality  2. Rhabdo as per nephrology IV hydration  3. Nonhealing leg ulcers probably venous arterial studies in 2019 did not show any significant disease although he had biphasic flow. Follow up out patient for further evaluation. 4. Physical therapy occupational therapy as per primary team for  deconditioning leg weakness  5. DVT prophylaxis if no contraindication  6. Dyslipidemia: continue statins       History of Presenting Illness:    Chief complain on admission : 69 y. 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, Anemia, Arm fracture, Arthritis, Cataract, Cataract, Cellulitis of left lower leg, Chronic venous hypertension with ulcer ([address] CKD (chronic kidney disease), Colon polyps, COPD (chronic obstructive pulmonary disease) ([address] Diabetes mellitus ([address] Diabetes mellitus ([address] Diabetes mellitus with peripheral circulatory disorder 2/25/2013); Varicose vein surgery (Left, 2009); Carpal tunnel release (Left, 1993); Cardiac catheterization (6/3/14); Colonoscopy (2006); Colonoscopy (11/21/11); Colonoscopy (4/23/12); Colonoscopy (08/04/2016); Splenectomy; hernia repair; and Carpal tunnel release. Social History:   reports that he quit smoking about 27 years ago. His smoking use included cigarettes. He has a 70.00 pack-year smoking history. He has never used smokeless tobacco. He reports current alcohol use. He reports that he does not use drugs. Family history:  family history includes Cancer in his brother and father; Diabetes in his brother; Heart Disease in his father; High Blood Pressure in his father; High Cholesterol in his father; Thyroid Disease in his brother. Allergies   Allergen Reactions    Cavilon Durable Barrier [Mineral Oil-Dimeth-Coconut Oil]     Parabens Hives    Parabens Hives    Prinivil [Lisinopril] Swelling    Cortisone Rash    Cortisone Rash    Dilaudid [Hydromorphone Hcl] Rash    Penicillins Rash    Penicillins Rash    Sulfamethoxazole-Trimethoprim Nausea Only    Tape [Adhesive Tape] Rash       Review of Systems:    All 14 systems were reviewed and are negative  Except for the positive findings  which as documented     BP (!) 109/47   Pulse 78   Temp 99.2 °F (37.3 °C) (Oral)   Resp 17   Ht 6' 5\" (1.956 m)   Wt 285 lb 6.4 oz (129.5 kg)   SpO2 90%   BMI 33.84 kg/m²   No intake or output data in the 24 hours ending 11/12/20 1210    Physical Exam  Vitals signs reviewed. Constitutional:       General: He is not in acute distress. Appearance: Normal appearance. He is not ill-appearing. HENT:      Head: Normocephalic and atraumatic. Eyes:      Conjunctiva/sclera: Conjunctivae normal.      Pupils: Pupils are equal, round, and reactive to light. Neck:      Musculoskeletal: Neck supple. No muscular tenderness. Vascular: No carotid bruit.    Cardiovascular:      Rate and Rhythm: Normal rate and 164     BMP:   Recent Labs     11/11/20  1002 11/12/20  0836   * 128*   K 5.1 3.8   CL 92* 95*   CO2 22 21   BUN 22 21   CREATININE 1.7* 1.3     PT/INR:   Recent Labs     11/11/20  1002 11/12/20  0836   PROTIME 22.8* 27.0*   INR 1.87 2.21     BNP:    Recent Labs     11/11/20  1002   PROBNP 1,196*     TROPONIN:   Recent Labs     11/11/20  1728 11/11/20  1955 11/12/20  1000   TROPONINT 0.092* 0.076* 0.085*        ECHO :   Echocardiogram        All labs, medications and tests reviewed by myself , continue all other medications of all above medical condition listed as is except for changes mentioned above. Thank you very much for consult , please call with questions. Electronically signed by WINDY Pearce CNP on 11/12/2020 at 12:10 PM      [facility]    I have seen ,spoken to  and examined this patient personally, independently of the nurse practitioner. I have reviewed the hospital care given to date and reviewed all pertinent labs and imaging. The plan was developed mutually at the time of the visit with the patient,  NP   and myself. I have spoken with patient, nursing staff and provided written and verbal instructions . The above note has been reviewed and I agree with the assessment, diagnosis, and treatment plan with changes made by me as follows     HPI:  I have reviewed the above HPI  And agree with above   Maribel Luo is a 69 y. o.year old who and presents with had concerns including Fatigue.   Chief Complaint   Patient presents with    Fatigue     Please review addendum/changes made to note above   Interval history:  No chest pain , says legs are weak    Physical Exam:  General:  Awake, alert, NAD  Head:normal  Eye:normal  Neck:  No JVD   Chest:  Clear to auscultation, respiration easy  Cardiovascular:  S1 and S2 audible, No added heart sounds, No signs of ankle edema, or volume overload, No evidence of JVD, No crackles  Abdomen:   nontender  Extremities: 2+* edema  Pulses; palpable  Neuro: grossly normal      MEDICAL DECISION MAKING;    I agree with the above plan, which was planned by myself and discussed with NP.   We will get stress test for stratification doubt cardiac etiology for troponin leak      Tonia Ronquillo MD [facility] 11/12/20

## 2020-11-12 NOTE — PROGRESS NOTES
Brit Bonner MD, Charmian Grass                Internal Medicine Hospitalist             Daily Progress  Note   Subjective:     Chief Complaint   Patient presents with    Fatigue     Mr. Cara Menchaca Complains of nil. Objective:    BP (!) 109/56   Pulse 70   Temp 98.3 °F (36.8 °C) (Oral)   Resp 16   Ht 6' 5\" (1.956 m)   Wt 285 lb 6.4 oz (129.5 kg)   SpO2 92%   BMI 33.84 kg/m²    No intake or output data in the 24 hours ending 11/12/20 0925   Physical Exam:  Heart:  Distant heart sounds. Lungs: Mostly clear to auscultation, decreased breath sounds at bases. No wheezes appreciated no crackles heard. Abdomen: Soft, non distended. Bowel sounds appreciated. No obvious liver or spleen enlargement. Non tender, no rebound noted. Extremities: Non tender, no swelling noted, strength 5/5 both legs. CNS: Grossly intact.     Labs:  CBC with Differential:    Lab Results   Component Value Date    WBC 15.8 11/11/2020    RBC 4.44 11/11/2020    HGB 13.2 11/11/2020    HCT 41.0 11/11/2020     11/11/2020    MCV 92.3 11/11/2020    MCH 29.7 11/11/2020    MCHC 32.2 11/11/2020    RDW 16.6 11/11/2020    SEGSPCT 65.0 11/11/2020    BANDSPCT 17 11/11/2020    LYMPHOPCT 10.0 11/11/2020    MONOPCT 7.0 11/11/2020    BASOPCT 1.0 11/11/2020    MONOSABS 1.1 11/11/2020    LYMPHSABS 1.6 11/11/2020    EOSABS 0.2 05/01/2020    BASOSABS 0.2 11/11/2020    DIFFTYPE MANUAL DIFFERENTIAL 11/11/2020     CMP:    Lab Results   Component Value Date     11/12/2020    K 3.8 11/12/2020    CL 95 11/12/2020    CO2 21 11/12/2020    BUN 21 11/12/2020    CREATININE 1.3 11/12/2020    GFRAA >60 11/12/2020    GFRAA >60 03/19/2013    AGRATIO 1.1 10/21/2019    LABGLOM 54 11/12/2020    GLUCOSE 216 11/12/2020    PROT 6.4 11/11/2020    PROT 7.4 09/17/2012    LABALBU 3.2 11/11/2020    CALCIUM 8.0 11/12/2020    BILITOT 0.8 11/11/2020    ALKPHOS 79 11/11/2020    AST 27 11/11/2020    ALT 14 11/11/2020     Recent Labs     11/11/20  1002 11/11/20  1720 11/11/20  1955   TROPONINT 0.125* 0.092* 0.076*     Lab Results   Component Value Date    TSH 1.770 11/21/2019         sodium chloride 125 mL/hr at 11/11/20 2116    dextrose        insulin NPH  15 Units Subcutaneous Once    insulin glargine  50 Units Subcutaneous Nightly    sodium chloride flush  10 mL Intravenous 2 times per day    ARIPiprazole  15 mg Oral Daily    aspirin  81 mg Oral Daily    atorvastatin  40 mg Oral Nightly    buPROPion  300 mg Oral QAM    escitalopram  20 mg Oral Daily    glycopyrrolate-formoterol  2 puff Inhalation BID    metoprolol tartrate  12.5 mg Oral BID    pantoprazole  40 mg Oral QAM AC    warfarin (COUMADIN) daily dosing (placeholder)   Other RX Placeholder    vancomycin (VANCOCIN) intermittent dosing (placeholder)   Other RX Placeholder    insulin lispro  15 Units Subcutaneous TID WC    insulin lispro  0-12 Units Subcutaneous TID WC    insulin lispro  0-12 Units Subcutaneous 2 times per day         Assessment:       Patient Active Problem List    Diagnosis Date Noted    Diabetes mellitus with skin ulcer ([facility]) 10/02/2015     Priority: High    WD-Ulcer of shin, left, with fat layer exposed ([facility]) 10/02/2015     Priority: High    Uncontrolled secondary diabetes mellitus with stage 3 CKD (GFR 30-59) ([facility]) 05/10/2015     Priority: High    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 ([facility]) 10/14/2014     Priority: High    Severe recurrent major depressive disorder with psychotic features ([facility]) 05/13/2014     Priority: High    COPD (chronic obstructive pulmonary disease) ([facility]) 01/29/2014     Priority: High    Long term current use of anticoagulants with INR goal of 2.0-3.0 12/06/2013     Priority: High    History of DVT (deep vein thrombosis)- left femoral 11/15/2013     Priority: High     Class: Acute    History of pulmonary embolism 11/15/2013     Priority: High     Class: Acute    Diabetic skin ulcer associated with type 2 diabetes mellitus

## 2020-11-12 NOTE — CONSULTS
[address] [facility]  consulted by Dr. Kris Smith for monitoring and adjustment. Indication for treatment: Cellulitis  Goal trough: 10-15 mcg/mL     Pertinent Laboratory Values:   Temp Readings from Last 3 Encounters:   11/12/20 99.2 °F (37.3 °C) (Oral)   11/05/20 99.9 °F (37.7 °C) (Temporal)   10/29/20 97.7 °F (36.5 °C) (Temporal)     Recent Labs     11/11/20  1002 11/12/20  0836   WBC 15.8* 12.6*   LACTATE 1.6  --      Recent Labs     11/11/20  1002 11/12/20  0836   BUN 22 21   CREATININE 1.7* 1.3     Estimated Creatinine Clearance: 75 mL/min (based on SCr of 1.3 mg/dL). No intake or output data in the 24 hours ending 11/12/20 1254    Pertinent Cultures:  Date    Source    Results  11/11   Blood    Ordered  11/11   COVID-19   Negative    Vancomycin level:   TROUGH:    Recent Labs     11/12/20  0836   VANCOTROUGH 7.7*     RANDOM:  No results for input(s): VANCORANDOM in the last 72 hours. Assessment:  · WBC and temperature: WBC elevated @12.6, trending down; pt with  Low grade fever today @ 99.2 F  · SCr, BUN, and urine output: KIRSTEN, SCR down today @1.3, no I/O data  · Day(s) of therapy:  2  · Vancomycin level:   · 11/12:  7.7  OK to re-dose       Plan:  · Vancomycin random level is sub-therapeutic @ 7.7  · Due to KIRSTEN, intermittent vancomycin dosing is appropriate at this time  · Ordered Vancomycin 1750mg IV x 1 dose today. · Repeat random level tomorrow AM  · Will dose Vancomycin based on levels and renal trends  · Pharmacy will continue to monitor patient and adjust therapy as indicated    [facility] 11/13 @ 08:00    Thank you for the consult.   Rylie VanceCharlotte [facility]  11/12/2020 12:54 PM

## 2020-11-12 NOTE — CONSULTS
Nephrology Service Consultation      [address], [address], [address]  Phone: [phone]  Office Hours: 8:30AM - 4:30PM  Monday - Friday            Patient:  Constantine Sykes  MRN: [id number]  Consulting physician:  Lizy Greer MD  Reason for Consult: elevated creatinine      PCP: Max Syed MD    HISTORY OF PRESENT ILLNESS:   The patient is a 69 y.o. male with DM2 and diabetic ulcers on leg presented due to fatigue, weakness, falls. He also reports diarrhea but he was only have 3BMs per day. Renal consult for cr 1.7 from 1.1 in may 2020  He is on room air, he denies using nsaids at home, he denies a recent abx use or any new meds  He is nonoliguric. REVIEW OF SYSTEMS:  14 point ROS is Negative.  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 Darby Zamudio Anemia 11/8/2011    Arm fracture [place]l Green     Dr Elma Fregoso with also yearly DM exam    Cataract 11/12    worsening-Dr. Parry Servant    Cellulitis of left lower leg     Chronic venous hypertension with ulcer ([address] 11/18/2011    CKD (chronic kidney disease) Feb 2012    Renal u/S normal     Colon polyps     Dr. Darby Zamudio COPD (chronic obstructive pulmonary disease) ([address]     Diabetes mellitus ([address] 1993    Diabetes mellitus ([address]     Diabetes mellitus with peripheral circulatory disorder ([address] 10/2/2015    Diabetes mellitus with skin ulcer ([address] 10/2/2015    Diabetic peripheral neuropathy ([address] 11/12    + EMG, NCS    Diabetic skin ulcer associated with type 2 diabetes mellitus ([address]     Diverticulosis 4/23/12    mild, left colon    Femoral DVT (deep venous thrombosis) ([address] 09/2011    PARTIAL,CHRONIC-SPFLD HEART SURGEONS    GERD (gastroesophageal reflux disease)     Glaucoma 11/12    open angle-Dr. Maria Teresa Hernandez H/O cardiac catheterization 6/3/14    EF55% normal study    H/O Doppler ultrasound 03/26/15    Carotid US- no significant stenosis noted bilaterally.  H/O echocardiogram 11/21/2019    EF50-55%, Mild AR. Moderately dilated right ventricle with negative Solis's sign.  H/O mumps orchitis     as a youth    Hemorrhoids 4/23/12    Dr. Stefany Mendenhall; repeat colonoscopy 3 years    History of nuclear stress test 11/21/2019    EF 60%, Normal study.  HLD (hyperlipidemia)     Hx of Doppler ultrasound 1/06/15    Lymph node seen in left groin area. No bilateral stenosis.     Hyperlipemia     Hyperlipidemia     Hypertension 1992    Hypertension     Idiopathic chronic venous hypertension of left lower extremity with ulcer and inflammation (HCC) 10/2/2015    Leg ulcer ([address] 1978-present    following at wound center, Dr Torri Hall No diabetic retinopathy OU 11/12    Dr. Bren Gould chronic ulcer of left lower leg with fat layer exposed ([address] 10/2/2015    Pulmonary embolism ([address] 11/13    patient on coumadin    Sleep apnea     doesnt always use cpap dt it drys him out    SOB (shortness of breath) Oct 2011    Stress test normal.     Tendinitis 1973    plantar tendons    Type II or unspecified type diabetes mellitus with other specified manifestations, not stated as uncontrolled 2/8/2013    Unspecified venous (peripheral) insufficiency     Urticaria     WD-Cellulitis of right anterior lower leg 9/5/2019    WD-Cellulitis of right lower extremity 3/26/2020    WD-Chronic venous hypertension (idiopathic) with ulcer of left lower extremity (CODE) ([address] 6/27/2019    WD-Chronic venous hypertension with inflammation, right 9/19/2019    WD-Decubitus ulcer of left buttock, stage 3 ([address] 6/25/2020    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 to breakdown of skin ([address] 3/26/2020    WD-Open wound of hand without complication, left, initial encounter 12/12/2019    WD-Pressure Take 1 tablet by mouth every morning 6/10/20  Yes WINDY Mays NP   escitalopram (LEXAPRO) 20 MG tablet Take 1 tablet by mouth daily 6/10/20 11/11/20 Yes WINDY Mays NP   ARIPiprazole (ABILIFY) 15 MG tablet Take 1 tablet by mouth daily 6/10/20 11/11/20 Yes WINDY Mays NP   atorvastatin (LIPITOR) 40 MG tablet Take 1 tablet by mouth nightly 6/10/20 11/11/20 Yes WINDY Mays NP   furosemide (LASIX) 20 MG tablet Take 1 tablet by mouth 2 times daily 6/10/20 11/11/20 Yes WINDY Mays NP   metFORMIN (GLUCOPHAGE) 500 MG tablet Take 1 tablet by mouth daily (with breakfast) 6/10/20 11/11/20 Yes WINDY Mays NP   glycopyrrolate-formoterol (BEVESPI AEROSPHERE) 9-4.8 MCG/ACT AERO Inhale 2 puffs into the lungs 2 times daily 6/3/20  Yes Yony Nolan MD   metoprolol tartrate (LOPRESSOR) 25 MG tablet Take 0.5 tablets by mouth 2 times daily 1/9/20  Yes Ramy Sequeira MD   aspirin 81 MG chewable tablet Take 1 tablet by mouth daily 11/25/19  Yes Clinton Baker MD   omeprazole (PRILOSEC) 20 MG delayed release capsule TAKE ONE (1) CAPSULE BY MOUTH ONCE DAILY 6/10/20   WINDY Mays NP   potassium chloride (MICRO-K) 10 MEQ extended release capsule Take 10 mEq by mouth daily    Historical Provider, MD   Zinc Oxide (DESITIN CREAMY EX) Apply 13 % topically 11/11/2020 Patient states he applies this to lower bilat lower legs    Historical Provider, MD        Allergies:  Cavilon durable barrier [mineral oil-dimeth-coconut oil]; Parabens; Parabens; Prinivil [lisinopril]; Cortisone; Cortisone; Dilaudid [hydromorphone hcl]; Penicillins; Penicillins; Sulfamethoxazole-trimethoprim; and Tape [adhesive tape]    Social History:   TOBACCO:   reports that he quit smoking about 27 years ago. His smoking use included cigarettes. He has a 70.00 pack-year smoking history.  He has never used smokeless tobacco.  ETOH:   reports current alcohol

## 2020-11-12 NOTE — CARE COORDINATION
CM in to see Pt to discuss discharge planning. Pt from home alone. Pt states he has a brother in law that helps him daily. Pt has DME to include walker, has insurance and pcp. Pt can afford medications. Pt not interested in [facility], but is willing to go to SNF if needed at discharge. Pt has been to [facility] in the past.      Pt denies any other needs at this time. CM available if needs arise.

## 2020-11-13 ENCOUNTER — APPOINTMENT (OUTPATIENT)
Dept: NUCLEAR MEDICINE | Age: 75
DRG: 683 | End: 2020-11-13
Payer: MEDICARE

## 2020-11-13 LAB
ANION GAP SERPL CALCULATED.3IONS-SCNC: 13 MMOL/L (ref 4–16)
BASOPHILS ABSOLUTE: 0 K/CU MM
BASOPHILS RELATIVE PERCENT: 0.3 % (ref 0–1)
BUN BLDV-MCNC: 22 MG/DL (ref 6–23)
CALCIUM SERPL-MCNC: 8 MG/DL (ref 8.3–10.6)
CHLORIDE BLD-SCNC: 98 MMOL/L (ref 99–110)
CO2: 20 MMOL/L (ref 21–32)
CREAT SERPL-MCNC: 1.3 MG/DL (ref 0.9–1.3)
DIFFERENTIAL TYPE: ABNORMAL
EOSINOPHILS ABSOLUTE: 0.1 K/CU MM
EOSINOPHILS RELATIVE PERCENT: 1.4 % (ref 0–3)
GFR AFRICAN AMERICAN: >60 ML/MIN/1.73M2
GFR NON-AFRICAN AMERICAN: 54 ML/MIN/1.73M2
GLUCOSE BLD-MCNC: 160 MG/DL (ref 70–99)
GLUCOSE BLD-MCNC: 182 MG/DL (ref 70–99)
GLUCOSE BLD-MCNC: 252 MG/DL (ref 70–99)
GLUCOSE BLD-MCNC: 304 MG/DL (ref 70–99)
GLUCOSE BLD-MCNC: 318 MG/DL (ref 70–99)
HCT VFR BLD CALC: 35.8 % (ref 42–52)
HEMOGLOBIN: 11.9 GM/DL (ref 13.5–18)
IMMATURE NEUTROPHIL %: 0.6 % (ref 0–0.43)
INR BLD: 2.01 INDEX
LV EF: 62 %
LVEF MODALITY: NORMAL
LYMPHOCYTES ABSOLUTE: 1.6 K/CU MM
LYMPHOCYTES RELATIVE PERCENT: 22 % (ref 24–44)
MCH RBC QN AUTO: 30 PG (ref 27–31)
MCHC RBC AUTO-ENTMCNC: 33.2 % (ref 32–36)
MCV RBC AUTO: 90.2 FL (ref 78–100)
MONOCYTES ABSOLUTE: 0.7 K/CU MM
MONOCYTES RELATIVE PERCENT: 9.6 % (ref 0–4)
NUCLEATED RBC %: 0 %
PDW BLD-RTO: 16.5 % (ref 11.7–14.9)
PLATELET # BLD: 161 K/CU MM (ref 140–440)
PMV BLD AUTO: 12.6 FL (ref 7.5–11.1)
POTASSIUM SERPL-SCNC: 3.5 MMOL/L (ref 3.5–5.1)
PROTHROMBIN TIME: 24.5 SECONDS (ref 11.7–14.5)
RBC # BLD: 3.97 M/CU MM (ref 4.6–6.2)
SEGMENTED NEUTROPHILS ABSOLUTE COUNT: 4.8 K/CU MM
SEGMENTED NEUTROPHILS RELATIVE PERCENT: 66.1 % (ref 36–66)
SODIUM BLD-SCNC: 131 MMOL/L (ref 135–145)
TOTAL CK: 621 IU/L (ref 38–174)
TOTAL IMMATURE NEUTOROPHIL: 0.04 K/CU MM
TOTAL NUCLEATED RBC: 0 K/CU MM
VANCOMYCIN RANDOM: 12.5 UG/ML
WBC # BLD: 7.2 K/CU MM (ref 4–10.5)

## 2020-11-13 PROCEDURE — 3430000000 HC RX DIAGNOSTIC RADIOPHARMACEUTICAL: Performed by: INTERNAL MEDICINE

## 2020-11-13 PROCEDURE — 93017 CV STRESS TEST TRACING ONLY: CPT

## 2020-11-13 PROCEDURE — 82550 ASSAY OF CK (CPK): CPT

## 2020-11-13 PROCEDURE — 6360000002 HC RX W HCPCS: Performed by: INTERNAL MEDICINE

## 2020-11-13 PROCEDURE — 80202 ASSAY OF VANCOMYCIN: CPT

## 2020-11-13 PROCEDURE — 6360000002 HC RX W HCPCS: Performed by: HOSPITALIST

## 2020-11-13 PROCEDURE — 99232 SBSQ HOSP IP/OBS MODERATE 35: CPT | Performed by: INTERNAL MEDICINE

## 2020-11-13 PROCEDURE — 6370000000 HC RX 637 (ALT 250 FOR IP): Performed by: HOSPITALIST

## 2020-11-13 PROCEDURE — 78452 HT MUSCLE IMAGE SPECT MULT: CPT

## 2020-11-13 PROCEDURE — 94640 AIRWAY INHALATION TREATMENT: CPT

## 2020-11-13 PROCEDURE — 97597 DBRDMT OPN WND 1ST 20 CM/<: CPT | Performed by: SURGERY

## 2020-11-13 PROCEDURE — 85610 PROTHROMBIN TIME: CPT

## 2020-11-13 PROCEDURE — 6370000000 HC RX 637 (ALT 250 FOR IP): Performed by: NURSE PRACTITIONER

## 2020-11-13 PROCEDURE — 2580000003 HC RX 258: Performed by: HOSPITALIST

## 2020-11-13 PROCEDURE — 36415 COLL VENOUS BLD VENIPUNCTURE: CPT

## 2020-11-13 PROCEDURE — 94761 N-INVAS EAR/PLS OXIMETRY MLT: CPT

## 2020-11-13 PROCEDURE — 82962 GLUCOSE BLOOD TEST: CPT

## 2020-11-13 PROCEDURE — 1200000000 HC SEMI PRIVATE

## 2020-11-13 PROCEDURE — 80048 BASIC METABOLIC PNL TOTAL CA: CPT

## 2020-11-13 PROCEDURE — A9500 TC99M SESTAMIBI: HCPCS | Performed by: INTERNAL MEDICINE

## 2020-11-13 PROCEDURE — 6370000000 HC RX 637 (ALT 250 FOR IP): Performed by: SURGERY

## 2020-11-13 PROCEDURE — 6370000000 HC RX 637 (ALT 250 FOR IP): Performed by: INTERNAL MEDICINE

## 2020-11-13 PROCEDURE — 2580000003 HC RX 258: Performed by: NURSE PRACTITIONER

## 2020-11-13 PROCEDURE — 85025 COMPLETE CBC W/AUTO DIFF WBC: CPT

## 2020-11-13 RX ORDER — WARFARIN SODIUM 6 MG/1
6 TABLET ORAL DAILY
Status: DISCONTINUED | OUTPATIENT
Start: 2020-11-13 | End: 2020-11-15

## 2020-11-13 RX ORDER — GLIPIZIDE 5 MG/1
10 TABLET ORAL
Status: DISCONTINUED | OUTPATIENT
Start: 2020-11-13 | End: 2020-11-18 | Stop reason: HOSPADM

## 2020-11-13 RX ORDER — ALOGLIPTIN 12.5 MG/1
12.5 TABLET, FILM COATED ORAL DAILY
Status: DISCONTINUED | OUTPATIENT
Start: 2020-11-14 | End: 2020-11-18 | Stop reason: HOSPADM

## 2020-11-13 RX ORDER — SODIUM BICARBONATE 650 MG/1
1300 TABLET ORAL 3 TIMES DAILY
Status: DISPENSED | OUTPATIENT
Start: 2020-11-13 | End: 2020-11-15

## 2020-11-13 RX ORDER — INSULIN GLARGINE 100 [IU]/ML
55 INJECTION, SOLUTION SUBCUTANEOUS NIGHTLY
Status: DISCONTINUED | OUTPATIENT
Start: 2020-11-13 | End: 2020-11-14

## 2020-11-13 RX ADMIN — ESCITALOPRAM OXALATE 20 MG: 10 TABLET ORAL at 14:43

## 2020-11-13 RX ADMIN — METOPROLOL TARTRATE 12.5 MG: 25 TABLET, FILM COATED ORAL at 21:13

## 2020-11-13 RX ADMIN — GLYCOPYRROLATE AND FORMOTEROL FUMARATE 2 PUFF: 9; 4.8 AEROSOL, METERED RESPIRATORY (INHALATION) at 11:57

## 2020-11-13 RX ADMIN — PANTOPRAZOLE SODIUM 40 MG: 40 TABLET, DELAYED RELEASE ORAL at 06:07

## 2020-11-13 RX ADMIN — LIDOCAINE HYDROCHLORIDE: 40 SOLUTION TOPICAL at 06:07

## 2020-11-13 RX ADMIN — Medication 30 MILLICURIE: at 09:30

## 2020-11-13 RX ADMIN — SODIUM BICARBONATE 1300 MG: 650 TABLET ORAL at 14:42

## 2020-11-13 RX ADMIN — SODIUM CHLORIDE, PRESERVATIVE FREE 10 ML: 5 INJECTION INTRAVENOUS at 14:55

## 2020-11-13 RX ADMIN — VANCOMYCIN HYDROCHLORIDE 1500 MG: 5 INJECTION, POWDER, LYOPHILIZED, FOR SOLUTION INTRAVENOUS at 14:52

## 2020-11-13 RX ADMIN — WARFARIN SODIUM 6 MG: 6 TABLET ORAL at 18:34

## 2020-11-13 RX ADMIN — GLYCOPYRROLATE AND FORMOTEROL FUMARATE 2 PUFF: 9; 4.8 AEROSOL, METERED RESPIRATORY (INHALATION) at 22:20

## 2020-11-13 RX ADMIN — REGADENOSON 0.4 MG: 0.08 INJECTION, SOLUTION INTRAVENOUS at 09:32

## 2020-11-13 RX ADMIN — GLIPIZIDE 10 MG: 5 TABLET ORAL at 16:46

## 2020-11-13 RX ADMIN — ARIPIPRAZOLE 15 MG: 10 TABLET ORAL at 14:43

## 2020-11-13 RX ADMIN — SODIUM BICARBONATE 1300 MG: 650 TABLET ORAL at 21:13

## 2020-11-13 RX ADMIN — ATORVASTATIN CALCIUM 40 MG: 40 TABLET, FILM COATED ORAL at 21:13

## 2020-11-13 RX ADMIN — Medication 10 MILLICURIE: at 07:40

## 2020-11-13 RX ADMIN — ASPIRIN 81 MG: 81 TABLET, CHEWABLE ORAL at 14:42

## 2020-11-13 RX ADMIN — SODIUM CHLORIDE, PRESERVATIVE FREE 10 ML: 5 INJECTION INTRAVENOUS at 21:13

## 2020-11-13 RX ADMIN — INSULIN GLARGINE 55 UNITS: 100 INJECTION, SOLUTION SUBCUTANEOUS at 21:13

## 2020-11-13 RX ADMIN — BUPROPION HYDROCHLORIDE 300 MG: 150 TABLET, FILM COATED, EXTENDED RELEASE ORAL at 14:42

## 2020-11-13 ASSESSMENT — PAIN SCALES - GENERAL
PAINLEVEL_OUTOF10: 0

## 2020-11-13 ASSESSMENT — PAIN SCALES - WONG BAKER: WONGBAKER_NUMERICALRESPONSE: 0

## 2020-11-13 NOTE — PROGRESS NOTES
Progress Note( Dr. Leonila Burton)  11/12/2020  Subjective:   Admit Date: 11/11/2020  PCP: Sage Grigsby MD    Admitted For :    Consulted For: Increasing fatigue and high blood glucose    Interval History: Feels somewhat better less tired    Denies any chest pains,   Some SOB . Denies nausea or vomiting. No new bowel or bladder symptoms. No intake or output data in the 24 hours ending 11/12/20 7224    DATA    CBC:   Recent Labs     11/11/20  1002 11/12/20  0836   WBC 15.8* 12.6*   HGB 13.2* 12.4*    164    CMP:  Recent Labs     11/11/20  1002 11/12/20  0836   * 128*   K 5.1 3.8   CL 92* 95*   CO2 22 21   BUN 22 21   CREATININE 1.7* 1.3   CALCIUM 8.7 8.0*   PROT 6.4  --    LABALBU 3.2*  --    BILITOT 0.8  --    ALKPHOS 79  --    AST 27  --    ALT 14  --      Lipids:   Lab Results   Component Value Date    CHOL 107 11/22/2019    CHOL 175 05/05/2015    HDL 46 11/22/2019    TRIG 81 11/22/2019     Glucose:  Recent Labs     11/12/20  1117 11/12/20  1626 11/12/20 2058   POCGLU 457* 330* 293*     AlgeglmtteG2H:  Lab Results   Component Value Date    LABA1C 8.3 11/22/2019     High Sensitivity TSH:   Lab Results   Component Value Date    TSHHS 1.770 11/21/2019     Free T3: No results found for: FT3  Free T4:  Lab Results   Component Value Date    T4FREE 1.14 11/21/2019       Xr Elbow Left (2 Views)    Result Date: 11/11/2020  EXAMINATION: TWO XRAY VIEWS OF THE LEFT ELBOW 11/11/2020 10:07 am COMPARISON: None. HISTORY: ORDERING SYSTEM PROVIDED HISTORY: trauma TECHNOLOGIST PROVIDED HISTORY: Reason for exam:->trauma Reason for Exam: trauma Acuity: Acute Type of Exam: Initial Mechanism of Injury: Nurse removed bilateral leg dressings. Pt states they are applied every week at his doctors office in [facility]. Legs are red and swollen. Stage 2 ulcer on left calf, approx 3 cm in diameter. Pt states he has been seen by the wound clinic FINDINGS: No acute fracture or dislocation is identified.   No joint effusion is identified. No radiopaque foreign body is identified. No acute abnormality. Xr Elbow Right (2 Views)    Result Date: 11/11/2020  EXAMINATION: TWO XRAY VIEWS OF THE RIGHT ELBOW 11/11/2020 10:07 am COMPARISON: Right forearm series 11/03/2014. HISTORY: ORDERING SYSTEM PROVIDED HISTORY: trauma TECHNOLOGIST PROVIDED HISTORY: Reason for exam:->trauma Reason for Exam: trauma Acuity: Acute Type of Exam: Initial Mechanism of Injury: Nurse removed bilateral leg dressings. Pt states they are applied every week at his doctors office in [facility]. Legs are red and swollen. Stage 2 ulcer on left calf, approx 3 cm in diameter. Pt states he has been seen by the wound clinic FINDINGS: Small elbow effusion. Mature ossification is noted about the coronoid process on the lateral view. There is mild enthesophyte formation about the lateral epicondyle. No malalignment. 1.  Small elbow effusion. 2.  Age indeterminate fracture fragment about the tip of the coronoid process. Xr Knee Left (min 4 Views)    Result Date: 11/11/2020  EXAMINATION: FOUR XRAY VIEWS OF THE LEFT KNEE 11/11/2020 9:19 am COMPARISON: None. HISTORY: ORDERING SYSTEM PROVIDED HISTORY: trauma TECHNOLOGIST PROVIDED HISTORY: Reason for exam:->trauma Reason for Exam: trauma Acuity: Acute Type of Exam: Initial Mechanism of Injury: Nurse removed bilateral leg dressings. Pt states they are applied every week at his doctors office in [facility]. Legs are red and swollen. Stage 2 ulcer on left calf, approx 3 cm in diameter. Pt states he has been seen by the wound clinic FINDINGS: No fracture, cortical erosion or joint effusion were noted. No patellofemoral or femoral tibial joint compartment narrowing were identified. The bony mineralization was normal.     No fracture, joint effusion or joint space narrowing. Xr Knee Right (min 4 Views)    Result Date: 11/11/2020  EXAMINATION: FOUR XRAY VIEWS OF THE RIGHT KNEE 11/11/2020 9:19 am COMPARISON: None. HISTORY: ORDERING SYSTEM PROVIDED HISTORY: trauma TECHNOLOGIST PROVIDED HISTORY: Reason for exam:->trauma Reason for Exam: trauma Acuity: Acute Type of Exam: Initial Mechanism of Injury: Nurse removed bilateral leg dressings. Pt states they are applied every week at his doctors office in [facility]. Legs are red and swollen. Stage 2 ulcer on left calf, approx 3 cm in diameter. Pt states he has been seen by the wound clinic FINDINGS: A small to moderate right knee effusion was noted without fracture or cortical erosion. Moderate degenerative narrowing involves the medial femoral tibial joint compartment. Small to moderate right knee effusion without fracture or cortical erosion. Moderate degenerative narrowing involves the medial femoral tibial joint compartment. Xr Chest Portable    Result Date: 11/11/2020  EXAMINATION: ONE XRAY VIEW OF THE CHEST 11/11/2020 9:19 am COMPARISON: 07/02/2020, 05/01/2020 HISTORY: ORDERING SYSTEM PROVIDED HISTORY: weakness TECHNOLOGIST PROVIDED HISTORY: Reason for exam:->weakness Reason for Exam: weakness Acuity: Acute Type of Exam: Initial Additional signs and symptoms: Nurse removed bilateral leg dressings. Pt states they are applied every week at his doctors office in [facility]. Legs are red and swollen. Stage 2 ulcer on left calf, approx 3 cm in diameter. Pt states he has been seen by the wound clinic FINDINGS: Shallow inflation. Obscuration of the lung apices by the patient's neck. The cardiomediastinal silhouette is unchanged in appearance. Chronic appearing vascular congestion. There is no consolidation, pneumothorax, or evidence of edema. No effusion is appreciated. The osseous structures are unchanged in appearance. Chronic findings in the chest without acute airspace disease identified.      Us Retroperitoneal Limited    Result Date: 11/11/2020  EXAMINATION: ULTRASOUND OF THE KIDNEYS 11/11/2020 6:50 pm COMPARISON: 02/24/2012 HISTORY: ORDERING SYSTEM PROVIDED HISTORY: cortney TECHNOLOGIST PROVIDED HISTORY: Reason for exam:->kirsten Reason for Exam: KIRSTEN; abnormal labs Type of Exam: Initial FINDINGS: The right kidney measures 10.6 cm in length and the left kidney measures 9.6 cm in length. Kidneys demonstrate normal cortical echogenicity. No hydronephrosis or intrarenal stones. No focal lesions. Unremarkable ultrasound of the kidneys. Scheduled Medicines   Medications:    insulin glargine  50 Units Subcutaneous Nightly    [START ON 11/13/2020] lidocaine   Topical Once    sodium chloride flush  10 mL Intravenous 2 times per day    ARIPiprazole  15 mg Oral Daily    aspirin  81 mg Oral Daily    atorvastatin  40 mg Oral Nightly    buPROPion  300 mg Oral QAM    escitalopram  20 mg Oral Daily    glycopyrrolate-formoterol  2 puff Inhalation BID    metoprolol tartrate  12.5 mg Oral BID    pantoprazole  40 mg Oral QAM AC    warfarin (COUMADIN) daily dosing (placeholder)   Other RX Placeholder    vancomycin (VANCOCIN) intermittent dosing (placeholder)   Other RX Placeholder    insulin lispro  15 Units Subcutaneous TID WC    insulin lispro  0-12 Units Subcutaneous TID WC    insulin lispro  0-12 Units Subcutaneous 2 times per day      Infusions:    dextrose           Objective:   Vitals: BP (!) 101/52   Pulse 70   Temp 98.4 °F (36.9 °C) (Oral)   Resp 18   Ht 6' 5\" (1.956 m)   Wt 285 lb 6.4 oz (129.5 kg)   SpO2 90%   BMI 33.84 kg/m²   General appearance: alert and cooperative with exam  Neck: no JVD or bruit  Thyroid : Normal lobes   Lungs: Has Vesicular Breath sounds   Heart:  regular rate and rhythm  Abdomen: soft, non-tender; bowel sounds normal; no masses,  no organomegaly  Musculoskeletal: Normal  Extremities: extremities normal, , no edema  Neurologic:  Awake, alert, oriented to name, place and time. Cranial nerves II-XII are grossly intact. Motor is  intact. Sensory neuropathy. ,  and gait is normal.    Assessment:     Patient Active Problem List: Gastroesophageal reflux disease without esophagitis     Hypertension in stage 3 chronic kidney disease due to type 2 diabetes mellitus (HCC)     DM (diabetes mellitus) type II, controlled, with peripheral vascular disorder (HCC)     Combined hyperlipidemia associated with type 2 diabetes mellitus (HCC)     Diabetic skin ulcer associated with type 2 diabetes mellitus (HCC)     CKD (chronic kidney disease)     History of DVT (deep vein thrombosis)- left femoral     History of pulmonary embolism     Long term current use of anticoagulants with INR goal of 2.0-3.0     COPD (chronic obstructive pulmonary disease) (HCC)     Severe recurrent major depressive disorder with psychotic features ([facility]     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)     Venous (peripheral) insufficiency     Uncontrolled secondary diabetes mellitus with stage 3 CKD (GFR 30-59) (HCC)     Diabetes mellitus with skin ulcer (HCC)     WD-Ulcer of shin, left, with fat layer exposed ([address]     History of colon polyps     Personal history of PE (pulmonary embolism)     WD-Chronic venous hypertension (idiopathic) with ulcer of left lower extremity (CODE) ([facility]     WD-Chronic venous hypertension with inflammation, right     Troponin I above reference range     KIRSTEN (acute kidney injury) ([facility]      Plan:     1. Reviewed POC blood glucose . Labs and X ray results   2. Reviewed Current Medicines   3. On meal/ Correction bolus Humalog/ Basal Lantus Insulin regime   4. Monitor Blood glucose frequently   5. Modified  the dose of Insulin/ other medicines as needed   6. Will follow     .      Indio Gipson MD

## 2020-11-13 NOTE — PROGRESS NOTES
Nephrology Progress Note        [address], [address] [address]  Phone: [phone]  Office Hours: 8:30AM - 4:30PM  Monday - Friday 11/13/2020 3:38 PM  Subjective:   Admit Date: 11/11/2020  PCP: Cesar Valdivia MD  Interval History:   Eating lunch  S/p stress test today    Diet: DIET CARB CONTROL;      Data:   Scheduled Meds:   insulin NPH  25 Units Subcutaneous Once    glipiZIDE  10 mg Oral BID AC    sodium bicarbonate  1,300 mg Oral TID    vancomycin  1,500 mg Intravenous Q18H    insulin glargine  55 Units Subcutaneous Nightly    insulin lispro  27 Units Subcutaneous TID WC    warfarin  6 mg Oral Daily    insulin lispro  0-18 Units Subcutaneous TID WC    insulin lispro  0-18 Units Subcutaneous 2 times per day    sodium chloride flush  10 mL Intravenous 2 times per day    ARIPiprazole  15 mg Oral Daily    aspirin  81 mg Oral Daily    atorvastatin  40 mg Oral Nightly    buPROPion  300 mg Oral QAM    escitalopram  20 mg Oral Daily    glycopyrrolate-formoterol  2 puff Inhalation BID    metoprolol tartrate  12.5 mg Oral BID    pantoprazole  40 mg Oral QAM AC     Continuous Infusions:   dextrose       PRN Meds:sodium chloride flush, acetaminophen **OR** acetaminophen, promethazine **OR** [DISCONTINUED] ondansetron, glucose, dextrose, glucagon (rDNA), dextrose, ipratropium  I/O last 3 completed shifts: In: 10 [I.V.:10]  Out: -   No intake/output data recorded.     Intake/Output Summary (Last 24 hours) at 11/13/2020 1538  Last data filed at 11/13/2020 1455  Gross per 24 hour   Intake 10 ml   Output --   Net 10 ml       CBC:   Recent Labs     11/11/20  1002 11/12/20  0836 11/13/20  0549   WBC 15.8* 12.6* 7.2   HGB 13.2* 12.4* 11.9*    164 161       BMP:    Recent Labs     11/11/20  1002 11/12/20  0836 11/13/20  0549   * 128* 131*   K 5.1 3.8 3.5   CL 92* 95* 98*   CO2 22 21 20*   BUN 22 21 22   CREATININE 1.7* 1.3 1.3   GLUCOSE 479* 216* 160*     Hepatic:   Recent Labs     11/11/20  1002   AST 27   ALT 14   BILITOT 0.8   ALKPHOS 79     INR:   Recent Labs     11/11/20  1002 11/12/20  0836 11/13/20  0549   INR 1.87 2.21 2.01       Objective:   Vitals: BP (!) 104/52   Pulse 75   Temp 97.9 °F (36.6 °C) (Oral)   Resp 10   Ht 6' 5\" (1.956 m)   Wt 295 lb 11.2 oz (134.1 kg)   SpO2 92%   BMI 35.06 kg/m²   General appearance: alert and cooperative with exam, in no acute distress  HEENT: normocephalic, atraumatic,   Neck: supple, trachea midline  Lungs:  breathing comfortably on room air  Abdomen: ; non distended,  Extremities:   Neurologic: alert, oriented, follows commands, interactive    Assessment and Plan:     Patient Active Problem List     Diagnosis Date Noted    Diabetes mellitus with skin ulcer ([facility] 10/02/2015       Priority: High    WD-Ulcer of shin, left, with fat layer exposed ([address] 10/02/2015       Priority: High    Uncontrolled secondary diabetes mellitus with stage 3 CKD (GFR 30-59) ([address] 05/10/2015       Priority: High    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 ([address] 10/14/2014       Priority: High    Severe recurrent major depressive disorder with psychotic features ([address] 05/13/2014       Priority: High    COPD (chronic obstructive pulmonary disease) ([facility] 01/29/2014       Priority: High    Long term current use of anticoagulants with INR goal of 2.0-3.0 12/06/2013       Priority: High    History of DVT (deep vein thrombosis)- left femoral 11/15/2013       Priority: High       Class: Acute    History of pulmonary embolism 11/15/2013       Priority: High       Class: Acute    Diabetic skin ulcer associated with type 2 diabetes mellitus ([address]         Priority: High       Class: Present on Admission    Combined hyperlipidemia associated with type 2 diabetes mellitus ([address]         Priority: High    CKD (chronic kidney disease) 02/01/2012       Priority: High    Hypertension in stage 3 chronic kidney disease due to type 2 diabetes mellitus ([facility]         Priority: High    DM (diabetes mellitus) type II, controlled, with peripheral vascular disorder [facility])         Priority: High    Venous (peripheral) insufficiency         Priority: Medium    Gastroesophageal reflux disease without esophagitis 11/08/2011       Priority: Low    KIRSTEN (acute kidney injury) ([facility] 11/11/2020    Troponin I above reference range 11/21/2019    WD-Chronic venous hypertension with inflammation, right 09/19/2019    WD-Chronic venous hypertension (idiopathic) with ulcer of left lower extremity (CODE) ([facility] 06/27/2019    Personal history of PE (pulmonary embolism) 02/12/2019    History of colon polyps 06/21/2016   KIRSTEN; cr 1.7 from baseline 1.1//renal US showed no HN//low Ernst of 20, suggesting prerenal etiology  Rhabdo: CK 1293  DM2  LLE ulcers  Leukocytosis  Metabolic acidosis     Suggest;  Cr better at 1.3  Give few doses of sodium bicarb for the metabolic acidosis  Hold losartan for now as BP is low normal  Monitor vanc level  BMP tomorrow            Electronically signed by Daniel Saldana DO on 11/13/2020 at 3:38 PM    [facility]  MD Becki Baptiste DO  [address],  [address]  [place], [place] [address]  PHONE: [phone]  FAX: [phone]

## 2020-11-13 NOTE — PROGRESS NOTES
with small amount of fibrinous exudate. Moderate edema noted  Neuro: no focal deficits noted  Psych: mood normal      Assessment and Plan:  69 y.o. male with long standing venous stasis disease and ulcers of the left lower leg. He may have some acute cellulitis vs erythema from his baseline venous disease. Wounds were debrided at bedside today. Local anesthesia was obtained with topical 4% lidocaine. A 10 blade scalpel was then used to sharply debride the wounds of slough and fibrinous material.  The area of the wounds debrided was 9 sq cm. Claudio tolerated the procedure well. Hemostasis was achieved with pressure.   Aquacel AG dressing was applied followed by ABD/kerlex and ACE.         Patient Active Problem List:     Gastroesophageal reflux disease without esophagitis     Hypertension in stage 3 chronic kidney disease due to type 2 diabetes mellitus (HCC)     DM (diabetes mellitus) type II, controlled, with peripheral vascular disorder ([address]     Combined hyperlipidemia associated with type 2 diabetes mellitus ([address]     Diabetic skin ulcer associated with type 2 diabetes mellitus (HCC)     CKD (chronic kidney disease)     History of DVT (deep vein thrombosis)- left femoral     History of pulmonary embolism     Long term current use of anticoagulants with INR goal of 2.0-3.0     COPD (chronic obstructive pulmonary disease) (HCC)     Severe recurrent major depressive disorder with psychotic features ([address]     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)     Venous (peripheral) insufficiency     Uncontrolled secondary diabetes mellitus with stage 3 CKD (GFR 30-59) (HCC)     Diabetes mellitus with skin ulcer (HCC)     WD-Ulcer of shin, left, with fat layer exposed ([address]     History of colon polyps     Personal history of PE (pulmonary embolism)     WD-Chronic venous hypertension (idiopathic) with ulcer of left lower extremity (CODE) ([address]     WD-Chronic venous hypertension with inflammation, right     Troponin I above reference range     KIRSTEN (acute kidney injury) ([address]      - wound gently debrided at the bedside today  - no need for further surgical intervention  - will follow peripherally while admitted. Can follow up with Dr. Robert Healy in [facility] for ongoing wound cares and compression.       Washington Thorpe MD

## 2020-11-13 NOTE — PROGRESS NOTES
Susy Louise MD, [address]                Internal Medicine Hospitalist             Daily Progress  Note   Subjective:     Chief Complaint   Patient presents with    Fatigue     Mr. Sarah Penn of nil, is waiting for the surgeon to come in to do the debridement. Objective:    /66   Pulse 65   Temp 99 °F (37.2 °C) (Oral)   Resp 9   Ht 6' 5\" (1.956 m)   Wt 295 lb 11.2 oz (134.1 kg)   SpO2 90%   BMI 35.06 kg/m²    No intake or output data in the 24 hours ending 11/13/20 1123   Physical Exam:  Heart:  Regular rate and rhythm, normal S1 and S2 in all 4 auscultatory areas. No rubs  Soft Systolic Murmur no gallops heard. Lungs: Mostly clear to auscultation, decreased breath sounds at bases. No wheezes appreciated no crackles heard. Abdomen: Soft, non distended. Bowel sounds appreciated. No obvious liver or spleen enlargement. Non tender, no rebound noted. Extremities: No detailed exam done has left leg bandaged, right has stasis dermatitis. CNS: Grossly intact.     Labs:  CBC with Differential:    Lab Results   Component Value Date    WBC 7.2 11/13/2020    RBC 3.97 11/13/2020    HGB 11.9 11/13/2020    HCT 35.8 11/13/2020     11/13/2020    MCV 90.2 11/13/2020    MCH 30.0 11/13/2020    MCHC 33.2 11/13/2020    RDW 16.5 11/13/2020    SEGSPCT 66.1 11/13/2020    BANDSPCT 17 11/11/2020    LYMPHOPCT 22.0 11/13/2020    MONOPCT 9.6 11/13/2020    BASOPCT 0.3 11/13/2020    MONOSABS 0.7 11/13/2020    LYMPHSABS 1.6 11/13/2020    EOSABS 0.1 11/13/2020    BASOSABS 0.0 11/13/2020    DIFFTYPE AUTOMATED DIFFERENTIAL 11/13/2020     BMP:    Lab Results   Component Value Date     11/13/2020    K 3.5 11/13/2020    CL 98 11/13/2020    CO2 20 11/13/2020    BUN 22 11/13/2020    LABALBU 3.2 11/11/2020    CREATININE 1.3 11/13/2020    CALCIUM 8.0 11/13/2020    GFRAA >60 11/13/2020    GFRAA >60 03/19/2013    LABGLOM 54 11/13/2020    GLUCOSE 160 11/13/2020     Recent Labs     11/11/20  1728 11/11/20  1955 11/12/20  1000   TROPONINT 0.092* 0.076* 0.085*     Lab Results   Component Value Date    TSH 1.770 11/21/2019         dextrose        insulin lispro  25 Units Subcutaneous TID WC    insulin NPH  25 Units Subcutaneous Once    glipiZIDE  10 mg Oral BID AC    sodium bicarbonate  1,300 mg Oral TID    vancomycin  1,500 mg Intravenous Q18H    insulin glargine  50 Units Subcutaneous Nightly    insulin lispro  0-18 Units Subcutaneous TID WC    insulin lispro  0-18 Units Subcutaneous 2 times per day    sodium chloride flush  10 mL Intravenous 2 times per day    ARIPiprazole  15 mg Oral Daily    aspirin  81 mg Oral Daily    atorvastatin  40 mg Oral Nightly    buPROPion  300 mg Oral QAM    escitalopram  20 mg Oral Daily    glycopyrrolate-formoterol  2 puff Inhalation BID    metoprolol tartrate  12.5 mg Oral BID    pantoprazole  40 mg Oral QAM AC    warfarin (COUMADIN) daily dosing (placeholder)   Other RX Placeholder         Assessment:       Patient Active Problem List    Diagnosis Date Noted    Diabetes mellitus with skin ulcer (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 01/29/2014     Priority: High    Long term current use of anticoagulants with INR goal of 2.0-3.0 12/06/2013     Priority: High    History of DVT (deep vein thrombosis)- left femoral 11/15/2013     Priority: High     Class: Acute    History of pulmonary embolism 11/15/2013     Priority: High     Class: Acute    Diabetic skin ulcer associated with type 2 diabetes mellitus ([facility] Priority: High     Class: Present on Admission    Combined hyperlipidemia associated with type 2 diabetes mellitus ([facility]      Priority: High    CKD (chronic kidney disease) 02/01/2012     Priority: High    Hypertension in stage 3 chronic kidney disease due to type 2 diabetes mellitus ([facility]      Priority: High    DM (diabetes mellitus) type II, controlled, with peripheral vascular disorder (HCC)      Priority: High    Venous (peripheral) insufficiency      Priority: Medium    Gastroesophageal reflux disease without esophagitis 11/08/2011     Priority: Low    KIRSTEN (acute kidney injury) ([facility] 11/11/2020    Troponin I above reference range 11/21/2019    WD-Chronic venous hypertension with inflammation, right 09/19/2019    WD-Chronic venous hypertension (idiopathic) with ulcer of left lower extremity (CODE) ([facility] 06/27/2019    Personal history of PE (pulmonary embolism) 02/12/2019    History of colon polyps 06/21/2016       Plan:     Problems being addressed this admission:   Weakness / hyponatremia / rhabdomyolysis  Elevated troponins  DM 2  Chronic venous ulcer left leg  Hx of DVT on [facility]     For his weakness will have PT/OT check on him. His sodium is better at 131 and his CK at 621 and so continue to monitor. Cardio has no new suggestion no chest pains  His sugars are 252 today and so will adjust his coverage but they are getting better. Surgery saw him yesterday for his left leg  And advised continue local wound care, compression ACE wrap, legs elevated and is to do debridement today. On vanco.   On coumadin for his DVT continue. INR is 2.01 today.      Consultants:  Nephrology  Cardiology  Endocrine  Surgery    General Orders:  Repeat basic labs again in am.  I have explained to the patient and discussed with him/her the treatment plan.   Ivan Brewer MD, [address]

## 2020-11-13 NOTE — CARE COORDINATION
CM in to see Pt to follow up on discharge planning. Pt interested in SNF/ARU at discharge if needed for therapy. Pt choice [facility]. Referral made, facesheet faxed. Referral made to [facility] to follow for PT/OT recommendations.

## 2020-11-14 LAB
ANION GAP SERPL CALCULATED.3IONS-SCNC: 8 MMOL/L (ref 4–16)
BASOPHILS ABSOLUTE: 0 K/CU MM
BASOPHILS RELATIVE PERCENT: 0.5 % (ref 0–1)
BUN BLDV-MCNC: 16 MG/DL (ref 6–23)
CALCIUM SERPL-MCNC: 8.1 MG/DL (ref 8.3–10.6)
CHLORIDE BLD-SCNC: 101 MMOL/L (ref 99–110)
CO2: 25 MMOL/L (ref 21–32)
CREAT SERPL-MCNC: 1.2 MG/DL (ref 0.9–1.3)
DIFFERENTIAL TYPE: ABNORMAL
DOSE AMOUNT: NORMAL
DOSE TIME: NORMAL
EOSINOPHILS ABSOLUTE: 0.2 K/CU MM
EOSINOPHILS RELATIVE PERCENT: 2.4 % (ref 0–3)
GFR AFRICAN AMERICAN: >60 ML/MIN/1.73M2
GFR NON-AFRICAN AMERICAN: 59 ML/MIN/1.73M2
GLUCOSE BLD-MCNC: 101 MG/DL (ref 70–99)
GLUCOSE BLD-MCNC: 108 MG/DL (ref 70–99)
GLUCOSE BLD-MCNC: 124 MG/DL (ref 70–99)
GLUCOSE BLD-MCNC: 170 MG/DL (ref 70–99)
GLUCOSE BLD-MCNC: 276 MG/DL (ref 70–99)
GLUCOSE BLD-MCNC: 82 MG/DL (ref 70–99)
HCT VFR BLD CALC: 36.7 % (ref 42–52)
HEMOGLOBIN: 12 GM/DL (ref 13.5–18)
IMMATURE NEUTROPHIL %: 0.6 % (ref 0–0.43)
INR BLD: 2.03 INDEX
LYMPHOCYTES ABSOLUTE: 1.7 K/CU MM
LYMPHOCYTES RELATIVE PERCENT: 25.9 % (ref 24–44)
MCH RBC QN AUTO: 29.6 PG (ref 27–31)
MCHC RBC AUTO-ENTMCNC: 32.7 % (ref 32–36)
MCV RBC AUTO: 90.6 FL (ref 78–100)
MONOCYTES ABSOLUTE: 0.7 K/CU MM
MONOCYTES RELATIVE PERCENT: 10.5 % (ref 0–4)
NUCLEATED RBC %: 0 %
PDW BLD-RTO: 16.7 % (ref 11.7–14.9)
PLATELET # BLD: 193 K/CU MM (ref 140–440)
PMV BLD AUTO: 12.8 FL (ref 7.5–11.1)
POTASSIUM SERPL-SCNC: 4.4 MMOL/L (ref 3.5–5.1)
PROTHROMBIN TIME: 24.8 SECONDS (ref 11.7–14.5)
RBC # BLD: 4.05 M/CU MM (ref 4.6–6.2)
SEGMENTED NEUTROPHILS ABSOLUTE COUNT: 4 K/CU MM
SEGMENTED NEUTROPHILS RELATIVE PERCENT: 60.1 % (ref 36–66)
SODIUM BLD-SCNC: 134 MMOL/L (ref 135–145)
TOTAL CK: 432 IU/L (ref 38–174)
TOTAL IMMATURE NEUTOROPHIL: 0.04 K/CU MM
TOTAL NUCLEATED RBC: 0 K/CU MM
VANCOMYCIN TROUGH: 14 UG/ML (ref 10–20)
WBC # BLD: 6.6 K/CU MM (ref 4–10.5)

## 2020-11-14 PROCEDURE — 99232 SBSQ HOSP IP/OBS MODERATE 35: CPT | Performed by: INTERNAL MEDICINE

## 2020-11-14 PROCEDURE — 97163 PT EVAL HIGH COMPLEX 45 MIN: CPT

## 2020-11-14 PROCEDURE — 82565 ASSAY OF CREATININE: CPT

## 2020-11-14 PROCEDURE — 97166 OT EVAL MOD COMPLEX 45 MIN: CPT

## 2020-11-14 PROCEDURE — 82962 GLUCOSE BLOOD TEST: CPT

## 2020-11-14 PROCEDURE — 87081 CULTURE SCREEN ONLY: CPT

## 2020-11-14 PROCEDURE — 85610 PROTHROMBIN TIME: CPT

## 2020-11-14 PROCEDURE — 94761 N-INVAS EAR/PLS OXIMETRY MLT: CPT

## 2020-11-14 PROCEDURE — 94640 AIRWAY INHALATION TREATMENT: CPT

## 2020-11-14 PROCEDURE — 97530 THERAPEUTIC ACTIVITIES: CPT

## 2020-11-14 PROCEDURE — 80202 ASSAY OF VANCOMYCIN: CPT

## 2020-11-14 PROCEDURE — 6370000000 HC RX 637 (ALT 250 FOR IP): Performed by: NURSE PRACTITIONER

## 2020-11-14 PROCEDURE — 6370000000 HC RX 637 (ALT 250 FOR IP): Performed by: HOSPITALIST

## 2020-11-14 PROCEDURE — 80048 BASIC METABOLIC PNL TOTAL CA: CPT

## 2020-11-14 PROCEDURE — 82550 ASSAY OF CK (CPK): CPT

## 2020-11-14 PROCEDURE — 97116 GAIT TRAINING THERAPY: CPT

## 2020-11-14 PROCEDURE — 2580000003 HC RX 258: Performed by: HOSPITALIST

## 2020-11-14 PROCEDURE — 85025 COMPLETE CBC W/AUTO DIFF WBC: CPT

## 2020-11-14 PROCEDURE — 36415 COLL VENOUS BLD VENIPUNCTURE: CPT

## 2020-11-14 PROCEDURE — 6360000002 HC RX W HCPCS: Performed by: HOSPITALIST

## 2020-11-14 PROCEDURE — 1200000000 HC SEMI PRIVATE

## 2020-11-14 PROCEDURE — 2580000003 HC RX 258: Performed by: NURSE PRACTITIONER

## 2020-11-14 PROCEDURE — 6370000000 HC RX 637 (ALT 250 FOR IP): Performed by: INTERNAL MEDICINE

## 2020-11-14 RX ORDER — INSULIN GLARGINE 100 [IU]/ML
40 INJECTION, SOLUTION SUBCUTANEOUS NIGHTLY
Status: DISCONTINUED | OUTPATIENT
Start: 2020-11-14 | End: 2020-11-16

## 2020-11-14 RX ADMIN — GLYCOPYRROLATE AND FORMOTEROL FUMARATE 2 PUFF: 9; 4.8 AEROSOL, METERED RESPIRATORY (INHALATION) at 21:17

## 2020-11-14 RX ADMIN — WARFARIN SODIUM 6 MG: 6 TABLET ORAL at 17:31

## 2020-11-14 RX ADMIN — INSULIN GLARGINE 40 UNITS: 100 INJECTION, SOLUTION SUBCUTANEOUS at 21:52

## 2020-11-14 RX ADMIN — METOPROLOL TARTRATE 12.5 MG: 25 TABLET, FILM COATED ORAL at 20:56

## 2020-11-14 RX ADMIN — SODIUM CHLORIDE, PRESERVATIVE FREE 10 ML: 5 INJECTION INTRAVENOUS at 20:57

## 2020-11-14 RX ADMIN — SODIUM CHLORIDE, PRESERVATIVE FREE 10 ML: 5 INJECTION INTRAVENOUS at 10:39

## 2020-11-14 RX ADMIN — SODIUM BICARBONATE 1300 MG: 650 TABLET ORAL at 14:11

## 2020-11-14 RX ADMIN — SODIUM BICARBONATE 1300 MG: 650 TABLET ORAL at 10:38

## 2020-11-14 RX ADMIN — ARIPIPRAZOLE 15 MG: 10 TABLET ORAL at 10:39

## 2020-11-14 RX ADMIN — METOPROLOL TARTRATE 12.5 MG: 25 TABLET, FILM COATED ORAL at 10:43

## 2020-11-14 RX ADMIN — PANTOPRAZOLE SODIUM 40 MG: 40 TABLET, DELAYED RELEASE ORAL at 06:25

## 2020-11-14 RX ADMIN — BUPROPION HYDROCHLORIDE 300 MG: 150 TABLET, FILM COATED, EXTENDED RELEASE ORAL at 10:39

## 2020-11-14 RX ADMIN — ASPIRIN 81 MG: 81 TABLET, CHEWABLE ORAL at 10:39

## 2020-11-14 RX ADMIN — GLYCOPYRROLATE AND FORMOTEROL FUMARATE 2 PUFF: 9; 4.8 AEROSOL, METERED RESPIRATORY (INHALATION) at 08:28

## 2020-11-14 RX ADMIN — ESCITALOPRAM OXALATE 20 MG: 10 TABLET ORAL at 10:38

## 2020-11-14 RX ADMIN — SODIUM BICARBONATE 1300 MG: 650 TABLET ORAL at 20:56

## 2020-11-14 RX ADMIN — VANCOMYCIN HYDROCHLORIDE 1500 MG: 5 INJECTION, POWDER, LYOPHILIZED, FOR SOLUTION INTRAVENOUS at 06:25

## 2020-11-14 RX ADMIN — ATORVASTATIN CALCIUM 40 MG: 40 TABLET, FILM COATED ORAL at 20:56

## 2020-11-14 RX ADMIN — ALOGLIPTIN 12.5 MG: 12.5 TABLET, FILM COATED ORAL at 10:38

## 2020-11-14 ASSESSMENT — PAIN SCALES - GENERAL
PAINLEVEL_OUTOF10: 0
PAINLEVEL_OUTOF10: 0

## 2020-11-14 NOTE — PROGRESS NOTES
PRN  promethazine, 12.5 mg, Q6H PRN  glucose, 15 g, PRN  dextrose, 12.5 g, PRN  glucagon (rDNA), 1 mg, PRN  dextrose, 100 mL/hr, PRN  ipratropium, 2 puff, PRN        Recent Labs     11/12/20  0836 11/13/20  0549 11/14/20  0332   WBC 12.6* 7.2 6.6   HGB 12.4* 11.9* 12.0*   HCT 37.5* 35.8* 36.7*    161 193      Recent Labs     11/12/20  0836 11/13/20  0549 11/14/20  0332   * 131* 134*   K 3.8 3.5 4.4   CL 95* 98* 101   CO2 21 20* 25   BUN 21 22 16   CREATININE 1.3 1.3 1.2     No results for input(s): AST, ALT, ALB, BILIDIR, BILITOT, ALKPHOS in the last 72 hours.   Recent Labs     11/12/20  0836 11/13/20  0549 11/14/20  0332   INR 2.21 2.01 2.03     Recent Labs     11/11/20  1728 11/11/20  1955 11/12/20  0836 11/12/20  1000 11/13/20  0549 11/14/20  0332   CKTOTAL  --   --  708*  --  621* 432*   TROPONINT 0.092* 0.076*  --  0.085*  --   --         Imaging reviewed      Electronically signed by Aracelis Cassidy MD on 11/14/2020 at 3:00 PM

## 2020-11-14 NOTE — PROGRESS NOTES
Physical Therapy    Facility/Department: [facility] 3E  Initial Assessment    NAME: Rudy Fonseca  : 1945  MRN: [id number]    Date of Service: 2020    Discharge Recommendations:  IP Rehab        Assessment   Assessment: Pt is a 69 y.o. 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, Anemia, Arm fracture, Arthritis, Cataract, Cataract, Cellulitis of left lower leg, Chronic venous hypertension with ulcer ([address] CKD (chronic kidney disease), Colon polyps, COPD (chronic obstructive pulmonary disease) ([address] Diabetes mellitus ([address] Diabetes mellitus ([address] Diabetes mellitus with peripheral circulatory disorder ([address] Diabetes mellitus with skin ulcer ([address] Diabetic peripheral neuropathy ([address] Diabetic skin ulcer associated with type 2 diabetes mellitus ([address] Diverticulosis, Femoral DVT (deep venous thrombosis) ([address] GERD (gastroesophageal reflux disease), Glaucoma, H/O cardiac catheterization, H/O Doppler ultrasound, H/O echocardiogram, H/O mumps orchitis, Hemorrhoids, History of nuclear stress test, HLD (hyperlipidemia), Hx of Doppler ultrasound, Hyperlipemia, Hyperlipidemia, Hypertension, Hypertension, Idiopathic chronic venous hypertension of left lower extremity with ulcer and inflammation ([address] Leg ulcer ([address] No diabetic retinopathy OU, Non-pressure chronic ulcer of left lower leg with fat layer exposed ([address] Pulmonary embolism ([address] Sleep apnea, SOB (shortness of breath), Tendinitis, Type II or unspecified type diabetes mellitus with other specified manifestations, not stated as uncontrolled, Unspecified venous (peripheral) insufficiency, Urticaria, WD-Cellulitis of right anterior lower leg, WD-Cellulitis of right lower extremity, WD-Chronic venous hypertension (idiopathic) with ulcer of left lower extremity (CODE) ([address] WD-Chronic venous hypertension with inflammation, right, WD-Decubitus ulcer of left buttock, stage 3 (HCC), 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 to breakdown of skin ([address] WD-Open wound of hand without complication, left, initial encounter, WD-Pressure injury of left buttock, stage 2 ([address] WD-Pressure injury of left buttock, stage 3 ([address] WD-Pressure injury of right buttock, stage 3 ([address] WD-Skin tear of right forearm without complication, WD-Ulcer of right pretibial region, with fat layer exposed ([address] Tonsillectomy (1953); Splenectomy (1958); Breast surgery (1970s); hernia repair (1970s); Skin graft (1978-present); Cataract removal (Right, 3/11/2013); Cataract removal (Left, 2/25/2013); Varicose vein surgery (Left, 2009); Carpal tunnel release (Left, 1993); Cardiac catheterization (6/3/14); Colonoscopy (2006); Colonoscopy (11/21/11); Colonoscopy (4/23/12); Colonoscopy (08/04/2016); Splenectomy; hernia repair; and Carpal tunnel release with admission for Cellulitis of lower extremity, Elevated troponin, Elevated CK, KIRSTEN (acute kidney injury), and General weakness. Prior to admission, pt was modified independent with functional mobility and ADLs. Examination of body systems reveals decreased strength, decreased balance, decreased aerobic capacity, and decreased independence with functional mobility.          Prognosis: Good  Decision Making: High Complexity  Clinical Presentation: unpredictable characteristics  PT Education: Goals;PT Role;Plan of Care;Gait Training;Functional Mobility Training;Equipment;Transfer Training;General Safety  REQUIRES PT FOLLOW UP: Yes  Activity Tolerance  Activity Tolerance: Patient Tolerated treatment well         Restrictions  Restrictions/Precautions  Restrictions/Precautions: General Precautions, Fall Risk  Vision/Hearing  Vision: Within Functional Limits  Hearing: Within functional limits     Subjective  General  Chart Reviewed: Yes  Patient assessed for rehabilitation services?: Yes  Family / Caregiver Present: No  Follows Commands: Within Functional Limits  Pain Screening  Patient Currently in Pain: Denies  Vital Signs  Patient Currently in Pain: Denies       Orientation  Orientation  Overall Orientation Status: Within Normal Limits  Social/Functional History  Social/Functional History  Lives With: Alone  Type of Home: House  Home Layout: One level  Home Access: Stairs to enter without rails  Entrance Stairs - Number of Steps: 2  Bathroom Shower/Tub: Tub/Shower unit(sponge bathe)  Bathroom Toilet: Handicap height(elevated seat attachement with rails)  Home Equipment: Rolling walker  ADL Assistance: Independent  Homemaking Assistance: Independent  Ambulation Assistance: Independent(mod I with front wheeled walker)  Transfer Assistance: Independent  Active : Yes  Additional Comments: sleeps in chair  Cognition   Cognition  Overall Cognitive Status: WNL    Objective             Strength RLE  Comment: knee extension: at least 3+/5 observed functionally  Strength LLE  Comment: knee extension: at least 3+/5 observed functionally     Sensation  Overall Sensation Status: WNL  Bed mobility  Supine to Sit: Minimal assistance(with verbal and tactile cues for BUE and BLE placement throughout transfer; with HOB elevated; with increased time for task completion)  Transfers  Sit to Stand:  Moderate Assistance(with verbal cues to push through bed/chair and avoid pulling on walker)  Stand to sit: Moderate Assistance(with verbal cues to feel bed/chair against back of legs, reach back, and sit slowly)  Ambulation  Ambulation?: Yes  Ambulation 1  Surface: level tile  Device: Rolling Walker  Assistance: Minimal assistance  Quality of Gait: decreased gait speed, decreased step length bilaterally, unsteady gait  Distance: 10 feet  Comments: with verbal and tactile cues for BLE placement, walker placement, and sequence throughout ambulation; with verbal and tactile cues to maintain upright posture in order to avoid COM shifting outside of EL Balance  Posture: Poor(forward head, rounded shoulders, increased thoracic kyphosis)  Sitting - Static: Good  Sitting - Dynamic: Good  Standing - Static: Poor;+  Standing - Dynamic: Poor;+      Gait Training:  Cues were given for safety, sequence, device management, balance, posture, awareness, path. Therapeutic Activity Training:   Therapeutic activity training was instructed today. Cues were given for safety, sequence, UE/LE placement, awareness, and balance. Activities performed today included bed mobility training, sup-sit, sit-stand. Plan   Plan  Times per week: 3+  Current Treatment Recommendations: Strengthening, ROM, Balance Training, Functional Mobility Training, Transfer Training, ADL/Self-care Training, Gait Training, Patient/Caregiver Education & Training, IADL Training, Stair training, Equipment Evaluation, Education, & procurement, Neuromuscular Re-education, Pain Management, Home Exercise Program, Positioning, Endurance Training, Safety Education & Training  Safety Devices  Type of devices: All fall risk precautions in place, Left in chair, Call light within reach, Chair alarm in place, Nurse notified, Gait belt, Patient at risk for falls      AM-PAC Score  AM-PAC Inpatient Mobility Raw Score : 14 (11/14/20 1717)  AM-PAC Inpatient T-Scale Score : 38.1 (11/14/20 1717)  Mobility Inpatient CMS 0-100% Score: 61.29 (11/14/20 1717)  Mobility Inpatient CMS G-Code Modifier : CL (11/14/20 1717)          Goals  Long term goals  Time Frame for Long term goals :  In one week:  Long term goal 1: Pt will complete all bed mobility with SBAx1  Long term goal 2: Pt will complete sit <> stand transfers with SBAx1  Long term goal 3: Pt will ambulate 75 feet with SBAx1 with LRAD  Long term goal 4: Pt will independently complete 3 sets of 10 reps of BLE AROM exercises in available and allowed ROM       Time In: 1258  Time Out: 1333  Total Treatment Time: 35  Timed Code Treatment Minutes: [facility] A Sage Samayoa DPT  License #: [id number]

## 2020-11-14 NOTE — PROGRESS NOTES
Nephrology Progress Note        [address], [address] [address]  Phone: [phone]  Office Hours: 8:30AM - 4:30PM  Monday - Friday 11/14/2020 9:50 AM  Subjective:   Admit Date: 11/11/2020  PCP: Leopold Mesa, MD  Interval History:   Doing better  Still weak unable to stand up    Diet: DIET CARB CONTROL;      Data:   Scheduled Meds:   insulin glargine  40 Units Subcutaneous Nightly    insulin NPH  25 Units Subcutaneous Once    glipiZIDE  10 mg Oral BID AC    sodium bicarbonate  1,300 mg Oral TID    vancomycin  1,500 mg Intravenous Q18H    insulin lispro  27 Units Subcutaneous TID WC    warfarin  6 mg Oral Daily    alogliptin  12.5 mg Oral Daily    insulin lispro  0-18 Units Subcutaneous TID WC    insulin lispro  0-18 Units Subcutaneous 2 times per day    sodium chloride flush  10 mL Intravenous 2 times per day    ARIPiprazole  15 mg Oral Daily    aspirin  81 mg Oral Daily    atorvastatin  40 mg Oral Nightly    buPROPion  300 mg Oral QAM    escitalopram  20 mg Oral Daily    glycopyrrolate-formoterol  2 puff Inhalation BID    metoprolol tartrate  12.5 mg Oral BID    pantoprazole  40 mg Oral QAM AC     Continuous Infusions:   dextrose       PRN Meds:sodium chloride flush, acetaminophen **OR** acetaminophen, promethazine **OR** [DISCONTINUED] ondansetron, glucose, dextrose, glucagon (rDNA), dextrose, ipratropium  I/O last 3 completed shifts: In: 560 [P.O.:550; I.V.:10]  Out: 4175 [Urine:4175]  No intake/output data recorded.     Intake/Output Summary (Last 24 hours) at 11/14/2020 0950  Last data filed at 11/14/2020 7536  Gross per 24 hour   Intake 560 ml   Output 4175 ml   Net -3615 ml       CBC:   Recent Labs     11/12/20 0836 11/13/20 0549 11/14/20  0332   WBC 12.6* 7.2 6.6   HGB 12.4* 11.9* 12.0*    161 193       BMP:    Recent Labs     11/12/20 0836 11/13/20 0549 11/14/20  0332   * 131* 134*   K 3.8 3.5 4.4   CL 95* 98* 101 CO2 21 20* 25   BUN 21 22 16   CREATININE 1.3 1.3 1.2   GLUCOSE 216* 160* 101*     Hepatic:   Recent Labs     11/11/20  1002   AST 27   ALT 14   BILITOT 0.8   ALKPHOS 79     INR:   Recent Labs     11/12/20  0836 11/13/20  0549 11/14/20  0332   INR 2.21 2.01 2.03       Objective:   Vitals: BP (!) 122/55   Pulse 78   Temp 98.1 °F (36.7 °C) (Oral)   Resp 23   Ht 6' 5\" (1.956 m)   Wt 295 lb 12.8 oz (134.2 kg)   SpO2 95%   BMI 35.08 kg/m²   General appearance: alert and cooperative with exam, in no acute distress  HEENT: normocephalic, atraumatic,   Neck: supple, trachea midline  Lungs:  breathing comfortably on room air  Abdomen: ; non distended,  Extremities:   Neurologic: alert, oriented, follows commands, interactive    Assessment and Plan:     Patient Active Problem List     Diagnosis Date Noted    Diabetes mellitus with skin ulcer ([facility] 10/02/2015       Priority: High    WD-Ulcer of shin, left, with fat layer exposed ([facility] 10/02/2015       Priority: High    Uncontrolled secondary diabetes mellitus with stage 3 CKD (GFR 30-59) ([facility]) 05/10/2015       Priority: High    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 ([facility] 10/14/2014       Priority: High    Severe recurrent major depressive disorder with psychotic features ([facility] 05/13/2014       Priority: High    COPD (chronic obstructive pulmonary disease) ([facility] 01/29/2014       Priority: High    Long term current use of anticoagulants with INR goal of 2.0-3.0 12/06/2013       Priority: High    History of DVT (deep vein thrombosis)- left femoral 11/15/2013       Priority: High       Class: Acute    History of pulmonary embolism 11/15/2013       Priority: High       Class: Acute    Diabetic skin ulcer associated with type 2 diabetes mellitus (HCC)         Priority: High       Class: Present on Admission    Combined hyperlipidemia associated with type 2 diabetes mellitus ([facility]         Priority: High    CKD (chronic kidney disease)

## 2020-11-14 NOTE — PROGRESS NOTES
Progress Note( Dr. Ruben Quintero)  11/13/2020  Subjective:   Admit Date: 11/11/2020  PCP: Chase Glass MD    Admitted For :    Consulted For: Increasing fatigue and high blood glucose    Interval History: Feels somewhat better less tired    Denies any chest pains,   Some SOB . Denies nausea or vomiting. No new bowel or bladder symptoms. Intake/Output Summary (Last 24 hours) at 11/13/2020 2222  Last data filed at 11/13/2020 1838  Gross per 24 hour   Intake 560 ml   Output 1475 ml   Net -915 ml       DATA    CBC:   Recent Labs     11/11/20  1002 11/12/20  0836 11/13/20  0549   WBC 15.8* 12.6* 7.2   HGB 13.2* 12.4* 11.9*    164 161    CMP:  Recent Labs     11/11/20  1002 11/12/20  0836 11/13/20  0549   * 128* 131*   K 5.1 3.8 3.5   CL 92* 95* 98*   CO2 22 21 20*   BUN 22 21 22   CREATININE 1.7* 1.3 1.3   CALCIUM 8.7 8.0* 8.0*   PROT 6.4  --   --    LABALBU 3.2*  --   --    BILITOT 0.8  --   --    ALKPHOS 79  --   --    AST 27  --   --    ALT 14  --   --      Lipids:   Lab Results   Component Value Date    CHOL 107 11/22/2019    CHOL 175 05/05/2015    HDL 46 11/22/2019    TRIG 81 11/22/2019     Glucose:  Recent Labs     11/13/20  0220 11/13/20  1449 11/13/20 2019   POCGLU 252* 182* 304*     [id number]:  Lab Results   Component Value Date    LABA1C 8.3 11/22/2019     High Sensitivity TSH:   Lab Results   Component Value Date    TSHHS 1.770 11/21/2019     Free T3: No results found for: FT3  Free T4:  Lab Results   Component Value Date    T4FREE 1.14 11/21/2019       Xr Elbow Left (2 Views)    Result Date: 11/11/2020  EXAMINATION: TWO XRAY VIEWS OF THE LEFT ELBOW 11/11/2020 10:07 am COMPARISON: None. HISTORY: ORDERING SYSTEM PROVIDED HISTORY: trauma TECHNOLOGIST PROVIDED HISTORY: Reason for exam:->trauma Reason for Exam: trauma Acuity: Acute Type of Exam: Initial Mechanism of Injury: Nurse removed bilateral leg dressings.  Pt states they are applied every week at his doctors office in [place]. Legs are red and swollen. Stage 2 ulcer on left calf, approx 3 cm in diameter. Pt states he has been seen by the wound clinic FINDINGS: No acute fracture or dislocation is identified. No joint effusion is identified. No radiopaque foreign body is identified. No acute abnormality. Xr Elbow Right (2 Views)    Result Date: 11/11/2020  EXAMINATION: TWO XRAY VIEWS OF THE RIGHT ELBOW 11/11/2020 10:07 am COMPARISON: Right forearm series 11/03/2014. HISTORY: ORDERING SYSTEM PROVIDED HISTORY: trauma TECHNOLOGIST PROVIDED HISTORY: Reason for exam:->trauma Reason for Exam: trauma Acuity: Acute Type of Exam: Initial Mechanism of Injury: Nurse removed bilateral leg dressings. Pt states they are applied every week at his doctors office in [facility]. Legs are red and swollen. Stage 2 ulcer on left calf, approx 3 cm in diameter. Pt states he has been seen by the wound clinic FINDINGS: Small elbow effusion. Mature ossification is noted about the coronoid process on the lateral view. There is mild enthesophyte formation about the lateral epicondyle. No malalignment. 1.  Small elbow effusion. 2.  Age indeterminate fracture fragment about the tip of the coronoid process. Xr Knee Left (min 4 Views)    Result Date: 11/11/2020  EXAMINATION: FOUR XRAY VIEWS OF THE LEFT KNEE 11/11/2020 9:19 am COMPARISON: None. HISTORY: ORDERING SYSTEM PROVIDED HISTORY: trauma TECHNOLOGIST PROVIDED HISTORY: Reason for exam:->trauma Reason for Exam: trauma Acuity: Acute Type of Exam: Initial Mechanism of Injury: Nurse removed bilateral leg dressings. Pt states they are applied every week at his doctors office in [facility]. Legs are red and swollen. Stage 2 ulcer on left calf, approx 3 cm in diameter. Pt states he has been seen by the wound clinic FINDINGS: No fracture, cortical erosion or joint effusion were noted. No patellofemoral or femoral tibial joint compartment narrowing were identified.   The bony mineralization was normal. disease identified. Us Retroperitoneal Limited    Result Date: 11/11/2020  EXAMINATION: ULTRASOUND OF THE KIDNEYS 11/11/2020 6:50 pm COMPARISON: 02/24/2012 HISTORY: ORDERING SYSTEM PROVIDED HISTORY: kirsten TECHNOLOGIST PROVIDED HISTORY: Reason for exam:->kirsten Reason for Exam: KIRSTEN; abnormal labs Type of Exam: Initial FINDINGS: The right kidney measures 10.6 cm in length and the left kidney measures 9.6 cm in length. Kidneys demonstrate normal cortical echogenicity. No hydronephrosis or intrarenal stones. No focal lesions. Unremarkable ultrasound of the kidneys.        Scheduled Medicines   Medications:    insulin NPH  25 Units Subcutaneous Once    glipiZIDE  10 mg Oral BID AC    sodium bicarbonate  1,300 mg Oral TID    vancomycin  1,500 mg Intravenous Q18H    insulin glargine  55 Units Subcutaneous Nightly    insulin lispro  27 Units Subcutaneous TID WC    warfarin  6 mg Oral Daily    insulin lispro  0-18 Units Subcutaneous TID WC    insulin lispro  0-18 Units Subcutaneous 2 times per day    sodium chloride flush  10 mL Intravenous 2 times per day    ARIPiprazole  15 mg Oral Daily    aspirin  81 mg Oral Daily    atorvastatin  40 mg Oral Nightly    buPROPion  300 mg Oral QAM    escitalopram  20 mg Oral Daily    glycopyrrolate-formoterol  2 puff Inhalation BID    metoprolol tartrate  12.5 mg Oral BID    pantoprazole  40 mg Oral QAM AC      Infusions:    dextrose           Objective:   Vitals: /63   Pulse 77   Temp 99.2 °F (37.3 °C) (Oral)   Resp 12   Ht 6' 5\" (1.956 m)   Wt 295 lb 11.2 oz (134.1 kg)   SpO2 93%   BMI 35.06 kg/m²   General appearance: alert and cooperative with exam  Neck: no JVD or bruit  Thyroid : Normal lobes   Lungs: Has Vesicular Breath sounds   Heart:  regular rate and rhythm  Abdomen: soft, non-tender; bowel sounds normal; no masses,  no organomegaly  Musculoskeletal: Normal  Extremities: extremities normal, , no edema  Neurologic:  Awake, alert, oriented to name, place and time. Cranial nerves II-XII are grossly intact. Motor is  intact. Sensory neuropathy. ,  and gait is normal.    Assessment:     Patient Active Problem List:     Gastroesophageal reflux disease without esophagitis     Hypertension in stage 3 chronic kidney disease due to type 2 diabetes mellitus (HCC)     DM (diabetes mellitus) type II, controlled, with peripheral vascular disorder (HCC)     Combined hyperlipidemia associated with type 2 diabetes mellitus ([address]     Diabetic skin ulcer associated with type 2 diabetes mellitus (HCC)     CKD (chronic kidney disease)     History of DVT (deep vein thrombosis)- left femoral     History of pulmonary embolism     Long term current use of anticoagulants with INR goal of 2.0-3.0     COPD (chronic obstructive pulmonary disease) (HCC)     Severe recurrent major depressive disorder with psychotic features ([address]     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 Reviewed POC blood glucose . Labs and X ray results   2. Reviewed Current Medicines   3. On meal/ Correction bolus Humalog/ Basal Lantus Insulin regime   4. Monitor Blood glucose frequently   5. Modified  the dose of Insulin/ other medicines as needed   6. Will follow     .      Reji Ferrara MD

## 2020-11-14 NOTE — PLAN OF CARE
Problem: Falls - Risk of:  Goal: Will remain free from falls  Description: Will remain free from falls  11/14/2020 1445 by Graciela Lujan RN  Outcome: Ongoing  11/14/2020 0330 by Caffie Osgood, LPN  Outcome: Ongoing  11/14/2020 0326 by Jaskaran Osgood, LPN  Outcome: Ongoing  Goal: Absence of physical injury  Description: Absence of physical injury  11/14/2020 1445 by Graciela Lujan RN  Outcome: Ongoing  11/14/2020 0330 by Jaskaran Osgood, LPN  Outcome: Ongoing  11/14/2020 0326 by Jaskaran Osgood, LPN  Outcome: Ongoing     Problem: Pain:  Goal: Pain level will decrease  Description: Pain level will decrease  11/14/2020 1445 by Graciela Lujan RN  Outcome: Ongoing  11/14/2020 0330 by Caffie Osgood, LPN  Outcome: Ongoing  11/14/2020 0326 by Caffie Osgood, LPN  Outcome: Ongoing  Goal: Control of acute pain  Description: Control of acute pain  11/14/2020 1445 by Graciela Ljuan RN  Outcome: Ongoing  11/14/2020 0330 by Caffie Osgood, LPN  Outcome: Ongoing  11/14/2020 0326 by Caffie Osgood, LPN  Outcome: Ongoing  Goal: Control of chronic pain  Description: Control of chronic pain  11/14/2020 1445 by Graciela Lujan RN  Outcome: Ongoing  11/14/2020 0330 by Caffie Osgood, LPN  Outcome: Ongoing  11/14/2020 0326 by Caffie Osgood, LPN  Outcome: Ongoing     Problem: Skin Integrity:  Goal: Will show no infection signs and symptoms  Description: Will show no infection signs and symptoms  11/14/2020 1445 by Graciela Lujan RN  Outcome: Ongoing  11/14/2020 0330 by Caffie Osgood, LPN  Outcome: Ongoing  11/14/2020 0326 by Caffie Osgood, LPN  Outcome: Ongoing  Goal: Absence of new skin breakdown  Description: Absence of new skin breakdown  11/14/2020 1445 by Graciela Lujan RN  Outcome: Ongoing  11/14/2020 0330 by Caffie Osgood, LPN  Outcome: Ongoing  11/14/2020 0326 by Caffie Osgood, LPN  Outcome: Ongoing

## 2020-11-14 NOTE — PROGRESS NOTES
Comprehensive Nutrition Assessment    Type and Reason for Visit:  Wound    Nutrition Recommendations/Plan:   Add wound healing ONS BID  Encourage po intake as able  Will monitor po intake, nutrition status, poc    Nutrition Assessment:  Pt admitted with general weakness, elevated troponin, cellulitis, PMH: DM, HTN, HLD, COPD, CKD, positive nutrition screen for wounds, pt on carb control diet consuming ~% per documentation, pt is at moderate nutrition risk    Malnutrition Assessment:  Malnutrition Status:  Insufficient data    Context:  Acute Illness       Estimated Daily Nutrient Needs:  Energy (kcal):  3755-9907; Weight Used for Energy Requirements:  Current     Protein (g):  114-133; Weight Used for Protein Requirements:  Ideal          Wounds:  Multiple, Venous Stasis       Current Nutrition Therapies:    DIET CARB CONTROL; Anthropometric Measures:  · Height: 6' 5.01\" (195.6 cm)  · Current Body Weight: 295 lb 13.7 oz (134.2 kg)     · Ideal Body Weight: 208 lbs; % Ideal Body Weight 142.2 %   · BMI: 35.1  · BMI Categories: Obese Class 2 (BMI 35.0 -39.9)       Nutrition Diagnosis:   · Increased nutrient needs related to wound healing as evidenced by multiple wounds    Nutrition Interventions:   Food and/or Nutrient Delivery:  Continue Current Diet, Start Oral Nutrition Supplement  Nutrition Education/Counseling:  No recommendation at this time   Coordination of Nutrition Care:  Continue to monitor while inpatient    Goals:  pt will consume greater than 75% of meals and supplements       Nutrition Monitoring and Evaluation:   Food/Nutrient Intake Outcomes:  Supplement Intake, Food and Nutrient Intake  Physical Signs/Symptoms Outcomes:  GI Status, Weight, Biochemical Data, Hemodynamic Status, Skin     Discharge Planning:     Too soon to determine     Electronically signed by Farrah Urban, MS, RD, LD on 11/14/20 at 2:45 PM EST    Contact: [phone]

## 2020-11-14 NOTE — PROGRESS NOTES
PHARMACY ANTICOAGULATION MONITORING SERVICE    Shawna Banks is a 69 y.o. male on warfarin therapy for DVT and PE. Pharmacy consulted by Dr. Reinhold Castleman for monitoring and adjustment of treatment. Indication for anticoagulation: DVT and PE  INR goal: 2-3  Warfarin dose prior to admission: 5mg daily except 7.5mg on Monday and Thursday per anticoag clinic on 11/5/20    Pertinent Laboratory Values   Recent Labs     11/12/20  0836 11/13/20  0549 11/14/20  0332   INR 2.21 2.01 2.03   HGB 12.4* 11.9* 12.0*   HCT 37.5* 35.8* 36.7*    161 193       Assessment/Plan:   Drug Interactions: aspirin (home med), no new interactions noted   INR remains therapeutic today    Continue warfarin 6mg daily tonight and follow INR trends, slight increase in total weekly dose as INR was sub-therapeutic on admission    Pharmacy will continue to monitor and adjust warfarin therapy as indicated    Thank you for the consult.   Toy Zimmerman, [facility]  11/14/2020 1:07 PM

## 2020-11-15 LAB
BASOPHILS ABSOLUTE: 0 K/CU MM
BASOPHILS RELATIVE PERCENT: 0.6 % (ref 0–1)
CREAT SERPL-MCNC: 1.3 MG/DL (ref 0.9–1.3)
DIFFERENTIAL TYPE: ABNORMAL
EOSINOPHILS ABSOLUTE: 0.2 K/CU MM
EOSINOPHILS RELATIVE PERCENT: 2.6 % (ref 0–3)
GFR AFRICAN AMERICAN: >60 ML/MIN/1.73M2
GFR NON-AFRICAN AMERICAN: 54 ML/MIN/1.73M2
GLUCOSE BLD-MCNC: 110 MG/DL (ref 70–99)
GLUCOSE BLD-MCNC: 154 MG/DL (ref 70–99)
GLUCOSE BLD-MCNC: 154 MG/DL (ref 70–99)
GLUCOSE BLD-MCNC: 159 MG/DL (ref 70–99)
GLUCOSE BLD-MCNC: 214 MG/DL (ref 70–99)
GLUCOSE BLD-MCNC: 291 MG/DL (ref 70–99)
GLUCOSE BLD-MCNC: 52 MG/DL (ref 70–99)
GLUCOSE BLD-MCNC: 61 MG/DL (ref 70–99)
GLUCOSE BLD-MCNC: 71 MG/DL (ref 70–99)
HCT VFR BLD CALC: 37.9 % (ref 42–52)
HEMOGLOBIN: 11.8 GM/DL (ref 13.5–18)
IMMATURE NEUTROPHIL %: 1.1 % (ref 0–0.43)
INR BLD: 1.68 INDEX
LYMPHOCYTES ABSOLUTE: 1.7 K/CU MM
LYMPHOCYTES RELATIVE PERCENT: 26 % (ref 24–44)
MCH RBC QN AUTO: 28.4 PG (ref 27–31)
MCHC RBC AUTO-ENTMCNC: 31.1 % (ref 32–36)
MCV RBC AUTO: 91.1 FL (ref 78–100)
MONOCYTES ABSOLUTE: 0.8 K/CU MM
MONOCYTES RELATIVE PERCENT: 12.5 % (ref 0–4)
NUCLEATED RBC %: 0 %
PDW BLD-RTO: 16.8 % (ref 11.7–14.9)
PLATELET # BLD: 213 K/CU MM (ref 140–440)
PMV BLD AUTO: 12.9 FL (ref 7.5–11.1)
PROTHROMBIN TIME: 20.4 SECONDS (ref 11.7–14.5)
RBC # BLD: 4.16 M/CU MM (ref 4.6–6.2)
SEGMENTED NEUTROPHILS ABSOLUTE COUNT: 3.7 K/CU MM
SEGMENTED NEUTROPHILS RELATIVE PERCENT: 57.2 % (ref 36–66)
TOTAL IMMATURE NEUTOROPHIL: 0.07 K/CU MM
TOTAL NUCLEATED RBC: 0 K/CU MM
WBC # BLD: 6.5 K/CU MM (ref 4–10.5)

## 2020-11-15 PROCEDURE — 2580000003 HC RX 258: Performed by: HOSPITALIST

## 2020-11-15 PROCEDURE — 94640 AIRWAY INHALATION TREATMENT: CPT

## 2020-11-15 PROCEDURE — 82565 ASSAY OF CREATININE: CPT

## 2020-11-15 PROCEDURE — 99232 SBSQ HOSP IP/OBS MODERATE 35: CPT | Performed by: INTERNAL MEDICINE

## 2020-11-15 PROCEDURE — 6360000002 HC RX W HCPCS: Performed by: HOSPITALIST

## 2020-11-15 PROCEDURE — 6370000000 HC RX 637 (ALT 250 FOR IP): Performed by: INTERNAL MEDICINE

## 2020-11-15 PROCEDURE — 82962 GLUCOSE BLOOD TEST: CPT

## 2020-11-15 PROCEDURE — 36415 COLL VENOUS BLD VENIPUNCTURE: CPT

## 2020-11-15 PROCEDURE — 94761 N-INVAS EAR/PLS OXIMETRY MLT: CPT

## 2020-11-15 PROCEDURE — 2580000003 HC RX 258: Performed by: NURSE PRACTITIONER

## 2020-11-15 PROCEDURE — 85025 COMPLETE CBC W/AUTO DIFF WBC: CPT

## 2020-11-15 PROCEDURE — 85610 PROTHROMBIN TIME: CPT

## 2020-11-15 PROCEDURE — 6370000000 HC RX 637 (ALT 250 FOR IP): Performed by: NURSE PRACTITIONER

## 2020-11-15 PROCEDURE — 1200000000 HC SEMI PRIVATE

## 2020-11-15 RX ORDER — WARFARIN SODIUM 7.5 MG/1
7.5 TABLET ORAL DAILY
Status: DISCONTINUED | OUTPATIENT
Start: 2020-11-15 | End: 2020-11-18

## 2020-11-15 RX ADMIN — GLYCOPYRROLATE AND FORMOTEROL FUMARATE 2 PUFF: 9; 4.8 AEROSOL, METERED RESPIRATORY (INHALATION) at 08:49

## 2020-11-15 RX ADMIN — METOPROLOL TARTRATE 12.5 MG: 25 TABLET, FILM COATED ORAL at 21:27

## 2020-11-15 RX ADMIN — ARIPIPRAZOLE 15 MG: 10 TABLET ORAL at 10:17

## 2020-11-15 RX ADMIN — BUPROPION HYDROCHLORIDE 300 MG: 150 TABLET, FILM COATED, EXTENDED RELEASE ORAL at 10:16

## 2020-11-15 RX ADMIN — GLIPIZIDE 10 MG: 5 TABLET ORAL at 06:32

## 2020-11-15 RX ADMIN — ALOGLIPTIN 12.5 MG: 12.5 TABLET, FILM COATED ORAL at 10:19

## 2020-11-15 RX ADMIN — GLYCOPYRROLATE AND FORMOTEROL FUMARATE 2 PUFF: 9; 4.8 AEROSOL, METERED RESPIRATORY (INHALATION) at 19:56

## 2020-11-15 RX ADMIN — ASPIRIN 81 MG: 81 TABLET, CHEWABLE ORAL at 10:17

## 2020-11-15 RX ADMIN — VANCOMYCIN HYDROCHLORIDE 1500 MG: 5 INJECTION, POWDER, LYOPHILIZED, FOR SOLUTION INTRAVENOUS at 18:36

## 2020-11-15 RX ADMIN — METOPROLOL TARTRATE 12.5 MG: 25 TABLET, FILM COATED ORAL at 10:17

## 2020-11-15 RX ADMIN — VANCOMYCIN HYDROCHLORIDE 1500 MG: 5 INJECTION, POWDER, LYOPHILIZED, FOR SOLUTION INTRAVENOUS at 00:02

## 2020-11-15 RX ADMIN — SODIUM CHLORIDE, PRESERVATIVE FREE 10 ML: 5 INJECTION INTRAVENOUS at 21:28

## 2020-11-15 RX ADMIN — INSULIN GLARGINE 40 UNITS: 100 INJECTION, SOLUTION SUBCUTANEOUS at 21:27

## 2020-11-15 RX ADMIN — WARFARIN SODIUM 7.5 MG: 7.5 TABLET ORAL at 18:35

## 2020-11-15 RX ADMIN — ATORVASTATIN CALCIUM 40 MG: 40 TABLET, FILM COATED ORAL at 21:27

## 2020-11-15 RX ADMIN — ESCITALOPRAM OXALATE 20 MG: 10 TABLET ORAL at 10:17

## 2020-11-15 RX ADMIN — SODIUM CHLORIDE, PRESERVATIVE FREE 10 ML: 5 INJECTION INTRAVENOUS at 10:24

## 2020-11-15 RX ADMIN — PANTOPRAZOLE SODIUM 40 MG: 40 TABLET, DELAYED RELEASE ORAL at 06:32

## 2020-11-15 ASSESSMENT — PAIN SCALES - GENERAL
PAINLEVEL_OUTOF10: 0
PAINLEVEL_OUTOF10: 0

## 2020-11-15 NOTE — PROGRESS NOTES
Wound care done to LLE, alpha bath cleansed, rinsed with NS, Aquacel Ag applied, abd, kerlix, and ace wrap    Ace wrap applied to RLE    Wound care to Sacrum: cleansed with NS, puracol, and mepilex placed. [facility] bed [facility].

## 2020-11-15 NOTE — PROGRESS NOTES
100 mL/hr, PRN  ipratropium, 2 puff, PRN        Recent Labs     11/13/20  0549 11/14/20 0332 11/15/20  0303   WBC 7.2 6.6 6.5   HGB 11.9* 12.0* 11.8*   HCT 35.8* 36.7* 37.9*    193 213      Recent Labs     11/13/20 0549 11/14/20  0332 11/15/20  0303   * 134*  --    K 3.5 4.4  --    CL 98* 101  --    CO2 20* 25  --    BUN 22 16  --    CREATININE 1.3 1.2 1.3     No results for input(s): AST, ALT, ALB, BILIDIR, BILITOT, ALKPHOS in the last 72 hours.   Recent Labs     11/13/20 0549 11/14/20  0332 11/15/20  0303   INR 2.01 2.03 1.68     Recent Labs     11/13/20 0549 11/14/20 0332   CKTOTAL 621* 432*        Imaging reviewed      Electronically signed by Kelley Bautista MD on 11/15/2020 at 12:01 PM

## 2020-11-15 NOTE — CARE COORDINATION
LSW noted that Pt therapy recommendations were for ARU. LSW called Pt in the room. Pt stated that he would like ARU. LSW made a referral to [address] [place] [address]. Pt insurance will require a precert.

## 2020-11-15 NOTE — CARE COORDINATION
Received referral for this pt. I will review his clinical information, and confir with MD and PD, for appropriateness.  Thank you for this referral

## 2020-11-15 NOTE — CONSULTS
[address] [facility]  consulted by Dr. Hebert Patient for monitoring and adjustment. Indication for treatment: Cellulitis  Goal trough: 10-15 mcg/mL     Pertinent Laboratory Values:   Temp Readings from Last 3 Encounters:   11/14/20 98.5 °F (36.9 °C) (Oral)   11/05/20 99.9 °F (37.7 °C) (Temporal)   10/29/20 97.7 °F (36.5 °C) (Temporal)     Recent Labs     11/12/20  0836 11/13/20  0549 11/14/20  0332   WBC 12.6* 7.2 6.6     Recent Labs     11/12/20  0836 11/13/20  0549 11/14/20  0332   BUN 21 22 16   CREATININE 1.3 1.3 1.2     Estimated Creatinine Clearance: 83 mL/min (based on SCr of 1.2 mg/dL). Intake/Output Summary (Last 24 hours) at 11/15/2020 0106  Last data filed at 11/15/2020 0038  Gross per 24 hour   Intake 980 ml   Output 4175 ml   Net -3195 ml       Pertinent Cultures:  Date    Source    Results  11/11   Blood                                     No growth  11/11   COVID-19   Negative    Vancomycin level:   TROUGH:    Recent Labs     11/12/20  0836 11/14/20  2217   VANCOTROUGH 7.7* 14.0     RANDOM:    Recent Labs     11/13/20  0549   VANCORANDOM 12.5       Assessment:  · WBC and temperature: WNL  · SCr, BUN, and urine output: KIRSTEN resolved  · Day(s) of therapy: 5  · Vancomycin level:   · 11/12:  7.7  OK to re-dose  · 11/13:  12.5 (15 hour level)  · 11/14:  14.0 ( 16 hour level), therapeutic       Plan:  · Vancomycin level returned with a therapeutic trough  · Continue Vancomycin 1500 mg IVPB q18h  · Repeat Vancomycin level in 48 hours  · Monitor for accumulation due to BMI  · Pharmacy will continue to monitor patient and adjust therapy as indicated    VANCOMYCIN TROUGH SCHEDULED FOR 11/17/20 @ 1757    Thank you for the consult.   Sivakumar Marrufo RPh  11/15/2020 1:06 AM

## 2020-11-15 NOTE — PROGRESS NOTES
Nephrology Progress Note        [address], [address] [place] [address]  Phone: [phone]  Office Hours: 8:30AM - 4:30PM  Monday - Friday        11/15/2020 11:21 AM  Subjective:   Admit Date: 11/11/2020  PCP: Froilan Bunch MD  Interval History:   Doing better  Stood up with PT and walker    Diet: DIET CARB CONTROL; Dietary Nutrition Supplements: Wound Healing Oral Supplement      Data:   Scheduled Meds:   insulin glargine  40 Units Subcutaneous Nightly    insulin NPH  25 Units Subcutaneous Once    glipiZIDE  10 mg Oral BID AC    vancomycin  1,500 mg Intravenous Q18H    insulin lispro  27 Units Subcutaneous TID WC    warfarin  6 mg Oral Daily    alogliptin  12.5 mg Oral Daily    insulin lispro  0-18 Units Subcutaneous TID WC    insulin lispro  0-18 Units Subcutaneous 2 times per day    sodium chloride flush  10 mL Intravenous 2 times per day    ARIPiprazole  15 mg Oral Daily    aspirin  81 mg Oral Daily    atorvastatin  40 mg Oral Nightly    buPROPion  300 mg Oral QAM    escitalopram  20 mg Oral Daily    glycopyrrolate-formoterol  2 puff Inhalation BID    metoprolol tartrate  12.5 mg Oral BID    pantoprazole  40 mg Oral QAM AC     Continuous Infusions:   dextrose       PRN Meds:sodium chloride flush, acetaminophen **OR** acetaminophen, promethazine **OR** [DISCONTINUED] ondansetron, glucose, dextrose, glucagon (rDNA), dextrose, ipratropium  I/O last 3 completed shifts:   In: 2120 [P.O.:1620; IV Piggyback:500]  Out: 2790 [Urine:3450]  I/O this shift:  In: 240 [P.O.:240]  Out: 325 [Urine:325]    Intake/Output Summary (Last 24 hours) at 11/15/2020 1121  Last data filed at 11/15/2020 1025  Gross per 24 hour   Intake 1880 ml   Output 2725 ml   Net -845 ml       CBC:   Recent Labs     11/13/20 0549 11/14/20  0332 11/15/20  0303   WBC 7.2 6.6 6.5   HGB 11.9* 12.0* 11.8*    193 213       BMP:    Recent Labs     11/13/20  0549 11/14/20  0332 11/15/20  0303 * 134*  --    K 3.5 4.4  --    CL 98* 101  --    CO2 20* 25  --    BUN 22 16  --    CREATININE 1.3 1.2 1.3   GLUCOSE 160* 101*  --      Hepatic:   No results for input(s): AST, ALT, ALB, BILITOT, ALKPHOS in the last 72 hours.   INR:   Recent Labs     11/13/20  0549 11/14/20  0332 11/15/20  0303   INR 2.01 2.03 1.68       Objective:   Vitals: BP (!) 111/58   Pulse 97   Temp 98.3 °F (36.8 °C)   Resp 18   Ht 6' 5.01\" (1.956 m)   Wt 296 lb (134.3 kg) Comment: less 20lbs for bed extender  SpO2 94%   BMI 35.09 kg/m²   General appearance: alert and cooperative with exam, in no acute distress  HEENT: normocephalic, atraumatic,   Neck: supple, trachea midline  Lungs:  breathing comfortably on room air  Abdomen: ; non distended,  Extremities:   Neurologic: alert, oriented, follows commands, interactive    Assessment and Plan:     Patient Active Problem List     Diagnosis Date Noted    Diabetes mellitus with skin ulcer ([facility] 10/02/2015       Priority: High    WD-Ulcer of shin, left, with fat layer exposed ([facility] 10/02/2015       Priority: High    Uncontrolled secondary diabetes mellitus with stage 3 CKD (GFR 30-59) ([facility] 05/10/2015       Priority: High    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 ([facility] 10/14/2014       Priority: High    Severe recurrent major depressive disorder with psychotic features ([facility] 05/13/2014       Priority: High    COPD (chronic obstructive pulmonary disease) ([facility] 01/29/2014       Priority: High    Long term current use of anticoagulants with INR goal of 2.0-3.0 12/06/2013       Priority: High    History of DVT (deep vein thrombosis)- left femoral 11/15/2013       Priority: High       Class: Acute    History of pulmonary embolism 11/15/2013       Priority: High       Class: Acute    Diabetic skin ulcer associated with type 2 diabetes mellitus (HCC)         Priority: High       Class: Present on Admission    Combined hyperlipidemia associated with type 2 diabetes mellitus ([facility]         Priority: High    CKD (chronic kidney disease) 02/01/2012       Priority: High    Hypertension in stage 3 chronic kidney disease due to type 2 diabetes mellitus ([facility]         Priority: High    DM (diabetes mellitus) type II, controlled, with peripheral vascular disorder [facility])         Priority: High    Venous (peripheral) insufficiency         Priority: Medium    Gastroesophageal reflux disease without esophagitis 11/08/2011       Priority: Low    KIRSTEN (acute kidney injury) ([facility] 11/11/2020    Troponin I above reference range 11/21/2019    WD-Chronic venous hypertension with inflammation, right 09/19/2019    WD-Chronic venous hypertension (idiopathic) with ulcer of left lower extremity (CODE) ([facility] 06/27/2019    Personal history of PE (pulmonary embolism) 02/12/2019    History of colon polyps 06/21/2016   KIRSTEN; cr 1.7 from baseline 1.1//renal US showed no HN//low Ernst of 20, suggesting prerenal etiology  Rhabdo: CK 1293  DM2  LLE ulcers  Leukocytosis  Metabolic acidosis     Na  410  Creat stable at 1.3  Resolving KIRSTEN  Hypotension resolved  suggest   hold losartan  Recheck in am  Dr Denae Aaron will follow       Electronically signed by Fatmata Hart MD on 11/15/2020 at 11:21 MD Amna Tripp DO [address]  [place], [place] [address]  PHONE: [phone]  FAX: [phone]

## 2020-11-15 NOTE — PROGRESS NOTES
PHARMACY ANTICOAGULATION MONITORING SERVICE    Raquel Sutton is a 69 y.o. male on warfarin therapy for DVT and PE. Pharmacy consulted by Dr. Linda Torrez for monitoring and adjustment of treatment. Indication for anticoagulation: DVT and PE  INR goal: 2-3  Warfarin dose prior to admission: 5mg daily except 7.5mg on Monday and Thursday per [facility] clinic on 11/5/20    Pertinent Laboratory Values   Recent Labs     11/13/20  0549 11/14/20  0332 11/15/20  0303   INR 2.01 2.03 1.68   HGB 11.9* 12.0* 11.8*   HCT 35.8* 36.7* 37.9*    193 213       Assessment/Plan:   Drug Interactions: aspirin (home med), no new interactions noted   INR sub-therapeutic    Warfarin 7.5 mg [address] will continue to monitor and adjust warfarin therapy as indicated    Thank you for the consult.   Candida Rain, [address]  11/15/2020 1:08 PM

## 2020-11-16 LAB
CREAT SERPL-MCNC: 1.3 MG/DL (ref 0.9–1.3)
CULTURE: NORMAL
GFR AFRICAN AMERICAN: >60 ML/MIN/1.73M2
GFR NON-AFRICAN AMERICAN: 54 ML/MIN/1.73M2
GLUCOSE BLD-MCNC: 119 MG/DL (ref 70–99)
GLUCOSE BLD-MCNC: 135 MG/DL (ref 70–99)
GLUCOSE BLD-MCNC: 141 MG/DL (ref 70–99)
GLUCOSE BLD-MCNC: 230 MG/DL (ref 70–99)
GLUCOSE BLD-MCNC: 240 MG/DL (ref 70–99)
INR BLD: 1.65 INDEX
Lab: NORMAL
PROTHROMBIN TIME: 20.1 SECONDS (ref 11.7–14.5)
SPECIMEN: NORMAL

## 2020-11-16 PROCEDURE — 2580000003 HC RX 258: Performed by: HOSPITALIST

## 2020-11-16 PROCEDURE — 97535 SELF CARE MNGMENT TRAINING: CPT

## 2020-11-16 PROCEDURE — 97530 THERAPEUTIC ACTIVITIES: CPT

## 2020-11-16 PROCEDURE — 2700000000 HC OXYGEN THERAPY PER DAY

## 2020-11-16 PROCEDURE — 6370000000 HC RX 637 (ALT 250 FOR IP): Performed by: INTERNAL MEDICINE

## 2020-11-16 PROCEDURE — 82565 ASSAY OF CREATININE: CPT

## 2020-11-16 PROCEDURE — 36415 COLL VENOUS BLD VENIPUNCTURE: CPT

## 2020-11-16 PROCEDURE — 97116 GAIT TRAINING THERAPY: CPT

## 2020-11-16 PROCEDURE — 2580000003 HC RX 258: Performed by: NURSE PRACTITIONER

## 2020-11-16 PROCEDURE — 94761 N-INVAS EAR/PLS OXIMETRY MLT: CPT

## 2020-11-16 PROCEDURE — 6370000000 HC RX 637 (ALT 250 FOR IP): Performed by: NURSE PRACTITIONER

## 2020-11-16 PROCEDURE — 6360000002 HC RX W HCPCS: Performed by: HOSPITALIST

## 2020-11-16 PROCEDURE — 97110 THERAPEUTIC EXERCISES: CPT

## 2020-11-16 PROCEDURE — 94640 AIRWAY INHALATION TREATMENT: CPT

## 2020-11-16 PROCEDURE — 99231 SBSQ HOSP IP/OBS SF/LOW 25: CPT | Performed by: INTERNAL MEDICINE

## 2020-11-16 PROCEDURE — 1200000000 HC SEMI PRIVATE

## 2020-11-16 PROCEDURE — 85610 PROTHROMBIN TIME: CPT

## 2020-11-16 RX ORDER — INSULIN GLARGINE 100 [IU]/ML
50 INJECTION, SOLUTION SUBCUTANEOUS NIGHTLY
Status: DISCONTINUED | OUTPATIENT
Start: 2020-11-16 | End: 2020-11-17

## 2020-11-16 RX ADMIN — PANTOPRAZOLE SODIUM 40 MG: 40 TABLET, DELAYED RELEASE ORAL at 06:11

## 2020-11-16 RX ADMIN — GLYCOPYRROLATE AND FORMOTEROL FUMARATE 2 PUFF: 9; 4.8 AEROSOL, METERED RESPIRATORY (INHALATION) at 08:32

## 2020-11-16 RX ADMIN — GLIPIZIDE 10 MG: 5 TABLET ORAL at 06:10

## 2020-11-16 RX ADMIN — METOPROLOL TARTRATE 12.5 MG: 25 TABLET, FILM COATED ORAL at 08:50

## 2020-11-16 RX ADMIN — ASPIRIN 81 MG: 81 TABLET, CHEWABLE ORAL at 08:50

## 2020-11-16 RX ADMIN — ATORVASTATIN CALCIUM 40 MG: 40 TABLET, FILM COATED ORAL at 21:32

## 2020-11-16 RX ADMIN — ARIPIPRAZOLE 15 MG: 10 TABLET ORAL at 08:49

## 2020-11-16 RX ADMIN — SODIUM CHLORIDE, PRESERVATIVE FREE 10 ML: 5 INJECTION INTRAVENOUS at 21:33

## 2020-11-16 RX ADMIN — VANCOMYCIN HYDROCHLORIDE 1500 MG: 5 INJECTION, POWDER, LYOPHILIZED, FOR SOLUTION INTRAVENOUS at 13:28

## 2020-11-16 RX ADMIN — GLIPIZIDE 10 MG: 5 TABLET ORAL at 17:36

## 2020-11-16 RX ADMIN — WARFARIN SODIUM 7.5 MG: 7.5 TABLET ORAL at 17:36

## 2020-11-16 RX ADMIN — BUPROPION HYDROCHLORIDE 300 MG: 150 TABLET, FILM COATED, EXTENDED RELEASE ORAL at 08:49

## 2020-11-16 RX ADMIN — SODIUM CHLORIDE, PRESERVATIVE FREE 10 ML: 5 INJECTION INTRAVENOUS at 08:49

## 2020-11-16 RX ADMIN — METOPROLOL TARTRATE 12.5 MG: 25 TABLET, FILM COATED ORAL at 21:32

## 2020-11-16 RX ADMIN — ESCITALOPRAM OXALATE 20 MG: 10 TABLET ORAL at 08:49

## 2020-11-16 ASSESSMENT — PAIN SCALES - GENERAL: PAINLEVEL_OUTOF10: 0

## 2020-11-16 NOTE — PROGRESS NOTES
PHARMACY ANTICOAGULATION MONITORING SERVICE    Kimberlee العراقي is a 69 y.o. male on warfarin therapy for DVT and PE. Pharmacy consulted by Dr. Reginald Stringer for monitoring and adjustment of treatment. Indication for anticoagulation: DVT and PE  INR goal: 2-3  Warfarin dose prior to admission: 5mg daily except 7.5mg on Monday and Thursday per [facility] clinic on 11/5/20    Pertinent Laboratory Values   Recent Labs     11/14/20  0332 11/15/20  0303 11/16/20  0524   INR 2.03 1.68 1.65   HGB 12.0* 11.8*  --    HCT 36.7* 37.9*  --     213  --        Assessment/Plan:   Drug Interactions: aspirin (home med), no new interactions noted   INR remains sub-therapeutic, but has leveled @1.65    Continue with warfarin 7.5 mg daily until INR starts to trend back upward   Pharmacy will continue to monitor and adjust warfarin therapy as indicated    Thank you for the consult. Kong Pierce  11/16/2020 3:07 PM

## 2020-11-16 NOTE — CARE COORDINATION
Reveiwed therapy and progress notes. Patients primary insruance is [facility] Medicare. Per [facility] Medicare guidelines patient does not meet ARU criteria. Notified Valerie JORGENSEN.

## 2020-11-16 NOTE — CARE COORDINATION
Reviewed chart and requested OT eval note. Called and left message for [facility]/ARU admissions for update on referral.       10:23 AM  Received call from [facility] admissions who states consult is being reviewed as OT eval is now available. 10:45 AM  Spoke with [facility] admissions who states patient does not meet ARU criteria and will not be approved per insurance. Noted back up plan of [facility] and called referral to [facility]/admissions. Awaiting updated therapy notes prior to pre-cert being able to be initiated and updated patient who is agreeable to CM moving on [facility]. 2:52 PM  Spoke with [facility]/admissions at [facility] who states patient has been accepted and pre-cert will be initiated once updated PT note is available. PAS/RR completed and packet in soft chart and will need AVS/RX at discharge. Pt new to [facility] at discharge. Please call report to [phone] and fax orders, AVS, and discharge summaries to [phone].

## 2020-11-16 NOTE — PROGRESS NOTES
Progress Note( Dr. Tamie Mares)  11/15/2020  Subjective:   Admit Date: 11/11/2020  PCP: Cecille Crystal MD    Admitted For :ncreasing fatigue and high blood glucose    Consulted For: Better control of blood glucose    Interval History: Feels somewhat better less tired    Denies any chest pains,   Some SOB . Denies nausea or vomiting. No new bowel or bladder symptoms. Intake/Output Summary (Last 24 hours) at 11/15/2020 2048  Last data filed at 11/15/2020 1811  Gross per 24 hour   Intake 2100 ml   Output 2125 ml   Net -25 ml       DATA    CBC:   Recent Labs     11/13/20  0549 11/14/20  0332 11/15/20  0303   WBC 7.2 6.6 6.5   HGB 11.9* 12.0* 11.8*    193 213    CMP:  Recent Labs     11/13/20  0549 11/14/20  0332 11/15/20  0303   * 134*  --    K 3.5 4.4  --    CL 98* 101  --    CO2 20* 25  --    BUN 22 16  --    CREATININE 1.3 1.2 1.3   CALCIUM 8.0* 8.1*  --      Lipids:   Lab Results   Component Value Date    CHOL 107 11/22/2019    CHOL 175 05/05/2015    HDL 46 11/22/2019    TRIG 81 11/22/2019     Glucose:  Recent Labs     11/15/20  1709 11/15/20  1725 11/15/20  1811   POCGLU 61* 71 110*     [id number]:  Lab Results   Component Value Date    LABA1C 8.3 11/22/2019     High Sensitivity TSH:   Lab Results   Component Value Date    TSHHS 1.770 11/21/2019     Free T3: No results found for: FT3  Free T4:  Lab Results   Component Value Date    T4FREE 1.14 11/21/2019       Xr Elbow Left (2 Views)    Result Date: 11/11/2020  EXAMINATION: TWO XRAY VIEWS OF THE LEFT ELBOW 11/11/2020 10:07 am COMPARISON: None. HISTORY: ORDERING SYSTEM PROVIDED HISTORY: trauma TECHNOLOGIST PROVIDED HISTORY: Reason for exam:->trauma Reason for Exam: trauma Acuity: Acute Type of Exam: Initial Mechanism of Injury: Nurse removed bilateral leg dressings. Pt states they are applied every week at his doctors office in [facility]. Legs are red and swollen. Stage 2 ulcer on left calf, approx 3 cm in diameter.  Pt states he Date: 11/11/2020  EXAMINATION: FOUR XRAY VIEWS OF THE RIGHT KNEE 11/11/2020 9:19 am COMPARISON: None. HISTORY: ORDERING SYSTEM PROVIDED HISTORY: trauma TECHNOLOGIST PROVIDED HISTORY: Reason for exam:->trauma Reason for Exam: trauma Acuity: Acute Type of Exam: Initial Mechanism of Injury: Nurse removed bilateral leg dressings. Pt states they are applied every week at his doctors office in [facility]. Legs are red and swollen. Stage 2 ulcer on left calf, approx 3 cm in diameter. Pt states he has been seen by the wound clinic FINDINGS: A small to moderate right knee effusion was noted without fracture or cortical erosion. Moderate degenerative narrowing involves the medial femoral tibial joint compartment. Small to moderate right knee effusion without fracture or cortical erosion. Moderate degenerative narrowing involves the medial femoral tibial joint compartment. Xr Chest Portable    Result Date: 11/11/2020  EXAMINATION: ONE XRAY VIEW OF THE CHEST 11/11/2020 9:19 am COMPARISON: 07/02/2020, 05/01/2020 HISTORY: ORDERING SYSTEM PROVIDED HISTORY: weakness TECHNOLOGIST PROVIDED HISTORY: Reason for exam:->weakness Reason for Exam: weakness Acuity: Acute Type of Exam: Initial Additional signs and symptoms: Nurse removed bilateral leg dressings. Pt states they are applied every week at his doctors office in [facility]. Legs are red and swollen. Stage 2 ulcer on left calf, approx 3 cm in diameter. Pt states he has been seen by the wound clinic FINDINGS: Shallow inflation. Obscuration of the lung apices by the patient's neck. The cardiomediastinal silhouette is unchanged in appearance. Chronic appearing vascular congestion. There is no consolidation, pneumothorax, or evidence of edema. No effusion is appreciated. The osseous structures are unchanged in appearance. Chronic findings in the chest without acute airspace disease identified.      Us Retroperitoneal Limited    Result Date: 11/11/2020  EXAMINATION: ULTRASOUND OF THE KIDNEYS 11/11/2020 6:50 pm COMPARISON: 02/24/2012 HISTORY: ORDERING SYSTEM PROVIDED HISTORY: kirsten TECHNOLOGIST PROVIDED HISTORY: Reason for exam:->kirsten Reason for Exam: KIRSTEN; abnormal labs Type of Exam: Initial FINDINGS: The right kidney measures 10.6 cm in length and the left kidney measures 9.6 cm in length. Kidneys demonstrate normal cortical echogenicity. No hydronephrosis or intrarenal stones. No focal lesions. Unremarkable ultrasound of the kidneys. Scheduled Medicines   Medications:    warfarin  7.5 mg Oral Daily    insulin glargine  40 Units Subcutaneous Nightly    insulin NPH  25 Units Subcutaneous Once    glipiZIDE  10 mg Oral BID AC    vancomycin  1,500 mg Intravenous Q18H    insulin lispro  27 Units Subcutaneous TID WC    alogliptin  12.5 mg Oral Daily    insulin lispro  0-18 Units Subcutaneous TID WC    insulin lispro  0-18 Units Subcutaneous 2 times per day    sodium chloride flush  10 mL Intravenous 2 times per day    ARIPiprazole  15 mg Oral Daily    aspirin  81 mg Oral Daily    atorvastatin  40 mg Oral Nightly    buPROPion  300 mg Oral QAM    escitalopram  20 mg Oral Daily    glycopyrrolate-formoterol  2 puff Inhalation BID    metoprolol tartrate  12.5 mg Oral BID    pantoprazole  40 mg Oral QAM AC      Infusions:    dextrose           Objective:   Vitals: BP (!) 108/52   Pulse 72   Temp 98.6 °F (37 °C) (Oral)   Resp 15   Ht 6' 5.01\" (1.956 m)   Wt 296 lb (134.3 kg) Comment: less 20lbs for bed extender  SpO2 97%   BMI 35.09 kg/m²   General appearance: alert and cooperative with exam  Neck: no JVD or bruit  Thyroid : Normal lobes   Lungs: Has Vesicular Breath sounds   Heart:  regular rate and rhythm  Abdomen: soft, non-tender; bowel sounds normal; no masses,  no organomegaly  Musculoskeletal: Normal  Extremities: extremities normal, , no edema  Neurologic:  Awake, alert, oriented to name, place and time. Cranial nerves II-XII are grossly intact.   Motor is intact. Sensory neuropathy. ,  and gait is normal.    Assessment:     Patient Active Problem List:     Gastroesophageal reflux disease without esophagitis     Hypertension in stage 3 chronic kidney disease due to type 2 diabetes mellitus (HCC)     DM (diabetes mellitus) type II, controlled, with peripheral vascular disorder (HCC)     Combined hyperlipidemia associated with type 2 diabetes mellitus ([address]     Diabetic skin ulcer associated with type 2 diabetes mellitus (HCC)     CKD (chronic kidney disease)     History of DVT (deep vein thrombosis)- left femoral     History of pulmonary embolism     Long term current use of anticoagulants with INR goal of 2.0-3.0     COPD (chronic obstructive pulmonary disease) (HCC)     Severe recurrent major depressive disorder with psychotic features ([address]     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 Reviewed POC blood glucose . Labs and X ray results   2. Reviewed Current Medicines   3. On meal/ Correction bolus Humalog/ Basal Lantus Insulin regime   4. Monitor Blood glucose frequently   5. Modified  the dose of Insulin/ other medicines as needed   6. Will follow     .      Estuardo Dunham MD

## 2020-11-16 NOTE — PROGRESS NOTES
Occupational Therapy    Occupational Therapy Treatment Note  Name: Niesha Carrion MRN: [id number] :   1945   Date:  2020   Admission Date: 2020 Room:  [facility]0302-A   Restrictions/Precautions:  Restrictions/Precautions  Restrictions/Precautions: General Precautions, Fall Risk     Communication with other providers:   Cleared for treatment by James Garduno RN. Subjective:  Patient states:  \"I can't stand\"  Pain:   Location, Type, Intensity (0/10 to 10/10): None rated    Objective:    Observation:  Pt alert and oriented. Treatment, including education:  Transfers  Supine to sit :Mod A  Sit to supine : Mod A for B legs  Scooting : Mod A  Sit to stand :Min A using features of bed. Stand to sit :CGA      Self Care Training:   Cues were given for safety, sequence, UE/LE placement, visual cues, and balance. Activities performed today included dressing, toileting, hand hygiene, and grooming. Grooming  Sup to wash/rinse/dry face. Bathing   Min A for buttocks and able to wash sitting at EOB and declined to wash feet    UB Dress  Donned gown. Therapeutic Exercise:  Cues were given for technique, safety, recruitment, and rationale. Cues were verbal and/or tactile. For BUE strengthening for ADL & functional mobility Indep pt performed BUE strengthening HEP c 2# hand weights x 20 reps x 6 exercises with Minimal difficulty. Therapeutic Activity Training:   Therapeutic activity training was instructed today. Cues were given for safety, sequence, UE/LE placement, awareness, and balance. Activities performed today included bed mobility training, sup-sit, sit-stand, SPT. Pt stood x 2 min with CGA with RW to facilitate increased endurance/strength for ADL tasks and transfers.         Safety Measures: Gait belt used, Left in bed, Pull/Bed Alarm activated and call light left in reach          Assessment / Impression:        Patient's tolerance of treatment: Good   Adverse Reaction:

## 2020-11-16 NOTE — DISCHARGE INSTR - COC
Continuity of Care Form    Patient Name: Rudy Fonseca   :  1945  MRN:  [id number]    Admit date:  2020  Discharge date:  ***    Code Status Order: Full Code   Advance Directives:   Advance Care Flowsheet Documentation       Date/Time Healthcare Directive Type of Healthcare Directive Copy in [address] Agent's Name Healthcare Agent's Phone Number    20 1630  Yes, patient has an advance directive for healthcare treatment  Other (Comment)  No, copy requested from medical records  --  --  --            Admitting Physician:  Susana Morgan MD  PCP: Chase Glass MD    Discharging Nurse: [facility] Unit/Room#: 3020/3020-A  Discharging Unit Phone Number: ***    Emergency Contact:   Extended Emergency Contact Information  Primary Emergency Contact: [address] [place] [address] Phone: [phone]  Relation: Child    Past Surgical History:  Past Surgical History:   Procedure Laterality Date    BREAST SURGERY  1970s    benign tumors bilaterally    CARDIAC CATHETERIZATION  6/3/14    EF55% normal study    CARPAL TUNNEL RELEASE Left     CARPAL TUNNEL RELEASE      CATARACT REMOVAL Right 3/11/2013    Dr. Pam Rodrigues Left 2013    Dr. Matt Gerardo  2006    polyps    COLONOSCOPY  11    villous component--f/u colonoscopy will be needed in 6 months    COLONOSCOPY  12    mild diverticulosis, internal hemorrhoids; repeat in 3 years (Dr. Anna Henriquez)    COLONOSCOPY  2016    mild diverticulosis, three colon polyps    HERNIA REPAIR  1970s   [address] [facility]      SKIN GRAFT  1978-present    skin grafts to left ankle ulcers    [address] SURGERY Left 2009       Immunization History:   Immunization History   Administered Date(s) Administered    Influenza 2011, 2012, 10/22/2013    Influenza Virus Vaccine left lower extremity (CODE) ([facility] I87.312    WD-Chronic venous hypertension with inflammation, right I87.321    Troponin I above reference range R77.8    KIRSTEN (acute kidney injury) ([facility] N17.9    Cellulitis of lower extremity L03.119    Hyponatremia E87.1       Isolation/Infection:   Isolation            Contact          Patient Infection Status       Infection Onset Added Last Indicated Last Indicated By Review Planned Expiration Resolved Resolved By    CRE (Carbapenem-Resistant Enterobacteriaceae)  08/26/19 08/26/19 Jb Cassidy RN        8/15/19 Klebsiella aerogenes wound    MRSA  02/24/16 08/06/20 Culture, Wound        8/6/2020 wound; 3/12/2020 wound; 12/26/19 wound; 10/17/19 wound; 8/15/19 wound; 6/10/16 leg wound; 4/22/16 wound; 2/19/16 wound    Resolved    COVID-19 Rule Out 11/11/20 11/11/20 11/11/20 COVID-19 (Ordered)   11/11/20 Rule-Out Test Resulted    MRSA  08/22/12 08/22/12 Jonelle Merino RN   11/18/13 Tommie Nelson RN    wound 11/15/12            Nurse Assessment:  Last Vital Signs: BP (!) 119/55   Pulse 68   Temp 97.6 °F (36.4 °C) (Oral)   Resp 12   Ht 6' 5.01\" (1.956 m)   Wt 296 lb 6.4 oz (134.4 kg)   SpO2 97%   BMI 35.14 kg/m²     Last documented pain score (0-10 scale): Pain Level: 0  Last Weight:   Wt Readings from Last 1 Encounters:   11/16/20 296 lb 6.4 oz (134.4 kg)     Mental Status:  oriented, alert and coherent    IV Access:  - None    Nursing Mobility/ADLs:  Walking   Dependent  Transfer  Dependent  Bathing  Dependent  Dressing  Dependent  Toileting  Assisted  Feeding  Independent  Med Admin  Assisted  Med Delivery   whole    Wound Care Documentation and Therapy:  Wound 11/15/13 Other (Comment) Leg Inner erethema with a small puncture site (Active)   Number of days: 2557       Wound 06/27/19 #6 (onset 1 month) Left Distal Medial Ankle (Active)   Wound Image   10/29/20 0908   Wound Etiology Other 11/14/20 2048   Dressing Status Clean;Dry; Intact 11/16/20 4261   Wound Cleansed Cleansed with saline 11/12/20 1030   Offloading for Diabetic Foot Ulcers Walker 11/05/20 0904   Wound Length (cm) 2.1 cm 11/12/20 1030   Wound Width (cm) 2 cm 11/12/20 1030   Wound Depth (cm) 0.2 cm 11/12/20 1030   Wound Surface Area (cm^2) 4.2 cm^2 11/12/20 1030   Change in Wound Size % (l*w) 40 11/12/20 1030   Wound Volume (cm^3) 0.84 cm^3 11/12/20 1030   Wound Healing % 60 11/12/20 1030   Post-Procedure Length (cm) 2.4 cm 10/29/20 0939   Post-Procedure Width (cm) 3 cm 10/29/20 0939   Post-Procedure Depth (cm) 0.1 cm 10/29/20 0939   Post-Procedure Surface Area (cm^2) 7.2 cm^2 10/29/20 0939   Post-Procedure Volume (cm^3) 0.72 cm^3 10/29/20 0939   Distance Tunneling (cm) 0 cm 11/12/20 1030   Tunneling Position ___ O'Clock 0 11/12/20 1030   Undermining Starts ___ O'Clock 0 11/12/20 1030   Undermining Ends___ O'Clock 0 11/12/20 1030   Undermining Maxium Distance (cm) 0 11/12/20 1030   Wound Assessment Pink/red 11/15/20 2124   Drainage Amount Moderate 11/15/20 2124   Drainage Description Serosanguinous; Yellow 11/15/20 2124   Odor None 11/15/20 2124   Ana-wound Assessment Dry/flaky 11/15/20 2124   Margins Defined edges 11/15/20 2124   Wound Thickness Description not for Pressure Injury Full thickness 11/15/20 2124   Number of days: 507       Wound 07/30/20 #7 (onset 1 week) Right Lower Leg Circumferential (Active)   Wound Image   10/29/20 0908   Wound Etiology Other 11/05/20 0904   Dressing Status Clean;Dry; Intact 11/16/20 0935   Wound Cleansed Soap and water; Wound cleanser 11/05/20 0904   Offloading for Diabetic Foot Ulcers Walker 11/05/20 0904   Wound Length (cm) 0 cm 11/12/20 1030   Wound Width (cm) 0 cm 11/12/20 1030   Wound Depth (cm) 0 cm 11/12/20 1030   Wound Surface Area (cm^2) 0 cm^2 11/12/20 1030   Change in Wound Size % (l*w) 100 11/12/20 1030   Wound Volume (cm^3) 0 cm^3 11/12/20 1030   Wound Healing % 100 11/12/20 1030   Distance Tunneling (cm) 0 cm 11/05/20 0904   Tunneling Position ___ O'Clock 0 Etiology Traumatic 11/14/20 1537   Dressing Status Clean;Dry; Intact 11/16/20 0935   Wound Cleansed Irrigated with saline 11/14/20 1537   Dressing/Treatment Other (comment) 11/14/20 1537   Wound Length (cm) 2 cm 11/12/20 1030   Wound Width (cm) 1.8 cm 11/12/20 1030   Wound Depth (cm) 0.1 cm 11/12/20 1030   Wound Surface Area (cm^2) 3.6 cm^2 11/12/20 1030   Wound Volume (cm^3) 0.36 cm^3 11/12/20 1030   Distance Tunneling (cm) 0 cm 11/12/20 1030   Tunneling Position ___ O'Clock 0 11/12/20 1030   Undermining Starts ___ O'Clock 0 11/12/20 1030   Undermining Ends___ O'Clock 0 11/12/20 1030   Undermining Maxium Distance (cm) 0 11/12/20 1030   Drainage Amount Scant 11/15/20 2124   Drainage Description Yellow 11/15/20 2124   Odor Mild 11/15/20 2124   Ana-wound Assessment Fragile 11/15/20 2124   Margins Undefined edges 11/15/20 2124   Wound Thickness Description not for Pressure Injury Full thickness 11/15/20 2124   Number of days: 3       Wound 11/12/20 Buttocks Left cluster (Active)   Wound Etiology Deep tissue/Injury 11/14/20 2048   Dressing Status Clean;Dry; Intact 11/16/20 0935   Wound Cleansed Soap and water 11/14/20 1537   Dressing/Treatment Other (comment) 11/15/20 2124   Wound Length (cm) 1.6 cm 11/12/20 1030   Wound Width (cm) 2 cm 11/12/20 1030   Wound Depth (cm) 0.1 cm 11/12/20 1030   Wound Surface Area (cm^2) 3.2 cm^2 11/12/20 1030   Wound Volume (cm^3) 0.32 cm^3 11/12/20 1030   Distance Tunneling (cm) 0 cm 11/12/20 1030   Tunneling Position ___ O'Clock 0 11/12/20 1030   Undermining Starts ___ O'Clock 0 11/12/20 1030   Undermining Ends___ O'Clock 0 11/12/20 1030   Undermining Maxium Distance (cm) 0 11/12/20 1030   Drainage Amount Scant 11/15/20 2124   Drainage Description Serosanguinous 11/15/20 2124   Odor None 11/15/20 2124   Margins Defined edges 11/15/20 2124   Wound Thickness Description not for Pressure Injury Full thickness 11/15/20 2124   Number of days: 3        Elimination:  Continence:   · Bowel: Yes  · Bladder: Yes  Urinary Catheter: None   Colostomy/Ileostomy/Ileal Conduit: No       Date of Last BM: ***    Intake/Output Summary (Last 24 hours) at 11/16/2020 1052  Last data filed at 11/15/2020 2156  Gross per 24 hour   Intake 720 ml   Output 900 ml   Net -180 ml     I/O last 3 completed shifts: In: 960 [P.O.:960]  Out: 1225 [Urine:1225]    Safety Concerns: At Risk for Falls    Impairments/Disabilities:      None    Patient's personal belongings (please select all that are sent with patient):  Glasses, Dentures upper and lower    RN SIGNATURE:  Electronically signed by Andie Licea RN on 11/18/20 at 2:36 PM EST                PHYSICIAN SECTION    Nutrition Therapy:  Current Nutrition Therapy:   - Oral Diet:  Carb Control 3 carbs/meal (1500kcals/day)    Routes of Feeding: Oral  Liquids: No Restrictions  Daily Fluid Restriction: no  Last Modified Barium Swallow with Video (Video Swallowing Test): not done    Treatments at the Time of Hospital Discharge:   Respiratory Treatments:  none  Oxygen Therapy:  is on oxygen at 2 L/min per nasal cannula. Ventilator:    - No ventilator support    Rehab Therapies: Physical Therapy and Occupational Therapy  Weight Bearing Status/Restrictions: No weight bearing restirctions  Other Medical Equipment (for information only, NOT a DME order):  nond  Other Treatments: wound care  Wound care- wash legs with alphabath rinse and dry. Left medial leg proximal and distal Cleanse wounds with NS apply aquacel AG dressing  followed by abd kerlex wrap tape. Then wrap bilateral lower extremities with 6 inch double ACE wrap from the foot to the knee. Change dressing daily and prn. Elevate lower extremities on pillows while in bed. Rt elbow cleanse with NS apply versatel aquacel ag optifoam border change every 2 days and prn. Left buttock wound cleanse with NS apply puracol and sacral mepilex border change every 2 days and prn.  Pt is at mild risk for skin breakdown AEB nguyen score. Observe nguyen orders. Prognosis: Good    Condition at Discharge: Stable    Rehab Potential (if transferring to Rehab): Good    Recommended Labs or Other Treatments After Discharge:  BMP weekly    Physician Certification: I certify the above information and transfer of Brenda Nesbitt  is necessary for the continuing treatment of the diagnosis listed and that he requires Jeffrey Watersuel for less 30 days.      Update Admission H&P: No change in H&P    PHYSICIAN SIGNATURE:  Electronically signed by Clinton Baker MD on 11/18/20 at 12:21 PM EST

## 2020-11-16 NOTE — PROGRESS NOTES
- doing well this morning  - signing off today so call me back if needed  - Resume losartan on dc as long as BP is not low  Thank you      A&P:  -KIRSTEN; cr 1.7 from baseline 1.1//renal US showed no HN//low Ernst of 20, suggesting prerenal etiology  -Rhabdo: CK 1293  -DM2  -LLE ulcers  -Leukocytosis  -Metabolic acidosis     -

## 2020-11-16 NOTE — PLAN OF CARE
Problem: Falls - Risk of:  Goal: Will remain free from falls  Description: Will remain free from falls  11/16/2020 1728 by Sania White RN  Outcome: Ongoing  11/16/2020 0417 by Patricia Bowser RN  Outcome: Ongoing  Goal: Absence of physical injury  Description: Absence of physical injury  11/16/2020 1728 by Sania White RN  Outcome: Ongoing  11/16/2020 0417 by Patricia Bowser RN  Outcome: Ongoing     Problem: Pain:  Goal: Pain level will decrease  Description: Pain level will decrease  11/16/2020 1728 by Sania White RN  Outcome: Ongoing  11/16/2020 0417 by Patricia Bowser RN  Outcome: Ongoing  Goal: Control of acute pain  Description: Control of acute pain  11/16/2020 1728 by Sania White RN  Outcome: Ongoing  11/16/2020 0417 by Patricia Bowser RN  Outcome: Ongoing  Goal: Control of chronic pain  Description: Control of chronic pain  11/16/2020 1728 by Sania White RN  Outcome: Ongoing  11/16/2020 0417 by Patricia Bowser RN  Outcome: Ongoing     Problem: Skin Integrity:  Goal: Will show no infection signs and symptoms  Description: Will show no infection signs and symptoms  11/16/2020 1728 by Sania White RN  Outcome: Ongoing  11/16/2020 0417 by Patricia Bowser RN  Outcome: Ongoing  Goal: Absence of new skin breakdown  Description: Absence of new skin breakdown  11/16/2020 1728 by Sania White RN  Outcome: Ongoing  11/16/2020 0417 by Patricia Bowser RN  Outcome: Ongoing

## 2020-11-16 NOTE — CONSULTS
[address] [facility]  consulted by Dr. Gary Tovar for monitoring and adjustment. Indication for treatment: Cellulitis  Goal trough: 10-15 mcg/mL     Pertinent Laboratory Values:   Temp Readings from Last 3 Encounters:   11/16/20 98.1 °F (36.7 °C) (Oral)   11/05/20 99.9 °F (37.7 °C) (Temporal)   10/29/20 97.7 °F (36.5 °C) (Temporal)     Recent Labs     11/14/20  0332 11/15/20  0303   WBC 6.6 6.5     Recent Labs     11/14/20  0332 11/15/20  0303 11/16/20  0524   BUN 16  --   --    CREATININE 1.2 1.3 1.3     Estimated Creatinine Clearance: 77 mL/min (based on SCr of 1.3 mg/dL). Intake/Output Summary (Last 24 hours) at 11/16/2020 1545  Last data filed at 11/16/2020 1056  Gross per 24 hour   Intake 240 ml   Output 1350 ml   Net -1110 ml       Pertinent Cultures:  Date    Source    Results  11/11   Blood                                     No growth  11/11   COVID-19   Negative    Vancomycin level:   TROUGH:    Recent Labs     11/14/20  2217   [address] 14.0     RANDOM:    No results for input(s): VANCORANDOM in the last 72 hours. Assessment:  · WBC and temperature:  WBC remains normal, pt remains afebrile  · SCr, BUN, and urine output: KIRSTEN resolved, SCR steady @1.3  · Day(s) of therapy: 4  · Vancomycin level:   · 11/12:  7.7  OK to re-dose  · 11/13:  12.5 (15 hour level)  · 11/14:  14.0 ( 16 hour level), therapeutic       Plan:  · Vancomycin level returned with a therapeutic trough on 11/14  · Renal trends remain stable  · Continue Vancomycin 1500 mg IVPB q18h  · Repeat Vancomycin level tomorrow am  · Monitor for accumulation due to BMI  · Pharmacy will continue to monitor patient and adjust therapy as indicated    VANCOMYCIN TROUGH SCHEDULED FOR 11/17/20 @ 7659    Thank you for the consult. Madeline Greer  11/16/2020 3:45 PM

## 2020-11-16 NOTE — PROGRESS NOTES
Physical Therapy    Physical Therapy Treatment Note  Name: Loren Metz MRN: [id number] :   1945   Date:  2020   Admission Date: 2020 Room:  [facility]-A   Restrictions/Precautions:  Restrictions/Precautions  Restrictions/Precautions: General Precautions, Fall Risk       Communication with other providers:  CM needed an updated note  Subjective:  Patient states:  He is willing to try to stand up, tired at end of session  Pain:   Location, Type, Intensity (0/10 to 10/10): No number given B LE's  Objective:    Observation:  Pt was resting in the bed  Treatment, including education/measures:  Transfers  Supine to sit :min a of 1 and VCs  Sit to supine :min/mod A of 1 for LE's and VCs   Scooting :SBA with extra time and VCs  Sit to stand :mod A of 1 and VC's  Stand to sit :mod a of 1 and VC's  Pt was able to take 5 steps to the [facility] with the RW , pt needed min A of 1  Pt stood x 2 for 6 min each and doing marching with the RW with min A of 1 for balance  Safety  Patient left safely in the bed, with call light/phone in reach with alarm applied. Gait belt was used for transfers and gait.   Assessment / Impression:     Patient's tolerance of treatment:  Good, very motivated   Adverse Reaction: none  Significant change in status and impact:  none  Barriers to improvement:  Weakness, pain  Plan for Next Session:    Will cont to work towards pt's goals per his tolerance  Time in:  1430  Time out:  1513  Timed treatment minutes:  43  Total treatment time:  37  Previously filed items:  Social/Functional History  Lives With: Alone  Type of Home: House  Home Layout: One level  Home Access: Stairs to enter without rails  Entrance Stairs - Number of Steps: 2  Bathroom Shower/Tub: Tub/Shower unit(sponge bathe)  Bathroom Toilet: Handicap height(elevated seat attachement with rails)  Home Equipment: Rolling walker  ADL Assistance: Independent  Homemaking Assistance: Independent  Ambulation Assistance: Independent(mod I with front wheeled walker)  Transfer Assistance: Independent  Active : Yes  Additional Comments: sleeps in chair     Long term goals  Time Frame for Long term goals : In one week:  Long term goal 1: Pt will complete all bed mobility with SBAx1  Long term goal 2: Pt will complete sit <> stand transfers with SBAx1  Long term goal 3: Pt will ambulate 75 feet with SBAx1 with LRAD  Long term goal 4: Pt will independently complete 3 sets of 10 reps of BLE AROM exercises in available and allowed ROM  Electronically signed by:     Beverley Garrett PTA  11/16/2020, 3:03 PM

## 2020-11-16 NOTE — PROGRESS NOTES
has been seen by the wound clinic FINDINGS: No acute fracture or dislocation is identified. No joint effusion is identified. No radiopaque foreign body is identified. No acute abnormality. Xr Elbow Right (2 Views)    Result Date: 11/11/2020  EXAMINATION: TWO XRAY VIEWS OF THE RIGHT ELBOW 11/11/2020 10:07 am COMPARISON: Right forearm series 11/03/2014. HISTORY: ORDERING SYSTEM PROVIDED HISTORY: trauma TECHNOLOGIST PROVIDED HISTORY: Reason for exam:->trauma Reason for Exam: trauma Acuity: Acute Type of Exam: Initial Mechanism of Injury: Nurse removed bilateral leg dressings. Pt states they are applied every week at his doctors office in [facility]. Legs are red and swollen. Stage 2 ulcer on left calf, approx 3 cm in diameter. Pt states he has been seen by the wound clinic FINDINGS: Small elbow effusion. Mature ossification is noted about the coronoid process on the lateral view. There is mild enthesophyte formation about the lateral epicondyle. No malalignment. 1.  Small elbow effusion. 2.  Age indeterminate fracture fragment about the tip of the coronoid process. Xr Knee Left (min 4 Views)    Result Date: 11/11/2020  EXAMINATION: FOUR XRAY VIEWS OF THE LEFT KNEE 11/11/2020 9:19 am COMPARISON: None. HISTORY: ORDERING SYSTEM PROVIDED HISTORY: trauma TECHNOLOGIST PROVIDED HISTORY: Reason for exam:->trauma Reason for Exam: trauma Acuity: Acute Type of Exam: Initial Mechanism of Injury: Nurse removed bilateral leg dressings. Pt states they are applied every week at his doctors office in [facility]. Legs are red and swollen. Stage 2 ulcer on left calf, approx 3 cm in diameter. Pt states he has been seen by the wound clinic FINDINGS: No fracture, cortical erosion or joint effusion were noted. No patellofemoral or femoral tibial joint compartment narrowing were identified. The bony mineralization was normal.     No fracture, joint effusion or joint space narrowing.      Xr Knee Right (min 4 Views)    Result Date: 11/11/2020  EXAMINATION: FOUR XRAY VIEWS OF THE RIGHT KNEE 11/11/2020 9:19 am COMPARISON: None. HISTORY: ORDERING SYSTEM PROVIDED HISTORY: trauma TECHNOLOGIST PROVIDED HISTORY: Reason for exam:->trauma Reason for Exam: trauma Acuity: Acute Type of Exam: Initial Mechanism of Injury: Nurse removed bilateral leg dressings. Pt states they are applied every week at his doctors office in [facility]. Legs are red and swollen. Stage 2 ulcer on left calf, approx 3 cm in diameter. Pt states he has been seen by the wound clinic FINDINGS: A small to moderate right knee effusion was noted without fracture or cortical erosion. Moderate degenerative narrowing involves the medial femoral tibial joint compartment. Small to moderate right knee effusion without fracture or cortical erosion. Moderate degenerative narrowing involves the medial femoral tibial joint compartment. Xr Chest Portable    Result Date: 11/11/2020  EXAMINATION: ONE XRAY VIEW OF THE CHEST 11/11/2020 9:19 am COMPARISON: 07/02/2020, 05/01/2020 HISTORY: ORDERING SYSTEM PROVIDED HISTORY: weakness TECHNOLOGIST PROVIDED HISTORY: Reason for exam:->weakness Reason for Exam: weakness Acuity: Acute Type of Exam: Initial Additional signs and symptoms: Nurse removed bilateral leg dressings. Pt states they are applied every week at his doctors office in [facility]. Legs are red and swollen. Stage 2 ulcer on left calf, approx 3 cm in diameter. Pt states he has been seen by the wound clinic FINDINGS: Shallow inflation. Obscuration of the lung apices by the patient's neck. The cardiomediastinal silhouette is unchanged in appearance. Chronic appearing vascular congestion. There is no consolidation, pneumothorax, or evidence of edema. No effusion is appreciated. The osseous structures are unchanged in appearance. Chronic findings in the chest without acute airspace disease identified.      Us Retroperitoneal Limited    Result Date: 11/11/2020  EXAMINATION: ULTRASOUND OF THE KIDNEYS 11/11/2020 6:50 pm COMPARISON: 02/24/2012 HISTORY: ORDERING SYSTEM PROVIDED HISTORY: kirsten TECHNOLOGIST PROVIDED HISTORY: Reason for exam:->kirsten Reason for Exam: KIRSTEN; abnormal labs Type of Exam: Initial FINDINGS: The right kidney measures 10.6 cm in length and the left kidney measures 9.6 cm in length. Kidneys demonstrate normal cortical echogenicity. No hydronephrosis or intrarenal stones. No focal lesions. Unremarkable ultrasound of the kidneys. Scheduled Medicines   Medications:    warfarin  7.5 mg Oral Daily    insulin glargine  40 Units Subcutaneous Nightly    insulin NPH  25 Units Subcutaneous Once    glipiZIDE  10 mg Oral BID AC    vancomycin  1,500 mg Intravenous Q18H    insulin lispro  27 Units Subcutaneous TID WC    alogliptin  12.5 mg Oral Daily    insulin lispro  0-18 Units Subcutaneous TID WC    insulin lispro  0-18 Units Subcutaneous 2 times per day    sodium chloride flush  10 mL Intravenous 2 times per day    ARIPiprazole  15 mg Oral Daily    aspirin  81 mg Oral Daily    atorvastatin  40 mg Oral Nightly    buPROPion  300 mg Oral QAM    escitalopram  20 mg Oral Daily    glycopyrrolate-formoterol  2 puff Inhalation BID    metoprolol tartrate  12.5 mg Oral BID    pantoprazole  40 mg Oral QAM AC      Infusions:    dextrose           Objective:   Vitals: BP (!) 108/52   Pulse 72   Temp 98.6 °F (37 °C) (Oral)   Resp 15   Ht 6' 5.01\" (1.956 m)   Wt 296 lb (134.3 kg) Comment: less 20lbs for bed extender  SpO2 97%   BMI 35.09 kg/m²   General appearance: alert and cooperative with exam  Neck: no JVD or bruit  Thyroid : Normal lobes   Lungs: Has Vesicular Breath sounds   Heart:  regular rate and rhythm  Abdomen: soft, non-tender; bowel sounds normal; no masses,  no organomegaly  Musculoskeletal: Normal  Extremities: extremities normal, , no edema  Neurologic:  Awake, alert, oriented to name, place and time. Cranial nerves II-XII are grossly intact.   Motor is intact. Sensory neuropathy. ,  and gait is normal.    Assessment:     Patient Active Problem List:     Gastroesophageal reflux disease without esophagitis     Hypertension in stage 3 chronic kidney disease due to type 2 diabetes mellitus (HCC)     DM (diabetes mellitus) type II, controlled, with peripheral vascular disorder (HCC)     Combined hyperlipidemia associated with type 2 diabetes mellitus ([address]     Diabetic skin ulcer associated with type 2 diabetes mellitus (HCC)     CKD (chronic kidney disease)     History of DVT (deep vein thrombosis)- left femoral     History of pulmonary embolism     Long term current use of anticoagulants with INR goal of 2.0-3.0     COPD (chronic obstructive pulmonary disease) (HCC)     Severe recurrent major depressive disorder with psychotic features ([address]     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 Reviewed POC blood glucose . Labs and X ray results   2. Reviewed Current Medicines   3. On meal/ Correction bolus Humalog/ Basal Lantus Insulin regime   4. Monitor Blood glucose frequently   5. Modified  the dose of Insulin/ other medicines as needed   6. Will follow     .      Christine Bedolla MD

## 2020-11-16 NOTE — PROGRESS NOTES
apparent distress. Appears given age. EYES Pupils are equally round. No scleral erythema, discharge, or conjunctivitis. HENT Mucous membranes are moist. Oral pharynx without exudates, no evidence of thrush. NECK Supple, no apparent thyromegaly or masses. RESP Clear to auscultation, no wheezes, rales or rhonchi. Symmetric chest movement while on room air. CARDIO/VASC S1/S2 auscultated. Regular rate without appreciable murmurs, rubs, or gallops. No JVD or carotid bruits. Peripheral pulses equal bilaterally and palpable. No peripheral edema. GI Abdomen is soft without significant tenderness, masses, or guarding. Bowel sounds are normoactive. Rectal exam deferred. MSK lower extremity with dressing. SKIN Normal coloration, warm, dry. NEURO Cranial nerves appear grossly intact, normal speech, no lateralizing weakness. PSYCH Awake, alert, oriented x 4. Affect appropriate.     Medications:   Medications:    insulin glargine  50 Units Subcutaneous Nightly    insulin lispro  20 Units Subcutaneous TID WC    warfarin  7.5 mg Oral Daily    glipiZIDE  10 mg Oral BID AC    vancomycin  1,500 mg Intravenous Q18H    alogliptin  12.5 mg Oral Daily    insulin lispro  0-18 Units Subcutaneous TID WC    insulin lispro  0-18 Units Subcutaneous 2 times per day    sodium chloride flush  10 mL Intravenous 2 times per day    ARIPiprazole  15 mg Oral Daily    aspirin  81 mg Oral Daily    atorvastatin  40 mg Oral Nightly    buPROPion  300 mg Oral QAM    escitalopram  20 mg Oral Daily    glycopyrrolate-formoterol  2 puff Inhalation BID    metoprolol tartrate  12.5 mg Oral BID    pantoprazole  40 mg Oral QAM AC      Infusions:    dextrose       PRN Meds: sodium chloride flush, 10 mL, PRN  acetaminophen, 650 mg, Q6H PRN    Or  acetaminophen, 650 mg, Q6H PRN  promethazine, 12.5 mg, Q6H PRN  glucose, 15 g, PRN  dextrose, 12.5 g, PRN  glucagon (rDNA), 1 mg, PRN  dextrose, 100 mL/hr, PRN  ipratropium, 2 puff, PRN        Recent Labs     11/14/20  0332 11/15/20  0303   WBC 6.6 6.5   HGB 12.0* 11.8*   HCT 36.7* 37.9*    213      Recent Labs     11/14/20  0332 11/15/20  0303 11/16/20  0524   *  --   --    K 4.4  --   --      --   --    CO2 25  --   --    BUN 16  --   --    CREATININE 1.2 1.3 1.3     No results for input(s): AST, ALT, ALB, BILIDIR, BILITOT, ALKPHOS in the last 72 hours.   Recent Labs     11/14/20  0332 11/15/20  0303 11/16/20  0524   INR 2.03 1.68 1.65     Recent Labs     11/14/20 0332   CKTOTAL 432*        Imaging reviewed      Electronically signed by Galen Hein MD on 11/16/2020 at 3:12 PM

## 2020-11-17 LAB
CREAT SERPL-MCNC: 1.3 MG/DL (ref 0.9–1.3)
DOSE AMOUNT: ABNORMAL
DOSE TIME: ABNORMAL
GFR AFRICAN AMERICAN: >60 ML/MIN/1.73M2
GFR NON-AFRICAN AMERICAN: 54 ML/MIN/1.73M2
GLUCOSE BLD-MCNC: 140 MG/DL (ref 70–99)
GLUCOSE BLD-MCNC: 180 MG/DL (ref 70–99)
GLUCOSE BLD-MCNC: 205 MG/DL (ref 70–99)
GLUCOSE BLD-MCNC: 269 MG/DL (ref 70–99)
GLUCOSE BLD-MCNC: 272 MG/DL (ref 70–99)
GLUCOSE BLD-MCNC: 47 MG/DL (ref 70–99)
GLUCOSE BLD-MCNC: 63 MG/DL (ref 70–99)
GLUCOSE BLD-MCNC: 99 MG/DL (ref 70–99)
INR BLD: 1.72 INDEX
PROTHROMBIN TIME: 20.9 SECONDS (ref 11.7–14.5)
VANCOMYCIN TROUGH: 22.4 UG/ML (ref 10–20)

## 2020-11-17 PROCEDURE — 1200000000 HC SEMI PRIVATE

## 2020-11-17 PROCEDURE — 82565 ASSAY OF CREATININE: CPT

## 2020-11-17 PROCEDURE — 36415 COLL VENOUS BLD VENIPUNCTURE: CPT

## 2020-11-17 PROCEDURE — 85610 PROTHROMBIN TIME: CPT

## 2020-11-17 PROCEDURE — 6370000000 HC RX 637 (ALT 250 FOR IP): Performed by: HOSPITALIST

## 2020-11-17 PROCEDURE — 6360000002 HC RX W HCPCS: Performed by: HOSPITALIST

## 2020-11-17 PROCEDURE — 82962 GLUCOSE BLOOD TEST: CPT

## 2020-11-17 PROCEDURE — 94761 N-INVAS EAR/PLS OXIMETRY MLT: CPT

## 2020-11-17 PROCEDURE — 6370000000 HC RX 637 (ALT 250 FOR IP): Performed by: INTERNAL MEDICINE

## 2020-11-17 PROCEDURE — 6370000000 HC RX 637 (ALT 250 FOR IP): Performed by: NURSE PRACTITIONER

## 2020-11-17 PROCEDURE — 94640 AIRWAY INHALATION TREATMENT: CPT

## 2020-11-17 PROCEDURE — 2580000003 HC RX 258: Performed by: NURSE PRACTITIONER

## 2020-11-17 PROCEDURE — 2580000003 HC RX 258: Performed by: HOSPITALIST

## 2020-11-17 PROCEDURE — 80202 ASSAY OF VANCOMYCIN: CPT

## 2020-11-17 RX ORDER — INSULIN GLARGINE 100 [IU]/ML
60 INJECTION, SOLUTION SUBCUTANEOUS NIGHTLY
Status: DISCONTINUED | OUTPATIENT
Start: 2020-11-17 | End: 2020-11-18 | Stop reason: HOSPADM

## 2020-11-17 RX ADMIN — PANTOPRAZOLE SODIUM 40 MG: 40 TABLET, DELAYED RELEASE ORAL at 07:01

## 2020-11-17 RX ADMIN — ESCITALOPRAM OXALATE 20 MG: 10 TABLET ORAL at 09:36

## 2020-11-17 RX ADMIN — ATORVASTATIN CALCIUM 40 MG: 40 TABLET, FILM COATED ORAL at 20:40

## 2020-11-17 RX ADMIN — ARIPIPRAZOLE 15 MG: 10 TABLET ORAL at 09:36

## 2020-11-17 RX ADMIN — METOPROLOL TARTRATE 12.5 MG: 25 TABLET, FILM COATED ORAL at 20:40

## 2020-11-17 RX ADMIN — INSULIN GLARGINE 60 UNITS: 100 INJECTION, SOLUTION SUBCUTANEOUS at 20:36

## 2020-11-17 RX ADMIN — BUPROPION HYDROCHLORIDE 300 MG: 150 TABLET, FILM COATED, EXTENDED RELEASE ORAL at 09:36

## 2020-11-17 RX ADMIN — VANCOMYCIN HYDROCHLORIDE 1500 MG: 5 INJECTION, POWDER, LYOPHILIZED, FOR SOLUTION INTRAVENOUS at 06:09

## 2020-11-17 RX ADMIN — ASPIRIN 81 MG: 81 TABLET, CHEWABLE ORAL at 09:36

## 2020-11-17 RX ADMIN — GLYCOPYRROLATE AND FORMOTEROL FUMARATE 2 PUFF: 9; 4.8 AEROSOL, METERED RESPIRATORY (INHALATION) at 08:07

## 2020-11-17 RX ADMIN — ALOGLIPTIN 12.5 MG: 12.5 TABLET, FILM COATED ORAL at 09:36

## 2020-11-17 RX ADMIN — WARFARIN SODIUM 7.5 MG: 7.5 TABLET ORAL at 18:11

## 2020-11-17 RX ADMIN — GLIPIZIDE 10 MG: 5 TABLET ORAL at 07:01

## 2020-11-17 RX ADMIN — SODIUM CHLORIDE, PRESERVATIVE FREE 10 ML: 5 INJECTION INTRAVENOUS at 09:39

## 2020-11-17 RX ADMIN — DEXTROSE MONOHYDRATE 100 ML/HR: 50 INJECTION, SOLUTION INTRAVENOUS at 15:34

## 2020-11-17 RX ADMIN — DEXTROSE 15 G: 15 GEL ORAL at 15:12

## 2020-11-17 ASSESSMENT — PAIN SCALES - GENERAL
PAINLEVEL_OUTOF10: 0
PAINLEVEL_OUTOF10: 0

## 2020-11-17 NOTE — PROGRESS NOTES
Progress Note( Dr. Asia Arias)  11/16/2020  Subjective:   Admit Date: 11/11/2020  PCP: Serge Quarles MD    Admitted For :ncreasing fatigue and high blood glucose    Consulted For: Better control of blood glucose    Interval History: Feels somewhat better less tired    Denies any chest pains,   Some SOB . Denies nausea or vomiting. No new bowel or bladder symptoms. Intake/Output Summary (Last 24 hours) at 11/16/2020 2252  Last data filed at 11/16/2020 2131  Gross per 24 hour   Intake --   Output 1530 ml   Net -1530 ml       DATA    CBC:   Recent Labs     11/14/20  0332 11/15/20  0303   WBC 6.6 6.5   HGB 12.0* 11.8*    213    CMP:  Recent Labs     11/14/20  0332 11/15/20  0303 11/16/20  0524   *  --   --    K 4.4  --   --      --   --    CO2 25  --   --    BUN 16  --   --    CREATININE 1.2 1.3 1.3   CALCIUM 8.1*  --   --      Lipids:   Lab Results   Component Value Date    CHOL 107 11/22/2019    CHOL 175 05/05/2015    HDL 46 11/22/2019    TRIG 81 11/22/2019     Glucose:  Recent Labs     11/16/20  1135 11/16/20  1716 11/16/20 2122   POCGLU 240* 135* 141*     [id number]:  Lab Results   Component Value Date    LABA1C 8.3 11/22/2019     High Sensitivity TSH:   Lab Results   Component Value Date    TSHHS 1.770 11/21/2019     Free T3: No results found for: FT3  Free T4:  Lab Results   Component Value Date    T4FREE 1.14 11/21/2019       Xr Elbow Left (2 Views)    Result Date: 11/11/2020  EXAMINATION: TWO XRAY VIEWS OF THE LEFT ELBOW 11/11/2020 10:07 am COMPARISON: None. HISTORY: ORDERING SYSTEM PROVIDED HISTORY: trauma TECHNOLOGIST PROVIDED HISTORY: Reason for exam:->trauma Reason for Exam: trauma Acuity: Acute Type of Exam: Initial Mechanism of Injury: Nurse removed bilateral leg dressings. Pt states they are applied every week at his doctors office in [facility]. Legs are red and swollen. Stage 2 ulcer on left calf, approx 3 cm in diameter.  Pt states he has been seen by the wound clinic FINDINGS: No acute fracture or dislocation is identified. No joint effusion is identified. No radiopaque foreign body is identified. No acute abnormality. Xr Elbow Right (2 Views)    Result Date: 11/11/2020  EXAMINATION: TWO XRAY VIEWS OF THE RIGHT ELBOW 11/11/2020 10:07 am COMPARISON: Right forearm series 11/03/2014. HISTORY: ORDERING SYSTEM PROVIDED HISTORY: trauma TECHNOLOGIST PROVIDED HISTORY: Reason for exam:->trauma Reason for Exam: trauma Acuity: Acute Type of Exam: Initial Mechanism of Injury: Nurse removed bilateral leg dressings. Pt states they are applied every week at his doctors office in [facility]. Legs are red and swollen. Stage 2 ulcer on left calf, approx 3 cm in diameter. Pt states he has been seen by the wound clinic FINDINGS: Small elbow effusion. Mature ossification is noted about the coronoid process on the lateral view. There is mild enthesophyte formation about the lateral epicondyle. No malalignment. 1.  Small elbow effusion. 2.  Age indeterminate fracture fragment about the tip of the coronoid process. Xr Knee Left (min 4 Views)    Result Date: 11/11/2020  EXAMINATION: FOUR XRAY VIEWS OF THE LEFT KNEE 11/11/2020 9:19 am COMPARISON: None. HISTORY: ORDERING SYSTEM PROVIDED HISTORY: trauma TECHNOLOGIST PROVIDED HISTORY: Reason for exam:->trauma Reason for Exam: trauma Acuity: Acute Type of Exam: Initial Mechanism of Injury: Nurse removed bilateral leg dressings. Pt states they are applied every week at his doctors office in [facility]. Legs are red and swollen. Stage 2 ulcer on left calf, approx 3 cm in diameter. Pt states he has been seen by the wound clinic FINDINGS: No fracture, cortical erosion or joint effusion were noted. No patellofemoral or femoral tibial joint compartment narrowing were identified. The bony mineralization was normal.     No fracture, joint effusion or joint space narrowing.      Xr Knee Right (min 4 Views)    Result Date: 11/11/2020  EXAMINATION: FOUR XRAY VIEWS OF THE RIGHT KNEE 11/11/2020 9:19 am COMPARISON: None. HISTORY: ORDERING SYSTEM PROVIDED HISTORY: trauma TECHNOLOGIST PROVIDED HISTORY: Reason for exam:->trauma Reason for Exam: trauma Acuity: Acute Type of Exam: Initial Mechanism of Injury: Nurse removed bilateral leg dressings. Pt states they are applied every week at his doctors office in [facility]. Legs are red and swollen. Stage 2 ulcer on left calf, approx 3 cm in diameter. Pt states he has been seen by the wound clinic FINDINGS: A small to moderate right knee effusion was noted without fracture or cortical erosion. Moderate degenerative narrowing involves the medial femoral tibial joint compartment. Small to moderate right knee effusion without fracture or cortical erosion. Moderate degenerative narrowing involves the medial femoral tibial joint compartment. Xr Chest Portable    Result Date: 11/11/2020  EXAMINATION: ONE XRAY VIEW OF THE CHEST 11/11/2020 9:19 am COMPARISON: 07/02/2020, 05/01/2020 HISTORY: ORDERING SYSTEM PROVIDED HISTORY: weakness TECHNOLOGIST PROVIDED HISTORY: Reason for exam:->weakness Reason for Exam: weakness Acuity: Acute Type of Exam: Initial Additional signs and symptoms: Nurse removed bilateral leg dressings. Pt states they are applied every week at his doctors office in [facility]. Legs are red and swollen. Stage 2 ulcer on left calf, approx 3 cm in diameter. Pt states he has been seen by the wound clinic FINDINGS: Shallow inflation. Obscuration of the lung apices by the patient's neck. The cardiomediastinal silhouette is unchanged in appearance. Chronic appearing vascular congestion. There is no consolidation, pneumothorax, or evidence of edema. No effusion is appreciated. The osseous structures are unchanged in appearance. Chronic findings in the chest without acute airspace disease identified.      Us Retroperitoneal Limited    Result Date: 11/11/2020  EXAMINATION: ULTRASOUND OF THE KIDNEYS 11/11/2020 6:50 pm Gastroesophageal reflux disease without esophagitis     Hypertension in stage 3 chronic kidney disease due to type 2 diabetes mellitus (HCC)     DM (diabetes mellitus) type II, controlled, with peripheral vascular disorder (HCC)     Combined hyperlipidemia associated with type 2 diabetes mellitus (HCC)     Diabetic skin ulcer associated with type 2 diabetes mellitus (HCC)     CKD (chronic kidney disease)     History of DVT (deep vein thrombosis)- left femoral     History of pulmonary embolism     Long term current use of anticoagulants with INR goal of 2.0-3.0     COPD (chronic obstructive pulmonary disease) (HCC)     Severe recurrent major depressive disorder with psychotic features [address]     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)     Venous (peripheral) insufficiency     Uncontrolled secondary diabetes mellitus with stage 3 CKD (GFR 30-59) (HCC)     Diabetes mellitus with skin ulcer (HCC)     WD-Ulcer of shin, left, with fat layer exposed ([address]     History of colon polyps     Personal history of PE (pulmonary embolism)     WD-Chronic venous hypertension (idiopathic) with ulcer of left lower extremity (CODE) ([facility]     WD-Chronic venous hypertension with inflammation, right     Troponin I above reference range     KIRSTEN (acute kidney injury) ([facility]      Plan:     1. Reviewed POC blood glucose . Labs and X ray results   2. Reviewed Current Medicines   3. On meal/ Correction bolus Humalog/ Basal Lantus Insulin regime + OHGD   4. Monitor Blood glucose frequently   5. Modified  the dose of Insulin/ other medicines as needed   6. Will follow     .      Carly Mac MD

## 2020-11-17 NOTE — PROGRESS NOTES
PHARMACY ANTICOAGULATION MONITORING SERVICE    Fernando Smith is a 69 y.o. male on warfarin therapy for DVT and PE. Pharmacy consulted by Dr. Samy Hayden for monitoring and adjustment of treatment. Indication for anticoagulation: DVT and PE  INR goal: 2-3  Warfarin dose prior to admission: 5mg daily except 7.5mg on Monday and Thursday per [facility] clinic on 11/5/20    Pertinent Laboratory Values   Recent Labs     11/15/20  0303  11/17/20  0539   INR 1.68   < > 1.72   HGB 11.8*  --   --    HCT 37.9*  --   --      --   --     < > = values in this interval not displayed. INR MONITORING  Recent Labs     11/15/20  0303 11/16/20  0524 11/17/20  0539   INR 1.68 1.65 1.72       Assessment/Plan:   Drug Interactions: aspirin (home med), no new interactions noted   INR remains sub-therapeutic, but is starting to trend upward.  Continue with warfarin 7.5 mg daily and follow INR trends. Expect INR to be close to therapeutic tomorrow. [address] [place] [address] will continue to monitor and adjust warfarin therapy as indicated    Thank you for the consult. Dhara Chow  11/17/2020 12:31 PM

## 2020-11-17 NOTE — CARE COORDINATION
Spoke with Sydnie/admissions at [facility] who states pre-cert is still pending with [facility] Medicare though she has charge nurse phone number in case it is received after CM leaves for the day. CM following. Pt new to [facility] at discharge. Please call report to [phone] and fax orders, AVS, and discharge summaries to [phone].

## 2020-11-17 NOTE — PROGRESS NOTES
Hospitalist Progress Note      Name:  Elza Greco /Age/Sex: 1945  [de-identified]69 y.o. male)   MRN & CSN:  [id number] & [id number] Admission Date/Time: 2020  8:55 AM   Location:  [address] [place] [address] PCP: Uzma Tovar MD         Hospital Day: 7    Assessment and Plan:   Elza Greco is a 69 y.o.  male  who presents with Cellulitis of lower extremity     1. Cellulitis, left leg: Has chronic venous ulcer. Doing much better. Redness is almost resolved. Swelling is resolved. MRSA nasal culture negative. He has been on vancomycin for the past 7 days. Will discontinue today.     2. Acute kidney injury: Resolved with fluid resuscitation. He presented with creatinine of 1.7 [baseline 1.2]. Creatinine currently at 1.3.      3. Hyponatremia-resolved with fluid resuscitation  4. Troponinemia likely related to acute kidney injury  · Ejection fraction 50%  · Stress test negative     4. Type 2 diabetes uncontrolled: Hemoglobin A1c 8.3. On Lantus, on sliding scale. Hypoglycemia protocol.     5. History of venous thromboembolism: On Coumadin. Pharmacy to dose. Daily INR.      6. Frequent falls: Physical therapy and Occupational Therapy -ARU recommended  7. Gastroesophageal reflux disease  8. Hypertension  9. Hyperlipidemia  10. Depression  11. COPD. Diet DIET CARB CONTROL; Dietary Nutrition Supplements: Wound Healing Oral Supplement   DVT Prophylaxis [x] chronically anticoagulated with warfarin   GI Prophylaxis [] PPI,  [] H2 Blocker,  [] Carafate,  [] Diet/Tube Feeds   Code Status Full Code   Disposition  ARU   MDM [] Low, [x] Moderate,[]  High     History of Present Illness:   Patient seen and examined. Legs are feeling much better. No fever or chills. Awaiting ARU placement. Ten point ROS reviewed negative, unless as noted above    Objective:        Intake/Output Summary (Last 24 hours) at 2020 1036  Last data filed at 2020 0701  Gross per 24 hour   Intake -- Output 2380 ml   Net -2380 ml      Vitals:   Vitals:    11/17/20 0932   BP: 110/61   Pulse: 69   Resp: 15   Temp: 98.3 °F (36.8 °C)   SpO2: 96%     Physical Exam:   GEN Awake male, sitting upright in bed. RESP breathing comfortably on supplemental oxygen 2 L/min via nasal cannula. CARDIO/VASC S1/S2 auscultated. Regular rate without appreciable murmurs. No peripheral edema. GI Abdomen is soft without significant tenderness, masses, or guarding. Bowel sounds are normoactive. MSK No gross joint deformities. SKIN left leg venous ulcers dry. Redness resolving bilaterally. No swelling bilaterally. NEURO normal speech, no lateralizing weakness. PSYCH Awake, alert, oriented x 3.      Medications:   Medications:    insulin glargine  60 Units Subcutaneous Nightly    insulin lispro  25 Units Subcutaneous TID WC    warfarin  7.5 mg Oral Daily    glipiZIDE  10 mg Oral BID AC    vancomycin  1,500 mg Intravenous Q18H    alogliptin  12.5 mg Oral Daily    insulin lispro  0-18 Units Subcutaneous TID WC    insulin lispro  0-18 Units Subcutaneous 2 times per day    sodium chloride flush  10 mL Intravenous 2 times per day    ARIPiprazole  15 mg Oral Daily    aspirin  81 mg Oral Daily    atorvastatin  40 mg Oral Nightly    buPROPion  300 mg Oral QAM    escitalopram  20 mg Oral Daily    glycopyrrolate-formoterol  2 puff Inhalation BID    metoprolol tartrate  12.5 mg Oral BID    pantoprazole  40 mg Oral QAM AC      Infusions:    dextrose       PRN Meds: sodium chloride flush, 10 mL, PRN  acetaminophen, 650 mg, Q6H PRN    Or  acetaminophen, 650 mg, Q6H PRN  promethazine, 12.5 mg, Q6H PRN  glucose, 15 g, PRN  dextrose, 12.5 g, PRN  glucagon (rDNA), 1 mg, PRN  dextrose, 100 mL/hr, PRN  ipratropium, 2 puff, PRN        CBC   Recent Labs     11/15/20  0303   WBC 6.5   HGB 11.8*   HCT 37.9*         BMP   Recent Labs     11/15/20  0303 11/16/20  0524 11/17/20  0539   CREATININE 1.3 1.3 1.3       Radiology report reviewed    Electronically signed by Abiodun Lancaster MD on 11/17/2020 at 10:36 AM

## 2020-11-18 VITALS
SYSTOLIC BLOOD PRESSURE: 122 MMHG | BODY MASS INDEX: 37.19 KG/M2 | TEMPERATURE: 97.9 F | DIASTOLIC BLOOD PRESSURE: 62 MMHG | OXYGEN SATURATION: 97 % | HEART RATE: 64 BPM | HEIGHT: 77 IN | WEIGHT: 315 LBS | RESPIRATION RATE: 12 BRPM

## 2020-11-18 LAB
CREAT SERPL-MCNC: 1.4 MG/DL (ref 0.9–1.3)
GFR AFRICAN AMERICAN: >60 ML/MIN/1.73M2
GFR NON-AFRICAN AMERICAN: 50 ML/MIN/1.73M2
GLUCOSE BLD-MCNC: 136 MG/DL (ref 70–99)
GLUCOSE BLD-MCNC: 151 MG/DL (ref 70–99)
GLUCOSE BLD-MCNC: 193 MG/DL (ref 70–99)
GLUCOSE BLD-MCNC: 203 MG/DL (ref 70–99)
INR BLD: 1.71 INDEX
PROTHROMBIN TIME: 20.8 SECONDS (ref 11.7–14.5)
SARS-COV-2, NAAT: NOT DETECTED
SOURCE: NORMAL

## 2020-11-18 PROCEDURE — 6370000000 HC RX 637 (ALT 250 FOR IP): Performed by: INTERNAL MEDICINE

## 2020-11-18 PROCEDURE — U0002 COVID-19 LAB TEST NON-CDC: HCPCS

## 2020-11-18 PROCEDURE — 82565 ASSAY OF CREATININE: CPT

## 2020-11-18 PROCEDURE — 82962 GLUCOSE BLOOD TEST: CPT

## 2020-11-18 PROCEDURE — 36415 COLL VENOUS BLD VENIPUNCTURE: CPT

## 2020-11-18 PROCEDURE — 85610 PROTHROMBIN TIME: CPT

## 2020-11-18 PROCEDURE — 6370000000 HC RX 637 (ALT 250 FOR IP): Performed by: NURSE PRACTITIONER

## 2020-11-18 PROCEDURE — 2580000003 HC RX 258: Performed by: NURSE PRACTITIONER

## 2020-11-18 RX ORDER — ALOGLIPTIN 12.5 MG/1
12.5 TABLET, FILM COATED ORAL DAILY
Qty: 60 TABLET | DISCHARGE
Start: 2020-11-19

## 2020-11-18 RX ORDER — GLIPIZIDE 10 MG/1
10 TABLET ORAL
Qty: 60 TABLET | Refills: 3 | DISCHARGE
Start: 2020-11-18

## 2020-11-18 RX ADMIN — ASPIRIN 81 MG: 81 TABLET, CHEWABLE ORAL at 08:29

## 2020-11-18 RX ADMIN — BUPROPION HYDROCHLORIDE 300 MG: 150 TABLET, FILM COATED, EXTENDED RELEASE ORAL at 08:28

## 2020-11-18 RX ADMIN — GLIPIZIDE 10 MG: 5 TABLET ORAL at 06:16

## 2020-11-18 RX ADMIN — ESCITALOPRAM OXALATE 20 MG: 10 TABLET ORAL at 08:28

## 2020-11-18 RX ADMIN — SODIUM CHLORIDE, PRESERVATIVE FREE 10 ML: 5 INJECTION INTRAVENOUS at 08:28

## 2020-11-18 RX ADMIN — PANTOPRAZOLE SODIUM 40 MG: 40 TABLET, DELAYED RELEASE ORAL at 06:16

## 2020-11-18 RX ADMIN — ARIPIPRAZOLE 15 MG: 10 TABLET ORAL at 08:29

## 2020-11-18 RX ADMIN — METOPROLOL TARTRATE 12.5 MG: 25 TABLET, FILM COATED ORAL at 08:28

## 2020-11-18 ASSESSMENT — PAIN SCALES - GENERAL
PAINLEVEL_OUTOF10: 0

## 2020-11-18 NOTE — PROGRESS NOTES
Hospitalist Progress Note      Name:  Jovany Rizo /Age/Sex: 1945  [de-identified]69 y.o. male)   MRN & CSN:  [id number] & [id number] Admission Date/Time: 2020  8:55 AM   Location:  [address] [place] [address] PCP: Yanni Jaime MD         Hospital Day: 8    Assessment and Plan:   Jovany Rizo is a 69 y.o.  male  who presents with Cellulitis of lower extremity     1. Cellulitis, left leg: Has chronic venous ulcer. Doing much better. Redness is almost resolved. Swelling is resolved. MRSA nasal culture negative. Antibiotic treatment with vancomycin completed.     2. Acute kidney injury: Resolved with fluid resuscitation. 3. Hyponatremia-resolved with fluid resuscitation  4. Troponinemia likely related to acute kidney injury  · Ejection fraction 50%  · Stress test negative     4. Type 2 diabetes uncontrolled: Hemoglobin A1c 8.3. On Lantus, on sliding scale. Hypoglycemia protocol.     5. History of venous thromboembolism: On Coumadin. Pharmacy to dose. Daily INR. 6. Frequent falls: Physical therapy and Occupational Therapy -ARU recommended  7. Gastroesophageal reflux disease  8. Hypertension  9. Hyperlipidemia  10. Depression  11. COPD. Diet DIET CARB CONTROL; Dietary Nutrition Supplements: Wound Healing Oral Supplement   DVT Prophylaxis [x] chronically anticoagulated with warfarin   GI Prophylaxis [] PPI,  [] H2 Blocker,  [] Carafate,  [] Diet/Tube Feeds   Code Status Full Code   Disposition  ARU placement. Medically ready for discharge. MDM [] Low, [x] Moderate,[]  High     History of Present Illness:   Patient seen and examined. No acute issues overnight. Awaiting ARU placement. Ten point ROS reviewed negative, unless as noted above    Objective:        Intake/Output Summary (Last 24 hours) at 2020 0925  Last data filed at 2020 0700  Gross per 24 hour   Intake 1543 ml   Output 4400 ml   Net -2857 ml      Vitals:   Vitals:    20 0819   BP:    Pulse: 64   Resp: Temp:    SpO2:      Physical Exam:   GEN Awake male, sitting upright in bed. RESP breathing comfortably on supplemental oxygen 2 L/min via nasal cannula. CARDIO/VASC S1/S2 auscultated. Regular rate without appreciable murmurs. No peripheral edema. GI Abdomen is soft without significant tenderness, masses, or guarding. Bowel sounds are normoactive. MSK No gross joint deformities. SKIN left leg venous ulcers dry. Redness resolving bilaterally. No swelling bilaterally. NEURO normal speech, no lateralizing weakness. PSYCH Awake, alert, oriented x 3. Medications:   Medications:    warfarin  9 mg Oral Daily    insulin glargine  60 Units Subcutaneous Nightly    insulin lispro  25 Units Subcutaneous TID WC    glipiZIDE  10 mg Oral BID AC    alogliptin  12.5 mg Oral Daily    insulin lispro  0-18 Units Subcutaneous TID WC    insulin lispro  0-18 Units Subcutaneous 2 times per day    sodium chloride flush  10 mL Intravenous 2 times per day    ARIPiprazole  15 mg Oral Daily    aspirin  81 mg Oral Daily    atorvastatin  40 mg Oral Nightly    buPROPion  300 mg Oral QAM    escitalopram  20 mg Oral Daily    glycopyrrolate-formoterol  2 puff Inhalation BID    metoprolol tartrate  12.5 mg Oral BID    pantoprazole  40 mg Oral QAM AC      Infusions:    dextrose 100 mL/hr (11/17/20 1534)     PRN Meds: sodium chloride flush, 10 mL, PRN  acetaminophen, 650 mg, Q6H PRN    Or  acetaminophen, 650 mg, Q6H PRN  promethazine, 12.5 mg, Q6H PRN  glucose, 15 g, PRN  dextrose, 12.5 g, PRN  glucagon (rDNA), 1 mg, PRN  dextrose, 100 mL/hr, PRN  ipratropium, 2 puff, PRN        CBC   No results for input(s): WBC, HGB, HCT, PLT in the last 72 hours.    BMP   Recent Labs     11/16/20  0524 11/17/20  0539 11/18/20  0353   CREATININE 1.3 1.3 1.4*       Radiology report reviewed    Electronically signed by Britany Callahan MD on 11/18/2020 at 9:25 AM

## 2020-11-18 NOTE — PLAN OF CARE
Problem: Falls - Risk of:  Goal: Will remain free from falls  Description: Will remain free from falls  11/18/2020 1239 by Francy Duvall RN  Outcome: Completed  11/18/2020 0832 by Francy Duvall RN  Outcome: Ongoing  Goal: Absence of physical injury  Description: Absence of physical injury  11/18/2020 1239 by Francy Duvall RN  Outcome: Completed  11/18/2020 0832 by Francy Duvall RN  Outcome: Ongoing     Problem: Pain:  Description: Pain management should include both nonpharmacologic and pharmacologic interventions.   Goal: Pain level will decrease  Description: Pain level will decrease  11/18/2020 1239 by Francy Duvall RN  Outcome: Completed  11/18/2020 0832 by Francy Duvall RN  Outcome: Ongoing  Goal: Control of acute pain  Description: Control of acute pain  11/18/2020 1239 by Francy Duvall RN  Outcome: Completed  11/18/2020 0832 by Francy Duvall RN  Outcome: Ongoing  Goal: Control of chronic pain  Description: Control of chronic pain  11/18/2020 1239 by Francy Duvall RN  Outcome: Completed  11/18/2020 0832 by Francy Duvall RN  Outcome: Ongoing     Problem: Skin Integrity:  Goal: Will show no infection signs and symptoms  Description: Will show no infection signs and symptoms  11/18/2020 1239 by Francy Duvall RN  Outcome: Completed  11/18/2020 0832 by Francy Duvall RN  Outcome: Ongoing  Goal: Absence of new skin breakdown  Description: Absence of new skin breakdown  11/18/2020 1239 by Francy Duvall RN  Outcome: Completed  11/18/2020 0832 by Francy Duvall RN  Outcome: Ongoing

## 2020-11-18 NOTE — PROGRESS NOTES
This nurse attempted to obtain daily weight, huge increase in weight, noticed bed extended on bed. Pt stated they had recently put that on. Its possible bed wasn't zeroed. Will pass on to oncoming nurse to obtain weight when able.

## 2020-11-18 NOTE — PROGRESS NOTES
PHARMACY ANTICOAGULATION MONITORING SERVICE    Juju Boyer is a 69 y.o. male on warfarin therapy for DVT and PE. Pharmacy consulted by Dr. Janusz Tellez for monitoring and adjustment of treatment. Indication for anticoagulation: DVT and PE  INR goal: 2-3  Warfarin dose prior to admission: 5mg daily except 7.5mg on Monday and Thursday per [facility] clinic on 11/5/20    Pertinent Laboratory Values   Recent Labs     11/18/20  0353   INR 1.71     INR MONITORING  Recent Labs     11/16/20  0524 11/17/20  0539 11/18/20  0353   INR 1.65 1.72 1.71       Assessment/Plan:   Drug Interactions: aspirin (home med), no new interactions noted   INR remains sub-therapeutic and has now leveled off @1.71 despite higher than normal dosing vs home regimen.  Will increase to warfarin 9mg daily tonight and foloow INR trends tomorrow. [address] [place] [address] will continue to monitor and adjust warfarin therapy as indicated    Thank you for the consult. Liset Head  Nneka Taylor  11/18/2020 8:03 AM

## 2020-11-18 NOTE — DISCHARGE SUMMARY
Discharge Summary    Name:  Magy Castellon /Age/Sex: 1945  (50 y.o. male)   MRN & CSN:  [id number] & [id number] Admission Date/Time: 2020  8:55 AM   Attending:  Loren Farias MD Discharging Physician: Loren Farias MD     Hospital Course:   Magy Castellon is a 69 y.o.  male  who presents with Cellulitis of lower extremity     1. Cellulitis, left leg: Has chronic venous ulcer.    Doing much better. Redness and redness have resolved. MRSA nasal culture negative. Antibiotic treatment with vancomycin completed.     2. Acute kidney injury: Resolved with fluid resuscitation. 3. Hyponatremia-resolved with fluid resuscitation  4. Troponinemia likely related to acute kidney injury- Stress test negative  5. Type 2 diabetes uncontrolled: Hemoglobin A1c 8.3. He was managed with insulin in the hospital and discharged on glipizide, alogliptin and metformin. 6. History of venous thromboembolism: On Coumadin.  Pharmacy to dose.  Daily INR. 7. Frequent falls: No fractures on imaging of upper and lower extremities. SNF placement for physical rehabilitation. 8. Gastroesophageal reflux disease  9. Hypertension  10. Hyperlipidemia  11. Depression  12. COPD - stable.        The patient expressed appropriate understanding of and agreement with the discharge recommendations, medications, and plan.      Consults this admission:  PHARMACY TO DOSE VANCOMYCIN  IP CONSULT TO HOSPITALIST  IP CONSULT TO ENDOCRINOLOGY  IP CONSULT TO PHARMACY  IP CONSULT TO PHARMACY  IP CONSULT TO CASE MANAGEMENT  IP CONSULT TO NEPHROLOGY  IP CONSULT TO CARDIOLOGY  IP CONSULT TO GENERAL SURGERY    Discharge Instruction:   Follow up appointments:   Primary care physician:  within 2 weeks    Diet:  diabetic diet   Activity: activity as tolerated  Disposition: Discharged to:   []Home, [][facility], [x]SNF, []Acute Rehab, []Hospice  Condition on discharge: Stable    Discharge Medications:      Abiola Check   Home Medication Instructions CNV:[id number]    Printed on:11/18/20 1213   Medication Information                      alogliptin (NESINA) 12.5 MG TABS tablet  Take 1 tablet by mouth daily             ARIPiprazole (ABILIFY) 15 MG tablet  Take 1 tablet by mouth daily             aspirin 81 MG chewable tablet  Take 1 tablet by mouth daily             atorvastatin (LIPITOR) 40 MG tablet  Take 1 tablet by mouth nightly             buPROPion (WELLBUTRIN XL) 300 MG extended release tablet  Take 1 tablet by mouth every morning             escitalopram (LEXAPRO) 20 MG tablet  Take 1 tablet by mouth daily             furosemide (LASIX) 20 MG tablet  Take 1 tablet by mouth 2 times daily             glipiZIDE (GLUCOTROL) 10 MG tablet  Take 1 tablet by mouth 2 times daily (before meals)             glycopyrrolate-formoterol (BEVESPI AEROSPHERE) 9-4.8 MCG/ACT AERO  Inhale 2 puffs into the lungs 2 times daily             metFORMIN (GLUCOPHAGE) 500 MG tablet  Take 1 tablet by mouth daily (with breakfast)             metoprolol tartrate (LOPRESSOR) 25 MG tablet  Take 0.5 tablets by mouth 2 times daily             omeprazole (PRILOSEC) 20 MG delayed release capsule  TAKE ONE (1) CAPSULE BY MOUTH ONCE DAILY             potassium chloride (MICRO-K) 10 MEQ extended release capsule  Take 10 mEq by mouth daily             warfarin (COUMADIN) 5 MG tablet  Take 1 tablet by mouth daily Except take 1 and 1/2 tablets by mouth on Mondays or as directed by clinic             Zinc Oxide (DESITIN CREAMY EX)  Apply 13 % topically 11/11/2020 Patient states he applies this to lower bilat lower legs                 Objective Findings at Discharge:   /62   Pulse 64   Temp 97.9 °F (36.6 °C) (Oral)   Resp 12   Ht 6' 5\" (1.956 m)   Wt (!) 321 lb (145.6 kg)   SpO2 97%   BMI 38.07 kg/m²            PHYSICAL EXAM  GEN    Awake male, sitting upright in bed. RESP breathing comfortably on supplemental oxygen 2 L/min via nasal cannula.   CARDIO/VASC           S1/S2

## 2020-11-23 ENCOUNTER — HOSPITAL ENCOUNTER (OUTPATIENT)
Age: 75
Setting detail: SPECIMEN
Discharge: HOME | End: 2020-11-23
Payer: MEDICARE

## 2020-11-23 LAB
ALBUMIN SERPL-MCNC: 3.1 GM/DL (ref 3.4–5)
ALP BLD-CCNC: 69 IU/L (ref 40–128)
ALT SERPL-CCNC: 12 U/L (ref 10–40)
ANION GAP SERPL CALCULATED.3IONS-SCNC: 12 MMOL/L (ref 4–16)
AST SERPL-CCNC: 13 IU/L (ref 15–37)
BASOPHILS ABSOLUTE: 0.1 K/CU MM
BASOPHILS RELATIVE PERCENT: 1 % (ref 0–1)
BILIRUB SERPL-MCNC: 0.4 MG/DL (ref 0–1)
BUN BLDV-MCNC: 28 MG/DL (ref 6–23)
CALCIUM SERPL-MCNC: 8.8 MG/DL (ref 8.3–10.6)
CHLORIDE BLD-SCNC: 95 MMOL/L (ref 99–110)
CO2: 26 MMOL/L (ref 21–32)
CREAT SERPL-MCNC: 1.4 MG/DL (ref 0.9–1.3)
DIFFERENTIAL TYPE: ABNORMAL
EOSINOPHILS ABSOLUTE: 0.2 K/CU MM
EOSINOPHILS RELATIVE PERCENT: 3.9 % (ref 0–3)
GFR AFRICAN AMERICAN: >60 ML/MIN/1.73M2
GFR NON-AFRICAN AMERICAN: 50 ML/MIN/1.73M2
GLUCOSE BLD-MCNC: 270 MG/DL (ref 70–99)
HCT VFR BLD CALC: 40.9 % (ref 42–52)
HEMOGLOBIN: 12.2 GM/DL (ref 13.5–18)
IMMATURE NEUTROPHIL %: 0.5 % (ref 0–0.43)
LYMPHOCYTES ABSOLUTE: 2.1 K/CU MM
LYMPHOCYTES RELATIVE PERCENT: 35.1 % (ref 24–44)
MCH RBC QN AUTO: 28.4 PG (ref 27–31)
MCHC RBC AUTO-ENTMCNC: 29.8 % (ref 32–36)
MCV RBC AUTO: 95.1 FL (ref 78–100)
MONOCYTES ABSOLUTE: 0.6 K/CU MM
MONOCYTES RELATIVE PERCENT: 9.9 % (ref 0–4)
NUCLEATED RBC %: 0 %
PDW BLD-RTO: 15.9 % (ref 11.7–14.9)
PLATELET # BLD: 326 K/CU MM (ref 140–440)
PMV BLD AUTO: 12.1 FL (ref 7.5–11.1)
POTASSIUM SERPL-SCNC: 5.1 MMOL/L (ref 3.5–5.1)
RBC # BLD: 4.3 M/CU MM (ref 4.6–6.2)
SEGMENTED NEUTROPHILS ABSOLUTE COUNT: 3 K/CU MM
SEGMENTED NEUTROPHILS RELATIVE PERCENT: 49.6 % (ref 36–66)
SODIUM BLD-SCNC: 133 MMOL/L (ref 135–145)
TOTAL IMMATURE NEUTOROPHIL: 0.03 K/CU MM
TOTAL NUCLEATED RBC: 0 K/CU MM
TOTAL PROTEIN: 6.6 GM/DL (ref 6.4–8.2)
WBC # BLD: 6 K/CU MM (ref 4–10.5)

## 2020-11-23 PROCEDURE — 36415 COLL VENOUS BLD VENIPUNCTURE: CPT

## 2020-11-23 PROCEDURE — 80053 COMPREHEN METABOLIC PANEL: CPT

## 2020-11-23 PROCEDURE — 85025 COMPLETE CBC W/AUTO DIFF WBC: CPT

## 2020-11-24 ENCOUNTER — TELEPHONE (OUTPATIENT)
Dept: PHARMACY | Age: 75
End: 2020-11-24

## 2020-11-24 NOTE — TELEPHONE ENCOUNTER
No answer and no voicemail.      CLINICAL PHARMACY CONSULT: MED RECONCILIATION/REVIEW ADDENDUM    For Pharmacy Admin Tracking Only    PHSO: No  Total # of Interventions Recommended: 0  Total Interventions Accepted: 0  Time Spent (min): 5    Maria Mayo, ShinD

## 2020-11-30 ENCOUNTER — HOSPITAL ENCOUNTER (OUTPATIENT)
Age: 75
Setting detail: SPECIMEN
Discharge: HOME | End: 2020-11-30
Payer: MEDICARE

## 2020-11-30 LAB
ANION GAP SERPL CALCULATED.3IONS-SCNC: 11 MMOL/L (ref 4–16)
BUN BLDV-MCNC: 26 MG/DL (ref 6–23)
CALCIUM SERPL-MCNC: 8.7 MG/DL (ref 8.3–10.6)
CHLORIDE BLD-SCNC: 94 MMOL/L (ref 99–110)
CO2: 25 MMOL/L (ref 21–32)
CREAT SERPL-MCNC: 1.3 MG/DL (ref 0.9–1.3)
GFR AFRICAN AMERICAN: >60 ML/MIN/1.73M2
GFR NON-AFRICAN AMERICAN: 54 ML/MIN/1.73M2
GLUCOSE BLD-MCNC: 247 MG/DL (ref 70–99)
HCT VFR BLD CALC: 40.7 % (ref 42–52)
HEMOGLOBIN: 12.6 GM/DL (ref 13.5–18)
MCH RBC QN AUTO: 28.9 PG (ref 27–31)
MCHC RBC AUTO-ENTMCNC: 31 % (ref 32–36)
MCV RBC AUTO: 93.3 FL (ref 78–100)
PDW BLD-RTO: 15.5 % (ref 11.7–14.9)
PLATELET # BLD: 276 K/CU MM (ref 140–440)
PMV BLD AUTO: 12.1 FL (ref 7.5–11.1)
POTASSIUM SERPL-SCNC: 5.6 MMOL/L (ref 3.5–5.1)
RBC # BLD: 4.36 M/CU MM (ref 4.6–6.2)
SODIUM BLD-SCNC: 130 MMOL/L (ref 135–145)
WBC # BLD: 6.3 K/CU MM (ref 4–10.5)

## 2020-11-30 PROCEDURE — 85027 COMPLETE CBC AUTOMATED: CPT

## 2020-11-30 PROCEDURE — 80048 BASIC METABOLIC PNL TOTAL CA: CPT

## 2020-11-30 PROCEDURE — 36415 COLL VENOUS BLD VENIPUNCTURE: CPT

## 2020-12-04 ENCOUNTER — HOSPITAL ENCOUNTER (OUTPATIENT)
Age: 75
Setting detail: SPECIMEN
Discharge: HOME | End: 2020-12-04
Payer: MEDICARE

## 2020-12-04 ENCOUNTER — TELEPHONE (OUTPATIENT)
Dept: FAMILY MEDICINE CLINIC | Age: 75
End: 2020-12-04

## 2020-12-04 LAB
INR BLD: 1.05 INDEX
PROTHROMBIN TIME: 12.7 SECONDS (ref 11.7–14.5)

## 2020-12-04 PROCEDURE — 85610 PROTHROMBIN TIME: CPT

## 2020-12-04 PROCEDURE — 36415 COLL VENOUS BLD VENIPUNCTURE: CPT

## 2020-12-10 ENCOUNTER — TELEPHONE (OUTPATIENT)
Dept: FAMILY MEDICINE CLINIC | Age: 75
End: 2020-12-10

## 2020-12-10 ENCOUNTER — CLINICAL DOCUMENTATION (OUTPATIENT)
Dept: FAMILY MEDICINE CLINIC | Age: 75
End: 2020-12-10

## 2020-12-10 NOTE — TELEPHONE ENCOUNTER
Patient is seeing [facility] today for [id number] Sr [id number] visit.   Please to please obtain POCT A1c and POCT urine micro (orders placed) if he does his attend his in person appointment

## 2020-12-15 ENCOUNTER — TELEPHONE (OUTPATIENT)
Dept: PHARMACY | Age: 75
End: 2020-12-15

## 2020-12-16 ENCOUNTER — TELEPHONE (OUTPATIENT)
Dept: FAMILY MEDICINE CLINIC | Age: 75
End: 2020-12-16

## 2020-12-16 NOTE — TELEPHONE ENCOUNTER
Patient due for A1C. Called patient to schedule lab appt before end of year received no answer, left message to return call.

## 2020-12-19 NOTE — TELEPHONE ENCOUNTER
Patient no showed to appointment this past week. Staff to please call patient if he can just stop by for the POCT testing A1c and urine by end of year.  Labs

## 2020-12-23 ENCOUNTER — TELEPHONE (OUTPATIENT)
Dept: WOUND CARE | Age: 75
End: 2020-12-23

## 2020-12-28 ENCOUNTER — TELEPHONE (OUTPATIENT)
Dept: PHARMACY | Age: 75
End: 2020-12-28

## 2020-12-28 NOTE — TELEPHONE ENCOUNTER
Received voicemail from Chung Daniel RN with [facility]. Patient started with [facility] over the weekend. Returned call and left voicemail requesting INR be checked today, Tuesday, or Wednesday this week.    CLINICAL PHARMACY CONSULT: MED RECONCILIATION/REVIEW ADDENDUM    For Pharmacy Admin Tracking Only    PHSO: No        Time Spent (min): 5    Greta Hadley PharmD

## 2020-12-29 ENCOUNTER — ANTI-COAG VISIT (OUTPATIENT)
Dept: PHARMACY | Age: 75
End: 2020-12-29

## 2020-12-29 LAB — INR BLD: 4.9

## 2020-12-29 NOTE — PROGRESS NOTES
Medication Management Service  [address] [phone]    Visit Date: 12/29/2020   Subjective:       Jaquelin Correa is a 77 y.o. male who is having INR checked by [facility]. INR results were called in to the clinic for anticoagulation monitoring and adjustment by Nayan Pro RN who can be reached at [phone]. Patient results called in to clinic for warfarin management due to  Indication:   DVT. INR goal: of 2.0-3.0. Duration of therapy: indefinite. Patient reports the following:   Adherent with regimen  Missed or extra doses:  None   Bleeding or thromboembolic side effects:  None  Significant medication changes:  None  Significant dietary changes: None  Significant alcohol or tobacco changes: None  Significant recent illness, disease state changes, or hospitalization:  None  Upcoming surgeries or procedures:  None  Falls: None           Assessment and Plan     PT/INR performed by [address] INR today is 4.9, supratherapeutic. Patient reports he got home 1 week ago from facility. Patient reports he thinks he was given 7.5mg daily while in the SNF and he was drinking Boost/Ensure while there but is no longer drinking once home. Left message with Nayan Pro at [facility]. office with order  Plan: Hold warfarin for 2 days then take warfarin 5mg daily . Recheck INR in 6 days     Patient's home care nurse verbalized understanding of dosing directions and information discussed and will provide information to patient. Patient's home care nurse acknowledges working in consult agreement with pharmacist as referred by patient's physician on behalf of patient.       Electronically signed by Rhonda Galdamez, [facility] on 12/29/20 at 5:54 PM EST    CLINICAL PHARMACY CONSULT: MED RECONCILIATION/REVIEW ADDENDUM    For Pharmacy Admin Tracking Only    PHSO: No  Total # of Interventions Recommended: 1  - Decreased Dose #: 1 - Maintenance Safety Lab Monitoring #: 1  Total Interventions Accepted: 1  Time Spent (min): 15    Madalyn Mccabe, PharmD

## 2021-01-04 ENCOUNTER — TELEPHONE (OUTPATIENT)
Dept: PHARMACY | Age: 76
End: 2021-01-04

## 2021-01-04 NOTE — TELEPHONE ENCOUNTER
Spoke to Ty Youssef with [facility]. After hours was going to get INR. Advised to call clinic with result.      CLINICAL PHARMACY CONSULT: MED RECONCILIATION/REVIEW ADDENDUM    For Pharmacy Admin Tracking Only    PHSO: NoTime Spent (min): 5    Shin LordD

## 2021-01-07 ENCOUNTER — HOSPITAL ENCOUNTER (OUTPATIENT)
Dept: WOUND CARE | Age: 76
Discharge: HOME | End: 2021-01-07
Payer: MEDICARE

## 2021-01-07 ENCOUNTER — TELEPHONE (OUTPATIENT)
Dept: PHARMACY | Age: 76
End: 2021-01-07

## 2021-01-07 VITALS — TEMPERATURE: 97.8 F | DIASTOLIC BLOOD PRESSURE: 90 MMHG | SYSTOLIC BLOOD PRESSURE: 189 MMHG

## 2021-01-07 DIAGNOSIS — I87.312 CHRONIC VENOUS HYPERTENSION (IDIOPATHIC) WITH ULCER OF LEFT LOWER EXTREMITY (CODE) (HCC): ICD-10-CM

## 2021-01-07 DIAGNOSIS — Z86.711 PERSONAL HISTORY OF PE (PULMONARY EMBOLISM): ICD-10-CM

## 2021-01-07 DIAGNOSIS — I87.2 VENOUS (PERIPHERAL) INSUFFICIENCY: ICD-10-CM

## 2021-01-07 DIAGNOSIS — L97.929 VARICOSE VEINS OF LOWER EXTREMITIES WITH ULCER AND INFLAMMATION (HCC): ICD-10-CM

## 2021-01-07 DIAGNOSIS — L97.822 ULCER OF SHIN, LEFT, WITH FAT LAYER EXPOSED (HCC): ICD-10-CM

## 2021-01-07 DIAGNOSIS — E11.622 DIABETIC SKIN ULCER ASSOCIATED WITH TYPE 2 DIABETES MELLITUS (HCC): ICD-10-CM

## 2021-01-07 DIAGNOSIS — I87.321 CHRONIC VENOUS HYPERTENSION WITH INFLAMMATION, RIGHT: ICD-10-CM

## 2021-01-07 DIAGNOSIS — E11.622 DIABETES MELLITUS WITH SKIN ULCER (HCC): Primary | ICD-10-CM

## 2021-01-07 DIAGNOSIS — L98.499 DIABETIC SKIN ULCER ASSOCIATED WITH TYPE 2 DIABETES MELLITUS (HCC): ICD-10-CM

## 2021-01-07 DIAGNOSIS — Z86.718 HISTORY OF DVT (DEEP VEIN THROMBOSIS): ICD-10-CM

## 2021-01-07 DIAGNOSIS — L98.499 DIABETES MELLITUS WITH SKIN ULCER (HCC): Primary | ICD-10-CM

## 2021-01-07 DIAGNOSIS — L97.919 VARICOSE VEINS OF LOWER EXTREMITIES WITH ULCER AND INFLAMMATION (HCC): ICD-10-CM

## 2021-01-07 DIAGNOSIS — I83.229 VARICOSE VEINS OF LOWER EXTREMITIES WITH ULCER AND INFLAMMATION (HCC): ICD-10-CM

## 2021-01-07 DIAGNOSIS — I83.219 VARICOSE VEINS OF LOWER EXTREMITIES WITH ULCER AND INFLAMMATION (HCC): ICD-10-CM

## 2021-01-07 PROCEDURE — 29581 APPL MULTLAYER CMPRN SYS LEG: CPT

## 2021-01-07 RX ORDER — LIDOCAINE 50 MG/G
OINTMENT TOPICAL ONCE
Status: CANCELLED | OUTPATIENT
Start: 2021-01-07 | End: 2021-01-07

## 2021-01-07 RX ORDER — LIDOCAINE HYDROCHLORIDE 40 MG/ML
SOLUTION TOPICAL ONCE
Status: CANCELLED | OUTPATIENT
Start: 2021-01-07 | End: 2021-01-07

## 2021-01-07 RX ORDER — WARFARIN SODIUM 5 MG/1
5 TABLET ORAL DAILY
Qty: 98 TABLET | Refills: 1 | Status: SHIPPED | OUTPATIENT
Start: 2021-01-07

## 2021-01-07 RX ORDER — GINSENG 100 MG
CAPSULE ORAL ONCE
Status: CANCELLED | OUTPATIENT
Start: 2021-01-07 | End: 2021-01-07

## 2021-01-07 RX ORDER — BACITRACIN ZINC AND POLYMYXIN B SULFATE 500; 1000 [USP'U]/G; [USP'U]/G
OINTMENT TOPICAL ONCE
Status: CANCELLED | OUTPATIENT
Start: 2021-01-07 | End: 2021-01-07

## 2021-01-07 RX ORDER — BACITRACIN, NEOMYCIN, POLYMYXIN B 400; 3.5; 5 [USP'U]/G; MG/G; [USP'U]/G
OINTMENT TOPICAL ONCE
Status: CANCELLED | OUTPATIENT
Start: 2021-01-07 | End: 2021-01-07

## 2021-01-07 ASSESSMENT — PAIN DESCRIPTION - LOCATION: LOCATION: BUTTOCKS

## 2021-01-07 ASSESSMENT — PAIN DESCRIPTION - ORIENTATION: ORIENTATION: RIGHT;LEFT

## 2021-01-07 ASSESSMENT — PAIN DESCRIPTION - PAIN TYPE: TYPE: CHRONIC PAIN

## 2021-01-07 NOTE — PROGRESS NOTES
 GERD (gastroesophageal reflux disease)     Glaucoma 11/12    open angle-Dr. Rockey Sever H/O cardiac catheterization 6/3/14    EF55% normal study    H/O Doppler ultrasound 03/26/15    Carotid US- no significant stenosis noted bilaterally.  H/O echocardiogram 11/21/2019    EF50-55%, Mild AR. Moderately dilated right ventricle with negative Solis's sign.  H/O mumps orchitis     as a youth    Hemorrhoids 4/23/12    Dr. Luz Concepcion; repeat colonoscopy 3 years    History of nuclear stress test 11/21/2019    EF 60%, Normal study.  HLD (hyperlipidemia)     Hx of Doppler ultrasound 1/06/15    Lymph node seen in left groin area. No bilateral stenosis.     Hyperlipemia     Hyperlipidemia     Hypertension 1992    Hypertension     Idiopathic chronic venous hypertension of left lower extremity with ulcer and inflammation (HCC) 10/2/2015    Leg ulcer ([address] 1978-present    following at wound center, Dr Xochitl Man No diabetic retinopathy OU 11/12    Dr. Teresa Turner chronic ulcer of left lower leg with fat layer exposed ([address] 10/2/2015    Pulmonary embolism ([address] 11/13    patient on coumadin    Sleep apnea     doesnt always use cpap dt it drys him out    SOB (shortness of breath) Oct 2011    Stress test normal.     Tendinitis 1973    plantar tendons    Type II or unspecified type diabetes mellitus with other specified manifestations, not stated as uncontrolled 2/8/2013    Unspecified venous (peripheral) insufficiency     Urticaria     WD-Cellulitis of right anterior lower leg 9/5/2019    WD-Cellulitis of right lower extremity 3/26/2020    WD-Chronic venous hypertension (idiopathic) with ulcer of left lower extremity (CODE) ([address] 6/27/2019    WD-Chronic venous hypertension with inflammation, right 9/19/2019    WD-Decubitus ulcer of left buttock, stage 3 ([address] 6/25/2020    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 to breakdown of skin ([address] 3/26/2020  WD-Open wound of hand without complication, left, initial encounter 2019    WD-Pressure injury of left buttock, stage 2 ([address] 2020    WD-Pressure injury of left buttock, stage 3 ([address] 3/12/2020    WD-Pressure injury of right buttock, stage 3 ([address] 3/12/2020    WD-Skin tear of right forearm without complication     WD-Ulcer of right pretibial region, with fat layer exposed ([address] 10/17/2019       PAST SURGICAL HISTORY    Past Surgical History:   Procedure Laterality Date    BREAST SURGERY  1970s    benign tumors bilaterally    CARDIAC CATHETERIZATION  6/3/14    EF55% normal study    CARPAL TUNNEL RELEASE Left     CARPAL TUNNEL RELEASE      CATARACT REMOVAL Right 3/11/2013    Dr. Renell Brittle Left 2013    Dr. Gary Lockhart      polyps    COLONOSCOPY  11    villous component--f/u colonoscopy will be needed in 6 months    COLONOSCOPY  12    mild diverticulosis, internal hemorrhoids; repeat in 3 years (Dr. Cuong Vanessa)    COLONOSCOPY  2016    mild diverticulosis, three colon polyps    HERNIA REPAIR  1970s    HERNIA REPAIR      SKIN GRAFT  -present    skin grafts to left ankle ulcers    SPLENECTOMY      SPLENECTOMY      TONSILLECTOMY      VARICOSE VEIN SURGERY Left 2009       FAMILY HISTORY    Family History   Problem Relation Age of Onset    Heart Disease Father     Cancer Father         prostate    High Blood Pressure Father     High Cholesterol Father     Cancer Brother     Diabetes Brother     Thyroid Disease Brother        SOCIAL HISTORY    Social History     Tobacco Use    Smoking status: Former Smoker     Packs/day: 2.00     Years: 35.00     Pack years: 70.00     Types: Cigarettes     Quit date:      Years since quittin.0    Smokeless tobacco: Never Used    Tobacco comment: chew--30 years.   Reviewed 2015   Substance Use Topics    Alcohol use: Yes     Comment: soc    Drug use: Never ALLERGIES    Allergies   Allergen Reactions    Cavilon Durable Barrier [Mineral Oil-Dimeth-Coconut Oil]     Parabens Hives    Parabens Hives    Prinivil [Lisinopril] Swelling    Cortisone Rash    Cortisone Rash    Dilaudid [Hydromorphone Hcl] Rash    Penicillins Rash    Penicillins Rash    Sulfamethoxazole-Trimethoprim Nausea Only    Tape [Adhesive Tape] Rash       MEDICATIONS    Current Outpatient Medications on File Prior to Encounter   Medication Sig Dispense Refill    alogliptin (NESINA) 12.5 MG TABS tablet Take 1 tablet by mouth daily 60 tablet     glipiZIDE (GLUCOTROL) 10 MG tablet Take 1 tablet by mouth 2 times daily (before meals) 60 tablet 3    warfarin (COUMADIN) 5 MG tablet Take 1 tablet by mouth daily Except take 1 and 1/2 tablets by mouth on Mondays or as directed by clinic (Patient taking differently: Take by mouth See Admin Instructions 11/11/2020 Per [facility] patient to take 5 mg on Sun/Tues/Wed/Fri/Sat and 7.5 mg on Mondays and Thursdays. Patient states he takes in the morning.  Last dose 5 mg 11/10/2020) 98 tablet 1    buPROPion (WELLBUTRIN XL) 300 MG extended release tablet Take 1 tablet by mouth every morning 90 tablet 0    omeprazole (PRILOSEC) 20 MG delayed release capsule TAKE ONE (1) CAPSULE BY MOUTH ONCE DAILY 90 capsule 1    glycopyrrolate-formoterol (BEVESPI AEROSPHERE) 9-4.8 MCG/ACT AERO Inhale 2 puffs into the lungs 2 times daily 1 Inhaler 5    potassium chloride (MICRO-K) 10 MEQ extended release capsule Take 10 mEq by mouth daily      metoprolol tartrate (LOPRESSOR) 25 MG tablet Take 0.5 tablets by mouth 2 times daily 90 tablet 3    Zinc Oxide (DESITIN CREAMY EX) Apply 13 % topically 11/11/2020 Patient states he applies this to lower bilat lower legs      aspirin 81 MG chewable tablet Take 1 tablet by mouth daily 30 tablet 3    escitalopram (LEXAPRO) 20 MG tablet Take 1 tablet by mouth daily 90 tablet 0 Post-Procedure Surface Area (cm^2) 7.2 cm^2 10/29/20 0939   Post-Procedure Volume (cm^3) 0.72 cm^3 10/29/20 0939   Distance Tunneling (cm) 0 cm 11/05/20 0904   Tunneling Position ___ O'Clock 0 11/05/20 0904   Undermining Starts ___ O'Clock 0 11/05/20 0904   Undermining Ends___ O'Clock 0 11/05/20 0904   Undermining Maxium Distance (cm) 0 11/05/20 0904   Wound Assessment Granulation tissue;Slough 11/05/20 0904   Drainage Amount Moderate 11/05/20 0904   Drainage Description Yellow 11/05/20 0904   Odor Mild 11/05/20 0904   Ana-wound Assessment Fragile; Maceration 11/05/20 0904   Margins Defined edges; Unattached edges 11/05/20 0904   Wound Thickness Description not for Pressure Injury Full thickness 11/05/20 0904   Number of days: 559       Wound 07/30/20 Leg Medial;Lower; Left #7 (Active)   Wound Image   01/07/21 0950   Wound Etiology Other 11/05/20 0904   Dressing Status New dressing applied 01/07/21 1046   Wound Cleansed Soap and water; Other (Comment); Cleansed with saline 01/07/21 0950   Dressing/Treatment Moisturizing cream 09/17/20 0955   Offloading for Diabetic Foot Ulcers Walker 11/05/20 0904   Wound Length (cm) 0.4 cm 01/07/21 0950   Wound Width (cm) 0.4 cm 01/07/21 0950   Wound Depth (cm) 0.1 cm 01/07/21 0950   Wound Surface Area (cm^2) 0.16 cm^2 01/07/21 0950   Change in Wound Size % (l*w) 99.89 01/07/21 0950   Wound Volume (cm^3) 0.02 cm^3 01/07/21 0950   Wound Healing % 100 01/07/21 0950   Post-Procedure Length (cm) 21.5 cm 08/13/20 1020   Post-Procedure Width (cm) 7.5 cm 08/13/20 1020   Post-Procedure Depth (cm) 0.1 cm 08/13/20 1020   Post-Procedure Surface Area (cm^2) 161.25 cm^2 08/13/20 1020   Post-Procedure Volume (cm^3) 16.12 cm^3 08/13/20 1020   Distance Tunneling (cm) 0 cm 01/07/21 0950   Tunneling Position ___ O'Clock 0 01/07/21 0950   Undermining Starts ___ O'Clock 0 01/07/21 0950   Undermining Ends___ O'Clock 0 01/07/21 0950   Undermining Maxium Distance (cm) 0 01/07/21 0950 Wound Assessment [facility] 01/07/21 0950   Drainage Amount Large 01/07/21 0950   Drainage Description Serosanguinous 01/07/21 0950   Odor Mild 01/07/21 0950   Ana-wound Assessment Maceration 01/07/21 0950   Margins Undefined edges 01/07/21 0950   Wound Thickness Description not for Pressure Injury Not applicable 60/58/30 4706   Number of days: 161       Wound 08/27/20 Leg Medial;Lower;Right #8 Cluster (Active)   Wound Image   01/07/21 0950   Wound Etiology Other 11/05/20 0904   Dressing Status New dressing applied 01/07/21 1046   Wound Cleansed Soap and water;Irrigated with saline; Other (Comment) 01/07/21 0950   Dressing/Treatment Alginate 09/17/20 0955   Offloading for Diabetic Foot Ulcers Walker 11/05/20 0904   Wound Length (cm) 4.2 cm 01/07/21 0950   Wound Width (cm) 3.8 cm 01/07/21 0950   Wound Depth (cm) 0.1 cm 01/07/21 0950   Wound Surface Area (cm^2) 15.96 cm^2 01/07/21 0950   Change in Wound Size % (l*w) -698 01/07/21 0950   Wound Volume (cm^3) 1.6 cm^3 01/07/21 0950   Wound Healing % -700 01/07/21 0950   Post-Procedure Length (cm) 1.1 cm 10/29/20 0939   Post-Procedure Width (cm) 1.5 cm 10/29/20 0939   Post-Procedure Depth (cm) 0.1 cm 10/29/20 0939   Post-Procedure Surface Area (cm^2) 1.65 cm^2 10/29/20 0939   Post-Procedure Volume (cm^3) 0.16 cm^3 10/29/20 0939   Distance Tunneling (cm) 0 cm 01/07/21 0950   Tunneling Position ___ O'Clock 0 01/07/21 0950   Undermining Starts ___ O'Clock 0 01/07/21 0950   Undermining Ends___ O'Clock 0 01/07/21 0950   Undermining Maxium Distance (cm) 0 01/07/21 0950   Wound Assessment Subcutaneous 01/07/21 0950   Drainage Amount Moderate 01/07/21 0950   Drainage Description Serosanguinous 01/07/21 0950   Odor Mild 01/07/21 0950   Ana-wound Assessment Dry/flaky 01/07/21 0950   Margins Unattached edges 01/07/21 0950   Wound Thickness Description not for Pressure Injury Full thickness 01/07/21 0950   Number of days: 133       Wound 11/12/20 Elbow Right;Posterior (Active)             Vascular studies:   ordered on 7/25/19 Date  To be done 8/2/19 imaging center               Imaging:  Date               Cultures: obtained from left medal leg  Date 6/27/19--positive kles.                                [id number] from left medial lower leg on 8/15/19                               Obtained from left medial lower leg on 9/26/19                               Obtained from right leg on 10/17/19                               Obtained from left leg on 3/12/2020                               Obtained from right leg on 3/19/2020                                                                                                                           Obtained from right leg on 4/30/2020--scanty growth                                  Biopsy done 7/18/19                                Obtained culture 10/15/2020                 Labs/ HbA1c  Date               Grafts:   #1 [place] applied on 9/17/2020  #2 [place] applied on 9/24/2020  #3 [place] applied on 10/1/2020  #4 [place] applied on 10/8/2020                    HBO:                Antibiotics: Doxycycline for 14 days BID; phoned to [facility] [address] 6/28/19                                    Doxycycline for 10 days BID and CIPRO 500 mg BID for 10 days ordered on 8/22/19                                     Doxycycline for 7 days on 9/5/19                                   Doxycycline for 7 days on 3/12/2020 continued on 3/19/2020 for 7 days                                    Bactrim DS for  10 days on 3/26/52259                                   Bactrim DS for 10 days on 6/25/2020                                                 Earlier Wound care treatments:                [id number]:                        Consults:   Date 7/18/19 to Dr Octaviano Costa-- Aracelis Santiago care physician:      Continuing wound care orders and information:              Residence: private             Continue home health care with:none at this time               Your wound-care supplies will be provided by: Magalys Singh provider:              [id number] with              ZHA loading: Date              PGNXV Medications: RX SANTYL GIVEN 8/1/19              [facility] cleansing:                           LZ not scrub or use excessive force.                          Wash hands with soap and water before and after dressing changes.                         Prior to applying a clean dressing, cleanse wound with normal saline,                                wound cleanser, or mild soap and water.                                     Daily Wound management:                                                Avoid standing for long periods of time.                          Apply wraps/stockings in AM and remove at bedtime.                          If swelling is present, elevate legs to the level of the heart or above for 30 minutes 4-5 times a day and/or when sitting.                                             When taking antibiotics take entire prescription as ordered by physician do not stop taking until medicine is all gone.                                                    Orders for this week: 1/7/2021     Auth for graft on 9/10/2020          Left medial ankle  proximal leg -- Optifoam AG over area and lotrisone to leg. Wrap with Coban 2 lite  --leave in place until next visit      Right Leg -- Calcium alginate between toes. Optifoam AG over area and  Apply  Lotrisone  to intact skin.  Wrap with coban 2 lite leave in place until next visit          Buttock-- Apply Mepilex border as needed.        Follow up Susannah Padilla Dr in 1 week on Thursday in the wound care center  Call [phone] for any questions or concerns.   Physician Signature         Treatment Note Wound 08/27/20 Leg Medial;Lower;Right #8 Cluster-Dressing/Treatment: (lotrisone;optifoam;coban 2 lite; tape)

## 2021-01-07 NOTE — PLAN OF CARE
Problem: Wound:  Goal: Will show signs of wound healing; wound closure and no evidence of infection  Description: Will show signs of wound healing; wound closure and no evidence of infection  1/7/2021 1047 by Marco Alaniz RN  Outcome: Ongoing  1/7/2021 1019 by Yazan Hillman RN  Outcome: Ongoing     Problem: Pain:  Goal: Pain level will decrease  Description: Pain level will decrease  Outcome: Ongoing  Goal: Control of acute pain  Description: Control of acute pain  Outcome: Ongoing  Goal: Control of chronic pain  Description: Control of chronic pain  Outcome: Ongoing

## 2021-01-07 NOTE — PROGRESS NOTES
Multilayer Compression Wrap   (Not Unna) Below the Knee    NAME:  Kayla Mccartney  YOB: 1945  MEDICAL RECORD NUMBER:  [id number]  DATE:  1/7/2021    Multilayer compression wrap: Removed old Multilayer wrap if indicated and wash leg with mild soap/water. Applied moisturizing agent to dry skin as needed. Applied primary and secondary dressing as ordered. Applied multilayered dressing below the knee to right lower leg. Applied multilayered dressing below the knee to left lower leg. Instructed patient/caregiver not to remove dressing and to keep it clean and dry. Instructed patient/caregiver on complications to report to provider, such as pain, numbness in toes, heavy drainage, and slippage of dressing. Instructed patient on purpose of compression dressing and on activity and exercise recommendations.       Electronically signed by Cecilio Burrows RN on 1/7/2021 at 10:48 AM

## 2021-01-08 ENCOUNTER — APPOINTMENT (OUTPATIENT)
Dept: ULTRASOUND IMAGING | Age: 76
End: 2021-01-08
Payer: MEDICARE

## 2021-01-08 ENCOUNTER — HOSPITAL ENCOUNTER (EMERGENCY)
Age: 76
Discharge: ANOTHER HOSPITAL | End: 2021-01-08
Attending: EMERGENCY MEDICINE | Admitting: INTERNAL MEDICINE
Payer: MEDICARE

## 2021-01-08 ENCOUNTER — HOSPITAL ENCOUNTER (INPATIENT)
Age: 76
LOS: 1 days | Discharge: ANOTHER HOSPITAL | DRG: 177 | End: 2021-01-12
Attending: INTERNAL MEDICINE | Admitting: INTERNAL MEDICINE
Payer: MEDICARE

## 2021-01-08 VITALS
DIASTOLIC BLOOD PRESSURE: 62 MMHG | HEIGHT: 77 IN | WEIGHT: 285 LBS | TEMPERATURE: 98.9 F | BODY MASS INDEX: 33.65 KG/M2 | HEART RATE: 80 BPM | RESPIRATION RATE: 16 BRPM | SYSTOLIC BLOOD PRESSURE: 119 MMHG | OXYGEN SATURATION: 98 %

## 2021-01-08 DIAGNOSIS — M79.604 BILATERAL LEG PAIN: Primary | ICD-10-CM

## 2021-01-08 DIAGNOSIS — M79.605 BILATERAL LEG PAIN: Primary | ICD-10-CM

## 2021-01-08 DIAGNOSIS — E11.622 DIABETIC ANKLE ULCER (HCC): ICD-10-CM

## 2021-01-08 DIAGNOSIS — I82.503 CHRONIC DEEP VEIN THROMBOSIS (DVT) OF BOTH LOWER EXTREMITIES, UNSPECIFIED VEIN (HCC): ICD-10-CM

## 2021-01-08 DIAGNOSIS — R53.1 GENERAL WEAKNESS: ICD-10-CM

## 2021-01-08 DIAGNOSIS — L97.309 DIABETIC ANKLE ULCER (HCC): ICD-10-CM

## 2021-01-08 LAB
ALBUMIN SERPL-MCNC: 3 GM/DL (ref 3.4–5)
ALP BLD-CCNC: 71 IU/L (ref 40–129)
ALT SERPL-CCNC: 16 U/L (ref 10–40)
ANION GAP SERPL CALCULATED.3IONS-SCNC: 9 MMOL/L (ref 4–16)
AST SERPL-CCNC: 22 IU/L (ref 15–37)
BACTERIA: NEGATIVE /HPF
BASOPHILS ABSOLUTE: 0 K/CU MM
BASOPHILS RELATIVE PERCENT: 0.5 % (ref 0–1)
BILIRUB SERPL-MCNC: 0.6 MG/DL (ref 0–1)
BILIRUBIN URINE: NEGATIVE MG/DL
BLOOD, URINE: NEGATIVE
BUN BLDV-MCNC: 7 MG/DL (ref 6–23)
CALCIUM SERPL-MCNC: 8.1 MG/DL (ref 8.3–10.6)
CHLORIDE BLD-SCNC: 105 MMOL/L (ref 99–110)
CLARITY: CLEAR
CO2: 23 MMOL/L (ref 21–32)
COLOR: YELLOW
CREAT SERPL-MCNC: 1 MG/DL (ref 0.9–1.3)
DIFFERENTIAL TYPE: ABNORMAL
EOSINOPHILS ABSOLUTE: 0 K/CU MM
EOSINOPHILS RELATIVE PERCENT: 0.5 % (ref 0–3)
GFR AFRICAN AMERICAN: >60 ML/MIN/1.73M2
GFR NON-AFRICAN AMERICAN: >60 ML/MIN/1.73M2
GLUCOSE BLD-MCNC: 159 MG/DL (ref 70–99)
GLUCOSE, URINE: 50 MG/DL
HCT VFR BLD CALC: 35.8 % (ref 42–52)
HEMOGLOBIN: 11.6 GM/DL (ref 13.5–18)
HYALINE CASTS: 2 /LPF
IMMATURE NEUTROPHIL %: 0.4 % (ref 0–0.43)
INR BLD: 1.77 INDEX
KETONES, URINE: NEGATIVE MG/DL
LEUKOCYTE ESTERASE, URINE: NEGATIVE
LYMPHOCYTES ABSOLUTE: 2.1 K/CU MM
LYMPHOCYTES RELATIVE PERCENT: 37 % (ref 24–44)
MCH RBC QN AUTO: 30 PG (ref 27–31)
MCHC RBC AUTO-ENTMCNC: 32.4 % (ref 32–36)
MCV RBC AUTO: 92.5 FL (ref 78–100)
MONOCYTES ABSOLUTE: 0.8 K/CU MM
MONOCYTES RELATIVE PERCENT: 13.9 % (ref 0–4)
MUCUS: ABNORMAL HPF
NITRITE URINE, QUANTITATIVE: NEGATIVE
NUCLEATED RBC %: 0 %
PDW BLD-RTO: 18.1 % (ref 11.7–14.9)
PH, URINE: 5 (ref 5–8)
PLATELET # BLD: 215 K/CU MM (ref 140–440)
PMV BLD AUTO: 11.7 FL (ref 7.5–11.1)
POTASSIUM SERPL-SCNC: 3.3 MMOL/L (ref 3.5–5.1)
PROTEIN UA: 30 MG/DL
PROTHROMBIN TIME: 21.5 SECONDS (ref 11.7–14.5)
RBC # BLD: 3.87 M/CU MM (ref 4.6–6.2)
RBC URINE: <1 /HPF (ref 0–3)
SEGMENTED NEUTROPHILS ABSOLUTE COUNT: 2.7 K/CU MM
SEGMENTED NEUTROPHILS RELATIVE PERCENT: 47.7 % (ref 36–66)
SODIUM BLD-SCNC: 137 MMOL/L (ref 135–145)
SPECIFIC GRAVITY UA: 1.02 (ref 1–1.03)
TOTAL CK: 110 IU/L (ref 38–174)
TOTAL IMMATURE NEUTOROPHIL: 0.02 K/CU MM
TOTAL NUCLEATED RBC: 0 K/CU MM
TOTAL PROTEIN: 6.8 GM/DL (ref 6.4–8.2)
TRICHOMONAS: ABNORMAL /HPF
UROBILINOGEN, URINE: 1 MG/DL (ref 0.2–1)
WBC # BLD: 5.7 K/CU MM (ref 4–10.5)
WBC UA: <1 /HPF (ref 0–2)

## 2021-01-08 PROCEDURE — 85610 PROTHROMBIN TIME: CPT

## 2021-01-08 PROCEDURE — 85025 COMPLETE CBC W/AUTO DIFF WBC: CPT

## 2021-01-08 PROCEDURE — 96372 THER/PROPH/DIAG INJ SC/IM: CPT

## 2021-01-08 PROCEDURE — 6370000000 HC RX 637 (ALT 250 FOR IP): Performed by: PHYSICIAN ASSISTANT

## 2021-01-08 PROCEDURE — 99285 EMERGENCY DEPT VISIT HI MDM: CPT

## 2021-01-08 PROCEDURE — 6360000002 HC RX W HCPCS: Performed by: PHYSICIAN ASSISTANT

## 2021-01-08 PROCEDURE — 81001 URINALYSIS AUTO W/SCOPE: CPT

## 2021-01-08 PROCEDURE — 93970 EXTREMITY STUDY: CPT

## 2021-01-08 PROCEDURE — G0378 HOSPITAL OBSERVATION PER HR: HCPCS

## 2021-01-08 PROCEDURE — 82550 ASSAY OF CK (CPK): CPT

## 2021-01-08 PROCEDURE — 80053 COMPREHEN METABOLIC PANEL: CPT

## 2021-01-08 RX ORDER — BUPROPION HYDROCHLORIDE 150 MG/1
300 TABLET ORAL EVERY MORNING
Status: CANCELLED | OUTPATIENT
Start: 2021-01-09

## 2021-01-08 RX ORDER — PANTOPRAZOLE SODIUM 40 MG/1
40 TABLET, DELAYED RELEASE ORAL
Status: CANCELLED | OUTPATIENT
Start: 2021-01-09

## 2021-01-08 RX ORDER — NICOTINE POLACRILEX 4 MG
15 LOZENGE BUCCAL PRN
Status: CANCELLED | OUTPATIENT
Start: 2021-01-08

## 2021-01-08 RX ORDER — ASPIRIN 81 MG/1
81 TABLET, CHEWABLE ORAL DAILY
Status: CANCELLED | OUTPATIENT
Start: 2021-01-08

## 2021-01-08 RX ORDER — ALOGLIPTIN 12.5 MG/1
12.5 TABLET, FILM COATED ORAL DAILY
Status: CANCELLED | OUTPATIENT
Start: 2021-01-08

## 2021-01-08 RX ORDER — ACETAMINOPHEN 650 MG/1
650 SUPPOSITORY RECTAL EVERY 6 HOURS PRN
Status: DISCONTINUED | OUTPATIENT
Start: 2021-01-08 | End: 2021-01-09 | Stop reason: HOSPADM

## 2021-01-08 RX ORDER — SODIUM CHLORIDE 0.9 % (FLUSH) 0.9 %
10 SYRINGE (ML) INJECTION EVERY 12 HOURS SCHEDULED
Status: CANCELLED | OUTPATIENT
Start: 2021-01-08

## 2021-01-08 RX ORDER — POTASSIUM CHLORIDE 20 MEQ/1
40 TABLET, EXTENDED RELEASE ORAL ONCE
Status: COMPLETED | OUTPATIENT
Start: 2021-01-08 | End: 2021-01-08

## 2021-01-08 RX ORDER — KETOROLAC TROMETHAMINE 30 MG/ML
15 INJECTION, SOLUTION INTRAMUSCULAR; INTRAVENOUS EVERY 6 HOURS PRN
Status: DISCONTINUED | OUTPATIENT
Start: 2021-01-08 | End: 2021-01-09 | Stop reason: HOSPADM

## 2021-01-08 RX ORDER — SODIUM CHLORIDE 0.9 % (FLUSH) 0.9 %
10 SYRINGE (ML) INJECTION PRN
Status: CANCELLED | OUTPATIENT
Start: 2021-01-08

## 2021-01-08 RX ORDER — DEXTROSE MONOHYDRATE 50 MG/ML
100 INJECTION, SOLUTION INTRAVENOUS PRN
Status: CANCELLED | OUTPATIENT
Start: 2021-01-08

## 2021-01-08 RX ORDER — ATORVASTATIN CALCIUM 40 MG/1
40 TABLET, FILM COATED ORAL NIGHTLY
Status: CANCELLED | OUTPATIENT
Start: 2021-01-08

## 2021-01-08 RX ORDER — GLIPIZIDE 10 MG/1
10 TABLET ORAL
Status: CANCELLED | OUTPATIENT
Start: 2021-01-09

## 2021-01-08 RX ORDER — ACETAMINOPHEN 325 MG/1
650 TABLET ORAL EVERY 6 HOURS PRN
Status: DISCONTINUED | OUTPATIENT
Start: 2021-01-08 | End: 2021-01-09 | Stop reason: HOSPADM

## 2021-01-08 RX ORDER — MORPHINE SULFATE 4 MG/ML
2 INJECTION, SOLUTION INTRAMUSCULAR; INTRAVENOUS ONCE
Status: COMPLETED | OUTPATIENT
Start: 2021-01-08 | End: 2021-01-08

## 2021-01-08 RX ORDER — POLYETHYLENE GLYCOL 3350 17 G/17G
17 POWDER, FOR SOLUTION ORAL DAILY PRN
Status: CANCELLED | OUTPATIENT
Start: 2021-01-08

## 2021-01-08 RX ORDER — DEXTROSE MONOHYDRATE 25 G/50ML
12.5 INJECTION, SOLUTION INTRAVENOUS PRN
Status: CANCELLED | OUTPATIENT
Start: 2021-01-08

## 2021-01-08 RX ORDER — WARFARIN SODIUM 5 MG/1
5 TABLET ORAL DAILY
Status: CANCELLED | OUTPATIENT
Start: 2021-01-08

## 2021-01-08 RX ORDER — POTASSIUM CHLORIDE 7.45 MG/ML
10 INJECTION INTRAVENOUS PRN
Status: CANCELLED | OUTPATIENT
Start: 2021-01-08

## 2021-01-08 RX ORDER — PROMETHAZINE HYDROCHLORIDE 25 MG/1
12.5 TABLET ORAL EVERY 6 HOURS PRN
Status: CANCELLED | OUTPATIENT
Start: 2021-01-08

## 2021-01-08 RX ORDER — KETOROLAC TROMETHAMINE 30 MG/ML
15 INJECTION, SOLUTION INTRAMUSCULAR; INTRAVENOUS ONCE
Status: COMPLETED | OUTPATIENT
Start: 2021-01-08 | End: 2021-01-08

## 2021-01-08 RX ORDER — POTASSIUM CHLORIDE 20 MEQ/1
40 TABLET, EXTENDED RELEASE ORAL PRN
Status: CANCELLED | OUTPATIENT
Start: 2021-01-08

## 2021-01-08 RX ORDER — POTASSIUM CHLORIDE 750 MG/1
10 CAPSULE, EXTENDED RELEASE ORAL DAILY
Status: CANCELLED | OUTPATIENT
Start: 2021-01-08

## 2021-01-08 RX ORDER — ONDANSETRON 2 MG/ML
4 INJECTION INTRAMUSCULAR; INTRAVENOUS EVERY 6 HOURS PRN
Status: CANCELLED | OUTPATIENT
Start: 2021-01-08

## 2021-01-08 RX ADMIN — POTASSIUM CHLORIDE 40 MEQ: 1500 TABLET, EXTENDED RELEASE ORAL at 16:17

## 2021-01-08 RX ADMIN — KETOROLAC TROMETHAMINE 15 MG: 30 INJECTION, SOLUTION INTRAMUSCULAR; INTRAVENOUS at 13:57

## 2021-01-08 RX ADMIN — MORPHINE SULFATE 2 MG: 4 INJECTION, SOLUTION INTRAMUSCULAR; INTRAVENOUS at 19:19

## 2021-01-08 ASSESSMENT — PAIN DESCRIPTION - PAIN TYPE: TYPE: ACUTE PAIN

## 2021-01-08 ASSESSMENT — PAIN SCALES - GENERAL: PAINLEVEL_OUTOF10: 8

## 2021-01-08 NOTE — ED NOTES
Bed: H-04  Expected date:   Expected time:   Means of arrival:   Comments:  EMS- leg pain, unable to bear weight     Natasha Padilla Aus  01/08/21 1259

## 2021-01-08 NOTE — PROGRESS NOTES
Hospitalist Consult       Name:  Holly Hou /Age/Sex:   [de-identified]77 y.o. male)   MRN & CSN:  [id number] & [id number] Admission Date/Time: 2021 12:54 PM   Location:  ED21/ED-21 PCP: Precious Hare MD       Hospital Day: 1        Admitting Physician: Dr. Thompson Led and Plan:   Holly Hou is a 77 y.o. male who presents with  Leg Pain (bilateral lower extremity pain, weakness)    Generalized weakness    Admit to [facility] under observation   PT/OT   CM to assist with placement\   Fall precautions     Chronic nonocclusive DVTs on long-term anticoagulation Coumadin. Subtherapeutic. Pharmacy to dose Coumadin   Weight based Lovenox for bridging   CTA    Chronic venous stasis/diabetic pressure ulcers lateral lower extremities by wound clinic   Wound consult     DMII with chronic complications and without long term use of insulin. Monitor FSBS and cover with medium dose SSI   Hold PO medications while inpatient    Essential hypertension- continue home antihypertensive regimen- Monitor BP trends. D/W ED Social work patient is good candidate for [employer] [facility]. D/W Hospitalist SHAYY who accepts as transfer  Transportation to be arranged by ED      Patient case discussed with ED provider    Diet Carb control    DVT Prophylaxis [x] Lovenox, []  Heparin, [] SCDs, [] Ambulation  [] Long term AC   GI Prophylaxis [x] PPI,  [] H2 Blocker,  [] Carafate,  [] Diet/Tube Feeds   Code Status Full     Disposition Admit to IP.     Patient plans to return home upon discharge   MDM [] Low, [x] Moderate,[]  High     -Patient assessment and plan discussed and reviewed with admitting physician: Geovany Guzman MD.       History of Present Illness:     Chief Complaint: Leg Pain (bilateral lower extremity pain, weakness) Aileen Shelton is a 77 y.o. male with a history of anemia, chronic venous hypertension, CKD, COPD, diabetes, chronic DVTs/PE, who presents from home with  Concerns of leg pain and bilateral extremity weakness. He was recently discharged from skilled rehab approximately 3 weeks ago due to running out of skilled days but concerned about worsening symptoms. He states he did not feel he was ready to go home and is seeking placement again. This he has an admission to this facility 11/111111/18/2020 for treatment of left leg cellulitis/KIRSTEN. Ten point ROS: reviewed negative, unless as noted in above HPI. Objective: Intake/Output Summary (Last 24 hours) at 1/8/2021 1827  Last data filed at 1/8/2021 1609  Gross per 24 hour   Intake    Output 575 ml   Net -575 ml        Vitals:   Vitals:    01/08/21 1420 01/08/21 1432 01/08/21 1503 01/08/21 1702   BP:  121/76 123/60 (!) 140/64   Pulse:       Resp:       Temp:       TempSrc:       SpO2: 92%      Weight:       Height:           Physical Exam: 01/08/21     GEN -Awake nontoxic appearing male, sitting upright in bed , NAD. obese body habitus. Appears given age. EYES -PERRLA. No scleral erythema, discharge, or conjunctivitis. HENT -MM are moist. Oral pharynx without exudates, no evidence of thrush. NECK -Supple, no apparent thyromegaly or masses. RESP -CTA, no wheezes, rales or rhonchi. Symmetric chest movement while on RA.   C/V -S1/S2 auscultated. RRR without appreciable M/R/G. No JVD or carotid bruits. Peripheral pulses equal bilaterally and palpable. Cap refill <3 sec. +2 peripheral edema. GI -Abdomen is soft non distended and without significant TTP. + BS. No masses or guarding. Rectal exam deferred. No HSM   -No CVA/ flank tenderness. Starkey catheter is not present. LYMPH-No palpable cervical lymphadenopathy and no hepatosplenomegaly. No petechiae or ecchymoses. MS -No gross joint deformities. SKIN -Normal coloration, warm, dry. This is ulcerations appear healing  NEURO-Cranial nerves appear grossly intact, normal speech, no lateralizing weakness. PSYC-Awake, alert, oriented x 4- person, place, time, situation,  Appropriate affect. 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 Carolee Seay Anemia 11/8/2011    Arm fracture Dimple Mocha     Dr Maggie Ernandez with also yearly DM exam    Cataract 11/12    worsening-Dr. Dianna Javier    Cellulitis of left lower leg     Chronic venous hypertension with ulcer ([address] 11/18/2011    CKD (chronic kidney disease) Feb 2012    Renal u/S normal     Colon polyps     Dr. Carolee Seay COPD (chronic obstructive pulmonary disease) ([address]     Diabetes mellitus ([address] 1993    Diabetes mellitus ([address]     Diabetes mellitus with peripheral circulatory disorder ([address] 10/2/2015    Diabetes mellitus with skin ulcer ([address] 10/2/2015    Diabetic peripheral neuropathy ([address] 11/12    + EMG, NCS    Diabetic skin ulcer associated with type 2 diabetes mellitus ([address]     Diverticulosis 4/23/12    mild, left colon    Femoral DVT (deep venous thrombosis) ([address] 09/2011    PARTIAL,CHRONIC-SPFLD HEART SURGEONS    GERD (gastroesophageal reflux disease)     Glaucoma 11/12    open angle-Dr. Dequan Castellon H/O cardiac catheterization 6/3/14    EF55% normal study    H/O Doppler ultrasound 03/26/15    Carotid US- no significant stenosis noted bilaterally.  H/O echocardiogram 11/21/2019    EF50-55%, Mild AR. Moderately dilated right ventricle with negative Solis's sign.  H/O mumps orchitis     as a youth    Hemorrhoids 4/23/12    Dr. Kaycee Yung; repeat colonoscopy 3 years    History of nuclear stress test 11/21/2019    EF 60%, Normal study.     HLD (hyperlipidemia)     Hx of Doppler ultrasound 1/06/15 Lymph node seen in left groin area. No bilateral stenosis.     Hyperlipemia     Hyperlipidemia     Hypertension 1992    Hypertension     Idiopathic chronic venous hypertension of left lower extremity with ulcer and inflammation (HCC) 10/2/2015    Leg ulcer ([address] 1978-present    following at wound center, Dr Brock Awan No diabetic retinopathy OU 11/12    Dr. Jaylon Almonte chronic ulcer of left lower leg with fat layer exposed ([address] 10/2/2015    Pulmonary embolism ([address] 11/13    patient on coumadin    Sleep apnea     doesnt always use cpap dt it drys him out    SOB (shortness of breath) Oct 2011    Stress test normal.     Tendinitis 1973    plantar tendons    Type II or unspecified type diabetes mellitus with other specified manifestations, not stated as uncontrolled 2/8/2013    Unspecified venous (peripheral) insufficiency     Urticaria     WD-Cellulitis of right anterior lower leg 9/5/2019    WD-Cellulitis of right lower extremity 3/26/2020    WD-Chronic venous hypertension (idiopathic) with ulcer of left lower extremity (CODE) ([address] 6/27/2019    WD-Chronic venous hypertension with inflammation, right 9/19/2019    WD-Decubitus ulcer of left buttock, stage 3 ([address] 6/25/2020    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 to breakdown of skin ([address] 3/26/2020    WD-Open wound of hand without complication, left, initial encounter 12/12/2019    WD-Pressure injury of left buttock, stage 2 ([address] 1/2/2020    WD-Pressure injury of left buttock, stage 3 ([address] 3/12/2020    WD-Pressure injury of right buttock, stage 3 ([address] 3/12/2020    WD-Skin tear of right forearm without complication 5/11/6768    WD-Ulcer of right pretibial region, with fat layer exposed ([address] 10/17/2019       Past Surgical  History: ARIPiprazole (ABILIFY) 15 MG tablet Take 1 tablet by mouth daily 6/10/20 11/11/20  WINDY Thomas - NP   atorvastatin (LIPITOR) 40 MG tablet Take 1 tablet by mouth nightly 6/10/20 11/11/20  WINDY Thomas - NP   furosemide (LASIX) 20 MG tablet Take 1 tablet by mouth 2 times daily 6/10/20 11/11/20  WINDY Thomas - NP   metFORMIN (GLUCOPHAGE) 500 MG tablet Take 1 tablet by mouth daily (with breakfast) 6/10/20 11/11/20  WINDY Thomas - SCOTT   glycopyrrolate-formoterol (BEVESPI AEROSPHERE) 9-4.8 MCG/ACT AERO Inhale 2 puffs into the lungs 2 times daily 6/3/20   Annette Bishop MD   potassium chloride (MICRO-K) 10 MEQ extended release capsule Take 10 mEq by mouth daily    Historical Provider, MD   metoprolol tartrate (LOPRESSOR) 25 MG tablet Take 0.5 tablets by mouth 2 times daily 1/9/20   Carmen Zamudio MD   Zinc Oxide (DESITIN CREAMY EX) Apply 13 % topically 11/11/2020 Patient states he applies this to lower bilat lower legs    Historical Provider, MD   aspirin 81 MG chewable tablet Take 1 tablet by mouth daily 11/25/19   Adelita Torrez MD         Medications:   Medications:    Infusions:   PRN Meds:     Data:     Laboratory this visit:  Reviewed  Recent Labs     01/08/21  1400   WBC 5.7   HGB 11.6*   HCT 35.8*         Recent Labs     01/08/21  1400      K 3.3*      CO2 23   BUN 7   CREATININE 1.0     Recent Labs     01/08/21  1400   AST 22   ALT 16   BILITOT 0.6   ALKPHOS 71     Recent Labs     01/08/21  1400   INR 1.77         Radiology this visit:  Reviewed.     Vl Dup Lower Extremity Venous Bilateral    Result Date: 1/8/2021 EXAMINATION: DUPLEX VENOUS ULTRASOUND OF THE BILATERAL LOWER EXTREMITIES, 1/8/2021 3:30 pm TECHNIQUE: Duplex ultrasound and Doppler images were obtained of the bilateral lower extremities. COMPARISON: 11/22/2019. HISTORY: ORDERING SYSTEM PROVIDED HISTORY: Leg pain, hx of DVT TECHNOLOGIST PROVIDED HISTORY: Reason for exam:   Leg pain, hx of DVT Reason for Exam: Leg pain, hx DVT FINDINGS: Evaluation is partially limited due to overlying soft tissue swelling. The right common femoral vein appears patent. Right proximal, mid femoral vein is partially compressible which may be due to chronic right femoral vein thrombosis seen on the prior study. The distal aspect of the right femoral vein is poorly visualized. The right popliteal vein appears patent. The visualized portions of the right calf veins appear patent. Again seen is partial compression of the left common femoral vein with chronic appearing nonocclusive thrombi of the left common femoral vein as seen on the prior study. Also again seen is partial compression of midportion of the left femoral vein, suspicious for chronic nonocclusive thrombus. Distal aspect of the left femoral vein is poorly visualized. There is also incompressibility of the left posterior tibial vein. Findings suggestive of bilateral nonocclusive thrombi, which appear chronic. These involve the right proximal and mid femoral vein, as well as left common femoral vein and the midportion of the left femoral vein. There also appears to be additional calf vein thrombosis involving the left posterior tibial vein, which may be acute.          EKG this visit:  Reviewed           Electronically signed by WINDY Alejo CNP on 1/8/2021 at 6:27 PM

## 2021-01-08 NOTE — CARE COORDINATION
CM received patient as a consult. CM went to see patient and introduced self and explained role of CM. Patient stated that he was in [place] but was released to early and is not doing well, but Medicaid \"forced him to leave SNF b/c they refused to pay any longer. \" -- according to patient. Patient reports that he lives at home alone in a one story residence. Patient has [facility] insurance and can afford his medication. Patient states that he usually drives self around and to and from his own physician appointments, but patients' PCP explained to patient that patient needs to only drive in rare cases such as physician appointments. CM gave patient information and phone number for [employer] so patient can call them upon discharge and get assistance with driving to and from physician appointments, picking up medications, groceries etc. [facility] will also assist patient with getting groceries, cooking food, bringing patient meals and/or housekeeping. Patient also feels that he can benefit from [employer]. Patient reports that he would like to utilize [facility] as a [facility] agency for OT/PT, Nursing and Case Management. CM faxed over order to [phone]. Patient to be discharged with [employer] and [facility] for OT/PT, Case Management and Nursing @ [phone] and fax: [phone]. CM has already faxed in referral.    17:45 Pasha JUAREZ came to talk to CM and let CM know that patient is not wanting to go home with [facility] but instead would like to return to [facility]. Patient is stating that he is unable to walk at this time. CM explained that patient told CM that he is out of time through his insurance [facility] and was \"forced to leave [place] at his last stay. \" 17:50 CM called Sydnie in Admissions w/[facility] SN[facility]. Sydnie reported that patient had been at their SNF for 25 days before [facility] ask patient to private pay. Sydnie reported that patient could come back to facility but Cassgrecia SimonsLinda would need updated OT/PT evals completed on patient first and pre-cert completed and this will not be finished until Monday. CM went to speak with patient. Patient reported that he would like to go back to [facility]. CM explained the above with patient. Patient would like to be admitted for OBS in order for possible NH placement. Patient to be discharged when medically stable to possible placement to [facility] after pre-cert and updated OT/PT eval is completed. Covid Test will also need completed.

## 2021-01-08 NOTE — PROGRESS NOTES
67-year-old male who has a previous history of lower extremity DVT. Lower extreme Doppler ultrasound showed bilateral lower extremity DVT which appears to be chronic. There is also concern of superimposed acute thrombus. He is chronically on warfarin.   INR 1.7.  Continue warfarin  Pharmacy to dose  Lovenox weight-based for bridging until INR  is at goal of 2-3  CTA pulmonary

## 2021-01-08 NOTE — ED PROVIDER NOTES
For all further details of the patient's emergency department visit, please see the advanced practice provider's documentation. Comment: Please note this report has been produced using speech recognition software and may contain errors related to that system including errors in grammar, punctuation, and spelling, as well as words and phrases that may be inappropriate. If there are any questions or concerns please feel free to contact the dictating provider for clarification.         Rick Mccullough MD  01/08/21 1981

## 2021-01-08 NOTE — Clinical Note
Patient Class: Observation [104]   REQUIRED: Diagnosis: Generalized weakness [342616]   Estimated Length of Stay: Estimated stay of less than 2 midnights   Admitting Provider: Reinier Reno [[id number]]

## 2021-01-08 NOTE — ED PROVIDER NOTES
PCP: Cuate Alonzo MD    [address] [place] [address]    Chief Complaint   Patient presents with    Leg Pain     bilateral lower extremity pain, weakness       I have seen and evaluated this patient with Dr. Spike Velez      Lists of hospitals in the [place]    Jeni Chan is a 77 y.o. male who presents with progressive weakness in the legs and bilateral leg pain,  An achy, constant 8/10 pain from hips to knees and also in the right heel. Was hospitalized 2 months ago for cellulitis and spent 5 weeks at [address] [place] he wasn't ready to go home, but had to bc of medicare. Has had difficulty since then, but the pain is worse the last 2 days. Lives alone and is having a lot of difficulty at home. Denies fevers, N/v/d, cp or sob. Follows with wound care for bilateral lower leg ulcers. No distal numbness, tingling, weakness, or functional deficit. No other pain. REVIEW OF SYSTEMS    General: Denies constitutional symptoms. Cardiac: Denies chest wall injury or chest pain  Pulmonary: Denies chest wall injury or shortness of breath  GI: Denies abdomen injury or abdominal pain  Musculoskeletal:  Denies any other musculoskeletal pain or injuries, including chest wall and back. Skin: Skin intact. Lymphatics:  No nodules or streaks.       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 Phillis Curling Anemia 11/8/2011    Arm fracture Sloane Mefrancisco Somers with also yearly DM exam    Cataract 11/12    worsening-Dr. Lashonda Babin    Cellulitis of left lower leg     Chronic venous hypertension with ulcer ([address] 11/18/2011    CKD (chronic kidney disease) Feb 2012    Renal u/S normal     Colon polyps     Dr. Phillis Curling COPD (chronic obstructive pulmonary disease) ([address]  Diabetes mellitus ([address] 1993    Diabetes mellitus ([address]     Diabetes mellitus with peripheral circulatory disorder ([address] 10/2/2015    Diabetes mellitus with skin ulcer ([address] 10/2/2015    Diabetic peripheral neuropathy ([address] 11/12    + EMG, NCS    Diabetic skin ulcer associated with type 2 diabetes mellitus ([address]     Diverticulosis 4/23/12    mild, left colon    Femoral DVT (deep venous thrombosis) ([address] 09/2011    PARTIAL,CHRONIC-SPFLD HEART SURGEONS    GERD (gastroesophageal reflux disease)     Glaucoma 11/12    open angle-Dr. Dmitri Stapleton H/O cardiac catheterization 6/3/14    EF55% normal study    H/O Doppler ultrasound 03/26/15    Carotid US- no significant stenosis noted bilaterally.  H/O echocardiogram 11/21/2019    EF50-55%, Mild AR. Moderately dilated right ventricle with negative Solis's sign.  H/O mumps orchitis     as a youth    Hemorrhoids 4/23/12    Dr. Ramesh Flynn; repeat colonoscopy 3 years    History of nuclear stress test 11/21/2019    EF 60%, Normal study.  HLD (hyperlipidemia)     Hx of Doppler ultrasound 1/06/15    Lymph node seen in left groin area. No bilateral stenosis.     Hyperlipemia     Hyperlipidemia     Hypertension 1992    Hypertension     Idiopathic chronic venous hypertension of left lower extremity with ulcer and inflammation ([address] 10/2/2015    Leg ulcer ([address] 1978-present    following at wound center, Dr Chantell Zazueta No diabetic retinopathy OU 11/12    Dr. Rocio Ramirez chronic ulcer of left lower leg with fat layer exposed ([address] 10/2/2015    Pulmonary embolism ([address] 11/13    patient on coumadin    Sleep apnea     doesnt always use cpap dt it drys him out    SOB (shortness of breath) Oct 2011    Stress test normal.     Tendinitis 1973    plantar tendons    Type II or unspecified type diabetes mellitus with other specified manifestations, not stated as uncontrolled 2/8/2013    Unspecified venous (peripheral) insufficiency  Urticaria     WD-Cellulitis of right anterior lower leg 9/5/2019    WD-Cellulitis of right lower extremity 3/26/2020    WD-Chronic venous hypertension (idiopathic) with ulcer of left lower extremity (CODE) ([address] 6/27/2019    WD-Chronic venous hypertension with inflammation, right 9/19/2019    WD-Decubitus ulcer of left buttock, stage 3 ([address] 6/25/2020    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 to breakdown of skin ([address] 3/26/2020    WD-Open wound of hand without complication, left, initial encounter 12/12/2019    WD-Pressure injury of left buttock, stage 2 ([address] 1/2/2020    WD-Pressure injury of left buttock, stage 3 ([address] 3/12/2020    WD-Pressure injury of right buttock, stage 3 ([address] 3/12/2020    WD-Skin tear of right forearm without complication 3/36/3986    WD-Ulcer of right pretibial region, with fat layer exposed ([address] 10/17/2019     Past Surgical History:   Procedure Laterality Date    BREAST SURGERY  1970s    benign tumors bilaterally    CARDIAC CATHETERIZATION  6/3/14    EF55% normal study    CARPAL TUNNEL RELEASE Left 1993    CARPAL TUNNEL RELEASE      CATARACT REMOVAL Right 3/11/2013    Dr. Renell Brittle Left 2/25/2013    Dr. Gary Lockhart  2006    polyps    COLONOSCOPY  11/21/11    villous component--f/u colonoscopy will be needed in 6 months    COLONOSCOPY  4/23/12    mild diverticulosis, internal hemorrhoids; repeat in 3 years (Dr. Cuong Vanessa)    COLONOSCOPY  08/04/2016    mild diverticulosis, three colon polyps    HERNIA REPAIR  1970s    HERNIA REPAIR      SKIN GRAFT  1978-present    skin grafts to left ankle ulcers    SPLENECTOMY  1958    SPLENECTOMY      TONSILLECTOMY  1953    VARICOSE VEIN SURGERY Left 2009       CURRENT MEDICATIONS    Current Outpatient Rx   Medication Sig Dispense Refill  warfarin (COUMADIN) 5 MG tablet Take 1 tablet by mouth daily Except take 1 and 1/2 tablets by mouth on Mondays or as directed by clinic 98 tablet 1    alogliptin (NESINA) 12.5 MG TABS tablet Take 1 tablet by mouth daily 60 tablet     glipiZIDE (GLUCOTROL) 10 MG tablet Take 1 tablet by mouth 2 times daily (before meals) 60 tablet 3    buPROPion (WELLBUTRIN XL) 300 MG extended release tablet Take 1 tablet by mouth every morning 90 tablet 0    escitalopram (LEXAPRO) 20 MG tablet Take 1 tablet by mouth daily 90 tablet 0    omeprazole (PRILOSEC) 20 MG delayed release capsule TAKE ONE (1) CAPSULE BY MOUTH ONCE DAILY 90 capsule 1    ARIPiprazole (ABILIFY) 15 MG tablet Take 1 tablet by mouth daily 90 tablet 0    atorvastatin (LIPITOR) 40 MG tablet Take 1 tablet by mouth nightly 90 tablet 0    furosemide (LASIX) 20 MG tablet Take 1 tablet by mouth 2 times daily 180 tablet 0    metFORMIN (GLUCOPHAGE) 500 MG tablet Take 1 tablet by mouth daily (with breakfast) 90 tablet 0    glycopyrrolate-formoterol (BEVESPI AEROSPHERE) 9-4.8 MCG/ACT AERO Inhale 2 puffs into the lungs 2 times daily 1 Inhaler 5    potassium chloride (MICRO-K) 10 MEQ extended release capsule Take 10 mEq by mouth daily      metoprolol tartrate (LOPRESSOR) 25 MG tablet Take 0.5 tablets by mouth 2 times daily 90 tablet 3    Zinc Oxide (DESITIN CREAMY EX) Apply 13 % topically 11/11/2020 Patient states he applies this to lower bilat lower legs      aspirin 81 MG chewable tablet Take 1 tablet by mouth daily 30 tablet 3       ALLERGIES    Allergies   Allergen Reactions    Cavilon Durable Barrier [Mineral Oil-Dimeth-Coconut Oil]     Parabens Hives    Parabens Hives    Prinivil [Lisinopril] Swelling    Cortisone Rash    Cortisone Rash    Dilaudid [Hydromorphone Hcl] Rash    Penicillins Rash    Penicillins Rash    Sulfamethoxazole-Trimethoprim Nausea Only    Tape [place] Tape] Rash       SOCIAL & FAMILY HISTORY    Social History Socioeconomic History    Marital status: Single     Spouse name: Not on file    Number of children: Not on file    Years of education: Not on file    Highest education level: Not on file   Occupational History    Not on file   Social Needs    Financial resource strain: Not on file    Food insecurity     Worry: Not on file     Inability: Not on file    Transportation needs     Medical: Not on file     Non-medical: Not on file   Tobacco Use    Smoking status: Former Smoker     Packs/day: 2.00     Years: 35.00     Pack years: 70.00     Types: Cigarettes     Quit date:      Years since quittin.0    Smokeless tobacco: Never Used    Tobacco comment: chew--30 years.   Reviewed 2015   Substance and Sexual Activity    Alcohol use: Yes     Comment: soc    Drug use: Never    Sexual activity: Not on file   Lifestyle    Physical activity     Days per week: Not on file     Minutes per session: Not on file    Stress: Not on file   Relationships    Social connections     Talks on phone: Not on file     Gets together: Not on file     Attends Rastafari service: Not on file     Active member of club or organization: Not on file     Attends meetings of clubs or organizations: Not on file     Relationship status: Not on file    Intimate partner violence     Fear of current or ex partner: Not on file     Emotionally abused: Not on file     Physically abused: Not on file     Forced sexual activity: Not on file   Other Topics Concern    Not on file   Social History Narrative    ** Merged History Encounter **          Family History   Problem Relation Age of Onset    Heart Disease Father     Cancer Father         prostate    High Blood Pressure Father     High Cholesterol Father     Cancer Brother     Diabetes Brother     Thyroid Disease Brother            PHYSICAL EXAM VITAL SIGNS: BP (!) 140/65   Pulse 82   Temp 99.3 °F (37.4 °C) (Oral)   Resp 16   Ht 6' 5\" (1.956 m)   Wt 285 lb (129.3 kg)   SpO2 94%   BMI 33.80 kg/m²   Constitutional:  Well developed, well nourished, no acute distress, non-toxic appearance   HENT:  Atraumatic    Musculoskeletal:    chronic wounds of the left and right ankles with chronic appearing changes to the skin of the lower extremities. The lower legs are erythematous but there is no pitting edema. No calf swelling or tenderness. No cord. Homans sign negative. Distal capillary refill, pulses, sensation and motor intact. Examination of remaining extremities reveals active ROM of  joints without ligamentous laxity. Theres no obvious joint or bony deformity. Lymphatics:  No swollen nodes or streaks. Integument:  Well hydrated, no rash. skin intact except where noted  Vascular: affected extremity distally neurovascularly intact - pulses, sensation, and capillary refill intact. Neurologic:  Awake alert, normal flow of speech. Cranial nerves II through XII grossly intact. Psychiatric: Cooperative, pleasant affect    RADIOLOGY/PROCEDURES    VL DUP LOWER EXTREMITY VENOUS BILATERAL   Preliminary Result   Findings suggestive of bilateral nonocclusive thrombi, which appear chronic. These involve the right proximal and mid femoral vein, as well as left common   femoral vein and the midportion of the left femoral vein. There also appears   to be additional calf vein thrombosis involving the left posterior tibial   vein, which may be acute.            Results for orders placed or performed during the hospital encounter of 01/08/21   CBC auto diff   Result Value Ref Range    WBC 5.7 4.0 - 10.5 K/CU MM    RBC 3.87 (L) 4.6 - 6.2 M/CU MM    Hemoglobin 11.6 (L) 13.5 - 18.0 GM/DL    Hematocrit 35.8 (L) 42 - 52 %    MCV 92.5 78 - 100 FL    MCH 30.0 27 - 31 PG    MCHC 32.4 32.0 - 36.0 %    RDW 18.1 (H) 11.7 - 14.9 %    Platelets 280 173 - 888 K/CU MM MPV 11.7 (H) 7.5 - 11.1 FL    Differential Type AUTOMATED DIFFERENTIAL     Segs Relative 47.7 36 - 66 %    Lymphocytes % 37.0 24 - 44 %    Monocytes % 13.9 (H) 0 - 4 %    Eosinophils % 0.5 0 - 3 %    Basophils % 0.5 0 - 1 %    Segs Absolute 2.7 K/CU MM    Lymphocytes Absolute 2.1 K/CU MM    Monocytes Absolute 0.8 K/CU MM    Eosinophils Absolute 0.0 K/CU MM    Basophils Absolute 0.0 K/CU MM    Nucleated RBC % 0.0 %    Total Nucleated RBC 0.0 K/CU MM    Total Immature Neutrophil 0.02 K/CU MM    Immature Neutrophil % 0.4 0 - 0.43 %   CMP   Result Value Ref Range    Sodium 137 135 - 145 MMOL/L    Potassium 3.3 (L) 3.5 - 5.1 MMOL/L    Chloride 105 99 - 110 mMol/L    CO2 23 21 - 32 MMOL/L    BUN 7 6 - 23 MG/DL    CREATININE 1.0 0.9 - 1.3 MG/DL    Glucose 159 (H) 70 - 99 MG/DL    Calcium 8.1 (L) 8.3 - 10.6 MG/DL    Alb 3.0 (L) 3.4 - 5.0 GM/DL    Total Protein 6.8 6.4 - 8.2 GM/DL    Total Bilirubin 0.6 0.0 - 1.0 MG/DL    ALT 16 10 - 40 U/L    AST 22 15 - 37 IU/L    Alkaline Phosphatase 71 40 - 129 IU/L    GFR Non-African American >60 >60 mL/min/1.73m2    GFR African American >60 >60 mL/min/1.73m2    Anion Gap 9 4 - 16   CK   Result Value Ref Range    Total  38 - 174 IU/L   Urinalysis   Result Value Ref Range    Color, UA YELLOW YELLOW    Clarity, UA CLEAR CLEAR    Glucose, Urine 50 (A) NEGATIVE MG/DL    Bilirubin Urine NEGATIVE NEGATIVE MG/DL    Ketones, Urine NEGATIVE NEGATIVE MG/DL    Specific Gravity, UA 1.023 1.001 - 1.035    Blood, Urine NEGATIVE NEGATIVE    pH, Urine 5.0 5.0 - 8.0    Protein, UA 30 (A) NEGATIVE MG/DL    Urobilinogen, Urine 1 0.2 - 1.0 MG/DL    Nitrite Urine, Quantitative NEGATIVE NEGATIVE    Leukocyte Esterase, Urine NEGATIVE NEGATIVE    RBC, UA <1 0 - 3 /HPF    WBC, UA <1 0 - 2 /HPF    Bacteria, UA NEGATIVE NEGATIVE /HPF    Mucus, UA RARE (A) NEGATIVE HPF    Trichomonas, UA NONE SEEN NONE SEEN /HPF    Hyaline Casts, UA 2 /LPF   Protime-INR   Result Value Ref Range Protime 21.5 (H) 11.7 - 14.5 SECONDS    INR 1.77 INDEX       ED COURSE & MEDICAL DECISION MAKING        Patient presents as above with pain and weakness in the lower extremities. On exam he has chronic changes of the lower extremities, 2 chronic wounds that do not appear to be acutely infected at this time. Patient is mildly hypertensive, mildly elevated temperature, other vital signs are normal.    Patient's urine is unknown concerning for infection, routine labs reveal normal white blood cell count, mildly decreased potassium at 3.3, no other clinically significant derangements. , INR subtherapeutic at 1.77. Ultrasound shows findings of bilateral nonocclusive thrombi which appear chronic. These involve right proximal and mid femoral vein as well as left common femoral vein in the midportion of the left femoral vein. There possibly is additional Vein thrombosis of the left posterior tibial vein which may be acute. Patient is still too weak to ambulate and says that he goes home he will not be able to care for himself. Case management is able to find placement for this patient at [place] but he needs to be admitted for PT, OT and precertification. Discussed case and presentation with Maria Teresa Wadsworth the hospitalist who agrees to admit the patient. Vital signs and nursing notes reviewed during ED course. All pertinent Lab data and radiographic results reviewed with patient at bedside. The patient and/or the family were informed of the results of any tests/labs/imaging, the treatment plan, and time was allotted to answer questions. Clinical  IMPRESSION    1. Bilateral leg pain    2. Chronic deep vein thrombosis (DVT) of both lower extremities, unspecified vein (HCC)    3. Diabetic ankle ulcer ([facility]    4.  General weakness Comment: Please note this report has been produced using speech recognition software and may contain errors related to that system including errors in grammar, punctuation, and spelling, as well as words and phrases that may be inappropriate. If there are any questions or concerns please feel free to contact the dictating provider for clarification.         Ramesh Yuan  01/08/21 1943

## 2021-01-09 ENCOUNTER — APPOINTMENT (OUTPATIENT)
Dept: CT IMAGING | Age: 76
DRG: 177 | End: 2021-01-09
Attending: INTERNAL MEDICINE
Payer: MEDICARE

## 2021-01-09 LAB
GLUCOSE BLD-MCNC: 137 MG/DL (ref 70–99)
GLUCOSE BLD-MCNC: 166 MG/DL (ref 70–99)
GLUCOSE BLD-MCNC: 173 MG/DL (ref 70–99)
GLUCOSE BLD-MCNC: 194 MG/DL (ref 70–99)
GLUCOSE BLD-MCNC: 207 MG/DL (ref 70–99)
INR BLD: 1.85 INDEX
PROTHROMBIN TIME: 21.3 SECONDS (ref 11.7–14.5)

## 2021-01-09 PROCEDURE — 6370000000 HC RX 637 (ALT 250 FOR IP): Performed by: INTERNAL MEDICINE

## 2021-01-09 PROCEDURE — 97162 PT EVAL MOD COMPLEX 30 MIN: CPT

## 2021-01-09 PROCEDURE — 97530 THERAPEUTIC ACTIVITIES: CPT

## 2021-01-09 PROCEDURE — 94640 AIRWAY INHALATION TREATMENT: CPT

## 2021-01-09 PROCEDURE — 6370000000 HC RX 637 (ALT 250 FOR IP): Performed by: NURSE PRACTITIONER

## 2021-01-09 PROCEDURE — 2700000000 HC OXYGEN THERAPY PER DAY

## 2021-01-09 PROCEDURE — 96372 THER/PROPH/DIAG INJ SC/IM: CPT

## 2021-01-09 PROCEDURE — 85610 PROTHROMBIN TIME: CPT

## 2021-01-09 PROCEDURE — 36415 COLL VENOUS BLD VENIPUNCTURE: CPT

## 2021-01-09 PROCEDURE — 82962 GLUCOSE BLOOD TEST: CPT

## 2021-01-09 PROCEDURE — 94761 N-INVAS EAR/PLS OXIMETRY MLT: CPT

## 2021-01-09 PROCEDURE — 2580000003 HC RX 258: Performed by: NURSE PRACTITIONER

## 2021-01-09 PROCEDURE — 71275 CT ANGIOGRAPHY CHEST: CPT

## 2021-01-09 PROCEDURE — 6360000002 HC RX W HCPCS: Performed by: NURSE PRACTITIONER

## 2021-01-09 PROCEDURE — 6360000004 HC RX CONTRAST MEDICATION: Performed by: INTERNAL MEDICINE

## 2021-01-09 PROCEDURE — G0378 HOSPITAL OBSERVATION PER HR: HCPCS

## 2021-01-09 RX ORDER — CLOTRIMAZOLE AND BETAMETHASONE DIPROPIONATE 10; .64 MG/G; MG/G
CREAM TOPICAL 2 TIMES DAILY
COMMUNITY

## 2021-01-09 RX ORDER — ACETAMINOPHEN 325 MG/1
650 TABLET ORAL EVERY 6 HOURS PRN
Status: DISCONTINUED | OUTPATIENT
Start: 2021-01-09 | End: 2021-01-13 | Stop reason: HOSPADM

## 2021-01-09 RX ORDER — DEXTROSE MONOHYDRATE 50 MG/ML
100 INJECTION, SOLUTION INTRAVENOUS PRN
Status: DISCONTINUED | OUTPATIENT
Start: 2021-01-09 | End: 2021-01-13 | Stop reason: HOSPADM

## 2021-01-09 RX ORDER — ONDANSETRON 2 MG/ML
4 INJECTION INTRAMUSCULAR; INTRAVENOUS EVERY 6 HOURS PRN
Status: DISCONTINUED | OUTPATIENT
Start: 2021-01-09 | End: 2021-01-13 | Stop reason: HOSPADM

## 2021-01-09 RX ORDER — DOXYCYCLINE HYCLATE 100 MG
100 TABLET ORAL EVERY 12 HOURS SCHEDULED
Status: DISCONTINUED | OUTPATIENT
Start: 2021-01-09 | End: 2021-01-12

## 2021-01-09 RX ORDER — OXYCODONE HYDROCHLORIDE AND ACETAMINOPHEN 5; 325 MG/1; MG/1
1 TABLET ORAL EVERY 4 HOURS PRN
Status: DISCONTINUED | OUTPATIENT
Start: 2021-01-09 | End: 2021-01-10

## 2021-01-09 RX ORDER — BUPROPION HYDROCHLORIDE 150 MG/1
300 TABLET ORAL EVERY MORNING
Status: DISCONTINUED | OUTPATIENT
Start: 2021-01-09 | End: 2021-01-13 | Stop reason: HOSPADM

## 2021-01-09 RX ORDER — NICOTINE POLACRILEX 4 MG
15 LOZENGE BUCCAL PRN
Status: DISCONTINUED | OUTPATIENT
Start: 2021-01-09 | End: 2021-01-13 | Stop reason: HOSPADM

## 2021-01-09 RX ORDER — ALOGLIPTIN 12.5 MG/1
12.5 TABLET, FILM COATED ORAL DAILY
Status: DISCONTINUED | OUTPATIENT
Start: 2021-01-09 | End: 2021-01-13 | Stop reason: HOSPADM

## 2021-01-09 RX ORDER — PANTOPRAZOLE SODIUM 40 MG/1
40 TABLET, DELAYED RELEASE ORAL
Status: DISCONTINUED | OUTPATIENT
Start: 2021-01-09 | End: 2021-01-13 | Stop reason: HOSPADM

## 2021-01-09 RX ORDER — PROMETHAZINE HYDROCHLORIDE 12.5 MG/1
12.5 TABLET ORAL EVERY 6 HOURS PRN
Status: DISCONTINUED | OUTPATIENT
Start: 2021-01-09 | End: 2021-01-13 | Stop reason: HOSPADM

## 2021-01-09 RX ORDER — OXYCODONE HYDROCHLORIDE 5 MG/1
5 TABLET ORAL EVERY 4 HOURS PRN
Status: DISCONTINUED | OUTPATIENT
Start: 2021-01-09 | End: 2021-01-10

## 2021-01-09 RX ORDER — ACETAMINOPHEN 650 MG/1
650 SUPPOSITORY RECTAL EVERY 6 HOURS PRN
Status: DISCONTINUED | OUTPATIENT
Start: 2021-01-09 | End: 2021-01-13 | Stop reason: HOSPADM

## 2021-01-09 RX ORDER — SODIUM CHLORIDE 0.9 % (FLUSH) 0.9 %
10 SYRINGE (ML) INJECTION PRN
Status: DISCONTINUED | OUTPATIENT
Start: 2021-01-09 | End: 2021-01-13 | Stop reason: HOSPADM

## 2021-01-09 RX ORDER — SODIUM CHLORIDE 0.9 % (FLUSH) 0.9 %
10 SYRINGE (ML) INJECTION EVERY 12 HOURS SCHEDULED
Status: DISCONTINUED | OUTPATIENT
Start: 2021-01-09 | End: 2021-01-13 | Stop reason: HOSPADM

## 2021-01-09 RX ORDER — WARFARIN SODIUM 5 MG/1
5 TABLET ORAL DAILY
Status: DISCONTINUED | OUTPATIENT
Start: 2021-01-09 | End: 2021-01-11 | Stop reason: DRUGHIGH

## 2021-01-09 RX ORDER — DEXTROSE MONOHYDRATE 25 G/50ML
12.5 INJECTION, SOLUTION INTRAVENOUS PRN
Status: DISCONTINUED | OUTPATIENT
Start: 2021-01-09 | End: 2021-01-13 | Stop reason: HOSPADM

## 2021-01-09 RX ORDER — ASPIRIN 81 MG/1
81 TABLET, CHEWABLE ORAL DAILY
Status: DISCONTINUED | OUTPATIENT
Start: 2021-01-09 | End: 2021-01-13 | Stop reason: HOSPADM

## 2021-01-09 RX ORDER — POLYETHYLENE GLYCOL 3350 17 G/17G
17 POWDER, FOR SOLUTION ORAL DAILY PRN
Status: DISCONTINUED | OUTPATIENT
Start: 2021-01-09 | End: 2021-01-13 | Stop reason: HOSPADM

## 2021-01-09 RX ORDER — ATORVASTATIN CALCIUM 40 MG/1
40 TABLET, FILM COATED ORAL NIGHTLY
Status: DISCONTINUED | OUTPATIENT
Start: 2021-01-09 | End: 2021-01-13 | Stop reason: HOSPADM

## 2021-01-09 RX ORDER — FUROSEMIDE 20 MG/1
20 TABLET ORAL 2 TIMES DAILY
Status: DISCONTINUED | OUTPATIENT
Start: 2021-01-09 | End: 2021-01-13 | Stop reason: HOSPADM

## 2021-01-09 RX ORDER — ESCITALOPRAM OXALATE 20 MG/1
20 TABLET ORAL DAILY
Status: DISCONTINUED | OUTPATIENT
Start: 2021-01-09 | End: 2021-01-13 | Stop reason: HOSPADM

## 2021-01-09 RX ADMIN — METOPROLOL TARTRATE 12.5 MG: 25 TABLET, FILM COATED ORAL at 09:18

## 2021-01-09 RX ADMIN — INSULIN LISPRO 2 UNITS: 100 INJECTION, SOLUTION INTRAVENOUS; SUBCUTANEOUS at 09:17

## 2021-01-09 RX ADMIN — ATORVASTATIN CALCIUM 40 MG: 40 TABLET, FILM COATED ORAL at 01:41

## 2021-01-09 RX ADMIN — GLYCOPYRROLATE AND FORMOTEROL FUMARATE 2 PUFF: 9; 4.8 AEROSOL, METERED RESPIRATORY (INHALATION) at 06:05

## 2021-01-09 RX ADMIN — METOPROLOL TARTRATE 12.5 MG: 25 TABLET, FILM COATED ORAL at 01:42

## 2021-01-09 RX ADMIN — FUROSEMIDE 20 MG: 20 TABLET ORAL at 09:18

## 2021-01-09 RX ADMIN — SODIUM CHLORIDE, PRESERVATIVE FREE 10 ML: 5 INJECTION INTRAVENOUS at 20:59

## 2021-01-09 RX ADMIN — ACETAMINOPHEN 650 MG: 325 TABLET ORAL at 20:58

## 2021-01-09 RX ADMIN — OXYCODONE 5 MG: 5 TABLET ORAL at 12:30

## 2021-01-09 RX ADMIN — OXYCODONE 5 MG: 5 TABLET ORAL at 23:40

## 2021-01-09 RX ADMIN — ATORVASTATIN CALCIUM 40 MG: 40 TABLET, FILM COATED ORAL at 20:58

## 2021-01-09 RX ADMIN — DOXYCYCLINE HYCLATE 100 MG: 100 TABLET, COATED ORAL at 20:58

## 2021-01-09 RX ADMIN — ESCITALOPRAM 20 MG: 20 TABLET, FILM COATED ORAL at 09:18

## 2021-01-09 RX ADMIN — ARIPIPRAZOLE 15 MG: 10 TABLET ORAL at 09:18

## 2021-01-09 RX ADMIN — OXYCODONE 5 MG: 5 TABLET ORAL at 06:44

## 2021-01-09 RX ADMIN — SODIUM CHLORIDE, PRESERVATIVE FREE 10 ML: 5 INJECTION INTRAVENOUS at 01:42

## 2021-01-09 RX ADMIN — FUROSEMIDE 20 MG: 20 TABLET ORAL at 20:58

## 2021-01-09 RX ADMIN — SODIUM CHLORIDE, PRESERVATIVE FREE 10 ML: 5 INJECTION INTRAVENOUS at 12:03

## 2021-01-09 RX ADMIN — ALOGLIPTIN 12.5 MG: 12.5 TABLET, FILM COATED ORAL at 09:18

## 2021-01-09 RX ADMIN — ENOXAPARIN SODIUM 135 MG: 150 INJECTION SUBCUTANEOUS at 20:59

## 2021-01-09 RX ADMIN — WARFARIN SODIUM 5 MG: 5 TABLET ORAL at 17:17

## 2021-01-09 RX ADMIN — ENOXAPARIN SODIUM 135 MG: 150 INJECTION SUBCUTANEOUS at 09:17

## 2021-01-09 RX ADMIN — ASPIRIN 81 MG: 81 TABLET, CHEWABLE ORAL at 09:29

## 2021-01-09 RX ADMIN — INSULIN LISPRO 4 UNITS: 100 INJECTION, SOLUTION INTRAVENOUS; SUBCUTANEOUS at 12:03

## 2021-01-09 RX ADMIN — OXYCODONE 5 MG: 5 TABLET ORAL at 01:41

## 2021-01-09 RX ADMIN — DOXYCYCLINE HYCLATE 100 MG: 100 TABLET, COATED ORAL at 09:18

## 2021-01-09 RX ADMIN — ENOXAPARIN SODIUM 135 MG: 150 INJECTION SUBCUTANEOUS at 01:42

## 2021-01-09 RX ADMIN — PANTOPRAZOLE SODIUM 40 MG: 40 TABLET, DELAYED RELEASE ORAL at 06:20

## 2021-01-09 RX ADMIN — IOPAMIDOL 75 ML: 755 INJECTION, SOLUTION INTRAVENOUS at 03:03

## 2021-01-09 RX ADMIN — OXYCODONE AND ACETAMINOPHEN 1 TABLET: 5; 325 TABLET ORAL at 16:18

## 2021-01-09 RX ADMIN — BUPROPION HYDROCHLORIDE 300 MG: 150 TABLET, FILM COATED, EXTENDED RELEASE ORAL at 09:18

## 2021-01-09 RX ADMIN — GLYCOPYRROLATE AND FORMOTEROL FUMARATE 2 PUFF: 9; 4.8 AEROSOL, METERED RESPIRATORY (INHALATION) at 20:05

## 2021-01-09 ASSESSMENT — PAIN DESCRIPTION - PROGRESSION
CLINICAL_PROGRESSION: GRADUALLY WORSENING

## 2021-01-09 ASSESSMENT — PAIN DESCRIPTION - DESCRIPTORS
DESCRIPTORS: ACHING;CONSTANT

## 2021-01-09 ASSESSMENT — PAIN DESCRIPTION - FREQUENCY
FREQUENCY: CONTINUOUS

## 2021-01-09 ASSESSMENT — PAIN DESCRIPTION - PAIN TYPE
TYPE: ACUTE PAIN

## 2021-01-09 ASSESSMENT — PAIN DESCRIPTION - ORIENTATION
ORIENTATION: RIGHT
ORIENTATION: RIGHT;LEFT
ORIENTATION: RIGHT;LEFT

## 2021-01-09 ASSESSMENT — PAIN - FUNCTIONAL ASSESSMENT
PAIN_FUNCTIONAL_ASSESSMENT: PREVENTS OR INTERFERES WITH ALL ACTIVE AND SOME PASSIVE ACTIVITIES
PAIN_FUNCTIONAL_ASSESSMENT: ACTIVITIES ARE NOT PREVENTED
PAIN_FUNCTIONAL_ASSESSMENT: PREVENTS OR INTERFERES SOME ACTIVE ACTIVITIES AND ADLS

## 2021-01-09 ASSESSMENT — PAIN DESCRIPTION - LOCATION
LOCATION: HIP;LEG
LOCATION: LEG

## 2021-01-09 ASSESSMENT — PAIN DESCRIPTION - ONSET
ONSET: ON-GOING

## 2021-01-09 ASSESSMENT — PAIN SCALES - GENERAL
PAINLEVEL_OUTOF10: 3
PAINLEVEL_OUTOF10: 10
PAINLEVEL_OUTOF10: 3
PAINLEVEL_OUTOF10: 3
PAINLEVEL_OUTOF10: 4
PAINLEVEL_OUTOF10: 5

## 2021-01-09 NOTE — PROGRESS NOTES
Hospitalist Progress Note       Brigida Bamberger, M.D.  1/9/2021 7:54 AM  Admit Date: 1/8/2021    PCP: Genny Godfrey MD     Assessment and Plan:   Bilateral chronic venous stasis/diabetic pressure ulcers:  healing but BLE have cellulitic appearance. Faint pulses noted on exam.   - doxycycline.   - ANA CRISTINA pending.   - Wound consult     Generalized weakness: was recently discharged from SNF and now able to care for himself at home. -PT/OT.   -CM to assist with placement. Fall precautions                Chronic nonocclusive DVTs and acute occ L posterior tibial v: on long-term coumadin. INR subtherapeutic (patient reported difficult time maintaining therapeutic INR). - Coumadin with Lovenox bridging for now. -  NOAC if insurance covers; will discuss with cm and send pre-auth Monday     DMII: with chronic complications and without long term use of insulin. a1c 8.3 11/22/19  -Holding home oral hypoglycemics  -Medium dose SSI.     Essential hypertension: per hx. BP controlled. Cont home BP medications.  Monitor BP and titrate PRN     Patient Active Problem List:     Gastroesophageal reflux disease without esophagitis     Hypertension in stage 3 chronic kidney disease due to type 2 diabetes mellitus (HCC)     DM (diabetes mellitus) type II, controlled, with peripheral vascular disorder (HCC)     Combined hyperlipidemia associated with type 2 diabetes mellitus ([address]     Diabetic skin ulcer associated with type 2 diabetes mellitus (HCC)     CKD (chronic kidney disease)     History of DVT (deep vein thrombosis)- left femoral     History of pulmonary embolism     Long term current use of anticoagulants with INR goal of 2.0-3.0     COPD (chronic obstructive pulmonary disease) (HCC)     Severe recurrent major depressive disorder with psychotic features ([address]     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 COMPARISON: 11/22/2019. HISTORY: ORDERING SYSTEM PROVIDED HISTORY: Leg pain, hx of DVT TECHNOLOGIST PROVIDED HISTORY: Reason for exam:   Leg pain, hx of DVT Reason for Exam: Leg pain, hx DVT FINDINGS: Evaluation is partially limited due to overlying soft tissue swelling. The right common femoral vein appears patent. Right proximal, mid femoral vein is partially compressible which may be due to chronic right femoral vein thrombosis seen on the prior study. The distal aspect of the right femoral vein is poorly visualized. The right popliteal vein appears patent. The visualized portions of the right calf veins appear patent. Again seen is partial compression of the left common femoral vein with chronic appearing nonocclusive thrombi of the left common femoral vein as seen on the prior study. Also again seen is partial compression of midportion of the left femoral vein, suspicious for chronic nonocclusive thrombus. Distal aspect of the left femoral vein is poorly visualized. There is also incompressibility of the left posterior tibial vein. There nonspecific mild prominent right inguinal lymph nodes. Limited evaluation due to overlying soft tissue swelling. Findings suggestive of bilateral nonocclusive thrombi, which appear chronic. These involve the right proximal and mid femoral vein, as well as left common femoral vein and the midportion of the left femoral vein. There also appears to be additional calf vein thrombosis involving the left posterior tibial vein, which may be acute.      Cta Pulmonary W Contrast    Result Date: 1/9/2021 EXAMINATION: CTA OF THE CHEST, 1/9/2021 2:17 am TECHNIQUE: CTA of the chest was performed after the administration of intravenous contrast.  Multiplanar reformatted images are provided for review. MIP images are provided for review. Dose modulation, iterative reconstruction, and/or weight based adjustment of the mA/kV was utilized to reduce the radiation dose to as low as reasonably achievable. COMPARISON: None HISTORY: ORDERING SYSTEM PROVIDED HISTORY:  Acute on chronic DVT, subtherapeutic INR TECHNOLOGIST PROVIDED HISTORY: Reason for exam:   Acute on chronic DVT, subtherapeutic INR Reason for Exam:  Acute on chronic DVT, subtherapeutic INR Acuity: Unknown Type of Exam: Initial Additional signs and symptoms:  Acute on chronic DVT, subtherapeutic INR Relevant Medical/Surgical History:  Acute on chronic DVT, subtherapeutic INR FINDINGS: Pulmonary Arteries: Pulmonary arteries are adequately opacified for evaluation. No evidence of intraluminal filling defect to suggest pulmonary embolism. Main pulmonary artery is normal in caliber. Mediastinum: No evidence of mediastinal lymphadenopathy. The heart and pericardium demonstrate no acute abnormality. There is no acute abnormality of the thoracic aorta. Cardiomegaly is noted. Lungs/pleura: Emphysematous changes are noted. There is no evidence of a pneumothorax. Multifocal bilateral ground-glass opacities are seen. Upper Abdomen: Limited images of the upper abdomen are unremarkable. Soft Tissues/Bones: No acute bone or soft tissue abnormality. 1. No evidence for a pulmonary embolism. 2. Multifocal ground-glass opacities are seen bilaterally which could be related to an atypical/viral pneumonia. 3. Minimal emphysematous changes.    -    DATA:    CBC   Recent Labs     01/08/21  1400   WBC 5.7   HGB 11.6*   HCT 35.8*         BMP   Recent Labs     01/08/21  1400      K 3.3*      CO2 23   BUN 7   CREATININE 1.0     LFT'S   Recent Labs     01/08/21 1400   AST 22   ALT 16   BILITOT 0.6   ALKPHOS 71     COAG   Recent Labs     01/08/21  1400   INR 1.77     CARDIAC ENZYMES    Recent Labs     01/08/21  1400   CKTOTAL 110     U/A:    Lab Results   Component Value Date    COLORU YELLOW 01/08/2021    WBCUA <1 01/08/2021    RBCUA <1 01/08/2021    MUCUS RARE 01/08/2021    BACTERIA NEGATIVE 01/08/2021    CLARITYU CLEAR 01/08/2021    SPECGRAV 1.023 01/08/2021    LEUKOCYTESUR NEGATIVE 01/08/2021    BLOODU NEGATIVE 01/08/2021         Bhaskar Allen MD  [facility] Hospitalist

## 2021-01-09 NOTE — ED NOTES
Phone report called to SAMI Arevalo at [facility] [place] by [facility] for transport to their unit and assumption of care. This RN to call with ETA once known.      Josep Villalobos RN  01/08/21 2950

## 2021-01-09 NOTE — PROGRESS NOTES
PHARMACY ANTICOAGULATION MONITORING SERVICE    Jose M Watkins is a 77 y.o. male on warfarin therapy for history of DVT and possible superimposed acute thrombus. Pharmacy consulted by Dr. Grace Loyd for monitoring and adjustment of treatment. Indication for anticoagulation: Hx of DVT, concern for superimposed acute thrombus  INR goal: 2-3  Warfarin dose prior to admission: 5 mg daily    Pertinent Laboratory Values   Recent Labs     01/08/21  1400   INR 1.77   HGB 11.6*   HCT 35.8*          Assessment/Plan:  Drug Interactions: Aspirin (home med), escitalopram (home med), therapeutic enoxaparin, doxycycline  INR sub-therapeutic on admission  Will continue warfarin 5 mg daily  +THE PATIENT IS CURRENTLY TAKING BOTH COUMADIN AND LOVENOX+         LOOK TO DISCONTINUE THE LOVENOX ONCE INR IS THERAPEUTIC   Pharmacy will continue to monitor and adjust warfarin therapy as indicated    Thank you for the consult.   Bennie Pina RPh  1/9/2021 1:26 AM

## 2021-01-09 NOTE — PROGRESS NOTES
Admission skin assmt done by this nurse and Tiffany Shields. Noted 3 wounds to coccyx, new mepilex border placed on patient. Noted wound to Right lower medial leg, Left lower medial leg, and left distal medial ankle. See wound LDA. Blanchable redness noted to BLE. Patient has a scabbed over abrasion on right elbow. Underneath bilat breasts and groin area noted red with moisture.

## 2021-01-09 NOTE — PROGRESS NOTES
Physical Therapy    Facility/Department: [facility] UNIT  Initial Assessment    NAME: Mallika Payne  : 1945  MRN: [id number]    Date of Service: 2021    Discharge Recommendations:  Continue to assess pending progress, Subacute/Skilled Nursing Facility        Assessment   Body structures, Functions, Activity limitations: Decreased functional mobility ; Decreased safe awareness;Decreased balance;Decreased coordination;Decreased ADL status; Decreased ROM; Decreased endurance;Decreased high-level IADLs;Decreased strength;Decreased posture  Assessment: Patient is 76 y/o male with decreased functional strength and mobility with low motivation and exhibits very limited AROM B LE from previous B LE ulcers. Patient would benefit from skilled PT to address functional mobility for ability to assess standing and walking for return to PLF. Patient would benefit from SNF with PT upon discharge. Specific instructions for Next Treatment: progress B LE AROM, 2 person assist for standing  Prognosis: Fair  Decision Making: Medium Complexity  Clinical Presentation: evolving  Barriers to Learning: motivation  REQUIRES PT FOLLOW UP: Yes  Activity Tolerance  Activity Tolerance: Patient Tolerated treatment well;Patient limited by pain; Patient limited by fatigue;Patient limited by endurance       Patient Diagnosis(es): There were no encounter diagnoses. injury of right buttock, stage 3 (HCC), WD-Skin tear of right forearm without complication, and WD-Ulcer of right pretibial region, with fat layer exposed ([facility] [address] has a past surgical history that includes Tonsillectomy (1953); Splenectomy (1958); Breast surgery (1970s); hernia repair (1970s); Skin graft (1978-present); Cataract removal (Right, 3/11/2013); Cataract removal (Left, 2/25/2013); Varicose vein surgery (Left, 2009); Carpal tunnel release (Left, 1993); Cardiac catheterization (6/3/14); Colonoscopy (2006); Colonoscopy (11/21/11); Colonoscopy (4/23/12); Colonoscopy (08/04/2016); Splenectomy; hernia repair; and Carpal tunnel release.     Restrictions     Vision/Hearing  Vision: Impaired  Vision Exceptions: Wears glasses for reading  Hearing: Exceptions to [facility]  Hearing Exceptions: Hard of hearing/hearing concerns     Subjective  General  Chart Reviewed: Yes  Patient assessed for rehabilitation services?: Yes  Response To Previous Treatment: Not applicable  Family / Caregiver Present: No  Referring Practitioner: arabella  Referral Date : 01/08/21  Diagnosis: weakness  Subjective  Subjective: Patient in bed upon arrival and agreeable to PT evaluation  Pain Screening  Patient Currently in Pain: Yes  Pain Assessment  Pain Assessment: 0-10  Pain Level: 10  Pain Type: Acute pain  Pain Location: Hip;Leg  Pain Orientation: Right  Pain Radiating Towards: from hip to knees  Pain Descriptors: Aching  Pain Frequency: Continuous  Clinical Progression: Gradually worsening  Vital Signs  Patient Currently in Pain: Yes  Pre Treatment Pain Screening  Intervention List: Patient able to continue with treatment;Nurse called to administer meds    Orientation  Orientation  Overall Orientation Status: Within Functional Limits  Social/Functional History  Social/Functional History  Lives With: Alone  Type of Home: House  Home Layout: One level  Home Access: Stairs to enter without rails  Entrance Stairs - Number of Steps: 2 Time Frame for Short term goals: 2 weeks or upon discharge  Short term goal 1: Patient will be able to perform bed mobility and transfers Mod I. Short term goal 2: Patient will be able to perform sit to stand Min A  Short term goal 3: Patient will be able to stand >5 min without LOB CGA.   Short term goal 4: Patient will be able to perform B LE AROM exercises supine  Long term goals  Time Frame for Long term goals : defer  Patient Goals   Patient goals : to return home       Therapy Time   Individual Concurrent Group Co-treatment   Time In 1223         Time Out 1300         Minutes 37         Timed Code Treatment Minutes: [address], [address] [id number], MPT, AT, GLORIAT[place], LEO  1/9/2021  1:17 PM

## 2021-01-09 NOTE — PLAN OF CARE
Problem: Falls - Risk of:  Goal: Will remain free from falls  Description: Will remain free from falls  Outcome: Ongoing  Goal: Absence of physical injury  Description: Absence of physical injury  Outcome: Ongoing     Problem: Pain:  Description: Pain management should include both nonpharmacologic and pharmacologic interventions.   Goal: Pain level will decrease  Description: Pain level will decrease  Outcome: Ongoing  Goal: Control of acute pain  Description: Control of acute pain  Outcome: Ongoing  Goal: Control of chronic pain  Description: Control of chronic pain  Outcome: Ongoing     Problem: Discharge Planning:  Goal: Discharged to appropriate level of care  Description: Discharged to appropriate level of care  Outcome: Ongoing     Problem: Serum Glucose Level - Abnormal:  Goal: Ability to maintain appropriate glucose levels will improve  Description: Ability to maintain appropriate glucose levels will improve  Outcome: Ongoing     Problem: Sensory Perception - Impaired:  Goal: Ability to maintain a stable neurologic state will improve  Description: Ability to maintain a stable neurologic state will improve  Outcome: Ongoing     Problem: Coping:  Goal: Participation in decision-making will improve  Description: Demonstration of participation in decision-making regarding own care will improve  Outcome: Ongoing  Goal: Verbalizations of comfort with decisions will increase  Description: Verbalizations of comfort with decisions will increase  Outcome: Ongoing     Problem: Health Behavior:  Goal: Ability to identify and utilize available resources and services will improve  Description: Ability to identify and utilize available resources and services will improve  Outcome: Ongoing

## 2021-01-09 NOTE — ED NOTES
Pt has been accepted to [facility], room 13.   Report to be called at [phone]     Lorine Epley  01/08/21 2059

## 2021-01-09 NOTE — H&P
History and Physical      Name:  Kayla Mccartney /Age/Sex: 1945  [de-identified]77 y.o. male)   MRN & CSN:  [id number] & [id number] Admission Date/Time: 2021 11:58 PM   Location:   PCP: Martha Kraus MD       Hospital Day: 2    Assessment and Plan:     Kayla Mccartney is a 77 y.o.  male with PMH chronic DVT, chronic venous stasis/diabetic ulcers, diabetes and HTN presented to [facility] with complaints of generalized weakness and bilateral lower extremity pain. He was found to have acute on chronic DVT and was hospitalized for further work-up and treatment. Bilateral chronic venous stasis/diabetic pressure ulcers: per hx. Wounds appear to be healing but BLE have cellulitic appearance. Faint pulses noted on exam. Start doxycycline. ANA CRISTINA pending. Wound consult    Generalized weakness: was recently discharged from SNF and now able to care for himself at home. Admit to [facility] under observation. PT/OT. CM to assist with placement. Fall precautions                Chronic nonocclusive DVTs: on long-term coumadin. INR subtherapeutic (patient reported difficult time maintaining therapeutic INR). BLEVD with chronic BLE DVT and possible LLE acute DVT. Cont Coumadin with Lovenox bridging for now. May benefit from NOAC with difficulty maintaining therapeutic INR. CTA pending    DMII: with chronic complications and without long term use of insulin. Holding home oral hypoglycemics. Medium dose SSI. Monitor blood sugar and titrate PRN      Essential hypertension: per hx. BP controlled. Cont home BP medications.  Monitor BP and titrate PRN     DVT prophylaxis: Coumadin with Lovenox bridge    Diet DIET CARB CONTROL;   DVT Prophylaxis [x] Lovenox, []  Heparin, [] SCDs, [] No VTE prophylaxis, patient ambulating   GI Prophylaxis [] PPI, [] H2 Blocker, [x] No GI prophylaxis, patient is receiving diet/Tube Feeds   Code Status Full Code   Disposition Will likely need SNF MDM [] Low, [] Moderate,[]  High  Patient's risk as above due to      History of Present Illness:     Chief Complaint: <principal problem not specified>    Aileen Shelton is a 77 y.o.  male with PMH chronic DVT, chronic venous stasis/diabetic ulcers, diabetes and HTN presented to [place] [address] with complaints of generalized weakness and bilateral lower extremity pain. He was found to have acute on chronic DVT and was hospitalized for further work-up and treatment. Information obtained from chart review and patient report. Per chart review, patient was hospitalized approximately 2 months ago for bilateral lower extremity chronic cellulitis. He was discharged to SNF for 5 weeks and was sent home 5 days before Mercedes. Since then his physical addition has continued to decline with decreased/limited mobility, difficult ambulating and worsening lower extremity pain. Patient says day prior to arrival he was unable to get out of his chair so EMS was called. Still says he feels weak and not able to ambulate. Complaining of bilateral lower extremity pain as well, no improvement. Patient lives home alone he says he is unable to care for himself. A 14 point ROS was reviewed negative unless otherwise noted above    Objective:   No intake or output data in the 24 hours ending 01/09/21 0033   Vitals: There were no vitals filed for this visit. Physical Exam:      General: 71-year-old male who appears older than stated age and chronically ill. Laying in bed. No acute distress  Eyes: eyelids/lashes normal appearence. Pupils equal, round and reactive. No scleral erythema, discharge, or conjunctivitis. HENT: bilateral ear and nose normal in appearance. Mucous membranes moist. Hearing grossly intact. Neck supple, no apparent thyromegaly or masses. Respiratory: lungs clear to auscultation, no wheezes, rales or rhonchi. Symmetric chest movement while on room air. Cardiovascular: RRR, S1/S2 auscultated. + murmur, no rubs, or gallops. No JVD or carotid bruits. Peripheral pulses equal bilaterally and palpable. GI: abdomen soft. No tenderness, masses, or guarding. Bowel sounds are normoactive. Rectal exam deferred. : No costovertebral angle tenderness. Starkey catheter is not present. Heme/lymph: no palpable cervical lymphadenopathy and no hepatosplenomegaly. No petechiae or ecchymoses. Musculoskeletal: Full ROM of all 4 extremities, strength 5/5 in all extremities. No gross joint deformities. Skin: BLE with healing wounds. Erythema warmth and edema noted bilaterally  Neurological: cranial nerves 2-12 grossly intact. No slurred speech, no facial droop. Non-focal   Psych: LOC X4, calm and cooperative. Affect appropriate.       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 Merle Garcia Anemia 11/8/2011    Arm fracture Mandy Hernandez     Dr Dax Barakat with also yearly DM exam    Cataract 11/12    worsening-Dr. Amandeep Paz    Cellulitis of left lower leg     Chronic venous hypertension with ulcer ([address] 11/18/2011    CKD (chronic kidney disease) Feb 2012    Renal u/S normal     Colon polyps     Dr. Merle Garcia COPD (chronic obstructive pulmonary disease) ([address]     Diabetes mellitus ([address] 1993    Diabetes mellitus ([address]     Diabetes mellitus with peripheral circulatory disorder ([address] 10/2/2015    Diabetes mellitus with skin ulcer ([address] 10/2/2015    Diabetic peripheral neuropathy ([address] 11/12    + EMG, NCS    Diabetic skin ulcer associated with type 2 diabetes mellitus ([address]     Diverticulosis 4/23/12    mild, left colon    Femoral DVT (deep venous thrombosis) ([address] 09/2011    PARTIAL,CHRONIC-SPFLD HEART SURGEONS    GERD (gastroesophageal reflux disease)     Glaucoma 11/12 open angle-Dr. Gricelda Ortiz H/O cardiac catheterization 6/3/14    EF55% normal study    H/O Doppler ultrasound 03/26/15    Carotid US- no significant stenosis noted bilaterally.  H/O echocardiogram 11/21/2019    EF50-55%, Mild AR. Moderately dilated right ventricle with negative Solis's sign.  H/O mumps orchitis     as a youth    Hemorrhoids 4/23/12    Dr. Aditya Ceballos; repeat colonoscopy 3 years    History of nuclear stress test 11/21/2019    EF 60%, Normal study.  HLD (hyperlipidemia)     Hx of Doppler ultrasound 1/06/15    Lymph node seen in left groin area. No bilateral stenosis.     Hyperlipemia     Hyperlipidemia     Hypertension 1992    Hypertension     Idiopathic chronic venous hypertension of left lower extremity with ulcer and inflammation (HCC) 10/2/2015    Leg ulcer ([address] 1978-present    following at wound center, Dr Matt Phillips No diabetic retinopathy OU 11/12    Dr. Bailey Marsh chronic ulcer of left lower leg with fat layer exposed ([address] 10/2/2015    Pulmonary embolism ([address] 11/13    patient on coumadin    Sleep apnea     doesnt always use cpap dt it drys him out    SOB (shortness of breath) Oct 2011    Stress test normal.     Tendinitis 1973    plantar tendons    Type II or unspecified type diabetes mellitus with other specified manifestations, not stated as uncontrolled 2/8/2013    Unspecified venous (peripheral) insufficiency     Urticaria     WD-Cellulitis of right anterior lower leg 9/5/2019    WD-Cellulitis of right lower extremity 3/26/2020    WD-Chronic venous hypertension (idiopathic) with ulcer of left lower extremity (CODE) ([address] 6/27/2019    WD-Chronic venous hypertension with inflammation, right 9/19/2019    WD-Decubitus ulcer of left buttock, stage 3 ([address] 6/25/2020    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 to breakdown of skin ([address] 3/26/2020  WD-Open wound of hand without complication, left, initial encounter 12/12/2019    WD-Pressure injury of left buttock, stage 2 ([address] 1/2/2020    WD-Pressure injury of left buttock, stage 3 ([address] 3/12/2020    WD-Pressure injury of right buttock, stage 3 ([address] 3/12/2020    WD-Skin tear of right forearm without complication 4/03/7976    WD-Ulcer of right pretibial region, with fat layer exposed ([address] 10/17/2019       PSHX:  has a past surgical history that includes Tonsillectomy (1953); Splenectomy (1958); Breast surgery (1970s); hernia repair (1970s); Skin graft (1978-present); Cataract removal (Right, 3/11/2013); Cataract removal (Left, 2/25/2013); Varicose vein surgery (Left, 2009); Carpal tunnel release (Left, 1993); Cardiac catheterization (6/3/14); Colonoscopy (2006); Colonoscopy (11/21/11); Colonoscopy (4/23/12); Colonoscopy (08/04/2016); Splenectomy; hernia repair; and Carpal tunnel release. Allergies: Allergies   Allergen Reactions    Cavilon Durable Barrier [Mineral Oil-Dimeth-Coconut Oil]     Parabens Hives    Parabens Hives    Prinivil [Lisinopril] Swelling    Cortisone Rash    Cortisone Rash    Dilaudid [Hydromorphone Hcl] Rash    Penicillins Rash    Penicillins Rash    Sulfamethoxazole-Trimethoprim Nausea Only    Tape Michelle Sinks Tape] Rash       FAM HX: family history includes Cancer in his brother and father; Diabetes in his brother; Heart Disease in his father; High Blood Pressure in his father; High Cholesterol in his father; Thyroid Disease in his brother.      Soc HX:   Social History     Socioeconomic History    Marital status: Single     Spouse name: Not on file    Number of children: Not on file    Years of education: Not on file    Highest education level: Not on file   Occupational History    Not on file   Social Needs    Financial resource strain: Not on file    Food insecurity     Worry: Not on file     Inability: Not on file   [employer] needs Medical: Not on file     Non-medical: Not on file   Tobacco Use    Smoking status: Former Smoker     Packs/day: 2.00     Years: 35.00     Pack years: 70.00     Types: Cigarettes     Quit date: 47     Years since quittin.0    Smokeless tobacco: Never Used    Tobacco comment: chew--30 years.   Reviewed 2015   Substance and Sexual Activity    Alcohol use: Yes     Comment: soc    Drug use: Never    Sexual activity: Not on file   Lifestyle    Physical activity     Days per week: Not on file     Minutes per session: Not on file    Stress: Not on file   Relationships    Social connections     Talks on phone: Not on file     Gets together: Not on file     Attends Jew service: Not on file     Active member of club or organization: Not on file     Attends meetings of clubs or organizations: Not on file     Relationship status: Not on file    Intimate partner violence     Fear of current or ex partner: Not on file     Emotionally abused: Not on file     Physically abused: Not on file     Forced sexual activity: Not on file   Other Topics Concern    Not on file   Social History Narrative    ** Merged History Encounter **            Medications:   Medications:    alogliptin  12.5 mg Oral Daily    ARIPiprazole  15 mg Oral Daily    aspirin  81 mg Oral Daily    atorvastatin  40 mg Oral Nightly    buPROPion  300 mg Oral QAM    escitalopram  20 mg Oral Daily    furosemide  20 mg Oral BID    glycopyrrolate-formoterol  2 puff Inhalation BID    metoprolol tartrate  12.5 mg Oral BID    pantoprazole  40 mg Oral QAM AC    sodium chloride flush  10 mL Intravenous 2 times per day    insulin lispro  0-12 Units Subcutaneous TID WC    insulin lispro  0-6 Units Subcutaneous Nightly      Infusions:    dextrose       PRN Meds:     sodium chloride flush, 10 mL, PRN      promethazine, 12.5 mg, Q6H PRN    Or      ondansetron, 4 mg, Q6H PRN      polyethylene glycol, 17 g, Daily PRN   acetaminophen, 650 mg, Q6H PRN    Or      acetaminophen, 650 mg, Q6H PRN      glucose, 15 g, PRN      dextrose, 12.5 g, PRN      glucagon (rDNA), 1 mg, PRN      dextrose, 100 mL/hr, PRN          Electronically signed by WINDY Savage CNP on 1/9/2021 at 12:33 AM

## 2021-01-09 NOTE — PROGRESS NOTES
Patient taken to CT scan by Erickson Muniz RT and Roberto Lee RN. This nurse notified that peripheral IV in left hand infiltrated with IV contrast. Ice applied. Erickson Muniz RT notified on call radiologist. Heriberto Munoz RN placed  New IV in right [facility]. Patient informed this nurse he wanted to continue ice therapy to left hand and refused to let this nurse remove IV at this time.

## 2021-01-09 NOTE — ED NOTES
QCT here for transport to Kami Tapia RN at [place] notified. Via phone.      Tessy Carolina RN  01/08/21 0717

## 2021-01-09 NOTE — PROGRESS NOTES
[facility] security at bedside, patient request his wallet and money be locked up. Money 65.00 verified with patient and counted in front of patient and this nurse.

## 2021-01-10 LAB
ALBUMIN SERPL-MCNC: 2.9 GM/DL (ref 3.4–5)
ALP BLD-CCNC: 65 IU/L (ref 40–129)
ALT SERPL-CCNC: 14 U/L (ref 10–40)
ANION GAP SERPL CALCULATED.3IONS-SCNC: 1 MMOL/L (ref 4–16)
AST SERPL-CCNC: 18 IU/L (ref 15–37)
BASOPHILS ABSOLUTE: 0 K/CU MM
BASOPHILS RELATIVE PERCENT: 0.4 % (ref 0–1)
BILIRUB SERPL-MCNC: 0.4 MG/DL (ref 0–1)
BUN BLDV-MCNC: 16 MG/DL (ref 6–23)
CALCIUM SERPL-MCNC: 7.8 MG/DL (ref 8.3–10.6)
CHLORIDE BLD-SCNC: 102 MMOL/L (ref 99–110)
CO2: 34 MMOL/L (ref 21–32)
CREAT SERPL-MCNC: 1.7 MG/DL (ref 0.9–1.3)
DIFFERENTIAL TYPE: ABNORMAL
EOSINOPHILS ABSOLUTE: 0.1 K/CU MM
EOSINOPHILS RELATIVE PERCENT: 1 % (ref 0–3)
ERYTHROCYTE SEDIMENTATION RATE: 25 MM/HR (ref 0–20)
GFR AFRICAN AMERICAN: 48 ML/MIN/1.73M2
GFR NON-AFRICAN AMERICAN: 40 ML/MIN/1.73M2
GLUCOSE BLD-MCNC: 137 MG/DL (ref 70–99)
GLUCOSE BLD-MCNC: 144 MG/DL (ref 70–99)
GLUCOSE BLD-MCNC: 148 MG/DL (ref 70–99)
GLUCOSE BLD-MCNC: 153 MG/DL (ref 70–99)
GLUCOSE BLD-MCNC: 200 MG/DL (ref 70–99)
HCT VFR BLD CALC: 34.7 % (ref 42–52)
HEMOGLOBIN: 11.1 GM/DL (ref 13.5–18)
HIGH SENSITIVE C-REACTIVE PROTEIN: 50.5 MG/L
IMMATURE NEUTROPHIL %: 0.2 % (ref 0–0.43)
INR BLD: 1.72 INDEX
LYMPHOCYTES ABSOLUTE: 1.9 K/CU MM
LYMPHOCYTES RELATIVE PERCENT: 38.1 % (ref 24–44)
MCH RBC QN AUTO: 29.4 PG (ref 27–31)
MCHC RBC AUTO-ENTMCNC: 32 % (ref 32–36)
MCV RBC AUTO: 92 FL (ref 78–100)
MONOCYTES ABSOLUTE: 0.4 K/CU MM
MONOCYTES RELATIVE PERCENT: 8 % (ref 0–4)
PDW BLD-RTO: 18.2 % (ref 11.7–14.9)
PLATELET # BLD: 186 K/CU MM (ref 140–440)
PMV BLD AUTO: 12.5 FL (ref 7.5–11.1)
POTASSIUM SERPL-SCNC: 3.7 MMOL/L (ref 3.5–5.1)
PROCALCITONIN: 0.07
PROTHROMBIN TIME: 19.8 SECONDS (ref 11.7–14.5)
RBC # BLD: 3.77 M/CU MM (ref 4.6–6.2)
SEGMENTED NEUTROPHILS ABSOLUTE COUNT: 2.6 K/CU MM
SEGMENTED NEUTROPHILS RELATIVE PERCENT: 52.3 % (ref 36–66)
SODIUM BLD-SCNC: 137 MMOL/L (ref 135–145)
TOTAL IMMATURE NEUTOROPHIL: 0.01 K/CU MM
TOTAL PROTEIN: 6.4 GM/DL (ref 6.4–8.2)
WBC # BLD: 5 K/CU MM (ref 4–10.5)

## 2021-01-10 PROCEDURE — 86141 C-REACTIVE PROTEIN HS: CPT

## 2021-01-10 PROCEDURE — 84145 PROCALCITONIN (PCT): CPT

## 2021-01-10 PROCEDURE — 2580000003 HC RX 258: Performed by: NURSE PRACTITIONER

## 2021-01-10 PROCEDURE — 82962 GLUCOSE BLOOD TEST: CPT

## 2021-01-10 PROCEDURE — 6370000000 HC RX 637 (ALT 250 FOR IP): Performed by: INTERNAL MEDICINE

## 2021-01-10 PROCEDURE — 6360000002 HC RX W HCPCS: Performed by: NURSE PRACTITIONER

## 2021-01-10 PROCEDURE — G0378 HOSPITAL OBSERVATION PER HR: HCPCS

## 2021-01-10 PROCEDURE — 96372 THER/PROPH/DIAG INJ SC/IM: CPT

## 2021-01-10 PROCEDURE — 94640 AIRWAY INHALATION TREATMENT: CPT

## 2021-01-10 PROCEDURE — 80053 COMPREHEN METABOLIC PANEL: CPT

## 2021-01-10 PROCEDURE — 85610 PROTHROMBIN TIME: CPT

## 2021-01-10 PROCEDURE — 6370000000 HC RX 637 (ALT 250 FOR IP): Performed by: NURSE PRACTITIONER

## 2021-01-10 PROCEDURE — 85025 COMPLETE CBC W/AUTO DIFF WBC: CPT

## 2021-01-10 PROCEDURE — 85652 RBC SED RATE AUTOMATED: CPT

## 2021-01-10 PROCEDURE — 36415 COLL VENOUS BLD VENIPUNCTURE: CPT

## 2021-01-10 PROCEDURE — 85027 COMPLETE CBC AUTOMATED: CPT

## 2021-01-10 RX ORDER — LEVOFLOXACIN 500 MG/1
500 TABLET, FILM COATED ORAL DAILY
Status: DISCONTINUED | OUTPATIENT
Start: 2021-01-10 | End: 2021-01-11

## 2021-01-10 RX ORDER — TRAMADOL HYDROCHLORIDE 50 MG/1
100 TABLET ORAL EVERY 6 HOURS PRN
Status: DISCONTINUED | OUTPATIENT
Start: 2021-01-10 | End: 2021-01-13 | Stop reason: HOSPADM

## 2021-01-10 RX ORDER — TRAMADOL HYDROCHLORIDE 50 MG/1
50 TABLET ORAL EVERY 6 HOURS PRN
Status: DISCONTINUED | OUTPATIENT
Start: 2021-01-10 | End: 2021-01-13 | Stop reason: HOSPADM

## 2021-01-10 RX ADMIN — ESCITALOPRAM 20 MG: 20 TABLET, FILM COATED ORAL at 09:06

## 2021-01-10 RX ADMIN — ALOGLIPTIN 12.5 MG: 12.5 TABLET, FILM COATED ORAL at 09:06

## 2021-01-10 RX ADMIN — SODIUM CHLORIDE, PRESERVATIVE FREE 10 ML: 5 INJECTION INTRAVENOUS at 10:22

## 2021-01-10 RX ADMIN — LEVOFLOXACIN 500 MG: 500 TABLET, FILM COATED ORAL at 10:22

## 2021-01-10 RX ADMIN — METOPROLOL TARTRATE 12.5 MG: 25 TABLET, FILM COATED ORAL at 21:08

## 2021-01-10 RX ADMIN — METOPROLOL TARTRATE 12.5 MG: 25 TABLET, FILM COATED ORAL at 09:06

## 2021-01-10 RX ADMIN — ENOXAPARIN SODIUM 135 MG: 150 INJECTION SUBCUTANEOUS at 21:07

## 2021-01-10 RX ADMIN — FUROSEMIDE 20 MG: 20 TABLET ORAL at 21:09

## 2021-01-10 RX ADMIN — GLYCOPYRROLATE AND FORMOTEROL FUMARATE 2 PUFF: 9; 4.8 AEROSOL, METERED RESPIRATORY (INHALATION) at 07:50

## 2021-01-10 RX ADMIN — DOXYCYCLINE HYCLATE 100 MG: 100 TABLET, COATED ORAL at 21:26

## 2021-01-10 RX ADMIN — OXYCODONE AND ACETAMINOPHEN 1 TABLET: 5; 325 TABLET ORAL at 06:37

## 2021-01-10 RX ADMIN — TRAMADOL HYDROCHLORIDE 100 MG: 50 TABLET ORAL at 21:09

## 2021-01-10 RX ADMIN — INSULIN LISPRO 2 UNITS: 100 INJECTION, SOLUTION INTRAVENOUS; SUBCUTANEOUS at 12:06

## 2021-01-10 RX ADMIN — DICLOFENAC 2 G: 10 GEL TOPICAL at 21:08

## 2021-01-10 RX ADMIN — OXYCODONE 5 MG: 5 TABLET ORAL at 04:19

## 2021-01-10 RX ADMIN — ENOXAPARIN SODIUM 135 MG: 150 INJECTION SUBCUTANEOUS at 09:06

## 2021-01-10 RX ADMIN — PANTOPRAZOLE SODIUM 40 MG: 40 TABLET, DELAYED RELEASE ORAL at 06:37

## 2021-01-10 RX ADMIN — ASPIRIN 81 MG: 81 TABLET, CHEWABLE ORAL at 09:11

## 2021-01-10 RX ADMIN — BUPROPION HYDROCHLORIDE 300 MG: 150 TABLET, FILM COATED, EXTENDED RELEASE ORAL at 09:06

## 2021-01-10 RX ADMIN — ARIPIPRAZOLE 15 MG: 10 TABLET ORAL at 09:06

## 2021-01-10 RX ADMIN — WARFARIN SODIUM 5 MG: 5 TABLET ORAL at 17:31

## 2021-01-10 RX ADMIN — INSULIN LISPRO 2 UNITS: 100 INJECTION, SOLUTION INTRAVENOUS; SUBCUTANEOUS at 17:31

## 2021-01-10 RX ADMIN — INSULIN LISPRO 4 UNITS: 100 INJECTION, SOLUTION INTRAVENOUS; SUBCUTANEOUS at 09:05

## 2021-01-10 RX ADMIN — DOXYCYCLINE HYCLATE 100 MG: 100 TABLET, COATED ORAL at 09:06

## 2021-01-10 RX ADMIN — OXYCODONE AND ACETAMINOPHEN 1 TABLET: 5; 325 TABLET ORAL at 13:45

## 2021-01-10 RX ADMIN — SODIUM CHLORIDE, PRESERVATIVE FREE 10 ML: 5 INJECTION INTRAVENOUS at 21:08

## 2021-01-10 RX ADMIN — GLYCOPYRROLATE AND FORMOTEROL FUMARATE 2 PUFF: 9; 4.8 AEROSOL, METERED RESPIRATORY (INHALATION) at 20:31

## 2021-01-10 RX ADMIN — FUROSEMIDE 20 MG: 20 TABLET ORAL at 09:06

## 2021-01-10 ASSESSMENT — PAIN SCALES - GENERAL
PAINLEVEL_OUTOF10: 2
PAINLEVEL_OUTOF10: 10
PAINLEVEL_OUTOF10: 4
PAINLEVEL_OUTOF10: 2
PAINLEVEL_OUTOF10: 2
PAINLEVEL_OUTOF10: 0
PAINLEVEL_OUTOF10: 2
PAINLEVEL_OUTOF10: 7

## 2021-01-10 ASSESSMENT — PAIN DESCRIPTION - LOCATION
LOCATION: HIP;LEG
LOCATION: HIP;LEG

## 2021-01-10 ASSESSMENT — PAIN DESCRIPTION - FREQUENCY: FREQUENCY: CONTINUOUS

## 2021-01-10 ASSESSMENT — PAIN - FUNCTIONAL ASSESSMENT: PAIN_FUNCTIONAL_ASSESSMENT: ACTIVITIES ARE NOT PREVENTED

## 2021-01-10 ASSESSMENT — PAIN DESCRIPTION - PAIN TYPE: TYPE: ACUTE PAIN

## 2021-01-10 ASSESSMENT — PAIN DESCRIPTION - ORIENTATION: ORIENTATION: RIGHT

## 2021-01-10 ASSESSMENT — PAIN DESCRIPTION - ONSET
ONSET: ON-GOING
ONSET: ON-GOING

## 2021-01-10 ASSESSMENT — PAIN DESCRIPTION - PROGRESSION: CLINICAL_PROGRESSION: GRADUALLY WORSENING

## 2021-01-10 ASSESSMENT — PAIN DESCRIPTION - DESCRIPTORS: DESCRIPTORS: ACHING;CONSTANT

## 2021-01-10 NOTE — PROGRESS NOTES
EXAMINATION: CTA OF THE CHEST, 1/9/2021 2:17 am TECHNIQUE: CTA of the chest was performed after the administration of intravenous contrast.  Multiplanar reformatted images are provided for review. MIP images are provided for review. Dose modulation, iterative reconstruction, and/or weight based adjustment of the mA/kV was utilized to reduce the radiation dose to as low as reasonably achievable. COMPARISON: None HISTORY: ORDERING SYSTEM PROVIDED HISTORY:  Acute on chronic DVT, subtherapeutic INR TECHNOLOGIST PROVIDED HISTORY: Reason for exam:   Acute on chronic DVT, subtherapeutic INR Reason for Exam:  Acute on chronic DVT, subtherapeutic INR Acuity: Unknown Type of Exam: Initial Additional signs and symptoms:  Acute on chronic DVT, subtherapeutic INR Relevant Medical/Surgical History:  Acute on chronic DVT, subtherapeutic INR FINDINGS: Pulmonary Arteries: Pulmonary arteries are adequately opacified for evaluation. No evidence of intraluminal filling defect to suggest pulmonary embolism. Main pulmonary artery is normal in caliber. Mediastinum: No evidence of mediastinal lymphadenopathy. The heart and pericardium demonstrate no acute abnormality. There is no acute abnormality of the thoracic aorta. Cardiomegaly is noted. Lungs/pleura: Emphysematous changes are noted. There is no evidence of a pneumothorax. Multifocal bilateral ground-glass opacities are seen. Upper Abdomen: Limited images of the upper abdomen are unremarkable. Soft Tissues/Bones: No acute bone or soft tissue abnormality. 1. No evidence for a pulmonary embolism. 2. Multifocal ground-glass opacities are seen bilaterally which could be related to an atypical/viral pneumonia. 3. Minimal emphysematous changes.    -    DATA:    CBC   Recent Labs     01/08/21  1400   WBC 5.7   HGB 11.6*   HCT 35.8*         BMP   Recent Labs     01/08/21  1400      K 3.3*      CO2 23   BUN 7   CREATININE 1.0     LFT'S   Recent Labs     01/08/21 1400   AST 22   ALT 16   BILITOT 0.6   ALKPHOS 71     COAG   Recent Labs     01/08/21  1400 01/09/21  1200   INR 1.77 1.85     CARDIAC ENZYMES    Recent Labs     01/08/21  1400   CKTOTAL 110     U/A:    Lab Results   Component Value Date    COLORU YELLOW 01/08/2021    WBCUA <1 01/08/2021    RBCUA <1 01/08/2021    MUCUS RARE 01/08/2021    BACTERIA NEGATIVE 01/08/2021    CLARITYU CLEAR 01/08/2021    SPECGRAV 1.023 01/08/2021    LEUKOCYTESUR NEGATIVE 01/08/2021    BLOODU NEGATIVE 01/08/2021         Little Lebron MD  [facility] Hospitalist

## 2021-01-11 LAB
GLUCOSE BLD-MCNC: 137 MG/DL (ref 70–99)
GLUCOSE BLD-MCNC: 141 MG/DL (ref 70–99)
GLUCOSE BLD-MCNC: 143 MG/DL (ref 70–99)
GLUCOSE BLD-MCNC: 182 MG/DL (ref 70–99)
INR BLD: 1.64 INDEX
PROTHROMBIN TIME: 18.9 SECONDS (ref 11.7–14.5)

## 2021-01-11 PROCEDURE — 96372 THER/PROPH/DIAG INJ SC/IM: CPT

## 2021-01-11 PROCEDURE — G0378 HOSPITAL OBSERVATION PER HR: HCPCS

## 2021-01-11 PROCEDURE — 6370000000 HC RX 637 (ALT 250 FOR IP): Performed by: NURSE PRACTITIONER

## 2021-01-11 PROCEDURE — 6370000000 HC RX 637 (ALT 250 FOR IP): Performed by: INTERNAL MEDICINE

## 2021-01-11 PROCEDURE — 99211 OFF/OP EST MAY X REQ PHY/QHP: CPT

## 2021-01-11 PROCEDURE — 97166 OT EVAL MOD COMPLEX 45 MIN: CPT

## 2021-01-11 PROCEDURE — 97530 THERAPEUTIC ACTIVITIES: CPT

## 2021-01-11 PROCEDURE — 2580000003 HC RX 258: Performed by: NURSE PRACTITIONER

## 2021-01-11 PROCEDURE — 82962 GLUCOSE BLOOD TEST: CPT

## 2021-01-11 PROCEDURE — 94640 AIRWAY INHALATION TREATMENT: CPT

## 2021-01-11 PROCEDURE — 85610 PROTHROMBIN TIME: CPT

## 2021-01-11 PROCEDURE — 2700000000 HC OXYGEN THERAPY PER DAY

## 2021-01-11 PROCEDURE — 6360000002 HC RX W HCPCS: Performed by: NURSE PRACTITIONER

## 2021-01-11 RX ORDER — OXYCODONE HYDROCHLORIDE 5 MG/1
5 TABLET ORAL ONCE
Status: COMPLETED | OUTPATIENT
Start: 2021-01-11 | End: 2021-01-11

## 2021-01-11 RX ORDER — WARFARIN SODIUM 6 MG/1
6 TABLET ORAL DAILY
Status: DISCONTINUED | OUTPATIENT
Start: 2021-01-11 | End: 2021-01-12

## 2021-01-11 RX ORDER — LEVOFLOXACIN 500 MG/1
500 TABLET, FILM COATED ORAL
Status: DISCONTINUED | OUTPATIENT
Start: 2021-01-12 | End: 2021-01-12

## 2021-01-11 RX ADMIN — SODIUM CHLORIDE, PRESERVATIVE FREE 10 ML: 5 INJECTION INTRAVENOUS at 21:27

## 2021-01-11 RX ADMIN — LEVOFLOXACIN 500 MG: 500 TABLET, FILM COATED ORAL at 09:32

## 2021-01-11 RX ADMIN — TRAMADOL HYDROCHLORIDE 100 MG: 50 TABLET ORAL at 15:01

## 2021-01-11 RX ADMIN — DOXYCYCLINE HYCLATE 100 MG: 100 TABLET, COATED ORAL at 09:31

## 2021-01-11 RX ADMIN — ALOGLIPTIN 12.5 MG: 12.5 TABLET, FILM COATED ORAL at 09:32

## 2021-01-11 RX ADMIN — TRAMADOL HYDROCHLORIDE 100 MG: 50 TABLET ORAL at 21:26

## 2021-01-11 RX ADMIN — TRAMADOL HYDROCHLORIDE 100 MG: 50 TABLET ORAL at 02:50

## 2021-01-11 RX ADMIN — OXYCODONE HYDROCHLORIDE 5 MG: 5 TABLET ORAL at 05:34

## 2021-01-11 RX ADMIN — FUROSEMIDE 20 MG: 20 TABLET ORAL at 21:26

## 2021-01-11 RX ADMIN — METOPROLOL TARTRATE 12.5 MG: 25 TABLET, FILM COATED ORAL at 09:32

## 2021-01-11 RX ADMIN — WARFARIN SODIUM 6 MG: 6 TABLET ORAL at 16:57

## 2021-01-11 RX ADMIN — GLYCOPYRROLATE AND FORMOTEROL FUMARATE 2 PUFF: 9; 4.8 AEROSOL, METERED RESPIRATORY (INHALATION) at 07:40

## 2021-01-11 RX ADMIN — FUROSEMIDE 20 MG: 20 TABLET ORAL at 09:32

## 2021-01-11 RX ADMIN — ASPIRIN 81 MG: 81 TABLET, CHEWABLE ORAL at 09:32

## 2021-01-11 RX ADMIN — METOPROLOL TARTRATE 12.5 MG: 25 TABLET, FILM COATED ORAL at 21:26

## 2021-01-11 RX ADMIN — SODIUM CHLORIDE, PRESERVATIVE FREE 10 ML: 5 INJECTION INTRAVENOUS at 10:33

## 2021-01-11 RX ADMIN — BUPROPION HYDROCHLORIDE 300 MG: 150 TABLET, FILM COATED, EXTENDED RELEASE ORAL at 10:33

## 2021-01-11 RX ADMIN — DOXYCYCLINE HYCLATE 100 MG: 100 TABLET, COATED ORAL at 21:26

## 2021-01-11 RX ADMIN — PANTOPRAZOLE SODIUM 40 MG: 40 TABLET, DELAYED RELEASE ORAL at 05:35

## 2021-01-11 RX ADMIN — ENOXAPARIN SODIUM 135 MG: 150 INJECTION SUBCUTANEOUS at 21:27

## 2021-01-11 RX ADMIN — INSULIN LISPRO 2 UNITS: 100 INJECTION, SOLUTION INTRAVENOUS; SUBCUTANEOUS at 16:57

## 2021-01-11 RX ADMIN — ARIPIPRAZOLE 15 MG: 10 TABLET ORAL at 09:32

## 2021-01-11 RX ADMIN — DICLOFENAC 2 G: 10 GEL TOPICAL at 21:26

## 2021-01-11 RX ADMIN — ESCITALOPRAM 20 MG: 20 TABLET, FILM COATED ORAL at 09:32

## 2021-01-11 RX ADMIN — GLYCOPYRROLATE AND FORMOTEROL FUMARATE 2 PUFF: 9; 4.8 AEROSOL, METERED RESPIRATORY (INHALATION) at 18:38

## 2021-01-11 RX ADMIN — INSULIN LISPRO 2 UNITS: 100 INJECTION, SOLUTION INTRAVENOUS; SUBCUTANEOUS at 08:25

## 2021-01-11 RX ADMIN — ENOXAPARIN SODIUM 135 MG: 150 INJECTION SUBCUTANEOUS at 09:31

## 2021-01-11 RX ADMIN — ATORVASTATIN CALCIUM 40 MG: 40 TABLET, FILM COATED ORAL at 21:26

## 2021-01-11 RX ADMIN — DICLOFENAC 2 G: 10 GEL TOPICAL at 15:01

## 2021-01-11 ASSESSMENT — PAIN DESCRIPTION - FREQUENCY
FREQUENCY: CONTINUOUS
FREQUENCY: CONTINUOUS
FREQUENCY: INTERMITTENT

## 2021-01-11 ASSESSMENT — PAIN DESCRIPTION - ONSET
ONSET: ON-GOING
ONSET: ON-GOING

## 2021-01-11 ASSESSMENT — PAIN SCALES - GENERAL
PAINLEVEL_OUTOF10: 10
PAINLEVEL_OUTOF10: 10
PAINLEVEL_OUTOF10: 3
PAINLEVEL_OUTOF10: 10
PAINLEVEL_OUTOF10: 10

## 2021-01-11 ASSESSMENT — PAIN DESCRIPTION - LOCATION
LOCATION: LEG
LOCATION: HIP;LEG

## 2021-01-11 ASSESSMENT — PAIN - FUNCTIONAL ASSESSMENT
PAIN_FUNCTIONAL_ASSESSMENT: PREVENTS OR INTERFERES SOME ACTIVE ACTIVITIES AND ADLS
PAIN_FUNCTIONAL_ASSESSMENT: PREVENTS OR INTERFERES SOME ACTIVE ACTIVITIES AND ADLS

## 2021-01-11 ASSESSMENT — PAIN DESCRIPTION - DESCRIPTORS
DESCRIPTORS: ACHING;DISCOMFORT
DESCRIPTORS: ACHING;CONSTANT
DESCRIPTORS: ACHING;CONSTANT

## 2021-01-11 ASSESSMENT — PAIN DESCRIPTION - ORIENTATION
ORIENTATION: RIGHT;LEFT
ORIENTATION: RIGHT;LEFT

## 2021-01-11 ASSESSMENT — PAIN DESCRIPTION - PAIN TYPE: TYPE: ACUTE PAIN

## 2021-01-11 ASSESSMENT — PAIN DESCRIPTION - PROGRESSION: CLINICAL_PROGRESSION: GRADUALLY WORSENING

## 2021-01-11 NOTE — PLAN OF CARE
Problem: Falls - Risk of:  Goal: Will remain free from falls  Description: Will remain free from falls  Outcome: Ongoing  Goal: Absence of physical injury  Description: Absence of physical injury  Outcome: Ongoing     Problem: Pain:  Description: Pain management should include both nonpharmacologic and pharmacologic interventions.   Goal: Pain level will decrease  Description: Pain level will decrease  Outcome: Ongoing  Goal: Control of acute pain  Description: Control of acute pain  Outcome: Ongoing  Goal: Control of chronic pain  Description: Control of chronic pain  Outcome: Ongoing     Problem: Discharge Planning:  Goal: Discharged to appropriate level of care  Description: Discharged to appropriate level of care  Outcome: Ongoing     Problem: Serum Glucose Level - Abnormal:  Goal: Ability to maintain appropriate glucose levels will improve  Description: Ability to maintain appropriate glucose levels will improve  Outcome: Ongoing     Problem: Sensory Perception - Impaired:  Goal: Ability to maintain a stable neurologic state will improve  Description: Ability to maintain a stable neurologic state will improve  Outcome: Ongoing     Problem: Coping:  Goal: Participation in decision-making will improve  Description: Demonstration of participation in decision-making regarding own care will improve  Outcome: Ongoing  Goal: Verbalizations of comfort with decisions will increase  Description: Verbalizations of comfort with decisions will increase  Outcome: Ongoing     Problem: Health Behavior:  Goal: Ability to identify and utilize available resources and services will improve  Description: Ability to identify and utilize available resources and services will improve  Outcome: Ongoing No

## 2021-01-11 NOTE — PROGRESS NOTES
Progress Note( Dr. Blanca Palma)    Subjective:   Admit Date: 11/11/2020  PCP: Anderson Zaldivar MD    Admitted For :ncreasing fatigue and high blood glucose    Consulted For: Better control of blood glucose    Interval History: Feels somewhat better less tired    Denies any chest pains,   Some SOB . Denies nausea or vomiting. No new bowel or bladder symptoms.      No intake or output data in the 24 hours ending 01/11/21 0057    DATA    CBC:   Recent Labs     01/08/21  1400 01/10/21  0700   WBC 5.7 5.0   HGB 11.6* 11.1*    186    CMP:  Recent Labs     01/08/21  1400 01/10/21  0700    137   K 3.3* 3.7    102   CO2 23 34*   BUN 7 16   CREATININE 1.0 1.7*   CALCIUM 8.1* 7.8*   PROT 6.8 6.4   LABALBU 3.0* 2.9*   BILITOT 0.6 0.4   ALKPHOS 71 65   AST 22 18   ALT 16 14     Lipids:   Lab Results   Component Value Date    CHOL 107 11/22/2019    CHOL 175 05/05/2015    HDL 46 11/22/2019    TRIG 81 11/22/2019     Glucose:  Recent Labs     01/10/21  1206 01/10/21  1730 01/10/21  2117   POCGLU 144* 153* 148*     [id number]:  Lab Results   Component Value Date    LABA1C 8.3 11/22/2019     High Sensitivity TSH:   Lab Results   Component Value Date    TSHHS 1.770 11/21/2019     Free T3: No results found for: FT3  Free T4:  Lab Results   Component Value Date    T4FREE 1.14 11/21/2019       Xr Elbow Left (2 Views)    Result Date: 11/11/2020 EXAMINATION: TWO XRAY VIEWS OF THE LEFT ELBOW 11/11/2020 10:07 am COMPARISON: None. HISTORY: ORDERING SYSTEM PROVIDED HISTORY: trauma TECHNOLOGIST PROVIDED HISTORY: Reason for exam:->trauma Reason for Exam: trauma Acuity: Acute Type of Exam: Initial Mechanism of Injury: Nurse removed bilateral leg dressings. Pt states they are applied every week at his doctors office in [facility]. Legs are red and swollen. Stage 2 ulcer on left calf, approx 3 cm in diameter. Pt states he has been seen by the wound clinic FINDINGS: No acute fracture or dislocation is identified. No joint effusion is identified. No radiopaque foreign body is identified. No acute abnormality. Xr Elbow Right (2 Views)    Result Date: 11/11/2020  EXAMINATION: TWO XRAY VIEWS OF THE RIGHT ELBOW 11/11/2020 10:07 am COMPARISON: Right forearm series 11/03/2014. HISTORY: ORDERING SYSTEM PROVIDED HISTORY: trauma TECHNOLOGIST PROVIDED HISTORY: Reason for exam:->trauma Reason for Exam: trauma Acuity: Acute Type of Exam: Initial Mechanism of Injury: Nurse removed bilateral leg dressings. Pt states they are applied every week at his doctors office in [facility]. Legs are red and swollen. Stage 2 ulcer on left calf, approx 3 cm in diameter. Pt states he has been seen by the wound clinic FINDINGS: Small elbow effusion. Mature ossification is noted about the coronoid process on the lateral view. There is mild enthesophyte formation about the lateral epicondyle. No malalignment. 1.  Small elbow effusion. 2.  Age indeterminate fracture fragment about the tip of the coronoid process.      Xr Knee Left (min 4 Views)    Result Date: 11/11/2020 EXAMINATION: FOUR XRAY VIEWS OF THE LEFT KNEE 11/11/2020 9:19 am COMPARISON: None. HISTORY: ORDERING SYSTEM PROVIDED HISTORY: trauma TECHNOLOGIST PROVIDED HISTORY: Reason for exam:->trauma Reason for Exam: trauma Acuity: Acute Type of Exam: Initial Mechanism of Injury: Nurse removed bilateral leg dressings. Pt states they are applied every week at his doctors office in [facility]. Legs are red and swollen. Stage 2 ulcer on left calf, approx 3 cm in diameter. Pt states he has been seen by the wound clinic FINDINGS: No fracture, cortical erosion or joint effusion were noted. No patellofemoral or femoral tibial joint compartment narrowing were identified. The bony mineralization was normal.     No fracture, joint effusion or joint space narrowing. Xr Knee Right (min 4 Views)    Result Date: 11/11/2020  EXAMINATION: FOUR XRAY VIEWS OF THE RIGHT KNEE 11/11/2020 9:19 am COMPARISON: None. HISTORY: ORDERING SYSTEM PROVIDED HISTORY: trauma TECHNOLOGIST PROVIDED HISTORY: Reason for exam:->trauma Reason for Exam: trauma Acuity: Acute Type of Exam: Initial Mechanism of Injury: Nurse removed bilateral leg dressings. Pt states they are applied every week at his doctors office in [facility]. Legs are red and swollen. Stage 2 ulcer on left calf, approx 3 cm in diameter. Pt states he has been seen by the wound clinic FINDINGS: A small to moderate right knee effusion was noted without fracture or cortical erosion. Moderate degenerative narrowing involves the medial femoral tibial joint compartment. Small to moderate right knee effusion without fracture or cortical erosion. Moderate degenerative narrowing involves the medial femoral tibial joint compartment.      Xr Chest Portable    Result Date: 11/11/2020 EXAMINATION: ONE XRAY VIEW OF THE CHEST 11/11/2020 9:19 am COMPARISON: 07/02/2020, 05/01/2020 HISTORY: ORDERING SYSTEM PROVIDED HISTORY: weakness TECHNOLOGIST PROVIDED HISTORY: Reason for exam:->weakness Reason for Exam: weakness Acuity: Acute Type of Exam: Initial Additional signs and symptoms: Nurse removed bilateral leg dressings. Pt states they are applied every week at his doctors office in [facility]. Legs are red and swollen. Stage 2 ulcer on left calf, approx 3 cm in diameter. Pt states he has been seen by the wound clinic FINDINGS: Shallow inflation. Obscuration of the lung apices by the patient's neck. The cardiomediastinal silhouette is unchanged in appearance. Chronic appearing vascular congestion. There is no consolidation, pneumothorax, or evidence of edema. No effusion is appreciated. The osseous structures are unchanged in appearance. Chronic findings in the chest without acute airspace disease identified. Us Retroperitoneal Limited    Result Date: 11/11/2020  EXAMINATION: ULTRASOUND OF THE KIDNEYS 11/11/2020 6:50 pm COMPARISON: 02/24/2012 HISTORY: ORDERING SYSTEM PROVIDED HISTORY: kirsten TECHNOLOGIST PROVIDED HISTORY: Reason for exam:->kirsten Reason for Exam: KIRSTEN; abnormal labs Type of Exam: Initial FINDINGS: The right kidney measures 10.6 cm in length and the left kidney measures 9.6 cm in length. Kidneys demonstrate normal cortical echogenicity. No hydronephrosis or intrarenal stones. No focal lesions. Unremarkable ultrasound of the kidneys.        Scheduled Medicines   Medications:      Infusions:         Objective:   Vitals: /62   Pulse 64   Temp 97.9 °F (36.6 °C) (Oral)   Resp 12   Ht 6' 5\" (1.956 m)   Wt (!) 321 lb (145.6 kg)   SpO2 97%   BMI 38.07 kg/m²   General appearance: alert and cooperative with exam  Neck: no JVD or bruit  Thyroid : Normal lobes   Lungs: Has Vesicular Breath sounds   Heart:  regular rate and rhythm Abdomen: soft, non-tender; bowel sounds normal; no masses,  no organomegaly  Musculoskeletal: Normal  Extremities: extremities normal, , no edema  Neurologic:  Awake, alert, oriented to name, place and time. Cranial nerves II-XII are grossly intact. Motor is  intact. Sensory neuropathy. ,  and gait is normal.    Assessment:     Patient Active Problem List:     Gastroesophageal reflux disease without esophagitis     Hypertension in stage 3 chronic kidney disease due to type 2 diabetes mellitus (HCC)     DM (diabetes mellitus) type II, controlled, with peripheral vascular disorder (HCC)     Combined hyperlipidemia associated with type 2 diabetes mellitus ([address]     Diabetic skin ulcer associated with type 2 diabetes mellitus (HCC)     CKD (chronic kidney disease)     History of DVT (deep vein thrombosis)- left femoral     History of pulmonary embolism     Long term current use of anticoagulants with INR goal of 2.0-3.0     COPD (chronic obstructive pulmonary disease) (HCC)     Severe recurrent major depressive disorder with psychotic features ([address]     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 Reviewed POC blood glucose . Labs and X ray results   2. Reviewed Current Medicines   3. On meal/ Correction bolus Humalog/ Basal Lantus Insulin regime + OHGD   4. Monitor Blood glucose frequently   5.  Modified  the dose of Insulin/ other medicines as needed 6. Will follow     .      Valentina Mancilla MD

## 2021-01-11 NOTE — PROGRESS NOTES
Physical Therapy    Physical Therapy Treatment Note  Name: Josiah Grimm MRN: [id number] :   1945   Date:  2021   Admission Date: 2021 Room:  [address] [place], [place] [id number]   Restrictions/Precautions:  Restrictions/Precautions  Restrictions/Precautions: Fall Risk       Communication with other providers:  Co-treat with OT  Subjective:  Patient states:  P agreeable to working with therapy  Pain:   Location, Type, Intensity (0/10 to 10/10):  BLEs 10/10 with pressure  Objective:    Observation:  P supine in bed on 2.5 liters of oxygen via nasal cannula  Treatment, including education/measures:  Bed mobility: P performs supine to sit with min A with HOB elevated. P scoots to EOB with min to mod A for assist. P returns from sit to supine with max A x 2 for assist at trunk and LEs. Functional mobility: P sits EOB with close supervision. P performs sit <> stand with bed elevated and stands to RW with mod A x 2 x 2 attempts. P with significant forward trunk flexion and posterior lean using bed for support at posterior thighs. P originally requires mod A for balance in standing progresses to min A on second attempt. P stands x 20 seconds each and unable to take step. P assisted into supine  P left supine in bed with HOB elevated, bed alarm on and call light within reach. Assessment / Impression:    P continues to demonstrate significant impairments in strength, transfers and balance.  Recommend skilled PT to address deficits and maximize functional potential.   Patient's tolerance of treatment:  fair   Adverse Reaction: pain  Significant change in status and impact:  Improved transfers  Barriers to improvement:  Pain, weakness  Plan for Next Session:    transfers  Time in:  1200  Time out:  1220  Timed treatment minutes:  20  Total treatment time:  20    Previously filed items:  Social/Functional History  Lives With: Alone  Type of Home: House  Home Layout: One level  Home Access: Stairs to enter without rails Entrance Stairs - Number of Steps: 2  Bathroom Shower/Tub: Walk-in shower  Bathroom Toilet: Standard  Bathroom Equipment: Toilet raiser  Home Equipment: Rolling walker, Grab bars  ADL Assistance: Independent(was independent priot to his stay in [place])  [address]: Independent  Homemaking Responsibilities: Yes  Ambulation Assistance: Needs assistance(FWW)  Transfer Assistance: Independent  Active : Yes  Occupation: Retired  Type of occupation: [employer]  Leisure & Hobbies: none  Short term goals  Time Frame for Short term goals: 2 weeks or upon discharge  Short term goal 1: Patient will be able to perform bed mobility and transfers Mod I. Short term goal 2: Patient will be able to perform sit to stand Min A  Short term goal 3: Patient will be able to stand >5 min without LOB CGA.   Short term goal 4: Patient will be able to perform B LE AROM exercises supine  Long term goals  Time Frame for Long term goals : defer    Electronically signed by:    Herminia Resendiz, PT  1/11/2021, 1:06 PM

## 2021-01-11 NOTE — CONSULTS
[facility] Continence Nurse  Consult Note       Lana Brenner  AGE: 77 y.o. GENDER: male  : 1945  TODAY'S DATE:  2021    Subjective:     Reason for  Evaluation and Assessment: wound assessment      Lana Brenner is a 77 y.o. 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 Jude William Anemia 2011    Arm fracture Wilnette Shan     Dr Kal Arana with also yearly DM exam    Cataract     worsening-Dr. العلي Sprjosemanuel    Cellulitis of left lower leg     Chronic venous hypertension with ulcer ([address] 2011    CKD (chronic kidney disease) 2012    Renal u/S normal     Colon polyps     Dr. Jude William COPD (chronic obstructive pulmonary disease) ([address]     Diabetes mellitus ([address]     Diabetes mellitus ([address]     Diabetes mellitus with peripheral circulatory disorder ([address] 10/2/2015    Diabetes mellitus with skin ulcer ([address] 10/2/2015    Diabetic peripheral neuropathy ([address]     + EMG, NCS    Diabetic skin ulcer associated with type 2 diabetes mellitus ([address]     Diverticulosis 12    mild, left colon    Femoral DVT (deep venous thrombosis) ([address] 2011    PARTIAL,CHRONIC-[facility] SURGEONS    GERD (gastroesophageal reflux disease)     Glaucoma     open angle-Dr. Liza Teran H/O cardiac catheterization 6/3/14    EF55% normal study    H/O Doppler ultrasound 03/26/15    Carotid US- no significant stenosis noted bilaterally.  H/O echocardiogram 2019    EF50-55%, Mild AR. Moderately dilated right ventricle with negative Solis's sign.     H/O mumps orchitis     as a youth    Hemorrhoids 12 Dr. Heydi Srinivasan; repeat colonoscopy 3 years    History of nuclear stress test 11/21/2019    EF 60%, Normal study.  HLD (hyperlipidemia)     Hx of Doppler ultrasound 1/06/15    Lymph node seen in left groin area. No bilateral stenosis.     Hyperlipemia     Hyperlipidemia     Hypertension 1992    Hypertension     Idiopathic chronic venous hypertension of left lower extremity with ulcer and inflammation (HCC) 10/2/2015    Leg ulcer ([address] 1978-present    following at wound center, Dr Angela Scott No diabetic retinopathy OU 11/12    Dr. Julio Diaz chronic ulcer of left lower leg with fat layer exposed ([address] 10/2/2015    Pulmonary embolism ([address] 11/13    patient on coumadin    Sleep apnea     doesnt always use cpap dt it drys him out    SOB (shortness of breath) Oct 2011    Stress test normal.     Tendinitis 1973    plantar tendons    Type II or unspecified type diabetes mellitus with other specified manifestations, not stated as uncontrolled 2/8/2013    Unspecified venous (peripheral) insufficiency     Urticaria     WD-Cellulitis of right anterior lower leg 9/5/2019    WD-Cellulitis of right lower extremity 3/26/2020    WD-Chronic venous hypertension (idiopathic) with ulcer of left lower extremity (CODE) ([address] 6/27/2019    WD-Chronic venous hypertension with inflammation, right 9/19/2019    WD-Decubitus ulcer of left buttock, stage 3 ([address] 6/25/2020    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 to breakdown of skin ([address] 3/26/2020    WD-Open wound of hand without complication, left, initial encounter 12/12/2019    WD-Pressure injury of left buttock, stage 2 ([address] 1/2/2020    WD-Pressure injury of left buttock, stage 3 ([address] 3/12/2020    WD-Pressure injury of right buttock, stage 3 ([address] 3/12/2020    WD-Skin tear of right forearm without complication 4/64/7158    WD-Ulcer of right pretibial region, with fat layer exposed ([address] 10/17/2019 PAST SURGICAL HISTORY    Past Surgical History:   Procedure Laterality Date    BREAST SURGERY  1970s    benign tumors bilaterally    CARDIAC CATHETERIZATION  6/3/14    EF55% normal study    CARPAL TUNNEL RELEASE Left     CARPAL TUNNEL RELEASE      CATARACT REMOVAL Right 3/11/2013    Dr. Jennifer Bhatti Left 2013    Dr. Abdulkadir Costa  2006    polyps    COLONOSCOPY  11    villous component--f/u colonoscopy will be needed in 6 months    COLONOSCOPY  12    mild diverticulosis, internal hemorrhoids; repeat in 3 years (Dr. Berrios Back)    COLONOSCOPY  2016    mild diverticulosis, three colon polyps    HERNIA REPAIR  1970s    HERNIA REPAIR      SKIN GRAFT  -present    skin grafts to left ankle ulcers    SPLENECTOMY      SPLENECTOMY      TONSILLECTOMY      VARICOSE VEIN SURGERY Left 2009       FAMILY HISTORY    Family History   Problem Relation Age of Onset    Heart Disease Father     Cancer Father         prostate    High Blood Pressure Father     High Cholesterol Father     Cancer Brother     Diabetes Brother     Thyroid Disease Brother        SOCIAL HISTORY    Social History     Tobacco Use    Smoking status: Former Smoker     Packs/day: 2.00     Years: 35.00     Pack years: 70.00     Types: Cigarettes     Quit date:      Years since quittin.0    Smokeless tobacco: Never Used    Tobacco comment: chew--30 years.   Reviewed 2015   Substance Use Topics    Alcohol use: Yes     Comment: soc    Drug use: Never       ALLERGIES    Allergies   Allergen Reactions    Cavilon Durable Barrier [Mineral Oil-Dimeth-Coconut Oil]     Parabens Hives    Parabens Hives    Prinivil [Lisinopril] Swelling    Cortisone Rash    Cortisone Rash    Dilaudid [Hydromorphone Hcl] Rash    Penicillins Rash    Penicillins Rash    Sulfamethoxazole-Trimethoprim Nausea Only    Tape [Adhesive Tape] Rash       MEDICATIONS No current facility-administered medications on file prior to encounter.       Current Outpatient Medications on File Prior to Encounter   Medication Sig Dispense Refill    clotrimazole-betamethasone (LOTRISONE) 1-0.05 % cream Apply topically 2 times daily Apply topically 2 times daily to BLE      buPROPion (WELLBUTRIN XL) 300 MG extended release tablet Take 1 tablet by mouth every morning 90 tablet 0    glycopyrrolate-formoterol (BEVESPI AEROSPHERE) 9-4.8 MCG/ACT AERO Inhale 2 puffs into the lungs 2 times daily 1 Inhaler 5    aspirin 81 MG chewable tablet Take 1 tablet by mouth daily 30 tablet 3    warfarin (COUMADIN) 5 MG tablet Take 1 tablet by mouth daily Except take 1 and 1/2 tablets by mouth on Mondays or as directed by clinic 98 tablet 1    alogliptin (NESINA) 12.5 MG TABS tablet Take 1 tablet by mouth daily 60 tablet     glipiZIDE (GLUCOTROL) 10 MG tablet Take 1 tablet by mouth 2 times daily (before meals) 60 tablet 3    escitalopram (LEXAPRO) 20 MG tablet Take 1 tablet by mouth daily 90 tablet 0    omeprazole (PRILOSEC) 20 MG delayed release capsule TAKE ONE (1) CAPSULE BY MOUTH ONCE DAILY 90 capsule 1    ARIPiprazole (ABILIFY) 15 MG tablet Take 1 tablet by mouth daily 90 tablet 0    atorvastatin (LIPITOR) 40 MG tablet Take 1 tablet by mouth nightly 90 tablet 0    furosemide (LASIX) 20 MG tablet Take 1 tablet by mouth 2 times daily 180 tablet 0    metFORMIN (GLUCOPHAGE) 500 MG tablet Take 1 tablet by mouth daily (with breakfast) 90 tablet 0    potassium chloride (MICRO-K) 10 MEQ extended release capsule Take 10 mEq by mouth daily      metoprolol tartrate (LOPRESSOR) 25 MG tablet Take 0.5 tablets by mouth 2 times daily 90 tablet 3         Objective:      BP (!) 104/52   Pulse 66   Temp 98.2 °F (36.8 °C) (Infrared)   Resp 18   Ht 6' 5\" (1.956 m)   Wt 296 lb 4 oz (134.4 kg)   SpO2 90%   BMI 35.13 kg/m²   Dao Risk Score: Dao Scale Score: 17    LABS    CBC:   Lab Results Component Value Date    WBC 5.0 01/10/2021    RBC 3.77 01/10/2021    HGB 11.1 01/10/2021    HCT 34.7 01/10/2021    MCV 92.0 01/10/2021    MCH 29.4 01/10/2021    MCHC 32.0 01/10/2021    RDW 18.2 01/10/2021     01/10/2021    MPV 12.5 01/10/2021     CMP:    Lab Results   Component Value Date     01/10/2021    K 3.7 01/10/2021     01/10/2021    CO2 34 01/10/2021    BUN 16 01/10/2021    CREATININE 1.7 01/10/2021    GFRAA 48 01/10/2021    GFRAA >60 03/19/2013    AGRATIO 1.1 10/21/2019    LABGLOM 40 01/10/2021    GLUCOSE 137 01/10/2021    PROT 6.4 01/10/2021    PROT 7.4 09/17/2012    LABALBU 2.9 01/10/2021    CALCIUM 7.8 01/10/2021    BILITOT 0.4 01/10/2021    ALKPHOS 65 01/10/2021    AST 18 01/10/2021    ALT 14 01/10/2021     Albumin:    Lab Results   Component Value Date    LABALBU 2.9 01/10/2021     PT/INR:    Lab Results   Component Value Date    PROTIME 18.9 01/11/2021    PROTIME 21.8 11/05/2020    INR 1.64 01/11/2021     HgBA1c:    Lab Results   Component Value Date    LABA1C 8.3 11/22/2019         Assessment:     Patient Active Problem List   Diagnosis    Gastroesophageal reflux disease without esophagitis    Hypertension in stage 3 chronic kidney disease due to type 2 diabetes mellitus [facility]    DM (diabetes mellitus) type II, controlled, with peripheral vascular disorder ([facility]    Combined hyperlipidemia associated with type 2 diabetes mellitus ([facility]    Diabetic skin ulcer associated with type 2 diabetes mellitus (HCC)    CKD (chronic kidney disease)    History of DVT (deep vein thrombosis)- left femoral    History of pulmonary embolism    Long term current use of anticoagulants with INR goal of 2.0-3.0    COPD (chronic obstructive pulmonary disease) ([facility]    Severe recurrent major depressive disorder with psychotic features ([facility]    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)    Venous (peripheral) insufficiency  Uncontrolled secondary diabetes mellitus with stage 3 CKD (GFR 30-59) (HCC)    Diabetes mellitus with skin ulcer ([facility]    WD-Ulcer of shin, left, with fat layer exposed ([facility]    History of colon polyps    Personal history of PE (pulmonary embolism)    WD-Chronic venous hypertension (idiopathic) with ulcer of left lower extremity (CODE) ([facility]    WD-Chronic venous hypertension with inflammation, right    Troponin I above reference range    KIRSTEN (acute kidney injury) ([facility]    Cellulitis of lower extremity    Hyponatremia    Generalized weakness    Weakness       Measurements:  Wound 11/15/13 Other (Comment) Leg Inner erethema with a small puncture site (Active)   Number of days: 2613       Wound 06/27/19 #6 (onset 1 month) Left Distal Medial Ankle (Active)   Dressing Status Clean;Dry; Intact 01/10/21 2100   Wound Length (cm) 0 cm 01/11/21 0945   Wound Width (cm) 0 cm 01/11/21 0945   Wound Depth (cm) 0 cm 01/11/21 0945   Wound Surface Area (cm^2) 0 cm^2 01/11/21 0945   Change in Wound Size % (l*w) 100 01/11/21 0945   Wound Volume (cm^3) 0 cm^3 01/11/21 0945   Wound Healing % 100 01/11/21 0945   Number of days: 563       Wound 07/30/20 Leg Medial;Lower; Left #7 (Active)   Wound Image   01/07/21 0950   Dressing Status Clean;Dry; Intact 01/10/21 2100   Wound Cleansed Soap and water; Other (Comment); Cleansed with saline 01/07/21 0950   Wound Length (cm) 0 cm 01/11/21 0945   Wound Width (cm) 0 cm 01/11/21 0945   Wound Depth (cm) 0 cm 01/11/21 0945   Wound Surface Area (cm^2) 0 cm^2 01/11/21 0945   Change in Wound Size % (l*w) 100 01/11/21 0945   Wound Volume (cm^3) 0 cm^3 01/11/21 0945   Wound Healing % 100 01/11/21 0945   Distance Tunneling (cm) 0 cm 01/07/21 0950   Tunneling Position ___ O'Clock 0 01/07/21 0950   Undermining Starts ___ O'Clock 0 01/07/21 0950   Undermining Ends___ O'Clock 0 01/07/21 0950   Undermining Maxium Distance (cm) 0 01/07/21 2550  [address] 01/07/21 0950 Drainage Amount Large 01/07/21 0950   Drainage Description Serosanguinous 01/07/21 0950   Odor Mild 01/07/21 0950   Ana-wound Assessment Maceration 01/07/21 0950   Margins Undefined edges 01/07/21 0950   Number of days: 165       Wound 08/27/20 Leg Medial;Lower;Right #8 Cluster (Active)   Wound Image   01/07/21 0950   Wound Etiology Venous 01/11/21 0945   Dressing Status New dressing applied 01/11/21 0945   Wound Cleansed Cleansed with saline 01/11/21 0945   Wound Length (cm) 2.5 cm 01/11/21 0945   Wound Width (cm) 2 cm 01/11/21 0945   Wound Depth (cm) 0.1 cm 01/11/21 0945   Wound Surface Area (cm^2) 5 cm^2 01/11/21 0945   Change in Wound Size % (l*w) -150 01/11/21 0945   Wound Volume (cm^3) 0.5 cm^3 01/11/21 0945   Wound Healing % -150 01/11/21 0945   Distance Tunneling (cm) 0 cm 01/11/21 0945   Tunneling Position ___ O'Clock 0 01/11/21 0945   Undermining Starts ___ O'Clock 0 01/11/21 0945   Undermining Ends___ O'Clock 0 01/11/21 0945   Undermining Maxium Distance (cm) 0 01/11/21 0945   Wound Assessment Pink/red 01/11/21 0945   Drainage Amount Small 01/11/21 0945   Drainage Description Serous 01/11/21 0945   Odor None 01/11/21 0945   Ana-wound Assessment Dry/flaky 01/11/21 0945   Margins Defined edges 01/11/21 0945   Wound Thickness Description not for Pressure Injury Full thickness 01/11/21 0945   Number of days: 137       Wound 11/12/20 Elbow Right;Posterior (Active)   Number of days: 59       Wound 11/12/20 Buttocks Left cluster (Active)   Number of days: 59       Response to treatment:  Well tolerated by patient.      Pain Assessment:  Severity:  none  Quality of pain: na  Wound Pain Timing/Severity: na  Premedicated: no    Plan:     Plan of Care: Wound 08/27/20 Leg Medial;Lower;Right #8 Cluster-Dressing/Treatment: (optifoam AG, kerlix) Pt in bed. Agreeable to wound assessment. Active with outpatient wound clinic. Notes reviewed. Admitted possible acute DVT LLE. Compression wraps already off of legs. Wound to right lower medial leg pictured and measured. Minimal drainage. Scar tissue noted to left lower leg. Treatment applied as above. Heels intact. Buttock with blanchable red/purple discoloration. Positioned to left side with heels floated with pillow support. Atmos air pump applied to mattress. Pt is at mild risk for skin breakdown AEB Dao. Follow Dao orders. Spoke with nurse. Specialty Bed Required : yes  [] Low Air Loss   [x] Pressure Redistribution  [] Fluid Immersion  [] Bariatric  [] Total Pressure Relief  [] Other:     Discharge Plan:  Placement for patient upon discharge: tbd  Hospice Care: no  Patient appropriate for Outpatient [address]: already active    Patient/Caregiver Teaching:  Level of patient/caregiver understanding able to:   Voiced understanding regarding wound care.         Electronically signed by Radha Benjamin RN,  on 1/11/2021 at 10:25 AM

## 2021-01-11 NOTE — PROGRESS NOTES
Progress Note( Dr. Blanca Palma)  1/11/2021  Subjective:   Admit Date: 11/11/2020  PCP: Anderson Zaldivar MD    Admitted For :ncreasing fatigue and high blood glucose    Consulted For: Better control of blood glucose    Interval History: Feels somewhat better less tired    Denies any chest pains,   Some SOB . Denies nausea or vomiting. No new bowel or bladder symptoms.      No intake or output data in the 24 hours ending 01/11/21 0058    DATA    CBC:   Recent Labs     01/08/21  1400 01/10/21  0700   WBC 5.7 5.0   HGB 11.6* 11.1*    186    CMP:  Recent Labs     01/08/21  1400 01/10/21  0700    137   K 3.3* 3.7    102   CO2 23 34*   BUN 7 16   CREATININE 1.0 1.7*   CALCIUM 8.1* 7.8*   PROT 6.8 6.4   LABALBU 3.0* 2.9*   BILITOT 0.6 0.4   ALKPHOS 71 65   AST 22 18   ALT 16 14     Lipids:   Lab Results   Component Value Date    CHOL 107 11/22/2019    CHOL 175 05/05/2015    HDL 46 11/22/2019    TRIG 81 11/22/2019     Glucose:  Recent Labs     01/10/21  1206 01/10/21  1730 01/10/21  2117   POCGLU 144* 153* 148*     [id number]:  Lab Results   Component Value Date    LABA1C 8.3 11/22/2019     High Sensitivity TSH:   Lab Results   Component Value Date    TSHHS 1.770 11/21/2019     Free T3: No results found for: FT3  Free T4:  Lab Results   Component Value Date    T4FREE 1.14 11/21/2019       Xr Elbow Left (2 Views)    Result Date: 11/11/2020 EXAMINATION: TWO XRAY VIEWS OF THE LEFT ELBOW 11/11/2020 10:07 am COMPARISON: None. HISTORY: ORDERING SYSTEM PROVIDED HISTORY: trauma TECHNOLOGIST PROVIDED HISTORY: Reason for exam:->trauma Reason for Exam: trauma Acuity: Acute Type of Exam: Initial Mechanism of Injury: Nurse removed bilateral leg dressings. Pt states they are applied every week at his doctors office in [facility]. Legs are red and swollen. Stage 2 ulcer on left calf, approx 3 cm in diameter. Pt states he has been seen by the wound clinic FINDINGS: No acute fracture or dislocation is identified. No joint effusion is identified. No radiopaque foreign body is identified. No acute abnormality. Xr Elbow Right (2 Views)    Result Date: 11/11/2020  EXAMINATION: TWO XRAY VIEWS OF THE RIGHT ELBOW 11/11/2020 10:07 am COMPARISON: Right forearm series 11/03/2014. HISTORY: ORDERING SYSTEM PROVIDED HISTORY: trauma TECHNOLOGIST PROVIDED HISTORY: Reason for exam:->trauma Reason for Exam: trauma Acuity: Acute Type of Exam: Initial Mechanism of Injury: Nurse removed bilateral leg dressings. Pt states they are applied every week at his doctors office in [facility]. Legs are red and swollen. Stage 2 ulcer on left calf, approx 3 cm in diameter. Pt states he has been seen by the wound clinic FINDINGS: Small elbow effusion. Mature ossification is noted about the coronoid process on the lateral view. There is mild enthesophyte formation about the lateral epicondyle. No malalignment. 1.  Small elbow effusion. 2.  Age indeterminate fracture fragment about the tip of the coronoid process.      Xr Knee Left (min 4 Views)    Result Date: 11/11/2020 EXAMINATION: FOUR XRAY VIEWS OF THE LEFT KNEE 11/11/2020 9:19 am COMPARISON: None. HISTORY: ORDERING SYSTEM PROVIDED HISTORY: trauma TECHNOLOGIST PROVIDED HISTORY: Reason for exam:->trauma Reason for Exam: trauma Acuity: Acute Type of Exam: Initial Mechanism of Injury: Nurse removed bilateral leg dressings. Pt states they are applied every week at his doctors office in [facility]. Legs are red and swollen. Stage 2 ulcer on left calf, approx 3 cm in diameter. Pt states he has been seen by the wound clinic FINDINGS: No fracture, cortical erosion or joint effusion were noted. No patellofemoral or femoral tibial joint compartment narrowing were identified. The bony mineralization was normal.     No fracture, joint effusion or joint space narrowing. Xr Knee Right (min 4 Views)    Result Date: 11/11/2020  EXAMINATION: FOUR XRAY VIEWS OF THE RIGHT KNEE 11/11/2020 9:19 am COMPARISON: None. HISTORY: ORDERING SYSTEM PROVIDED HISTORY: trauma TECHNOLOGIST PROVIDED HISTORY: Reason for exam:->trauma Reason for Exam: trauma Acuity: Acute Type of Exam: Initial Mechanism of Injury: Nurse removed bilateral leg dressings. Pt states they are applied every week at his doctors office in [facility]. Legs are red and swollen. Stage 2 ulcer on left calf, approx 3 cm in diameter. Pt states he has been seen by the wound clinic FINDINGS: A small to moderate right knee effusion was noted without fracture or cortical erosion. Moderate degenerative narrowing involves the medial femoral tibial joint compartment. Small to moderate right knee effusion without fracture or cortical erosion. Moderate degenerative narrowing involves the medial femoral tibial joint compartment.      Xr Chest Portable    Result Date: 11/11/2020 EXAMINATION: ONE XRAY VIEW OF THE CHEST 11/11/2020 9:19 am COMPARISON: 07/02/2020, 05/01/2020 HISTORY: ORDERING SYSTEM PROVIDED HISTORY: weakness TECHNOLOGIST PROVIDED HISTORY: Reason for exam:->weakness Reason for Exam: weakness Acuity: Acute Type of Exam: Initial Additional signs and symptoms: Nurse removed bilateral leg dressings. Pt states they are applied every week at his doctors office in [facility]. Legs are red and swollen. Stage 2 ulcer on left calf, approx 3 cm in diameter. Pt states he has been seen by the wound clinic FINDINGS: Shallow inflation. Obscuration of the lung apices by the patient's neck. The cardiomediastinal silhouette is unchanged in appearance. Chronic appearing vascular congestion. There is no consolidation, pneumothorax, or evidence of edema. No effusion is appreciated. The osseous structures are unchanged in appearance. Chronic findings in the chest without acute airspace disease identified. Us Retroperitoneal Limited    Result Date: 11/11/2020  EXAMINATION: ULTRASOUND OF THE KIDNEYS 11/11/2020 6:50 pm COMPARISON: 02/24/2012 HISTORY: ORDERING SYSTEM PROVIDED HISTORY: kirsten TECHNOLOGIST PROVIDED HISTORY: Reason for exam:->kirsten Reason for Exam: KIRSTEN; abnormal labs Type of Exam: Initial FINDINGS: The right kidney measures 10.6 cm in length and the left kidney measures 9.6 cm in length. Kidneys demonstrate normal cortical echogenicity. No hydronephrosis or intrarenal stones. No focal lesions. Unremarkable ultrasound of the kidneys.        Scheduled Medicines   Medications:      Infusions:         Objective:   Vitals: /62   Pulse 64   Temp 97.9 °F (36.6 °C) (Oral)   Resp 12   Ht 6' 5\" (1.956 m)   Wt (!) 321 lb (145.6 kg)   SpO2 97%   BMI 38.07 kg/m²   General appearance: alert and cooperative with exam  Neck: no JVD or bruit  Thyroid : Normal lobes   Lungs: Has Vesicular Breath sounds   Heart:  regular rate and rhythm 6. Will follow     .      Gabriela Serrano MD

## 2021-01-11 NOTE — CARE COORDINATION
The patient was transferred from [address] [place] [address] ED to [facility].  This CM reviewed the notes from [address] [place] [address] ED case manager dated 1/8, referral was made to Sydnie at [facility] and then patient was transferred to [place]. This CM met with the patient for discharge planning. Patient lives in a 2 story home alone (stays on main level, 3 steps to enter with no railing), has insurance with Rx coverage & PCP, stated that he requires some assistance with ADL's, and was driving until recently. Patient stated that he uses a walker at home and is supposed to wear oxygen at home but he doesn't wear it all the time. Patient stated that he was recently discharged from [facility] (approx. 12/20/2020) and returned home without a referral to [facility]. Patient stated that he is unable to take care of himself at home alone and wants to be placed at [facility] again to continue with physical therapy. Patient will need an insurance pre-cert, OT evaluation, and updated PT notes. CM will follow. 12:34 PM  CM spoke with Sydnie at [facility] regarding patient. Sydnie advised this CM to notify her once the patient's OT evaluation and updated PT notes are available. CM will follow. 2:04 PM  CM spoke with Sydnie at [facility] to notify that the updated PT note is available and the OT evaluation is still pending at this time.

## 2021-01-11 NOTE — PROGRESS NOTES
Occupational Therapy   Occupational Therapy Initial Assessment  Date: 2021   Patient Name: Mallika Payne  MRN: [id number]     : 1945    Date of Service: 2021    Discharge Recommendations:  Subacute/Skilled Nursing Facility       Assessment   Performance deficits / Impairments: Decreased functional mobility ; Decreased ADL status; Decreased strength;Decreased safe awareness;Decreased balance;Decreased endurance  Assessment: 78 yo male admitted with Bilateral chronic venous stasis/diabetic pressure ulcers:  He presents with decreased I adls, transfers and endurance. OT to see pt to improve I with adls, transfers, and endurance  Prognosis: Fair  Decision Making: Medium Complexity  History: see above  Exam: see above  OT Education: OT Role  REQUIRES OT FOLLOW UP: Yes  Activity Tolerance  Activity Tolerance: Patient limited by fatigue;Patient limited by pain           Patient Diagnosis(es): There were no encounter diagnoses. 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, Anemia, Arm fracture, Arthritis, Cataract, Cataract, Cellulitis of left lower leg, Chronic venous hypertension with ulcer ([facility] [address] CKD (chronic kidney disease), Colon polyps, COPD (chronic obstructive pulmonary disease) ([facility] [address] Diabetes mellitus ([facility] [address] Diabetes mellitus ([facility] [address] Diabetes mellitus with peripheral circulatory disorder ([facility] [address] Diabetes mellitus with skin ulcer ([facility] [address] Diabetic peripheral neuropathy ([facility] [address] Diabetic skin ulcer associated with type 2 diabetes mellitus ([facility] [address] Diverticulosis, Femoral DVT (deep venous thrombosis) ([facility] [address] GERD (gastroesophageal reflux disease), Glaucoma, H/O cardiac catheterization, H/O Doppler ultrasound, H/O echocardiogram, H/O mumps orchitis, Hemorrhoids, History of nuclear stress test, HLD (hyperlipidemia), Hx of Doppler ultrasound, Hyperlipemia, Hyperlipidemia, Hypertension, Hypertension, Idiopathic chronic venous hypertension of left lower extremity with ulcer and inflammation (HCC), Leg ulcer ([address] No diabetic retinopathy OU, Non-pressure chronic ulcer of left lower leg with fat layer exposed ([facility] [address] Pulmonary embolism ([facility] [address] Sleep apnea, SOB (shortness of breath), Tendinitis, Type II or unspecified type diabetes mellitus with other specified manifestations, not stated as uncontrolled, Unspecified venous (peripheral) insufficiency, Urticaria, WD-Cellulitis of right anterior lower leg, WD-Cellulitis of right lower extremity, WD-Chronic venous hypertension (idiopathic) with ulcer of left lower extremity (CODE) ([facility] [address] WD-Chronic venous hypertension with inflammation, right, WD-Decubitus ulcer of left buttock, stage 3 ([address] 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 to breakdown of skin ([address] WD-Open wound of hand without complication, left, initial encounter, WD-Pressure injury of left buttock, stage 2 ([address] WD-Pressure injury of left buttock, stage 3 ([address] WD-Pressure injury of right buttock, stage 3 (HCC), WD-Skin tear of right forearm without complication, and WD-Ulcer of right pretibial region, with fat layer exposed ([facility] [address] has a past surgical history that includes Tonsillectomy (1953); Splenectomy (1958); Breast surgery (1970s); hernia repair (1970s); Skin graft (1978-present); Cataract removal (Right, 3/11/2013); Cataract removal (Left, 2/25/2013); Varicose vein surgery (Left, 2009); Carpal tunnel release (Left, 1993); Cardiac catheterization (6/3/14); Colonoscopy (2006); Colonoscopy (11/21/11); Colonoscopy (4/23/12); Colonoscopy (08/04/2016); Splenectomy; hernia repair; and Carpal tunnel release.            Restrictions  Restrictions/Precautions  Restrictions/Precautions: Fall Risk  Position Activity Restriction  Other position/activity restrictions: O2    Subjective   General  Patient assessed for rehabilitation services?: Yes  Family / Caregiver Present: No  Pain Assessment  Pain Level: 10  Social/Functional History  Social/Functional History  Lives With: Alone  Type of Home: House  Home Layout: One level  Home Access: Stairs to enter without rails  Entrance Stairs - Number of Steps: 2  Bathroom Shower/Tub: Walk-in shower  Bathroom Toilet: Standard  Bathroom Equipment: Toilet raiser  Home Equipment: Rolling walker, Grab bars  ADL Assistance: Independent(was independent priot to his stay in [place])  [address]: Independent  Homemaking Responsibilities: Yes  Ambulation Assistance: Needs assistance(FWW)  Transfer Assistance: Independent  Active : Yes  Occupation: Retired  Type of occupation: [employer]  Leisure & Hobbies: none       Objective   Vision: Impaired  Vision Exceptions: Wears glasses for reading  Hearing: Exceptions to [facility]  Hearing Exceptions: Hard of hearing/hearing concerns    Orientation  Overall Orientation Status: Within Functional Limits  Observation/Palpation  Observation: Pt was supine in bed , HOB elevated  Balance [x] 3   [] 4      6. Eating meals? [] 1   [] 2   [] 2   [] 3   [] 3   [x] 4      Raw Score:  13/24  60-79% impaired     [24=0% impaired(CH), 23=1-19%(CI), 20-22=20-39%(CJ), 15-19=40-59%(CK), 10-14=60-79%(CL), 7-9=80-99%(CM), 6=100%(CN)]            Goals  Short term goals  Time Frame for Short term goals: until discharge  Short term goal 1: Pt will be CGA for functional adl transfers to chair, toilet or bed w/o LOB or falls  Short term goal 2: Pt will be S for UB bathing with extra time and Min A LB bathing using necessary a.e.   Short term goal 3: Pt will be SBA UB dressing/Mod A LB dressing using necessary a.e.and given extra time  Short term goal 4: Pt will be Min vc for BUE strengthening ex to assist with transfers and endurance  Short term goal 5: Pt will be CGA for toileting       Therapy Time   Individual Concurrent Group Co-treatment   Time In       1200   Time Out       1220   Minutes       Igor Laguerre OT

## 2021-01-11 NOTE — PROGRESS NOTES
PHARMACY ANTICOAGULATION MONITORING SERVICE    Dayanna Whitehead is a 77 y.o. male on warfarin therapy for history of DVT and possible superimposed acute thrombus. Pharmacy consulted by Dr. mEmie Rosales for monitoring and adjustment of treatment. Indication for anticoagulation: Hx of DVT, concern for superimposed acute thrombus  INR goal: 2-3  Warfarin dose prior to admission: 5 mg daily    Pertinent Laboratory Values   Recent Labs     01/08/21  1400 01/08/21  1400 01/10/21  0700 01/10/21  0700 01/11/21  0635   INR 1.77   < >  --    < > 1.64   HGB 11.6*  --  11.1*  --   --    HCT 35.8*  --  34.7*  --   --      --  186  --   --     < > = values in this interval not displayed. Assessment/Plan:  ? Drug Interactions: Aspirin (home med), escitalopram (home med), therapeutic enoxaparin, doxycycline, Levaquin and Tylenol. ? INR sub-therapeutic on admission  ? INR has been subtherapeutic since 1/4/21, increase Coumadin to 6mg daily starting today. ? +THE PATIENT IS CURRENTLY TAKING BOTH COUMADIN AND LOVENOX+         LOOK TO DISCONTINUE THE LOVENOX ONCE INR IS THERAPEUTIC   ? Pharmacy will continue to monitor and adjust warfarin therapy as indicated    Thank you for the consult.   [address] [place] [address]  1/11/2021 8:41 AM

## 2021-01-11 NOTE — PROGRESS NOTES
Gastroesophageal reflux disease without esophagitis     Hypertension in stage 3 chronic kidney disease due to type 2 diabetes mellitus (HCC)     DM (diabetes mellitus) type II, controlled, with peripheral vascular disorder (HCC)     Combined hyperlipidemia associated with type 2 diabetes mellitus (HCC)     Diabetic skin ulcer associated with type 2 diabetes mellitus (HCC)     CKD (chronic kidney disease)     History of DVT (deep vein thrombosis)- left femoral     History of pulmonary embolism     Long term current use of anticoagulants with INR goal of 2.0-3.0     COPD (chronic obstructive pulmonary disease) (HCC)     Severe recurrent major depressive disorder with psychotic features ([facility] [address]     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)     Venous (peripheral) insufficiency     Uncontrolled secondary diabetes mellitus with stage 3 CKD (GFR 30-59) (HCC)     Diabetes mellitus with skin ulcer (HCC)     WD-Ulcer of shin, left, with fat layer exposed ([facility] [address]     History of colon polyps     Personal history of PE (pulmonary embolism)     WD-Chronic venous hypertension (idiopathic) with ulcer of left lower extremity (CODE) ([facility] [address]     WD-Chronic venous hypertension with inflammation, right     Troponin I above reference range     KIRSTEN (acute kidney injury) ([facility] [address]     Cellulitis of lower extremity     Hyponatremia     Generalized weakness     Weakness      Subjective:     No chief complaint on file. F/U:  Interval History: no new issues, no fever chills nausea or vomiting. Objective:        Intake/Output Summary (Last 24 hours) at 1/11/2021 0704  Last data filed at 1/10/2021 1345  Gross per 24 hour   Intake 600 ml   Output    Net 600 ml      Vitals:   Vitals:    01/10/21 1907   BP: 130/60   Pulse: 83   Resp: 16   Temp: 98.7 °F (37.1 °C)   SpO2: 94%     Physical Exam:  Gen:  awake, alert, cooperative, no apparent distress flat affect EYES:  Lids and lashes normal, pupils equal, round and reactive to light, extra ocular muscles intact, sclera clear, conjunctiva normal  ENT:  Normocephalic, oral pharynx with moist mucus membranes, tonsils without erythema or exudates,  NECK:  Supple, symmetrical, trachea midline, no adenopathy,  LUNGS:  Clear to auscultate bilaterally, no rales ronchi or wheezing noted. CARDIOVASCULAR:  regular rate and rhythm, normal S1 and S2, no S3 or S4, and no murmur noted  ABDOMEN: Normal BS, Non tender, non distended, no HSM noted. MUSCULOSKELETAL:  ROM of all extremities grossly wnl  NEUROLOGIC: AOx 3,  Cranial nerves II-XII are grossly intact. Motor is 5 out of 5 bilaterally.   Sensory is intact  SKIN:  bl le erythema and swelling  Vl Dup Lower Extremity Venous Bilateral    Result Date: 1/8/2021 EXAMINATION: DUPLEX VENOUS ULTRASOUND OF THE BILATERAL LOWER EXTREMITIES, 1/8/2021 3:30 pm TECHNIQUE: Duplex ultrasound and Doppler images were obtained of the bilateral lower extremities. COMPARISON: 11/22/2019. HISTORY: ORDERING SYSTEM PROVIDED HISTORY: Leg pain, hx of DVT TECHNOLOGIST PROVIDED HISTORY: Reason for exam:   Leg pain, hx of DVT Reason for Exam: Leg pain, hx DVT FINDINGS: Evaluation is partially limited due to overlying soft tissue swelling. The right common femoral vein appears patent. Right proximal, mid femoral vein is partially compressible which may be due to chronic right femoral vein thrombosis seen on the prior study. The distal aspect of the right femoral vein is poorly visualized. The right popliteal vein appears patent. The visualized portions of the right calf veins appear patent. Again seen is partial compression of the left common femoral vein with chronic appearing nonocclusive thrombi of the left common femoral vein as seen on the prior study. Also again seen is partial compression of midportion of the left femoral vein, suspicious for chronic nonocclusive thrombus. Distal aspect of the left femoral vein is poorly visualized. There is also incompressibility of the left posterior tibial vein. There nonspecific mild prominent right inguinal lymph nodes. Limited evaluation due to overlying soft tissue swelling. Findings suggestive of bilateral nonocclusive thrombi, which appear chronic. These involve the right proximal and mid femoral vein, as well as left common femoral vein and the midportion of the left femoral vein. There also appears to be additional calf vein thrombosis involving the left posterior tibial vein, which may be acute.      Cta Pulmonary W Contrast    Result Date: 1/9/2021 EXAMINATION: CTA OF THE CHEST, 1/9/2021 2:17 am TECHNIQUE: CTA of the chest was performed after the administration of intravenous contrast.  Multiplanar reformatted images are provided for review. MIP images are provided for review. Dose modulation, iterative reconstruction, and/or weight based adjustment of the mA/kV was utilized to reduce the radiation dose to as low as reasonably achievable. COMPARISON: None HISTORY: ORDERING SYSTEM PROVIDED HISTORY:  Acute on chronic DVT, subtherapeutic INR TECHNOLOGIST PROVIDED HISTORY: Reason for exam:   Acute on chronic DVT, subtherapeutic INR Reason for Exam:  Acute on chronic DVT, subtherapeutic INR Acuity: Unknown Type of Exam: Initial Additional signs and symptoms:  Acute on chronic DVT, subtherapeutic INR Relevant Medical/Surgical History:  Acute on chronic DVT, subtherapeutic INR FINDINGS: Pulmonary Arteries: Pulmonary arteries are adequately opacified for evaluation. No evidence of intraluminal filling defect to suggest pulmonary embolism. Main pulmonary artery is normal in caliber. Mediastinum: No evidence of mediastinal lymphadenopathy. The heart and pericardium demonstrate no acute abnormality. There is no acute abnormality of the thoracic aorta. Cardiomegaly is noted. Lungs/pleura: Emphysematous changes are noted. There is no evidence of a pneumothorax. Multifocal bilateral ground-glass opacities are seen. Upper Abdomen: Limited images of the upper abdomen are unremarkable. Soft Tissues/Bones: No acute bone or soft tissue abnormality. 1. No evidence for a pulmonary embolism. 2. Multifocal ground-glass opacities are seen bilaterally which could be related to an atypical/viral pneumonia. 3. Minimal emphysematous changes.    -    DATA:    CBC   Recent Labs     01/08/21  1400 01/10/21  0700   WBC 5.7 5.0   HGB 11.6* 11.1*   HCT 35.8* 34.7*    186      BMP   Recent Labs     01/08/21  1400 01/10/21  0700    137   K 3.3* 3.7    102

## 2021-01-12 ENCOUNTER — APPOINTMENT (OUTPATIENT)
Dept: GENERAL RADIOLOGY | Age: 76
DRG: 177 | End: 2021-01-12
Attending: INTERNAL MEDICINE
Payer: MEDICARE

## 2021-01-12 VITALS
RESPIRATION RATE: 21 BRPM | HEART RATE: 69 BPM | DIASTOLIC BLOOD PRESSURE: 70 MMHG | WEIGHT: 296.25 LBS | TEMPERATURE: 97.1 F | HEIGHT: 77 IN | OXYGEN SATURATION: 95 % | BODY MASS INDEX: 34.98 KG/M2 | SYSTOLIC BLOOD PRESSURE: 138 MMHG

## 2021-01-12 PROBLEM — J12.82 PNEUMONIA DUE TO COVID-19 VIRUS: Status: ACTIVE | Noted: 2021-01-12

## 2021-01-12 PROBLEM — U07.1 PNEUMONIA DUE TO COVID-19 VIRUS: Status: ACTIVE | Noted: 2021-01-12

## 2021-01-12 PROBLEM — J18.9 MULTIFOCAL PNEUMONIA: Status: ACTIVE | Noted: 2021-01-12

## 2021-01-12 LAB
ADENOVIRUS DETECTION BY PCR: NOT DETECTED
ANION GAP SERPL CALCULATED.3IONS-SCNC: 5 MMOL/L (ref 4–16)
BORDETELLA PARAPERTUSSIS BY PCR: NOT DETECTED
BORDETELLA PERTUSSIS PCR: NOT DETECTED
BUN BLDV-MCNC: 25 MG/DL (ref 6–23)
CALCIUM SERPL-MCNC: 7.5 MG/DL (ref 8.3–10.6)
CHLAMYDOPHILA PNEUMONIA PCR: NOT DETECTED
CHLORIDE BLD-SCNC: 100 MMOL/L (ref 99–110)
CO2: 28 MMOL/L (ref 21–32)
CORONAVIRUS 229E PCR: NOT DETECTED
CORONAVIRUS HKU1 PCR: NOT DETECTED
CORONAVIRUS NL63 PCR: NOT DETECTED
CORONAVIRUS OC43 PCR: NOT DETECTED
CREAT SERPL-MCNC: 1.4 MG/DL (ref 0.9–1.3)
GFR AFRICAN AMERICAN: 60 ML/MIN/1.73M2
GFR NON-AFRICAN AMERICAN: 50 ML/MIN/1.73M2
GLUCOSE BLD-MCNC: 125 MG/DL (ref 70–99)
GLUCOSE BLD-MCNC: 129 MG/DL (ref 70–99)
GLUCOSE BLD-MCNC: 161 MG/DL (ref 70–99)
GLUCOSE BLD-MCNC: 201 MG/DL (ref 70–99)
GLUCOSE BLD-MCNC: 236 MG/DL (ref 70–99)
HCT VFR BLD CALC: 34.4 % (ref 42–52)
HEMOGLOBIN: 10.9 GM/DL (ref 13.5–18)
HUMAN METAPNEUMOVIRUS PCR: NOT DETECTED
INFLUENZA A BY PCR: NOT DETECTED
INFLUENZA A H1 (2009) PCR: NOT DETECTED
INFLUENZA A H1 PANDEMIC PCR: NOT DETECTED
INFLUENZA A H3 PCR: NOT DETECTED
INFLUENZA B BY PCR: NOT DETECTED
INR BLD: 1.62 INDEX
LACTATE: 1 MMOL/L (ref 0.4–2)
MCH RBC QN AUTO: 28.7 PG (ref 27–31)
MCHC RBC AUTO-ENTMCNC: 31.7 % (ref 32–36)
MCV RBC AUTO: 90.5 FL (ref 78–100)
MYCOPLASMA PNEUMONIAE PCR: NOT DETECTED
PARAINFLUENZA 1 PCR: NOT DETECTED
PARAINFLUENZA 2 PCR: NOT DETECTED
PARAINFLUENZA 3 PCR: NOT DETECTED
PARAINFLUENZA 4 PCR: NOT DETECTED
PDW BLD-RTO: 17.5 % (ref 11.7–14.9)
PLATELET # BLD: 182 K/CU MM (ref 140–440)
PMV BLD AUTO: 12.1 FL (ref 7.5–11.1)
POTASSIUM SERPL-SCNC: 3.7 MMOL/L (ref 3.5–5.1)
PRO-BNP: 1642 PG/ML
PROCALCITONIN: 0.21
PROTHROMBIN TIME: 18.7 SECONDS (ref 11.7–14.5)
RBC # BLD: 3.8 M/CU MM (ref 4.6–6.2)
RHINOVIRUS ENTEROVIRUS PCR: NOT DETECTED
RSV PCR: NOT DETECTED
SARS-COV-2: ABNORMAL
SODIUM BLD-SCNC: 133 MMOL/L (ref 135–145)
WBC # BLD: 6.4 K/CU MM (ref 4–10.5)

## 2021-01-12 PROCEDURE — 2580000003 HC RX 258: Performed by: NURSE PRACTITIONER

## 2021-01-12 PROCEDURE — 36415 COLL VENOUS BLD VENIPUNCTURE: CPT

## 2021-01-12 PROCEDURE — 6360000002 HC RX W HCPCS: Performed by: NURSE PRACTITIONER

## 2021-01-12 PROCEDURE — 6370000000 HC RX 637 (ALT 250 FOR IP): Performed by: NURSE PRACTITIONER

## 2021-01-12 PROCEDURE — 6370000000 HC RX 637 (ALT 250 FOR IP): Performed by: INTERNAL MEDICINE

## 2021-01-12 PROCEDURE — 71045 X-RAY EXAM CHEST 1 VIEW: CPT

## 2021-01-12 PROCEDURE — 96372 THER/PROPH/DIAG INJ SC/IM: CPT

## 2021-01-12 PROCEDURE — 85027 COMPLETE CBC AUTOMATED: CPT

## 2021-01-12 PROCEDURE — 6360000002 HC RX W HCPCS: Performed by: INTERNAL MEDICINE

## 2021-01-12 PROCEDURE — 1200000000 HC SEMI PRIVATE

## 2021-01-12 PROCEDURE — 83605 ASSAY OF LACTIC ACID: CPT

## 2021-01-12 PROCEDURE — 0202U NFCT DS 22 TRGT SARS-COV-2: CPT

## 2021-01-12 PROCEDURE — 2500000003 HC RX 250 WO HCPCS: Performed by: INTERNAL MEDICINE

## 2021-01-12 PROCEDURE — 80048 BASIC METABOLIC PNL TOTAL CA: CPT

## 2021-01-12 PROCEDURE — 94640 AIRWAY INHALATION TREATMENT: CPT

## 2021-01-12 PROCEDURE — 82962 GLUCOSE BLOOD TEST: CPT

## 2021-01-12 PROCEDURE — 85610 PROTHROMBIN TIME: CPT

## 2021-01-12 PROCEDURE — 2580000003 HC RX 258: Performed by: INTERNAL MEDICINE

## 2021-01-12 PROCEDURE — 2700000000 HC OXYGEN THERAPY PER DAY

## 2021-01-12 PROCEDURE — 83880 ASSAY OF NATRIURETIC PEPTIDE: CPT

## 2021-01-12 PROCEDURE — 84145 PROCALCITONIN (PCT): CPT

## 2021-01-12 RX ORDER — ACETAMINOPHEN 325 MG/1
650 TABLET ORAL EVERY 6 HOURS PRN
Status: CANCELLED | OUTPATIENT
Start: 2021-01-12

## 2021-01-12 RX ORDER — DEXTROSE MONOHYDRATE 50 MG/ML
100 INJECTION, SOLUTION INTRAVENOUS PRN
Status: CANCELLED | OUTPATIENT
Start: 2021-01-12

## 2021-01-12 RX ORDER — FUROSEMIDE 20 MG/1
20 TABLET ORAL 2 TIMES DAILY
Status: CANCELLED | OUTPATIENT
Start: 2021-01-12

## 2021-01-12 RX ORDER — NICOTINE POLACRILEX 4 MG
15 LOZENGE BUCCAL PRN
Status: CANCELLED | OUTPATIENT
Start: 2021-01-12

## 2021-01-12 RX ORDER — ESCITALOPRAM OXALATE 20 MG/1
20 TABLET ORAL DAILY
Status: CANCELLED | OUTPATIENT
Start: 2021-01-13

## 2021-01-12 RX ORDER — IPRATROPIUM BROMIDE AND ALBUTEROL SULFATE 2.5; .5 MG/3ML; MG/3ML
1 SOLUTION RESPIRATORY (INHALATION)
Status: DISCONTINUED | OUTPATIENT
Start: 2021-01-12 | End: 2021-01-12

## 2021-01-12 RX ORDER — SODIUM CHLORIDE 0.9 % (FLUSH) 0.9 %
10 SYRINGE (ML) INJECTION EVERY 12 HOURS SCHEDULED
Status: CANCELLED | OUTPATIENT
Start: 2021-01-12

## 2021-01-12 RX ORDER — DEXTROSE MONOHYDRATE 25 G/50ML
12.5 INJECTION, SOLUTION INTRAVENOUS PRN
Status: CANCELLED | OUTPATIENT
Start: 2021-01-12

## 2021-01-12 RX ORDER — DEXAMETHASONE SODIUM PHOSPHATE 4 MG/ML
4 INJECTION, SOLUTION INTRA-ARTICULAR; INTRALESIONAL; INTRAMUSCULAR; INTRAVENOUS; SOFT TISSUE EVERY 6 HOURS
Status: DISCONTINUED | OUTPATIENT
Start: 2021-01-12 | End: 2021-01-13 | Stop reason: HOSPADM

## 2021-01-12 RX ORDER — ATORVASTATIN CALCIUM 40 MG/1
40 TABLET, FILM COATED ORAL NIGHTLY
Status: CANCELLED | OUTPATIENT
Start: 2021-01-12

## 2021-01-12 RX ORDER — PANTOPRAZOLE SODIUM 40 MG/1
40 TABLET, DELAYED RELEASE ORAL
Status: CANCELLED | OUTPATIENT
Start: 2021-01-13

## 2021-01-12 RX ORDER — ONDANSETRON 2 MG/ML
4 INJECTION INTRAMUSCULAR; INTRAVENOUS EVERY 6 HOURS PRN
Status: CANCELLED | OUTPATIENT
Start: 2021-01-12

## 2021-01-12 RX ORDER — POLYETHYLENE GLYCOL 3350 17 G/17G
17 POWDER, FOR SOLUTION ORAL DAILY PRN
Status: CANCELLED | OUTPATIENT
Start: 2021-01-12

## 2021-01-12 RX ORDER — DIPHENHYDRAMINE HYDROCHLORIDE 50 MG/ML
25 INJECTION INTRAMUSCULAR; INTRAVENOUS EVERY 6 HOURS PRN
Status: CANCELLED | OUTPATIENT
Start: 2021-01-12

## 2021-01-12 RX ORDER — TRAMADOL HYDROCHLORIDE 50 MG/1
50 TABLET ORAL EVERY 6 HOURS PRN
Status: CANCELLED | OUTPATIENT
Start: 2021-01-12

## 2021-01-12 RX ORDER — ASPIRIN 81 MG/1
81 TABLET, CHEWABLE ORAL DAILY
Status: CANCELLED | OUTPATIENT
Start: 2021-01-13

## 2021-01-12 RX ORDER — SODIUM CHLORIDE 0.9 % (FLUSH) 0.9 %
10 SYRINGE (ML) INJECTION PRN
Status: CANCELLED | OUTPATIENT
Start: 2021-01-12

## 2021-01-12 RX ORDER — DEXAMETHASONE SODIUM PHOSPHATE 4 MG/ML
4 INJECTION, SOLUTION INTRA-ARTICULAR; INTRALESIONAL; INTRAMUSCULAR; INTRAVENOUS; SOFT TISSUE EVERY 6 HOURS
Status: CANCELLED | OUTPATIENT
Start: 2021-01-12

## 2021-01-12 RX ORDER — DIPHENHYDRAMINE HYDROCHLORIDE 50 MG/ML
25 INJECTION INTRAMUSCULAR; INTRAVENOUS EVERY 6 HOURS PRN
Status: DISCONTINUED | OUTPATIENT
Start: 2021-01-12 | End: 2021-01-13 | Stop reason: HOSPADM

## 2021-01-12 RX ORDER — TRAMADOL HYDROCHLORIDE 50 MG/1
100 TABLET ORAL EVERY 6 HOURS PRN
Status: CANCELLED | OUTPATIENT
Start: 2021-01-12

## 2021-01-12 RX ORDER — ACETAMINOPHEN 650 MG/1
650 SUPPOSITORY RECTAL EVERY 6 HOURS PRN
Status: CANCELLED | OUTPATIENT
Start: 2021-01-12

## 2021-01-12 RX ORDER — ALOGLIPTIN 12.5 MG/1
12.5 TABLET, FILM COATED ORAL DAILY
Status: CANCELLED | OUTPATIENT
Start: 2021-01-13

## 2021-01-12 RX ORDER — PROMETHAZINE HYDROCHLORIDE 12.5 MG/1
12.5 TABLET ORAL EVERY 6 HOURS PRN
Status: CANCELLED | OUTPATIENT
Start: 2021-01-12

## 2021-01-12 RX ORDER — BUPROPION HYDROCHLORIDE 150 MG/1
300 TABLET ORAL EVERY MORNING
Status: CANCELLED | OUTPATIENT
Start: 2021-01-13

## 2021-01-12 RX ADMIN — BUPROPION HYDROCHLORIDE 300 MG: 150 TABLET, FILM COATED, EXTENDED RELEASE ORAL at 08:46

## 2021-01-12 RX ADMIN — IPRATROPIUM BROMIDE AND ALBUTEROL SULFATE 1 AMPULE: .5; 3 SOLUTION RESPIRATORY (INHALATION) at 06:20

## 2021-01-12 RX ADMIN — IPRATROPIUM BROMIDE AND ALBUTEROL 1 PUFF: 20; 100 SPRAY, METERED RESPIRATORY (INHALATION) at 13:53

## 2021-01-12 RX ADMIN — ENOXAPARIN SODIUM 135 MG: 150 INJECTION SUBCUTANEOUS at 08:46

## 2021-01-12 RX ADMIN — IPRATROPIUM BROMIDE AND ALBUTEROL SULFATE 1 AMPULE: .5; 3 SOLUTION RESPIRATORY (INHALATION) at 10:23

## 2021-01-12 RX ADMIN — DEXAMETHASONE SODIUM PHOSPHATE 4 MG: 4 INJECTION, SOLUTION INTRAMUSCULAR; INTRAVENOUS at 19:51

## 2021-01-12 RX ADMIN — FUROSEMIDE 20 MG: 20 TABLET ORAL at 08:47

## 2021-01-12 RX ADMIN — SODIUM CHLORIDE, PRESERVATIVE FREE 10 ML: 5 INJECTION INTRAVENOUS at 19:52

## 2021-01-12 RX ADMIN — ENOXAPARIN SODIUM 135 MG: 150 INJECTION SUBCUTANEOUS at 19:50

## 2021-01-12 RX ADMIN — SODIUM CHLORIDE, PRESERVATIVE FREE 10 ML: 5 INJECTION INTRAVENOUS at 10:52

## 2021-01-12 RX ADMIN — DIPHENHYDRAMINE HYDROCHLORIDE 25 MG: 50 INJECTION, SOLUTION INTRAMUSCULAR; INTRAVENOUS at 12:09

## 2021-01-12 RX ADMIN — INSULIN LISPRO 4 UNITS: 100 INJECTION, SOLUTION INTRAVENOUS; SUBCUTANEOUS at 17:31

## 2021-01-12 RX ADMIN — DEXAMETHASONE SODIUM PHOSPHATE 4 MG: 4 INJECTION, SOLUTION INTRAMUSCULAR; INTRAVENOUS at 14:58

## 2021-01-12 RX ADMIN — CEFEPIME HYDROCHLORIDE 2 G: 2 INJECTION, POWDER, FOR SOLUTION INTRAVENOUS at 09:51

## 2021-01-12 RX ADMIN — GLYCOPYRROLATE AND FORMOTEROL FUMARATE 2 PUFF: 9; 4.8 AEROSOL, METERED RESPIRATORY (INHALATION) at 07:40

## 2021-01-12 RX ADMIN — INSULIN LISPRO 2 UNITS: 100 INJECTION, SOLUTION INTRAVENOUS; SUBCUTANEOUS at 12:40

## 2021-01-12 RX ADMIN — METOPROLOL TARTRATE 12.5 MG: 25 TABLET, FILM COATED ORAL at 08:47

## 2021-01-12 RX ADMIN — VANCOMYCIN HYDROCHLORIDE 2000 MG: 1 INJECTION, POWDER, LYOPHILIZED, FOR SOLUTION INTRAVENOUS at 11:46

## 2021-01-12 RX ADMIN — WARFARIN SODIUM 7 MG: 6 TABLET ORAL at 17:30

## 2021-01-12 RX ADMIN — METOPROLOL TARTRATE 12.5 MG: 25 TABLET, FILM COATED ORAL at 19:51

## 2021-01-12 RX ADMIN — ESCITALOPRAM 20 MG: 20 TABLET, FILM COATED ORAL at 08:47

## 2021-01-12 RX ADMIN — LEVOFLOXACIN 500 MG: 500 TABLET, FILM COATED ORAL at 05:45

## 2021-01-12 RX ADMIN — DICLOFENAC 2 G: 10 GEL TOPICAL at 09:51

## 2021-01-12 RX ADMIN — PANTOPRAZOLE SODIUM 40 MG: 40 TABLET, DELAYED RELEASE ORAL at 05:45

## 2021-01-12 RX ADMIN — METRONIDAZOLE 500 MG: 500 INJECTION, SOLUTION INTRAVENOUS at 10:52

## 2021-01-12 RX ADMIN — DICLOFENAC 2 G: 10 GEL TOPICAL at 19:50

## 2021-01-12 RX ADMIN — POLYVINYL ALCOHOL 1 DROP: 14 SOLUTION/ DROPS OPHTHALMIC at 21:30

## 2021-01-12 RX ADMIN — ARIPIPRAZOLE 15 MG: 10 TABLET ORAL at 08:47

## 2021-01-12 RX ADMIN — ALOGLIPTIN 12.5 MG: 12.5 TABLET, FILM COATED ORAL at 08:47

## 2021-01-12 RX ADMIN — FUROSEMIDE 20 MG: 20 TABLET ORAL at 19:51

## 2021-01-12 RX ADMIN — ATORVASTATIN CALCIUM 40 MG: 40 TABLET, FILM COATED ORAL at 19:51

## 2021-01-12 RX ADMIN — ASPIRIN 81 MG: 81 TABLET, CHEWABLE ORAL at 08:46

## 2021-01-12 ASSESSMENT — PAIN DESCRIPTION - PROGRESSION
CLINICAL_PROGRESSION: NOT CHANGED

## 2021-01-12 ASSESSMENT — PAIN SCALES - GENERAL
PAINLEVEL_OUTOF10: 0

## 2021-01-12 ASSESSMENT — PAIN DESCRIPTION - ONSET: ONSET: ON-GOING

## 2021-01-12 ASSESSMENT — PAIN DESCRIPTION - FREQUENCY: FREQUENCY: INTERMITTENT

## 2021-01-12 ASSESSMENT — PAIN - FUNCTIONAL ASSESSMENT: PAIN_FUNCTIONAL_ASSESSMENT: PREVENTS OR INTERFERES SOME ACTIVE ACTIVITIES AND ADLS

## 2021-01-12 ASSESSMENT — PAIN DESCRIPTION - DESCRIPTORS: DESCRIPTORS: ACHING;DISCOMFORT

## 2021-01-12 ASSESSMENT — PAIN DESCRIPTION - ORIENTATION: ORIENTATION: RIGHT;LEFT

## 2021-01-12 NOTE — PROGRESS NOTES
Spoke to [place] at the access center and she stated that when calling MICU transport and explaining the situation they stated that 15L on a non-re breather mask was not enough to qualify for a MICU transport. I voiced to Ruthy Caal that our concern was a continual decline in the patient with respiratory distress with the possibility of intubation. [facility] stated they would call back MICU back and see what she could do but the only transport time currently is for [facility] at 830PM [facility].

## 2021-01-12 NOTE — PROGRESS NOTES
Physical Therapy  P on HOLD today due to increased oxygen requirement. Per MD, p not safe to be seen.   Electronically signed by Indira Laurent PT on 1/12/2021 at 9:21 AM

## 2021-01-12 NOTE — PROGRESS NOTES
Patient was given Cefepime, Flagyl and Vancomycin IV. Patient tolerated the Cefepime and Flagyl well but during the Vancomycin infusion patient started having itchiness and rash. The vancomycin was stopped and benadryl was given and patient was re-assessed 30 minutes later. Per Dr. Susan Crawford, the vancomycin was restarted at a lower rate. 1250: patient complaining of Itchiness again from IV Vancomycin. Patient states he has had vancomycin in the past but has never had a reaction. Dr. Susan Crawford notified and Vancomycin turned off. Dr. Susan Crawford thinks possible delayed reactions from Cefepime as patient is allergic to penicillins. This nurse will continue to monitor.

## 2021-01-12 NOTE — DISCHARGE SUMMARY
Brendon Barfield [id number] [id number]  PCP:  Abdoulaye Del Rio MD    Admit date: 1/8/2021  Admitting Physician: Bhaskar lAlen MD    Discharge date: 1/12/2021 Discharge Physician: Bhaskar Allen MD      Reason for admission: No chief complaint on file. Present on Admission:   Weakness   Multifocal pneumonia       Discharge Diagnoses Include:  1. Acute hypoxic respiratory failure presumed secondary to Multifocal pneumonia; Pneumonia due to [address] [place]  Patient was receiving IV diuresissis and PO abx with doxycycline + Levaquin during his stay then last night developed increased oxygen requirement up to 5-6Lwith osat 89%-88%  - changing oxygen delivery to optiflow  - waiting on results of respiratory disease panel: sars cov-2 Is positive, PCR for cov 19 is ordered  - CXR this am shows multifocal infiltrates  - last procal was negative 1/10; repeat stat is ordered; lactic acid,  blood cultures, sputum culture  - vancomycin + cefepime & flagyl (has allergy of rash to pcn); this was ordered to replace doxycycline+levaquin which he was on previously, doxy for treatment of cellulitis bl/le, and levaquin for finding on CTA of multifocal infiltrates/PNA  -* while vancomycin was infusing started to develop redness which could have been from the cefepime or is from the vancomycin; benadryl ordered, is allergic to steroids.     2. Bilateral chronic venous stasis/diabetic pressure ulcers:  healing but BLE have cellulitic appearance.  Faint pulses noted on exam.   - doxycycline dc'd and vancomycin will cover  esr/crp/procacitonin: not septic with low procal, esr is borderline and crp is high is not specific  - ANA CRISTINA not available.   - Wound consult:  Multilayer compression wrap: Removed old Multilayer wrap if indicated and wash leg with mild soap/water. Applied moisturizing agent to dry skin as needed. Applied primary and secondary dressing as ordered. Applied multilayered dressing below the knee to right lower leg. Applied multilayered dressing below the knee to left lower leg. Instructed patient/caregiver not to remove dressing and to keep it clean and dry. Instructed patient/caregiver on complications to report to provider, such as pain, numbness in toes, heavy drainage, and slippage of dressing. Instructed patient on purpose of compression dressing and on activity and exercise recommendations.     3. Generalized weakness: was recently discharged from SNF and now unable to care for himself at home. -PT/OT.    -CM to assist with placement. Fall precautions                4. Chronic nonocclusive DVTs and acute occ L posterior tibial v: on long-term coumadin.  INR subtherapeutic (patient reported difficult time maintaining therapeutic INR).    - Coumadin with Lovenox bridging for now.    -[facility] if insurance covers; will discuss with cm and send pre-auth Monday  - INR remains subtherapeutic     5. DMII: with chronic complications and without long term use of insulin. a1c 8.3 11/22/19  -Holding home oral hypoglycemics  -Medium dose SSI.     6. Essential hypertension: per hx. BP controlled. Cont home BP medications. Monitor BP and titrate [facility][de-identified]   Patient presented with sob, cta done after finding of increased chronic to acute clot burden inlegs for which INR was subtherapeutic- despite a negative cxr.CT Showed multifocal ground glass opacities. ED physician reported that covid testing need no be done secondary to previous testing 11/11 and 11/18 of 2020 was negative. Acutely over the last 12 hours his oxygen demand has gone up ffrom 2-5-10-15L and is now on a non-re breather; a cxr done this am now shows multifocal pna, and a pcr resulted positive cov-sars-2 which is reported to be sars-cov19. The fear is patient may imminently require intubation.       Exam: see pn from today    Patient Instructions:   Juan Carlos Forte The patient was seen and examined on day of discharge and this discharge summary is in conjunction with any daily progress note from day of discharge.   Time spent on discharge in the examination, evaluation, counseling and review of medications and discharge plan: 32 minutes

## 2021-01-12 NOTE — PROGRESS NOTES
Spoke to the charge nurse Kerry Carroll at [facility] and updated her on the time of transport and that night shift will call report to the night shift nurse for clearer communication between staff for the patient.  Kerry Carroll stated she would update the staff and let them know that the patient will picked up at [facility] at 2030

## 2021-01-12 NOTE — PROGRESS NOTES
Hospitalist Progress Note       Rodrigo Wheeler M.D.  1/12/2021 10:49 AM  Admit Date: 1/8/2021    PCP: Makenna Urena MD     Assessment and Plan:   1. Acute hypoxic respiratory failure presumed secondary to Multifocal pneumonia; concern for viral consolidative process; unknown organism  Patient was receiving IV diuresissis and abx with doxycycline + Levaquin during his stay then last night developed increased oxygen requirement up to 5-6Lwith osat 89%-88%  covid negative  - changing oxygen delivery to optiflow  - waiting on results of respiratory disease panel: sars cov-2 Is positive, PCR for cov 19 is ordered  - CXR this am shows multifocal infiltrates  - last procal was negative 1/10; repeat stat is ordered; lactic acid,  blood cultures, sputum culture  - vancomycin + cefepime & flagyl (has allergy of rash to pcn); this was ordered to replace doxycycline+levaquin which he was on previously, doxy for treatment of cellulitis bl/le, and levaquin for finding on CTA of multifocal infiltrates/PNA  -* while vancomycin was infusing started to develop redness which could have been from the cefepime or is from the vancomycin; benadryl ordered, is allergic to steroids. 2. Bilateral chronic venous stasis/diabetic pressure ulcers:  healing but BLE have cellulitic appearance.  Faint pulses noted on exam.   - doxycycline dc'd and vancomycin will cover  esr/crp/procacitonin: not septic with low procal, esr is borderline and crp is high is not specific  - ANA CRISTINA not available.   - Wound consult:  Multilayer compression wrap: Removed old Multilayer wrap if indicated and wash leg with mild soap/water. Applied moisturizing agent to dry skin as needed. Applied primary and secondary dressing as ordered. Applied multilayered dressing below the knee to right lower leg. Applied multilayered dressing below the knee to left lower leg. Instructed patient/caregiver not to remove dressing and to keep it clean and dry. Diabetes mellitus with skin ulcer ([facility] [address]     WD-Ulcer of shin, left, with fat layer exposed ([facility] [address]     History of colon polyps     Personal history of PE (pulmonary embolism)     WD-Chronic venous hypertension (idiopathic) with ulcer of left lower extremity (CODE) (HCC)     WD-Chronic venous hypertension with inflammation, right     Troponin I above reference range     KIRSTEN (acute kidney injury) ([facility] [address]     Cellulitis of lower extremity     Hyponatremia     Generalized weakness     Weakness     Multifocal pneumonia      Subjective:         F/U:  Interval History: breathing became worse overnight, denied choking on food, denied nausea fever chills     Objective: Intake/Output Summary (Last 24 hours) at 1/12/2021 1049  Last data filed at 1/12/2021 0931  Gross per 24 hour   Intake 1840 ml   Output 2350 ml   Net -510 ml      Vitals:   Vitals:    01/12/21 1024   BP:    Pulse:    Resp:    Temp:    SpO2: (!) 89%     Physical Exam:  Gen: flat affect without apparent distress  EYES:  Lids and lashes normal, pupils equal, round and reactive to light, extra ocular muscles intact, sclera clear, conjunctiva normal  ENT:  Normocephalic, oral pharynx with moist mucus membranes, tonsils without erythema or exudates,  NECK:  Supple, symmetrical, trachea midline, no adenopathy,  LUNGS: wheezing end expiratory more dense on right base than left  CARDIOVASCULAR:  regular rate and rhythm, normal S1 and S2, no S3 or S4, and no murmur noted  ABDOMEN: Normal BS, Non tender, non distended, no HSM noted. MUSCULOSKELETAL:  ROM of all extremities grossly wnl  NEUROLOGIC: AOx 3,  Cranial nerves II-XII are grossly intact.     SKIN:  Erythema and induration b/l LE    Xr Chest Portable    Result Date: 1/12/2021 EXAMINATION: ONE XRAY VIEW OF THE CHEST 1/12/2021 6:59 am COMPARISON: 11/11/2020 HISTORY: ORDERING SYSTEM PROVIDED HISTORY: sob TECHNOLOGIST PROVIDED HISTORY: Reason for exam:->sob Reason for Exam: sob Acuity: Acute Type of Exam: Initial Additional signs and symptoms: sob FINDINGS: There is extensive multifocal bilateral airspace disease. The mediastinum and cardiac silhouette are unremarkable. Findings consistent with multifocal pneumonia, both viral and bacterial etiology should be considered. Vl Dup Lower Extremity Venous Bilateral    Result Date: 1/8/2021  EXAMINATION: DUPLEX VENOUS ULTRASOUND OF THE BILATERAL LOWER EXTREMITIES, 1/8/2021 3:30 pm TECHNIQUE: Duplex ultrasound and Doppler images were obtained of the bilateral lower extremities. COMPARISON: 11/22/2019. HISTORY: ORDERING SYSTEM PROVIDED HISTORY: Leg pain, hx of DVT TECHNOLOGIST PROVIDED HISTORY: Reason for exam:   Leg pain, hx of DVT Reason for Exam: Leg pain, hx DVT FINDINGS: Evaluation is partially limited due to overlying soft tissue swelling. The right common femoral vein appears patent. Right proximal, mid femoral vein is partially compressible which may be due to chronic right femoral vein thrombosis seen on the prior study. The distal aspect of the right femoral vein is poorly visualized. The right popliteal vein appears patent. The visualized portions of the right calf veins appear patent. Again seen is partial compression of the left common femoral vein with chronic appearing nonocclusive thrombi of the left common femoral vein as seen on the prior study. Also again seen is partial compression of midportion of the left femoral vein, suspicious for chronic nonocclusive thrombus. Distal aspect of the left femoral vein is poorly visualized. There is also incompressibility of the left posterior tibial vein. There nonspecific mild prominent right inguinal lymph nodes. Limited evaluation due to overlying soft tissue swelling. Findings suggestive of bilateral nonocclusive thrombi, which appear chronic. These involve the right proximal and mid femoral vein, as well as left common femoral vein and the midportion of the left femoral vein. There also appears to be additional calf vein thrombosis involving the left posterior tibial vein, which may be acute.      Cta Pulmonary W Contrast    Result Date: 1/9/2021 EXAMINATION: CTA OF THE CHEST, 1/9/2021 2:17 am TECHNIQUE: CTA of the chest was performed after the administration of intravenous contrast.  Multiplanar reformatted images are provided for review. MIP images are provided for review. Dose modulation, iterative reconstruction, and/or weight based adjustment of the mA/kV was utilized to reduce the radiation dose to as low as reasonably achievable. COMPARISON: None HISTORY: ORDERING SYSTEM PROVIDED HISTORY:  Acute on chronic DVT, subtherapeutic INR TECHNOLOGIST PROVIDED HISTORY: Reason for exam:   Acute on chronic DVT, subtherapeutic INR Reason for Exam:  Acute on chronic DVT, subtherapeutic INR Acuity: Unknown Type of Exam: Initial Additional signs and symptoms:  Acute on chronic DVT, subtherapeutic INR Relevant Medical/Surgical History:  Acute on chronic DVT, subtherapeutic INR FINDINGS: Pulmonary Arteries: Pulmonary arteries are adequately opacified for evaluation. No evidence of intraluminal filling defect to suggest pulmonary embolism. Main pulmonary artery is normal in caliber. Mediastinum: No evidence of mediastinal lymphadenopathy. The heart and pericardium demonstrate no acute abnormality. There is no acute abnormality of the thoracic aorta. Cardiomegaly is noted. Lungs/pleura: Emphysematous changes are noted. There is no evidence of a pneumothorax. Multifocal bilateral ground-glass opacities are seen. Upper Abdomen: Limited images of the upper abdomen are unremarkable. Soft Tissues/Bones: No acute bone or soft tissue abnormality. 1. No evidence for a pulmonary embolism. 2. Multifocal ground-glass opacities are seen bilaterally which could be related to an atypical/viral pneumonia. 3. Minimal emphysematous changes.    -    DATA:    CBC   Recent Labs     01/10/21  0700 01/12/21  0845   WBC 5.0 6.4   HGB 11.1* 10.9*   HCT 34.7* 34.4*    182      BMP   Recent Labs     01/10/21  0700 01/12/21  0845    133*   K 3.7 3.7    100 CO2 34* 28   BUN 16 25*   CREATININE 1.7* 1.4*     LFT'S   Recent Labs     01/10/21  0700   AST 18   ALT 14   BILITOT 0.4   ALKPHOS 65     COAG   Recent Labs     01/10/21  0715 01/11/21  0635 01/12/21  0600   INR 1.72 1.64 1.62     CARDIAC ENZYMES  No results for input(s): CKTOTAL, CKMB, CKMBINDEX, TROPONINI in the last 72 hours.   U/A:    Lab Results   Component Value Date    COLORU YELLOW 01/08/2021    WBCUA <1 01/08/2021    RBCUA <1 01/08/2021    MUCUS RARE 01/08/2021    BACTERIA NEGATIVE 01/08/2021    CLARITYU CLEAR 01/08/2021    SPECGRAV 1.023 01/08/2021    LEUKOCYTESUR NEGATIVE 01/08/2021    BLOODU NEGATIVE 01/08/2021         Mikhail Ron MD  [facility] Hospitalist

## 2021-01-12 NOTE — PROGRESS NOTES
PHARMACY ANTICOAGULATION MONITORING SERVICE    Walker Paredes is a 77 y.o. male on warfarin therapy for history of DVT and possible superimposed acute thrombus. Pharmacy consulted by Dr. Britt Wellington for monitoring and adjustment of treatment. Indication for anticoagulation: Hx of DVT, concern for superimposed acute thrombus  INR goal: 2-3  Warfarin dose prior to admission: 5 mg daily    Pertinent Laboratory Values   Recent Labs     01/10/21  0700 01/10/21  0700 01/12/21  0600   INR  --    < > 1.62   HGB 11.1*  --   --    HCT 34.7*  --   --      --   --     < > = values in this interval not displayed. Assessment/Plan:  ? Drug Interactions: Aspirin (home med), escitalopram (home med), therapeutic enoxaparin, doxycycline, Levaquin and Tylenol. ? INR sub-therapeutic on admission  ? INR has been subtherapeutic since 1/4/21, dose was increased to 6mg on 1/11.   ? INR for 1/12 is still subtherapeutic at 1.62, increase dose to 7mg today. ? +THE PATIENT IS CURRENTLY TAKING BOTH COUMADIN AND LOVENOX+         LOOK TO DISCONTINUE THE LOVENOX ONCE INR IS THERAPEUTIC   ? Pharmacy will continue to monitor and adjust warfarin therapy as indicated    Thank you for the consult.   Chelsy Waldron [facility]  1/12/2021 8:22 AM

## 2021-01-12 NOTE — CARE COORDINATION
JAMEEL received a call from Sydnie with [facility] stating that they received pre-certification approval for placement. JAMEEL advised Sydnie that the patient experienced a change in status and is no longer medically stable for discharge. Patient will need updated PT/OT notes and another pre-certification when deemed medically stable. 1:40 PM  JAMEEL advised by PT that the patient's COVID-19 test has come back positive.

## 2021-01-12 NOTE — PROGRESS NOTES
Called access center to see if we could get MICU transport set up for this patient since they would qualify and we need a sooner transport time.  Awaiting call back

## 2021-01-12 NOTE — PLAN OF CARE
Problem: Falls - Risk of:  Goal: Will remain free from falls  Description: Will remain free from falls  Outcome: Ongoing  Goal: Absence of physical injury  Description: Absence of physical injury  Outcome: Ongoing     Problem: Pain:  Goal: Pain level will decrease  Description: Pain level will decrease  Outcome: Ongoing  Goal: Control of acute pain  Description: Control of acute pain  Outcome: Ongoing  Goal: Control of chronic pain  Description: Control of chronic pain  Outcome: Ongoing     Problem: Discharge Planning:  Goal: Discharged to appropriate level of care  Description: Discharged to appropriate level of care  Outcome: Ongoing     Problem: Serum Glucose Level - Abnormal:  Goal: Ability to maintain appropriate glucose levels will improve  Description: Ability to maintain appropriate glucose levels will improve  Outcome: Ongoing     Problem: Sensory Perception - Impaired:  Goal: Ability to maintain a stable neurologic state will improve  Description: Ability to maintain a stable neurologic state will improve  Outcome: Ongoing     Problem: Coping:  Goal: Participation in decision-making will improve  Description: Demonstration of participation in decision-making regarding own care will improve  Outcome: Ongoing  Goal: Verbalizations of comfort with decisions will increase  Description: Verbalizations of comfort with decisions will increase  Outcome: Ongoing     Problem: Health Behavior:  Goal: Ability to identify and utilize available resources and services will improve  Description: Ability to identify and utilize available resources and services will improve  Outcome: Ongoing     Problem: Skin Integrity:  Goal: Will show no infection signs and symptoms  Description: Will show no infection signs and symptoms  Outcome: Ongoing  Goal: Absence of new skin breakdown  Description: Absence of new skin breakdown  Outcome: Ongoing

## 2021-01-12 NOTE — PROGRESS NOTES
Patient is assigned to bed 2003A. Called [facility] and spoke to the charge nurse Venus Santiago to see about giving report. Report will be called to Justen Conti as long as patient does not need to be intubated. If patient needs to be intubated then Venus Santiago stated patient will have to be sent to ICU instead of ICU stepdown.    Venus Santiago asked to be called once transport time is set up to give report

## 2021-01-12 NOTE — PROGRESS NOTES
PHARMACY TO DOSE VANCOMYCIN PER DR Munoz    INFECTION BEING TREATED = Pneumonia  CULTURES = not ordered yet  OTHER ABT'S = Flagyl and cefepime    AGE = 77 y.o.     SEX = male  HEIGHT = 6' 5\" (1.956 m)  Wt Readings from Last 3 Encounters:   01/09/21 296 lb 4 oz (134.4 kg)   01/08/21 285 lb (129.3 kg)   01/05/21 285 lb (129.3 kg)     Estimated Creatinine Clearance: 58 mL/min (A) (based on SCr of 1.7 mg/dL (H)).     Recent Labs     01/10/21  0700   WBC 5.0   BUN 16   CREATININE 1.7*     DOSING PLAN COMMENTS:  2gm IVPB Q24H    VANCO TROUGH SCHEDULED FOR 1/15/21 @ [address]  1/12/2021   9:01 AM  _______________________________________

## 2021-01-12 NOTE — PROGRESS NOTES
Dr. Benton Azar notified of patient's c/o SOB. Vital signs taken, see flow sheet. Oxygen on 2 liters n/c is 85%. Turned up to 6 liters . New orders received for chest X-ray, labs, Respiratory panel and Duo Nebs.      Doreen Franklin  6:13 AM

## 2021-01-13 ENCOUNTER — HOSPITAL ENCOUNTER (INPATIENT)
Age: 76
LOS: 19 days | Discharge: NURSING, REHAB OR LONG-TERM CARE | DRG: 870 | End: 2021-02-01
Attending: INTERNAL MEDICINE | Admitting: INTERNAL MEDICINE
Payer: MEDICARE

## 2021-01-13 ENCOUNTER — APPOINTMENT (OUTPATIENT)
Dept: GENERAL RADIOLOGY | Age: 76
DRG: 870 | End: 2021-01-13
Attending: INTERNAL MEDICINE
Payer: MEDICARE

## 2021-01-13 ENCOUNTER — ANESTHESIA EVENT (OUTPATIENT)
Dept: INPATIENT UNIT | Age: 76
DRG: 870 | End: 2021-01-13
Payer: MEDICARE

## 2021-01-13 ENCOUNTER — ANESTHESIA (OUTPATIENT)
Dept: INPATIENT UNIT | Age: 76
DRG: 870 | End: 2021-01-13
Payer: MEDICARE

## 2021-01-13 LAB
ABO/RH: NORMAL
ADENOVIRUS DETECTION BY PCR: NOT DETECTED
ALBUMIN SERPL-MCNC: 2.7 GM/DL (ref 3.4–5)
ALP BLD-CCNC: 58 IU/L (ref 40–129)
ALT SERPL-CCNC: 13 U/L (ref 10–40)
ANION GAP SERPL CALCULATED.3IONS-SCNC: 10 MMOL/L (ref 4–16)
ANTIBODY SCREEN: NEGATIVE
AST SERPL-CCNC: 26 IU/L (ref 15–37)
BASE EXCESS: 2 (ref 0–3.3)
BILIRUB SERPL-MCNC: 0.3 MG/DL (ref 0–1)
BILIRUBIN DIRECT: 0.2 MG/DL (ref 0–0.3)
BILIRUBIN, INDIRECT: 0.1 MG/DL (ref 0–0.7)
BORDETELLA PARAPERTUSSIS BY PCR: NOT DETECTED
BORDETELLA PERTUSSIS PCR: NOT DETECTED
BUN BLDV-MCNC: 26 MG/DL (ref 6–23)
CALCIUM SERPL-MCNC: 7.3 MG/DL (ref 8.3–10.6)
CARBON MONOXIDE, BLOOD: 1.7 % (ref 0–5)
CHLAMYDOPHILA PNEUMONIA PCR: NOT DETECTED
CHLORIDE BLD-SCNC: 104 MMOL/L (ref 99–110)
CO2 CONTENT: 25 MMOL/L (ref 19–24)
CO2: 22 MMOL/L (ref 21–32)
COMMENT: ABNORMAL
CORONAVIRUS 229E PCR: NOT DETECTED
CORONAVIRUS HKU1 PCR: NOT DETECTED
CORONAVIRUS NL63 PCR: NOT DETECTED
CORONAVIRUS OC43 PCR: NOT DETECTED
CREAT SERPL-MCNC: 1.2 MG/DL (ref 0.9–1.3)
D DIMER: 471 NG/ML(DDU)
DOSE AMOUNT: ABNORMAL
DOSE TIME: ABNORMAL
GFR AFRICAN AMERICAN: >60 ML/MIN/1.73M2
GFR NON-AFRICAN AMERICAN: 59 ML/MIN/1.73M2
GLUCOSE BLD-MCNC: 130 MG/DL (ref 70–99)
GLUCOSE BLD-MCNC: 216 MG/DL (ref 70–99)
GLUCOSE BLD-MCNC: 219 MG/DL (ref 70–99)
GLUCOSE BLD-MCNC: 225 MG/DL (ref 70–99)
GLUCOSE BLD-MCNC: 227 MG/DL (ref 70–99)
HCO3 ARTERIAL: 23.7 MMOL/L (ref 18–23)
HCT VFR BLD CALC: 35.8 % (ref 42–52)
HEMOGLOBIN: 11.6 GM/DL (ref 13.5–18)
HIGH SENSITIVE C-REACTIVE PROTEIN: 149 MG/L
HUMAN METAPNEUMOVIRUS PCR: NOT DETECTED
INFLUENZA A BY PCR: NOT DETECTED
INFLUENZA A H1 (2009) PCR: NOT DETECTED
INFLUENZA A H1 PANDEMIC PCR: NOT DETECTED
INFLUENZA A H3 PCR: NOT DETECTED
INFLUENZA B BY PCR: NOT DETECTED
INR BLD: 1.63 INDEX
MCH RBC QN AUTO: 29 PG (ref 27–31)
MCHC RBC AUTO-ENTMCNC: 32.4 % (ref 32–36)
MCV RBC AUTO: 89.5 FL (ref 78–100)
METHEMOGLOBIN ARTERIAL: 1.4 %
MYCOPLASMA PNEUMONIAE PCR: NOT DETECTED
O2 SATURATION: 89.2 % (ref 96–97)
PARAINFLUENZA 1 PCR: NOT DETECTED
PARAINFLUENZA 2 PCR: NOT DETECTED
PARAINFLUENZA 3 PCR: NOT DETECTED
PARAINFLUENZA 4 PCR: NOT DETECTED
PCO2 ARTERIAL: 42 MMHG (ref 32–45)
PDW BLD-RTO: 17.6 % (ref 11.7–14.9)
PH BLOOD: 7.36 (ref 7.34–7.45)
PLATELET # BLD: 195 K/CU MM (ref 140–440)
PMV BLD AUTO: 12.3 FL (ref 7.5–11.1)
PO2 ARTERIAL: 57 MMHG (ref 75–100)
POTASSIUM SERPL-SCNC: 4 MMOL/L (ref 3.5–5.1)
PROCALCITONIN: 0.36
PROTHROMBIN TIME: 19.8 SECONDS (ref 11.7–14.5)
RBC # BLD: 4 M/CU MM (ref 4.6–6.2)
RHINOVIRUS ENTEROVIRUS PCR: NOT DETECTED
RSV PCR: NOT DETECTED
SARS-COV-2: ABNORMAL
SODIUM BLD-SCNC: 136 MMOL/L (ref 135–145)
TOTAL PROTEIN: 6.1 GM/DL (ref 6.4–8.2)
VANCOMYCIN TROUGH: 6 UG/ML (ref 10–20)
WBC # BLD: 5.9 K/CU MM (ref 4–10.5)

## 2021-01-13 PROCEDURE — 94660 CPAP INITIATION&MGMT: CPT

## 2021-01-13 PROCEDURE — 6370000000 HC RX 637 (ALT 250 FOR IP): Performed by: INTERNAL MEDICINE

## 2021-01-13 PROCEDURE — 2700000000 HC OXYGEN THERAPY PER DAY

## 2021-01-13 PROCEDURE — 31500 INSERT EMERGENCY AIRWAY: CPT | Performed by: ANESTHESIOLOGY

## 2021-01-13 PROCEDURE — 80076 HEPATIC FUNCTION PANEL: CPT

## 2021-01-13 PROCEDURE — 2500000003 HC RX 250 WO HCPCS: Performed by: INTERNAL MEDICINE

## 2021-01-13 PROCEDURE — 36600 WITHDRAWAL OF ARTERIAL BLOOD: CPT

## 2021-01-13 PROCEDURE — XW13325 TRANSFUSION OF CONVALESCENT PLASMA (NONAUTOLOGOUS) INTO PERIPHERAL VEIN, PERCUTANEOUS APPROACH, NEW TECHNOLOGY GROUP 5: ICD-10-PCS | Performed by: INTERNAL MEDICINE

## 2021-01-13 PROCEDURE — 51702 INSERT TEMP BLADDER CATH: CPT

## 2021-01-13 PROCEDURE — 94761 N-INVAS EAR/PLS OXIMETRY MLT: CPT

## 2021-01-13 PROCEDURE — 2580000003 HC RX 258: Performed by: INTERNAL MEDICINE

## 2021-01-13 PROCEDURE — 80202 ASSAY OF VANCOMYCIN: CPT

## 2021-01-13 PROCEDURE — 6360000002 HC RX W HCPCS

## 2021-01-13 PROCEDURE — 85027 COMPLETE CBC AUTOMATED: CPT

## 2021-01-13 PROCEDURE — 71045 X-RAY EXAM CHEST 1 VIEW: CPT

## 2021-01-13 PROCEDURE — 6360000002 HC RX W HCPCS: Performed by: INTERNAL MEDICINE

## 2021-01-13 PROCEDURE — 0BH17EZ INSERTION OF ENDOTRACHEAL AIRWAY INTO TRACHEA, VIA NATURAL OR ARTIFICIAL OPENING: ICD-10-PCS | Performed by: ANESTHESIOLOGY

## 2021-01-13 PROCEDURE — 85610 PROTHROMBIN TIME: CPT

## 2021-01-13 PROCEDURE — 84145 PROCALCITONIN (PCT): CPT

## 2021-01-13 PROCEDURE — 5A1955Z RESPIRATORY VENTILATION, GREATER THAN 96 CONSECUTIVE HOURS: ICD-10-PCS | Performed by: INTERNAL MEDICINE

## 2021-01-13 PROCEDURE — 2500000003 HC RX 250 WO HCPCS

## 2021-01-13 PROCEDURE — 0202U NFCT DS 22 TRGT SARS-COV-2: CPT

## 2021-01-13 PROCEDURE — 80048 BASIC METABOLIC PNL TOTAL CA: CPT

## 2021-01-13 PROCEDURE — 82962 GLUCOSE BLOOD TEST: CPT

## 2021-01-13 PROCEDURE — 94640 AIRWAY INHALATION TREATMENT: CPT

## 2021-01-13 PROCEDURE — 2500000003 HC RX 250 WO HCPCS: Performed by: NURSE PRACTITIONER

## 2021-01-13 PROCEDURE — 86850 RBC ANTIBODY SCREEN: CPT

## 2021-01-13 PROCEDURE — 2580000003 HC RX 258: Performed by: NURSE PRACTITIONER

## 2021-01-13 PROCEDURE — 6360000002 HC RX W HCPCS: Performed by: NURSE PRACTITIONER

## 2021-01-13 PROCEDURE — 86901 BLOOD TYPING SEROLOGIC RH(D): CPT

## 2021-01-13 PROCEDURE — XW033E5 INTRODUCTION OF REMDESIVIR ANTI-INFECTIVE INTO PERIPHERAL VEIN, PERCUTANEOUS APPROACH, NEW TECHNOLOGY GROUP 5: ICD-10-PCS | Performed by: INTERNAL MEDICINE

## 2021-01-13 PROCEDURE — 86141 C-REACTIVE PROTEIN HS: CPT

## 2021-01-13 PROCEDURE — 87040 BLOOD CULTURE FOR BACTERIA: CPT

## 2021-01-13 PROCEDURE — 74018 RADEX ABDOMEN 1 VIEW: CPT

## 2021-01-13 PROCEDURE — 87205 SMEAR GRAM STAIN: CPT

## 2021-01-13 PROCEDURE — 6370000000 HC RX 637 (ALT 250 FOR IP): Performed by: NURSE PRACTITIONER

## 2021-01-13 PROCEDURE — 2000000000 HC ICU R&B

## 2021-01-13 PROCEDURE — 82803 BLOOD GASES ANY COMBINATION: CPT

## 2021-01-13 PROCEDURE — 85379 FIBRIN DEGRADATION QUANT: CPT

## 2021-01-13 PROCEDURE — 86900 BLOOD TYPING SEROLOGIC ABO: CPT

## 2021-01-13 PROCEDURE — 87070 CULTURE OTHR SPECIMN AEROBIC: CPT

## 2021-01-13 RX ORDER — PANTOPRAZOLE SODIUM 40 MG/1
40 TABLET, DELAYED RELEASE ORAL
Status: DISCONTINUED | OUTPATIENT
Start: 2021-01-13 | End: 2021-01-13

## 2021-01-13 RX ORDER — 0.9 % SODIUM CHLORIDE 0.9 %
30 INTRAVENOUS SOLUTION INTRAVENOUS PRN
Status: DISCONTINUED | OUTPATIENT
Start: 2021-01-13 | End: 2021-01-29

## 2021-01-13 RX ORDER — ALOGLIPTIN 12.5 MG/1
12.5 TABLET, FILM COATED ORAL DAILY
Status: CANCELLED | OUTPATIENT
Start: 2021-01-13

## 2021-01-13 RX ORDER — ACETAMINOPHEN 650 MG/1
650 SUPPOSITORY RECTAL EVERY 6 HOURS PRN
Status: DISCONTINUED | OUTPATIENT
Start: 2021-01-13 | End: 2021-02-01 | Stop reason: HOSPADM

## 2021-01-13 RX ORDER — CHLORHEXIDINE GLUCONATE 0.12 MG/ML
15 RINSE ORAL 2 TIMES DAILY
Status: DISCONTINUED | OUTPATIENT
Start: 2021-01-13 | End: 2021-02-01 | Stop reason: HOSPADM

## 2021-01-13 RX ORDER — SODIUM CHLORIDE 0.9 % (FLUSH) 0.9 %
10 SYRINGE (ML) INJECTION PRN
Status: DISCONTINUED | OUTPATIENT
Start: 2021-01-13 | End: 2021-02-01 | Stop reason: HOSPADM

## 2021-01-13 RX ORDER — ESCITALOPRAM OXALATE 10 MG/1
20 TABLET ORAL DAILY
Status: DISCONTINUED | OUTPATIENT
Start: 2021-01-13 | End: 2021-02-01 | Stop reason: HOSPADM

## 2021-01-13 RX ORDER — MINERAL OIL AND WHITE PETROLATUM 150; 830 MG/G; MG/G
OINTMENT OPHTHALMIC EVERY 4 HOURS
Status: DISCONTINUED | OUTPATIENT
Start: 2021-01-13 | End: 2021-01-15

## 2021-01-13 RX ORDER — ESCITALOPRAM OXALATE 10 MG/1
20 TABLET ORAL DAILY
Status: CANCELLED | OUTPATIENT
Start: 2021-01-13

## 2021-01-13 RX ORDER — DEXTROSE MONOHYDRATE 25 G/50ML
12.5 INJECTION, SOLUTION INTRAVENOUS PRN
Status: DISCONTINUED | OUTPATIENT
Start: 2021-01-13 | End: 2021-01-29

## 2021-01-13 RX ORDER — ACETAMINOPHEN 325 MG/1
650 TABLET ORAL EVERY 6 HOURS PRN
Status: DISCONTINUED | OUTPATIENT
Start: 2021-01-13 | End: 2021-02-01 | Stop reason: HOSPADM

## 2021-01-13 RX ORDER — ARIPIPRAZOLE 10 MG/1
15 TABLET ORAL DAILY
Status: DISCONTINUED | OUTPATIENT
Start: 2021-01-13 | End: 2021-02-01 | Stop reason: HOSPADM

## 2021-01-13 RX ORDER — POLYVINYL ALCOHOL 14 MG/ML
1 SOLUTION/ DROPS OPHTHALMIC EVERY 4 HOURS
Status: DISCONTINUED | OUTPATIENT
Start: 2021-01-13 | End: 2021-01-15

## 2021-01-13 RX ORDER — ZINC SULFATE 50(220)MG
50 CAPSULE ORAL DAILY
Status: DISCONTINUED | OUTPATIENT
Start: 2021-01-13 | End: 2021-02-01 | Stop reason: HOSPADM

## 2021-01-13 RX ORDER — FUROSEMIDE 20 MG/1
20 TABLET ORAL 2 TIMES DAILY
Status: DISCONTINUED | OUTPATIENT
Start: 2021-01-13 | End: 2021-01-16

## 2021-01-13 RX ORDER — ALBUTEROL SULFATE 90 UG/1
2 AEROSOL, METERED RESPIRATORY (INHALATION) 4 TIMES DAILY
Status: DISCONTINUED | OUTPATIENT
Start: 2021-01-13 | End: 2021-02-01 | Stop reason: HOSPADM

## 2021-01-13 RX ORDER — DEXAMETHASONE SODIUM PHOSPHATE 10 MG/ML
6 INJECTION, SOLUTION INTRAMUSCULAR; INTRAVENOUS EVERY 24 HOURS
Status: DISCONTINUED | OUTPATIENT
Start: 2021-01-14 | End: 2021-01-19

## 2021-01-13 RX ORDER — ATORVASTATIN CALCIUM 40 MG/1
40 TABLET, FILM COATED ORAL NIGHTLY
Status: DISCONTINUED | OUTPATIENT
Start: 2021-01-13 | End: 2021-02-01 | Stop reason: HOSPADM

## 2021-01-13 RX ORDER — DEXTROSE MONOHYDRATE 50 MG/ML
100 INJECTION, SOLUTION INTRAVENOUS PRN
Status: DISCONTINUED | OUTPATIENT
Start: 2021-01-13 | End: 2021-01-29

## 2021-01-13 RX ORDER — DEXAMETHASONE SODIUM PHOSPHATE 4 MG/ML
4 INJECTION, SOLUTION INTRA-ARTICULAR; INTRALESIONAL; INTRAMUSCULAR; INTRAVENOUS; SOFT TISSUE EVERY 6 HOURS
Status: DISCONTINUED | OUTPATIENT
Start: 2021-01-13 | End: 2021-01-13

## 2021-01-13 RX ORDER — BUPROPION HYDROCHLORIDE 150 MG/1
300 TABLET ORAL EVERY MORNING
Status: CANCELLED | OUTPATIENT
Start: 2021-01-13

## 2021-01-13 RX ORDER — PROPOFOL 10 MG/ML
10 INJECTION, EMULSION INTRAVENOUS
Status: DISCONTINUED | OUTPATIENT
Start: 2021-01-13 | End: 2021-01-20

## 2021-01-13 RX ORDER — ATORVASTATIN CALCIUM 40 MG/1
40 TABLET, FILM COATED ORAL NIGHTLY
Status: CANCELLED | OUTPATIENT
Start: 2021-01-13

## 2021-01-13 RX ORDER — FUROSEMIDE 10 MG/ML
40 INJECTION INTRAMUSCULAR; INTRAVENOUS ONCE
Status: COMPLETED | OUTPATIENT
Start: 2021-01-13 | End: 2021-01-13

## 2021-01-13 RX ORDER — ASPIRIN 81 MG/1
81 TABLET, CHEWABLE ORAL DAILY
Status: DISCONTINUED | OUTPATIENT
Start: 2021-01-13 | End: 2021-02-01 | Stop reason: HOSPADM

## 2021-01-13 RX ORDER — PROMETHAZINE HYDROCHLORIDE 25 MG/1
12.5 TABLET ORAL EVERY 6 HOURS PRN
Status: DISCONTINUED | OUTPATIENT
Start: 2021-01-13 | End: 2021-02-01 | Stop reason: HOSPADM

## 2021-01-13 RX ORDER — ASPIRIN 81 MG/1
81 TABLET, CHEWABLE ORAL DAILY
Status: CANCELLED | OUTPATIENT
Start: 2021-01-13

## 2021-01-13 RX ORDER — TRAMADOL HYDROCHLORIDE 50 MG/1
50 TABLET ORAL EVERY 6 HOURS PRN
Status: DISCONTINUED | OUTPATIENT
Start: 2021-01-13 | End: 2021-02-01 | Stop reason: HOSPADM

## 2021-01-13 RX ORDER — FUROSEMIDE 20 MG/1
20 TABLET ORAL 2 TIMES DAILY
Status: CANCELLED | OUTPATIENT
Start: 2021-01-13

## 2021-01-13 RX ORDER — SODIUM CHLORIDE 0.9 % (FLUSH) 0.9 %
10 SYRINGE (ML) INJECTION EVERY 12 HOURS SCHEDULED
Status: DISCONTINUED | OUTPATIENT
Start: 2021-01-13 | End: 2021-02-01 | Stop reason: HOSPADM

## 2021-01-13 RX ORDER — ARIPIPRAZOLE 10 MG/1
15 TABLET ORAL DAILY
Status: CANCELLED | OUTPATIENT
Start: 2021-01-13

## 2021-01-13 RX ORDER — SODIUM CHLORIDE 9 MG/ML
INJECTION, SOLUTION INTRAVENOUS PRN
Status: DISCONTINUED | OUTPATIENT
Start: 2021-01-13 | End: 2021-02-01 | Stop reason: HOSPADM

## 2021-01-13 RX ORDER — WARFARIN SODIUM 5 MG/1
5 TABLET ORAL DAILY
Status: CANCELLED | OUTPATIENT
Start: 2021-01-13

## 2021-01-13 RX ORDER — NICOTINE POLACRILEX 4 MG
15 LOZENGE BUCCAL PRN
Status: DISCONTINUED | OUTPATIENT
Start: 2021-01-13 | End: 2021-01-29

## 2021-01-13 RX ORDER — ONDANSETRON 2 MG/ML
4 INJECTION INTRAMUSCULAR; INTRAVENOUS EVERY 6 HOURS PRN
Status: DISCONTINUED | OUTPATIENT
Start: 2021-01-13 | End: 2021-02-01 | Stop reason: HOSPADM

## 2021-01-13 RX ORDER — GLIPIZIDE 5 MG/1
10 TABLET ORAL
Status: CANCELLED | OUTPATIENT
Start: 2021-01-13

## 2021-01-13 RX ORDER — BUPROPION HYDROCHLORIDE 150 MG/1
300 TABLET ORAL EVERY MORNING
Status: DISCONTINUED | OUTPATIENT
Start: 2021-01-13 | End: 2021-02-01 | Stop reason: HOSPADM

## 2021-01-13 RX ORDER — TRAMADOL HYDROCHLORIDE 50 MG/1
100 TABLET ORAL EVERY 6 HOURS PRN
Status: DISCONTINUED | OUTPATIENT
Start: 2021-01-13 | End: 2021-02-01 | Stop reason: HOSPADM

## 2021-01-13 RX ORDER — DIPHENHYDRAMINE HYDROCHLORIDE 50 MG/ML
25 INJECTION INTRAMUSCULAR; INTRAVENOUS EVERY 6 HOURS PRN
Status: DISCONTINUED | OUTPATIENT
Start: 2021-01-13 | End: 2021-02-01 | Stop reason: HOSPADM

## 2021-01-13 RX ORDER — POLYETHYLENE GLYCOL 3350 17 G/17G
17 POWDER, FOR SOLUTION ORAL DAILY PRN
Status: DISCONTINUED | OUTPATIENT
Start: 2021-01-13 | End: 2021-02-01 | Stop reason: HOSPADM

## 2021-01-13 RX ORDER — ALOGLIPTIN 12.5 MG/1
12.5 TABLET, FILM COATED ORAL DAILY
Status: DISCONTINUED | OUTPATIENT
Start: 2021-01-13 | End: 2021-02-01 | Stop reason: HOSPADM

## 2021-01-13 RX ADMIN — REMDESIVIR 200 MG: 100 INJECTION, POWDER, LYOPHILIZED, FOR SOLUTION INTRAVENOUS at 06:48

## 2021-01-13 RX ADMIN — Medication 2 PUFF: at 14:52

## 2021-01-13 RX ADMIN — PANTOPRAZOLE SODIUM 40 MG: 40 TABLET, DELAYED RELEASE ORAL at 05:37

## 2021-01-13 RX ADMIN — CHLORHEXIDINE GLUCONATE 0.12% ORAL RINSE 15 ML: 1.2 LIQUID ORAL at 23:18

## 2021-01-13 RX ADMIN — FAMOTIDINE 20 MG: 10 INJECTION, SOLUTION INTRAVENOUS at 23:18

## 2021-01-13 RX ADMIN — FUROSEMIDE 40 MG: 10 INJECTION, SOLUTION INTRAMUSCULAR; INTRAVENOUS at 22:23

## 2021-01-13 RX ADMIN — FUROSEMIDE 20 MG: 20 TABLET ORAL at 08:36

## 2021-01-13 RX ADMIN — ENOXAPARIN SODIUM 135 MG: 150 INJECTION SUBCUTANEOUS at 08:38

## 2021-01-13 RX ADMIN — MINERAL OIL AND PETROLATUM: 150; 830 OINTMENT OPHTHALMIC at 23:00

## 2021-01-13 RX ADMIN — PROPOFOL 80 MCG/KG/MIN: 10 INJECTION, EMULSION INTRAVENOUS at 22:23

## 2021-01-13 RX ADMIN — ENOXAPARIN SODIUM 135 MG: 150 INJECTION SUBCUTANEOUS at 23:18

## 2021-01-13 RX ADMIN — ASPIRIN 81 MG CHEWABLE TABLET 81 MG: 81 TABLET CHEWABLE at 08:37

## 2021-01-13 RX ADMIN — DICLOFENAC SODIUM 2 G: 10 GEL TOPICAL at 08:38

## 2021-01-13 RX ADMIN — INSULIN LISPRO 4 UNITS: 100 INJECTION, SOLUTION INTRAVENOUS; SUBCUTANEOUS at 08:36

## 2021-01-13 RX ADMIN — METOPROLOL TARTRATE 12.5 MG: 25 TABLET, FILM COATED ORAL at 08:37

## 2021-01-13 RX ADMIN — DEXAMETHASONE SODIUM PHOSPHATE 4 MG: 4 INJECTION, SOLUTION INTRAMUSCULAR; INTRAVENOUS at 01:19

## 2021-01-13 RX ADMIN — ALOGLIPTIN 12.5 MG: 12.5 TABLET, FILM COATED ORAL at 08:37

## 2021-01-13 RX ADMIN — ESCITALOPRAM OXALATE 20 MG: 10 TABLET ORAL at 08:37

## 2021-01-13 RX ADMIN — WARFARIN SODIUM 7 MG: 6 TABLET ORAL at 17:29

## 2021-01-13 RX ADMIN — DEXTROSE MONOHYDRATE 2 MCG/MIN: 50 INJECTION, SOLUTION INTRAVENOUS at 21:15

## 2021-01-13 RX ADMIN — SODIUM CHLORIDE, PRESERVATIVE FREE 10 ML: 5 INJECTION INTRAVENOUS at 22:46

## 2021-01-13 RX ADMIN — ALBUTEROL SULFATE 2 PUFF: 90 AEROSOL, METERED RESPIRATORY (INHALATION) at 14:51

## 2021-01-13 RX ADMIN — PROPOFOL 10 MCG/KG/MIN: 10 INJECTION, EMULSION INTRAVENOUS at 21:19

## 2021-01-13 RX ADMIN — Medication 16 MG: at 22:49

## 2021-01-13 RX ADMIN — INSULIN LISPRO 4 UNITS: 100 INJECTION, SOLUTION INTRAVENOUS; SUBCUTANEOUS at 12:14

## 2021-01-13 RX ADMIN — ARIPIPRAZOLE 15 MG: 10 TABLET ORAL at 08:36

## 2021-01-13 RX ADMIN — SODIUM CHLORIDE, PRESERVATIVE FREE 10 ML: 5 INJECTION INTRAVENOUS at 08:37

## 2021-01-13 RX ADMIN — Medication 25 MCG/HR: at 21:30

## 2021-01-13 RX ADMIN — ZINC SULFATE 220 MG (50 MG) CAPSULE 50 MG: CAPSULE at 14:28

## 2021-01-13 RX ADMIN — BUPROPION HYDROCHLORIDE 300 MG: 150 TABLET, FILM COATED, EXTENDED RELEASE ORAL at 08:36

## 2021-01-13 ASSESSMENT — PAIN SCALES - GENERAL: PAINLEVEL_OUTOF10: 0

## 2021-01-13 NOTE — PROGRESS NOTES
PHARMACY ANTICOAGULATION MONITORING SERVICE    Mike Talavera is a 77 y.o. male on warfarin therapy for history of DVT. Pharmacy consulted by Dr. Verito Mantilla for monitoring and adjustment of treatment. Indication for anticoagulation: Hx of DVT  INR goal: 2-3  Warfarin dose prior to admission: 5 mg daily    Pertinent Laboratory Values   Recent Labs     01/10/21  0700 01/10/21  0700 01/12/21  0845 01/13/21  0600   INR  --    < >  --  1.63   HGB 11.1*  --  10.9* 11.6*   HCT 34.7*  --  34.4* 35.8*     --  182 195    < > = values in this interval not displayed. Assessment/Plan:  Drug Interactions: Aspirin(home med), escitalopram (home med), therapeutic enoxaparin  INR sub-therapeutic @ 1.63  INR has been sub-therapeutic since admission 1/9. Dose was increased from 5 mg to 6 mg on 1/11 and further increased to 7 mg on 1/12. Will continue Warfarin 7 mg daily  +THE PATIENT IS CURRENTLY TAKING BOTH WARFARIN AND ENOXAPARIN+         LOOK TO DISCONTINUE  ENOXAPARIN ONCE INR IS THERAPEUTIC   Pharmacy will continue to monitor and adjust warfarin therapy as indicated    Thank you for the consult.   Anita Valdez RPh  1/13/2021 5:51 AM

## 2021-01-13 NOTE — PROGRESS NOTES
Patient states that he cannot get a hold of his daughter to let her know he is being transferred to [place] [id number] but that he left a message with her.     Doreen Franklin  10:06 PM

## 2021-01-13 NOTE — PLAN OF CARE
Problem: Airway Clearance - Ineffective  Goal: Achieve or maintain patent airway  Outcome: Ongoing     Problem: Gas Exchange - Impaired  Goal: Absence of hypoxia  Outcome: Ongoing  Goal: Promote optimal lung function  Outcome: Ongoing     Problem:  Body Temperature -  Risk of, Imbalanced  Goal: Ability to maintain a body temperature within defined limits  Outcome: Ongoing  Goal: Will regain or maintain usual level of consciousness  Outcome: Ongoing  Goal: Complications related to the disease process, condition or treatment will be avoided or minimized  Outcome: Ongoing

## 2021-01-13 NOTE — PROGRESS NOTES
CC: shortness of breath    PMHx: DM type II, HTN, HLD, depression, morbid obesity    Assessment:   - Acute on chronic respiratory failure secondary to bilateral PNA secondary to Covid-19  - Cellulitis  - COPD  - DM type 2     Plan:   - Advanced to BiPAP as respiratory status is insufficient on NC  - continue IV Abx for cellulitis of RLE  - Med management of COPD per Pulmonology   - Continue insulin (lispro) and maintain good blood glucose control         Dr. Anai Leung.  [facility]  Internal Medicine Hospitalist  [facility]  1/14/2021 3:37 PM

## 2021-01-13 NOTE — PROGRESS NOTES
Patient arrived to unit per stretcher per med trans from [facility], [place] mask on at time of arrival taken off and replaced with high flow 02 tubing at 13 liters. Alert and orient times. [place] to room, call light, staff, meals, meds, phone, tv, and phone. No questions at this time. Skin assessment completed upon arrival and areas noted to coccyx, rt elbow, and BLE.

## 2021-01-13 NOTE — PROGRESS NOTES
[facility]  consulted by Dr. Cece Montoya for monitoring and adjustment. Indication for treatment: Covid-19 pneumonia, possible secondary bacterial pneumonia, cellulitis  Goal trough: 15 mcg/mL (AUC/YOLA 400-600)    Pertinent Laboratory Values:   Temp Readings from Last 3 Encounters:   01/13/21 98 °F (36.7 °C) (Oral)   01/12/21 97.1 °F (36.2 °C) (Infrared)   01/08/21 98.9 °F (37.2 °C) (Oral)     Recent Labs     01/10/21  0700 01/12/21  0830 01/12/21  0845   WBC 5.0  --  6.4   LACTATE  --  1.0  --      Recent Labs     01/10/21  0700 01/12/21  0845   BUN 16 25*   CREATININE 1.7* 1.4*     Estimated Creatinine Clearance: 72 mL/min (A) (based on SCr of 1.4 mg/dL (H)). No intake or output data in the 24 hours ending 01/13/21 0355    Pertinent Cultures:  Date    Source    Results  1/12   Resp PCR               Covid-19  1/13   Respiratory    Ordered  1/13   Blood                Ordered    Vancomycin level:   TROUGH:  No results for input(s): VANCOTROUGH in the last 72 hours. RANDOM:  No results for input(s): VANCORANDOM in the last 72 hours. Assessment:  WBC and temperature: WNL  SCr, BUN, and urine output: KIRSTEN, Scr improving 1.7-> 1.4  Day(s) of therapy:2  Vancomycin concentration: To be collected    Plan:  Received Vancomycin 2000 mg x 1 @ [place]  Intermittent dosing based on levels due to KIRSTEN  Pharmacy will continue to monitor patient and adjust therapy as indicated    Lindsay 3 01/13/21 @1200    Thank you for the consult.   Luis Nava RPh   1/13/2021 3:55 AM

## 2021-01-13 NOTE — PROGRESS NOTES
[address] [facility]  consulted by Dr. Iván Jane for monitoring and adjustment. Indication for treatment: Covid-19 pneumonia, possible secondary bacterial pneumonia, cellulitis  Goal trough: 15 mcg/mL (AUC/YOLA 400-600)    Pertinent Laboratory Values:   Temp Readings from Last 3 Encounters:   01/13/21 98.4 °F (36.9 °C) (Oral)   01/12/21 97.1 °F (36.2 °C) (Infrared)   01/08/21 98.9 °F (37.2 °C) (Oral)     Recent Labs     01/12/21  0830 01/12/21  0845 01/13/21  0600   WBC  --  6.4 5.9   LACTATE 1.0  --   --      Recent Labs     01/12/21  0845 01/13/21  0600   BUN 25* 26*   CREATININE 1.4* 1.2     Estimated Creatinine Clearance: 83 mL/min (based on SCr of 1.2 mg/dL). No intake or output data in the 24 hours ending 01/13/21 1642    Pertinent Cultures:  Date    Source    Results  1/12   Resp PCR               Covid-19  1/13   Respiratory    Ordered  1/13   Blood                Ordered    Vancomycin level:   TROUGH:    Recent Labs     01/13/21  0600   VANCOTROUGH 6.0*     RANDOM:  No results for input(s): VANCORANDOM in the last 72 hours. Assessment:  · WBC and temperature: WNL  · SCr, BUN, and urine output: KIRSTEN, Scr improving 1.7-> 1.4  · Day(s) of therapy: 3 [On & off doses= renal]. · Vancomycin concentration:   ·  1/13 600am = 6.0 = sub-tx    Plan:  · Received Vancomycin 2000 mg x 1 @ [place]  · Renal, some improvement, scr= 1.2, crcl= 83  · Vanco RANDOM = 6 = low= 18 hrs after 2gm iv vanco given  · Intermittent dosing based on levels due to KIRSTEN  ·   Thus; give 1x 2000 mg ivpb Vancomycin this early pm  · Vanco Random at 13:00 on 1/14  · Pharmacy will continue to monitor patient and adjust therapy as indicated    Lindsay 3 01/14/21 @ 14:00    Thank you for the consult.   Heber Sharif [address], [facility]   1/13/2021 4:42 PM

## 2021-01-13 NOTE — PROGRESS NOTES
Pharmacy Consult for Remdesivir Initiation per Dr. Padmaja Avery for Use:  Covid (+) - Yes  Requiring supplemental oxygen - Yes  Requiring high-flow oxygen (>15L/min), non-invasive (BiPAP), or invasive mechanical ventilation - No  Use of 1 pressor or less - Yes  EGFR >30mL/min - Yes  ALT <5x ULN - Yes    Recommendations are consistent with the [facility] criteria for use published on the Hub with consideration from multiple clinical trials. https://hub.[facility]. org/sites/Quality/COVID-19/Clinical%20Care/Pharmaceutical/[facility]%20COVID%2019%20Treatment%20Summary%20-%20Hub%20Edition. pdf    Based on the above criteria, the patient is a candidate for remdesivir therapy. Remdesivir 200 mg on day 1 followed by 100 mg daily on days 2-5. The following test will also be ordered:  BMP x5 days  CBC x5 days  LFT x5 days    RENAL LAB EVALUATION  Estimated Creatinine Clearance: 72 mL/min (A) (based on SCr of 1.4 mg/dL (H)).   Recent Labs     01/10/21  0700 01/12/21  0845   BUN 16 25*   CREATININE 1.7* 1.4*       LIVER FUNCTION LAB EVALUATION  Recent Labs     01/11/21  0635 01/12/21  0600 01/13/21  0445 01/13/21  0600   AST  --   --  26  --    ALT  --   --  13  --    ALKPHOS  --   --  58  --    BILITOT  --   --  0.3  --    INR 1.64 1.62  --  1.63       PLATELETS  Platelets   Date Value Ref Range Status   01/13/2021 195 140 - 440 K/CU MM Final   01/12/2021 182 140 - 440 K/CU MM Final   01/10/2021 186 140 - 440 K/CU MM Final   01/08/2021 215 140 - 440 K/CU MM Final   11/30/2020 276 140 - 440 K/CU MM Final         Thank you for the consult,    Boy Garces, PharmD  1/13/21 @ 2681

## 2021-01-13 NOTE — PROGRESS NOTES
PHARMACY ANTICOAGULATION/ Pharmacy Warfarin Dosing & MONITORING SERVICE    Vicki Hoover is a 77 y.o. male on warfarin therapy for history of DVT. Pharmacy consulted by Dr. Nighat Wong for monitoring and adjustment of treatment. Indication for anticoagulation: Hx of DVT  INR goal: 2-3  Warfarin dose prior to admission: 5 mg daily    INR MONITORING  Lab Results   Component Value Date    INR 1.63 01/13/2021    INR 1.62 01/12/2021    INR 1.64 01/11/2021    INR 1.72 01/10/2021    INR 1.85 01/09/2021   _______________________________________________________________________    Pertinent Laboratory Values   Recent Labs     01/12/21  0845 01/13/21  0600   INR  --  1.63   HGB 10.9* 11.6*   HCT 34.4* 35.8*    195       Assessment/Plan:  ? Drug Interactions: Levaquin -new 1/10; Aspirin(home med), escitalopram (home med), therapeutic enoxaparin 1mg/kg dose. ? INR sub-therapeutic @ 1.63  ? INR has been sub-therapeutic since admission 1/9. Dose was increased from 5 mg to 6 mg on 1/11 and further increased to 7 mg on 1/12.  ? [address] =  Warfarin 7mg qday. ? Note: Tx bridge with LOVENOX, 1 mg/kg sq q12hr [this began 5 days ago=             1/9/21]. ? +THE PATIENT IS CURRENTLY TAKING BOTH WARFARIN AND ENOXAPARIN+         LOOK TO DISCONTINUE  ENOXAPARIN ONCE INR IS THERAPEUTIC   ? Pharmacy will continue to monitor and adjust warfarin therapy as indicated    Thank you for the consult.   Heber Bartlett [address] 88, [facility]  1/13/2021 12:20 PM

## 2021-01-13 NOTE — CONSULTS
[address] [place] [address], [address] [facility]                                  CONSULTATION    PATIENT NAME: Alesha Lockwood                     :        1945  MED REC NO:   [id number]                          ROOM:       2016  ACCOUNT NO:   [de-identified]                           ADMIT DATE: 2021  PROVIDER:     Ju Navarrete MD    CONSULT DATE:  2021    HISTORY OF PRESENT ILLNESS:  The patient is a 80-year-old gentleman with  multiple medical problems including COPD; chronic respiratory failure,  on home oxygen; obstructive sleep apnea, refusing CPAP; hypertension;  who went to the [facility] in [place] for increasing  shortness of breath. The patient was also having swelling of the lower  extremities with evidence of cellulitis and weakness. He was admitted  to [facility] in [place]. While in the hospital, he  developed increasing shortness of breath. He was tested for COVID-19  and was positive. He was subsequently transferred to the [facility] for further management. There was no history of  hemoptysis, hematemesis, nausea or vomiting. No history of chest pain. His chest x-ray showed bilateral infiltrates consistent with COVID  pneumonia. PAST MEDICAL HISTORY:  Significant for COPD; chronic respiratory  failure, on home oxygen; obstructive sleep apnea, refusing CPAP;  hypertension; chronic kidney disease; and diabetes. PAST SURGICAL HISTORY:  Remarkable for tonsillectomy, splenectomy,  hernia repair, cataract removal, colonoscopy, carpal tunnel release  surgery. FAMILY HISTORY:  Reveals that his father had heart disease, prostate  cancer, hypertension, and hypercholesterolemia. SOCIAL HISTORY:  Reveals that he quit smoking in , but used to smoke  2 packs per day for 35 years prior to that. He drinks alcohol off and  on. No history of drug abuse. MEDICATIONS:  Reviewed. ALLERGIES:  He is allergic to GENTAMICIN, LISINOPRIL, CORTISONE,  DILAUDID, PENICILLIN, BACTRIM and ADHESIVE TAPE. REVIEW OF SYSTEMS:  A 10- to 14-point review of systems was reviewed and  is negative except for what is mentioned in the history of present  illness. PHYSICAL EXAMINATION:  GENERAL:  The patient is alert and oriented x3, in no acute respiratory  distress. VITAL SIGNS:  His blood pressure is 119/57 mmHg, pulse of 61 per minute  and respiratory rate of 21 per minute. He is afebrile. His saturation  is 94% on 5 liters nasal cannula. HEENT:  Essentially unremarkable. NECK:  There is no JVD. No lymphadenopathy. The neck is supple. LUNGS:  Revealed diminished breath sounds and basilar crackles. HEART:  Showed normal S1, S2. There was no S3 or S4 noted. ABDOMEN:  Benign. There is no evidence of any organomegaly. The bowel  sounds are present. NEUROLOGIC:  Reveals that he is awake and responsive, answering  questions appropriately and moving his extremities. LABORATORY DATA:  His electrolytes were unremarkable. His CBC showed a  white count of 5.9, hemoglobin 11.6, hematocrit 35.8. His respiratory  disease panel PCR was positive for COVID-19. His D-dimer was 471. IMPRESSION:  1. Acute-on-chronic respiratory failure secondary to bilateral  pneumonia secondary to COVID-19.  2.  Bilateral pneumonia secondary to COVID-19.  3.  Obstructive sleep apnea, noncompliant with CPAP and refusing CPAP. 4.  COPD.  5.  Cellulitis. PLAN:  1. Remdesivir. 2.  Convalescent plasma. 3.  We will start zinc supplement. 4.  Check mag and phos. 5.  We will start the patient on Combivent 2 puffs 4 times a day. 6.  Cellulitis management as per admitting physician. 7.  As per orders.         Oz Jacques MD    D: 01/13/2021 10:49:18       T: 01/13/2021 13:46:38     /[email or web address]  Job#: [id number]     Doc#: [id number]    CC:

## 2021-01-13 NOTE — PROGRESS NOTES
Received a call from RN stating pt.'s 02 sats were in the mid 70's. Upon entering pt.'s room, pt was on nonrebreather. Placed pt on bipap 15/5 100% with 02 sats of 98%. Breathing treatment given.

## 2021-01-13 NOTE — PROGRESS NOTES
Cecy Arteaga RT, updated that  Patient would not be leaving until midnight.      Jakob Anand  11:02 PM

## 2021-01-13 NOTE — H&P
[facility]       History and Physical      Name:  Leva Bloch /Age/Sex: 1945  [de-identified]77 y.o. male)   MRN & CSN:  [id number] & [id number] Admission Date/Time: 2021 12:17 AM   Location:  -A PCP: Chintan Atwood MD       Hospital Day: 1    Assessment and Plan:   Leva Bloch is a 77 y.o.  male with past medical history of DM type II, hypertension, hyperlipidemia, depression, morbid obesity who was transferred from [facility] inpatient to [facility] for further management of COVID-19 pneumonia    · Acute on chronic respiratory failure with hypoxia -requiring high flow nasal cannula -likely due to Covid pneumonia and possible superimposed bacterial pneumonia, on 3 L home oxygen, monitor cultures, place on I broad-spectrum V antibiotics for healthcare associated pneumonia -consult pulmonary    · COVID 19 Pneumonia -presented with worsening shortness of breath, symptoms started on /X days before admission, requiring high flow oxygen, CT chest with , SARS-CoV-2 detected on 21, maintain appropriate PPI and droplet plus isolation, monitor appropriate biomarkers, patient consented for convalescent plasma, start on dexamethasone/remdesivir, and IV vancomycin and cefepime for possible superimposed bacterial pneumonia,  - monitor blood and sputum cultures, urine for legionella and strep, wean off oxygen as able, consult pulmonary and ID services    · Right Lower extremity cellulitis -continue IV antibiotics    · DM type II -started on insulin sliding scale, resume Glucotrol and Nesina    · History of LE DVT -resume warfarin with Lovenox bridge -monitor INR     Other comorbid conditions addressed include: Resume home medications unless contraindicated    Morbid obesity   Hyperlipidemia  Depression  Hypertension    Diet DIET CARB CONTROL;   DVT Prophylaxis [] Lovenox, []  Heparin, [] SCDs, [] No VTE prophylaxis, patient ambulating CARDIO/VASC S1/S2 auscultated. Regular rate without appreciable murmurs, rubs, or gallops. Peripheral pulses equal bilaterally and palpable. No peripheral edema. GI Abdomen is soft without significant tenderness, masses, or guarding. Bowel sounds are normoactive. Rectal exam deferred.  Starkey catheter is not present. HEME/LYMPH No petechiae or ecchymoses. MSK RLE edematous below knee with some redness, LLE minimal edema with some redness and old scar medial aspect of shaft   SKIN Normal coloration, warm, dry. NEURO Cranial nerves appear grossly intact, normal speech, no lateralizing weakness. PSYCH Awake, alert, oriented x 4. Affect appropriate.     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 Sumit Weiss Anemia 11/8/2011    Arm fracture Arloveronica Shelling     Dr Sharon Carmona with also yearly DM exam    Cataract 11/12    worsening-Dr. Franc Pierce    Cellulitis of left lower leg     Chronic venous hypertension with ulcer ([address] 11/18/2011    CKD (chronic kidney disease) Feb 2012    Renal u/S normal     Colon polyps     Dr. Sumit Weiss COPD (chronic obstructive pulmonary disease) ([address]     Diabetes mellitus ([address] 1993    Diabetes mellitus ([address]     Diabetes mellitus with peripheral circulatory disorder ([address] 10/2/2015    Diabetes mellitus with skin ulcer ([address] 10/2/2015    Diabetic peripheral neuropathy ([address] 11/12    + EMG, NCS    Diabetic skin ulcer associated with type 2 diabetes mellitus ([address]     Diverticulosis 4/23/12    mild, left colon    Femoral DVT (deep venous thrombosis) ([address] 09/2011    PARTIAL,CHRONIC-SPFLD HEART SURGEONS    GERD (gastroesophageal reflux disease)     Glaucoma 11/12    open angle-Dr. Melba Singer H/O cardiac catheterization 6/3/14    EF55% normal study    H/O Doppler ultrasound 03/26/15 Carotid US- no significant stenosis noted bilaterally.  H/O echocardiogram 11/21/2019    EF50-55%, Mild AR. Moderately dilated right ventricle with negative Solis's sign.  H/O mumps orchitis     as a youth    Hemorrhoids 4/23/12    Dr. Bobby Cid; repeat colonoscopy 3 years    History of nuclear stress test 11/21/2019    EF 60%, Normal study.  HLD (hyperlipidemia)     Hx of Doppler ultrasound 1/06/15    Lymph node seen in left groin area. No bilateral stenosis.     Hyperlipemia     Hyperlipidemia     Hypertension 1992    Hypertension     Idiopathic chronic venous hypertension of left lower extremity with ulcer and inflammation (HCC) 10/2/2015    Leg ulcer ([address] 1978-present    following at wound center, Dr Rigo Delacruz No diabetic retinopathy OU 11/12    Dr. Seth Barfield chronic ulcer of left lower leg with fat layer exposed ([address] 10/2/2015    Pulmonary embolism ([address] 11/13    patient on coumadin    Sleep apnea     doesnt always use cpap dt it drys him out    SOB (shortness of breath) Oct 2011    Stress test normal.     Tendinitis 1973    plantar tendons    Type II or unspecified type diabetes mellitus with other specified manifestations, not stated as uncontrolled 2/8/2013    Unspecified venous (peripheral) insufficiency     Urticaria     WD-Cellulitis of right anterior lower leg 9/5/2019    WD-Cellulitis of right lower extremity 3/26/2020    WD-Chronic venous hypertension (idiopathic) with ulcer of left lower extremity (CODE) ([address] 6/27/2019    WD-Chronic venous hypertension with inflammation, right 9/19/2019    WD-Decubitus ulcer of left buttock, stage 3 ([address] 6/25/2020    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 to breakdown of skin ([address] 3/26/2020    WD-Open wound of hand without complication, left, initial encounter 12/12/2019    WD-Pressure injury of left buttock, stage 2 ([address] 1/2/2020  WD-Pressure injury of left buttock, stage 3 ([facility] 3/12/2020    WD-Pressure injury of right buttock, stage 3 ([facility] 3/12/2020    WD-Skin tear of right forearm without complication 3/53/7582    WD-Ulcer of right pretibial region, with fat layer exposed ([facility] 10/17/2019     PSHX:  has a past surgical history that includes Tonsillectomy (1953); Splenectomy (1958); Breast surgery (1970s); hernia repair (1970s); Skin graft (1978-present); Cataract removal (Right, 3/11/2013); Cataract removal (Left, 2/25/2013); Varicose vein surgery (Left, 2009); Carpal tunnel release (Left, 1993); Cardiac catheterization (6/3/14); Colonoscopy (2006); Colonoscopy (11/21/11); Colonoscopy (4/23/12); Colonoscopy (08/04/2016); Splenectomy; hernia repair; and Carpal tunnel release. Allergies: Allergies   Allergen Reactions    Cavilon Durable Barrier [Mineral Oil-Dimeth-Coconut Oil]     Gentamycin [Gentamicin] Hives    Parabens Hives    Parabens Hives    Prinivil [Lisinopril] Swelling    Cortisone Rash    Cortisone Rash    Dilaudid [Hydromorphone Hcl] Rash    Penicillins Rash    Penicillins Rash    Sulfamethoxazole-Trimethoprim Nausea Only    Tape Virl Poplin Tape] Rash       FAM HX: family history includes Cancer in his brother and father; Diabetes in his brother; Heart Disease in his father; High Blood Pressure in his father; High Cholesterol in his father; Thyroid Disease in his brother.   Soc HX:   Social History     Socioeconomic History    Marital status: Single     Spouse name: Not on file    Number of children: Not on file    Years of education: Not on file    Highest education level: Not on file   Occupational History    Not on file   Social Needs    Financial resource strain: Not on file    Food insecurity     Worry: Not on file     Inability: Not on file    Transportation needs     Medical: Not on file     Non-medical: Not on file   Tobacco Use    Smoking status: Former Smoker     Packs/day: 2.00 Years: 35.00     Pack years: 70.00     Types: Cigarettes     Quit date: 47     Years since quittin.0    Smokeless tobacco: Never Used    Tobacco comment: chew--30 years.   Reviewed 2015   Substance and Sexual Activity    Alcohol use: Yes     Comment: soc    Drug use: Never    Sexual activity: Not on file   Lifestyle    Physical activity     Days per week: Not on file     Minutes per session: Not on file    Stress: Not on file   Relationships    Social connections     Talks on phone: Not on file     Gets together: Not on file     Attends Anabaptism service: Not on file     Active member of club or organization: Not on file     Attends meetings of clubs or organizations: Not on file     Relationship status: Not on file    Intimate partner violence     Fear of current or ex partner: Not on file     Emotionally abused: Not on file     Physically abused: Not on file     Forced sexual activity: Not on file   Other Topics Concern    Not on file   Social History Narrative    ** Merged History Encounter **            Medications:   Medications:    alogliptin  12.5 mg Oral Daily    ARIPiprazole  15 mg Oral Daily    aspirin  81 mg Oral Daily    atorvastatin  40 mg Oral Nightly    buPROPion  300 mg Oral QAM    escitalopram  20 mg Oral Daily    furosemide  20 mg Oral BID    metoprolol tartrate  12.5 mg Oral BID    pantoprazole  40 mg Oral QAM AC    sodium chloride flush  10 mL Intravenous 2 times per day    insulin lispro  0-12 Units Subcutaneous TID WC    insulin lispro  0-6 Units Subcutaneous Nightly    enoxaparin  1 mg/kg Subcutaneous BID    diclofenac sodium  2 g Topical BID    warfarin  7 mg Oral Daily    dexamethasone  4 mg Intravenous Q6H      Infusions:    dextrose       PRN Meds:     sodium chloride flush, 10 mL, PRN      promethazine, 12.5 mg, Q6H PRN    Or      ondansetron, 4 mg, Q6H PRN      polyethylene glycol, 17 g, Daily PRN      acetaminophen, 650 mg, Q6H PRN    Or   acetaminophen, 650 mg, Q6H PRN      glucose, 15 g, PRN      dextrose, 12.5 g, PRN      glucagon (rDNA), 1 mg, PRN      dextrose, 100 mL/hr, PRN      traMADol, 50 mg, Q6H PRN    Or      traMADol, 100 mg, Q6H PRN      diphenhydrAMINE, 25 mg, Q6H PRN      Home medications-   Prior to Admission medications    Medication Sig Start Date End Date Taking?  Authorizing Provider   clotrimazole-betamethasone (LOTRISONE) 1-0.05 % cream Apply topically 2 times daily Apply topically 2 times daily to BLE    Historical Provider, MD   warfarin (COUMADIN) 5 MG tablet Take 1 tablet by mouth daily Except take 1 and 1/2 tablets by mouth on Mondays or as directed by clinic 1/7/21   Zeke Joy MD   alogliptin (NESINA) 12.5 MG TABS tablet Take 1 tablet by mouth daily 11/19/20   Nirmal Armstrong MD   glipiZIDE (GLUCOTROL) 10 MG tablet Take 1 tablet by mouth 2 times daily (before meals) 11/18/20   Nirmal Armstrong MD   buPROPion (WELLBUTRIN XL) 300 MG extended release tablet Take 1 tablet by mouth every morning 6/10/20   WINDY Sotomayor NP   escitalopram (LEXAPRO) 20 MG tablet Take 1 tablet by mouth daily 6/10/20 11/11/20  WINDY Sotomayor NP   omeprazole (PRILOSEC) 20 MG delayed release capsule TAKE ONE (1) CAPSULE BY MOUTH ONCE DAILY 6/10/20   WINDY Sotomayor NP   ARIPiprazole (ABILIFY) 15 MG tablet Take 1 tablet by mouth daily 6/10/20 11/11/20  WINDY Sotomayor NP   atorvastatin (LIPITOR) 40 MG tablet Take 1 tablet by mouth nightly 6/10/20 11/11/20  WINDY Sotomayor NP   furosemide (LASIX) 20 MG tablet Take 1 tablet by mouth 2 times daily 6/10/20 11/11/20  WINDY Sotomayor NP   metFORMIN (GLUCOPHAGE) 500 MG tablet Take 1 tablet by mouth daily (with breakfast) 6/10/20 11/11/20  WINDY Sotomayor NP   glycopyrrolate-formoterol (BEVESPI AEROSPHERE) 9-4.8 MCG/ACT AERO Inhale 2 puffs into the lungs 2 times daily 6/3/20   Eagle Huerta MD potassium chloride (MICRO-K) 10 MEQ extended release capsule Take 10 mEq by mouth daily    Historical Provider, MD   metoprolol tartrate (LOPRESSOR) 25 MG tablet Take 0.5 tablets by mouth 2 times daily 1/9/20   Lise Matthews MD   aspirin 81 MG chewable tablet Take 1 tablet by mouth daily 11/25/19   Rafe Prader, MD       PHYSICIAN CERTIFICATION    I certify that Marco Antonio Echeverria is expected to be hospitalized for greater than 2 midnights based on the above assessment and plan:     Current diagnosis and plan of management discussed with the patient  at the time of admission in lay language     Code status was discussed with patient  - Full code     Pain Assessment - tramadol PRN    Electronically signed by Bacilio Garcia MD on 1/13/2021 at 12:51 AM

## 2021-01-13 NOTE — PROGRESS NOTES
[facility] arrived to inpatient unit and stated they cannot take the patient to [facility] because he is over the weight limit for one crew and we will have to wait until midnight for an additional crew. The patient is hooked back up to the monitor . I spoke with Axel Pickett from [facility] and he stated the patient will be picked up around midnight.      Katarina Perez  9:10 PM

## 2021-01-13 NOTE — PROGRESS NOTES
Patient's room number is changed from 2003 bed A to 2016. All questions answered by this RN to [facility] RN.      Ld Gomez  9:24 PM

## 2021-01-13 NOTE — PROGRESS NOTES
Saroj Giordano is here with four people to take the patient to 2016 [facility]. This RN updated team that the room number changed from 2003 A to 2016. This RN called Isabel GALLARDO the assigned nurse at [facility] to relay that Kenisha Meyers are on the way. All belongings are packed up in two bags. Patient's wallet , cell phone, glasses are also in a small separate bag inside the large bag, along with clothes and shoes.      Genice Bending  11:39 PM

## 2021-01-14 ENCOUNTER — APPOINTMENT (OUTPATIENT)
Dept: GENERAL RADIOLOGY | Age: 76
DRG: 870 | End: 2021-01-14
Attending: INTERNAL MEDICINE
Payer: MEDICARE

## 2021-01-14 LAB
ALBUMIN SERPL-MCNC: 2.6 GM/DL (ref 3.4–5)
ALP BLD-CCNC: 67 IU/L (ref 40–129)
ALT SERPL-CCNC: 20 U/L (ref 10–40)
ANION GAP SERPL CALCULATED.3IONS-SCNC: 14 MMOL/L (ref 4–16)
AST SERPL-CCNC: 38 IU/L (ref 15–37)
BASE EXCESS: 3 (ref 0–3.3)
BASE EXCESS: 4 (ref 0–3.3)
BILIRUB SERPL-MCNC: 0.4 MG/DL (ref 0–1)
BILIRUBIN DIRECT: 0.3 MG/DL (ref 0–0.3)
BILIRUBIN, INDIRECT: 0.1 MG/DL (ref 0–0.7)
BUN BLDV-MCNC: 36 MG/DL (ref 6–23)
CALCIUM SERPL-MCNC: 7.2 MG/DL (ref 8.3–10.6)
CARBON MONOXIDE, BLOOD: 1.6 % (ref 0–5)
CARBON MONOXIDE, BLOOD: 1.9 % (ref 0–5)
CHLORIDE BLD-SCNC: 105 MMOL/L (ref 99–110)
CO2 CONTENT: 21.6 MMOL/L (ref 19–24)
CO2 CONTENT: 25.7 MMOL/L (ref 19–24)
CO2: 19 MMOL/L (ref 21–32)
COMMENT: ABNORMAL
COMMENT: ABNORMAL
CREAT SERPL-MCNC: 1.4 MG/DL (ref 0.9–1.3)
D DIMER: 455 NG/ML(DDU)
DOSE AMOUNT: NORMAL
DOSE TIME: NORMAL
ERYTHROCYTE SEDIMENTATION RATE: 30 MM/HR (ref 0–20)
GFR AFRICAN AMERICAN: 60 ML/MIN/1.73M2
GFR NON-AFRICAN AMERICAN: 50 ML/MIN/1.73M2
GLUCOSE BLD-MCNC: 289 MG/DL (ref 70–99)
GLUCOSE BLD-MCNC: 294 MG/DL (ref 70–99)
GLUCOSE BLD-MCNC: 340 MG/DL (ref 70–99)
GLUCOSE BLD-MCNC: 346 MG/DL (ref 70–99)
HCO3 ARTERIAL: 20.6 MMOL/L (ref 18–23)
HCO3 ARTERIAL: 24.2 MMOL/L (ref 18–23)
HCT VFR BLD CALC: 36.4 % (ref 42–52)
HCT VFR BLD CALC: 36.4 % (ref 42–52)
HEMOGLOBIN: 11.8 GM/DL (ref 13.5–18)
HEMOGLOBIN: 11.8 GM/DL (ref 13.5–18)
HIGH SENSITIVE C-REACTIVE PROTEIN: 143.2 MG/L
INR BLD: 1.96 INDEX
MAGNESIUM: 1.6 MG/DL (ref 1.8–2.4)
MCH RBC QN AUTO: 29.1 PG (ref 27–31)
MCH RBC QN AUTO: 29.6 PG (ref 27–31)
MCHC RBC AUTO-ENTMCNC: 32.4 % (ref 32–36)
MCHC RBC AUTO-ENTMCNC: 32.4 % (ref 32–36)
MCV RBC AUTO: 89.7 FL (ref 78–100)
MCV RBC AUTO: 91.5 FL (ref 78–100)
METHEMOGLOBIN ARTERIAL: 1 %
METHEMOGLOBIN ARTERIAL: 1.8 %
O2 SATURATION: 88.4 % (ref 96–97)
O2 SATURATION: 96.4 % (ref 96–97)
PCO2 ARTERIAL: 34 MMHG (ref 32–45)
PCO2 ARTERIAL: 48 MMHG (ref 32–45)
PDW BLD-RTO: 18 % (ref 11.7–14.9)
PDW BLD-RTO: 18.1 % (ref 11.7–14.9)
PH BLOOD: 7.31 (ref 7.34–7.45)
PH BLOOD: 7.39 (ref 7.34–7.45)
PHOSPHORUS: 2.8 MG/DL (ref 2.5–4.9)
PLATELET # BLD: 229 K/CU MM (ref 140–440)
PLATELET # BLD: 238 K/CU MM (ref 140–440)
PMV BLD AUTO: 12.6 FL (ref 7.5–11.1)
PMV BLD AUTO: 12.9 FL (ref 7.5–11.1)
PO2 ARTERIAL: 148 MMHG (ref 75–100)
PO2 ARTERIAL: 56 MMHG (ref 75–100)
POTASSIUM SERPL-SCNC: 4 MMOL/L (ref 3.5–5.1)
PRO-BNP: 5973 PG/ML
PROCALCITONIN: 0.62
PROTHROMBIN TIME: 23.9 SECONDS (ref 11.7–14.5)
RBC # BLD: 3.98 M/CU MM (ref 4.6–6.2)
RBC # BLD: 4.06 M/CU MM (ref 4.6–6.2)
SODIUM BLD-SCNC: 138 MMOL/L (ref 135–145)
TOTAL PROTEIN: 6.2 GM/DL (ref 6.4–8.2)
VANCOMYCIN RANDOM: 16 UG/ML
WBC # BLD: 10.6 K/CU MM (ref 4–10.5)
WBC # BLD: 10.8 K/CU MM (ref 4–10.5)

## 2021-01-14 PROCEDURE — 2700000000 HC OXYGEN THERAPY PER DAY

## 2021-01-14 PROCEDURE — 84145 PROCALCITONIN (PCT): CPT

## 2021-01-14 PROCEDURE — 94761 N-INVAS EAR/PLS OXIMETRY MLT: CPT

## 2021-01-14 PROCEDURE — 6370000000 HC RX 637 (ALT 250 FOR IP): Performed by: INTERNAL MEDICINE

## 2021-01-14 PROCEDURE — 86901 BLOOD TYPING SEROLOGIC RH(D): CPT

## 2021-01-14 PROCEDURE — 6370000000 HC RX 637 (ALT 250 FOR IP): Performed by: NURSE PRACTITIONER

## 2021-01-14 PROCEDURE — 2500000003 HC RX 250 WO HCPCS: Performed by: NURSE PRACTITIONER

## 2021-01-14 PROCEDURE — 85027 COMPLETE CBC AUTOMATED: CPT

## 2021-01-14 PROCEDURE — 6360000002 HC RX W HCPCS: Performed by: NURSE PRACTITIONER

## 2021-01-14 PROCEDURE — 02HV33Z INSERTION OF INFUSION DEVICE INTO SUPERIOR VENA CAVA, PERCUTANEOUS APPROACH: ICD-10-PCS | Performed by: STUDENT IN AN ORGANIZED HEALTH CARE EDUCATION/TRAINING PROGRAM

## 2021-01-14 PROCEDURE — 82962 GLUCOSE BLOOD TEST: CPT

## 2021-01-14 PROCEDURE — 85652 RBC SED RATE AUTOMATED: CPT

## 2021-01-14 PROCEDURE — 80053 COMPREHEN METABOLIC PANEL: CPT

## 2021-01-14 PROCEDURE — 86141 C-REACTIVE PROTEIN HS: CPT

## 2021-01-14 PROCEDURE — 86850 RBC ANTIBODY SCREEN: CPT

## 2021-01-14 PROCEDURE — 2500000003 HC RX 250 WO HCPCS

## 2021-01-14 PROCEDURE — 2580000003 HC RX 258: Performed by: INTERNAL MEDICINE

## 2021-01-14 PROCEDURE — 6360000002 HC RX W HCPCS: Performed by: INTERNAL MEDICINE

## 2021-01-14 PROCEDURE — 31500 INSERT EMERGENCY AIRWAY: CPT

## 2021-01-14 PROCEDURE — 80202 ASSAY OF VANCOMYCIN: CPT

## 2021-01-14 PROCEDURE — 94002 VENT MGMT INPAT INIT DAY: CPT

## 2021-01-14 PROCEDURE — 2000000000 HC ICU R&B

## 2021-01-14 PROCEDURE — 2500000003 HC RX 250 WO HCPCS: Performed by: INTERNAL MEDICINE

## 2021-01-14 PROCEDURE — 2580000003 HC RX 258: Performed by: NURSE PRACTITIONER

## 2021-01-14 PROCEDURE — 82803 BLOOD GASES ANY COMBINATION: CPT

## 2021-01-14 PROCEDURE — 83880 ASSAY OF NATRIURETIC PEPTIDE: CPT

## 2021-01-14 PROCEDURE — 82248 BILIRUBIN DIRECT: CPT

## 2021-01-14 PROCEDURE — 83735 ASSAY OF MAGNESIUM: CPT

## 2021-01-14 PROCEDURE — 36556 INSERT NON-TUNNEL CV CATH: CPT

## 2021-01-14 PROCEDURE — 84100 ASSAY OF PHOSPHORUS: CPT

## 2021-01-14 PROCEDURE — 71045 X-RAY EXAM CHEST 1 VIEW: CPT

## 2021-01-14 PROCEDURE — 36620 INSERTION CATHETER ARTERY: CPT

## 2021-01-14 PROCEDURE — 86900 BLOOD TYPING SEROLOGIC ABO: CPT

## 2021-01-14 PROCEDURE — 85379 FIBRIN DEGRADATION QUANT: CPT

## 2021-01-14 PROCEDURE — 36600 WITHDRAWAL OF ARTERIAL BLOOD: CPT

## 2021-01-14 PROCEDURE — 85610 PROTHROMBIN TIME: CPT

## 2021-01-14 PROCEDURE — 89220 SPUTUM SPECIMEN COLLECTION: CPT

## 2021-01-14 PROCEDURE — 94640 AIRWAY INHALATION TREATMENT: CPT

## 2021-01-14 RX ORDER — WARFARIN SODIUM 5 MG/1
5 TABLET ORAL DAILY
Status: DISCONTINUED | OUTPATIENT
Start: 2021-01-14 | End: 2021-01-16

## 2021-01-14 RX ADMIN — INSULIN LISPRO 8 UNITS: 100 INJECTION, SOLUTION INTRAVENOUS; SUBCUTANEOUS at 09:37

## 2021-01-14 RX ADMIN — DICLOFENAC SODIUM 2 G: 10 GEL TOPICAL at 09:23

## 2021-01-14 RX ADMIN — CHLORHEXIDINE GLUCONATE 0.12% ORAL RINSE 15 ML: 1.2 LIQUID ORAL at 20:59

## 2021-01-14 RX ADMIN — PROPOFOL 40 MCG/KG/MIN: 10 INJECTION, EMULSION INTRAVENOUS at 11:30

## 2021-01-14 RX ADMIN — INSULIN LISPRO 6 UNITS: 100 INJECTION, SOLUTION INTRAVENOUS; SUBCUTANEOUS at 17:19

## 2021-01-14 RX ADMIN — Medication 2 PUFF: at 19:36

## 2021-01-14 RX ADMIN — Medication 175 MCG/HR: at 02:36

## 2021-01-14 RX ADMIN — DICLOFENAC SODIUM 2 G: 10 GEL TOPICAL at 01:16

## 2021-01-14 RX ADMIN — INSULIN LISPRO 8 UNITS: 100 INJECTION, SOLUTION INTRAVENOUS; SUBCUTANEOUS at 13:04

## 2021-01-14 RX ADMIN — DEXAMETHASONE SODIUM PHOSPHATE 6 MG: 10 INJECTION, SOLUTION INTRAMUSCULAR; INTRAVENOUS at 00:12

## 2021-01-14 RX ADMIN — SODIUM CHLORIDE, PRESERVATIVE FREE 10 ML: 5 INJECTION INTRAVENOUS at 21:02

## 2021-01-14 RX ADMIN — ESCITALOPRAM OXALATE 20 MG: 10 TABLET ORAL at 09:14

## 2021-01-14 RX ADMIN — ASPIRIN 81 MG CHEWABLE TABLET 81 MG: 81 TABLET CHEWABLE at 09:24

## 2021-01-14 RX ADMIN — PROPOFOL 80 MCG/KG/MIN: 10 INJECTION, EMULSION INTRAVENOUS at 03:28

## 2021-01-14 RX ADMIN — CHLORHEXIDINE GLUCONATE 0.12% ORAL RINSE 15 ML: 1.2 LIQUID ORAL at 09:22

## 2021-01-14 RX ADMIN — VANCOMYCIN HYDROCHLORIDE 1500 MG: 5 INJECTION, POWDER, LYOPHILIZED, FOR SOLUTION INTRAVENOUS at 21:01

## 2021-01-14 RX ADMIN — BUPROPION HYDROCHLORIDE 300 MG: 150 TABLET, FILM COATED, EXTENDED RELEASE ORAL at 09:14

## 2021-01-14 RX ADMIN — Medication 2 PUFF: at 07:34

## 2021-01-14 RX ADMIN — SODIUM CHLORIDE, PRESERVATIVE FREE 10 ML: 5 INJECTION INTRAVENOUS at 09:23

## 2021-01-14 RX ADMIN — REMDESIVIR 100 MG: 100 INJECTION, POWDER, LYOPHILIZED, FOR SOLUTION INTRAVENOUS at 01:11

## 2021-01-14 RX ADMIN — Medication 2 PUFF: at 11:52

## 2021-01-14 RX ADMIN — ALOGLIPTIN 12.5 MG: 12.5 TABLET, FILM COATED ORAL at 09:15

## 2021-01-14 RX ADMIN — POLYVINYL ALCOHOL 1 DROP: 14 SOLUTION/ DROPS OPHTHALMIC at 17:06

## 2021-01-14 RX ADMIN — PROPOFOL 40 MCG/KG/MIN: 10 INJECTION, EMULSION INTRAVENOUS at 06:37

## 2021-01-14 RX ADMIN — REMDESIVIR 100 MG: 100 INJECTION, POWDER, LYOPHILIZED, FOR SOLUTION INTRAVENOUS at 21:20

## 2021-01-14 RX ADMIN — ATORVASTATIN CALCIUM 40 MG: 40 TABLET, FILM COATED ORAL at 21:00

## 2021-01-14 RX ADMIN — ENOXAPARIN SODIUM 135 MG: 150 INJECTION SUBCUTANEOUS at 20:59

## 2021-01-14 RX ADMIN — MINERAL OIL AND PETROLATUM: 150; 830 OINTMENT OPHTHALMIC at 17:06

## 2021-01-14 RX ADMIN — ARIPIPRAZOLE 15 MG: 10 TABLET ORAL at 09:13

## 2021-01-14 RX ADMIN — POLYVINYL ALCOHOL 1 DROP: 14 SOLUTION/ DROPS OPHTHALMIC at 21:19

## 2021-01-14 RX ADMIN — FUROSEMIDE 20 MG: 20 TABLET ORAL at 09:14

## 2021-01-14 RX ADMIN — Medication 2 PUFF: at 15:37

## 2021-01-14 RX ADMIN — Medication 175 MCG/HR: at 18:30

## 2021-01-14 RX ADMIN — PROPOFOL 40 MCG/KG/MIN: 10 INJECTION, EMULSION INTRAVENOUS at 14:41

## 2021-01-14 RX ADMIN — PROPOFOL 40 MCG/KG/MIN: 10 INJECTION, EMULSION INTRAVENOUS at 20:59

## 2021-01-14 RX ADMIN — ALBUTEROL SULFATE 2 PUFF: 90 AEROSOL, METERED RESPIRATORY (INHALATION) at 11:52

## 2021-01-14 RX ADMIN — MIDAZOLAM HYDROCHLORIDE 2 MG/HR: 5 INJECTION, SOLUTION INTRAMUSCULAR; INTRAVENOUS at 05:25

## 2021-01-14 RX ADMIN — POLYVINYL ALCOHOL 1 DROP: 14 SOLUTION/ DROPS OPHTHALMIC at 14:42

## 2021-01-14 RX ADMIN — ENOXAPARIN SODIUM 135 MG: 150 INJECTION SUBCUTANEOUS at 09:22

## 2021-01-14 RX ADMIN — POLYVINYL ALCOHOL 1 DROP: 14 SOLUTION/ DROPS OPHTHALMIC at 09:24

## 2021-01-14 RX ADMIN — ALBUTEROL SULFATE 2 PUFF: 90 AEROSOL, METERED RESPIRATORY (INHALATION) at 15:36

## 2021-01-14 RX ADMIN — ZINC SULFATE 220 MG (50 MG) CAPSULE 50 MG: CAPSULE at 09:16

## 2021-01-14 RX ADMIN — FUROSEMIDE 20 MG: 20 TABLET ORAL at 21:00

## 2021-01-14 RX ADMIN — MINERAL OIL AND PETROLATUM: 150; 830 OINTMENT OPHTHALMIC at 02:52

## 2021-01-14 RX ADMIN — ALBUTEROL SULFATE 2 PUFF: 90 AEROSOL, METERED RESPIRATORY (INHALATION) at 19:36

## 2021-01-14 RX ADMIN — FAMOTIDINE 20 MG: 10 INJECTION, SOLUTION INTRAVENOUS at 20:59

## 2021-01-14 RX ADMIN — METOPROLOL TARTRATE 12.5 MG: 25 TABLET, FILM COATED ORAL at 09:16

## 2021-01-14 RX ADMIN — VANCOMYCIN HYDROCHLORIDE 2000 MG: 1 INJECTION, POWDER, LYOPHILIZED, FOR SOLUTION INTRAVENOUS at 01:19

## 2021-01-14 RX ADMIN — PROPOFOL 80 MCG/KG/MIN: 10 INJECTION, EMULSION INTRAVENOUS at 05:01

## 2021-01-14 RX ADMIN — WARFARIN SODIUM 5 MG: 5 TABLET ORAL at 17:15

## 2021-01-14 RX ADMIN — POLYVINYL ALCOHOL 1 DROP: 14 SOLUTION/ DROPS OPHTHALMIC at 06:38

## 2021-01-14 RX ADMIN — ALBUTEROL SULFATE 2 PUFF: 90 AEROSOL, METERED RESPIRATORY (INHALATION) at 07:33

## 2021-01-14 RX ADMIN — DICLOFENAC SODIUM 2 G: 10 GEL TOPICAL at 21:19

## 2021-01-14 RX ADMIN — PROPOFOL 80 MCG/KG/MIN: 10 INJECTION, EMULSION INTRAVENOUS at 00:09

## 2021-01-14 RX ADMIN — PROPOFOL 40 MCG/KG/MIN: 10 INJECTION, EMULSION INTRAVENOUS at 17:52

## 2021-01-14 RX ADMIN — MINERAL OIL AND PETROLATUM: 150; 830 OINTMENT OPHTHALMIC at 14:42

## 2021-01-14 RX ADMIN — MINERAL OIL AND PETROLATUM: 150; 830 OINTMENT OPHTHALMIC at 09:24

## 2021-01-14 RX ADMIN — PROPOFOL 40 MCG/KG/MIN: 10 INJECTION, EMULSION INTRAVENOUS at 08:28

## 2021-01-14 RX ADMIN — FAMOTIDINE 20 MG: 10 INJECTION, SOLUTION INTRAVENOUS at 09:22

## 2021-01-14 RX ADMIN — Medication 200 MCG/HR: at 08:17

## 2021-01-14 RX ADMIN — POLYVINYL ALCOHOL 1 DROP: 14 SOLUTION/ DROPS OPHTHALMIC at 01:17

## 2021-01-14 ASSESSMENT — PULMONARY FUNCTION TESTS
PIF_VALUE: 35
PIF_VALUE: 30
PIF_VALUE: 30
PIF_VALUE: 35
PIF_VALUE: 31
PIF_VALUE: 31
PIF_VALUE: 30

## 2021-01-14 NOTE — PROGRESS NOTES
Pt 02 decreasing to 60's while cleaning pt. Turned Bipap to 100%. Pt took extended amount of time to get back above 90% 02 sat. Pt is agreeable to being intubated. Sts he does not need to speak with daughter before intubation. Daughter, Jorge Tompkins is emergency contact in chart, per pt.

## 2021-01-14 NOTE — PROGRESS NOTES
Call initiated by: Nursing staff: Melina  Call addressed around: 1/13/2021 8:30 PM       Patient assessed at bedside. He is hypoxic in 80s, on bipap at 100% FIO2. He is using his accessary muscles of respiration and reports being tired. RR in 45s. No tachycardia. ABGs with normal PH and PCO2. PO2 57, SPO2 of 89%. The patient is a full code. Will intubate for worsening respiratory failure and impending respiratory collapsed. I discussed intubation with the patient and he agreed for intubation. Patient alert and oriented at time of this discussion. Orders placed: Stat intubation.         Teo Rehman, APRN - CNP

## 2021-01-14 NOTE — PROGRESS NOTES
Comprehensive Nutrition Assessment    Type and Reason for Visit:  Initial, Wound, new vent    Nutrition Recommendations/Plan:   Recommend initiating EN via NG if pt remains intubated  Please document dietitian for EN order and manage  Will monitor nutrition status, ability to initiate nutrition, poc    Nutrition Assessment:  Pt admitted for pneumonia due to covid-19, PMH: DM, HTN,CKD, pt now sedated on vent, +Levophed, MAP >65, +wounds, pt at high nutrition risk    Malnutrition Assessment:  Malnutrition Status: At risk for malnutrition (Comment)    Context:  Acute Illness       Estimated Daily Nutrient Needs:  Energy (kcal):  2571([facility] 2010); Weight Used for Energy Requirements:  Current     Protein (g):  190(2.0 g/kg);  Weight Used for Protein Requirements:  Ideal          Nutrition Related Findings:  Labs; Glucose 346, .2      Wounds:  Pressure Injury, Stage II, Venous Stasis, Multiple       Current Nutrition Therapies:    No diet orders on file  Additional Calorie Sources:   pt is receiving ~882 kcal from current propofol rate    Anthropometric Measures:  · Height: 6' 5.01\" (195.6 cm)  · Current Body Weight: 307 lb 1.6 oz (139.3 kg)   · Ideal Body Weight: 208 lbs; % Ideal Body Weight 147.6 %   · BMI: 36.4  · BMI Categories: Obese Class 2 (BMI 35.0 -39.9)       Nutrition Diagnosis:   · Inadequate oral intake related to impaired respiratory function as evidenced by NPO or clear liquid status due to medical condition    Nutrition Interventions:   Food and/or Nutrient Delivery:  Start Tube Feeding  Nutrition Education/Counseling:  No recommendation at this time   Coordination of Nutrition Care:  Continue to monitor while inpatient    Goals:  pt will have nutrition source within the next 24-48 hr       Nutrition Monitoring and Evaluation:   Behavioral-Environmental Outcomes:  None Identified   Food/Nutrient Intake Outcomes:  Enteral Nutrition Intake/Tolerance

## 2021-01-14 NOTE — PROGRESS NOTES
Called daughter at 37 to update her on her father's condition. Left a brief message for her to call me at the earliest convenience.

## 2021-01-14 NOTE — PROGRESS NOTES
Pulmonary and Critical Care  Progress Note    Subjective: The patient deteriorated and was placed on vent.   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, Anemia, Arm fracture, Arthritis, Cataract, Cataract, Cellulitis of left lower leg, Chronic venous hypertension with ulcer ([facility] [address] CKD (chronic kidney disease), Colon polyps, COPD (chronic obstructive pulmonary disease) ([facility] [address] Diabetes mellitus ([facility] [address] Diabetes mellitus ([facility] [address] Diabetes mellitus with peripheral circulatory disorder ([facility] [address] Diabetes mellitus with skin ulcer ([facility] [address] Diabetic peripheral neuropathy ([facility] [address] Diabetic skin ulcer associated with type 2 diabetes mellitus ([facility] [address] Diverticulosis, Femoral DVT (deep venous thrombosis) ([facility] [address] GERD (gastroesophageal reflux disease), Glaucoma, H/O cardiac catheterization, H/O Doppler ultrasound, H/O echocardiogram, H/O mumps orchitis, Hemorrhoids, History of nuclear stress test, HLD (hyperlipidemia), Hx of Doppler ultrasound, Hyperlipemia, Hyperlipidemia, Hypertension, Hypertension, Idiopathic chronic venous hypertension of left lower extremity with ulcer and inflammation (HCC), Leg ulcer ([address] No diabetic retinopathy OU, Non-pressure chronic ulcer of left lower leg with fat layer exposed ([facility] [address] Pulmonary embolism ([facility] [address] Sleep apnea, SOB (shortness of breath), Tendinitis, Type II or unspecified type diabetes mellitus with other specified manifestations, not stated as uncontrolled, Unspecified venous (peripheral) insufficiency, Urticaria, WD-Cellulitis of right anterior lower leg, WD-Cellulitis of right lower extremity, WD-Chronic venous hypertension (idiopathic) with ulcer of left lower extremity (CODE) ([facility] [address] WD-Chronic venous hypertension with inflammation, right, WD-Decubitus ulcer of left buttock, stage 3 ([address] 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 to breakdown of skin ([address] WD-Open wound of hand without complication, left, initial encounter, WD-Pressure injury of left buttock, stage 2 ([address] WD-Pressure injury of left buttock, stage 3 ([address] WD-Pressure injury of right buttock, stage 3 (HCC), WD-Skin tear of right forearm without complication, and WD-Ulcer of right pretibial region, with fat layer exposed ([facility] [address] has a past surgical history that includes Tonsillectomy (1953); Splenectomy (1958); Breast surgery (1970s); hernia repair (1970s); Skin graft (1978-present); Cataract removal (Right, 3/11/2013); Cataract removal (Left, 2/25/2013); Varicose vein surgery (Left, 2009); Carpal tunnel release (Left, 1993); Cardiac catheterization (6/3/14); Colonoscopy (2006); Colonoscopy (11/21/11); Colonoscopy (4/23/12); Colonoscopy (08/04/2016); Splenectomy; hernia repair; and Carpal tunnel release. reports that he quit smoking about 28 years ago. His smoking use included cigarettes. He has a 70.00 pack-year smoking history. He has never used smokeless tobacco. He reports current alcohol use. He reports that he does not use drugs. Family history:  family history includes Cancer in his brother and father; Diabetes in his brother; Heart Disease in his father; High Blood Pressure in his father; High Cholesterol in his father; Thyroid Disease in his brother.     Allergies   Allergen Reactions    Cavilon Durable Barrier [Mineral Oil-Dimeth-Coconut Oil]     Gentamycin [Gentamicin] Hives    Parabens Hives    Parabens Hives    Prinivil [Lisinopril] Swelling    Cortisone Rash    Cortisone Rash    Dilaudid [Hydromorphone Hcl] Rash    Penicillins Rash    Penicillins Rash    Sulfamethoxazole-Trimethoprim Nausea Only    Tape [place] Tape] Rash     Social History:    Reviewed; no changes    Objective:   PHYSICAL EXAM:        VITALS:  /66   Pulse 64   Temp 97.9 °F (36.6 °C) (Axillary)   Resp 16   Ht 6' 5.01\" (1.956 m)   Wt (!) 307 lb 1.6 oz (139.3 kg)   SpO2 100%   BMI 36.41 kg/m²     24HR INTAKE/OUTPUT:      Intake/Output Summary (Last 24 hours) at 1/14/2021 1129  Last data filed at 1/14/2021 0958  Gross per 24 hour   Intake 480 ml   Output 2350 ml Net -1870 ml       CONSTITUTIONAL:  Somnolent. LUNGS:  decreased breath sounds, basilar crackles. CARDIOVASCULAR:  normal S1 and S2 and negative JVD  ABD:Abdomen soft, non-tender. BS normal. No masses,  No organomegaly  NEURO:Sedated on vent. DATA:    CBC:  Recent Labs     01/12/21  0845 01/13/21  0600 01/14/21  0800   WBC 6.4 5.9 10.6*  10.8*   RBC 3.80* 4.00* 3.98*  4.06*   HGB 10.9* 11.6* 11.8*  11.8*   HCT 34.4* 35.8* 36.4*  36.4*    195 238  229   MCV 90.5 89.5 91.5  89.7   MCH 28.7 29.0 29.6  29.1   MCHC 31.7* 32.4 32.4  32.4   RDW 17.5* 17.6* 18.1*  18.0*      BMP:  Recent Labs     01/12/21  0845 01/13/21  0600 01/14/21  0800   * 136 138   K 3.7 4.0 4.0    104 105   CO2 28 22 19*   BUN 25* 26* 36*   CREATININE 1.4* 1.2 1.4*   CALCIUM 7.5* 7.3* 7.2*   GLUCOSE 129* 219* 346*      ABG:  Recent Labs     01/13/21 2030 01/14/21  0030 01/14/21  0800   PH 7.36 7.31* 7.39   PO2ART 57* 56* 148*   DBT5GPU 42.0 48.0* 34.0   O2SAT 89.2* 88.4* 96.4     Lab Results   Component Value Date    PROBNP 5,973 (H) 01/14/2021    PROBNP 1,642 (H) 01/12/2021    PROBNP 1,196 (H) 11/11/2020     No results found for: CULTRESP    Radiology Review:  Pertinent images / reports were reviewed as a part of this visit.     Assessment:     Patient Active Problem List   Diagnosis    Gastroesophageal reflux disease without esophagitis    Hypertension in stage 3 chronic kidney disease due to type 2 diabetes mellitus [facility]    DM (diabetes mellitus) type II, controlled, with peripheral vascular disorder [facility]    Combined hyperlipidemia associated with type 2 diabetes mellitus ([facility]    Diabetic skin ulcer associated with type 2 diabetes mellitus (HCC)    CKD (chronic kidney disease)    History of DVT (deep vein thrombosis)- left femoral    History of pulmonary embolism    Long term current use of anticoagulants with INR goal of 2.0-3.0    COPD (chronic obstructive pulmonary disease) ([facility]  Severe recurrent major depressive disorder with psychotic features ([address]    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)    Venous (peripheral) insufficiency    Uncontrolled secondary diabetes mellitus with stage 3 CKD (GFR 30-59) (HCC)    Diabetes mellitus with skin ulcer ([address]    WD-Ulcer of shin, left, with fat layer exposed ([address]    History of colon polyps    Personal history of PE (pulmonary embolism)    WD-Chronic venous hypertension (idiopathic) with ulcer of left lower extremity (CODE) ([facility] [facility]    WD-Chronic venous hypertension with inflammation, right    Troponin I above reference range    KIRSTEN (acute kidney injury) ([facility]    Cellulitis of lower extremity    Hyponatremia    Generalized weakness    Weakness    Multifocal pneumonia    Pneumonia due to COVID-19 virus       Plan:   1. Overall the patient has worsened. 2. Cont full vent support. 3. Wean FiO2.     Gus Hercules MD  1/14/2021  11:29 AM

## 2021-01-14 NOTE — PROCEDURES
[address] [facility] VENOUS CATHETER PROCEDURE                                                                   Name:  Bennett Prost: [unfilled]  /Age/Sex: 1945  (77 y.o. male) Room/Bed:   CSN: [id number] Admission Date/Time: 2021 12:17 AM  MRN: [id number] Attending: Niko Ayala MD    PRE-PROCEDURE    INDICATION: Central Venous Access  LATERALITY: left  : Sarah Adams DO   CONSENT: Unable to be obtained due to patient's condition. PROCEDURE    Appropriate monitoring equipment such as telemetry and pulse oximetry was in place during the procedure. The skin over the vein was prepped with chlorhexidine and draped in a sterile fashion. Local anesthesia was obtained by infiltration using 1% Lidocaine without epinephrine. The vessel was non-pulsatile and easily compressible on ultrasound. A 20 cm triple lumen catheter was then inserted into the center of the vessel using a modified Seldinger technique and advanced to a depth of 18cm. Return of non-pulsatile venous blood was observed. Flow was easily aspirated from each of the catheter lumens and then filled with sterile normal saline. The line was securely fastened to the skin with sutures. Then the site was sterilely dressed using an antimicrobial Bio-patch and Tegaderm adhesive bandage. POST-PROCEDURE    The patient tolerated the procedure well.     CHEST X-RAY: Pending  COMPLICATIONS: None  ESTIMATED BLOOD LOSS: Minimal.      Sarah Adams DO 2021 1:50 PM none

## 2021-01-14 NOTE — PROGRESS NOTES
Hospitalist Progress Note      Name:  Anna Bajwa /Age/Sex: 1945  [de-identified]77 y.o. male)   MRN & CSN:  [id number] & [id number] Admission Date/Time: 2021 12:17 AM   Location:  -A PCP: Varghese Ugarte MD       Hospital Day: 2    Hospital Course:   Anna Bajwa is a 77 y.o. male who presented to the ED at [facility] on  originally with progressive weakness in the legs and bilateral leg pain. Patient later developed acute hypoxic respiratory failure presumed secondary to multifocal pneumonia on  and tested positive for Covid-19. Patient was later transferred to [facility] for further management. Chest x-ray at the time showed multifocal infiltrates. Patient's baseline at home is 3 L of O2. Patient was placed on BiPAP after reduced O2 stat on NC. Patient respiratory status continued to worsen and is now () intubated and placed on ventilator support. Assessment and Plan:     Acute respiratory failure with hypoxia secondary to COVID-19 viral pneumonia infection  SIRS criteria met at time of admission secondary to sepsis from viral pneumonia  COVID-19 positive: 21  IV antibiotics, IV steroids initiated on 21  Convalescent plasma given: 21  Remdesivir: 21  Oxygen requirement today: now on ventilator   DM type 2 - continue insulin sliding scale   HTN   Hyperlipidemia   Morbid Obesity   ?  Cellulitis of RLE - continue IV antibiotics       proning as able as he seems to prefer this positioning  pulm on vent management    Diet No diet orders on file   DVT Prophylaxis [x] Lovenox, []  Heparin, [] SCDs, []No VTE prophylaxis, patient ambulating   GI Prophylaxis [] PPI, [x] H2 Blocker, [] No GI prophylaxis, patient is receiving diet/Tube Feeds   Code Status Full Code   Disposition ICU due to vent dependency  Prognosis: guarded    MDM [] Low, [] Moderate,[x]  High  Patient's risk as above due to [x] One or more chronic illnesses with severe exacerbation or progression    [x] Acute or chronic illnesses or injuries that pose a threat to life or bodily function    [] An abrupt change in neurological status    [] Decision not to resuscitate     [x] Drug therapy requiring intensive monitoring for toxicity      Subjective:     Pt S&E. Sedated and is now on ventilator   Ventilation improved when proned  No fevers reported    No ROS as he is intubated and sedated. Objective: Intake/Output Summary (Last 24 hours) at 1/14/2021 1427  Last data filed at 1/14/2021 0958  Gross per 24 hour   Intake 240 ml   Output 2350 ml   Net -2110 ml      Vitals:   Vitals:    01/14/21 1217   BP: (!) 138/57   Pulse: 62   Resp: 16   Temp:    SpO2: 96%     Physical Exam:    GEN sedated male, laying in bed in no apparent distress. EYES Pupils are equally round. No scleral erythema, discharge, or conjunctivitis. HENT Mucous membranes are moist. +ETT +NGT  NECK No apparent thyromegaly or masses. RESP +rales or rhonchi. Symmetric chest movement while on invasive mechanical oxygen support  CARDIO/VASC S1/S2 auscultated. Regular rate without appreciable murmurs, rubs, or gallops. Peripheral pulses equal bilaterally and palpable. +1 pitting edema in extremities   GI Abdomen is soft without significant tenderness, masses, or guarding. Bowel sounds are normoactive. Rectal exam deferred.  Starkey catheter is present. MSK No gross joint deformities. No spontaneous movement noted  SKIN Normal coloration, warm, dry. NEURO Does not follow directions. No response to verbal or painful stimuli  PSYCH Sedated on propofol.      Medications:   Medications:    warfarin  5 mg Oral Daily    alogliptin  12.5 mg Oral Daily    ARIPiprazole  15 mg Oral Daily    aspirin  81 mg Oral Daily    atorvastatin  40 mg Oral Nightly    buPROPion  300 mg Oral QAM    escitalopram  20 mg Oral Daily    furosemide  20 mg Oral BID  metoprolol tartrate  12.5 mg Oral BID    sodium chloride flush  10 mL Intravenous 2 times per day    insulin lispro  0-12 Units Subcutaneous TID WC    insulin lispro  0-6 Units Subcutaneous Nightly    enoxaparin  1 mg/kg Subcutaneous BID    diclofenac sodium  2 g Topical BID    dexamethasone  6 mg Intravenous Q24H    remdesivir IVPB  100 mg Intravenous Q24H    vancomycin (VANCOCIN) intermittent dosing (placeholder)   Other RX Placeholder    zinc sulfate  50 mg Oral Daily    albuterol sulfate HFA  2 puff Inhalation 4x daily    ipratropium  2 puff Inhalation 4x daily    chlorhexidine  15 mL Mouth/Throat BID    famotidine (PEPCID) injection  20 mg Intravenous BID    polyvinyl alcohol  1 drop Both Eyes Q4H    And    artificial tears   Both Eyes Q4H      Infusions:    midazolam 2 mg/hr (01/14/21 0525)    fentanyl 200 mcg/hr (01/14/21 0824)    dextrose      sodium chloride      propofol 40 mcg/kg/min (01/14/21 1130)    norepinephrine 10 mcg/min (01/14/21 0641)     PRN Meds:     sodium chloride flush, 10 mL, PRN      promethazine, 12.5 mg, Q6H PRN    Or      ondansetron, 4 mg, Q6H PRN      polyethylene glycol, 17 g, Daily PRN      acetaminophen, 650 mg, Q6H PRN    Or      acetaminophen, 650 mg, Q6H PRN      glucose, 15 g, PRN      dextrose, 12.5 g, PRN      glucagon (rDNA), 1 mg, PRN      dextrose, 100 mL/hr, PRN      traMADol, 50 mg, Q6H PRN    Or      traMADol, 100 mg, Q6H PRN      diphenhydrAMINE, 25 mg, Q6H PRN      sodium chloride, , PRN      sodium chloride, 30 mL, PRN          Electronically signed by Mei Gooden DO on 1/14/2021 at 2:27 PM

## 2021-01-14 NOTE — ANESTHESIA PROCEDURE NOTES
Airway  Date/Time: 1/13/2021 9:15 PM  Urgency: emergent    Difficult airway    General Information and Staff    Patient location during procedure: ICU  Anesthesiologist: Svetlana Ríos MD  Resident/CRNA: WINDY Yung CRNA  Performed: resident/CRNA     Consent for Airway (if performed for an anesthetic, see related documentation for consents)  Patient identity confirmed: per hospital policy  Consent: Verbal consent obtained.   Consent given by: patient      Code status verified:yes  Indications and Patient Condition  Indications for airway management: hypoxemia and respiratory failure  Spontaneous ventilation: present  Sedation level: deep  Preoxygenated: yes  Patient position: sniffing  MILS maintained throughout  Mask difficulty assessment: vent by bag mask    Final Airway Details  Final airway type: endotracheal airway      Successful airway: ETT  Cuffed: yes   Successful intubation technique: video laryngoscopy  Endotracheal tube insertion site: oral  ETT size (mm): 7.5  Cormack-Lehane Classification: grade I - full view of glottis  Placement verified by: chest auscultation and capnometry   Measured from: gums  ETT to gums (cm): 24  Number of attempts at approach: 1  Ventilation between attempts: bag mask

## 2021-01-14 NOTE — PROGRESS NOTES
Updated patient's daughter, Dony Alberts, on patient's condition and plan of care. She verbalized understanding of the condition of the patient. Will continue to monitor.

## 2021-01-14 NOTE — PROGRESS NOTES
PHARMACY ANTICOAGULATION MONITORING SERVICE    Rubia Pacheco is a 77 y.o. male on warfarin therapy for history of DVT. Pharmacy consulted by Dr. Chester Gutierrez for monitoring and adjustment of treatment. Indication for anticoagulation: Hx of DVT  INR goal: 2-3  Warfarin dose prior to admission: 5 mg daily    Pertinent Laboratory Values   Recent Labs     01/12/21  0845 01/13/21  0600 01/14/21  0800   INR  --  1.63 1.96   HGB 10.9* 11.6* 11.8*  11.8*   HCT 34.4* 35.8* 36.4*  36.4*    195 238  229       Assessment/Plan:  ? Possible DDI's:   o Aspirin (home med), can increase bleeding risk, clinically appropriate  o Escitalopram (home med), can increase bleeding risk, appropriate to continue while inpatient  o Enoxaparin bridge (therapeutic), appropriate to continue until INR is therapeutic  o Tramadol and dexamethasone can increase effects of warfarin  ? INR just under goal @ 1.96  ? INR was sub-therapeutic upon admission on 1/9.  ? Patient has received boosted warfarin dose x3 days  o 1/11: warfarin 6 mg x1  o 1/12-13: warfarin 7 mg daily x2  ? INR trending up appropriately, and will likely be within therapeutic range tomorrow. ? Adjust dose back to home dose warfarin 5 mg daily  ? Further dose adjustments based on INR trends, interacting medications, and additional clinical factors. +THE PATIENT IS CURRENTLY TAKING BOTH WARFARIN AND ENOXAPARIN+ LOOK TO DISCONTINUE  ENOXAPARIN ONCE INR IS THERAPEUTIC     ? Pharmacy will continue to monitor and adjust warfarin therapy as indicated.     Thank you for the consult,  Hunter Norton, [address]  1/14/2021 2:04 PM

## 2021-01-14 NOTE — PROGRESS NOTES
Advanced ETT from 25cm to 27.5cm post xray stating it was just over 5cm above jose. Pt will be proned. Confirmation xray ordered.

## 2021-01-14 NOTE — PROGRESS NOTES
Spoke with daughter over the phone. Updated her on her father's condition. Explained the reason why her da had to be intubated. Daughter verbalized understanding and would call in the morning for updates. Will continue to monitor.

## 2021-01-14 NOTE — PROCEDURES
RADIAL ARTERIAL LINE PROCEDURE                                                                   Name:  Carol Zuñiga: [unfilled]  /Age/Sex: 1945  (77 y.o. male) Room/Bed:   [facility]: [id number] Admission Date/Time: 2021 12:17 AM  MRN: [id number] Attending: Jessica Gonzalez MD    PRE-PROCEDURE    INDICATIONS: severe hypotension, lab draws  LATERALITY: left  : Nora Delaney DO  CONSENT: Unable to be obtained due to patient's condition. BLAKE'S TEST: Normal    PROCEDURE    The skin over the artery was prepped with chlorhexidine and draped in a sterile fashion. Local anesthesia was obtained by infiltration using 1.5 cc of 1% Lidocaine without epinephrine. A catheter-over-wire arterial line Arrow device was used to pass a 20 gauge arterial line catheter into the vessel over a needle. Return of pulsatile arterial blood was observed. The transducer set was attached and securely fastened; the arterial line was then secured to the skin with sutures. The site was then sterilely dressed using an antimicrobial Bio-patch and Tegaderm adhesive bandage. POST-PROCEDURE    The patient tolerated the procedure well. Distal perfusion was unchanged. Wrist splint was placed by nursing staff.      WAVEFORM: Adequate  COMPLICATIONS: None    Nora Delaeny DO 2021 1:52 PM

## 2021-01-15 ENCOUNTER — APPOINTMENT (OUTPATIENT)
Dept: GENERAL RADIOLOGY | Age: 76
DRG: 870 | End: 2021-01-15
Attending: INTERNAL MEDICINE
Payer: MEDICARE

## 2021-01-15 LAB
BASE EXCESS: 2 (ref 0–3.3)
CARBON MONOXIDE, BLOOD: 1.8 % (ref 0–5)
CO2 CONTENT: 25 MMOL/L (ref 19–24)
COMMENT: ABNORMAL
GLUCOSE BLD-MCNC: 213 MG/DL (ref 70–99)
GLUCOSE BLD-MCNC: 248 MG/DL (ref 70–99)
GLUCOSE BLD-MCNC: 329 MG/DL (ref 70–99)
HCO3 ARTERIAL: 23.7 MMOL/L (ref 18–23)
METHEMOGLOBIN ARTERIAL: 1.3 %
O2 SATURATION: 94.2 % (ref 96–97)
PCO2 ARTERIAL: 42 MMHG (ref 32–45)
PH BLOOD: 7.36 (ref 7.34–7.45)
PO2 ARTERIAL: 78 MMHG (ref 75–100)

## 2021-01-15 PROCEDURE — 2580000003 HC RX 258: Performed by: NURSE PRACTITIONER

## 2021-01-15 PROCEDURE — 2500000003 HC RX 250 WO HCPCS: Performed by: NURSE PRACTITIONER

## 2021-01-15 PROCEDURE — 6360000002 HC RX W HCPCS: Performed by: NURSE PRACTITIONER

## 2021-01-15 PROCEDURE — 80202 ASSAY OF VANCOMYCIN: CPT

## 2021-01-15 PROCEDURE — 6370000000 HC RX 637 (ALT 250 FOR IP): Performed by: INTERNAL MEDICINE

## 2021-01-15 PROCEDURE — 6370000000 HC RX 637 (ALT 250 FOR IP): Performed by: NURSE PRACTITIONER

## 2021-01-15 PROCEDURE — 94761 N-INVAS EAR/PLS OXIMETRY MLT: CPT

## 2021-01-15 PROCEDURE — 6360000002 HC RX W HCPCS: Performed by: INTERNAL MEDICINE

## 2021-01-15 PROCEDURE — 37799 UNLISTED PX VASCULAR SURGERY: CPT

## 2021-01-15 PROCEDURE — 82962 GLUCOSE BLOOD TEST: CPT

## 2021-01-15 PROCEDURE — 94003 VENT MGMT INPAT SUBQ DAY: CPT

## 2021-01-15 PROCEDURE — 94640 AIRWAY INHALATION TREATMENT: CPT

## 2021-01-15 PROCEDURE — 2500000003 HC RX 250 WO HCPCS: Performed by: INTERNAL MEDICINE

## 2021-01-15 PROCEDURE — 71045 X-RAY EXAM CHEST 1 VIEW: CPT

## 2021-01-15 PROCEDURE — 2700000000 HC OXYGEN THERAPY PER DAY

## 2021-01-15 PROCEDURE — 2580000003 HC RX 258: Performed by: INTERNAL MEDICINE

## 2021-01-15 PROCEDURE — 89220 SPUTUM SPECIMEN COLLECTION: CPT

## 2021-01-15 PROCEDURE — 2000000000 HC ICU R&B

## 2021-01-15 PROCEDURE — 82803 BLOOD GASES ANY COMBINATION: CPT

## 2021-01-15 PROCEDURE — 80076 HEPATIC FUNCTION PANEL: CPT

## 2021-01-15 PROCEDURE — 85027 COMPLETE CBC AUTOMATED: CPT

## 2021-01-15 RX ORDER — POLYVINYL ALCOHOL 14 MG/ML
1 SOLUTION/ DROPS OPHTHALMIC EVERY 4 HOURS PRN
Status: DISCONTINUED | OUTPATIENT
Start: 2021-01-15 | End: 2021-02-01 | Stop reason: HOSPADM

## 2021-01-15 RX ORDER — MINERAL OIL AND PETROLATUM 150; 830 MG/G; MG/G
OINTMENT OPHTHALMIC PRN
Status: DISCONTINUED | OUTPATIENT
Start: 2021-01-15 | End: 2021-02-01 | Stop reason: HOSPADM

## 2021-01-15 RX ADMIN — Medication 2 PUFF: at 15:42

## 2021-01-15 RX ADMIN — ALBUTEROL SULFATE 2 PUFF: 90 AEROSOL, METERED RESPIRATORY (INHALATION) at 15:42

## 2021-01-15 RX ADMIN — MINERAL OIL AND PETROLATUM: 150; 830 OINTMENT OPHTHALMIC at 18:31

## 2021-01-15 RX ADMIN — Medication 2 PUFF: at 19:27

## 2021-01-15 RX ADMIN — DEXTROSE MONOHYDRATE 1 MCG/MIN: 50 INJECTION, SOLUTION INTRAVENOUS at 05:03

## 2021-01-15 RX ADMIN — POLYVINYL ALCOHOL 1 DROP: 14 SOLUTION/ DROPS OPHTHALMIC at 12:45

## 2021-01-15 RX ADMIN — DEXAMETHASONE SODIUM PHOSPHATE 6 MG: 10 INJECTION, SOLUTION INTRAMUSCULAR; INTRAVENOUS at 01:02

## 2021-01-15 RX ADMIN — CHLORHEXIDINE GLUCONATE 0.12% ORAL RINSE 15 ML: 1.2 LIQUID ORAL at 21:27

## 2021-01-15 RX ADMIN — POLYVINYL ALCOHOL 1 DROP: 14 SOLUTION/ DROPS OPHTHALMIC at 05:04

## 2021-01-15 RX ADMIN — ESCITALOPRAM OXALATE 20 MG: 10 TABLET ORAL at 10:09

## 2021-01-15 RX ADMIN — SODIUM CHLORIDE, PRESERVATIVE FREE 10 ML: 5 INJECTION INTRAVENOUS at 21:36

## 2021-01-15 RX ADMIN — Medication 50 MCG/HR: at 13:05

## 2021-01-15 RX ADMIN — WARFARIN SODIUM 5 MG: 5 TABLET ORAL at 18:00

## 2021-01-15 RX ADMIN — FAMOTIDINE 20 MG: 10 INJECTION, SOLUTION INTRAVENOUS at 21:27

## 2021-01-15 RX ADMIN — Medication 150 MCG/HR: at 00:59

## 2021-01-15 RX ADMIN — MINERAL OIL AND PETROLATUM: 150; 830 OINTMENT OPHTHALMIC at 03:00

## 2021-01-15 RX ADMIN — ALBUTEROL SULFATE 2 PUFF: 90 AEROSOL, METERED RESPIRATORY (INHALATION) at 11:13

## 2021-01-15 RX ADMIN — POLYVINYL ALCOHOL 1 DROP: 14 SOLUTION/ DROPS OPHTHALMIC at 09:00

## 2021-01-15 RX ADMIN — PROPOFOL 5 MCG/KG/MIN: 10 INJECTION, EMULSION INTRAVENOUS at 09:42

## 2021-01-15 RX ADMIN — Medication 2 PUFF: at 11:13

## 2021-01-15 RX ADMIN — INSULIN LISPRO 4 UNITS: 100 INJECTION, SOLUTION INTRAVENOUS; SUBCUTANEOUS at 18:15

## 2021-01-15 RX ADMIN — ALBUTEROL SULFATE 2 PUFF: 90 AEROSOL, METERED RESPIRATORY (INHALATION) at 08:04

## 2021-01-15 RX ADMIN — MINERAL OIL AND PETROLATUM: 150; 830 OINTMENT OPHTHALMIC at 10:11

## 2021-01-15 RX ADMIN — ZINC SULFATE 220 MG (50 MG) CAPSULE 50 MG: CAPSULE at 10:11

## 2021-01-15 RX ADMIN — REMDESIVIR 100 MG: 100 INJECTION, POWDER, LYOPHILIZED, FOR SOLUTION INTRAVENOUS at 22:00

## 2021-01-15 RX ADMIN — FUROSEMIDE 20 MG: 20 TABLET ORAL at 21:27

## 2021-01-15 RX ADMIN — PROPOFOL 5 MCG/KG/MIN: 10 INJECTION, EMULSION INTRAVENOUS at 21:27

## 2021-01-15 RX ADMIN — ASPIRIN 81 MG CHEWABLE TABLET 81 MG: 81 TABLET CHEWABLE at 10:09

## 2021-01-15 RX ADMIN — Medication 2 PUFF: at 08:05

## 2021-01-15 RX ADMIN — DICLOFENAC SODIUM 2 G: 10 GEL TOPICAL at 21:28

## 2021-01-15 RX ADMIN — PROPOFOL 40 MCG/KG/MIN: 10 INJECTION, EMULSION INTRAVENOUS at 00:29

## 2021-01-15 RX ADMIN — CHLORHEXIDINE GLUCONATE 0.12% ORAL RINSE 15 ML: 1.2 LIQUID ORAL at 10:22

## 2021-01-15 RX ADMIN — DICLOFENAC SODIUM 2 G: 10 GEL TOPICAL at 10:23

## 2021-01-15 RX ADMIN — BUPROPION HYDROCHLORIDE 300 MG: 150 TABLET, FILM COATED, EXTENDED RELEASE ORAL at 09:59

## 2021-01-15 RX ADMIN — ALBUTEROL SULFATE 2 PUFF: 90 AEROSOL, METERED RESPIRATORY (INHALATION) at 19:27

## 2021-01-15 RX ADMIN — ENOXAPARIN SODIUM 135 MG: 150 INJECTION SUBCUTANEOUS at 10:27

## 2021-01-15 RX ADMIN — FUROSEMIDE 20 MG: 20 TABLET ORAL at 09:59

## 2021-01-15 RX ADMIN — SODIUM CHLORIDE, PRESERVATIVE FREE 10 ML: 5 INJECTION INTRAVENOUS at 09:00

## 2021-01-15 RX ADMIN — POLYVINYL ALCOHOL 1 DROP: 14 SOLUTION/ DROPS OPHTHALMIC at 17:20

## 2021-01-15 RX ADMIN — FAMOTIDINE 20 MG: 10 INJECTION, SOLUTION INTRAVENOUS at 09:58

## 2021-01-15 RX ADMIN — ATORVASTATIN CALCIUM 40 MG: 40 TABLET, FILM COATED ORAL at 21:27

## 2021-01-15 RX ADMIN — INSULIN LISPRO 8 UNITS: 100 INJECTION, SOLUTION INTRAVENOUS; SUBCUTANEOUS at 14:25

## 2021-01-15 RX ADMIN — Medication 2 MCG/MIN: at 21:45

## 2021-01-15 RX ADMIN — MINERAL OIL AND PETROLATUM: 150; 830 OINTMENT OPHTHALMIC at 14:15

## 2021-01-15 RX ADMIN — POLYVINYL ALCOHOL 1 DROP: 14 SOLUTION/ DROPS OPHTHALMIC at 01:02

## 2021-01-15 RX ADMIN — ALOGLIPTIN 12.5 MG: 12.5 TABLET, FILM COATED ORAL at 09:59

## 2021-01-15 RX ADMIN — ENOXAPARIN SODIUM 135 MG: 150 INJECTION SUBCUTANEOUS at 21:32

## 2021-01-15 ASSESSMENT — PULMONARY FUNCTION TESTS
PIF_VALUE: 30
PIF_VALUE: 31
PIF_VALUE: 30

## 2021-01-15 NOTE — PROGRESS NOTES
Comprehensive Nutrition Assessment    Type and Reason for Visit:  Consult    Nutrition Recommendations/Plan:   EN Order: Vital HP @ 20 ml/hr  Will monitor GI tolerance  Advance slowly to goal rate of 85 ml/hr in 24 hr as tolerated   Will monitor GI tolerance, nutrition status, poc     Nutrition Assessment:  pt remains sedated on vent, +NGT, +Levophed, dietititan consult for tube feed order and manage, pt is at high nutrition risk    Malnutrition Assessment:  Malnutrition Status: At risk for malnutrition (Comment)    Context:  Acute Illness       Estimated Daily Nutrient Needs:  Energy (kcal):  [address] [facility] [address]); Weight Used for Energy Requirements:  Current     Protein (g):  190(2.0 g/kg);  Weight Used for Protein Requirements:  Ideal          Nutrition Related Findings:  POC glucose 294      Wounds:  Pressure Injury, Stage II, Venous Stasis, Multiple       Current Nutrition Therapies:    No diet orders on file  Additional Calorie Sources:   pt is receiving ~222 kcal from current propofol rate    Anthropometric Measures:  · Height: 6' 5.01\" (195.6 cm)  · Current Body Weight: 296 lb 8.3 oz (134.5 kg)    · Ideal Body Weight: 208 lbs; % Ideal Body Weight 142.6 %   · BMI: 35.2   · BMI Categories: Obese Class 2 (BMI 35.0 -39.9)       Nutrition Diagnosis:   · Inadequate oral intake related to impaired respiratory function as evidenced by NPO or clear liquid status due to medical condition    Nutrition Interventions:   Food and/or Nutrient Delivery:  Start Tube Feeding  Nutrition Education/Counseling:  No recommendation at this time   Coordination of Nutrition Care:  Continue to monitor while inpatient    Goals:  pt will tolerate EN initiation within the next 24 hr       Nutrition Monitoring and Evaluation:   Behavioral-Environmental Outcomes:  None Identified   Food/Nutrient Intake Outcomes:  Enteral Nutrition Intake/Tolerance Physical Signs/Symptoms Outcomes:  Biochemical Data, Weight, Skin, GI Status, Hemodynamic Status, Fluid Status or Edema     Discharge Planning:     Too soon to determine     Electronically signed by Romana Peeling, MS, RD, LD on 1/15/21 at 9:19 AM EST    Contact: [phone]

## 2021-01-15 NOTE — PROGRESS NOTES
Pulmonary and Critical Care  Progress Note    Subjective: The patient is better.   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, Anemia, Arm fracture, Arthritis, Cataract, Cataract, Cellulitis of left lower leg, Chronic venous hypertension with ulcer ([facility] [address] CKD (chronic kidney disease), Colon polyps, COPD (chronic obstructive pulmonary disease) ([facility] [address] Diabetes mellitus ([facility] [address] Diabetes mellitus ([facility] [address] Diabetes mellitus with peripheral circulatory disorder ([facility] [address] Diabetes mellitus with skin ulcer ([facility] [address] Diabetic peripheral neuropathy ([facility] [address] Diabetic skin ulcer associated with type 2 diabetes mellitus ([facility] [address] Diverticulosis, Femoral DVT (deep venous thrombosis) ([facility] [address] GERD (gastroesophageal reflux disease), Glaucoma, H/O cardiac catheterization, H/O Doppler ultrasound, H/O echocardiogram, H/O mumps orchitis, Hemorrhoids, History of nuclear stress test, HLD (hyperlipidemia), Hx of Doppler ultrasound, Hyperlipemia, Hyperlipidemia, Hypertension, Hypertension, Idiopathic chronic venous hypertension of left lower extremity with ulcer and inflammation (HCC), Leg ulcer ([address] No diabetic retinopathy OU, Non-pressure chronic ulcer of left lower leg with fat layer exposed ([facility] [address] Pulmonary embolism ([facility] [address] Sleep apnea, SOB (shortness of breath), Tendinitis, Type II or unspecified type diabetes mellitus with other specified manifestations, not stated as uncontrolled, Unspecified venous (peripheral) insufficiency, Urticaria, WD-Cellulitis of right anterior lower leg, WD-Cellulitis of right lower extremity, WD-Chronic venous hypertension (idiopathic) with ulcer of left lower extremity (CODE) ([facility] [address] WD-Chronic venous hypertension with inflammation, right, WD-Decubitus ulcer of left buttock, stage 3 ([address] 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 to breakdown of skin ([address] WD-Open wound of hand without complication, left, initial encounter, WD-Pressure injury of left buttock, stage 2 ([address] WD-Pressure injury of left buttock, stage 3 ([address] WD-Pressure injury of right buttock, stage 3 (HCC), WD-Skin tear of right forearm without complication, and WD-Ulcer of right pretibial region, with fat layer exposed ([facility] [address] has a past surgical history that includes Tonsillectomy (1953); Splenectomy (1958); Breast surgery (1970s); hernia repair (1970s); Skin graft (1978-present); Cataract removal (Right, 3/11/2013); Cataract removal (Left, 2/25/2013); Varicose vein surgery (Left, 2009); Carpal tunnel release (Left, 1993); Cardiac catheterization (6/3/14); Colonoscopy (2006); Colonoscopy (11/21/11); Colonoscopy (4/23/12); Colonoscopy (08/04/2016); Splenectomy; hernia repair; and Carpal tunnel release. reports that he quit smoking about 28 years ago. His smoking use included cigarettes. He has a 70.00 pack-year smoking history. He has never used smokeless tobacco. He reports current alcohol use. He reports that he does not use drugs. Family history:  family history includes Cancer in his brother and father; Diabetes in his brother; Heart Disease in his father; High Blood Pressure in his father; High Cholesterol in his father; Thyroid Disease in his brother.     Allergies   Allergen Reactions    Cavilon Durable Barrier [Mineral Oil-Dimeth-Coconut Oil]     Gentamycin [Gentamicin] Hives    Parabens Hives    Parabens Hives    Prinivil [Lisinopril] Swelling    Cortisone Rash    Cortisone Rash    Dilaudid [Hydromorphone Hcl] Rash    Penicillins Rash    Penicillins Rash    Sulfamethoxazole-Trimethoprim Nausea Only    Tape Angi [place]el[place] Tape] Rash     Social History:    Reviewed; no changes    Objective:   PHYSICAL EXAM:        VITALS:  BP (!) 133/57   Pulse (!) 44   Temp 97.7 °F (36.5 °C) (Axillary)   Resp 16   Ht 6' 5.01\" (1.956 m)   Wt 296 lb 8.3 oz (134.5 kg)   SpO2 95%   BMI 35.15 kg/m²     24HR INTAKE/OUTPUT:      Intake/Output Summary (Last 24 hours) at 1/15/2021 1109  Last data filed at 1/15/2021 0655  Gross per 24 hour   Intake 2354.82 ml   Output 2400 ml Net -45.18 ml       CONSTITUTIONAL:  Somnolent. LUNGS:  decreased breath sounds, basilar crackles. CARDIOVASCULAR:  normal S1 and S2 and negative JVD  ABD:Abdomen soft, non-tender. BS normal. No masses,  No organomegaly  NEURO:Sedated on vent. DATA:    CBC:  Recent Labs     01/13/21  0600 01/14/21  0800   WBC 5.9 10.6*  10.8*   RBC 4.00* 3.98*  4.06*   HGB 11.6* 11.8*  11.8*   HCT 35.8* 36.4*  36.4*    238  229   MCV 89.5 91.5  89.7   MCH 29.0 29.6  29.1   MCHC 32.4 32.4  32.4   RDW 17.6* 18.1*  18.0*      BMP:  Recent Labs     01/13/21  0600 01/14/21  0800    138   K 4.0 4.0    105   CO2 22 19*   BUN 26* 36*   CREATININE 1.2 1.4*   CALCIUM 7.3* 7.2*   GLUCOSE 219* 346*      ABG:  Recent Labs     01/14/21  0030 01/14/21  0800 01/15/21  0700   PH 7.31* 7.39 7.36   PO2ART 56* 148* 78   WRD0FHQ 48.0* 34.0 42.0   O2SAT 88.4* 96.4 94.2*     Lab Results   Component Value Date    PROBNP 5,973 (H) 01/14/2021    PROBNP 1,642 (H) 01/12/2021    PROBNP 1,196 (H) 11/11/2020     No results found for: CULTRESP    Radiology Review:  Pertinent images / reports were reviewed as a part of this visit.     Assessment:     Patient Active Problem List   Diagnosis    Gastroesophageal reflux disease without esophagitis    Hypertension in stage 3 chronic kidney disease due to type 2 diabetes mellitus ([address]    DM (diabetes mellitus) type II, controlled, with peripheral vascular disorder ([address]    Combined hyperlipidemia associated with type 2 diabetes mellitus ([facility] [facility]    Diabetic skin ulcer associated with type 2 diabetes mellitus (HCC)    CKD (chronic kidney disease)    History of DVT (deep vein thrombosis)- left femoral    History of pulmonary embolism    Long term current use of anticoagulants with INR goal of 2.0-3.0    COPD (chronic obstructive pulmonary disease) (HCC)    Severe recurrent major depressive disorder with psychotic features ([facility] [address]  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)    Venous (peripheral) insufficiency    Uncontrolled secondary diabetes mellitus with stage 3 CKD (GFR 30-59) (HCC)    Diabetes mellitus with skin ulcer (HCC)    WD-Ulcer of shin, left, with fat layer exposed ([facility] [address]    History of colon polyps    Personal history of PE (pulmonary embolism)    WD-Chronic venous hypertension (idiopathic) with ulcer of left lower extremity (CODE) ([facility] [address]    WD-Chronic venous hypertension with inflammation, right    Troponin I above reference range    KIRSTEN (acute kidney injury) ([facility] [address]    Cellulitis of lower extremity    Hyponatremia    Generalized weakness    Weakness    Multifocal pneumonia    Pneumonia due to COVID-19 virus       Plan:   1. Overall the patient has improved. 2. Wean FiO2.       [address] Jeffrey Waldron MD  1/15/2021  11:09 AM

## 2021-01-15 NOTE — PROGRESS NOTES
Notified attending provider of patients rhythm state and rate variation at this time. Patient is normotensive with MAP of 82. Assessment consistent.

## 2021-01-15 NOTE — DISCHARGE INSTR - COC
 Influenza, High Dose (Fluzone 65 yrs and older) 09/11/2014, 10/03/2016, 10/15/2018    Influenza, MDCK Quadv, IM, PF (Flucelvax 4 yrs and older) 10/06/2020    Influenza, Quadv, IM, PF (6 mo and older Fluzone, Flulaval, Fluarix, and 3 yrs and older Afluria) 01/04/2018    Influenza, Triv, inactivated, subunit, adjuvanted, IM (Fluad 65 yrs and older) 10/21/2019    Pneumococcal Conjugate 13-valent (Afexzzu33) 09/02/2015    Pneumococcal Polysaccharide (Cpfpbbeyd09) 06/10/2014    Tdap (Boostrix, Adacel) 09/01/2016       Active Problems:  Patient Active Problem List   Diagnosis Code    Gastroesophageal reflux disease without esophagitis K21.9    Hypertension in stage 3 chronic kidney disease due to type 2 diabetes mellitus ([facility]) E11.22, I12.9, N18.30    DM (diabetes mellitus) type II, controlled, with peripheral vascular disorder ([facility]) E11.51    Combined hyperlipidemia associated with type 2 diabetes mellitus ([facility] E11.69, E78.2    Diabetic skin ulcer associated with type 2 diabetes mellitus ([facility]) E11.622, L98.499    CKD (chronic kidney disease) N18.9    History of DVT (deep vein thrombosis)- left femoral Z86.718    History of pulmonary embolism Z86.711    Long term current use of anticoagulants with INR goal of 2.0-3.0 Z79.01    COPD (chronic obstructive pulmonary disease) ([facility]) J44.9    Severe recurrent major depressive disorder with psychotic features ([facility] F33.3    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 ([facility]) I83.229, I83.219, L97.919, L97.929    Venous (peripheral) insufficiency I87.2    Uncontrolled secondary diabetes mellitus with stage 3 CKD (GFR 30-59) ([facility]) E13.22, E13.65, N18.30    Diabetes mellitus with skin ulcer ([facility]) E11.622, L98.499    WD-Ulcer of shin, left, with fat layer exposed ([facility] C08.851    History of colon polyps Z86.010    Personal history of PE (pulmonary embolism) Z86.711  WD-Chronic venous hypertension (idiopathic) with ulcer of left lower extremity (CODE) ([facility]) I87.312    WD-Chronic venous hypertension with inflammation, right I87.321    Troponin I above reference range R77.8    KIRSTEN (acute kidney injury) ([facility] [address] N17.9    Cellulitis of lower extremity L03.119    Hyponatremia E87.1    Generalized weakness R53.1    Weakness R53.1    Multifocal pneumonia J18.9    Pneumonia due to COVID-19 virus U07.1, J12.82       Isolation/Infection:   Isolation          Droplet Plus        Patient Infection Status     Infection Onset Added Last Indicated Last Indicated By Review Planned Expiration Resolved Resolved By    COVID-19 01/12/21 01/12/21 01/13/21 Respiratory Panel, Molecular, with COVID-19 (Restricted: peds pts or suitable admitted adults) 01/20/21 01/27/21      CRE (Carbapenem-Resistant Enterobacteriaceae)  08/26/19 08/26/19 Ayad Ramires RN        8/15/19 Klebsiella aerogenes wound    MRSA  02/24/16 08/06/20 Culture, Wound        8/6/2020 wound; 3/12/2020 wound; 12/26/19 wound; 10/17/19 wound; 8/15/19 wound; 6/10/16 leg wound; 4/22/16 wound; 2/19/16 wound    Resolved    COVID-19 Rule Out 01/12/21 01/12/21 01/13/21 Respiratory Panel, Molecular, with COVID-19 (Restricted: peds pts or suitable admitted adults) (Ordered)   01/13/21 Pia Jaime, LPN    DYPBK-01 Rule Out 01/12/21 01/12/21 01/12/21 Respiratory Panel, Molecular, with COVID-19 (Restricted: peds pts or suitable admitted adults) (Ordered)   01/12/21 Rule-Out Test Resulted    COVID-19 Rule Out 11/11/20 11/11/20 11/11/20 COVID-19 (Ordered)   11/11/20 Rule-Out Test Resulted    MRSA  08/22/12 08/22/12 Dea Goodman RN   11/18/13 Pilar Brennan RN    wound 11/15/12          Nurse Assessment:  Last Vital Signs: BP (!) 133/57   Pulse (!) 46   Temp 97.7 °F (36.5 °C) (Axillary)   Resp 16   Ht 6' 5.01\" (1.956 m)   Wt 296 lb 8.3 oz (134.5 kg)   SpO2 98%   BMI 35.15 kg/m² Last documented pain score (0-10 scale): Pain Level: 0  Last Weight:   Wt Readings from Last 1 Encounters:   01/15/21 296 lb 8.3 oz (134.5 kg)     Mental Status:  {IP PT MENTAL STATUS:20030}    IV Access:  [address] NANETTE IV ACCESS:[id number]}    Nursing Mobility/ADLs:  Walking   {CHP DME ZJAN:905062063}  Transfer  {CHP DME CZUO:264944385}  Bathing  {CHP DME YSWL:411791357}  Dressing  {CHP DME HLFJ:364257135}  Toileting  {CHP DME SYYH:155650521}  Feeding  {CHP DME PTJH:557727622}  Med Admin  {CHP DME KYHH:307065739}  Med Delivery   { NANETTE MED Delivery:814170821}    Wound Care Documentation and Therapy:  Wound 11/15/13 Other (Comment) Leg Inner erethema with a small puncture site (Active)   Number of days: 2617       Wound 06/27/19 #6 (onset 1 month) Left Distal Medial Ankle (Active)   Wound Etiology Venous 01/13/21 0215   Dressing Status Clean;Dry; Intact 01/12/21 2230   Wound Length (cm) 0 cm 01/11/21 0945   Wound Width (cm) 0 cm 01/11/21 0945   Wound Depth (cm) 0 cm 01/11/21 0945   Wound Surface Area (cm^2) 0 cm^2 01/11/21 0945   Change in Wound Size % (l*w) 100 01/11/21 0945   Wound Volume (cm^3) 0 cm^3 01/11/21 0945   Wound Healing % 100 01/11/21 0945   Wound Assessment Pink/red 01/14/21 0400   Drainage Amount None 01/14/21 0400   Odor None 01/14/21 0400   Number of days: 567       Wound 07/30/20 Leg Medial;Lower; Left #7 (Active)   Wound Image   01/07/21 0950   Wound Etiology Venous 01/13/21 0215   Dressing Status Clean;Dry; Intact 01/12/21 2230   Wound Cleansed Soap and water; Other (Comment); Cleansed with saline 01/07/21 0950   Wound Length (cm) 0 cm 01/11/21 0945   Wound Width (cm) 0 cm 01/11/21 0945   Wound Depth (cm) 0 cm 01/11/21 0945   Wound Surface Area (cm^2) 0 cm^2 01/11/21 0945   Change in Wound Size % (l*w) 100 01/11/21 0945   Wound Volume (cm^3) 0 cm^3 01/11/21 0945   Wound Healing % 100 01/11/21 0945   Distance Tunneling (cm) 0 cm 01/07/21 0950   Tunneling Position ___ O'Clock 0 01/07/21 0950 Undermining Starts ___ O'Clock 0 01/07/21 0950   Undermining Ends___ O'Clock 0 01/07/21 0950   Undermining Maxium Distance (cm) 0 01/07/21 0950   Wound Assessment Pink/red 01/14/21 0400   Drainage Amount None 01/14/21 0400   Drainage Description Serosanguinous 01/14/21 0400   Odor Mild 01/14/21 0400   Ana-wound Assessment Maceration 01/14/21 0400   Margins Undefined edges 01/14/21 0400   Number of days: 169       Wound 08/27/20 Leg Medial;Lower;Right #8 Cluster (Active)   Wound Image   01/07/21 0950   Wound Etiology Venous 01/13/21 0215   Dressing Status Clean;Dry; Intact 01/12/21 2230   Wound Cleansed Cleansed with saline 01/11/21 0945   Wound Length (cm) 2.5 cm 01/11/21 0945   Wound Width (cm) 2 cm 01/11/21 0945   Wound Depth (cm) 0.1 cm 01/11/21 0945   Wound Surface Area (cm^2) 5 cm^2 01/11/21 0945   Change in Wound Size % (l*w) -150 01/11/21 0945   Wound Volume (cm^3) 0.5 cm^3 01/11/21 0945   Wound Healing % -150 01/11/21 0945   Distance Tunneling (cm) 0 cm 01/11/21 0945   Tunneling Position ___ O'Clock 0 01/11/21 0945   Undermining Starts ___ O'Clock 0 01/11/21 0945   Undermining Ends___ O'Clock 0 01/11/21 0945   Undermining Maxium Distance (cm) 0 01/11/21 0945   Wound Assessment Pink/red 01/14/21 0400   Drainage Amount Small 01/14/21 0400   Drainage Description Thin 01/14/21 0400   Odor None 01/14/21 0400   Ana-wound Assessment Dry/flaky 01/15/21 0400   Margins Defined edges 01/14/21 0400   Wound Thickness Description not for Pressure Injury Full thickness 01/14/21 0400   Number of days: 141       Wound 11/12/20 Elbow Right;Posterior (Active)   Number of days: 63       Wound 11/12/20 Buttocks Left cluster (Active)   Number of days: 63       Wound 01/13/21 Coccyx red small open area (Active)   Wound Etiology Pressure Stage  2 01/14/21 0900   Dressing Status Clean;Dry; Intact 01/15/21 0400   Wound Assessment Purple/maroon 01/14/21 0400   Drainage Amount Scant 01/14/21 0400 Drainage Description Thin 21 0400   Odor None 21   Number of days: 2        Elimination:  Continence:   · Bowel: {YES / IH:96290}  · Bladder: {YES / V}  Urinary Catheter: {Urinary Catheter:869528762}   Colostomy/Ileostomy/Ileal Conduit: {YES / ME:50495}       Date of Last BM: ***    Intake/Output Summary (Last 24 hours) at 1/15/2021 1143  Last data filed at 1/15/2021 0655  Gross per 24 hour   Intake 2354.82 ml   Output 2400 ml   Net -45.18 ml     I/O last 3 completed shifts: In: 2354.8 [I.V.:2104.8;  IV Piggyback:250]  Out: 0 [Urine:3250]    Safety Concerns:     [address] [employer] Safety Concerns:026354563}    Impairments/Disabilities:      [address] [employer] Impairments/Disabilities:780691564}      Patient's personal belongings (please select all that are sent with patient):  {CHP DME Belongings:983557942}    RN SIGNATURE:  {Esignature:327383062}    CASE MANAGEMENT/SOCIAL WORK SECTION    Inpatient Status Date: ***    Readmission Risk Assessment Score:  Readmission Risk              Risk of Unplanned Readmission:        40           Discharging to Facility/ Agency   · Name:   · Address:  · Phone:  · Fax:    Dialysis Facility (if applicable)   · Name:  · Address:  · Dialysis Schedule:  · Phone:  · Fax:    / signature: {Esignature:440776446}        PHYSICIAN SECTION      Nutrition Therapy:  Current Nutrition Therapy:   [employer] Diet List:484932680}    Routes of Feeding: {CHP DME Other Feedings:033241988}  Liquids: {Slp liquid thickness:35038}  Daily Fluid Restriction: {CHP DME Yes amt example:340816320}  Last Modified Barium Swallow with Video (Video Swallowing Test): {Done Not Done YDYL:133768114}    Treatments at the Time of Hospital Discharge:   Respiratory Treatments: ***  Oxygen Therapy:  {Therapy; copd oxygen:14237}  Ventilator:    {MH CC Vent WDNN:124942831}    Rehab Therapies: {THERAPEUTIC INTERVENTION:7493962706} Weight Bearing Status/Restrictions: [address] CC Weight Bearin}  Other Medical Equipment (for information only, NOT a DME order):  {EQUIPMENT:608620958}  Other Treatments: ***      Prognosis: {Prognosis:0218680369}    Condition at Discharge: [address] Patient Condition:878177873}    Rehab Potential (if transferring to Rehab): {Prognosis:9015246538}    Recommended Labs or Other Treatments After Discharge: ***    Physician Certification: I certify the above information and transfer of Marcos Farrell  is necessary for the continuing treatment of the diagnosis listed and that he requires {Admit to Appropriate Level of Care:93234} for {GREATER/LESS:913008878} 30 days.      Update Admission H&P: {CHP DME Changes in FKONK:538323231}    PHYSICIAN SIGNATURE:  {Esignature:410746677}

## 2021-01-16 ENCOUNTER — APPOINTMENT (OUTPATIENT)
Dept: GENERAL RADIOLOGY | Age: 76
DRG: 870 | End: 2021-01-16
Attending: INTERNAL MEDICINE
Payer: MEDICARE

## 2021-01-16 ENCOUNTER — APPOINTMENT (OUTPATIENT)
Dept: CT IMAGING | Age: 76
DRG: 870 | End: 2021-01-16
Attending: INTERNAL MEDICINE
Payer: MEDICARE

## 2021-01-16 LAB
ALBUMIN SERPL-MCNC: 2.4 GM/DL (ref 3.4–5)
ALP BLD-CCNC: 72 IU/L (ref 40–129)
ALT SERPL-CCNC: 19 U/L (ref 10–40)
ANION GAP SERPL CALCULATED.3IONS-SCNC: 11 MMOL/L (ref 4–16)
AST SERPL-CCNC: 28 IU/L (ref 15–37)
BASE EXCESS: 0 (ref 0–3.3)
BASE EXCESS: 5 (ref 0–3.3)
BILIRUB SERPL-MCNC: 0.4 MG/DL (ref 0–1)
BILIRUBIN DIRECT: 0.2 MG/DL (ref 0–0.3)
BILIRUBIN, INDIRECT: 0.2 MG/DL (ref 0–0.7)
BUN BLDV-MCNC: 39 MG/DL (ref 6–23)
C-REACTIVE PROTEIN, HIGH SENSITIVITY: 101.1 MG/L
CALCIUM SERPL-MCNC: 7.3 MG/DL (ref 8.3–10.6)
CARBON MONOXIDE, BLOOD: 1.7 % (ref 0–5)
CARBON MONOXIDE, BLOOD: 1.9 % (ref 0–5)
CHLORIDE BLD-SCNC: 110 MMOL/L (ref 99–110)
CO2 CONTENT: 26.1 MMOL/L (ref 19–24)
CO2 CONTENT: 28 MMOL/L (ref 19–24)
CO2: 23 MMOL/L (ref 21–32)
COMMENT: ABNORMAL
COMMENT: ABNORMAL
CREAT SERPL-MCNC: 1.1 MG/DL (ref 0.9–1.3)
D DIMER: 406 NG/ML(DDU)
GFR AFRICAN AMERICAN: >60 ML/MIN/1.73M2
GFR NON-AFRICAN AMERICAN: >60 ML/MIN/1.73M2
GLUCOSE BLD-MCNC: 240 MG/DL (ref 70–99)
GLUCOSE BLD-MCNC: 259 MG/DL (ref 70–99)
GLUCOSE BLD-MCNC: 271 MG/DL (ref 70–99)
GLUCOSE BLD-MCNC: 351 MG/DL (ref 70–99)
GLUCOSE BLD-MCNC: 374 MG/DL (ref 70–99)
HCO3 ARTERIAL: 24.8 MMOL/L (ref 18–23)
HCO3 ARTERIAL: 25.6 MMOL/L (ref 18–23)
HCT VFR BLD CALC: 35 % (ref 42–52)
HEMOGLOBIN: 11.1 GM/DL (ref 13.5–18)
INR BLD: 3.07 INDEX
MCH RBC QN AUTO: 29.3 PG (ref 27–31)
MCHC RBC AUTO-ENTMCNC: 31.7 % (ref 32–36)
MCV RBC AUTO: 92.3 FL (ref 78–100)
METHEMOGLOBIN ARTERIAL: 1 %
METHEMOGLOBIN ARTERIAL: 1.4 %
O2 SATURATION: 93 % (ref 96–97)
O2 SATURATION: 96.2 % (ref 96–97)
PCO2 ARTERIAL: 42 MMHG (ref 32–45)
PCO2 ARTERIAL: 77 MMHG (ref 32–45)
PDW BLD-RTO: 18.5 % (ref 11.7–14.9)
PH BLOOD: 7.13 (ref 7.34–7.45)
PH BLOOD: 7.38 (ref 7.34–7.45)
PLATELET # BLD: 241 K/CU MM (ref 140–440)
PMV BLD AUTO: 13 FL (ref 7.5–11.1)
PO2 ARTERIAL: 120 MMHG (ref 75–100)
PO2 ARTERIAL: 70 MMHG (ref 75–100)
POTASSIUM SERPL-SCNC: 4.1 MMOL/L (ref 3.5–5.1)
PROCALCITONIN: 0.35
PROTHROMBIN TIME: 37.6 SECONDS (ref 11.7–14.5)
RBC # BLD: 3.79 M/CU MM (ref 4.6–6.2)
SODIUM BLD-SCNC: 144 MMOL/L (ref 135–145)
TOTAL PROTEIN: 5.7 GM/DL (ref 6.4–8.2)
WBC # BLD: 10.5 K/CU MM (ref 4–10.5)

## 2021-01-16 PROCEDURE — 6370000000 HC RX 637 (ALT 250 FOR IP): Performed by: NURSE PRACTITIONER

## 2021-01-16 PROCEDURE — 2580000003 HC RX 258

## 2021-01-16 PROCEDURE — 93010 ELECTROCARDIOGRAM REPORT: CPT | Performed by: INTERNAL MEDICINE

## 2021-01-16 PROCEDURE — 82803 BLOOD GASES ANY COMBINATION: CPT

## 2021-01-16 PROCEDURE — 2580000003 HC RX 258: Performed by: INTERNAL MEDICINE

## 2021-01-16 PROCEDURE — 94640 AIRWAY INHALATION TREATMENT: CPT

## 2021-01-16 PROCEDURE — 82962 GLUCOSE BLOOD TEST: CPT

## 2021-01-16 PROCEDURE — 2500000003 HC RX 250 WO HCPCS

## 2021-01-16 PROCEDURE — 74177 CT ABD & PELVIS W/CONTRAST: CPT

## 2021-01-16 PROCEDURE — 6360000002 HC RX W HCPCS: Performed by: INTERNAL MEDICINE

## 2021-01-16 PROCEDURE — 74018 RADEX ABDOMEN 1 VIEW: CPT

## 2021-01-16 PROCEDURE — 2500000003 HC RX 250 WO HCPCS: Performed by: INTERNAL MEDICINE

## 2021-01-16 PROCEDURE — 80048 BASIC METABOLIC PNL TOTAL CA: CPT

## 2021-01-16 PROCEDURE — 86140 C-REACTIVE PROTEIN: CPT

## 2021-01-16 PROCEDURE — 6370000000 HC RX 637 (ALT 250 FOR IP): Performed by: INTERNAL MEDICINE

## 2021-01-16 PROCEDURE — 85610 PROTHROMBIN TIME: CPT

## 2021-01-16 PROCEDURE — 2700000000 HC OXYGEN THERAPY PER DAY

## 2021-01-16 PROCEDURE — 89220 SPUTUM SPECIMEN COLLECTION: CPT

## 2021-01-16 PROCEDURE — 80053 COMPREHEN METABOLIC PANEL: CPT

## 2021-01-16 PROCEDURE — 93005 ELECTROCARDIOGRAM TRACING: CPT | Performed by: INTERNAL MEDICINE

## 2021-01-16 PROCEDURE — 2000000000 HC ICU R&B

## 2021-01-16 PROCEDURE — 94003 VENT MGMT INPAT SUBQ DAY: CPT

## 2021-01-16 PROCEDURE — 85027 COMPLETE CBC AUTOMATED: CPT

## 2021-01-16 PROCEDURE — 6360000002 HC RX W HCPCS: Performed by: NURSE PRACTITIONER

## 2021-01-16 PROCEDURE — 71045 X-RAY EXAM CHEST 1 VIEW: CPT

## 2021-01-16 PROCEDURE — 84145 PROCALCITONIN (PCT): CPT

## 2021-01-16 PROCEDURE — 86141 C-REACTIVE PROTEIN HS: CPT

## 2021-01-16 PROCEDURE — 2500000003 HC RX 250 WO HCPCS: Performed by: NURSE PRACTITIONER

## 2021-01-16 PROCEDURE — 85379 FIBRIN DEGRADATION QUANT: CPT

## 2021-01-16 PROCEDURE — 82248 BILIRUBIN DIRECT: CPT

## 2021-01-16 PROCEDURE — 94761 N-INVAS EAR/PLS OXIMETRY MLT: CPT

## 2021-01-16 PROCEDURE — 80076 HEPATIC FUNCTION PANEL: CPT

## 2021-01-16 PROCEDURE — 6360000004 HC RX CONTRAST MEDICATION

## 2021-01-16 RX ORDER — FUROSEMIDE 10 MG/ML
20 INJECTION INTRAMUSCULAR; INTRAVENOUS 2 TIMES DAILY
Status: DISCONTINUED | OUTPATIENT
Start: 2021-01-17 | End: 2021-01-19

## 2021-01-16 RX ADMIN — ALBUTEROL SULFATE 2 PUFF: 90 AEROSOL, METERED RESPIRATORY (INHALATION) at 07:59

## 2021-01-16 RX ADMIN — FUROSEMIDE 20 MG: 20 TABLET ORAL at 20:06

## 2021-01-16 RX ADMIN — ALBUTEROL SULFATE 2 PUFF: 90 AEROSOL, METERED RESPIRATORY (INHALATION) at 12:34

## 2021-01-16 RX ADMIN — IOPAMIDOL 75 ML: 755 INJECTION, SOLUTION INTRAVENOUS at 16:19

## 2021-01-16 RX ADMIN — Medication 2 PUFF: at 07:59

## 2021-01-16 RX ADMIN — FAMOTIDINE 20 MG: 10 INJECTION, SOLUTION INTRAVENOUS at 08:46

## 2021-01-16 RX ADMIN — REMDESIVIR 100 MG: 100 INJECTION, POWDER, LYOPHILIZED, FOR SOLUTION INTRAVENOUS at 21:35

## 2021-01-16 RX ADMIN — PROPOFOL 65 MCG/KG/MIN: 10 INJECTION, EMULSION INTRAVENOUS at 17:30

## 2021-01-16 RX ADMIN — INSULIN LISPRO 10 UNITS: 100 INJECTION, SOLUTION INTRAVENOUS; SUBCUTANEOUS at 16:47

## 2021-01-16 RX ADMIN — PROPOFOL 65 MCG/KG/MIN: 10 INJECTION, EMULSION INTRAVENOUS at 21:33

## 2021-01-16 RX ADMIN — DICLOFENAC SODIUM 2 G: 10 GEL TOPICAL at 09:04

## 2021-01-16 RX ADMIN — CHLORHEXIDINE GLUCONATE 0.12% ORAL RINSE 15 ML: 1.2 LIQUID ORAL at 20:06

## 2021-01-16 RX ADMIN — INSULIN LISPRO 6 UNITS: 100 INJECTION, SOLUTION INTRAVENOUS; SUBCUTANEOUS at 08:48

## 2021-01-16 RX ADMIN — VANCOMYCIN HYDROCHLORIDE 1250 MG: 5 INJECTION, POWDER, LYOPHILIZED, FOR SOLUTION INTRAVENOUS at 13:00

## 2021-01-16 RX ADMIN — PROPOFOL 5 MCG/KG/MIN: 10 INJECTION, EMULSION INTRAVENOUS at 02:32

## 2021-01-16 RX ADMIN — SODIUM CHLORIDE, PRESERVATIVE FREE 10 ML: 5 INJECTION INTRAVENOUS at 08:46

## 2021-01-16 RX ADMIN — PROPOFOL 65 MCG/KG/MIN: 10 INJECTION, EMULSION INTRAVENOUS at 22:50

## 2021-01-16 RX ADMIN — Medication 75 MCG/HR: at 05:23

## 2021-01-16 RX ADMIN — ATORVASTATIN CALCIUM 40 MG: 40 TABLET, FILM COATED ORAL at 20:06

## 2021-01-16 RX ADMIN — PROPOFOL 65 MCG/KG/MIN: 10 INJECTION, EMULSION INTRAVENOUS at 20:00

## 2021-01-16 RX ADMIN — CHLORHEXIDINE GLUCONATE 0.12% ORAL RINSE 15 ML: 1.2 LIQUID ORAL at 08:47

## 2021-01-16 RX ADMIN — PROPOFOL 40 MCG/KG/MIN: 10 INJECTION, EMULSION INTRAVENOUS at 09:17

## 2021-01-16 RX ADMIN — DEXAMETHASONE SODIUM PHOSPHATE 6 MG: 10 INJECTION, SOLUTION INTRAMUSCULAR; INTRAVENOUS at 04:03

## 2021-01-16 RX ADMIN — DICLOFENAC SODIUM 2 G: 10 GEL TOPICAL at 20:06

## 2021-01-16 RX ADMIN — SODIUM CHLORIDE, PRESERVATIVE FREE 10 ML: 5 INJECTION INTRAVENOUS at 20:07

## 2021-01-16 RX ADMIN — PROPOFOL 50 MCG/KG/MIN: 10 INJECTION, EMULSION INTRAVENOUS at 11:31

## 2021-01-16 RX ADMIN — SODIUM CHLORIDE 1 MCG/KG/MIN: 9 INJECTION, SOLUTION INTRAVENOUS at 15:29

## 2021-01-16 RX ADMIN — INSULIN LISPRO 6 UNITS: 100 INJECTION, SOLUTION INTRAVENOUS; SUBCUTANEOUS at 13:28

## 2021-01-16 RX ADMIN — ALBUTEROL SULFATE 2 PUFF: 90 AEROSOL, METERED RESPIRATORY (INHALATION) at 20:30

## 2021-01-16 RX ADMIN — ALBUTEROL SULFATE 2 PUFF: 90 AEROSOL, METERED RESPIRATORY (INHALATION) at 15:17

## 2021-01-16 RX ADMIN — ENOXAPARIN SODIUM 135 MG: 150 INJECTION SUBCUTANEOUS at 08:46

## 2021-01-16 RX ADMIN — PROPOFOL 65 MCG/KG/MIN: 10 INJECTION, EMULSION INTRAVENOUS at 15:33

## 2021-01-16 RX ADMIN — Medication 2 PUFF: at 20:30

## 2021-01-16 RX ADMIN — PROPOFOL 30 MCG/KG/MIN: 10 INJECTION, EMULSION INTRAVENOUS at 05:20

## 2021-01-16 RX ADMIN — Medication 100 MCG/HR: at 17:55

## 2021-01-16 RX ADMIN — Medication 2 PUFF: at 15:18

## 2021-01-16 RX ADMIN — FAMOTIDINE 20 MG: 10 INJECTION, SOLUTION INTRAVENOUS at 20:11

## 2021-01-16 RX ADMIN — Medication 2 PUFF: at 12:34

## 2021-01-16 ASSESSMENT — PULMONARY FUNCTION TESTS
PIF_VALUE: 25
PIF_VALUE: 30
PIF_VALUE: 26
PIF_VALUE: 30
PIF_VALUE: 30
PIF_VALUE: 26
PIF_VALUE: 31
PIF_VALUE: 30
PIF_VALUE: 31
PIF_VALUE: 25
PIF_VALUE: 25
PIF_VALUE: 30
PIF_VALUE: 31
PIF_VALUE: 31

## 2021-01-16 ASSESSMENT — PAIN SCALES - GENERAL
PAINLEVEL_OUTOF10: 2
PAINLEVEL_OUTOF10: 2

## 2021-01-16 NOTE — PROGRESS NOTES
PHARMACY ANTICOAGULATION MONITORING SERVICE    Oscar Smith is a 77 y.o. male on warfarin therapy for history of DVT. Pharmacy consulted by Dr. Cyndy Lopez for monitoring and adjustment of treatment. Indication for anticoagulation: Hx of DVT  INR goal: 2-3  Warfarin dose prior to admission: 5 mg daily    Pertinent Laboratory Values   Recent Labs     01/14/21  0800 01/16/21  0610   INR 1.96 3.07   HGB 11.8*  11.8* 11.1*   HCT 36.4*  36.4* 35.0*     229 241       Assessment/Plan:  ? Possible DDI's:   o Aspirin (home med), can increase bleeding risk, clinically appropriate  o Escitalopram (home med), can increase bleeding risk, appropriate to continue while inpatient  o Enoxaparin bridge now stopped. o Tramadol and dexamethasone can increase effects of warfarin  ? No INR obtained yesterday. INR now supra-therapeutic today @3.07 and will likely continue to trend upward. ? Will hold warfarin for now and follow INR trends tomorrow. ? Pharmacy will continue to monitor and adjust warfarin therapy as indicated. Thank you for the consult,  Pam Cunha.  Deborah Zamudio  1/16/2021 4:01 PM

## 2021-01-16 NOTE — PROGRESS NOTES
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, Anemia, Arm fracture, Arthritis, Cataract, Cataract, Cellulitis of left lower leg, Chronic venous hypertension with ulcer ([facility] [address] CKD (chronic kidney disease), Colon polyps, COPD (chronic obstructive pulmonary disease) ([facility] [address] Diabetes mellitus ([facility] [address] Diabetes mellitus ([facility] [address] Diabetes mellitus with peripheral circulatory disorder ([facility] [address] Diabetes mellitus with skin ulcer ([facility] [address] Diabetic peripheral neuropathy ([facility] [address] Diabetic skin ulcer associated with type 2 diabetes mellitus ([facility] [address] Diverticulosis, Femoral DVT (deep venous thrombosis) ([facility] [address] GERD (gastroesophageal reflux disease), Glaucoma, H/O cardiac catheterization, H/O Doppler ultrasound, H/O echocardiogram, H/O mumps orchitis, Hemorrhoids, History of nuclear stress test, HLD (hyperlipidemia), Hx of Doppler ultrasound, Hyperlipemia, Hyperlipidemia, Hypertension, Hypertension, Idiopathic chronic venous hypertension of left lower extremity with ulcer and inflammation (HCC), Leg ulcer ([address] No diabetic retinopathy OU, Non-pressure chronic ulcer of left lower leg with fat layer exposed ([facility] [address] Pulmonary embolism ([facility] [address] Sleep apnea, SOB (shortness of breath), Tendinitis, Type II or unspecified type diabetes mellitus with other specified manifestations, not stated as uncontrolled, Unspecified venous (peripheral) insufficiency, Urticaria, WD-Cellulitis of right anterior lower leg, WD-Cellulitis of right lower extremity, WD-Chronic venous hypertension (idiopathic) with ulcer of left lower extremity (CODE) ([facility] [address] WD-Chronic venous hypertension with inflammation, right, WD-Decubitus ulcer of left buttock, stage 3 ([address] 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 to breakdown of skin ([address] WD-Open wound of hand without complication, left, initial encounter, WD-Pressure injury of left buttock, stage 2 ([address] WD-Pressure injury of left buttock, stage 3 ([address] WD-Pressure injury of right buttock, stage 3 (HCC), WD-Skin tear of right forearm without complication, and WD-Ulcer of right pretibial region, with fat layer exposed ([facility] [address] has a past surgical history that includes Tonsillectomy (1953); Splenectomy (1958); Breast surgery (1970s); hernia repair (1970s); Skin graft (1978-present); Cataract removal (Right, 3/11/2013); Cataract removal (Left, 2/25/2013); Varicose vein surgery (Left, 2009); Carpal tunnel release (Left, 1993); Cardiac catheterization (6/3/14); Colonoscopy (2006); Colonoscopy (11/21/11); Colonoscopy (4/23/12); Colonoscopy (08/04/2016); Splenectomy; hernia repair; and Carpal tunnel release. reports that he quit smoking about 28 years ago. His smoking use included cigarettes. He has a 70.00 pack-year smoking history. He has never used smokeless tobacco. He reports current alcohol use. He reports that he does not use drugs. Family history:  family history includes Cancer in his brother and father; Diabetes in his brother; Heart Disease in his father; High Blood Pressure in his father; High Cholesterol in his father; Thyroid Disease in his brother.     Allergies   Allergen Reactions    Cavilon Durable Barrier [Mineral Oil-Dimeth-Coconut Oil]     Gentamycin [Gentamicin] Hives    Parabens Hives    Parabens Hives    Prinivil [Lisinopril] Swelling    Cortisone Rash    Cortisone Rash    Dilaudid [Hydromorphone Hcl] Rash    Penicillins Rash    Penicillins Rash    Sulfamethoxazole-Trimethoprim Nausea Only    Tape Rajiv [place] Tape] Rash     Social History:    Reviewed; no changes    Objective:   PHYSICAL EXAM:        VITALS:  BP (!) 112/58   Pulse 55   Temp 96.2 °F (35.7 °C) (Rectal)   Resp 10   Ht 6' 5.01\" (1.956 m)   Wt 296 lb 8.3 oz (134.5 kg)   SpO2 97%   BMI 35.15 kg/m²     24HR INTAKE/OUTPUT:      Intake/Output Summary (Last 24 hours) at 1/16/2021 1203  Last data filed at 1/16/2021 0602  Gross per 24 hour   Intake 755.7 ml   Output 2015 ml Net -1259.3 ml       CONSTITUTIONAL:  Somnolent. LUNGS:  decreased breath sounds, basilar crackles. CARDIOVASCULAR:  normal S1 and S2 and negative JVD  ABD:Abdomen soft, non-tender. BS normal. No masses,  No organomegaly  NEURO:Sedated on vent. DATA:    CBC:  Recent Labs     01/14/21  0800 01/16/21  0610   WBC 10.6*  10.8* 10.5   RBC 3.98*  4.06* 3.79*   HGB 11.8*  11.8* 11.1*   HCT 36.4*  36.4* 35.0*     229 241   MCV 91.5  89.7 92.3   MCH 29.6  29.1 29.3   MCHC 32.4  32.4 31.7*   RDW 18.1*  18.0* 18.5*      BMP:  Recent Labs     01/14/21  0800 01/16/21  0610    144   K 4.0 4.1    110   CO2 19* 23   BUN 36* 39*   CREATININE 1.4* 1.1   CALCIUM 7.2* 7.3*   GLUCOSE 346* 240*      ABG:  Recent Labs     01/14/21  0800 01/15/21  0700 01/16/21  0700   PH 7.39 7.36 7.38   PO2ART 148* 78 70*   BTO0QRY 34.0 42.0 42.0   O2SAT 96.4 94.2* 93.0*     Lab Results   Component Value Date    PROBNP 5,973 (H) 01/14/2021    PROBNP 1,642 (H) 01/12/2021    PROBNP 1,196 (H) 11/11/2020     No results found for: CULTRESP    Radiology Review:  Pertinent images / reports were reviewed as a part of this visit.     Assessment:     Patient Active Problem List   Diagnosis    Gastroesophageal reflux disease without esophagitis    Hypertension in stage 3 chronic kidney disease due to type 2 diabetes mellitus ([address]    DM (diabetes mellitus) type II, controlled, with peripheral vascular disorder ([address]    Combined hyperlipidemia associated with type 2 diabetes mellitus ([facility] [address]    Diabetic skin ulcer associated with type 2 diabetes mellitus (HCC)    CKD (chronic kidney disease)    History of DVT (deep vein thrombosis)- left femoral    History of pulmonary embolism    Long term current use of anticoagulants with INR goal of 2.0-3.0    COPD (chronic obstructive pulmonary disease) (HCC)    Severe recurrent major depressive disorder with psychotic features ([facility] [address]  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)    Venous (peripheral) insufficiency    Uncontrolled secondary diabetes mellitus with stage 3 CKD (GFR 30-59) (HCC)    Diabetes mellitus with skin ulcer (HCC)    WD-Ulcer of shin, left, with fat layer exposed ([facility] [address]    History of colon polyps    Personal history of PE (pulmonary embolism)    WD-Chronic venous hypertension (idiopathic) with ulcer of left lower extremity (CODE) ([facility] [address]    WD-Chronic venous hypertension with inflammation, right    Troponin I above reference range    KIRSTEN (acute kidney injury) ([facility] [address]    Cellulitis of lower extremity    Hyponatremia    Generalized weakness    Weakness    Multifocal pneumonia    Pneumonia due to COVID-19 virus       Plan:   1. Overall the patient sl better. 2. Wean FiO2.  3. Cont present vent support.     Sharonda Beckham MD  1/16/2021  12:03 PM

## 2021-01-16 NOTE — PROGRESS NOTES
[address] [facility]  consulted by Dr. Sarah Argueta for monitoring and adjustment. Indication for treatment: Covid-19 pneumonia, possible secondary bacterial pneumonia, cellulitis  Goal trough: 15 mcg/mL (AUC/YOLA 400-600)    Pertinent Laboratory Values:   Temp Readings from Last 3 Encounters:   01/14/21 97.9 °F (36.6 °C) (Axillary)   01/12/21 97.1 °F (36.2 °C) (Infrared)   01/08/21 98.9 °F (37.2 °C) (Oral)     Recent Labs     01/12/21  0830 01/12/21  0845 01/13/21  0600 01/14/21  0800   WBC  --  6.4 5.9 10.6*  10.8*   LACTATE 1.0  --   --   --      Recent Labs     01/12/21  0845 01/13/21  0600 01/14/21  0800   BUN 25* 26* 36*   CREATININE 1.4* 1.2 1.4*     Estimated Creatinine Clearance: 72 mL/min (A) (based on SCr of 1.4 mg/dL (H)). Intake/Output Summary (Last 24 hours) at 1/14/2021 1356  Last data filed at 1/14/2021 0958  Gross per 24 hour   Intake 480 ml   Output 2350 ml   Net -1870 ml       Pertinent Cultures:  Date    Source    Results  1/12   Resp PCR               Covid-19  1/13   Respiratory    Ordered  1/13   Blood                Ordered    VANCOMYCIN RANDOM:    Recent Labs     01/14/21  1515   VANCORANDOM 16.0     RENAL LAB EVALUATION  Estimated Creatinine Clearance: 89 mL/min (based on SCr of 1.1 mg/dL). Recent Labs     01/14/21  0800 01/16/21  0610   BUN 36* 39*   CREATININE 1.4* 1.1     Assessment:  · WBC and temperature: WBC slightly elevated @10.8, pt afebrile  · SCr, BUN, and urine output:   · KIRSTEN, appears resolved, SCR down to normal today  · BUN trending up to 39  · Day(s) of therapy: 5  · Vancomycin concentration:  · 1/13: 6, appropriate to re-dose  · 1/14: 16, 14h post-dose    Plan:  · No vanco level done yesterday, pt did not receive any vanco doses yesterday. · Renal trends are now improved, SCR down to normal today. · Based on historic data, will restart vancomycin at 1,250mg ivpb q18h  · Trough scheduled after 2 doses have been given. · Pharmacy will continue to monitor patient and adjust therapy as indicated    [address] 1/17 @ 23:00    Thank you for the consult,  Kunal Bird.  Ana Clancy  11:33 AM  01/16/21

## 2021-01-17 ENCOUNTER — APPOINTMENT (OUTPATIENT)
Dept: GENERAL RADIOLOGY | Age: 76
DRG: 870 | End: 2021-01-17
Attending: INTERNAL MEDICINE
Payer: MEDICARE

## 2021-01-17 LAB
ABO/RH: NORMAL
ALBUMIN SERPL-MCNC: 2.4 GM/DL (ref 3.4–5)
ALP BLD-CCNC: 75 IU/L (ref 40–129)
ALT SERPL-CCNC: 18 U/L (ref 10–40)
ANION GAP SERPL CALCULATED.3IONS-SCNC: 13 MMOL/L (ref 4–16)
ANTIBODY SCREEN: NEGATIVE
AST SERPL-CCNC: 22 IU/L (ref 15–37)
BASE EXCESS: 3 (ref 0–3.3)
BILIRUB SERPL-MCNC: 0.4 MG/DL (ref 0–1)
BILIRUBIN DIRECT: 0.3 MG/DL (ref 0–0.3)
BILIRUBIN, INDIRECT: 0.1 MG/DL (ref 0–0.7)
BUN BLDV-MCNC: 55 MG/DL (ref 6–23)
C-REACTIVE PROTEIN, HIGH SENSITIVITY: 104.6 MG/L
CALCIUM SERPL-MCNC: 7.3 MG/DL (ref 8.3–10.6)
CARBON MONOXIDE, BLOOD: 1.2 % (ref 0–5)
CHLORIDE BLD-SCNC: 105 MMOL/L (ref 99–110)
CO2 CONTENT: 22.1 MMOL/L (ref 19–24)
CO2: 19 MMOL/L (ref 21–32)
COMMENT: ABNORMAL
CREAT SERPL-MCNC: 1.5 MG/DL (ref 0.9–1.3)
D DIMER: 368 NG/ML(DDU)
GFR AFRICAN AMERICAN: 55 ML/MIN/1.73M2
GFR NON-AFRICAN AMERICAN: 46 ML/MIN/1.73M2
GLUCOSE BLD-MCNC: 261 MG/DL (ref 70–99)
GLUCOSE BLD-MCNC: 319 MG/DL (ref 70–99)
GLUCOSE BLD-MCNC: 441 MG/DL (ref 70–99)
GLUCOSE BLD-MCNC: 458 MG/DL (ref 70–99)
GLUCOSE BLD-MCNC: 472 MG/DL (ref 70–99)
HCO3 ARTERIAL: 21.1 MMOL/L (ref 18–23)
HCT VFR BLD CALC: 33.6 % (ref 42–52)
HEMOGLOBIN: 10.9 GM/DL (ref 13.5–18)
INR BLD: 3.48 INDEX
MAGNESIUM: 2.2 MG/DL (ref 1.8–2.4)
MCH RBC QN AUTO: 29.9 PG (ref 27–31)
MCHC RBC AUTO-ENTMCNC: 32.4 % (ref 32–36)
MCV RBC AUTO: 92.1 FL (ref 78–100)
METHEMOGLOBIN ARTERIAL: 1.5 %
O2 SATURATION: 97.3 % (ref 96–97)
PCO2 ARTERIAL: 34 MMHG (ref 32–45)
PDW BLD-RTO: 18.5 % (ref 11.7–14.9)
PH BLOOD: 7.4 (ref 7.34–7.45)
PLATELET # BLD: 268 K/CU MM (ref 140–440)
PMV BLD AUTO: 13.8 FL (ref 7.5–11.1)
PO2 ARTERIAL: 268 MMHG (ref 75–100)
POTASSIUM SERPL-SCNC: 4.3 MMOL/L (ref 3.5–5.1)
PROCALCITONIN: 0.4
PROTHROMBIN TIME: 42.7 SECONDS (ref 11.7–14.5)
RBC # BLD: 3.65 M/CU MM (ref 4.6–6.2)
SODIUM BLD-SCNC: 137 MMOL/L (ref 135–145)
TOTAL PROTEIN: 5.6 GM/DL (ref 6.4–8.2)
WBC # BLD: 11.1 K/CU MM (ref 4–10.5)

## 2021-01-17 PROCEDURE — 2500000003 HC RX 250 WO HCPCS: Performed by: INTERNAL MEDICINE

## 2021-01-17 PROCEDURE — 85610 PROTHROMBIN TIME: CPT

## 2021-01-17 PROCEDURE — 99233 SBSQ HOSP IP/OBS HIGH 50: CPT | Performed by: INTERNAL MEDICINE

## 2021-01-17 PROCEDURE — 2580000003 HC RX 258: Performed by: INTERNAL MEDICINE

## 2021-01-17 PROCEDURE — 94640 AIRWAY INHALATION TREATMENT: CPT

## 2021-01-17 PROCEDURE — 82248 BILIRUBIN DIRECT: CPT

## 2021-01-17 PROCEDURE — 2500000003 HC RX 250 WO HCPCS: Performed by: NURSE PRACTITIONER

## 2021-01-17 PROCEDURE — 86850 RBC ANTIBODY SCREEN: CPT

## 2021-01-17 PROCEDURE — 6360000002 HC RX W HCPCS: Performed by: STUDENT IN AN ORGANIZED HEALTH CARE EDUCATION/TRAINING PROGRAM

## 2021-01-17 PROCEDURE — 86140 C-REACTIVE PROTEIN: CPT

## 2021-01-17 PROCEDURE — 89220 SPUTUM SPECIMEN COLLECTION: CPT

## 2021-01-17 PROCEDURE — 6370000000 HC RX 637 (ALT 250 FOR IP): Performed by: INTERNAL MEDICINE

## 2021-01-17 PROCEDURE — 84145 PROCALCITONIN (PCT): CPT

## 2021-01-17 PROCEDURE — 80053 COMPREHEN METABOLIC PANEL: CPT

## 2021-01-17 PROCEDURE — 82803 BLOOD GASES ANY COMBINATION: CPT

## 2021-01-17 PROCEDURE — 71045 X-RAY EXAM CHEST 1 VIEW: CPT

## 2021-01-17 PROCEDURE — 86900 BLOOD TYPING SEROLOGIC ABO: CPT

## 2021-01-17 PROCEDURE — 2000000000 HC ICU R&B

## 2021-01-17 PROCEDURE — 80202 ASSAY OF VANCOMYCIN: CPT

## 2021-01-17 PROCEDURE — 6360000002 HC RX W HCPCS: Performed by: NURSE PRACTITIONER

## 2021-01-17 PROCEDURE — 2700000000 HC OXYGEN THERAPY PER DAY

## 2021-01-17 PROCEDURE — 85379 FIBRIN DEGRADATION QUANT: CPT

## 2021-01-17 PROCEDURE — 6370000000 HC RX 637 (ALT 250 FOR IP): Performed by: NURSE PRACTITIONER

## 2021-01-17 PROCEDURE — 83735 ASSAY OF MAGNESIUM: CPT

## 2021-01-17 PROCEDURE — 6360000002 HC RX W HCPCS: Performed by: INTERNAL MEDICINE

## 2021-01-17 PROCEDURE — 94761 N-INVAS EAR/PLS OXIMETRY MLT: CPT

## 2021-01-17 PROCEDURE — 36430 TRANSFUSION BLD/BLD COMPNT: CPT

## 2021-01-17 PROCEDURE — 86901 BLOOD TYPING SEROLOGIC RH(D): CPT

## 2021-01-17 PROCEDURE — 85027 COMPLETE CBC AUTOMATED: CPT

## 2021-01-17 PROCEDURE — 82962 GLUCOSE BLOOD TEST: CPT

## 2021-01-17 PROCEDURE — 86141 C-REACTIVE PROTEIN HS: CPT

## 2021-01-17 PROCEDURE — 94003 VENT MGMT INPAT SUBQ DAY: CPT

## 2021-01-17 RX ORDER — METHYLPREDNISOLONE SODIUM SUCCINATE 125 MG/2ML
125 INJECTION, POWDER, LYOPHILIZED, FOR SOLUTION INTRAMUSCULAR; INTRAVENOUS ONCE
Status: COMPLETED | OUTPATIENT
Start: 2021-01-17 | End: 2021-01-17

## 2021-01-17 RX ORDER — METOCLOPRAMIDE HYDROCHLORIDE 5 MG/ML
10 INJECTION INTRAMUSCULAR; INTRAVENOUS EVERY 6 HOURS
Status: DISCONTINUED | OUTPATIENT
Start: 2021-01-17 | End: 2021-02-01 | Stop reason: HOSPADM

## 2021-01-17 RX ADMIN — PROPOFOL 65 MCG/KG/MIN: 10 INJECTION, EMULSION INTRAVENOUS at 05:36

## 2021-01-17 RX ADMIN — DICLOFENAC SODIUM 2 G: 10 GEL TOPICAL at 09:02

## 2021-01-17 RX ADMIN — SODIUM CHLORIDE 2 MCG/KG/MIN: 9 INJECTION, SOLUTION INTRAVENOUS at 22:00

## 2021-01-17 RX ADMIN — FUROSEMIDE 20 MG: 10 INJECTION, SOLUTION INTRAVENOUS at 18:49

## 2021-01-17 RX ADMIN — WHITE PETROLATUM: .15; .85 OINTMENT OPHTHALMIC at 17:01

## 2021-01-17 RX ADMIN — PROPOFOL 65 MCG/KG/MIN: 10 INJECTION, EMULSION INTRAVENOUS at 00:47

## 2021-01-17 RX ADMIN — METOCLOPRAMIDE 10 MG: 5 INJECTION, SOLUTION INTRAMUSCULAR; INTRAVENOUS at 14:19

## 2021-01-17 RX ADMIN — CHLORHEXIDINE GLUCONATE 0.12% ORAL RINSE 15 ML: 1.2 LIQUID ORAL at 20:35

## 2021-01-17 RX ADMIN — PROPOFOL 65 MCG/KG/MIN: 10 INJECTION, EMULSION INTRAVENOUS at 14:18

## 2021-01-17 RX ADMIN — POLYVINYL ALCOHOL 1 DROP: 14 SOLUTION/ DROPS OPHTHALMIC at 09:01

## 2021-01-17 RX ADMIN — Medication 15 MCG/MIN: at 02:04

## 2021-01-17 RX ADMIN — PROPOFOL 65 MCG/KG/MIN: 10 INJECTION, EMULSION INTRAVENOUS at 04:29

## 2021-01-17 RX ADMIN — PROPOFOL 65 MCG/KG/MIN: 10 INJECTION, EMULSION INTRAVENOUS at 21:35

## 2021-01-17 RX ADMIN — ATORVASTATIN CALCIUM 40 MG: 40 TABLET, FILM COATED ORAL at 20:27

## 2021-01-17 RX ADMIN — PROPOFOL 65 MCG/KG/MIN: 10 INJECTION, EMULSION INTRAVENOUS at 18:13

## 2021-01-17 RX ADMIN — VANCOMYCIN HYDROCHLORIDE 1250 MG: 5 INJECTION, POWDER, LYOPHILIZED, FOR SOLUTION INTRAVENOUS at 06:39

## 2021-01-17 RX ADMIN — ALBUTEROL SULFATE 2 PUFF: 90 AEROSOL, METERED RESPIRATORY (INHALATION) at 12:00

## 2021-01-17 RX ADMIN — Medication 2 PUFF: at 12:01

## 2021-01-17 RX ADMIN — Medication 100 MCG/HR: at 05:36

## 2021-01-17 RX ADMIN — ALBUTEROL SULFATE 2 PUFF: 90 AEROSOL, METERED RESPIRATORY (INHALATION) at 15:22

## 2021-01-17 RX ADMIN — SODIUM CHLORIDE, PRESERVATIVE FREE 10 ML: 5 INJECTION INTRAVENOUS at 20:28

## 2021-01-17 RX ADMIN — METOCLOPRAMIDE 10 MG: 5 INJECTION, SOLUTION INTRAMUSCULAR; INTRAVENOUS at 20:27

## 2021-01-17 RX ADMIN — PROPOFOL 65 MCG/KG/MIN: 10 INJECTION, EMULSION INTRAVENOUS at 20:26

## 2021-01-17 RX ADMIN — DEXAMETHASONE SODIUM PHOSPHATE 6 MG: 10 INJECTION, SOLUTION INTRAMUSCULAR; INTRAVENOUS at 00:47

## 2021-01-17 RX ADMIN — PROPOFOL 65 MCG/KG/MIN: 10 INJECTION, EMULSION INTRAVENOUS at 10:13

## 2021-01-17 RX ADMIN — ALBUTEROL SULFATE 2 PUFF: 90 AEROSOL, METERED RESPIRATORY (INHALATION) at 08:02

## 2021-01-17 RX ADMIN — Medication 2 PUFF: at 19:44

## 2021-01-17 RX ADMIN — ALBUTEROL SULFATE 2 PUFF: 90 AEROSOL, METERED RESPIRATORY (INHALATION) at 19:44

## 2021-01-17 RX ADMIN — PROPOFOL 65 MCG/KG/MIN: 10 INJECTION, EMULSION INTRAVENOUS at 08:37

## 2021-01-17 RX ADMIN — SODIUM CHLORIDE 2 MCG/KG/MIN: 9 INJECTION, SOLUTION INTRAVENOUS at 08:54

## 2021-01-17 RX ADMIN — FUROSEMIDE 20 MG: 10 INJECTION, SOLUTION INTRAVENOUS at 08:53

## 2021-01-17 RX ADMIN — Medication 100 MCG/HR: at 16:03

## 2021-01-17 RX ADMIN — FAMOTIDINE 20 MG: 10 INJECTION, SOLUTION INTRAVENOUS at 20:27

## 2021-01-17 RX ADMIN — METHYLPREDNISOLONE SODIUM SUCCINATE 125 MG: 125 INJECTION, POWDER, FOR SOLUTION INTRAMUSCULAR; INTRAVENOUS at 20:27

## 2021-01-17 RX ADMIN — Medication 2 PUFF: at 16:20

## 2021-01-17 RX ADMIN — PROPOFOL 65 MCG/KG/MIN: 10 INJECTION, EMULSION INTRAVENOUS at 02:04

## 2021-01-17 RX ADMIN — PROPOFOL 65 MCG/KG/MIN: 10 INJECTION, EMULSION INTRAVENOUS at 02:48

## 2021-01-17 RX ADMIN — FAMOTIDINE 20 MG: 10 INJECTION, SOLUTION INTRAVENOUS at 08:53

## 2021-01-17 RX ADMIN — INSULIN LISPRO 12 UNITS: 100 INJECTION, SOLUTION INTRAVENOUS; SUBCUTANEOUS at 11:41

## 2021-01-17 RX ADMIN — INSULIN LISPRO 12 UNITS: 100 INJECTION, SOLUTION INTRAVENOUS; SUBCUTANEOUS at 09:30

## 2021-01-17 RX ADMIN — PROPOFOL 65 MCG/KG/MIN: 10 INJECTION, EMULSION INTRAVENOUS at 16:04

## 2021-01-17 RX ADMIN — PROPOFOL 65 MCG/KG/MIN: 10 INJECTION, EMULSION INTRAVENOUS at 12:15

## 2021-01-17 RX ADMIN — DICLOFENAC SODIUM 2 G: 10 GEL TOPICAL at 20:37

## 2021-01-17 RX ADMIN — Medication 2 PUFF: at 08:03

## 2021-01-17 RX ADMIN — SODIUM CHLORIDE, PRESERVATIVE FREE 10 ML: 5 INJECTION INTRAVENOUS at 08:54

## 2021-01-17 RX ADMIN — CHLORHEXIDINE GLUCONATE 0.12% ORAL RINSE 15 ML: 1.2 LIQUID ORAL at 08:53

## 2021-01-17 ASSESSMENT — PULMONARY FUNCTION TESTS
PIF_VALUE: 30
PIF_VALUE: 25
PIF_VALUE: 30
PIF_VALUE: 26
PIF_VALUE: 25
PIF_VALUE: 30
PIF_VALUE: 25
PIF_VALUE: 25
PIF_VALUE: 30
PIF_VALUE: 25
PIF_VALUE: 30
PIF_VALUE: 30
PIF_VALUE: 26
PIF_VALUE: 25
PIF_VALUE: 25
PIF_VALUE: 30

## 2021-01-17 NOTE — PROGRESS NOTES
Pulmonary and Critical Care  Progress Note      VITALS:  BP (!) 117/50   Pulse 68   Temp 98.1 °F (36.7 °C) (Rectal)   Resp 20   Ht 6' 5.01\" (1.956 m)   Wt 296 lb 8.3 oz (134.5 kg)   SpO2 93%   BMI 35.15 kg/m²     Subjective:   CHIEF COMPLAINT :SOB     HPI:                The patient is on the vent. He is sedated and paralyzed. He is no fighting the vent. , his CT abd/pelvis showed no bowel obstruction. Objective:   PHYSICAL EXAM:    LUNGS:Bilateral basal crackles  Abd-distended, soft, BS+,NT  Ext- no pedal edema  CVS-s1s2, no murmurs      DATA:    CBC:  Recent Labs     01/16/21  0610 01/17/21  0555   WBC 10.5 11.1*   RBC 3.79* 3.65*   HGB 11.1* 10.9*   HCT 35.0* 33.6*    268   MCV 92.3 92.1   MCH 29.3 29.9   MCHC 31.7* 32.4   RDW 18.5* 18.5*      BMP:  Recent Labs     01/16/21  0610 01/17/21  0555    137   K 4.1 4.3    105   CO2 23 19*   BUN 39* 55*   CREATININE 1.1 1.5*   CALCIUM 7.3* 7.3*   GLUCOSE 240* 441*      ABG:  Recent Labs     01/16/21  0700 01/16/21  1430 01/17/21  0600   PH 7.38 7.13* 7.40   PO2ART 70* 120* 268*   GSR2QKQ 42.0 77.0* 34.0   O2SAT 93.0* 96.2 97.3*     BNP  Lab Results   Component Value Date    BNP 10 05/18/2014      D-Dimer:  Lab Results   Component Value Date    DDIMER 368 (H) 01/17/2021      Radiology:   No bowel obstruction or other acute abnormality in the abdomen or pelvis. 2. Patchy bibasilar consolidations and ground-glass opacities significantly   worsened from 01/09/2021 compatible with multifocal pneumonia.  Trace   bilateral pleural effusions. 3. 2.1 cm indeterminate right adrenal nodule unchanged from 05/28/2014 likely   representing a benign adenoma. 4. 1.2 cm indeterminate cystic lesion in the lower pole the right kidney. Consider further evaluation with nonemergent renal protocol MRI or CT.      No significant interval change      Assessment/Plan     Patient Active Problem List    Diagnosis Date Noted  Gastroesophageal reflux disease without esophagitis 11/08/2011     Priority: Low    Multifocal pneumonia 01/12/2021    Pneumonia due to COVID-19 virus 01/12/2021    Generalized weakness 01/08/2021    Weakness 01/08/2021    Cellulitis of lower extremity     Hyponatremia     KIRSTEN (acute kidney injury) ([facility] [address] 11/11/2020    Troponin I above reference range 11/21/2019    WD-Chronic venous hypertension with inflammation, right 09/19/2019    WD-Chronic venous hypertension (idiopathic) with ulcer of left lower extremity (CODE) ([facility] [address] 06/27/2019    Personal history of PE (pulmonary embolism) 02/12/2019    History of colon polyps 06/21/2016   Acute on chronic hypoxic resp failure  VDRF  Bilateral Pneumonia  COVID  Obesity  APRIL non compliant with CPAP  COPD         1. Abx  2. F/u C&S  3. Coumadin  4. Inhalers  5. CXR in am  6. Keep sats > 92%  7. Wean off pressors for a MAP > 65 mmhg  8. D/c Nimbex in am  9. Decadron   10. Diuresis  11. Keep sats > 92%  12. SAT and SBT trial when stable  13. C/w present management  No follow-ups on file.     Electronically signed by Lizandro Toney MD on 1/17/2021 at 1:41 PM

## 2021-01-17 NOTE — PROGRESS NOTES
PHARMACY ANTICOAGULATION MONITORING SERVICE    Mukesh Givens is a 77 y.o. male on warfarin therapy for history of DVT. Pharmacy consulted by Dr. Alpa Vides for monitoring and adjustment of treatment. Indication for anticoagulation: Hx of DVT  INR goal: 2-3  Warfarin dose prior to admission: 5 mg daily    Pertinent Laboratory Values   Recent Labs     01/16/21  0610 01/17/21  0555   INR 3.07 3.48   HGB 11.1* 10.9*   HCT 35.0* 33.6*    268       Assessment/Plan:  ? Possible DDI's:   o Aspirin (home med), can increase bleeding risk, clinically appropriate  o Escitalopram (home med), can increase bleeding risk, appropriate to continue while inpatient  o Enoxaparin bridge now stopped. o Tramadol and dexamethasone can increase effects of warfarin  ? INR continues to trend upward, remains supra-therapeutic @3.48. No warfarin dose given last night. ? Continue to hold warfarin for now and follow INR trends tomorrow. ? Pharmacy will continue to monitor and adjust warfarin therapy as indicated. Thank you for the consult,  Johnetta Favre.  Deb Purcell  1/17/2021 1:03 PM

## 2021-01-17 NOTE — PROGRESS NOTES
01/17/21 0432   Vent Information   Vent Type 980   Vent Mode AC/PC   Vt Ordered 0 mL   Pressure Ordered 20   Rate Set 20 bmp   Peak Flow 0 L/min   Pressure Support 0 cmH20   FiO2  70 %   SpO2 100 %   SpO2/FiO2 ratio 142.86   Sensitivity 0   PEEP/CPAP 10   I Time/ I Time % 1 s   Humidification Source HME   Nitric Oxide/Epoprostenol In Use? No   Vent Patient Data   High Peep/I Pressure 20   Peak Inspiratory Pressure 30 cmH2O   Mean Airway Pressure 17 cmH20   Rate Measured 20 br/min   Vt Exhaled 816 mL   Minute Volume 16.3 Liters   I:E Ratio 1:2.00   Spontaneous Breathing Trial (SBT) RT Doc   Pulse 62   Additional Respiratory  Assessments   Resp 20   Position Semi-Thorpe's   Oral Care Completed? No   Alarm Settings   High Pressure Alarm 40 cmH2O   Delay Alarm 20 sec(s)   Low Minute Volume Alarm 2.5 L/min   Apnea (secs) 20 secs   High Respiratory Rate 40 br/min   Low Exhaled Vt  250 mL   Patient Observation   Observations Pt is sleeping   ETT (adult)   Placement Date/Time: 01/13/21 (c) 4638   Preoxygenation: Yes  Mask Ventilation: Ventilated by mask (1)  Technique: Video laryngoscopy  Tube Size: 7.5 mm  Location: Oral  Grade View: Full view of the glottis  Insertion attempts: 1  Placement Verified B. ..    Secured at 25 cm   Measured From Lips   ET Placement Left   Secured By Commercial tube brewer   Site Condition Dry

## 2021-01-17 NOTE — PLAN OF CARE
Problem: Airway Clearance - Ineffective  Goal: Achieve or maintain patent airway  Outcome: Ongoing     Problem: Gas Exchange - Impaired  Goal: Absence of hypoxia  Outcome: Ongoing  Goal: Promote optimal lung function  Outcome: Ongoing     Problem: Breathing Pattern - Ineffective  Goal: Ability to achieve and maintain a regular respiratory rate  Outcome: Ongoing     Problem:  Body Temperature -  Risk of, Imbalanced  Goal: Ability to maintain a body temperature within defined limits  Outcome: Ongoing  Goal: Will regain or maintain usual level of consciousness  Outcome: Ongoing  Goal: Complications related to the disease process, condition or treatment will be avoided or minimized  Outcome: Ongoing     Problem: Isolation Precautions - Risk of Spread of Infection  Goal: Prevent transmission of infection  Outcome: Ongoing     Problem: Nutrition Deficits  Goal: Optimize nutritional status  Outcome: Ongoing     Problem: Risk for Fluid Volume Deficit  Goal: Maintain normal heart rhythm  Outcome: Ongoing  Goal: Maintain absence of muscle cramping  Outcome: Ongoing  Goal: Maintain normal serum potassium, sodium, calcium, phosphorus, and pH  Outcome: Ongoing     Problem: Falls - Risk of:  Goal: Will remain free from falls  Description: Will remain free from falls  Outcome: Ongoing  Goal: Absence of physical injury  Description: Absence of physical injury  Outcome: Ongoing     Problem: Skin Integrity:  Goal: Will show no infection signs and symptoms  Description: Will show no infection signs and symptoms  Outcome: Ongoing  Goal: Absence of new skin breakdown  Description: Absence of new skin breakdown  Outcome: Ongoing

## 2021-01-17 NOTE — PROGRESS NOTES
Hospitalist Progress Note      Name:  Jillian Alston /Age/Sex: 1945  [de-identified]77 y.o. male)   MRN & CSN:  [id number] & [id number] Admission Date/Time: 2021 12:17 AM   Location:  -A PCP: Makenna Urena MD       Hospital Day: 5    Hospital Course:   Jillian Alston is a 77 y.o. male who [place] [facility]iginally with progressive weakness in the legs and bilateral leg pain. Patient later developed acute hypoxic respiratory failure presumed secondary to multifocal pneumonia on  and tested positive for Covid-19. Patient was later transferred to [facility] for further management. Chest x-ray at the time showed multifocal infiltrates. Patient's baseline at home is 3 L of O2. Patient was placed on BiPAP after reduced O2 stat on NC. Patient respiratory status continued to worsen and is now () intubated and placed on ventilator support. Assessment and Plan:     Acute respiratory failure with hypoxia secondary to COVID-19 viral pneumonia infection  SIRS criteria met at time of admission secondary to sepsis from viral pneumonia  COVID-19 positive: 21  IV antibiotics, IV steroids initiated on 21  Convalescent plasma given: 21  Remdesivir: 21  Oxygen requirement today: now on ventilator   DM type 2 - continue insulin sliding scale   HTN   Hyperlipidemia   Morbid Obesity   ? Cellulitis of RLE - continue IV antibiotics     proning as able  Optimize vent settings    Diet DIET TUBE FEED CONTINUOUS/CYCLIC NPO; Low Calorie High Protein (Vital HP);  Nasogastric; Continuous; 10; 20; 24   DVT Prophylaxis [x] Lovenox, []  Heparin, [] SCDs, []No VTE prophylaxis, patient ambulating   GI Prophylaxis [] PPI, [x] H2 Blocker, [] No GI prophylaxis, patient is receiving diet/Tube Feeds   Code Status Full Code   Disposition ICU due to vent dependency  Prognosis: guarded    MDM [] Low, [] Moderate,[x]  High  Patient's risk as above due to  atorvastatin  40 mg Oral Nightly    buPROPion  300 mg Oral QAM    escitalopram  20 mg Oral Daily    metoprolol tartrate  12.5 mg Oral BID    sodium chloride flush  10 mL Intravenous 2 times per day    insulin lispro  0-12 Units Subcutaneous TID WC    insulin lispro  0-6 Units Subcutaneous Nightly    diclofenac sodium  2 g Topical BID    dexamethasone  6 mg Intravenous Q24H    zinc sulfate  50 mg Oral Daily    albuterol sulfate HFA  2 puff Inhalation 4x daily    ipratropium  2 puff Inhalation 4x daily    chlorhexidine  15 mL Mouth/Throat BID    famotidine (PEPCID) injection  20 mg Intravenous BID      Infusions:    cisatracurium (NIMBEX) infusion 2 mcg/kg/min (01/17/21 0854)    norepinephrine 5 mcg/min (01/17/21 0937)    fentanyl 100 mcg/hr (01/17/21 0536)    dextrose      sodium chloride      propofol 65 mcg/kg/min (01/17/21 0837)     PRN Meds:     microfibrillar collagen, , PRN      polyvinyl alcohol, 1 drop, Q4H PRN    And      artificial tears, , PRN      sodium chloride flush, 10 mL, PRN      promethazine, 12.5 mg, Q6H PRN    Or      ondansetron, 4 mg, Q6H PRN      polyethylene glycol, 17 g, Daily PRN      acetaminophen, 650 mg, Q6H PRN    Or      acetaminophen, 650 mg, Q6H PRN      glucose, 15 g, PRN      dextrose, 12.5 g, PRN      glucagon (rDNA), 1 mg, PRN      dextrose, 100 mL/hr, PRN      traMADol, 50 mg, Q6H PRN    Or      traMADol, 100 mg, Q6H PRN      diphenhydrAMINE, 25 mg, Q6H PRN      sodium chloride, , PRN      sodium chloride, 30 mL, PRN          Electronically signed by Luciana Guerrier DO on 1/17/2021 at 10:04 AM

## 2021-01-17 NOTE — PROGRESS NOTES
[address] [facility]  consulted by Dr. Verito Mantilla for monitoring and adjustment. Indication for treatment: Covid-19 pneumonia, possible secondary bacterial pneumonia, cellulitis  Goal trough: 15 mcg/mL (AUC/YOLA 400-600)    Pertinent Laboratory Values:   Temp Readings from Last 3 Encounters:   01/14/21 97.9 °F (36.6 °C) (Axillary)   01/12/21 97.1 °F (36.2 °C) (Infrared)   01/08/21 98.9 °F (37.2 °C) (Oral)     Recent Labs     01/12/21  0830 01/12/21  0845 01/13/21  0600 01/14/21  0800   WBC  --  6.4 5.9 10.6*  10.8*   LACTATE 1.0  --   --   --      Recent Labs     01/12/21  0845 01/13/21  0600 01/14/21  0800   BUN 25* 26* 36*   CREATININE 1.4* 1.2 1.4*     Estimated Creatinine Clearance: 72 mL/min (A) (based on SCr of 1.4 mg/dL (H)). Intake/Output Summary (Last 24 hours) at 1/14/2021 1356  Last data filed at 1/14/2021 0958  Gross per 24 hour   Intake 480 ml   Output 2350 ml   Net -1870 ml       Pertinent Cultures:  Date    Source    Results  1/12   Resp PCR               Covid-19  1/13   Respiratory    Ordered  1/13   Blood                Ordered    VANCOMYCIN RANDOM:    No results for input(s): VANCORANDOM in the last 72 hours. RENAL LAB EVALUATION  Estimated Creatinine Clearance: 66 mL/min (A) (based on SCr of 1.5 mg/dL (H)). Recent Labs     01/16/21  0610 01/17/21  0555   BUN 39* 55*   CREATININE 1.1 1.5*     Assessment:  · WBC and temperature: WBC slightly elevated @10.8 on 1/14, pt afebrile  · SCr, BUN, and urine output:   · KIRSTEN, appeared resolved, SCR trending back upward to 1.5 today (Fluctuating between 1.1 to 1.5)  · BUN also trending back up to 55  · Day(s) of therapy: 6  · Vancomycin concentration:  · 1/13: 6, appropriate to re-dose  · 1/14: 16, 14h post-dose    Plan:  · SCR up and down between 1.1-1.5, will follow trends closely. · Continue vancomycin at 1,250mg ivpb q18h, historical dosing. · Trough scheduled for later tonight. · Pharmacy will continue to monitor patient and adjust therapy as indicated    [place] 3 1/17 @ 23:00    Thank you for the consult,  Peri Ruggiero.  Елена Taylor  3:50 PM  01/17/21

## 2021-01-17 NOTE — PROGRESS NOTES
Hospitalist Progress Note      Name:  Maged Yeager /Age/Sex: 1945  [de-identified]77 y.o. male)   MRN & CSN:  [id number] & [id number] Admission Date/Time: 2021 12:17 AM   Location:  -A PCP: Deneen [place] MD       Hospital Day: 5    Hospital Course:   Maged Yeager is a 77 y.o. male who presented to the ED at [facility] on  originally with progressive weakness in the legs and bilateral leg pain. Patient later developed acute hypoxic respiratory failure presumed secondary to multifocal pneumonia on  and tested positive for Covid-19. Patient was later transferred to [facility] for further management. Chest x-ray at the time showed multifocal infiltrates. Patient's baseline at home is 3 L of O2. Patient was placed on BiPAP after reduced O2 stat on NC. Patient respiratory status continued to worsen and is now () intubated and placed on ventilator support. Proning started immediately. Assessment and Plan:     Acute respiratory failure with hypoxia secondary to COVID-19 viral pneumonia infection  SIRS criteria met at time of admission secondary to sepsis from viral pneumonia  COVID-19 positive: 21  IV antibiotics, IV steroids initiated on 21  Convalescent plasma given: 21  Remdesivir: 21  Oxygen requirement today: now on ventilator   DM type 2 - continue insulin sliding scale   HTN   Hyperlipidemia   Morbid Obesity   ? Cellulitis of RLE - continue IV antibiotics     Stat cxr to confirm no ptx as his vent settings are worsening  Prone asap if negative cxr    Diet DIET TUBE FEED CONTINUOUS/CYCLIC NPO; Low Calorie High Protein (Vital HP);  Nasogastric; Continuous; 10; 20; 24   DVT Prophylaxis [x] Lovenox, []  Heparin, [] SCDs, []No VTE prophylaxis, patient ambulating   GI Prophylaxis [] PPI, [x] H2 Blocker, [] No GI prophylaxis, patient is receiving diet/Tube Feeds   Code Status Full Code   Disposition ICU due to vent dependency  Prognosis: guarded MDM [] Low, [] Moderate,[x]  High  Patient's risk as above due to     [x] One or more chronic illnesses with severe exacerbation or progression    [x] Acute or chronic illnesses or injuries that pose a threat to life or bodily function    [] An abrupt change in neurological status    [] Decision not to resuscitate     [x] Drug therapy requiring intensive monitoring for toxicity      Subjective:     Pt S&E. Worse tolerance of vent today  No fevers overnight     No ROS as he is intubated and sedated. Objective: Intake/Output Summary (Last 24 hours) at 1/17/2021 1006  Last data filed at 1/17/2021 0854  Gross per 24 hour   Intake 2431.93 ml   Output 1100 ml   Net 1331.93 ml      Vitals:   Vitals:    01/17/21 0807   BP:    Pulse: 61   Resp: 16   Temp:    SpO2: 92%     Physical Exam:    GEN sedated male, laying in bed in no apparent distress. EYES Pupils are equally round. No scleral erythema, discharge, or conjunctivitis. HENT Mucous membranes are moist. +ETT +NGT  NECK No apparent thyromegaly or masses. RESP +rales or rhonchi. Symmetric chest movement while on invasive mechanical oxygen support  CARDIO/VASC S1/S2 auscultated. Regular rate without appreciable murmurs, rubs, or gallops. Peripheral pulses equal bilaterally and palpable. +1 pitting edema in extremities   GI Abdomen is soft without significant tenderness, masses, or guarding. Bowel sounds are normoactive. Rectal exam deferred. Central obesity   Starkey catheter IS present. MSK No gross joint deformities. No spontaneous movement noted  SKIN Normal coloration, warm, dry. NEURO Does not follow directions. No response to verbal or painful stimuli  PSYCH Sedated on propofol.  Fenatnyl, dopamine gtt    Medications:   Medications:    vancomycin  1,250 mg Intravenous Q18H    warfarin (COUMADIN) daily dosing (placeholder)   Other RX Placeholder    furosemide  20 mg Intravenous BID    alogliptin  12.5 mg Oral Daily  ARIPiprazole  15 mg Oral Daily    aspirin  81 mg Oral Daily    atorvastatin  40 mg Oral Nightly    buPROPion  300 mg Oral QAM    escitalopram  20 mg Oral Daily    metoprolol tartrate  12.5 mg Oral BID    sodium chloride flush  10 mL Intravenous 2 times per day    insulin lispro  0-12 Units Subcutaneous TID WC    insulin lispro  0-6 Units Subcutaneous Nightly    diclofenac sodium  2 g Topical BID    dexamethasone  6 mg Intravenous Q24H    zinc sulfate  50 mg Oral Daily    albuterol sulfate HFA  2 puff Inhalation 4x daily    ipratropium  2 puff Inhalation 4x daily    chlorhexidine  15 mL Mouth/Throat BID    famotidine (PEPCID) injection  20 mg Intravenous BID      Infusions:    cisatracurium (NIMBEX) infusion 2 mcg/kg/min (01/17/21 0854)    norepinephrine 5 mcg/min (01/17/21 0937)    fentanyl 100 mcg/hr (01/17/21 0536)    dextrose      sodium chloride      propofol 65 mcg/kg/min (01/17/21 0837)     PRN Meds:     microfibrillar collagen, , PRN      polyvinyl alcohol, 1 drop, Q4H PRN    And      artificial tears, , PRN      sodium chloride flush, 10 mL, PRN      promethazine, 12.5 mg, Q6H PRN    Or      ondansetron, 4 mg, Q6H PRN      polyethylene glycol, 17 g, Daily PRN      acetaminophen, 650 mg, Q6H PRN    Or      acetaminophen, 650 mg, Q6H PRN      glucose, 15 g, PRN      dextrose, 12.5 g, PRN      glucagon (rDNA), 1 mg, PRN      dextrose, 100 mL/hr, PRN      traMADol, 50 mg, Q6H PRN    Or      traMADol, 100 mg, Q6H PRN      diphenhydrAMINE, 25 mg, Q6H PRN      sodium chloride, , PRN      sodium chloride, 30 mL, PRN          Electronically signed by Tea Jackson DO on 1/17/2021 at 10:06 AM

## 2021-01-18 ENCOUNTER — APPOINTMENT (OUTPATIENT)
Dept: GENERAL RADIOLOGY | Age: 76
DRG: 870 | End: 2021-01-18
Attending: INTERNAL MEDICINE
Payer: MEDICARE

## 2021-01-18 LAB
ALBUMIN SERPL-MCNC: 2.4 GM/DL (ref 3.4–5)
ALP BLD-CCNC: 73 IU/L (ref 40–129)
ALT SERPL-CCNC: 16 U/L (ref 10–40)
ANION GAP SERPL CALCULATED.3IONS-SCNC: 9 MMOL/L (ref 4–16)
AST SERPL-CCNC: 18 IU/L (ref 15–37)
BASE EXCESS MIXED: 0 (ref 0–1.2)
BILIRUB SERPL-MCNC: 0.4 MG/DL (ref 0–1)
BILIRUBIN DIRECT: 0.2 MG/DL (ref 0–0.3)
BILIRUBIN, INDIRECT: 0.2 MG/DL (ref 0–0.7)
BUN BLDV-MCNC: 59 MG/DL (ref 6–23)
C-REACTIVE PROTEIN, HIGH SENSITIVITY: 85.4 MG/L
CALCIUM SERPL-MCNC: 7.3 MG/DL (ref 8.3–10.6)
CARBON MONOXIDE, BLOOD: 2 % (ref 0–5)
CHLORIDE BLD-SCNC: 108 MMOL/L (ref 99–110)
CO2 CONTENT: 26.7 MMOL/L (ref 19–24)
CO2: 24 MMOL/L (ref 21–32)
COMMENT: ABNORMAL
COMPONENT: NORMAL
CREAT SERPL-MCNC: 1.4 MG/DL (ref 0.9–1.3)
CULTURE: NORMAL
CULTURE: NORMAL
D DIMER: 286 NG/ML(DDU)
DOSE AMOUNT: ABNORMAL
DOSE TIME: ABNORMAL
GFR AFRICAN AMERICAN: 60 ML/MIN/1.73M2
GFR NON-AFRICAN AMERICAN: 50 ML/MIN/1.73M2
GLUCOSE BLD-MCNC: 260 MG/DL (ref 70–99)
GLUCOSE BLD-MCNC: 317 MG/DL (ref 70–99)
GLUCOSE BLD-MCNC: 345 MG/DL (ref 70–99)
GLUCOSE BLD-MCNC: 354 MG/DL (ref 70–99)
GLUCOSE BLD-MCNC: 373 MG/DL (ref 70–99)
HCO3 ARTERIAL: 25.4 MMOL/L (ref 18–23)
HCT VFR BLD CALC: 31.5 % (ref 42–52)
HEMOGLOBIN: 10.1 GM/DL (ref 13.5–18)
INR BLD: 2.11 INDEX
Lab: 2
Lab: NORMAL
Lab: NORMAL
MAGNESIUM: 2.2 MG/DL (ref 1.8–2.4)
MCH RBC QN AUTO: 29.2 PG (ref 27–31)
MCHC RBC AUTO-ENTMCNC: 32.1 % (ref 32–36)
MCV RBC AUTO: 91 FL (ref 78–100)
METHEMOGLOBIN ARTERIAL: 1.5 %
O2 SATURATION: 95.2 % (ref 96–97)
PCO2 ARTERIAL: 43 MMHG (ref 32–45)
PDW BLD-RTO: 18.8 % (ref 11.7–14.9)
PH BLOOD: 7.38 (ref 7.34–7.45)
PHOSPHORUS: 4.1 MG/DL (ref 2.5–4.9)
PLATELET # BLD: 264 K/CU MM (ref 140–440)
PMV BLD AUTO: 13.1 FL (ref 7.5–11.1)
PO2 ARTERIAL: 89 MMHG (ref 75–100)
POTASSIUM SERPL-SCNC: 4.8 MMOL/L (ref 3.5–5.1)
PROCALCITONIN: 0.47
PROTHROMBIN TIME: 25.7 SECONDS (ref 11.7–14.5)
RBC # BLD: 3.46 M/CU MM (ref 4.6–6.2)
SODIUM BLD-SCNC: 141 MMOL/L (ref 135–145)
SPECIMEN: NORMAL
SPECIMEN: NORMAL
STATUS: NORMAL
STATUS: NORMAL
TOTAL PROTEIN: 5.6 GM/DL (ref 6.4–8.2)
TRANSFUSION STATUS: NORMAL
TRANSFUSION STATUS: NORMAL
TRIGL SERPL-MCNC: 85 MG/DL
UNIT DIVISION: NORMAL
UNIT DIVISION: NORMAL
UNIT NUMBER: NORMAL
UNIT NUMBER: NORMAL
VANCOMYCIN TROUGH: 21.2 UG/ML (ref 10–20)
WBC # BLD: 13.7 K/CU MM (ref 4–10.5)

## 2021-01-18 PROCEDURE — 71045 X-RAY EXAM CHEST 1 VIEW: CPT

## 2021-01-18 PROCEDURE — 2500000003 HC RX 250 WO HCPCS: Performed by: INTERNAL MEDICINE

## 2021-01-18 PROCEDURE — 85610 PROTHROMBIN TIME: CPT

## 2021-01-18 PROCEDURE — 6360000002 HC RX W HCPCS: Performed by: INTERNAL MEDICINE

## 2021-01-18 PROCEDURE — 85027 COMPLETE CBC AUTOMATED: CPT

## 2021-01-18 PROCEDURE — 82962 GLUCOSE BLOOD TEST: CPT

## 2021-01-18 PROCEDURE — 94761 N-INVAS EAR/PLS OXIMETRY MLT: CPT

## 2021-01-18 PROCEDURE — 6360000002 HC RX W HCPCS: Performed by: STUDENT IN AN ORGANIZED HEALTH CARE EDUCATION/TRAINING PROGRAM

## 2021-01-18 PROCEDURE — 94003 VENT MGMT INPAT SUBQ DAY: CPT

## 2021-01-18 PROCEDURE — 2580000003 HC RX 258: Performed by: INTERNAL MEDICINE

## 2021-01-18 PROCEDURE — 2500000003 HC RX 250 WO HCPCS: Performed by: NURSE PRACTITIONER

## 2021-01-18 PROCEDURE — 94640 AIRWAY INHALATION TREATMENT: CPT

## 2021-01-18 PROCEDURE — 84478 ASSAY OF TRIGLYCERIDES: CPT

## 2021-01-18 PROCEDURE — 6360000002 HC RX W HCPCS: Performed by: NURSE PRACTITIONER

## 2021-01-18 PROCEDURE — 2000000000 HC ICU R&B

## 2021-01-18 PROCEDURE — 6370000000 HC RX 637 (ALT 250 FOR IP): Performed by: NURSE PRACTITIONER

## 2021-01-18 PROCEDURE — 82248 BILIRUBIN DIRECT: CPT

## 2021-01-18 PROCEDURE — 83735 ASSAY OF MAGNESIUM: CPT

## 2021-01-18 PROCEDURE — 84100 ASSAY OF PHOSPHORUS: CPT

## 2021-01-18 PROCEDURE — 6370000000 HC RX 637 (ALT 250 FOR IP): Performed by: INTERNAL MEDICINE

## 2021-01-18 PROCEDURE — 2700000000 HC OXYGEN THERAPY PER DAY

## 2021-01-18 PROCEDURE — 80053 COMPREHEN METABOLIC PANEL: CPT

## 2021-01-18 PROCEDURE — 82803 BLOOD GASES ANY COMBINATION: CPT

## 2021-01-18 PROCEDURE — 84145 PROCALCITONIN (PCT): CPT

## 2021-01-18 PROCEDURE — 86140 C-REACTIVE PROTEIN: CPT

## 2021-01-18 PROCEDURE — 31720 CLEARANCE OF AIRWAYS: CPT

## 2021-01-18 PROCEDURE — 85379 FIBRIN DEGRADATION QUANT: CPT

## 2021-01-18 RX ORDER — WARFARIN SODIUM 5 MG/1
5 TABLET ORAL
Status: COMPLETED | OUTPATIENT
Start: 2021-01-18 | End: 2021-01-18

## 2021-01-18 RX ADMIN — DICLOFENAC SODIUM 2 G: 10 GEL TOPICAL at 08:49

## 2021-01-18 RX ADMIN — METOCLOPRAMIDE 10 MG: 5 INJECTION, SOLUTION INTRAMUSCULAR; INTRAVENOUS at 00:00

## 2021-01-18 RX ADMIN — ZINC SULFATE 220 MG (50 MG) CAPSULE 50 MG: CAPSULE at 08:48

## 2021-01-18 RX ADMIN — Medication 100 MCG/HR: at 12:21

## 2021-01-18 RX ADMIN — ALOGLIPTIN 12.5 MG: 12.5 TABLET, FILM COATED ORAL at 08:48

## 2021-01-18 RX ADMIN — FUROSEMIDE 20 MG: 10 INJECTION, SOLUTION INTRAVENOUS at 17:49

## 2021-01-18 RX ADMIN — Medication 100 MCG/HR: at 02:11

## 2021-01-18 RX ADMIN — DICLOFENAC SODIUM 2 G: 10 GEL TOPICAL at 20:49

## 2021-01-18 RX ADMIN — SODIUM CHLORIDE, PRESERVATIVE FREE 10 ML: 5 INJECTION INTRAVENOUS at 20:39

## 2021-01-18 RX ADMIN — ALBUTEROL SULFATE 2 PUFF: 90 AEROSOL, METERED RESPIRATORY (INHALATION) at 15:41

## 2021-01-18 RX ADMIN — Medication 2 PUFF: at 15:41

## 2021-01-18 RX ADMIN — PROPOFOL 65 MCG/KG/MIN: 10 INJECTION, EMULSION INTRAVENOUS at 08:47

## 2021-01-18 RX ADMIN — PROPOFOL 65 MCG/KG/MIN: 10 INJECTION, EMULSION INTRAVENOUS at 10:56

## 2021-01-18 RX ADMIN — ATORVASTATIN CALCIUM 40 MG: 40 TABLET, FILM COATED ORAL at 20:48

## 2021-01-18 RX ADMIN — Medication 2 PUFF: at 21:27

## 2021-01-18 RX ADMIN — Medication 16 MCG/MIN: at 23:36

## 2021-01-18 RX ADMIN — FAMOTIDINE 20 MG: 10 INJECTION, SOLUTION INTRAVENOUS at 20:40

## 2021-01-18 RX ADMIN — PROPOFOL 65 MCG/KG/MIN: 10 INJECTION, EMULSION INTRAVENOUS at 17:49

## 2021-01-18 RX ADMIN — ALBUTEROL SULFATE 2 PUFF: 90 AEROSOL, METERED RESPIRATORY (INHALATION) at 11:42

## 2021-01-18 RX ADMIN — DEXAMETHASONE SODIUM PHOSPHATE 6 MG: 10 INJECTION, SOLUTION INTRAMUSCULAR; INTRAVENOUS at 00:00

## 2021-01-18 RX ADMIN — ALBUTEROL SULFATE 2 PUFF: 90 AEROSOL, METERED RESPIRATORY (INHALATION) at 08:03

## 2021-01-18 RX ADMIN — PROPOFOL 65 MCG/KG/MIN: 10 INJECTION, EMULSION INTRAVENOUS at 19:36

## 2021-01-18 RX ADMIN — PROPOFOL 65 MCG/KG/MIN: 10 INJECTION, EMULSION INTRAVENOUS at 02:13

## 2021-01-18 RX ADMIN — FUROSEMIDE 20 MG: 10 INJECTION, SOLUTION INTRAVENOUS at 08:47

## 2021-01-18 RX ADMIN — METOCLOPRAMIDE 10 MG: 5 INJECTION, SOLUTION INTRAMUSCULAR; INTRAVENOUS at 12:20

## 2021-01-18 RX ADMIN — ASPIRIN 81 MG CHEWABLE TABLET 81 MG: 81 TABLET CHEWABLE at 08:47

## 2021-01-18 RX ADMIN — VANCOMYCIN 1250 MG: 1 INJECTION, SOLUTION INTRAVENOUS at 06:03

## 2021-01-18 RX ADMIN — PROPOFOL 65 MCG/KG/MIN: 10 INJECTION, EMULSION INTRAVENOUS at 06:02

## 2021-01-18 RX ADMIN — PROPOFOL 65 MCG/KG/MIN: 10 INJECTION, EMULSION INTRAVENOUS at 13:23

## 2021-01-18 RX ADMIN — ALBUTEROL SULFATE 2 PUFF: 90 AEROSOL, METERED RESPIRATORY (INHALATION) at 21:26

## 2021-01-18 RX ADMIN — PROPOFOL 80 MCG/KG/MIN: 10 INJECTION, EMULSION INTRAVENOUS at 23:02

## 2021-01-18 RX ADMIN — Medication 2 PUFF: at 11:42

## 2021-01-18 RX ADMIN — Medication 2 PUFF: at 08:03

## 2021-01-18 RX ADMIN — ESCITALOPRAM OXALATE 20 MG: 10 TABLET ORAL at 08:47

## 2021-01-18 RX ADMIN — METOCLOPRAMIDE 10 MG: 5 INJECTION, SOLUTION INTRAMUSCULAR; INTRAVENOUS at 17:49

## 2021-01-18 RX ADMIN — PROPOFOL 65 MCG/KG/MIN: 10 INJECTION, EMULSION INTRAVENOUS at 21:30

## 2021-01-18 RX ADMIN — ARIPIPRAZOLE 15 MG: 10 TABLET ORAL at 08:47

## 2021-01-18 RX ADMIN — PROPOFOL 65 MCG/KG/MIN: 10 INJECTION, EMULSION INTRAVENOUS at 04:29

## 2021-01-18 RX ADMIN — SODIUM CHLORIDE 2 MCG/KG/MIN: 9 INJECTION, SOLUTION INTRAVENOUS at 09:23

## 2021-01-18 RX ADMIN — WARFARIN SODIUM 5 MG: 5 TABLET ORAL at 17:50

## 2021-01-18 RX ADMIN — CHLORHEXIDINE GLUCONATE 0.12% ORAL RINSE 15 ML: 1.2 LIQUID ORAL at 20:49

## 2021-01-18 RX ADMIN — METOCLOPRAMIDE 10 MG: 5 INJECTION, SOLUTION INTRAMUSCULAR; INTRAVENOUS at 08:47

## 2021-01-18 RX ADMIN — PROPOFOL 65 MCG/KG/MIN: 10 INJECTION, EMULSION INTRAVENOUS at 00:03

## 2021-01-18 RX ADMIN — FAMOTIDINE 20 MG: 10 INJECTION, SOLUTION INTRAVENOUS at 08:47

## 2021-01-18 RX ADMIN — CHLORHEXIDINE GLUCONATE 0.12% ORAL RINSE 15 ML: 1.2 LIQUID ORAL at 08:47

## 2021-01-18 RX ADMIN — Medication 200 MCG/HR: at 23:47

## 2021-01-18 ASSESSMENT — PULMONARY FUNCTION TESTS
PIF_VALUE: 25
PIF_VALUE: 23
PIF_VALUE: 25
PIF_VALUE: 24
PIF_VALUE: 23
PIF_VALUE: 24
PIF_VALUE: 23
PIF_VALUE: 23
PIF_VALUE: 24
PIF_VALUE: 23
PIF_VALUE: 25
PIF_VALUE: 23
PIF_VALUE: 24
PIF_VALUE: 27
PIF_VALUE: 23
PIF_VALUE: 24
PIF_VALUE: 23
PIF_VALUE: 23
PIF_VALUE: 24
PIF_VALUE: 25
PIF_VALUE: 23
PIF_VALUE: 25
PIF_VALUE: 23
PIF_VALUE: 24
PIF_VALUE: 23
PIF_VALUE: 24
PIF_VALUE: 23
PIF_VALUE: 25
PIF_VALUE: 24
PIF_VALUE: 28
PIF_VALUE: 23
PIF_VALUE: 25
PIF_VALUE: 23
PIF_VALUE: 24

## 2021-01-18 NOTE — PLAN OF CARE
Problem: Airway Clearance - Ineffective  Goal: Achieve or maintain patent airway  Outcome: Ongoing     Problem: Gas Exchange - Impaired  Goal: Absence of hypoxia  Outcome: Ongoing  Goal: Promote optimal lung function  Outcome: Ongoing     Problem: Breathing Pattern - Ineffective  Goal: Ability to achieve and maintain a regular respiratory rate  Outcome: Ongoing     Problem:  Body Temperature -  Risk of, Imbalanced  Goal: Ability to maintain a body temperature within defined limits  Outcome: Ongoing  Goal: Will regain or maintain usual level of consciousness  Outcome: Ongoing  Goal: Complications related to the disease process, condition or treatment will be avoided or minimized  Outcome: Ongoing     Problem: Nutrition Deficits  Goal: Optimize nutritional status  Outcome: Ongoing     Problem: Risk for Fluid Volume Deficit  Goal: Maintain normal heart rhythm  Outcome: Ongoing  Goal: Maintain absence of muscle cramping  Outcome: Ongoing  Goal: Maintain normal serum potassium, sodium, calcium, phosphorus, and pH  Outcome: Ongoing     Problem: Falls - Risk of:  Goal: Will remain free from falls  Description: Will remain free from falls  Outcome: Ongoing  Goal: Absence of physical injury  Description: Absence of physical injury  Outcome: Ongoing     Problem: Skin Integrity:  Goal: Will show no infection signs and symptoms  Description: Will show no infection signs and symptoms  Outcome: Ongoing  Goal: Absence of new skin breakdown  Description: Absence of new skin breakdown  Outcome: Ongoing

## 2021-01-18 NOTE — PROGRESS NOTES
01/18/21 0357   Vent Information   Vt Ordered 0 mL   Pressure Ordered 15   Rate Set 20 bmp   Peak Flow 0 L/min   Pressure Support 0 cmH20   FiO2  35 %   SpO2 93 %   SpO2/FiO2 ratio 265.71   Sensitivity 0   PEEP/CPAP 10   I Time/ I Time % 1 s   Humidification Source HME   Vent Patient Data   High Peep/I Pressure 15   Peak Inspiratory Pressure 25 cmH2O   Mean Airway Pressure 15 cmH20   Rate Measured 20 br/min   Vt Exhaled 634 mL   Minute Volume 12.7 Liters   I:E Ratio 1:2.00   Plateau Pressure 23 QGB23   Static Compliance 50 mL/cmH2O   Total PEEP 11 cmH20   Auto PEEP 0 cmH20   Cough/Sputum   Sputum How Obtained Endotracheal;Suctioned   $Obtained Sample $Nasotracheal Suction   Cough Productive   Sputum Amount Small   Sputum Color Clear   Tenacity Thin   Spontaneous Breathing Trial (SBT) RT Doc   Pulse 73   Breath Sounds   Right Upper Lobe Diminished   Right Middle Lobe Diminished   Right Lower Lobe Diminished   Left Upper Lobe Diminished   Left Lower Lobe Diminished   Additional Respiratory  Assessments   Resp 20   Alarm Settings   High Pressure Alarm 40 cmH2O   Press Low Alarm 5 cmH2O   Delay Alarm 2 sec(s)   Low Minute Volume Alarm 2.5 L/min   Apnea (secs) 2 secs   High Respiratory Rate 40 br/min   Low Exhaled Vt  250 mL   ETT (adult)   Placement Date/Time: 01/13/21 (c) 2357   Preoxygenation: Yes  Mask Ventilation: Ventilated by mask (1)  Technique: Video laryngoscopy  Tube Size: 7.5 mm  Location: Oral  Grade View: Full view of the glottis  Insertion attempts: 1  Placement Verified B. ..    Secured at 24 cm   Measured From [address] [place] [address],[address] By [employer]   Site Condition Dry   Cuff Pressure   (mlt)

## 2021-01-18 NOTE — PROGRESS NOTES
This nurse spoke with Dr. Gracie Pearson regarding starting TF and giving PO meds this morning. (Meds have been held d/t NGT output and abd distention) No NGT output over night- verbal orders to give PO meds this morning and start TF at 10 cc/hr and monitor pts response. Will continue to monitor.

## 2021-01-18 NOTE — PROGRESS NOTES
Comprehensive Nutrition Assessment    Type and Reason for Visit:  Reassess    Nutrition Recommendations/Plan:   · Increase current EN to 55 mL/hr to meet pt estimated needs    Nutrition Assessment:  Pt is now more hemodynamically stable and is started on EN at 10 mL/hr. Will order EN for goal and to be advanced at 10 mL q 6 hours    Malnutrition Assessment:  Malnutrition Status: At risk for malnutrition (Comment)    Context:  Acute Illness       Estimated Daily Nutrient Needs:  Energy (kcal):  2571([facility] 2010); Weight Used for Energy Requirements:  Current     Protein (g):  190(2.0 g/kg);  Weight Used for Protein Requirements:        Wounds:  Pressure Injury, Stage II, Venous Stasis, Multiple       Current Nutrition Therapies:    Current Tube Feeding (TF) Orders:  · Feeding Route: Nasogastric  · Formula: Low Calorie, High Protein  · Schedule: Continuous  · Current TF & Flush Orders Provides: 240 kcal and 26 g of protein per day  Additional Calorie Sources:   1434 kcal from propofol    Anthropometric Measures:  · Height: 6' 5.01\" (195.6 cm)  · Current Body Weight: 296 lb 8.3 oz (134.5 kg)     · Ideal Body Weight: 208 lbs; % Ideal Body Weight 142.6 %   · BMI: 35.2   · BMI Categories: Obese Class 2 (BMI 35.0 -39.9)       Nutrition Diagnosis:   · Inadequate oral intake related to impaired respiratory function as evidenced by NPO or clear liquid status due to medical condition      Nutrition Interventions:   Food and/or Nutrient Delivery:  Modify Tube Feeding  Nutrition Education/Counseling:  No recommendation at this time   Coordination of Nutrition Care:  Continue to monitor while inpatient    Goals:  pt will receive greater than 75% of his estimated needs       Nutrition Monitoring and Evaluation:   Behavioral-Environmental Outcomes:  None Identified   Food/Nutrient Intake Outcomes:  Enteral Nutrition Intake/Tolerance Physical Signs/Symptoms Outcomes:  Biochemical Data, Weight, Skin, GI Status, Hemodynamic Status, Fluid Status or Edema     Discharge Planning:     Too soon to determine     Electronically signed by Margie Manning RD, LD on 3/01/37 at 4:14 PM EST    Contact: [phone]

## 2021-01-18 NOTE — PROGRESS NOTES
Hospitalist Progress Note      Name:  Isela Turner /Age/Sex: 1945  [de-identified]77 y.o. male)   MRN & CSN:  [id number] & [id number] Admission Date/Time: 2021 12:17 AM   Location:  -A PCP: Sulma Anderson MD       Hospital Day: 5    Hospital Course:   Isela Turner is a 77 y.o. male who presented to the ED at [facility] on  originally with progressive weakness in the legs and bilateral leg pain. Patient later developed acute hypoxic respiratory failure presumed secondary to multifocal pneumonia on  and tested positive for Covid-19. Patient was later transferred to [facility] for further management. Chest x-ray at the time showed multifocal infiltrates. Patient's baseline at home is 3 L of O2. Patient was placed on BiPAP after reduced O2 stat on NC. Patient respiratory status continued to worsen and is now () intubated and placed on ventilator support. Proning started immediately. Assessment and Plan:     Acute respiratory failure with hypoxia secondary to COVID-19 viral pneumonia infection  SIRS criteria met at time of admission secondary to sepsis from viral pneumonia  COVID-19 positive: 21  IV antibiotics, IV steroids initiated on 21  Convalescent plasma given: 21  Remdesivir: 21  Oxygen requirement today: now on ventilator   DM type 2 - continue insulin sliding scale   HTN   Hyperlipidemia   Morbid Obesity   ? Cellulitis of RLE - continue IV antibiotics     Better on nimbex gtt - appreciate change  Increased SSI scale  Started LOW TF  Monitor OP as unsure bowel function   proning protocol. Supine by AM shift    Diet DIET TUBE FEED CONTINUOUS/CYCLIC NPO; Low Calorie High Protein (Vital HP);  Nasogastric; Continuous; 10; 20; 24   DVT Prophylaxis [x] Lovenox, []  Heparin, [] SCDs, []No VTE prophylaxis, patient ambulating   GI Prophylaxis [] PPI, [x] H2 Blocker, [] No GI prophylaxis, patient is receiving diet/Tube Feeds   Code Status Full Code Disposition ICU due to vent dependency  Prognosis: guarded    MDM [] Low, [] Moderate,[x]  High  Patient's risk as above due to     [x] One or more chronic illnesses with severe exacerbation or progression    [x] Acute or chronic illnesses or injuries that pose a threat to life or bodily function    [] An abrupt change in neurological status    [] Decision not to resuscitate     [x] Drug therapy requiring intensive monitoring for toxicity      Subjective:     Pt S&E. Supine  Did well overnight since nimbex added  No fevers documented     No ROS as he is intubated and sedated. Objective: Intake/Output Summary (Last 24 hours) at 1/17/2021 1944  Last data filed at 1/17/2021 1820  Gross per 24 hour   Intake 2486.47 ml   Output 2620 ml   Net -133.53 ml      Vitals:   Vitals:    01/17/21 1902   BP: (!) 127/54   Pulse: 65   Resp: 20   Temp:    SpO2: 94%     Physical Exam:    GEN sedated male, laying in bed in no apparent distress. EYES Pupils are equally round. No scleral erythema, discharge, or conjunctivitis. HENT Mucous membranes are moist. +ETT +NGT  NECK No apparent thyromegaly or masses. RESP +rales or rhonchi. Symmetric chest movement while on invasive mechanical oxygen support  CARDIO/VASC . Peripheral pulses equal bilaterally and palpable. +1 pitting edema in extremities   GI Abdomen is soft without significant tenderness, masses, or guarding. Bowel sounds are normoactive. Rectal exam deferred. Central obesity   Starkey catheter ISpresent. MSK No gross joint deformities. No spontaneous movement noted  SKIN Normal coloration, warm, dry. NEURO Does not follow directions. No response to verbal or painful stimuli  PSYCH Sedated on propofol.  Nimbex, fentanyl, propofol     Medications:   Medications:    metoclopramide  10 mg Intravenous Q6H    insulin lispro  0-18 Units Subcutaneous Q6H    methylPREDNISolone  125 mg Intravenous Once    vancomycin  1,250 mg Intravenous Q18H  warfarin (COUMADIN) daily dosing (placeholder)   Other RX Placeholder    furosemide  20 mg Intravenous BID    alogliptin  12.5 mg Oral Daily    ARIPiprazole  15 mg Oral Daily    aspirin  81 mg Oral Daily    atorvastatin  40 mg Oral Nightly    buPROPion  300 mg Oral QAM    escitalopram  20 mg Oral Daily    metoprolol tartrate  12.5 mg Oral BID    sodium chloride flush  10 mL Intravenous 2 times per day    diclofenac sodium  2 g Topical BID    dexamethasone  6 mg Intravenous Q24H    zinc sulfate  50 mg Oral Daily    albuterol sulfate HFA  2 puff Inhalation 4x daily    ipratropium  2 puff Inhalation 4x daily    chlorhexidine  15 mL Mouth/Throat BID    famotidine (PEPCID) injection  20 mg Intravenous BID      Infusions:    cisatracurium (NIMBEX) infusion 2 mcg/kg/min (01/17/21 0854)    norepinephrine 6 mcg/min (01/17/21 1850)    fentanyl 100 mcg/hr (01/17/21 1603)    dextrose      sodium chloride      propofol 65 mcg/kg/min (01/17/21 1813)     PRN Meds:     microfibrillar collagen, , PRN      polyvinyl alcohol, 1 drop, Q4H PRN    And      artificial tears, , PRN      sodium chloride flush, 10 mL, PRN      promethazine, 12.5 mg, Q6H PRN    Or      ondansetron, 4 mg, Q6H PRN      polyethylene glycol, 17 g, Daily PRN      acetaminophen, 650 mg, Q6H PRN    Or      acetaminophen, 650 mg, Q6H PRN      glucose, 15 g, PRN      dextrose, 12.5 g, PRN      glucagon (rDNA), 1 mg, PRN      dextrose, 100 mL/hr, PRN      traMADol, 50 mg, Q6H PRN    Or      traMADol, 100 mg, Q6H PRN      diphenhydrAMINE, 25 mg, Q6H PRN      sodium chloride, , PRN      sodium chloride, 30 mL, PRN          Electronically signed by Allen Haney DO on 1/17/2021 at 7:44 PM

## 2021-01-18 NOTE — PROGRESS NOTES
[address] [facility]  consulted by Dr. Verito Mantilla for monitoring and adjustment. Indication for treatment: Covid-19 pneumonia, possible secondary bacterial pneumonia, cellulitis  Goal trough: 15 mcg/mL (AUC/YOLA 400-600)    Pertinent Laboratory Values:   Temp Readings from Last 3 Encounters:   01/14/21 97.9 °F (36.6 °C) (Axillary)   01/12/21 97.1 °F (36.2 °C) (Infrared)   01/08/21 98.9 °F (37.2 °C) (Oral)     Recent Labs     01/12/21  0830 01/12/21  0845 01/13/21  0600 01/14/21  0800   WBC  --  6.4 5.9 10.6*  10.8*   LACTATE 1.0  --   --   --      Recent Labs     01/12/21  0845 01/13/21  0600 01/14/21  0800   BUN 25* 26* 36*   CREATININE 1.4* 1.2 1.4*     Estimated Creatinine Clearance: 72 mL/min (A) (based on SCr of 1.4 mg/dL (H)). Intake/Output Summary (Last 24 hours) at 1/14/2021 1356  Last data filed at 1/14/2021 0958  Gross per 24 hour   Intake 480 ml   Output 2350 ml   Net -1870 ml       Pertinent Cultures:  Date    Source    Results  1/12   Resp PCR               Covid-19  1/13   Respiratory    Ordered  1/13   Blood                NGTD    VANCOMYCIN RANDOM:    No results for input(s): VANCORANDOM in the last 72 hours. RENAL LAB EVALUATION  Estimated Creatinine Clearance: 70 mL/min (A) (based on SCr of 1.4 mg/dL (H)). Recent Labs     01/16/21  0610 01/17/21  0555 01/18/21  0425   BUN 39* 55* 59*   CREATININE 1.1 1.5* 1.4*     Assessment:  · WBC and temperature: Elevated WBC @ 13.7;  Tmax= 99.5F  · SCr, BUN, and urine output:   · Previously KIRSTEN  · Scr and BUN now trending back up  · Baseline Scr 1 on admission  · Day(s) of therapy: 7  · Vancomycin concentration:  · 1/13: 6, appropriate to re-dose  · 1/14: 16, 14h post-dose  · 1/17: 21.2, 18h post-dose, supra-therapeutic    Plan:  · Reduce vancomycin dosing from 1250 mg IVPB q18h to q24h  · Repeat trough level in 24h  · Pharmacy will continue to monitor patient and adjust therapy as indicated VANCOMYCIN CONCENTRATION SCHEDULED FOR 1/19 @ 0530    Thank you for the consult,  Luz Freitas, ShinD, [facility]  12:01 PM  01/18/21

## 2021-01-18 NOTE — PROGRESS NOTES
PHARMACY ANTICOAGULATION MONITORING SERVICE    Derick Cartwright is a 77 y.o. male on warfarin therapy for history of DVT. Pharmacy consulted by Dr. Mavis Lara for monitoring and adjustment of treatment. Indication for anticoagulation: Hx of DVT  INR goal: 2-3  Warfarin dose prior to admission: 5 mg daily    Pertinent Laboratory Values   Recent Labs     01/16/21  0610 01/17/21  0555 01/18/21  0425   INR 3.07 3.48 2.11   HGB 11.1* 10.9* 10.1*   HCT 35.0* 33.6* 31.5*    268 264       Assessment/Plan:  ? Possible DDI's:   o Aspirin (home med), can increase bleeding risk, clinically appropriate  o Escitalopram (home med), can increase bleeding risk, appropriate to continue while inpatient  o Enoxaparin bridge now stopped. o Tramadol and dexamethasone can increase effects of warfarin  ? INR supra-therapeutic x2 doses and warfarin was placed on hold  ? INR now back within therapeutic range  ? Resume warfarin 5 mg daily  ? Pharmacy will continue to monitor and adjust warfarin therapy as indicated.     Thank you for the consult,  Rhett Clipper, Riley Duverney  1/18/2021 4:32 PM

## 2021-01-18 NOTE — PROGRESS NOTES
Hospitalist Progress Note      Name:  Mallika Payne /Age/Sex: 1945  [de-identified]77 y.o. male)   MRN & CSN:  [id number] & [id number] Admission Date/Time: 2021 12:17 AM   Location:  -A PCP: Ronaldo Mills MD       Hospital Day: 6    Hospital Course:   Mallika Payne is a 77 y.o. male who presented to the ED at [facility] on  originally with progressive weakness in the legs and bilateral leg pain. Patient later developed acute hypoxic respiratory failure presumed secondary to multifocal pneumonia on  and tested positive for Covid-19. Patient was later transferred to [facility] for further management. Chest x-ray at the time showed multifocal infiltrates. Patient's baseline at home is 3 L of O2. Patient was placed on BiPAP after reduced O2 stat on NC. Patient respiratory status continued to worsen and is now () intubated and placed on ventilator support. Proning started immediately. Assessment and Plan:     Acute respiratory failure with hypoxia secondary to COVID-19 viral pneumonia infection  SIRS criteria met at time of admission secondary to sepsis from viral pneumonia  COVID-19 positive: 21  IV antibiotics, IV steroids initiated on 21  Convalescent plasma given: 21  Remdesivir: 21  Oxygen requirement today: now on ventilator   DM type 2 - continue insulin sliding scale   HTN   Hyperlipidemia   Morbid Obesity   ? Cellulitis of RLE - continue IV antibiotics      --> when vent settings increase, add nimbex and prone as he calms and vent numbers improve. No BS for a few days and started slow TF and reglan without success today. Need to call dtr/ karissa. Hopeful     Diet DIET TUBE FEED CONTINUOUS/CYCLIC NPO; Low Calorie High Protein (Vital HP);  Nasogastric; Continuous; 10; 20; 24   DVT Prophylaxis [x] Lovenox, []  Heparin, [] SCDs, []No VTE prophylaxis, patient ambulating Medications:   Medications:    vancomycin  1,250 mg Intravenous Q24H    metoclopramide  10 mg Intravenous Q6H    insulin lispro  0-18 Units Subcutaneous Q6H    warfarin (COUMADIN) daily dosing (placeholder)   Other RX Placeholder    furosemide  20 mg Intravenous BID    alogliptin  12.5 mg Oral Daily    ARIPiprazole  15 mg Oral Daily    aspirin  81 mg Oral Daily    atorvastatin  40 mg Oral Nightly    buPROPion  300 mg Oral QAM    escitalopram  20 mg Oral Daily    metoprolol tartrate  12.5 mg Oral BID    sodium chloride flush  10 mL Intravenous 2 times per day    diclofenac sodium  2 g Topical BID    dexamethasone  6 mg Intravenous Q24H    zinc sulfate  50 mg Oral Daily    albuterol sulfate HFA  2 puff Inhalation 4x daily    ipratropium  2 puff Inhalation 4x daily    chlorhexidine  15 mL Mouth/Throat BID    famotidine (PEPCID) injection  20 mg Intravenous BID      Infusions:    cisatracurium (NIMBEX) infusion 2 mcg/kg/min (01/17/21 2200)    norepinephrine 5 mcg/min (01/17/21 2309)    fentanyl 100 mcg/hr (01/18/21 0211)    dextrose      sodium chloride      propofol 65 mcg/kg/min (01/18/21 0602)     PRN Meds:     microfibrillar collagen, , PRN      polyvinyl alcohol, 1 drop, Q4H PRN    And      artificial tears, , PRN      sodium chloride flush, 10 mL, PRN      promethazine, 12.5 mg, Q6H PRN    Or      ondansetron, 4 mg, Q6H PRN      polyethylene glycol, 17 g, Daily PRN      acetaminophen, 650 mg, Q6H PRN    Or      acetaminophen, 650 mg, Q6H PRN      glucose, 15 g, PRN      dextrose, 12.5 g, PRN      glucagon (rDNA), 1 mg, PRN      dextrose, 100 mL/hr, PRN      traMADol, 50 mg, Q6H PRN    Or      traMADol, 100 mg, Q6H PRN      diphenhydrAMINE, 25 mg, Q6H PRN      sodium chloride, , PRN      sodium chloride, 30 mL, PRN          Electronically signed by Mei Gooden DO on 1/18/2021 at 6:28 AM

## 2021-01-18 NOTE — PROGRESS NOTES
Pulmonary and Critical Care  Progress Note    Subjective: The patient is sedated on vent. FiO2 35%. Supine position. Shortness of breath none. Chest pain none. Addressing respiratory complaints Patient is negative for  hemoptysis and cyanosis  CONSTITUTIONAL:  negative for fevers and chills.       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, Anemia, Arm fracture, Arthritis, Cataract, Cataract, Cellulitis of left lower leg, Chronic venous hypertension with ulcer ([facility] [address] CKD (chronic kidney disease), Colon polyps, COPD (chronic obstructive pulmonary disease) ([facility] [address] Diabetes mellitus ([facility] [address] Diabetes mellitus ([facility] [address] Diabetes mellitus with peripheral circulatory disorder ([facility] [address] Diabetes mellitus with skin ulcer ([facility] [address] Diabetic peripheral neuropathy ([facility] [address] Diabetic skin ulcer associated with type 2 diabetes mellitus ([facility] [address] Diverticulosis, Femoral DVT (deep venous thrombosis) ([facility] [address] GERD (gastroesophageal reflux disease), Glaucoma, H/O cardiac catheterization, H/O Doppler ultrasound, H/O echocardiogram, H/O mumps orchitis, Hemorrhoids, History of nuclear stress test, HLD (hyperlipidemia), Hx of Doppler ultrasound, Hyperlipemia, Hyperlipidemia, Hypertension, Hypertension, Idiopathic chronic venous hypertension of left lower extremity with ulcer and inflammation (HCC), Leg ulcer ([address] No diabetic retinopathy OU, Non-pressure chronic ulcer of left lower leg with fat layer exposed ([facility] [address] Pulmonary embolism ([facility] [address] Sleep apnea, SOB (shortness of breath), Tendinitis, Type II or unspecified type diabetes mellitus with other specified manifestations, not stated as uncontrolled, Unspecified venous (peripheral) insufficiency, Urticaria, WD-Cellulitis of right anterior lower leg, WD-Cellulitis of right lower extremity, WD-Chronic venous hypertension (idiopathic) with ulcer of left lower extremity (CODE) ([facility] [address] WD-Chronic venous hypertension with inflammation, right, WD-Decubitus ulcer of left buttock, stage 3 ([address] 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 to breakdown of skin ([address] WD-Open wound of hand without complication, left, initial encounter, WD-Pressure injury of left buttock, stage 2 ([address] WD-Pressure injury of left buttock, stage 3 ([address] WD-Pressure injury of right buttock, stage 3 (HCC), WD-Skin tear of right forearm without complication, and WD-Ulcer of right pretibial region, with fat layer exposed ([facility] [address] has a past surgical history that includes Tonsillectomy (1953); Splenectomy (1958); Breast surgery (1970s); hernia repair (1970s); Skin graft (1978-present); Cataract removal (Right, 3/11/2013); Cataract removal (Left, 2/25/2013); Varicose vein surgery (Left, 2009); Carpal tunnel release (Left, 1993); Cardiac catheterization (6/3/14); Colonoscopy (2006); Colonoscopy (11/21/11); Colonoscopy (4/23/12); Colonoscopy (08/04/2016); Splenectomy; hernia repair; and Carpal tunnel release. reports that he quit smoking about 28 years ago. His smoking use included cigarettes. He has a 70.00 pack-year smoking history. He has never used smokeless tobacco. He reports current alcohol use. He reports that he does not use drugs. Family history:  family history includes Cancer in his brother and father; Diabetes in his brother; Heart Disease in his father; High Blood Pressure in his father; High Cholesterol in his father; Thyroid Disease in his brother.     Allergies   Allergen Reactions    Cavilon Durable Barrier [Mineral Oil-Dimeth-Coconut Oil]     Gentamycin [Gentamicin] Hives    Parabens Hives    Parabens Hives    Prinivil [Lisinopril] Swelling    Cortisone Rash    Cortisone Rash    Dilaudid [Hydromorphone Hcl] Rash    Penicillins Rash    Penicillins Rash    Sulfamethoxazole-Trimethoprim Nausea Only    Tape Evalene [place] Tape] Rash     Social History:    Reviewed; no changes    Objective:   PHYSICAL EXAM:        VITALS:  BP (!) 123/43   Pulse 82   Temp 98.6 °F (37 °C) (Rectal)   Resp 20   Ht 6' 5.01\" (1.956 m)   Wt 296 lb 8.3 oz (134.5 kg)   SpO2 92%   BMI 35.15 kg/m²     24HR INTAKE/OUTPUT:      Intake/Output Summary (Last 24 hours) at 1/18/2021 1111  Last data filed at 1/18/2021 0848  Gross per 24 hour   Intake 2582.14 ml   Output 2320 ml Net 262.14 ml       CONSTITUTIONAL:  Somnolent. LUNGS:  decreased breath sounds, basilar crackles. CARDIOVASCULAR:  normal S1 and S2 and negative JVD  ABD:Abdomen soft, non-tender. BS normal. No masses,  No organomegaly  NEURO:Sedated on vent. DATA:    CBC:  Recent Labs     01/16/21  0610 01/17/21  0555 01/18/21  0425   WBC 10.5 11.1* 13.7*   RBC 3.79* 3.65* 3.46*   HGB 11.1* 10.9* 10.1*   HCT 35.0* 33.6* 31.5*    268 264   MCV 92.3 92.1 91.0   MCH 29.3 29.9 29.2   MCHC 31.7* 32.4 32.1   RDW 18.5* 18.5* 18.8*      BMP:  Recent Labs     01/16/21  0610 01/17/21  0555 01/18/21  0425    137 141   K 4.1 4.3 4.8    105 108   CO2 23 19* 24   BUN 39* 55* 59*   CREATININE 1.1 1.5* 1.4*   CALCIUM 7.3* 7.3* 7.3*   GLUCOSE 240* 441* 317*      ABG:  Recent Labs     01/16/21  1430 01/17/21  0600 01/18/21  0600   PH 7.13* 7.40 7.38   PO2ART 120* 268* 89   LCM3PME 77.0* 34.0 43.0   O2SAT 96.2 97.3* 95.2*     Lab Results   Component Value Date    PROBNP 5,973 (H) 01/14/2021    PROBNP 1,642 (H) 01/12/2021    PROBNP 1,196 (H) 11/11/2020     No results found for: CULTRESP    Radiology Review:  Pertinent images / reports were reviewed as a part of this visit.     Assessment:     Patient Active Problem List   Diagnosis    Gastroesophageal reflux disease without esophagitis    Hypertension in stage 3 chronic kidney disease due to type 2 diabetes mellitus ([facility] [address]    DM (diabetes mellitus) type II, controlled, with peripheral vascular disorder ([facility] [address]    Combined hyperlipidemia associated with type 2 diabetes mellitus ([facility] [address]    Diabetic skin ulcer associated with type 2 diabetes mellitus (HCC)    CKD (chronic kidney disease)    History of DVT (deep vein thrombosis)- left femoral    History of pulmonary embolism    Long term current use of anticoagulants with INR goal of 2.0-3.0    COPD (chronic obstructive pulmonary disease) (HCC)    Severe recurrent major depressive disorder with psychotic features ([facility] [address]  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)    Venous (peripheral) insufficiency    Uncontrolled secondary diabetes mellitus with stage 3 CKD (GFR 30-59) (HCC)    Diabetes mellitus with skin ulcer (HCC)    WD-Ulcer of shin, left, with fat layer exposed ([facility] [address]    History of colon polyps    Personal history of PE (pulmonary embolism)    WD-Chronic venous hypertension (idiopathic) with ulcer of left lower extremity (CODE) ([facility] [address]    WD-Chronic venous hypertension with inflammation, right    Troponin I above reference range    KIRSTEN (acute kidney injury) ([facility] [address]    Cellulitis of lower extremity    Hyponatremia    Generalized weakness    Weakness    Multifocal pneumonia    Pneumonia due to COVID-19 virus       Plan:   1. Overall the patient sl better. 2. Wean FiO2.  3. Cont present vent support. 4. Adjust ET tube. 5. Discussed with SAMI.   Olena Contreras MD  1/18/2021  11:11 AM

## 2021-01-19 ENCOUNTER — APPOINTMENT (OUTPATIENT)
Dept: GENERAL RADIOLOGY | Age: 76
DRG: 870 | End: 2021-01-19
Attending: INTERNAL MEDICINE
Payer: MEDICARE

## 2021-01-19 LAB
ALBUMIN SERPL-MCNC: 2.8 GM/DL (ref 3.4–5)
ALP BLD-CCNC: 83 IU/L (ref 40–129)
ALT SERPL-CCNC: 18 U/L (ref 10–40)
ANION GAP SERPL CALCULATED.3IONS-SCNC: 12 MMOL/L (ref 4–16)
AST SERPL-CCNC: 22 IU/L (ref 15–37)
BASE EXCESS: 3 (ref 0–3.3)
BILIRUB SERPL-MCNC: 0.4 MG/DL (ref 0–1)
BILIRUBIN DIRECT: 0.3 MG/DL (ref 0–0.3)
BILIRUBIN, INDIRECT: 0.1 MG/DL (ref 0–0.7)
BUN BLDV-MCNC: 73 MG/DL (ref 6–23)
C-REACTIVE PROTEIN, HIGH SENSITIVITY: 68.9 MG/L
CALCIUM SERPL-MCNC: 7.4 MG/DL (ref 8.3–10.6)
CARBON MONOXIDE, BLOOD: 1.7 % (ref 0–5)
CHLORIDE BLD-SCNC: 107 MMOL/L (ref 99–110)
CO2 CONTENT: 26.4 MMOL/L (ref 19–24)
CO2: 24 MMOL/L (ref 21–32)
COMMENT: ABNORMAL
CREAT SERPL-MCNC: 1.7 MG/DL (ref 0.9–1.3)
D DIMER: 1506 NG/ML(DDU)
DOSE AMOUNT: ABNORMAL
DOSE TIME: ABNORMAL
ESTIMATED AVERAGE GLUCOSE: 286 MG/DL
GFR AFRICAN AMERICAN: 48 ML/MIN/1.73M2
GFR NON-AFRICAN AMERICAN: 40 ML/MIN/1.73M2
GLUCOSE BLD-MCNC: 252 MG/DL (ref 70–99)
GLUCOSE BLD-MCNC: 303 MG/DL (ref 70–99)
GLUCOSE BLD-MCNC: 310 MG/DL (ref 70–99)
GLUCOSE BLD-MCNC: 371 MG/DL (ref 70–99)
GLUCOSE BLD-MCNC: 438 MG/DL (ref 70–99)
GLUCOSE BLD-MCNC: 445 MG/DL (ref 70–99)
GLUCOSE BLD-MCNC: 467 MG/DL (ref 70–99)
GLUCOSE BLD-MCNC: 497 MG/DL (ref 70–99)
HBA1C MFR BLD: 11.6 % (ref 4.2–6.3)
HCO3 ARTERIAL: 24.7 MMOL/L (ref 18–23)
HCT VFR BLD CALC: 36.2 % (ref 42–52)
HEMOGLOBIN: 11 GM/DL (ref 13.5–18)
INR BLD: 2.16 INDEX
MAGNESIUM: 2.4 MG/DL (ref 1.8–2.4)
MCH RBC QN AUTO: 28.9 PG (ref 27–31)
MCHC RBC AUTO-ENTMCNC: 30.4 % (ref 32–36)
MCV RBC AUTO: 95 FL (ref 78–100)
METHEMOGLOBIN ARTERIAL: 1.6 %
O2 SATURATION: 96 % (ref 96–97)
PCO2 ARTERIAL: 55 MMHG (ref 32–45)
PDW BLD-RTO: 19.3 % (ref 11.7–14.9)
PH BLOOD: 7.26 (ref 7.34–7.45)
PHOSPHORUS: 6.8 MG/DL (ref 2.5–4.9)
PLATELET # BLD: 303 K/CU MM (ref 140–440)
PMV BLD AUTO: 13 FL (ref 7.5–11.1)
PO2 ARTERIAL: 114 MMHG (ref 75–100)
POTASSIUM SERPL-SCNC: 4.5 MMOL/L (ref 3.5–5.1)
POTASSIUM SERPL-SCNC: 6 MMOL/L (ref 3.5–5.1)
PROCALCITONIN: 0.5
PROTHROMBIN TIME: 26.3 SECONDS (ref 11.7–14.5)
RBC # BLD: 3.81 M/CU MM (ref 4.6–6.2)
SODIUM BLD-SCNC: 143 MMOL/L (ref 135–145)
TOTAL PROTEIN: 6.7 GM/DL (ref 6.4–8.2)
VANCOMYCIN TROUGH: 21.7 UG/ML (ref 10–20)
WBC # BLD: 18.3 K/CU MM (ref 4–10.5)

## 2021-01-19 PROCEDURE — 2000000000 HC ICU R&B

## 2021-01-19 PROCEDURE — 6360000002 HC RX W HCPCS: Performed by: INTERNAL MEDICINE

## 2021-01-19 PROCEDURE — 87081 CULTURE SCREEN ONLY: CPT

## 2021-01-19 PROCEDURE — 2500000003 HC RX 250 WO HCPCS: Performed by: INTERNAL MEDICINE

## 2021-01-19 PROCEDURE — 37799 UNLISTED PX VASCULAR SURGERY: CPT

## 2021-01-19 PROCEDURE — 94003 VENT MGMT INPAT SUBQ DAY: CPT

## 2021-01-19 PROCEDURE — 82803 BLOOD GASES ANY COMBINATION: CPT

## 2021-01-19 PROCEDURE — 85610 PROTHROMBIN TIME: CPT

## 2021-01-19 PROCEDURE — 2580000003 HC RX 258: Performed by: INTERNAL MEDICINE

## 2021-01-19 PROCEDURE — 94640 AIRWAY INHALATION TREATMENT: CPT

## 2021-01-19 PROCEDURE — 6370000000 HC RX 637 (ALT 250 FOR IP): Performed by: INTERNAL MEDICINE

## 2021-01-19 PROCEDURE — 84100 ASSAY OF PHOSPHORUS: CPT

## 2021-01-19 PROCEDURE — 6370000000 HC RX 637 (ALT 250 FOR IP): Performed by: NURSE PRACTITIONER

## 2021-01-19 PROCEDURE — 86140 C-REACTIVE PROTEIN: CPT

## 2021-01-19 PROCEDURE — 85379 FIBRIN DEGRADATION QUANT: CPT

## 2021-01-19 PROCEDURE — 99213 OFFICE O/P EST LOW 20 MIN: CPT

## 2021-01-19 PROCEDURE — 36600 WITHDRAWAL OF ARTERIAL BLOOD: CPT

## 2021-01-19 PROCEDURE — 83036 HEMOGLOBIN GLYCOSYLATED A1C: CPT

## 2021-01-19 PROCEDURE — 80053 COMPREHEN METABOLIC PANEL: CPT

## 2021-01-19 PROCEDURE — 87040 BLOOD CULTURE FOR BACTERIA: CPT

## 2021-01-19 PROCEDURE — 82962 GLUCOSE BLOOD TEST: CPT

## 2021-01-19 PROCEDURE — 84145 PROCALCITONIN (PCT): CPT

## 2021-01-19 PROCEDURE — 80202 ASSAY OF VANCOMYCIN: CPT

## 2021-01-19 PROCEDURE — 84132 ASSAY OF SERUM POTASSIUM: CPT

## 2021-01-19 PROCEDURE — 6360000002 HC RX W HCPCS: Performed by: STUDENT IN AN ORGANIZED HEALTH CARE EDUCATION/TRAINING PROGRAM

## 2021-01-19 PROCEDURE — 6360000002 HC RX W HCPCS: Performed by: NURSE PRACTITIONER

## 2021-01-19 PROCEDURE — 87305 ASPERGILLUS AG IA: CPT

## 2021-01-19 PROCEDURE — 85027 COMPLETE CBC AUTOMATED: CPT

## 2021-01-19 PROCEDURE — 83735 ASSAY OF MAGNESIUM: CPT

## 2021-01-19 PROCEDURE — 87389 HIV-1 AG W/HIV-1&-2 AB AG IA: CPT

## 2021-01-19 PROCEDURE — 2700000000 HC OXYGEN THERAPY PER DAY

## 2021-01-19 PROCEDURE — 99223 1ST HOSP IP/OBS HIGH 75: CPT | Performed by: INTERNAL MEDICINE

## 2021-01-19 PROCEDURE — 71045 X-RAY EXAM CHEST 1 VIEW: CPT

## 2021-01-19 PROCEDURE — 82248 BILIRUBIN DIRECT: CPT

## 2021-01-19 PROCEDURE — 2500000003 HC RX 250 WO HCPCS: Performed by: NURSE PRACTITIONER

## 2021-01-19 PROCEDURE — 89220 SPUTUM SPECIMEN COLLECTION: CPT

## 2021-01-19 PROCEDURE — 87449 NOS EACH ORGANISM AG IA: CPT

## 2021-01-19 RX ORDER — DEXTROSE MONOHYDRATE 25 G/50ML
12.5 INJECTION, SOLUTION INTRAVENOUS PRN
Status: DISCONTINUED | OUTPATIENT
Start: 2021-01-19 | End: 2021-01-29

## 2021-01-19 RX ORDER — WARFARIN SODIUM 5 MG/1
5 TABLET ORAL DAILY
Status: DISCONTINUED | OUTPATIENT
Start: 2021-01-19 | End: 2021-01-20

## 2021-01-19 RX ORDER — INSULIN GLARGINE 100 [IU]/ML
0.25 INJECTION, SOLUTION SUBCUTANEOUS NIGHTLY
Status: DISCONTINUED | OUTPATIENT
Start: 2021-01-19 | End: 2021-01-19

## 2021-01-19 RX ORDER — FUROSEMIDE 10 MG/ML
40 INJECTION INTRAMUSCULAR; INTRAVENOUS ONCE
Status: COMPLETED | OUTPATIENT
Start: 2021-01-19 | End: 2021-01-19

## 2021-01-19 RX ORDER — DEXTROSE MONOHYDRATE 50 MG/ML
100 INJECTION, SOLUTION INTRAVENOUS PRN
Status: DISCONTINUED | OUTPATIENT
Start: 2021-01-19 | End: 2021-02-01 | Stop reason: HOSPADM

## 2021-01-19 RX ORDER — SODIUM CHLORIDE 9 MG/ML
INJECTION, SOLUTION INTRAVENOUS CONTINUOUS
Status: ACTIVE | OUTPATIENT
Start: 2021-01-19 | End: 2021-01-20

## 2021-01-19 RX ORDER — INSULIN GLARGINE 100 [IU]/ML
40 INJECTION, SOLUTION SUBCUTANEOUS NIGHTLY
Status: DISCONTINUED | OUTPATIENT
Start: 2021-01-19 | End: 2021-01-20

## 2021-01-19 RX ORDER — DEXTROSE MONOHYDRATE 25 G/50ML
25 INJECTION, SOLUTION INTRAVENOUS ONCE
Status: COMPLETED | OUTPATIENT
Start: 2021-01-19 | End: 2021-01-19

## 2021-01-19 RX ORDER — METHYLPREDNISOLONE SODIUM SUCCINATE 40 MG/ML
40 INJECTION, POWDER, LYOPHILIZED, FOR SOLUTION INTRAMUSCULAR; INTRAVENOUS EVERY 8 HOURS
Status: DISCONTINUED | OUTPATIENT
Start: 2021-01-19 | End: 2021-01-30

## 2021-01-19 RX ORDER — SODIUM POLYSTYRENE SULFONATE 15 G/60ML
15 SUSPENSION ORAL; RECTAL ONCE
Status: COMPLETED | OUTPATIENT
Start: 2021-01-19 | End: 2021-01-19

## 2021-01-19 RX ORDER — NICOTINE POLACRILEX 4 MG
15 LOZENGE BUCCAL PRN
Status: DISCONTINUED | OUTPATIENT
Start: 2021-01-19 | End: 2021-02-01 | Stop reason: HOSPADM

## 2021-01-19 RX ADMIN — CHLORHEXIDINE GLUCONATE 0.12% ORAL RINSE 15 ML: 1.2 LIQUID ORAL at 20:59

## 2021-01-19 RX ADMIN — Medication 2 PUFF: at 07:42

## 2021-01-19 RX ADMIN — BUPROPION HYDROCHLORIDE 300 MG: 150 TABLET, FILM COATED, EXTENDED RELEASE ORAL at 08:11

## 2021-01-19 RX ADMIN — METOCLOPRAMIDE 10 MG: 5 INJECTION, SOLUTION INTRAMUSCULAR; INTRAVENOUS at 01:33

## 2021-01-19 RX ADMIN — Medication 2 PUFF: at 14:06

## 2021-01-19 RX ADMIN — METOCLOPRAMIDE 10 MG: 5 INJECTION, SOLUTION INTRAMUSCULAR; INTRAVENOUS at 14:41

## 2021-01-19 RX ADMIN — ALBUTEROL SULFATE 2 PUFF: 90 AEROSOL, METERED RESPIRATORY (INHALATION) at 19:21

## 2021-01-19 RX ADMIN — Medication 200 MCG/HR: at 21:11

## 2021-01-19 RX ADMIN — ZINC SULFATE 220 MG (50 MG) CAPSULE 50 MG: CAPSULE at 08:12

## 2021-01-19 RX ADMIN — PROPOFOL 80 MCG/KG/MIN: 10 INJECTION, EMULSION INTRAVENOUS at 14:40

## 2021-01-19 RX ADMIN — FUROSEMIDE 20 MG: 10 INJECTION, SOLUTION INTRAVENOUS at 08:12

## 2021-01-19 RX ADMIN — INSULIN HUMAN 10 UNITS: 100 INJECTION, SOLUTION PARENTERAL at 14:42

## 2021-01-19 RX ADMIN — DICLOFENAC SODIUM 2 G: 10 GEL TOPICAL at 21:00

## 2021-01-19 RX ADMIN — INSULIN GLARGINE 22 UNITS: 100 INJECTION, SOLUTION SUBCUTANEOUS at 21:23

## 2021-01-19 RX ADMIN — PROPOFOL 80 MCG/KG/MIN: 10 INJECTION, EMULSION INTRAVENOUS at 06:36

## 2021-01-19 RX ADMIN — PROPOFOL 80 MCG/KG/MIN: 10 INJECTION, EMULSION INTRAVENOUS at 08:06

## 2021-01-19 RX ADMIN — ALBUTEROL SULFATE 2 PUFF: 90 AEROSOL, METERED RESPIRATORY (INHALATION) at 10:25

## 2021-01-19 RX ADMIN — SODIUM CHLORIDE: 9 INJECTION, SOLUTION INTRAVENOUS at 14:42

## 2021-01-19 RX ADMIN — PROPOFOL 75 MCG/KG/MIN: 10 INJECTION, EMULSION INTRAVENOUS at 16:15

## 2021-01-19 RX ADMIN — PROPOFOL 80 MCG/KG/MIN: 10 INJECTION, EMULSION INTRAVENOUS at 09:45

## 2021-01-19 RX ADMIN — FUROSEMIDE 40 MG: 10 INJECTION, SOLUTION INTRAVENOUS at 16:15

## 2021-01-19 RX ADMIN — PROPOFOL 80 MCG/KG/MIN: 10 INJECTION, EMULSION INTRAVENOUS at 04:58

## 2021-01-19 RX ADMIN — SODIUM POLYSTYRENE SULFONATE 15 G: 15 SUSPENSION ORAL; RECTAL at 14:40

## 2021-01-19 RX ADMIN — PROPOFOL 65 MCG/KG/MIN: 10 INJECTION, EMULSION INTRAVENOUS at 21:37

## 2021-01-19 RX ADMIN — METHYLPREDNISOLONE SODIUM SUCCINATE 40 MG: 40 INJECTION, POWDER, LYOPHILIZED, FOR SOLUTION INTRAMUSCULAR; INTRAVENOUS at 16:15

## 2021-01-19 RX ADMIN — Medication 2 PUFF: at 10:25

## 2021-01-19 RX ADMIN — ARIPIPRAZOLE 15 MG: 10 TABLET ORAL at 08:12

## 2021-01-19 RX ADMIN — PROPOFOL 70 MCG/KG/MIN: 10 INJECTION, EMULSION INTRAVENOUS at 19:28

## 2021-01-19 RX ADMIN — DEXAMETHASONE SODIUM PHOSPHATE 6 MG: 10 INJECTION, SOLUTION INTRAMUSCULAR; INTRAVENOUS at 01:33

## 2021-01-19 RX ADMIN — CHLORHEXIDINE GLUCONATE 0.12% ORAL RINSE 15 ML: 1.2 LIQUID ORAL at 08:09

## 2021-01-19 RX ADMIN — Medication 200 MCG/HR: at 10:15

## 2021-01-19 RX ADMIN — PROPOFOL 80 MCG/KG/MIN: 10 INJECTION, EMULSION INTRAVENOUS at 00:34

## 2021-01-19 RX ADMIN — Medication 50 MEQ: at 14:42

## 2021-01-19 RX ADMIN — PROPOFOL 81 MCG/KG/MIN: 10 INJECTION, EMULSION INTRAVENOUS at 02:04

## 2021-01-19 RX ADMIN — Medication 200 MCG/HR: at 15:34

## 2021-01-19 RX ADMIN — ASPIRIN 81 MG CHEWABLE TABLET 81 MG: 81 TABLET CHEWABLE at 08:11

## 2021-01-19 RX ADMIN — METOPROLOL TARTRATE 12.5 MG: 25 TABLET, FILM COATED ORAL at 08:12

## 2021-01-19 RX ADMIN — Medication 200 MCG/HR: at 05:39

## 2021-01-19 RX ADMIN — METOCLOPRAMIDE 10 MG: 5 INJECTION, SOLUTION INTRAMUSCULAR; INTRAVENOUS at 20:58

## 2021-01-19 RX ADMIN — ALBUTEROL SULFATE 2 PUFF: 90 AEROSOL, METERED RESPIRATORY (INHALATION) at 14:07

## 2021-01-19 RX ADMIN — SODIUM CHLORIDE, PRESERVATIVE FREE 10 ML: 5 INJECTION INTRAVENOUS at 08:12

## 2021-01-19 RX ADMIN — PROPOFOL 75 MCG/KG/MIN: 10 INJECTION, EMULSION INTRAVENOUS at 17:54

## 2021-01-19 RX ADMIN — Medication 2 PUFF: at 19:21

## 2021-01-19 RX ADMIN — FAMOTIDINE 20 MG: 10 INJECTION, SOLUTION INTRAVENOUS at 20:58

## 2021-01-19 RX ADMIN — PROPOFOL 80 MCG/KG/MIN: 10 INJECTION, EMULSION INTRAVENOUS at 03:50

## 2021-01-19 RX ADMIN — DICLOFENAC SODIUM 2 G: 10 GEL TOPICAL at 08:17

## 2021-01-19 RX ADMIN — DEXTROSE MONOHYDRATE 25 G: 25 INJECTION, SOLUTION INTRAVENOUS at 14:41

## 2021-01-19 RX ADMIN — PROPOFOL 80 MCG/KG/MIN: 10 INJECTION, EMULSION INTRAVENOUS at 12:56

## 2021-01-19 RX ADMIN — PROPOFOL 60 MCG/KG/MIN: 10 INJECTION, EMULSION INTRAVENOUS at 23:41

## 2021-01-19 RX ADMIN — PROPOFOL 80 MCG/KG/MIN: 10 INJECTION, EMULSION INTRAVENOUS at 11:35

## 2021-01-19 RX ADMIN — ALBUTEROL SULFATE 2 PUFF: 90 AEROSOL, METERED RESPIRATORY (INHALATION) at 07:42

## 2021-01-19 RX ADMIN — FAMOTIDINE 20 MG: 10 INJECTION, SOLUTION INTRAVENOUS at 08:12

## 2021-01-19 RX ADMIN — METOCLOPRAMIDE 10 MG: 5 INJECTION, SOLUTION INTRAMUSCULAR; INTRAVENOUS at 06:18

## 2021-01-19 RX ADMIN — SODIUM CHLORIDE, PRESERVATIVE FREE 10 ML: 5 INJECTION INTRAVENOUS at 20:59

## 2021-01-19 RX ADMIN — WARFARIN SODIUM 5 MG: 5 TABLET ORAL at 18:56

## 2021-01-19 RX ADMIN — ATORVASTATIN CALCIUM 40 MG: 40 TABLET, FILM COATED ORAL at 21:00

## 2021-01-19 RX ADMIN — ESCITALOPRAM OXALATE 20 MG: 10 TABLET ORAL at 08:11

## 2021-01-19 RX ADMIN — ALOGLIPTIN 12.5 MG: 12.5 TABLET, FILM COATED ORAL at 08:09

## 2021-01-19 ASSESSMENT — PULMONARY FUNCTION TESTS
PIF_VALUE: 28
PIF_VALUE: 29
PIF_VALUE: 29
PIF_VALUE: 24
PIF_VALUE: 29
PIF_VALUE: 28
PIF_VALUE: 24
PIF_VALUE: 28
PIF_VALUE: 28
PIF_VALUE: 29
PIF_VALUE: 23
PIF_VALUE: 29
PIF_VALUE: 29
PIF_VALUE: 23
PIF_VALUE: 28
PIF_VALUE: 28
PIF_VALUE: 29
PIF_VALUE: 29
PIF_VALUE: 24
PIF_VALUE: 29
PIF_VALUE: 28
PIF_VALUE: 28
PIF_VALUE: 23
PIF_VALUE: 24
PIF_VALUE: 29
PIF_VALUE: 28
PIF_VALUE: 23
PIF_VALUE: 28
PIF_VALUE: 24
PIF_VALUE: 29
PIF_VALUE: 29
PIF_VALUE: 28
PIF_VALUE: 29
PIF_VALUE: 28
PIF_VALUE: 24

## 2021-01-19 NOTE — PROGRESS NOTES
Pulmonary and Critical Care  Progress Note    Subjective: The patient is sedated on vent. FiO2 40%. Supine position. Shortness of breath none. Chest pain none. Addressing respiratory complaints Patient is negative for  hemoptysis and cyanosis  CONSTITUTIONAL:  negative for fevers and chills.       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, Anemia, Arm fracture, Arthritis, Cataract, Cataract, Cellulitis of left lower leg, Chronic venous hypertension with ulcer ([facility] [address] CKD (chronic kidney disease), Colon polyps, COPD (chronic obstructive pulmonary disease) ([facility] [address] Diabetes mellitus ([facility] [address] Diabetes mellitus ([facility] [address] Diabetes mellitus with peripheral circulatory disorder ([facility] [address] Diabetes mellitus with skin ulcer ([facility] [address] Diabetic peripheral neuropathy ([facility] [address] Diabetic skin ulcer associated with type 2 diabetes mellitus ([facility] [address] Diverticulosis, Femoral DVT (deep venous thrombosis) ([facility] [address] GERD (gastroesophageal reflux disease), Glaucoma, H/O cardiac catheterization, H/O Doppler ultrasound, H/O echocardiogram, H/O mumps orchitis, Hemorrhoids, History of nuclear stress test, HLD (hyperlipidemia), Hx of Doppler ultrasound, Hyperlipemia, Hyperlipidemia, Hypertension, Hypertension, Idiopathic chronic venous hypertension of left lower extremity with ulcer and inflammation (HCC), Leg ulcer ([address] No diabetic retinopathy OU, Non-pressure chronic ulcer of left lower leg with fat layer exposed ([facility] [address] Pulmonary embolism ([facility] [address] Sleep apnea, SOB (shortness of breath), Tendinitis, Type II or unspecified type diabetes mellitus with other specified manifestations, not stated as uncontrolled, Unspecified venous (peripheral) insufficiency, Urticaria, WD-Cellulitis of right anterior lower leg, WD-Cellulitis of right lower extremity, WD-Chronic venous hypertension (idiopathic) with ulcer of left lower extremity (CODE) ([facility] [address] WD-Chronic venous hypertension with inflammation, right, WD-Decubitus ulcer of left buttock, stage 3 ([address] 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 to breakdown of skin ([address] WD-Open wound of hand without complication, left, initial encounter, WD-Pressure injury of left buttock, stage 2 ([address] WD-Pressure injury of left buttock, stage 3 ([address] WD-Pressure injury of right buttock, stage 3 (HCC), WD-Skin tear of right forearm without complication, and WD-Ulcer of right pretibial region, with fat layer exposed ([facility] [address] has a past surgical history that includes Tonsillectomy (1953); Splenectomy (1958); Breast surgery (1970s); hernia repair (1970s); Skin graft (1978-present); Cataract removal (Right, 3/11/2013); Cataract removal (Left, 2/25/2013); Varicose vein surgery (Left, 2009); Carpal tunnel release (Left, 1993); Cardiac catheterization (6/3/14); Colonoscopy (2006); Colonoscopy (11/21/11); Colonoscopy (4/23/12); Colonoscopy (08/04/2016); Splenectomy; hernia repair; and Carpal tunnel release. reports that he quit smoking about 28 years ago. His smoking use included cigarettes. He has a 70.00 pack-year smoking history. He has never used smokeless tobacco. He reports current alcohol use. He reports that he does not use drugs. Family history:  family history includes Cancer in his brother and father; Diabetes in his brother; Heart Disease in his father; High Blood Pressure in his father; High Cholesterol in his father; Thyroid Disease in his brother.     Allergies   Allergen Reactions    Cavilon Durable Barrier [Mineral Oil-Dimeth-Coconut Oil]     Gentamycin [Gentamicin] Hives    Parabens Hives    Parabens Hives    Prinivil [Lisinopril] Swelling    Cortisone Rash    Cortisone Rash    Dilaudid [Hydromorphone Hcl] Rash    Penicillins Rash    Penicillins Rash    Sulfamethoxazole-Trimethoprim Nausea Only    Tape Joe Lim[place] Tape] Rash     Social History:    Reviewed; no changes    Objective:   PHYSICAL EXAM:        VITALS:  BP (!) 135/50   Pulse 70   Temp 98.7 °F (37.1 °C) (Rectal)   Resp 24   Ht 6' 5.01\" (1.956 m)   Wt 296 lb 8.3 oz (134.5 kg)   SpO2 97%   BMI 35.15 kg/m²     24HR INTAKE/OUTPUT:      Intake/Output Summary (Last 24 hours) at 1/19/2021 1052  Last data filed at 1/19/2021 0837  Gross per 24 hour   Intake 2336.23 ml   Output 2200 ml Net 136.23 ml       CONSTITUTIONAL:  Somnolent. LUNGS:  decreased breath sounds, basilar crackles. CARDIOVASCULAR:  normal S1 and S2 and negative JVD  ABD:Abdomen soft, non-tender. BS normal. No masses,  No organomegaly  NEURO:Sedated on vent. DATA:    CBC:  Recent Labs     01/17/21  0555 01/18/21  0425 01/19/21  0545   WBC 11.1* 13.7* 18.3*   RBC 3.65* 3.46* 3.81*   HGB 10.9* 10.1* 11.0*   HCT 33.6* 31.5* 36.2*    264 303   MCV 92.1 91.0 95.0   MCH 29.9 29.2 28.9   MCHC 32.4 32.1 30.4*   RDW 18.5* 18.8* 19.3*      BMP:  Recent Labs     01/17/21  0555 01/18/21  0425 01/19/21  0545    141 143   K 4.3 4.8 6.0*    108 107   CO2 19* 24 24   BUN 55* 59* 73*   CREATININE 1.5* 1.4* 1.7*   CALCIUM 7.3* 7.3* 7.4*   GLUCOSE 441* 317* 310*      ABG:  Recent Labs     01/17/21  0600 01/18/21  0600 01/19/21  0700   PH 7.40 7.38 7.26*   PO2ART 268* 89 114*   EWM5PBD 34.0 43.0 55.0*   O2SAT 97.3* 95.2* 96.0     Lab Results   Component Value Date    PROBNP 5,973 (H) 01/14/2021    PROBNP 1,642 (H) 01/12/2021    PROBNP 1,196 (H) 11/11/2020     No results found for: [facility]    Radiology Review:  Pertinent images / reports were reviewed as a part of this visit.     Assessment:     Patient Active Problem List   Diagnosis    Gastroesophageal reflux disease without esophagitis    Hypertension in stage 3 chronic kidney disease due to type 2 diabetes mellitus ([facility] [address]    DM (diabetes mellitus) type II, controlled, with peripheral vascular disorder ([facility] [address]    Combined hyperlipidemia associated with type 2 diabetes mellitus ([facility] [address]    Diabetic skin ulcer associated with type 2 diabetes mellitus (HCC)    CKD (chronic kidney disease)    History of DVT (deep vein thrombosis)- left femoral    History of pulmonary embolism    Long term current use of anticoagulants with INR goal of 2.0-3.0    COPD (chronic obstructive pulmonary disease) (HCC)    Severe recurrent major depressive disorder with psychotic features ([facility] [address]  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 [address]    History of colon polyps    Personal history of PE (pulmonary embolism)    WD-Chronic venous hypertension (idiopathic) with ulcer of left lower extremity (CODE) ([facility] [address]    WD-Chronic venous hypertension with inflammation, right    Troponin I above reference range    KIRSTEN (acute kidney injury) ([facility] [address]    Cellulitis of lower extremity    Hyponatremia    Generalized weakness    Weakness    Multifocal pneumonia    Pneumonia due to COVID-19 virus       Plan:   1. Inc rate to 24. PC to 20. Repeat ABGs in 2 hrs.  2. Wean FiO2.  3. Discussed with RN and RT.   Tory Bolivar MD  1/19/2021  10:52 AM

## 2021-01-19 NOTE — PROGRESS NOTES
[address] [facility] consulted by Dr. Erin Pro for monitoring and adjustment. Indication for treatment: Covid-19 pneumonia, possible secondary bacterial pneumonia, cellulitis  Goal trough: 15 mcg/mL (AUC/YOLA 400-600)    Pertinent Laboratory Values:   Temp Readings from Last 3 Encounters:   01/14/21 97.9 °F (36.6 °C) (Axillary)   01/12/21 97.1 °F (36.2 °C) (Infrared)   01/08/21 98.9 °F (37.2 °C) (Oral)     Recent Labs     01/12/21  0830 01/12/21  0845 01/13/21  0600 01/14/21  0800   WBC  --  6.4 5.9 10.6*  10.8*   LACTATE 1.0  --   --   --      Recent Labs     01/12/21  0845 01/13/21  0600 01/14/21  0800   BUN 25* 26* 36*   CREATININE 1.4* 1.2 1.4*     Estimated Creatinine Clearance: 72 mL/min (A) (based on SCr of 1.4 mg/dL (H)). Intake/Output Summary (Last 24 hours) at 1/14/2021 1356  Last data filed at 1/14/2021 0958  Gross per 24 hour   Intake 480 ml   Output 2350 ml   Net -1870 ml       Pertinent Cultures:  Date    Source    Results  1/12   Resp PCR               Covid-19  1/13   Respiratory    Ordered  1/13   Blood                NGTD    RENAL LAB EVALUATION  Estimated Creatinine Clearance: 58 mL/min (A) (based on SCr of 1.7 mg/dL (H)). Recent Labs     01/17/21  0555 01/18/21  0425 01/19/21  0545   BUN 55* 59* 73*   CREATININE 1.5* 1.4* 1.7*     VANCOMYCIN TROUGH:    Recent Labs     01/17/21  2345 01/19/21  0545   VANCOTROUGH 21.2* 21.7*       Assessment:  · WBC and temperature: Elevated WBC @ 18.3;  Tmax= 99.5F  · SCr, BUN, and urine output:   · Previously KIRSTEN, then improved, now in KIRSTEN again  · Baseline Scr 1 on admission  · Day(s) of therapy: 8  · Vancomycin concentration:  · 1/13: 6, appropriate to re-dose  · 1/14: 16, 14h post-dose  · 1/17: 21.2, 18h post-dose, supra-therapeutic  · 1/19: 21.7, 24h post-dose, supra-therapeutic    Plan:  · Modify dosing from scheduled to intermittent given worsening renal trends  · Hold vancomycin today · Repeat vancomycin level in 24h and re-dose when appropriate  · Pharmacy will continue to monitor patient and adjust therapy as indicated    Parvincolletteadis 3 1/20 @ 3936    Thank you for the consult,  Mahesh Vail, PharmD, [facility]  11:39 AM  01/19/21

## 2021-01-19 NOTE — PROGRESS NOTES
ABG results did not cross over. PH  7.52  PCO2  28  PO2  99  HCO3  22.9    Per perfect serve with Dr. Patricia Brantley - reduce rate to 22.

## 2021-01-19 NOTE — CONSULTS
[facility] Continence Nurse  Consult Note       Derick Cartwright  AGE: 77 y.o. GENDER: male  : 1945  TODAY'S DATE:  2021    Subjective:     Reason for  Evaluation and Assessment: wound care scot Cartwright is a 77 y.o. 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 Marily Briceño Anemia 2011    Arm fracture [facility] Balint     Dr Js Fontaine with also yearly DM exam    Cataract     worsening-Dr. Flavia Trivedi    Cellulitis of left lower leg     Chronic venous hypertension with ulcer ([address] 2011    CKD (chronic kidney disease) 2012    Renal u/S normal     Colon polyps     Dr. Marily Briceño COPD (chronic obstructive pulmonary disease) ([address]     Diabetes mellitus ([address]     Diabetes mellitus ([address]     Diabetes mellitus with peripheral circulatory disorder ([address] 10/2/2015    Diabetes mellitus with skin ulcer ([address] 10/2/2015    Diabetic peripheral neuropathy ([address]     + EMG, NCS    Diabetic skin ulcer associated with type 2 diabetes mellitus ([address]     Diverticulosis 12    mild, left colon    Femoral DVT (deep venous thrombosis) ([address] 2011    PARTIAL,CHRONIC-[facility] SURGE[facility]    GERD (gastroesophageal reflux disease)     Glaucoma     open angle-Dr. Rodriguez Favorite H/O cardiac catheterization 6/3/14    EF55% normal study    H/O Doppler ultrasound 03/26/15    Carotid US- no significant stenosis noted bilaterally.  H/O echocardiogram 2019    EF50-55%, Mild AR. Moderately dilated right ventricle with negative Solis's sign.     H/O mumps orchitis     as a youth    Hemorrhoids 12 Dr. Erik Landeros; repeat colonoscopy 3 years    History of nuclear stress test 11/21/2019    EF 60%, Normal study.  HLD (hyperlipidemia)     Hx of Doppler ultrasound 1/06/15    Lymph node seen in left groin area. No bilateral stenosis.     Hyperlipemia     Hyperlipidemia     Hypertension 1992    Hypertension     Idiopathic chronic venous hypertension of left lower extremity with ulcer and inflammation (HCC) 10/2/2015    Leg ulcer ([address] 1978-present    following at wound center, Dr Jose Krueger No diabetic retinopathy OU 11/12    Dr. Jessica Mullen chronic ulcer of left lower leg with fat layer exposed ([address] 10/2/2015    Pulmonary embolism ([address] 11/13    patient on coumadin    Sleep apnea     doesnt always use cpap dt it drys him out    SOB (shortness of breath) Oct 2011    Stress test normal.     Tendinitis 1973    plantar tendons    Type II or unspecified type diabetes mellitus with other specified manifestations, not stated as uncontrolled 2/8/2013    Unspecified venous (peripheral) insufficiency     Urticaria     WD-Cellulitis of right anterior lower leg 9/5/2019    WD-Cellulitis of right lower extremity 3/26/2020    WD-Chronic venous hypertension (idiopathic) with ulcer of left lower extremity (CODE) ([address] 6/27/2019    WD-Chronic venous hypertension with inflammation, right 9/19/2019    WD-Decubitus ulcer of left buttock, stage 3 ([address] 6/25/2020    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 to breakdown of skin ([address] 3/26/2020    WD-Open wound of hand without complication, left, initial encounter 12/12/2019    WD-Pressure injury of left buttock, stage 2 ([address] 1/2/2020    WD-Pressure injury of left buttock, stage 3 ([address] 3/12/2020    WD-Pressure injury of right buttock, stage 3 ([address] 3/12/2020    WD-Skin tear of right forearm without complication 1/20/0056    WD-Ulcer of right pretibial region, with fat layer exposed ([address] 10/17/2019 MEDICATIONS    No current facility-administered medications on file prior to encounter.       Current Outpatient Medications on File Prior to Encounter   Medication Sig Dispense Refill    clotrimazole-betamethasone (LOTRISONE) 1-0.05 % cream Apply topically 2 times daily Apply topically 2 times daily to BLE      warfarin (COUMADIN) 5 MG tablet Take 1 tablet by mouth daily Except take 1 and 1/2 tablets by mouth on Mondays or as directed by clinic 98 tablet 1    alogliptin (NESINA) 12.5 MG TABS tablet Take 1 tablet by mouth daily 60 tablet     glipiZIDE (GLUCOTROL) 10 MG tablet Take 1 tablet by mouth 2 times daily (before meals) 60 tablet 3    buPROPion (WELLBUTRIN XL) 300 MG extended release tablet Take 1 tablet by mouth every morning 90 tablet 0    escitalopram (LEXAPRO) 20 MG tablet Take 1 tablet by mouth daily 90 tablet 0    omeprazole (PRILOSEC) 20 MG delayed release capsule TAKE ONE (1) CAPSULE BY MOUTH ONCE DAILY 90 capsule 1    ARIPiprazole (ABILIFY) 15 MG tablet Take 1 tablet by mouth daily 90 tablet 0    atorvastatin (LIPITOR) 40 MG tablet Take 1 tablet by mouth nightly 90 tablet 0    furosemide (LASIX) 20 MG tablet Take 1 tablet by mouth 2 times daily 180 tablet 0    metFORMIN (GLUCOPHAGE) 500 MG tablet Take 1 tablet by mouth daily (with breakfast) 90 tablet 0    glycopyrrolate-formoterol (BEVESPI AEROSPHERE) 9-4.8 MCG/ACT AERO Inhale 2 puffs into the lungs 2 times daily 1 Inhaler 5    potassium chloride (MICRO-K) 10 MEQ extended release capsule Take 10 mEq by mouth daily      metoprolol tartrate (LOPRESSOR) 25 MG tablet Take 0.5 tablets by mouth 2 times daily 90 tablet 3    aspirin 81 MG chewable tablet Take 1 tablet by mouth daily 30 tablet 3         Objective:      BP (!) 135/50   Pulse 70   Temp 98.7 °F (37.1 °C) (Rectal)   Resp 24   Ht 6' 5.01\" (1.956 m)   Wt 296 lb 8.3 oz (134.5 kg)   SpO2 97%   BMI 35.15 kg/m²   Dao Risk Score: Dao Scale Score: 8    LABS    CBC: Lab Results   Component Value Date    WBC 18.3 01/19/2021    RBC 3.81 01/19/2021    HGB 11.0 01/19/2021    HCT 36.2 01/19/2021    MCV 95.0 01/19/2021    MCH 28.9 01/19/2021    MCHC 30.4 01/19/2021    RDW 19.3 01/19/2021     01/19/2021    MPV 13.0 01/19/2021     CMP:    Lab Results   Component Value Date     01/19/2021    K 6.0 01/19/2021     01/19/2021    CO2 24 01/19/2021    BUN 73 01/19/2021    CREATININE 1.7 01/19/2021    GFRAA 48 01/19/2021    GFRAA >60 03/19/2013    AGRATIO 1.1 10/21/2019    LABGLOM 40 01/19/2021    GLUCOSE 310 01/19/2021    PROT 6.7 01/19/2021    PROT 7.4 09/17/2012    LABALBU 2.8 01/19/2021    CALCIUM 7.4 01/19/2021    BILITOT 0.4 01/19/2021    ALKPHOS 83 01/19/2021    AST 22 01/19/2021    ALT 18 01/19/2021     Albumin:    Lab Results   Component Value Date    LABALBU 2.8 01/19/2021     PT/INR:    Lab Results   Component Value Date    PROTIME 26.3 01/19/2021    PROTIME 21.8 11/05/2020    INR 2.16 01/19/2021     HgBA1c:    Lab Results   Component Value Date    LABA1C 8.3 11/22/2019         Assessment:     Patient Active Problem List   Diagnosis    Gastroesophageal reflux disease without esophagitis    Hypertension in stage 3 chronic kidney disease due to type 2 diabetes mellitus [facility]    DM (diabetes mellitus) type II, controlled, with peripheral vascular disorder ([facility]    Combined hyperlipidemia associated with type 2 diabetes mellitus ([facility]    Diabetic skin ulcer associated with type 2 diabetes mellitus (HCC)    CKD (chronic kidney disease)    History of DVT (deep vein thrombosis)- left femoral    History of pulmonary embolism    Long term current use of anticoagulants with INR goal of 2.0-3.0    COPD (chronic obstructive pulmonary disease) (HCC)    Severe recurrent major depressive disorder with psychotic features ([facility]    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)    Venous (peripheral) insufficiency  Uncontrolled secondary diabetes mellitus with stage 3 CKD (GFR 30-59) (HCC)    Diabetes mellitus with skin ulcer [facility]    WD-Ulcer of shin, left, with fat layer exposed ([facility]    History of colon polyps    Personal history of PE (pulmonary embolism)    WD-Chronic venous hypertension (idiopathic) with ulcer of left lower extremity (CODE) ([facility]    WD-Chronic venous hypertension with inflammation, right    Troponin I above reference range    KIRSTEN (acute kidney injury) ([facility]    Cellulitis of lower extremity    Hyponatremia    Generalized weakness    Weakness    Multifocal pneumonia    Pneumonia due to COVID-19 virus       Measurements:  Wound 11/15/13 Other (Comment) Leg Inner erethema with a small puncture site (Active)   Number of days: 2621       Wound 06/27/19 #6 (onset 1 month) Left Distal Medial Ankle (Active)   Wound Etiology Venous 01/13/21 0215   Dressing Status Clean;Dry; Intact 01/19/21 0755   Wound Length (cm) 0 cm 01/19/21 1312   Wound Width (cm) 0 cm 01/19/21 1312   Wound Depth (cm) 0 cm 01/19/21 1312   Wound Surface Area (cm^2) 0 cm^2 01/19/21 1312   Change in Wound Size % (l*w) 100 01/19/21 1312   Wound Volume (cm^3) 0 cm^3 01/19/21 1312   Wound Healing % 100 01/19/21 1312   Wound Assessment Pink/red 01/14/21 0400   Drainage Amount None 01/14/21 0400   Odor None 01/19/21 0407   Ana-wound Assessment Dry/flaky 01/19/21 0407   Number of days: 572       Wound 07/30/20 Leg Medial;Lower; Left #7 (Active)   Wound Image   01/07/21 0950   Wound Etiology Venous 01/19/21 1314   Dressing Status New dressing applied 01/19/21 1314   Wound Cleansed Cleansed with saline 01/19/21 1314   Dressing/Treatment Other (comment) 01/19/21 0755   Dressing Change Due 01/20/21 01/19/21 0755   Wound Length (cm) 1.2 cm 01/19/21 1314   Wound Width (cm) 1.3 cm 01/19/21 1314   Wound Depth (cm) 0.1 cm 01/19/21 1314   Wound Surface Area (cm^2) 1.56 cm^2 01/19/21 1314   Change in Wound Size % (l*w) 98.92 01/19/21 1314 Wound Volume (cm^3) 0.16 cm^3 01/19/21 1314   Wound Healing % 99 01/19/21 1314   Distance Tunneling (cm) 0 cm 01/19/21 1314   Tunneling Position ___ O'Clock 0 01/19/21 1314   Undermining Starts ___ O'Clock 0 01/19/21 1314   Undermining Ends___ O'Clock 0 01/19/21 1314   Undermining Maxium Distance (cm) 0 01/19/21 1314   Wound Assessment Pink/red 01/14/21 0400   Drainage Amount Moderate 01/19/21 1314   Drainage Description Serosanguinous 01/19/21 1314   Odor None 01/19/21 1314   Ana-wound Assessment Dry/flaky 01/19/21 1314   Margins Defined edges 01/19/21 1314   Wound Thickness Description not for Pressure Injury Full thickness 01/19/21 1314   Number of days: 173       Wound 08/27/20 Leg Medial;Lower;Right #8 Cluster (Active)   Wound Image   01/07/21 0950   Wound Etiology Venous 01/13/21 0215   Dressing Status Clean;Dry; Intact 01/19/21 0755   Wound Cleansed Cleansed with saline 01/11/21 0945   Dressing/Treatment Open to air 01/19/21 1314   Wound Length (cm) 0 cm 01/19/21 1314   Wound Width (cm) 0 cm 01/19/21 1314   Wound Depth (cm) 0 cm 01/19/21 1314   Wound Surface Area (cm^2) 0 cm^2 01/19/21 1314   Change in Wound Size % (l*w) 100 01/19/21 1314   Wound Volume (cm^3) 0 cm^3 01/19/21 1314   Wound Healing % 100 01/19/21 1314   Distance Tunneling (cm) 0 cm 01/11/21 0945   Tunneling Position ___ O'Clock 0 01/11/21 0945   Undermining Starts ___ O'Clock 0 01/11/21 0945   Undermining Ends___ O'Clock 0 01/11/21 0945   Undermining Maxium Distance (cm) 0 01/11/21 0945   Wound Assessment Pink/red 01/14/21 0400   Drainage Amount Small 01/14/21 0400   Drainage Description Thin 01/14/21 0400   Odor None 01/19/21 0407   Ana-wound Assessment Dry/flaky 01/19/21 0407   Margins Defined edges 01/14/21 0400   Wound Thickness Description not for Pressure Injury Full thickness 01/14/21 0400   Number of days: 145       Wound 11/12/20 Elbow Right;Posterior (Active)   Number of days: 68       Wound 11/12/20 Buttocks Left cluster (Active) Number of days: 68       Wound 01/13/21 Coccyx red small open area (Active)   Wound Etiology Pressure Stage  2 01/14/21 0900   Dressing Status Other (Comment) 01/19/21 1312   Wound Cleansed Cleansed with saline 01/19/21 1312   Dressing/Treatment Barrier film 01/19/21 0755   Wound Length (cm) 0 cm 01/19/21 1312   Wound Width (cm) 0 cm 01/19/21 1312   Wound Depth (cm) 0 cm 01/19/21 1312   Wound Surface Area (cm^2) 0 cm^2 01/19/21 1312   Wound Volume (cm^3) 0 cm^3 01/19/21 1312   Distance Tunneling (cm) 0 cm 01/19/21 1312   Tunneling Position ___ O'Clock 0 01/19/21 1312   Undermining Starts ___ O'Clock 0 01/19/21 1312   Undermining Ends___ O'Clock 0 01/19/21 1312   Undermining Maxium Distance (cm) 0 01/19/21 1312   Wound Assessment Purple/maroon 01/19/21 0755   Drainage Amount None 01/19/21 1312   Drainage Description Thin 01/19/21 0755   Odor None 01/19/21 1312   Ana-wound Assessment Intact 01/19/21 1312   Margins Attached edges 01/19/21 1312   Number of days: 6       Wound 01/19/21 Radial Right (Active)   Wound Etiology Skin Tear 01/19/21 1312   Dressing Status New dressing applied 01/19/21 1312   Wound Cleansed Cleansed with saline 01/19/21 1312   Dressing/Treatment Collagen;Silicone border 87/26/59 1312   Wound Length (cm) 0.2 cm 01/19/21 1312   Wound Width (cm) 0.6 cm 01/19/21 1312   Wound Depth (cm) 0.1 cm 01/19/21 1312   Wound Surface Area (cm^2) 0.12 cm^2 01/19/21 1312   Wound Volume (cm^3) 0.01 cm^3 01/19/21 1312   Distance Tunneling (cm) 0 cm 01/19/21 1312   Tunneling Position ___ O'Clock 0 01/19/21 1312   Undermining Starts ___ O'Clock 0 01/19/21 1312   Undermining Ends___ O'Clock 0 01/19/21 1312   Undermining Maxium Distance (cm) 0 01/19/21 1312   Wound Assessment Pink/red 01/19/21 1312   Drainage Amount Small 01/19/21 1312   Drainage Description Serosanguinous 01/19/21 1312   Odor None 01/19/21 1312   Ana-wound Assessment Fragile 01/19/21 1312   Margins Defined edges 01/19/21 1312 Wound Thickness Description not for Pressure Injury Partial thickness 01/19/21 1312   Number of days: 0       Wound 01/19/21 Radial Left (Active)   Wound Etiology Skin Tear 01/19/21 1312   Dressing Status New dressing applied 01/19/21 1312   Wound Cleansed Cleansed with saline 01/19/21 1312   Dressing/Treatment Collagen;Silicone border 99/75/76 1312   Wound Length (cm) 0.7 cm 01/19/21 1312   Wound Width (cm) 0.2 cm 01/19/21 1312   Wound Depth (cm) 0.1 cm 01/19/21 1312   Wound Surface Area (cm^2) 0.14 cm^2 01/19/21 1312   Wound Volume (cm^3) 0.01 cm^3 01/19/21 1312   Distance Tunneling (cm) 0 cm 01/19/21 1312   Tunneling Position ___ O'Clock 0 01/19/21 1312   Undermining Starts ___ O'Clock 0 01/19/21 1312   Undermining Ends___ O'Clock 0 01/19/21 1312   Undermining Maxium Distance (cm) 0 01/19/21 1312   Wound Assessment Pink/red 01/19/21 1312   Drainage Amount Moderate 01/19/21 1312   Drainage Description Serosanguinous 01/19/21 1312   Odor None 01/19/21 1312   Ana-wound Assessment Fragile 01/19/21 1312   Margins Defined edges 01/19/21 1312   Wound Thickness Description not for Pressure Injury Partial thickness 01/19/21 1312   Number of days: 0       Wound 01/19/21 Elbow Left (Active)   Wound Etiology Other 01/19/21 1312   Dressing Status New dressing applied 01/19/21 1312   Wound Cleansed Cleansed with saline 01/19/21 1312   Dressing/Treatment Silicone border 19/48/07 1312   Wound Length (cm) 1.8 cm 01/19/21 1312   Wound Width (cm) 1.5 cm 01/19/21 1312   Wound Depth (cm) 0 cm 01/19/21 1312   Wound Surface Area (cm^2) 2.7 cm^2 01/19/21 1312   Wound Volume (cm^3) 0 cm^3 01/19/21 1312   Distance Tunneling (cm) 0 cm 01/19/21 1312   Tunneling Position ___ O'Clock 0 01/19/21 1312   Undermining Starts ___ O'Clock 0 01/19/21 1312   Undermining Ends___ O'Clock 0 01/19/21 1312   Undermining Maxium Distance (cm) 0 01/19/21 1312   Wound Assessment Fluid filled blister 01/19/21 1312   Drainage Amount None 01/19/21 1312 Odor None 01/19/21 1312   Ana-wound Assessment Intact 01/19/21 1312   Margins Attached edges 01/19/21 1312   Number of days: 0       Response to treatment:  Well tolerated by patient. Pain Assessment:  Severity:  none  Quality of pain:   Wound Pain Timing/Severity:   Premedicated: no    Plan:     Plan of Care: Wound 01/13/21 Coccyx red small open area-Dressing/Treatment: (barrier cream)  Wound 08/27/20 Leg Medial;Lower;Right #8 Cluster-Dressing/Treatment: Open to air  Wound 07/30/20 Leg Medial;Lower; Left #7-Dressing/Treatment: (optifoam ag kerlix tape )  Wound 06/27/19 #6 (onset 1 month) Left Distal Medial Ankle-Dressing/Treatment: (lotrisone;optifoam;coban 2 lite; tape)  Wound 01/19/21 Radial Right-Dressing/Treatment: Collagen, Silicone border  Wound 01/19/21 Radial Left-Dressing/Treatment: Collagen, Silicone border  Wound 01/19/21 Elbow Left-Dressing/Treatment: Silicone border     Pt is in bed on a ventilator eyes closed unresponsive. Wound care eval for f/u. Pt has chronic lower leg skin changes from venous disease with open area to left lower leg. Cleansed with NS measured and pictured dressing as above. Pt has skin tears to rt and lt arms and blister to left elbow cleansed with NS measured and pictured dressing as above. Heels and buttocks intact. Continue calazime cream to buttocks. Heels floated. Pt turned to rt side. Pt is at very high risk for skin breakdown AEB nguyen score. Observe nguyen orders. Specialty Bed Required : yes  [x] Low Air Loss   [] Pressure Redistribution  [] Fluid Immersion  [] Bariatric  [] Total Pressure Relief  [] Other:     Discharge Plan:  Placement for patient upon discharge:  tbd  Hospice Care: no  Patient appropriate for Outpatient [address] [facility] [address]:  Yes to continue when discharged    Patient/Caregiver Teaching:  Level of patient/caregiver understanding able to: cares explained as given. Electronically signed by Bela Abrams.  SAMI Lang,  on 1/19/2021 at 2:09 PM

## 2021-01-19 NOTE — CONSULTS
Infectious Disease Consult Note  2021   Patient Name: Juan Birmingham : 1945   Impression  Severe COVID-19 pneumonia with acute on chronic hypoxic respiratory failure. Date of symptom onset:~2021  Date of positive COVID-19 PCR: 2021  Exposure: unknown  Oxygen supplementation: mechanical ventilation  Bacterial infection: possible as pct is elevated  BMI:35 kg/m2  Encephalopathy:  KIRSTEN:  COVID-associated Coagulopathy  Elevated D-dimer  Hx of lower extremity DVT  T2DM  HTN  HLD  Depression  Morbid obesity  ? Multi-morbidity: per PMHx  Plan:  ? Therapeutic: on vancomycin and cefepime. D/c dexamethasone, start Solumedrol  ? Diagnostic: blood cx, aspergillus Ag, BDG, trend CRP and pct  ? F/u test:     Thank you for allowing me to consult in the care of this patient.  ------------------------  REASON FOR CONSULT: Infective syndrome   Requested by: Dr. Pastora Trejo  History obtained from chart review, RN as the patient is intubated  HPI:Patient is a 77 y.o. Caucasianmale with T2DM, hyperlipidemia, htn, depression, morbid obesity who was admitted 2021 for further evaluation and management of shortness of breath that started on 2021 and positive COVID test. Antibiotics were started, vancomycin and cefepime. He progressively worsened and was intubated and placed on mechanical ventilator. Being weaned off vasopressor. ? Infectious diseases service was consulted to evaluate the pt, and recommend further investigative and therapeutic measures.   Review and summary of old records:  ROS:   Unable to obtain; pt on vent  Patient Active Problem List    Diagnosis Date Noted    Diabetes mellitus with skin ulcer ([facility] 10/02/2015     Priority: High    WD-Ulcer of shin, left, with fat layer exposed ([facility] [address] 10/02/2015     Priority: High    Uncontrolled secondary diabetes mellitus with stage 3 CKD (GFR 30-59) ([facility]) 05/10/2015     Priority: High  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 ([facility] [address] 10/14/2014     Priority: High    Severe recurrent major depressive disorder with psychotic features ([facility] [address] 05/13/2014     Priority: High    COPD (chronic obstructive pulmonary disease) ([facility] [address] 01/29/2014     Priority: High    Long term current use of anticoagulants with INR goal of 2.0-3.0 12/06/2013     Priority: High    History of DVT (deep vein thrombosis)- left femoral 11/15/2013     Priority: High     Class: Acute    History of pulmonary embolism 11/15/2013     Priority: High     Class: Acute    Diabetic skin ulcer associated with type 2 diabetes mellitus ([facility] [address]      Priority: High     Class: Present on Admission    Combined hyperlipidemia associated with type 2 diabetes mellitus ([facility] [address]      Priority: High    CKD (chronic kidney disease) 02/01/2012     Priority: High    Hypertension in stage 3 chronic kidney disease due to type 2 diabetes mellitus ([facility] [address]      Priority: High    DM (diabetes mellitus) type II, controlled, with peripheral vascular disorder ([facility] [address]      Priority: High    Venous (peripheral) insufficiency      Priority: Medium    Gastroesophageal reflux disease without esophagitis 11/08/2011     Priority: Low    Multifocal pneumonia 01/12/2021    Pneumonia due to COVID-19 virus 01/12/2021    Generalized weakness 01/08/2021    Weakness 01/08/2021    Cellulitis of lower extremity     Hyponatremia     KIRSTEN (acute kidney injury) ([facility] [address] 11/11/2020    Troponin I above reference range 11/21/2019    WD-Chronic venous hypertension with inflammation, right 09/19/2019    WD-Chronic venous hypertension (idiopathic) with ulcer of left lower extremity (CODE) ([facility] [address] 06/27/2019    Personal history of PE (pulmonary embolism) 02/12/2019    History of colon polyps 06/21/2016     Past Medical History:   Diagnosis Date    Adenocarcinoma in situ in tubulovillous adenoma Nov 2011 with high grade dysplasia=- C scope and removal per Dr. Ulises Sorto Anemia 11/8/2011    Arm fracture Bronwyn Lopez with also yearly DM exam    Cataract 11/12    worsening-Dr. Mireya Haider    Cellulitis of left lower leg     Chronic venous hypertension with ulcer ([address] 11/18/2011    CKD (chronic kidney disease) Feb 2012    Renal u/S normal     Colon polyps     Dr. Ulises Sorto COPD (chronic obstructive pulmonary disease) ([address]     Diabetes mellitus ([address] 1993    Diabetes mellitus ([address]     Diabetes mellitus with peripheral circulatory disorder ([address] 10/2/2015    Diabetes mellitus with skin ulcer ([address] 10/2/2015    Diabetic peripheral neuropathy ([address] 11/12    + EMG, NCS    Diabetic skin ulcer associated with type 2 diabetes mellitus ([address]     Diverticulosis 4/23/12    mild, left colon    Femoral DVT (deep venous thrombosis) ([address] 09/2011    PARTIAL,CHRONIC-SPFLD HEART SURGEONS    GERD (gastroesophageal reflux disease)     Glaucoma 11/12    open angle-Dr. Alyssa Leonard H/O cardiac catheterization 6/3/14    EF55% normal study    H/O Doppler ultrasound 03/26/15    Carotid US- no significant stenosis noted bilaterally.  H/O echocardiogram 11/21/2019    EF50-55%, Mild AR. Moderately dilated right ventricle with negative Solis's sign.  H/O mumps orchitis     as a youth    Hemorrhoids 4/23/12    Dr. Severa Pike; repeat colonoscopy 3 years    History of nuclear stress test 11/21/2019    EF 60%, Normal study.  HLD (hyperlipidemia)     Hx of Doppler ultrasound 1/06/15    Lymph node seen in left groin area. No bilateral stenosis.     Hyperlipemia     Hyperlipidemia     Hypertension 1992    Hypertension     Idiopathic chronic venous hypertension of left lower extremity with ulcer and inflammation ([address] 10/2/2015    Leg ulcer ([address] 1978-present    following at wound center, Dr Lance Shone No diabetic retinopathy OU 11/12    Dr. Mireya Haider  Non-pressure chronic ulcer of left lower leg with fat layer exposed ([address] 10/2/2015    Pulmonary embolism ([address] 11/13    patient on coumadin    Sleep apnea     doesnt always use cpap dt it drys him out    SOB (shortness of breath) Oct 2011    Stress test normal.     Tendinitis 1973    plantar tendons    Type II or unspecified type diabetes mellitus with other specified manifestations, not stated as uncontrolled 2/8/2013    Unspecified venous (peripheral) insufficiency     Urticaria     WD-Cellulitis of right anterior lower leg 9/5/2019    WD-Cellulitis of right lower extremity 3/26/2020    WD-Chronic venous hypertension (idiopathic) with ulcer of left lower extremity (CODE) ([address] 6/27/2019    WD-Chronic venous hypertension with inflammation, right 9/19/2019    WD-Decubitus ulcer of left buttock, stage 3 ([address] 6/25/2020    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 to breakdown of skin ([address] 3/26/2020    WD-Open wound of hand without complication, left, initial encounter 12/12/2019    WD-Pressure injury of left buttock, stage 2 ([address] 1/2/2020    WD-Pressure injury of left buttock, stage 3 ([address] 3/12/2020    WD-Pressure injury of right buttock, stage 3 ([address] 3/12/2020    WD-Skin tear of right forearm without complication 9/35/5242    WD-Ulcer of right pretibial region, with fat layer exposed ([address] 10/17/2019      Past Surgical History:   Procedure Laterality Date    BREAST SURGERY  1970s    benign tumors bilaterally    CARDIAC CATHETERIZATION  6/3/14    EF55% normal study    CARPAL TUNNEL RELEASE Left 1993    CARPAL TUNNEL RELEASE      CATARACT REMOVAL Right 3/11/2013    Dr. Cara Gao Left 2/25/2013    Dr. Jo-Ann Baca  2006    polyps    COLONOSCOPY  11/21/11    villous component--f/u colonoscopy will be needed in 6 months    COLONOSCOPY  4/23/12    mild diverticulosis, internal hemorrhoids; repeat in 3 years (Dr. Kendal Mcnamara)  COLONOSCOPY  2016    mild diverticulosis, three colon polyps    HERNIA REPAIR  1970s    HERNIA REPAIR      SKIN GRAFT  1978-present    skin grafts to left ankle ulcers    SPLENECTOMY      SPLENECTOMY      TONSILLECTOMY      VARICOSE VEIN SURGERY Left 2009      Family History   Problem Relation Age of Onset    Heart Disease Father     Cancer Father         prostate    High Blood Pressure Father     High Cholesterol Father     Cancer Brother     Diabetes Brother     Thyroid Disease Brother       Infectious disease related family history - not contibutory. SOCIAL HISTORY  Social History     Tobacco Use    Smoking status: Former Smoker     Packs/day: 2.00     Years: 35.00     Pack years: 70.00     Types: Cigarettes     Quit date:      Years since quittin.0    Smokeless tobacco: Never Used    Tobacco comment: chew--30 years. Reviewed 2015   Substance Use Topics    Alcohol use: Yes     Comment: soc      ? Born:  ? Lived  ? Occupation:  ? No recent travel of significance. ? No recent unusual exposures. ? NO pets    ? ALLERGIES  Allergies   Allergen Reactions    Cavilon Durable Barrier [Mineral Oil-Dimeth-Coconut Oil]     Gentamycin [Gentamicin] Hives    Parabens Hives    Parabens Hives    Prinivil [Lisinopril] Swelling    Cortisone Rash    Cortisone Rash    Dilaudid [Hydromorphone Hcl] Rash    Penicillins Rash    Penicillins Rash    Sulfamethoxazole-Trimethoprim Nausea Only    Tape [Adhesive Tape] Rash      MEDICATIONS  Reviewed and are per the chart/EMR.    IMMUNIZATION HISTORY  Immunization History   Administered Date(s) Administered    Influenza 2011, 2012, 10/22/2013    Influenza Virus Vaccine 10/14/2015    Influenza, High Dose (Fluzone 65 yrs and older) 2014, 10/03/2016, 10/15/2018    Influenza, MDCK Quadv, IM, PF (Flucelvax 4 yrs and older) 10/06/2020  Influenza, Quadv, IM, PF (6 mo and older Fluzone, Flulaval, Fluarix, and 3 yrs and older Afluria) 01/04/2018    Influenza, Triv, inactivated, subunit, adjuvanted, IM (Fluad 65 yrs and older) 10/21/2019    Pneumococcal Conjugate 13-valent ([id number]) 09/02/2015    Pneumococcal Polysaccharide ([id number]) 06/10/2014    Tdap (Boostrix, Adacel) 09/01/2016     ? Antibiotics:   Vancomycin  Cefepime  ?  -------------------------------------------------------------------------------------------------------------------    Vital Signs:  Vitals:    01/19/21 1412   BP:    Pulse:    Resp: 21   Temp:    SpO2: 96%         Exam:    VS: noted; wt 134.5 kg  Gen: intubated, mechanically ventilated  Skin: no stigmata of endocarditis  Wounds: C/D/I  HEMT: AT/NC ETT   Eyes: PERRL. Neck: Supple. Trachea midline. No LAD. Chest: reduced air entry bilaterally. Heart: RRR and no MRG. Abd: soft, non-distended, no tenderness, no hepatomegaly. Normoactive bowel sounds. Ext: no clubbing, cyanosis, or edema  Catheter Site: without erythema or tenderness  LDA: CVC, Urethral catheter:  Neuro: intubated and mechanically ventilated    ? Diagnostic Studies: reviewed  ? ? I have examined this patient and available medical records on this date and have made the above observations, conclusions and recommendations.   Electronically signed by: Electronically signed by Gamaliel Handy MD on 1/19/2021 at 2:37 PM

## 2021-01-19 NOTE — PROGRESS NOTES
critical lab result potassium 6.0, message sent to dr. Stanton Kirk    2204- case reviewed with Dr. Eid Diss, notified of pt in afib at this time    [phone]- Pt is in NSR at this time

## 2021-01-19 NOTE — PROGRESS NOTES
Hospitalist Progress Note      Name:  Isela Turner /Age/Sex: 1945  [de-identified]77 y.o. male)   MRN & CSN:  [id number] & [id number] Admission Date/Time: 2021 12:17 AM   Location:  -A PCP: Sulma Anderson MD         Hospital Day: 7    Assessment and Plan:   Isela Turner is a 77 y.o.  male  who presents with shortness of breath, was transferred from [facility] to for management of COVID-19 pneumonia    · Acute on chronic hypoxic respiratory failure due to COVID-19 pneumonia, possible hospital-acquired pneumonia  · Septic shock due to COVID-19 pneumonia  -Worsening Leucocytosis  -Covid positive   -Check MRSA  -Respiratory cultures pending  -Blood cultures negative to date  -On steroids   -Remdesivir   -S/p convalescent plasma 2 units 2021  -On pressors  -Ventilator, management per pulmonology  -On IV vancomycin  -Pulmo on board, Consult ID    · Right lower extremity cellulitis  On vancomycin  · Diabetes mellitus type 2-poor control  Check A1c, start on sliding scale, Lantus, consult endocrine    · Hyperkalemia   Give dose of Kayexalate, monitor    · KIRSTEN, likely due to ATN  On Lasix, with good urine output, consult nephro    · Moderate PEM  Dietitian on board, on tube feedings    Chronic medical condition  Hyperlipidemia, on statin  Hypertension  Morbid obesity, encourage weight loss and exercise  Lower extremity DVT, on Coumadin, INR therapeutic  Chronic respiratory failure at 3 L of O2    Called [facility] Left voicemail    Diet DIET TUBE FEED CONTINUOUS/CYCLIC NPO; Low Calorie High Protein (Vital HP);  Nasogastric; Continuous; 10; 55; 24   DVT Prophylaxis [] Lovenox, []  Heparin, [] SCDs, []No VTE prophylaxis, patient ambulating   GI Prophylaxis [] PPI, [] H2 Blocker, [] No GI prophylaxis, patient is receiving diet/Tube Feeds   Code Status Full Code   Disposition Patient requires continued admission due to   MDM [] Low, [] Moderate,[]  High Patient's risk as above due to      History of Present Illness:     Pt S&E. Intubated , sedated    10-14 point ROS reviewed negative, unless as noted above    Objective: Intake/Output Summary (Last 24 hours) at 1/19/2021 1126  Last data filed at 1/19/2021 0837  Gross per 24 hour   Intake 2336.23 ml   Output 2200 ml   Net 136.23 ml      Vitals:   Vitals:    01/19/21 1025   BP:    Pulse:    Resp: 24   Temp:    SpO2: 97%     Physical Exam:    GEN Intubated, sedated distress. Appears given age. EYES Pupils are equally round. No scleral erythema, discharge, or conjunctivitis. HENT Mucous membranes are moist. NG tube   NECK No apparent thyromegaly or masses. ET Tube insitu  RESP Clear to auscultation, no wheezes, rales or rhonchi. Symmetric chest movement   CARDIO/VASC S1/S2 auscultated. Regular rate without appreciable murmurs, rubs, or gallops. Peripheral pulses equal bilaterally and palpable. No peripheral edema. GI Abdomen is soft without significant tenderness, masses, or guarding. Bowel sounds are normoactive. Rectal exam deferred.  Starkey catheter is present. HEME/LYMPH No petechiae or ecchymoses. MSK No gross joint deformities. Spontaneous movement of all extremities  SKIN Normal coloration, warm, dry. NEURO Cranial nerves appear grossly intact,on vent.     Medications:   Medications:    vancomycin (VANCOCIN) intermittent dosing (placeholder)   Other See Admin Instructions    warfarin  5 mg Oral Daily    metoclopramide  10 mg Intravenous Q6H    insulin lispro  0-18 Units Subcutaneous Q6H    furosemide  20 mg Intravenous BID    alogliptin  12.5 mg Oral Daily    ARIPiprazole  15 mg Oral Daily    aspirin  81 mg Oral Daily    atorvastatin  40 mg Oral Nightly    buPROPion  300 mg Oral QAM    escitalopram  20 mg Oral Daily    metoprolol tartrate  12.5 mg Oral BID    sodium chloride flush  10 mL Intravenous 2 times per day    diclofenac sodium  2 g Topical BID  dexamethasone  6 mg Intravenous Q24H    zinc sulfate  50 mg Oral Daily    albuterol sulfate HFA  2 puff Inhalation 4x daily    ipratropium  2 puff Inhalation 4x daily    chlorhexidine  15 mL Mouth/Throat BID    famotidine (PEPCID) injection  20 mg Intravenous BID      Infusions:    cisatracurium (NIMBEX) infusion Stopped (01/18/21 1612)    norepinephrine 6 mcg/min (01/19/21 1027)    fentanyl 200 mcg/hr (01/19/21 1015)    dextrose      sodium chloride      propofol 80 mcg/kg/min (01/19/21 0945)     PRN Meds:     microfibrillar collagen, , PRN      polyvinyl alcohol, 1 drop, Q4H PRN    And      artificial tears, , PRN      sodium chloride flush, 10 mL, PRN      promethazine, 12.5 mg, Q6H PRN    Or      ondansetron, 4 mg, Q6H PRN      polyethylene glycol, 17 g, Daily PRN      acetaminophen, 650 mg, Q6H PRN    Or      acetaminophen, 650 mg, Q6H PRN      glucose, 15 g, PRN      dextrose, 12.5 g, PRN      glucagon (rDNA), 1 mg, PRN      dextrose, 100 mL/hr, PRN      traMADol, 50 mg, Q6H PRN    Or      traMADol, 100 mg, Q6H PRN      diphenhydrAMINE, 25 mg, Q6H PRN      sodium chloride, , PRN      sodium chloride, 30 mL, PRN        Data    Recent Labs     01/17/21  0555 01/18/21  0425 01/19/21  0545   WBC 11.1* 13.7* 18.3*   HGB 10.9* 10.1* 11.0*   HCT 33.6* 31.5* 36.2*    264 303      Recent Labs     01/17/21  0555 01/18/21  0425 01/19/21  0545    141 143   K 4.3 4.8 6.0*    108 107   CO2 19* 24 24   PHOS  --  4.1 6.8*   BUN 55* 59* 73*   CREATININE 1.5* 1.4* 1.7*     Recent Labs     01/17/21  0555 01/18/21  0425 01/19/21  0545   AST 22 18 22   ALT 18 16 18   BILIDIR 0.3 0.2 0.3   BILITOT 0.4 0.4 0.4   ALKPHOS 75 73 83     Recent Labs     01/17/21  0555 01/18/21  0425 01/19/21  0545   INR 3.48 2.11 2.16     No results for input(s): CKTOTAL, CKMB, CKMBINDEX, TROPONINI in the last 72 hours.         Electronically signed by Tarsha Rey MD on 1/19/2021 at 11:26 AM

## 2021-01-20 ENCOUNTER — APPOINTMENT (OUTPATIENT)
Dept: GENERAL RADIOLOGY | Age: 76
DRG: 870 | End: 2021-01-20
Attending: INTERNAL MEDICINE
Payer: MEDICARE

## 2021-01-20 PROBLEM — J96.01 ACUTE RESPIRATORY FAILURE WITH HYPOXIA (HCC): Status: ACTIVE | Noted: 2021-01-20

## 2021-01-20 LAB
ALBUMIN SERPL-MCNC: 2.4 GM/DL (ref 3.4–5)
ALP BLD-CCNC: 68 IU/L (ref 40–129)
ALT SERPL-CCNC: 15 U/L (ref 10–40)
ANION GAP SERPL CALCULATED.3IONS-SCNC: 11 MMOL/L (ref 4–16)
AST SERPL-CCNC: 17 IU/L (ref 15–37)
BASE EXCESS MIXED: 5.1 (ref 0–1.2)
BILIRUB SERPL-MCNC: 0.3 MG/DL (ref 0–1)
BILIRUBIN DIRECT: 0.2 MG/DL (ref 0–0.3)
BILIRUBIN, INDIRECT: 0.1 MG/DL (ref 0–0.7)
BUN BLDV-MCNC: 73 MG/DL (ref 6–23)
C-REACTIVE PROTEIN, HIGH SENSITIVITY: 84.8 MG/L
CALCIUM SERPL-MCNC: 7 MG/DL (ref 8.3–10.6)
CARBON MONOXIDE, BLOOD: 2.3 % (ref 0–5)
CHLORIDE BLD-SCNC: 109 MMOL/L (ref 99–110)
CO2 CONTENT: 28.8 MMOL/L (ref 19–24)
CO2: 24 MMOL/L (ref 21–32)
COMMENT: ABNORMAL
CREAT SERPL-MCNC: 1.4 MG/DL (ref 0.9–1.3)
CULTURE: ABNORMAL
CULTURE: ABNORMAL
D DIMER: 529 NG/ML(DDU)
DOSE AMOUNT: NORMAL
DOSE TIME: NORMAL
GFR AFRICAN AMERICAN: 60 ML/MIN/1.73M2
GFR NON-AFRICAN AMERICAN: 50 ML/MIN/1.73M2
GLUCOSE BLD-MCNC: 246 MG/DL (ref 70–99)
GLUCOSE BLD-MCNC: 289 MG/DL (ref 70–99)
GLUCOSE BLD-MCNC: 360 MG/DL (ref 70–99)
GLUCOSE BLD-MCNC: 454 MG/DL (ref 70–99)
GLUCOSE BLD-MCNC: 480 MG/DL (ref 70–99)
GLUCOSE BLD-MCNC: 488 MG/DL (ref 70–99)
HCO3 ARTERIAL: 27.8 MMOL/L (ref 18–23)
HCT VFR BLD CALC: 29 % (ref 42–52)
HEMOGLOBIN: 9.3 GM/DL (ref 13.5–18)
HIV SCREEN: NON REACTIVE
INR BLD: 2.72 INDEX
Lab: ABNORMAL
MAGNESIUM: 2.3 MG/DL (ref 1.8–2.4)
MCH RBC QN AUTO: 29.2 PG (ref 27–31)
MCHC RBC AUTO-ENTMCNC: 32.1 % (ref 32–36)
MCV RBC AUTO: 90.9 FL (ref 78–100)
METHEMOGLOBIN ARTERIAL: 1.1 %
O2 SATURATION: 96.4 % (ref 96–97)
PCO2 ARTERIAL: 34 MMHG (ref 32–45)
PDW BLD-RTO: 18.2 % (ref 11.7–14.9)
PH BLOOD: 7.52 (ref 7.34–7.45)
PHOSPHORUS: 3.5 MG/DL (ref 2.5–4.9)
PLATELET # BLD: 267 K/CU MM (ref 140–440)
PMV BLD AUTO: 12.4 FL (ref 7.5–11.1)
PO2 ARTERIAL: 98 MMHG (ref 75–100)
POTASSIUM SERPL-SCNC: 5 MMOL/L (ref 3.5–5.1)
PROCALCITONIN: 0.38
PROTHROMBIN TIME: 33.2 SECONDS (ref 11.7–14.5)
RBC # BLD: 3.19 M/CU MM (ref 4.6–6.2)
SODIUM BLD-SCNC: 144 MMOL/L (ref 135–145)
SPECIMEN: ABNORMAL
TOTAL PROTEIN: 5.6 GM/DL (ref 6.4–8.2)
VANCOMYCIN RANDOM: 14.8 UG/ML
WBC # BLD: 10 K/CU MM (ref 4–10.5)

## 2021-01-20 PROCEDURE — 6360000002 HC RX W HCPCS: Performed by: INTERNAL MEDICINE

## 2021-01-20 PROCEDURE — 2500000003 HC RX 250 WO HCPCS: Performed by: NURSE PRACTITIONER

## 2021-01-20 PROCEDURE — 80202 ASSAY OF VANCOMYCIN: CPT

## 2021-01-20 PROCEDURE — 6370000000 HC RX 637 (ALT 250 FOR IP): Performed by: INTERNAL MEDICINE

## 2021-01-20 PROCEDURE — 71045 X-RAY EXAM CHEST 1 VIEW: CPT

## 2021-01-20 PROCEDURE — 6370000000 HC RX 637 (ALT 250 FOR IP): Performed by: NURSE PRACTITIONER

## 2021-01-20 PROCEDURE — 94761 N-INVAS EAR/PLS OXIMETRY MLT: CPT

## 2021-01-20 PROCEDURE — 82803 BLOOD GASES ANY COMBINATION: CPT

## 2021-01-20 PROCEDURE — 94003 VENT MGMT INPAT SUBQ DAY: CPT

## 2021-01-20 PROCEDURE — 6360000002 HC RX W HCPCS: Performed by: STUDENT IN AN ORGANIZED HEALTH CARE EDUCATION/TRAINING PROGRAM

## 2021-01-20 PROCEDURE — 85027 COMPLETE CBC AUTOMATED: CPT

## 2021-01-20 PROCEDURE — 84145 PROCALCITONIN (PCT): CPT

## 2021-01-20 PROCEDURE — 83735 ASSAY OF MAGNESIUM: CPT

## 2021-01-20 PROCEDURE — 31720 CLEARANCE OF AIRWAYS: CPT

## 2021-01-20 PROCEDURE — 2700000000 HC OXYGEN THERAPY PER DAY

## 2021-01-20 PROCEDURE — 99233 SBSQ HOSP IP/OBS HIGH 50: CPT | Performed by: INTERNAL MEDICINE

## 2021-01-20 PROCEDURE — 2000000000 HC ICU R&B

## 2021-01-20 PROCEDURE — 85610 PROTHROMBIN TIME: CPT

## 2021-01-20 PROCEDURE — 80048 BASIC METABOLIC PNL TOTAL CA: CPT

## 2021-01-20 PROCEDURE — 94640 AIRWAY INHALATION TREATMENT: CPT

## 2021-01-20 PROCEDURE — 37799 UNLISTED PX VASCULAR SURGERY: CPT

## 2021-01-20 PROCEDURE — 86141 C-REACTIVE PROTEIN HS: CPT

## 2021-01-20 PROCEDURE — 2580000003 HC RX 258: Performed by: INTERNAL MEDICINE

## 2021-01-20 PROCEDURE — 99222 1ST HOSP IP/OBS MODERATE 55: CPT | Performed by: INTERNAL MEDICINE

## 2021-01-20 PROCEDURE — 82962 GLUCOSE BLOOD TEST: CPT

## 2021-01-20 PROCEDURE — 85379 FIBRIN DEGRADATION QUANT: CPT

## 2021-01-20 PROCEDURE — 2500000003 HC RX 250 WO HCPCS: Performed by: INTERNAL MEDICINE

## 2021-01-20 PROCEDURE — 86140 C-REACTIVE PROTEIN: CPT

## 2021-01-20 PROCEDURE — 80076 HEPATIC FUNCTION PANEL: CPT

## 2021-01-20 PROCEDURE — 84100 ASSAY OF PHOSPHORUS: CPT

## 2021-01-20 RX ORDER — WARFARIN SODIUM 2.5 MG/1
2.5 TABLET ORAL DAILY
Status: DISCONTINUED | OUTPATIENT
Start: 2021-01-20 | End: 2021-01-22

## 2021-01-20 RX ORDER — PROPOFOL 10 MG/ML
10 INJECTION, EMULSION INTRAVENOUS
Status: DISCONTINUED | OUTPATIENT
Start: 2021-01-20 | End: 2021-01-21

## 2021-01-20 RX ORDER — INSULIN GLARGINE 100 [IU]/ML
50 INJECTION, SOLUTION SUBCUTANEOUS NIGHTLY
Status: DISCONTINUED | OUTPATIENT
Start: 2021-01-20 | End: 2021-01-22

## 2021-01-20 RX ADMIN — ASPIRIN 81 MG CHEWABLE TABLET 81 MG: 81 TABLET CHEWABLE at 08:50

## 2021-01-20 RX ADMIN — INSULIN HUMAN 20 UNITS: 100 INJECTION, SOLUTION PARENTERAL at 12:22

## 2021-01-20 RX ADMIN — ARIPIPRAZOLE 15 MG: 10 TABLET ORAL at 08:49

## 2021-01-20 RX ADMIN — INSULIN HUMAN 40 UNITS: 100 INJECTION, SUSPENSION SUBCUTANEOUS at 08:59

## 2021-01-20 RX ADMIN — SODIUM CHLORIDE, PRESERVATIVE FREE 10 ML: 5 INJECTION INTRAVENOUS at 08:53

## 2021-01-20 RX ADMIN — SODIUM CHLORIDE, PRESERVATIVE FREE 10 ML: 5 INJECTION INTRAVENOUS at 20:55

## 2021-01-20 RX ADMIN — ALBUTEROL SULFATE 2 PUFF: 90 AEROSOL, METERED RESPIRATORY (INHALATION) at 20:35

## 2021-01-20 RX ADMIN — VANCOMYCIN HYDROCHLORIDE 1250 MG: 5 INJECTION, POWDER, LYOPHILIZED, FOR SOLUTION INTRAVENOUS at 10:17

## 2021-01-20 RX ADMIN — Medication 2 PUFF: at 14:32

## 2021-01-20 RX ADMIN — INSULIN GLARGINE 50 UNITS: 100 INJECTION, SOLUTION SUBCUTANEOUS at 20:58

## 2021-01-20 RX ADMIN — METHYLPREDNISOLONE SODIUM SUCCINATE 40 MG: 40 INJECTION, POWDER, LYOPHILIZED, FOR SOLUTION INTRAMUSCULAR; INTRAVENOUS at 00:46

## 2021-01-20 RX ADMIN — BUPROPION HYDROCHLORIDE 300 MG: 150 TABLET, FILM COATED, EXTENDED RELEASE ORAL at 08:50

## 2021-01-20 RX ADMIN — ATORVASTATIN CALCIUM 40 MG: 40 TABLET, FILM COATED ORAL at 20:55

## 2021-01-20 RX ADMIN — INSULIN HUMAN 15 UNITS: 100 INJECTION, SOLUTION PARENTERAL at 06:12

## 2021-01-20 RX ADMIN — INSULIN HUMAN 20 UNITS: 100 INJECTION, SOLUTION PARENTERAL at 17:42

## 2021-01-20 RX ADMIN — PROPOFOL 30 MCG/KG/MIN: 10 INJECTION, EMULSION INTRAVENOUS at 20:57

## 2021-01-20 RX ADMIN — ALBUTEROL SULFATE 2 PUFF: 90 AEROSOL, METERED RESPIRATORY (INHALATION) at 11:13

## 2021-01-20 RX ADMIN — PROPOFOL 55 MCG/KG/MIN: 10 INJECTION, EMULSION INTRAVENOUS at 06:06

## 2021-01-20 RX ADMIN — ALBUTEROL SULFATE 2 PUFF: 90 AEROSOL, METERED RESPIRATORY (INHALATION) at 14:32

## 2021-01-20 RX ADMIN — PROPOFOL 50 MCG/KG/MIN: 10 INJECTION, EMULSION INTRAVENOUS at 10:03

## 2021-01-20 RX ADMIN — METOCLOPRAMIDE 10 MG: 5 INJECTION, SOLUTION INTRAMUSCULAR; INTRAVENOUS at 07:09

## 2021-01-20 RX ADMIN — INSULIN GLARGINE 18 UNITS: 100 INJECTION, SOLUTION SUBCUTANEOUS at 01:04

## 2021-01-20 RX ADMIN — Medication 50 MCG/HR: at 17:45

## 2021-01-20 RX ADMIN — METOCLOPRAMIDE 10 MG: 5 INJECTION, SOLUTION INTRAMUSCULAR; INTRAVENOUS at 19:44

## 2021-01-20 RX ADMIN — METOCLOPRAMIDE 10 MG: 5 INJECTION, SOLUTION INTRAMUSCULAR; INTRAVENOUS at 13:09

## 2021-01-20 RX ADMIN — METHYLPREDNISOLONE SODIUM SUCCINATE 40 MG: 40 INJECTION, POWDER, LYOPHILIZED, FOR SOLUTION INTRAMUSCULAR; INTRAVENOUS at 15:40

## 2021-01-20 RX ADMIN — Medication 2 MCG/MIN: at 10:07

## 2021-01-20 RX ADMIN — PROPOFOL 55 MCG/KG/MIN: 10 INJECTION, EMULSION INTRAVENOUS at 03:23

## 2021-01-20 RX ADMIN — PROPOFOL 55 MCG/KG/MIN: 10 INJECTION, EMULSION INTRAVENOUS at 01:18

## 2021-01-20 RX ADMIN — FAMOTIDINE 20 MG: 10 INJECTION, SOLUTION INTRAVENOUS at 20:56

## 2021-01-20 RX ADMIN — ESCITALOPRAM OXALATE 20 MG: 10 TABLET ORAL at 08:49

## 2021-01-20 RX ADMIN — METHYLPREDNISOLONE SODIUM SUCCINATE 40 MG: 40 INJECTION, POWDER, LYOPHILIZED, FOR SOLUTION INTRAMUSCULAR; INTRAVENOUS at 07:09

## 2021-01-20 RX ADMIN — METOPROLOL TARTRATE 12.5 MG: 25 TABLET, FILM COATED ORAL at 08:50

## 2021-01-20 RX ADMIN — Medication 200 MCG/HR: at 08:49

## 2021-01-20 RX ADMIN — DICLOFENAC SODIUM 2 G: 10 GEL TOPICAL at 08:52

## 2021-01-20 RX ADMIN — FAMOTIDINE 20 MG: 10 INJECTION, SOLUTION INTRAVENOUS at 08:50

## 2021-01-20 RX ADMIN — INSULIN HUMAN 10 UNITS: 100 INJECTION, SOLUTION PARENTERAL at 07:03

## 2021-01-20 RX ADMIN — CHLORHEXIDINE GLUCONATE 0.12% ORAL RINSE 15 ML: 1.2 LIQUID ORAL at 08:49

## 2021-01-20 RX ADMIN — ALBUTEROL SULFATE 2 PUFF: 90 AEROSOL, METERED RESPIRATORY (INHALATION) at 07:32

## 2021-01-20 RX ADMIN — CHLORHEXIDINE GLUCONATE 0.12% ORAL RINSE 15 ML: 1.2 LIQUID ORAL at 20:55

## 2021-01-20 RX ADMIN — WARFARIN SODIUM 2.5 MG: 2.5 TABLET ORAL at 17:38

## 2021-01-20 RX ADMIN — INSULIN HUMAN 18 UNITS: 100 INJECTION, SOLUTION PARENTERAL at 01:02

## 2021-01-20 RX ADMIN — Medication 2 PUFF: at 07:32

## 2021-01-20 RX ADMIN — PROPOFOL 30 MCG/KG/MIN: 10 INJECTION, EMULSION INTRAVENOUS at 16:51

## 2021-01-20 RX ADMIN — METOPROLOL TARTRATE 12.5 MG: 25 TABLET, FILM COATED ORAL at 20:55

## 2021-01-20 RX ADMIN — INSULIN HUMAN 9 UNITS: 100 INJECTION, SOLUTION PARENTERAL at 17:39

## 2021-01-20 RX ADMIN — PROPOFOL 44.99 MCG/KG/MIN: 10 INJECTION, EMULSION INTRAVENOUS at 12:54

## 2021-01-20 RX ADMIN — DICLOFENAC SODIUM 2 G: 10 GEL TOPICAL at 20:56

## 2021-01-20 RX ADMIN — Medication 200 MCG/HR: at 03:08

## 2021-01-20 RX ADMIN — INSULIN HUMAN 15 UNITS: 100 INJECTION, SOLUTION PARENTERAL at 12:21

## 2021-01-20 RX ADMIN — ZINC SULFATE 220 MG (50 MG) CAPSULE 50 MG: CAPSULE at 08:53

## 2021-01-20 RX ADMIN — Medication 2 PUFF: at 11:13

## 2021-01-20 RX ADMIN — METOCLOPRAMIDE 10 MG: 5 INJECTION, SOLUTION INTRAMUSCULAR; INTRAVENOUS at 01:12

## 2021-01-20 RX ADMIN — Medication 2 PUFF: at 20:35

## 2021-01-20 RX ADMIN — INSULIN HUMAN 15 UNITS: 100 INJECTION, SOLUTION PARENTERAL at 01:09

## 2021-01-20 RX ADMIN — ALOGLIPTIN 12.5 MG: 12.5 TABLET, FILM COATED ORAL at 08:49

## 2021-01-20 RX ADMIN — INSULIN HUMAN 18 UNITS: 100 INJECTION, SOLUTION PARENTERAL at 07:00

## 2021-01-20 ASSESSMENT — PULMONARY FUNCTION TESTS
PIF_VALUE: 28
PIF_VALUE: 23
PIF_VALUE: 24
PIF_VALUE: 28
PIF_VALUE: 28
PIF_VALUE: 23
PIF_VALUE: 28
PIF_VALUE: 24
PIF_VALUE: 29
PIF_VALUE: 24
PIF_VALUE: 23
PIF_VALUE: 28
PIF_VALUE: 28
PIF_VALUE: 24
PIF_VALUE: 28
PIF_VALUE: 23
PIF_VALUE: 24
PIF_VALUE: 21
PIF_VALUE: 24
PIF_VALUE: 24
PIF_VALUE: 28
PIF_VALUE: 23
PIF_VALUE: 29
PIF_VALUE: 24
PIF_VALUE: 29
PIF_VALUE: 24

## 2021-01-20 NOTE — CONSULTS
Endocrinology   Consult Note      Dear Doctor Rodríguez Dyer    Thank You for the Consult     Pt. Was Admitted for : Initially admitted to [facility] and transferred here on January 13, 2021 for acute on chronic respiratory failure with hypoxia and patient has Covid pneumonia    Reason for Consult: Better control of blood glucose    History Obtained From:   EMR       HISTORY OF PRESENT ILLNESS:                The patient is a 77 y.o. male with significant past medical history of diabetes mellitus, CKD, COPD hypertension, hyperlipidemia, lower leg cellulitis depression morbid obesity was initially admitted to [facility] for recuperation. Patient initially was admitted here and was transferred to [facility] for recuperation where he developed become positive for Covid so he was transferred back here during this time patient has been hypoxic short of breath and now he has been intubated and placed on ventilator and also has been given tube feeding. His blood sugars have been running very high of 300-400 ranges so I was  consulted for better control of blood glucose. ROS:   Pt's ROS done in detail. 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 Oscar Jimenez Anemia 11/8/2011    Arm fracture Benjamin Akin Hebert with also yearly DM exam    Cataract 11/12    worsening-Dr. Cory Smart    Cellulitis of left lower leg     Chronic venous hypertension with ulcer ([address] 11/18/2011    CKD (chronic kidney disease) Feb 2012    Renal u/S normal     Colon polyps     Dr. Oscar Jimenez COPD (chronic obstructive pulmonary disease) ([address]     Diabetes mellitus ([address] 1993    Diabetes mellitus ([address]     Diabetes mellitus with peripheral circulatory disorder ([address] 10/2/2015  Diabetes mellitus with skin ulcer ([address] 10/2/2015    Diabetic peripheral neuropathy ([address] 11/12    + EMG, NCS    Diabetic skin ulcer associated with type 2 diabetes mellitus ([address]     Diverticulosis 4/23/12    mild, left colon    Femoral DVT (deep venous thrombosis) ([address] 09/2011    PARTIAL,CHRONIC-SPFLD HEART SURGEONS    GERD (gastroesophageal reflux disease)     Glaucoma 11/12    open angle-Dr. Luara Cancer H/O cardiac catheterization 6/3/14    EF55% normal study    H/O Doppler ultrasound 03/26/15    Carotid US- no significant stenosis noted bilaterally.  H/O echocardiogram 11/21/2019    EF50-55%, Mild AR. Moderately dilated right ventricle with negative Solis's sign.  H/O mumps orchitis     as a youth    Hemorrhoids 4/23/12    Dr. Lisandra Hankins; repeat colonoscopy 3 years    History of nuclear stress test 11/21/2019    EF 60%, Normal study.  HLD (hyperlipidemia)     Hx of Doppler ultrasound 1/06/15    Lymph node seen in left groin area. No bilateral stenosis.     Hyperlipemia     Hyperlipidemia     Hypertension 1992    Hypertension     Idiopathic chronic venous hypertension of left lower extremity with ulcer and inflammation (HCC) 10/2/2015    Leg ulcer ([address] 1978-present    following at wound center, Dr Mitch Lai No diabetic retinopathy OU 11/12    Dr. Roger Nunes chronic ulcer of left lower leg with fat layer exposed ([address] 10/2/2015    Pulmonary embolism ([address] 11/13    patient on coumadin    Sleep apnea     doesnt always use cpap dt it drys him out    SOB (shortness of breath) Oct 2011    Stress test normal.     Tendinitis 1973    plantar tendons    Type II or unspecified type diabetes mellitus with other specified manifestations, not stated as uncontrolled 2/8/2013    Unspecified venous (peripheral) insufficiency     Urticaria     WD-Cellulitis of right anterior lower leg 9/5/2019    WD-Cellulitis of right lower extremity 3/26/2020  WD-Chronic venous hypertension (idiopathic) with ulcer of left lower extremity (CODE) ([address] 6/27/2019    WD-Chronic venous hypertension with inflammation, right 9/19/2019    WD-Decubitus ulcer of left buttock, stage 3 ([address] 6/25/2020    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 to breakdown of skin ([address] 3/26/2020    WD-Open wound of hand without complication, left, initial encounter 12/12/2019    WD-Pressure injury of left buttock, stage 2 ([address] 1/2/2020    WD-Pressure injury of left buttock, stage 3 ([address] 3/12/2020    WD-Pressure injury of right buttock, stage 3 ([address] 3/12/2020    WD-Skin tear of right forearm without complication 1/73/7063    WD-Ulcer of right pretibial region, with fat layer exposed ([address] 10/17/2019     Surgical History:        Procedure Laterality Date    BREAST SURGERY  1970s    benign tumors bilaterally    CARDIAC CATHETERIZATION  6/3/14    EF55% normal study    CARPAL TUNNEL RELEASE Left 1993    CARPAL TUNNEL RELEASE      CATARACT REMOVAL Right 3/11/2013    Dr. Mauricio Fraser Left 2/25/2013    Dr. Kimberly Teran  2006    polyps    COLONOSCOPY  11/21/11    villous component--f/u colonoscopy will be needed in 6 months    COLONOSCOPY  4/23/12    mild diverticulosis, internal hemorrhoids; repeat in 3 years (Dr. Erik Landeros)    COLONOSCOPY  08/04/2016    mild diverticulosis, three colon polyps    HERNIA REPAIR  1970s    HERNIA REPAIR      SKIN GRAFT  1978-present    skin grafts to left ankle ulcers    SPLENECTOMY  1958    SPLENECTOMY      TONSILLECTOMY  1953    VARICOSE VEIN SURGERY Left 2009       Allergies:  Cavilon durable barrier [mineral oil-dimeth-coconut oil], Gentamycin [gentamicin], Parabens, Parabens, Prinivil [lisinopril], Cortisone, Cortisone, Dilaudid [hydromorphone hcl], Penicillins, Penicillins, Sulfamethoxazole-trimethoprim, and Tape [adhesive tape]    Family History:       Problem Relation Age of Onset  Heart Disease Father     Cancer Father         prostate    High Blood Pressure Father     High Cholesterol Father     Cancer Brother     Diabetes Brother     Thyroid Disease Brother      REVIEW OF SYSTEMS:  Review of System Done as noted above     PHYSICAL EXAM:      Vitals:    BP (!) 123/36   Pulse 56   Temp 98.5 °F (36.9 °C) (Rectal)   Resp 22   Ht 6' 5.01\" (1.956 m)   Wt 296 lb 8.3 oz (134.5 kg)   SpO2 95%   BMI 35.15 kg/m²     CONSTITUTIONAL:   now patient is sedated, intubated and on ventilator  also on tube feeding    EYES:  vision intact Fundoscopic Exam not performed   ENT:Normal  NECK:  Supple, No JVD.    Thyroid Exam:Normal   LUNGS:  Has Vesicular Breath Sounds,   CARDIOVASCULAR:  Normal apical impulse, regular rate and rhythm, normal S1 and S2, no S3 or S4, and has no  murmur   ABDOMEN:  No scars, normal bowel sounds, soft, non-distended, non-tender, no masses palpated, no hepatolienomegaly  Musculoskeletal: Normal  Extremities: Normal, peripheral pulses normal, , has no edema   NEUROLOGIC: Patient is 6 sedated, intubated and on ventilator    DATA:    CBC:   Recent Labs     01/17/21  0555 01/18/21  0425 01/19/21  0545   WBC 11.1* 13.7* 18.3*   HGB 10.9* 10.1* 11.0*    264 303    CMP:  Recent Labs     01/17/21  0555 01/18/21  0425 01/19/21  0545 01/19/21  1700    141 143  --    K 4.3 4.8 6.0* 4.5    108 107  --    CO2 19* 24 24  --    BUN 55* 59* 73*  --    CREATININE 1.5* 1.4* 1.7*  --    CALCIUM 7.3* 7.3* 7.4*  --    PROT 5.6* 5.6* 6.7  --    LABALBU 2.4* 2.4* 2.8*  --    BILITOT 0.4 0.4 0.4  --    ALKPHOS 75 73 83  --    AST 22 18 22  --    ALT 18 16 18  --      Lipids:   Lab Results   Component Value Date    CHOL 107 11/22/2019    CHOL 175 05/05/2015    HDL 46 11/22/2019    TRIG 85 01/18/2021     Glucose:   Recent Labs     01/19/21  1553 01/19/21  1635 01/19/21  2116   POCGLU 438* 445* 497*     Hemoglobin A1C:   Lab Results   Component Value Date EXAMINATION: ONE XRAY VIEW OF THE CHEST 1/18/2021 2:52 pm COMPARISON: 01/18/2021 HISTORY: ORDERING SYSTEM PROVIDED HISTORY: ETT placement in CM above jose. TECHNOLOGIST PROVIDED HISTORY: Reason for exam:->ETT placement in CM above jose. Reason for Exam: ETT placement in CM above jose. Acuity: Acute Type of Exam: Subsequent/Follow-up FINDINGS: Left internal jugular catheter with tip in the superior SVC directed laterally. Endotracheal tube terminates 4 cm above the jose. Enteric tube courses below the diaphragm. Multifocal areas of consolidation are stable compatible with COVID-19 pneumonia. Heart size is normal.  No pneumothorax or pleural effusion. Endotracheal tube terminates 4 cm above the jose. Otherwise no significant change. Findings of COVID-19 pneumonia. Xr Chest Portable    Result Date: 1/18/2021  EXAMINATION: ONE XRAY VIEW OF THE CHEST 1/18/2021 9:52 am COMPARISON: 01/18/2021, 01/16/2021 HISTORY: ORDERING SYSTEM PROVIDED HISTORY: ETT placement TECHNOLOGIST PROVIDED HISTORY: Reason for exam:->ETT placement Reason for exam:->verify ETT placement per Dr. Richi Birmingham, how many Cm above the jose? Reason for Exam: 1 FINDINGS: Endotracheal tube terminates 6.5 cm above the jose. Enteric tube courses below the diaphragm. Left internal jugular catheter with tip at the superior SVC directed laterally. Hazy ground-glass opacity areas of consolidation scattered through the lungs are improving from prior exams given differences in technique. No pneumothorax or pleural effusion. Borderline cardiomegaly is stable. Endotracheal tube terminates 6.5 cm above the jose. Moderate changes of COVID-19 pneumonia throughout the lungs with improvement since 01/16/2021.      Xr Chest Portable    Result Date: 1/18/2021 EXAMINATION: ONE XRAY VIEW OF THE CHEST 1/18/2021 4:21 am COMPARISON: 01/17/2021 HISTORY: ORDERING SYSTEM PROVIDED HISTORY: on vent TECHNOLOGIST PROVIDED HISTORY: Reason for exam:->on vent Reason for Exam: Pt on ventilator Acuity: Acute Type of Exam: Initial FINDINGS: The cardiac silhouette and mediastinal contours are stable. Vascular calcifications are noted along the aortic arch. Support tubes and lines are unchanged. There are diffuse bilateral lung infiltrates, likely related to pneumonia and/or pulmonary edema. The visualized osseous structures are unremarkable. 1. Diffuse bilateral lung infiltrates, likely related to pulmonary edema versus pneumonia. Xr Chest Portable    Result Date: 1/17/2021  EXAMINATION: ONE XRAY VIEW OF THE CHEST 1/17/2021 3:52 am COMPARISON: 01/16/2021 HISTORY: ORDERING SYSTEM PROVIDED HISTORY: on vent TECHNOLOGIST PROVIDED HISTORY: Reason for exam:->on vent Reason for Exam: on vent Acuity: Unknown Type of Exam: Subsequent/Follow-up Additional signs and symptoms: on vent Relevant Medical/Surgical History: on vent FINDINGS: Endotracheal tube and transesophageal gastric tube remain in place. There are extensive bilateral airspace opacities, stable to minimally improved since the comparison study. No evidence of effusion or pneumothorax. A left internal jugular central line is unchanged. The cardiac silhouette and osseous structures are stable. No significant interval change.      Xr Chest Portable    Result Date: 1/16/2021 EXAMINATION: ONE XRAY VIEW OF THE CHEST 1/16/2021 1:12 pm COMPARISON: Chest radiograph 01/16/2021 at 4:09 a.m., chest CTA 01/09/2021 HISTORY: ORDERING SYSTEM PROVIDED HISTORY: r/o pneumo TECHNOLOGIST PROVIDED HISTORY: Reason for exam:->r/o pneumo Reason for Exam: r/o pneumo Acuity: Acute Type of Exam: Initial FINDINGS: Unchanged endotracheal tube, gastric tube, and left internal jugular central venous catheter. Overlying heart monitor leads and tubing. Patient rotation to the right. Low lung volumes. No significant change in patchy bilateral airspace opacities. Diffuse interstitial prominence with indistinct pulmonary vasculature but no definite interlobular septal thickening. No definite findings of pneumothorax or pleural effusion. Normal mediastinal and cardiac contours given positioning and technique. 1. No significant change in bilateral airspace and interstitial opacities likely due to pneumonia or edema (noncardiogenic or cardiogenic). 2. No findings of pneumothorax. Xr Chest Portable    Result Date: 1/16/2021  EXAMINATION: ONE XRAY VIEW OF THE CHEST 1/16/2021 5:26 am COMPARISON: 01/15/2021 4:30 a.m. HISTORY: ORDERING SYSTEM PROVIDED HISTORY: on vent TECHNOLOGIST PROVIDED HISTORY: Reason for exam:->on vent Reason for Exam: vent Acuity: Unknown Type of Exam: Unknown FINDINGS: Monitor wires project over the chest.  Rotated chest.  ETT tip 4 cm above the jose. Enteric catheter courses below the diaphragm. Left IJ catheter tip in the SVC. Heart size and mediastinal contours are stable. Diffuse bilateral lung opacities again seen. Mildly improved compared to prior study. No pleural effusion or pneumothorax. Diffuse bilateral lung opacities persist, mildly improved compared to prior study. Findings compatible multifocal pneumonia.      Xr Chest Portable    Result Date: 1/15/2021 EXAMINATION: ONE XRAY VIEW OF THE CHEST 1/13/2021 11:46 pm COMPARISON: Chest radiograph dated January 13, 2021 at 11:05 p.m. HISTORY: ORDERING SYSTEM PROVIDED HISTORY: ETT Placement TECHNOLOGIST PROVIDED HISTORY: Reason for exam:->ETT Placement Reason for Exam: ETT Placement Acuity: Acute Type of Exam: Initial Mechanism of Injury: ETT Placement Relevant Medical/Surgical History: ETT Placement FINDINGS: The cardiomediastinal silhouette is stable. The tip of the ET tube is 3.1 cm above the jose. The enteric tube extends below the diaphragm. Extensive bilateral airspace opacities are seen without significant change. There is no evidence of a pneumothorax. 1. Tip of the ET tube is 3.1 cm above the jose in improved positioning. 2. Extensive bilateral airspace opacities are seen without significant change. Xr Chest Portable    Result Date: 1/15/2021  EXAMINATION: ONE XRAY VIEW OF THE CHEST 1/15/2021 5:22 am COMPARISON: January 14, 2021 HISTORY: ORDERING SYSTEM PROVIDED HISTORY: on vent   1. Persistent multifocal bilateral airspace consolidations. No significant interval change. Differential includes ARDS and multi lobar pneumonia. 2. Small bilateral pleural effusions. 3. Stable mild enlargement of the cardiac silhouette. Xr Chest Portable    Result Date: 1/13/2021  EXAMINATION: ONE XRAY VIEW OF THE CHEST 1/13/2021 11:05 pm COMPARISON: 01/13/2021 HISTORY: ORDERING SYSTEM PROVIDED HISTORY: ETT placement      Endotracheal tube tip proximal right mainstem bronchus. Recommend pulling back 3-4 cm. Stable diffuse airspace disease. Critical results were called by Dr. Joss Tucker to patient's nurse on 1/13/2021 at 23:52. Xr Chest Portable    Result Date: 1/13/2021  EXAMINATION: ONE XRAY VIEW OF THE CHEST 1/13/2021 9:18 pm COMPARISON: 1 day prior.  HISTORY: ORDERING SYSTEM PROVIDED HISTORY: ET tube placement 1. Endotracheal tube terminates 5.1 cm above the jose. 2. Increased bilateral pulmonary opacities consistent with worsening pneumonia.        Scheduled Medicines   Medications:    vancomycin (VANCOCIN) intermittent dosing (placeholder)   Other See Admin Instructions    warfarin  5 mg Oral Daily    methylPREDNISolone  40 mg Intravenous Q8H    insulin glargine  40 Units Subcutaneous Nightly    insulin regular  0-18 Units Subcutaneous Q6H    [START ON 1/20/2021] insulin regular  15 Units Subcutaneous 4 times per day    [START ON 1/20/2021] insulin NPH  20 Units Subcutaneous QAM    metoclopramide  10 mg Intravenous Q6H    alogliptin  12.5 mg Oral Daily    ARIPiprazole  15 mg Oral Daily    aspirin  81 mg Oral Daily    atorvastatin  40 mg Oral Nightly    buPROPion  300 mg Oral QAM    escitalopram  20 mg Oral Daily    metoprolol tartrate  12.5 mg Oral BID    sodium chloride flush  10 mL Intravenous 2 times per day    diclofenac sodium  2 g Topical BID    zinc sulfate  50 mg Oral Daily    albuterol sulfate HFA  2 puff Inhalation 4x daily    ipratropium  2 puff Inhalation 4x daily    chlorhexidine  15 mL Mouth/Throat BID    famotidine (PEPCID) injection  20 mg Intravenous BID      Infusions:    dextrose      sodium chloride 75 mL/hr at 01/19/21 1442    cisatracurium (NIMBEX) infusion Stopped (01/18/21 1612)    norepinephrine Stopped (01/19/21 1930)    fentanyl 200 mcg/hr (01/19/21 2111)    dextrose      sodium chloride      propofol 65 mcg/kg/min (01/19/21 2137)         IMPRESSION    Patient Active Problem List   Diagnosis    Gastroesophageal reflux disease without esophagitis    Hypertension in stage 3 chronic kidney disease due to type 2 diabetes mellitus [facility]    DM (diabetes mellitus) type II, controlled, with peripheral vascular disorder ([facility] [facility]    Combined hyperlipidemia associated with type 2 diabetes mellitus ([facility]  Diabetic skin ulcer associated with type 2 diabetes mellitus (HCC)    CKD (chronic kidney disease)    History of DVT (deep vein thrombosis)- left femoral    History of pulmonary embolism    Long term current use of anticoagulants with INR goal of 2.0-3.0    COPD (chronic obstructive pulmonary disease) (HCC)    Severe recurrent major depressive disorder with psychotic features (HCC)    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)    Venous (peripheral) insufficiency    Uncontrolled secondary diabetes mellitus with stage 3 CKD (GFR 30-59) (HCC)    Diabetes mellitus with skin ulcer ([address]    WD-Ulcer of shin, left, with fat layer exposed ([address]    History of colon polyps    Personal history of PE (pulmonary embolism)    WD-Chronic venous hypertension (idiopathic) with ulcer of left lower extremity (CODE) ([address]    WD-Chronic venous hypertension with inflammation, right    Troponin I above reference range    KIRSTEN (acute kidney injury) ([address]    Cellulitis of lower extremity    Hyponatremia    Generalized weakness    Weakness    Multifocal pneumonia    Pneumonia due to COVID-19 virus         RECOMMENDATIONS:      1. Reviewed POC blood glucose . Labs and X ray results   2. Reviewed Home and Current Medicines   3. Will Start On 10 July, correction bolus regular Lantus Insulin regime  4. Monitor Blood glucose frequently   5. Modify  the dose of Insulin/ as needed        Will follow with you  Again thank you for sharing pt's care with me.      Truly yours,       Anthonette Rinne MD

## 2021-01-20 NOTE — PROGRESS NOTES
Pulmonary and Critical Care  Progress Note    Subjective: The patient is better, sedated on vent. FiO2 40%. Supine position. Shortness of breath none. Chest pain none. Addressing respiratory complaints Patient is negative for  hemoptysis and cyanosis  CONSTITUTIONAL:  negative for fevers and chills.       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, Anemia, Arm fracture, Arthritis, Cataract, Cataract, Cellulitis of left lower leg, Chronic venous hypertension with ulcer ([facility] [address] CKD (chronic kidney disease), Colon polyps, COPD (chronic obstructive pulmonary disease) ([facility] [address] Diabetes mellitus ([facility] [address] Diabetes mellitus ([facility] [address] Diabetes mellitus with peripheral circulatory disorder ([facility] [address] Diabetes mellitus with skin ulcer ([facility] [address] Diabetic peripheral neuropathy ([facility] [address] Diabetic skin ulcer associated with type 2 diabetes mellitus ([facility] [address] Diverticulosis, Femoral DVT (deep venous thrombosis) ([facility] [address] GERD (gastroesophageal reflux disease), Glaucoma, H/O cardiac catheterization, H/O Doppler ultrasound, H/O echocardiogram, H/O mumps orchitis, Hemorrhoids, History of nuclear stress test, HLD (hyperlipidemia), Hx of Doppler ultrasound, Hyperlipemia, Hyperlipidemia, Hypertension, Hypertension, Idiopathic chronic venous hypertension of left lower extremity with ulcer and inflammation (HCC), Leg ulcer ([address] No diabetic retinopathy OU, Non-pressure chronic ulcer of left lower leg with fat layer exposed ([facility] [address] Pulmonary embolism ([facility] [address] Sleep apnea, SOB (shortness of breath), Tendinitis, Type II or unspecified type diabetes mellitus with other specified manifestations, not stated as uncontrolled, Unspecified venous (peripheral) insufficiency, Urticaria, WD-Cellulitis of right anterior lower leg, WD-Cellulitis of right lower extremity, WD-Chronic venous hypertension (idiopathic) with ulcer of left lower extremity (CODE) ([facility] [address] WD-Chronic venous hypertension with inflammation, right, WD-Decubitus ulcer of left buttock, stage 3 ([address] 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 to breakdown of skin ([address] WD-Open wound of hand without complication, left, initial encounter, WD-Pressure injury of left buttock, stage 2 ([address] WD-Pressure injury of left buttock, stage 3 ([address] WD-Pressure injury of right buttock, stage 3 (HCC), WD-Skin tear of right forearm without complication, and WD-Ulcer of right pretibial region, with fat layer exposed ([facility] [address] has a past surgical history that includes Tonsillectomy (1953); Splenectomy (1958); Breast surgery (1970s); hernia repair (1970s); Skin graft (1978-present); Cataract removal (Right, 3/11/2013); Cataract removal (Left, 2/25/2013); Varicose vein surgery (Left, 2009); Carpal tunnel release (Left, 1993); Cardiac catheterization (6/3/14); Colonoscopy (2006); Colonoscopy (11/21/11); Colonoscopy (4/23/12); Colonoscopy (08/04/2016); Splenectomy; hernia repair; and Carpal tunnel release. reports that he quit smoking about 28 years ago. His smoking use included cigarettes. He has a 70.00 pack-year smoking history. He has never used smokeless tobacco. He reports current alcohol use. He reports that he does not use drugs. Family history:  family history includes Cancer in his brother and father; Diabetes in his brother; Heart Disease in his father; High Blood Pressure in his father; High Cholesterol in his father; Thyroid Disease in his brother.     Allergies   Allergen Reactions    Cavilon Durable Barrier [Mineral Oil-Dimeth-Coconut Oil]     Gentamycin [Gentamicin] Hives    Parabens Hives    Parabens Hives    Prinivil [Lisinopril] Swelling    Cortisone Rash    Cortisone Rash    Dilaudid [Hydromorphone Hcl] Rash    Penicillins Rash    Penicillins Rash    Sulfamethoxazole-Trimethoprim Nausea Only    Tape Laya Anis Tape] Rash     Social History:    Reviewed; no changes    Objective:   PHYSICAL EXAM:        VITALS:  BP (!) 139/43   Pulse 59   Temp 98.7 °F (37.1 °C) (Rectal)   Resp 11   Ht 6' 5.01\" (1.956 m)   Wt 274 lb 14.6 oz (124.7 kg)   SpO2 95%   BMI 32.59 kg/m²     24HR INTAKE/OUTPUT:      Intake/Output Summary (Last 24 hours) at 1/20/2021 1121  Last data filed at 1/20/2021 0630  Gross per 24 hour   Intake 4010.46 ml   Output 5375 ml Net -1364.54 ml       CONSTITUTIONAL:  Somnolent. LUNGS:  decreased breath sounds, basilar crackles. CARDIOVASCULAR:  normal S1 and S2 and negative JVD  ABD:Abdomen soft, non-tender. BS normal. No masses,  No organomegaly  NEURO:Sedated on vent. DATA:    CBC:  Recent Labs     01/18/21  0425 01/19/21  0545   WBC 13.7* 18.3*   RBC 3.46* 3.81*   HGB 10.1* 11.0*   HCT 31.5* 36.2*    303   MCV 91.0 95.0   MCH 29.2 28.9   MCHC 32.1 30.4*   RDW 18.8* 19.3*      BMP:  Recent Labs     01/18/21  0425 01/19/21  0545 01/19/21  1700 01/20/21  0610    143  --  144   K 4.8 6.0* 4.5 5.0    107  --  109   CO2 24 24  --  24   BUN 59* 73*  --  73*   CREATININE 1.4* 1.7*  --  1.4*   CALCIUM 7.3* 7.4*  --  7.0*   GLUCOSE 317* 310*  --  480*      ABG:  Recent Labs     01/18/21  0600 01/19/21  0700 01/20/21  0700   PH 7.38 7.26* 7.52*   PO2ART 89 114* 98   [id number] 43.0 55.0* 34.0   O2SAT 95.2* 96.0 96.4     Lab Results   Component Value Date    PROBNP 5,973 (H) 01/14/2021    PROBNP 1,642 (H) 01/12/2021    PROBNP 1,196 (H) 11/11/2020     No results found for: [facility]    Radiology Review:  Pertinent images / reports were reviewed as a part of this visit.     Assessment:     Patient Active Problem List   Diagnosis    Gastroesophageal reflux disease without esophagitis    Hypertension in stage 3 chronic kidney disease due to type 2 diabetes mellitus ([facility] [address] [facility]    DM (diabetes mellitus) type II, controlled, with peripheral vascular disorder ([facility] [address] [facility]    Combined hyperlipidemia associated with type 2 diabetes mellitus ([facility] [address]    Diabetic skin ulcer associated with type 2 diabetes mellitus (HCC)    CKD (chronic kidney disease)    History of DVT (deep vein thrombosis)- left femoral    History of pulmonary embolism    Long term current use of anticoagulants with INR goal of 2.0-3.0    COPD (chronic obstructive pulmonary disease) (HCC)    Severe recurrent major depressive disorder with psychotic features ([facility] [address]  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)    Venous (peripheral) insufficiency    Uncontrolled secondary diabetes mellitus with stage 3 CKD (GFR 30-59) (HCC)    Diabetes mellitus with skin ulcer (HCC)    WD-Ulcer of shin, left, with fat layer exposed ([address]    History of colon polyps    Personal history of PE (pulmonary embolism)    WD-Chronic venous hypertension (idiopathic) with ulcer of left lower extremity (CODE) ([address]    WD-Chronic venous hypertension with inflammation, right    Troponin I above reference range    KIRSTEN (acute kidney injury) ([address]    Cellulitis of lower extremity    Hyponatremia    Generalized weakness    Weakness    Multifocal pneumonia    Pneumonia due to COVID-19 virus    Hyperkalemia    Acute respiratory failure with hypoxia ([address]       Plan:   1. Inc rate to 20.  2. Wean FiO2.  3. Discussed with SAMI.   Trinh Perez MD  1/20/2021  11:21 AM

## 2021-01-20 NOTE — CONSULTS
Nephrology Service Consultation      [address], [address] [address]  Phone: [phone]  Office Hours: 8:30AM  4:30PM  Monday - Friday            Patient:  Jillian Alston  MRN: [id number]  Consulting physician:  Owen Lawrence MD  Reason for Consult: elevated cr, hyperklaemia      PCP: Makenna Urena MD    HISTORY OF PRESENT ILLNESS:   The patient is a 77 y.o. male presented with soa and leg cellulitis. He was found to have covid 19, he was transferred from [facility].   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 Denys Halsted Anemia 11/8/2011    Arm fracture Chen Graver     Dr Moses Marrufo with also yearly DM exam    Cataract 11/12    worsening-Dr. Ulises Henson    Cellulitis of left lower leg     Chronic venous hypertension with ulcer ([address] 11/18/2011    CKD (chronic kidney disease) Feb 2012    Renal u/S normal     Colon polyps     Dr. Denys Halsted COPD (chronic obstructive pulmonary disease) ([address]     Diabetes mellitus ([address] 1993    Diabetes mellitus ([address]     Diabetes mellitus with peripheral circulatory disorder ([address] 10/2/2015    Diabetes mellitus with skin ulcer ([address] 10/2/2015    Diabetic peripheral neuropathy ([address] 11/12    + EMG, NCS    Diabetic skin ulcer associated with type 2 diabetes mellitus ([address]     Diverticulosis 4/23/12    mild, left colon    Femoral DVT (deep venous thrombosis) ([address] 09/2011    PARTIAL,CHRONIC-SPFLD HEART SURGEONS    GERD (gastroesophageal reflux disease)     Glaucoma 11/12    open angle-Dr. Erika Landis H/O cardiac catheterization 6/3/14    EF55% normal study    H/O Doppler ultrasound 03/26/15 Carotid US- no significant stenosis noted bilaterally.  H/O echocardiogram 11/21/2019    EF50-55%, Mild AR. Moderately dilated right ventricle with negative Solis's sign.  H/O mumps orchitis     as a youth    Hemorrhoids 4/23/12    Dr. Avtar Little; repeat colonoscopy 3 years    History of nuclear stress test 11/21/2019    EF 60%, Normal study.  HLD (hyperlipidemia)     Hx of Doppler ultrasound 1/06/15    Lymph node seen in left groin area. No bilateral stenosis.     Hyperlipemia     Hyperlipidemia     Hypertension 1992    Hypertension     Idiopathic chronic venous hypertension of left lower extremity with ulcer and inflammation (HCC) 10/2/2015    Leg ulcer ([address] 1978-present    following at wound center, Dr Nayla Blackman No diabetic retinopathy OU 11/12    Dr. Richie Westfall chronic ulcer of left lower leg with fat layer exposed ([address] 10/2/2015    Pulmonary embolism ([address] 11/13    patient on coumadin    Sleep apnea     doesnt always use cpap dt it drys him out    SOB (shortness of breath) Oct 2011    Stress test normal.     Tendinitis 1973    plantar tendons    Type II or unspecified type diabetes mellitus with other specified manifestations, not stated as uncontrolled 2/8/2013    Unspecified venous (peripheral) insufficiency     Urticaria     WD-Cellulitis of right anterior lower leg 9/5/2019    WD-Cellulitis of right lower extremity 3/26/2020    WD-Chronic venous hypertension (idiopathic) with ulcer of left lower extremity (CODE) ([address] 6/27/2019    WD-Chronic venous hypertension with inflammation, right 9/19/2019    WD-Decubitus ulcer of left buttock, stage 3 ([address] 6/25/2020    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 to breakdown of skin ([address] 3/26/2020    WD-Open wound of hand without complication, left, initial encounter 12/12/2019    WD-Pressure injury of left buttock, stage 2 ([address] 1/2/2020  WD-Pressure injury of left buttock, stage 3 ([address] 3/12/2020    WD-Pressure injury of right buttock, stage 3 ([address] 3/12/2020    WD-Skin tear of right forearm without complication 1/19/9724    WD-Ulcer of right pretibial region, with fat layer exposed ([address] 10/17/2019       Past Surgical History:        Procedure Laterality Date    BREAST SURGERY  1970s    benign tumors bilaterally    CARDIAC CATHETERIZATION  6/3/14    EF55% normal study    CARPAL TUNNEL RELEASE Left 1993    CARPAL TUNNEL RELEASE      CATARACT REMOVAL Right 3/11/2013    Dr. Renell Brittle Left 2/25/2013    Dr. Gary Lockhart  2006    polyps    COLONOSCOPY  11/21/11    villous component--f/u colonoscopy will be needed in 6 months    COLONOSCOPY  4/23/12    mild diverticulosis, internal hemorrhoids; repeat in 3 years (Dr. Cuong Vanessa)    COLONOSCOPY  08/04/2016    mild diverticulosis, three colon polyps    HERNIA REPAIR  1970s    HERNIA REPAIR      SKIN GRAFT  1978-present    skin grafts to left ankle ulcers    SPLENECTOMY  1958    SPLENECTOMY      TONSILLECTOMY  1953    VARICOSE VEIN SURGERY Left 2009       Medications:   Prior to Admission medications    Medication Sig Start Date End Date Taking?  Authorizing Provider   clotrimazole-betamethasone (LOTRISONE) 1-0.05 % cream Apply topically 2 times daily Apply topically 2 times daily to BLE    Historical Provider, MD   warfarin (COUMADIN) 5 MG tablet Take 1 tablet by mouth daily Except take 1 and 1/2 tablets by mouth on Mondays or as directed by clinic 1/7/21   [facility], MD   alogliptin (NESINA) 12.5 MG TABS tablet Take 1 tablet by mouth daily 11/19/20   Jessie Hickman MD   glipiZIDE (GLUCOTROL) 10 MG tablet Take 1 tablet by mouth 2 times daily (before meals) 11/18/20   Jessie Hickman MD   buPROPion (WELLBUTRIN XL) 300 MG extended release tablet Take 1 tablet by mouth every morning 6/10/20   WINDY Mena - NP escitalopram (LEXAPRO) 20 MG tablet Take 1 tablet by mouth daily 6/10/20 11/11/20  Aury May, APRN - NP   omeprazole (PRILOSEC) 20 MG delayed release capsule TAKE ONE (1) CAPSULE BY MOUTH ONCE DAILY 6/10/20   Aury May, APRN - NP   ARIPiprazole (ABILIFY) 15 MG tablet Take 1 tablet by mouth daily 6/10/20 11/11/20  Aury May, APRN - NP   atorvastatin (LIPITOR) 40 MG tablet Take 1 tablet by mouth nightly 6/10/20 11/11/20  Aury May, APRN - NP   furosemide (LASIX) 20 MG tablet Take 1 tablet by mouth 2 times daily 6/10/20 11/11/20  Aury May, APRN - NP   metFORMIN (GLUCOPHAGE) 500 MG tablet Take 1 tablet by mouth daily (with breakfast) 6/10/20 11/11/20  Aury May, APRN - NP   glycopyrrolate-formoterol (BEVESPI AEROSPHERE) 9-4.8 MCG/ACT AERO Inhale 2 puffs into the lungs 2 times daily 6/3/20   Kate Wright MD   potassium chloride (MICRO-K) 10 MEQ extended release capsule Take 10 mEq by mouth daily    Historical Provider, MD   metoprolol tartrate (LOPRESSOR) 25 MG tablet Take 0.5 tablets by mouth 2 times daily 1/9/20   Radha Gutierrez MD   aspirin 81 MG chewable tablet Take 1 tablet by mouth daily 11/25/19   Kaley Vazquez MD        Allergies:  Cavilon durable barrier [mineral oil-dimeth-coconut oil], Gentamycin [gentamicin], Parabens, Parabens, Prinivil [lisinopril], Cortisone, Cortisone, Dilaudid [hydromorphone hcl], Penicillins, Penicillins, Sulfamethoxazole-trimethoprim, and Tape [adhesive tape]    Social History:   TOBACCO:   reports that he quit smoking about 28 years ago. His smoking use included cigarettes. He has a 70.00 pack-year smoking history. He has never used smokeless tobacco.  ETOH:   reports current alcohol use.   OCCUPATION:      Family History:       Problem Relation Age of Onset    Heart Disease Father     Cancer Father         prostate    High Blood Pressure Father     High Cholesterol Father     Cancer Brother  Diabetes Brother     Thyroid Disease Brother            Physical Exam:    Vitals: BP (!) 123/36   Pulse 55   Temp 96.6 °F (35.9 °C) (Rectal)   Resp 22   Ht 6' 5.01\" (1.956 m)   Wt 274 lb 14.6 oz (124.7 kg)   SpO2 95%   BMI 32.59 kg/m²   General appearance: in no acute distress, appears stated age  Skin: Skin color, texture, turgor normal. No rashes or lesions  HEENT: normocephalic, atraumatic  Neck: supple, trachea midline  Lungs: , breathing comfortably on mv  Heart[de-identified] regular rate and rhythm, S1, S2 normal,  Abdomen: soft, non-tender; bowel sounds normal; no masses,   Extremities: extremities normal, atraumatic, no cyanosis or edema, some LLE erythema  Neurologic: sedated  Psychiatric: mood and affect can not be assessed    CBC:   Recent Labs     01/18/21 0425 01/19/21  0545   WBC 13.7* 18.3*   HGB 10.1* 11.0*    303     BMP:    Recent Labs     01/18/21 0425 01/19/21  0545 01/19/21  1700    143  --    K 4.8 6.0* 4.5    107  --    CO2 24 24  --    BUN 59* 73*  --    CREATININE 1.4* 1.7*  --    GLUCOSE 317* 310*  --      Hepatic:   Recent Labs     01/18/21 0425 01/19/21  0545   AST 18 22   ALT 16 18   BILITOT 0.4 0.4   ALKPHOS 73 83         Assessment and Recommendations     Patient Active Problem List   Diagnosis    Gastroesophageal reflux disease without esophagitis    Hypertension in stage 3 chronic kidney disease due to type 2 diabetes mellitus ([address]    DM (diabetes mellitus) type II, controlled, with peripheral vascular disorder ([address]    Combined hyperlipidemia associated with type 2 diabetes mellitus ([address]    Diabetic skin ulcer associated with type 2 diabetes mellitus (HCC)    CKD (chronic kidney disease)    History of DVT (deep vein thrombosis)- left femoral    History of pulmonary embolism    Long term current use of anticoagulants with INR goal of 2.0-3.0    COPD (chronic obstructive pulmonary disease) ([address]  Severe recurrent major depressive disorder with psychotic features ([facility]    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)    Venous (peripheral) insufficiency    Uncontrolled secondary diabetes mellitus with stage 3 CKD (GFR 30-59) (HCC)    Diabetes mellitus with skin ulcer ([address]    WD-Ulcer of shin, left, with fat layer exposed ([facility]    History of colon polyps    Personal history of PE (pulmonary embolism)    WD-Chronic venous hypertension (idiopathic) with ulcer of left lower extremity (CODE) ([facility]    WD-Chronic venous hypertension with inflammation, right    Troponin I above reference range    KIRSTEN (acute kidney injury) ([facility]    Cellulitis of lower extremity    Hyponatremia    Generalized weakness    Weakness    Multifocal pneumonia    Pneumonia due to COVID-19 virus   hyperkalemia: K of 6  KIRSTEN  BLE cellulitis  Acute hypoxic resp failure from COVID 19    Plan:  K wnl s/p medical therapy yesterday  Net negative 4L, hold lasix for now  Avoid nephrotoxins and renally dose meds  Critically ill    Thank you      Electronically signed by Barbara Morrow DO on 1/20/2021 at 7:26 AM    MD Ashly Roth DO [address]  [place], [place] [address]  PHONE: [phone]  FAX: [phone]

## 2021-01-20 NOTE — PROGRESS NOTES
PHARMACY ANTICOAGULATION MONITORING SERVICE    Bennett Mcmullen is a 77 y.o. male on warfarin therapy for history of DVT. Pharmacy consulted by Dr. Nas Burton for monitoring and adjustment of treatment. Indication for anticoagulation: Hx of DVT  INR goal: 2-3  Warfarin dose prior to admission: 5 mg daily    Pertinent Laboratory Values   Recent Labs     01/18/21  0425 01/19/21  0545 01/20/21  0610   INR 2.11 2.16 2.72   HGB 10.1* 11.0*  --    HCT 31.5* 36.2*  --     303  --        Assessment/Plan:  ? Possible DDI's:   o Aspirin (home med), can increase bleeding risk, clinically appropriate  o Escitalopram (home med), can increase bleeding risk, appropriate to continue while inpatient  o Enoxaparin bridge stopped with therapeutic INR  o Tramadol and dexamethasone can increase effects of warfarin  ? INR therapeutic @ 2.74; significant increase compared to yesterday's level. ? Decreased dose to Warfarin 2.5 mg daily  ? Additional dose adjustments to be made per INR trends, interacting medications, and other clinical factors  ? Pharmacy will continue to monitor and adjust warfarin therapy as indicated.     Thank you for the consult,  Cailin Muse, [address]  1/20/2021 4:07 PM

## 2021-01-20 NOTE — PROGRESS NOTES
Result Value Ref Range    POC Glucose 467 (H) 70 - 99 MG/DL   POCT Glucose    Collection Time: 01/19/21  3:53 PM   Result Value Ref Range    POC Glucose 438 (H) 70 - 99 MG/DL   POCT Glucose    Collection Time: 01/19/21  4:35 PM   Result Value Ref Range    POC Glucose 445 (H) 70 - 99 MG/DL   HIV ANTIGEN/ANTIBODY    Collection Time: 01/19/21  5:00 PM   Result Value Ref Range    HIV Screen NON REACTIVE NON REACTIVE   Potassium    Collection Time: 01/19/21  5:00 PM   Result Value Ref Range    Potassium 4.5 3.5 - 5.1 MMOL/L   POCT Glucose    Collection Time: 01/19/21  9:16 PM   Result Value Ref Range    POC Glucose 497 (H) 70 - 99 MG/DL   POCT Glucose    Collection Time: 01/20/21 12:52 AM   Result Value Ref Range    POC Glucose 488 (H) 70 - 99 MG/DL   Basic metabolic panel    Collection Time: 01/20/21  6:10 AM   Result Value Ref Range    Sodium 144 135 - 145 MMOL/L    Potassium 5.0 3.5 - 5.1 MMOL/L    Chloride 109 99 - 110 mMol/L    CO2 24 21 - 32 MMOL/L    Anion Gap 11 4 - 16    BUN 73 (H) 6 - 23 MG/DL    CREATININE 1.4 (H) 0.9 - 1.3 MG/DL    Glucose 480 (HH) 70 - 99 MG/DL    Calcium 7.0 (L) 8.3 - 10.6 MG/DL    GFR Non-African American 50 (L) >60 mL/min/1.73m2    GFR  60 (L) >60 mL/min/1.73m2   D-dimer, quantitative    Collection Time: 01/20/21  6:10 AM   Result Value Ref Range    D-Dimer, Quant 529 (H) <230 NG/mL(DDU)   Procalcitonin    Collection Time: 01/20/21  6:10 AM   Result Value Ref Range    Procalcitonin 0.382    Magnesium    Collection Time: 01/20/21  6:10 AM   Result Value Ref Range    Magnesium 2.3 1.8 - 2.4 mg/dl   Phosphorus    Collection Time: 01/20/21  6:10 AM   Result Value Ref Range    Phosphorus 3.5 2.5 - 4.9 MG/DL   Vancomycin, random    Collection Time: 01/20/21  6:10 AM   Result Value Ref Range    Vancomycin Rm 14.8 UG/ML    DOSE AMOUNT DOSE AMT.  GIVEN - `     DOSE TIME DOSE TIME GIVEN - `    High sensitivity CRP    Collection Time: 01/20/21  6:10 AM   Result Value Ref Range  WD-Chronic venous hypertension with inflammation, right    Troponin I above reference range    KIRSTEN (acute kidney injury) ([facility]    Cellulitis of lower extremity    Hyponatremia    Generalized weakness    Weakness    Multifocal pneumonia    Pneumonia due to COVID-19 virus    Hyperkalemia    Acute respiratory failure with hypoxia (HCC)       Active Problems  Active Problems:    Pneumonia due to COVID-19 virus    Hyperkalemia    Acute respiratory failure with hypoxia (HCC)  Resolved Problems:    * No resolved hospital problems. *      Impression and plan  ? Summary and rationale: Patient is a 77 y.o.  male T2DM, hyperlipidemia, htn, depression, morbid obesity who was admitted 1/13/2021 for further evaluation and management of shortness of breath that started on 1/8/2021 and positive COVID test. Has critical COVID-19 pneumonia, in hyperinflammatory phase  ? Clinical status: remains severe, but some improvement in FiO2 and the fact the he is not on vasopressors. ? Therapeutic:  o Ongoing antibiotics: vancomycin, cefepime  o Antiviral agent: remdesivir 1/13-1/16  o Anti-inflammatory agents:  ? Dexamethasone:1/12-19  ? Solu-Medrol: 1/17. 1/19-  o Completed antibiotics:   o Convalescent plasma receipt:  o Other agents:   ? Diagnostic:  ? F/u: Blood cx 1/19, 0/2; Aspergillus Ag, BDG, IL-6  ?  Other:      Electronically signed by: Electronically signed by Richard Baron MD on 1/20/2021 at 10:09 AM

## 2021-01-20 NOTE — FLOWSHEET NOTE
Pt. Assessment complete, see flowsheet. With any pause in sedation, mouth care, Pt. desats to low 80's with slow recovery. Will continue to monitor.

## 2021-01-20 NOTE — PROGRESS NOTES
[address] [facility]  consulted by Dr. Sarah Argueta for monitoring and adjustment. Indication for treatment: Covid-19 pneumonia, possible secondary bacterial pneumonia, cellulitis  Goal trough: 15 mcg/mL (AUC/YOLA 400-600)    Pertinent Laboratory Values:   Temp Readings from Last 3 Encounters:   01/14/21 97.9 °F (36.6 °C) (Axillary)   01/12/21 97.1 °F (36.2 °C) (Infrared)   01/08/21 98.9 °F (37.2 °C) (Oral)     Recent Labs     01/12/21  0830 01/12/21  0845 01/13/21  0600 01/14/21  0800   WBC  --  6.4 5.9 10.6*  10.8*   LACTATE 1.0  --   --   --      Recent Labs     01/12/21  0845 01/13/21  0600 01/14/21  0800   BUN 25* 26* 36*   CREATININE 1.4* 1.2 1.4*     Estimated Creatinine Clearance: 72 mL/min (A) (based on SCr of 1.4 mg/dL (H)). Intake/Output Summary (Last 24 hours) at 1/14/2021 1356  Last data filed at 1/14/2021 0958  Gross per 24 hour   Intake 480 ml   Output 2350 ml   Net -1870 ml       Pertinent Cultures:  Date    Source    Results  1/12   Resp PCR   Covid-19  1/13   Respiratory, Sputum  Ordered  1/13   Blood    Negative  1/19                            MRSA                                      Pending    RENAL LAB EVALUATION  Estimated Creatinine Clearance: 68 mL/min (A) (based on SCr of 1.4 mg/dL (H)). Recent Labs     01/18/21  0425 01/19/21  0545 01/20/21  0610   BUN 59* 73* 73*   CREATININE 1.4* 1.7* 1.4*     VANCOMYCIN TROUGH:    Recent Labs     01/17/21  2345 01/19/21  0545   VANCOTROUGH 21.2* 21.7*       Assessment:  · WBC and temperature: Elevated WBC @ 18.3; patient is afebrile today.   · SCr, BUN, and urine output:   · Previously KIRSTEN, then improved, now in KIRSTEN again  · SCr improved today @ 1.4  · Baseline Scr 1 on admission  · Day(s) of therapy: 9  · Vancomycin concentration:  · 1/13: 6, appropriate to re-dose  · 1/14: 16, 14h post-dose  · 1/17: 21.2, 18h post-dose, supra-therapeutic  · 1/19: 21.7, 24h post-dose, supra-therapeutic · 1/20:  14.8, 48h post-dose, therapeutic    Plan:  · Continue intermittent dosing based on renal trends  · Random level therapeutic today @ 14.8  · Vancomycin 1250mg IV x 1 dose today  · Repeat random level tomorrow AM  · Pharmacy will continue to monitor patient and adjust therapy as indicated    VANCOMYCIN CONCENTRATION SCHEDULED FOR 1/21 @ 5997    Thank you for the consult,    Amada Suggs, [address]  1/20/2021  8:57 AM

## 2021-01-20 NOTE — PROGRESS NOTES
Hospitalist Progress Note      Name:  Jennifer Liu /Age/Sex: 1945  [de-identified]77 y.o. male)   MRN & CSN:  [id number] & [id number] Admission Date/Time: 2021 12:17 AM   Location:  -A PCP: Dennise Wharton MD         Hospital Day: 8    Assessment and Plan:   Jennifer Liu is a 77 y.o.  male  who presents with shortness of breath, was transferred from [facility] to for management of COVID-19 pneumonia    · Acute on chronic hypoxic respiratory failure due to COVID-19 pneumonia, possible hospital-acquired pneumonia  · Septic shock due to COVID-19 pneumonia  -Worsening Leucocytosis  -Covid positive   -MRSA positive  -Respiratory cultures pending  -Blood cultures negative to date  -On steroids   -Remdesivir   -S/p convalescent plasma 2 units 2021  -On pressors  -Ventilator, management per pulmonology  -On IV vancomycin, cefepime   -Pulmo on board  -ID, continue vancomycin and cefepime    · Right lower extremity cellulitis  On vancomycin  · Diabetes mellitus type 2-poor control  Check A1c, start on sliding scale, Lantus,  endocrine    · Hyperkalemia   Give dose of Kayexalate, monitor    · KIRSTEN, likely due to ATN  On Lasix, with good urine output, nephro on board    · Moderate PEM  Dietitian on board, on tube feedings    Chronic medical condition  Hyperlipidemia, on statin  Hypertension  Morbid obesity, encourage weight loss and exercise  Lower extremity DVT, on Coumadin, INR therapeutic  Chronic respiratory failure at 3 L of O2    Called [facility] Left voicemail, will try again    Diet DIET TUBE FEED CONTINUOUS/CYCLIC NPO; Low Calorie High Protein (Vital HP);  Nasogastric; Continuous; 10; 55; 24   DVT Prophylaxis [] Lovenox, []  Heparin, [] SCDs, []No VTE prophylaxis, patient ambulating   GI Prophylaxis [] PPI, [] H2 Blocker, [] No GI prophylaxis, patient is receiving diet/Tube Feeds   Code Status Full Code   Disposition Patient requires continued admission due to  escitalopram  20 mg Oral Daily    metoprolol tartrate  12.5 mg Oral BID    sodium chloride flush  10 mL Intravenous 2 times per day    diclofenac sodium  2 g Topical BID    zinc sulfate  50 mg Oral Daily    albuterol sulfate HFA  2 puff Inhalation 4x daily    ipratropium  2 puff Inhalation 4x daily    chlorhexidine  15 mL Mouth/Throat BID    famotidine (PEPCID) injection  20 mg Intravenous BID      Infusions:    propofol 30 mcg/kg/min (01/20/21 1412)    dextrose      cisatracurium (NIMBEX) infusion Stopped (01/18/21 1612)    norepinephrine Stopped (01/20/21 1446)    fentaNYL 75 mcg/hr (01/20/21 1507)    dextrose      sodium chloride       PRN Meds:     glucose, 15 g, PRN      dextrose, 12.5 g, PRN      glucagon (rDNA), 1 mg, PRN      dextrose, 100 mL/hr, PRN      microfibrillar collagen, , PRN      polyvinyl alcohol, 1 drop, Q4H PRN    And      artificial tears, , PRN      sodium chloride flush, 10 mL, PRN      promethazine, 12.5 mg, Q6H PRN    Or      ondansetron, 4 mg, Q6H PRN      polyethylene glycol, 17 g, Daily PRN      acetaminophen, 650 mg, Q6H PRN    Or      acetaminophen, 650 mg, Q6H PRN      glucose, 15 g, PRN      dextrose, 12.5 g, PRN      glucagon (rDNA), 1 mg, PRN      dextrose, 100 mL/hr, PRN      traMADol, 50 mg, Q6H PRN    Or      traMADol, 100 mg, Q6H PRN      diphenhydrAMINE, 25 mg, Q6H PRN      sodium chloride, , PRN      sodium chloride, 30 mL, PRN        Data    Recent Labs     01/18/21 0425 01/19/21  0545   WBC 13.7* 18.3*   HGB 10.1* 11.0*   HCT 31.5* 36.2*    303      Recent Labs     01/18/21  0425 01/19/21  0545 01/19/21  1700 01/20/21  0610    143  --  144   K 4.8 6.0* 4.5 5.0    107  --  109   CO2 24 24  --  24   PHOS 4.1 6.8*  --  3.5   BUN 59* 73*  --  73*   CREATININE 1.4* 1.7*  --  1.4*     Recent Labs     01/18/21  0425 01/19/21  0545 01/20/21  0610   AST 18 22 17   ALT 16 18 15   BILIDIR 0.2 0.3 0.2   BILITOT 0.4 0.4 0.3 ALKPHOS 73 83 68     Recent Labs     01/18/21  0425 01/19/21  0545 01/20/21  0610   INR 2.11 2.16 2.72     No results for input(s): CKTOTAL, CKMB, CKMBINDEX, TROPONINI in the last 72 hours.         Electronically signed by Saul Billy MD on 1/20/2021 at 3:20 PM

## 2021-01-21 ENCOUNTER — APPOINTMENT (OUTPATIENT)
Dept: GENERAL RADIOLOGY | Age: 76
DRG: 870 | End: 2021-01-21
Attending: INTERNAL MEDICINE
Payer: MEDICARE

## 2021-01-21 LAB
1,3 BETA-D-GLUCAN INTERP: NEGATIVE
1,3 BETA-D-GLUCAN: 46 PG/ML
ANION GAP SERPL CALCULATED.3IONS-SCNC: 9 MMOL/L (ref 4–16)
ASPERGILLUS GALACTO AG: NEGATIVE
ASPERGILLUS GALACTO INDEX: 0.07
BASE EXCESS MIXED: 3.1 (ref 0–1.2)
BUN BLDV-MCNC: 78 MG/DL (ref 6–23)
C-REACTIVE PROTEIN, HIGH SENSITIVITY: 51.7 MG/L
CALCIUM SERPL-MCNC: 7.4 MG/DL (ref 8.3–10.6)
CARBON MONOXIDE, BLOOD: 1.9 % (ref 0–5)
CHLORIDE BLD-SCNC: 112 MMOL/L (ref 99–110)
CO2 CONTENT: 28.2 MMOL/L (ref 19–24)
CO2: 26 MMOL/L (ref 21–32)
COMMENT: ABNORMAL
CREAT SERPL-MCNC: 1.2 MG/DL (ref 0.9–1.3)
D DIMER: 439 NG/ML(DDU)
DOSE AMOUNT: NORMAL
DOSE TIME: NORMAL
GFR AFRICAN AMERICAN: >60 ML/MIN/1.73M2
GFR NON-AFRICAN AMERICAN: 59 ML/MIN/1.73M2
GLUCOSE BLD-MCNC: 143 MG/DL (ref 70–99)
GLUCOSE BLD-MCNC: 168 MG/DL (ref 70–99)
GLUCOSE BLD-MCNC: 178 MG/DL (ref 70–99)
GLUCOSE BLD-MCNC: 184 MG/DL (ref 70–99)
GLUCOSE BLD-MCNC: 236 MG/DL (ref 70–99)
GLUCOSE BLD-MCNC: 240 MG/DL (ref 70–99)
GLUCOSE BLD-MCNC: 244 MG/DL (ref 70–99)
GLUCOSE BLD-MCNC: 284 MG/DL (ref 70–99)
HCO3 ARTERIAL: 27 MMOL/L (ref 18–23)
HCT VFR BLD CALC: 30 % (ref 42–52)
HEMOGLOBIN: 9.4 GM/DL (ref 13.5–18)
INR BLD: 2.13 INDEX
Lab: ABNORMAL
MAGNESIUM: 2.3 MG/DL (ref 1.8–2.4)
MCH RBC QN AUTO: 29 PG (ref 27–31)
MCHC RBC AUTO-ENTMCNC: 31.3 % (ref 32–36)
MCV RBC AUTO: 92.6 FL (ref 78–100)
METHEMOGLOBIN ARTERIAL: 1.4 %
O2 SATURATION: 96.5 % (ref 96–97)
PCO2 ARTERIAL: 38 MMHG (ref 32–45)
PDW BLD-RTO: 18.5 % (ref 11.7–14.9)
PH BLOOD: 7.46 (ref 7.34–7.45)
PHOSPHORUS: 3.6 MG/DL (ref 2.5–4.9)
PLATELET # BLD: 280 K/CU MM (ref 140–440)
PMV BLD AUTO: 12.9 FL (ref 7.5–11.1)
PO2 ARTERIAL: 174 MMHG (ref 75–100)
POTASSIUM SERPL-SCNC: 5 MMOL/L (ref 3.5–5.1)
PROCALCITONIN: 0.22
PROTHROMBIN TIME: 26 SECONDS (ref 11.7–14.5)
RBC # BLD: 3.24 M/CU MM (ref 4.6–6.2)
SODIUM BLD-SCNC: 147 MMOL/L (ref 135–145)
TEST NAME: ABNORMAL
VANCOMYCIN RANDOM: 18.5 UG/ML
WBC # BLD: 10.6 K/CU MM (ref 4–10.5)

## 2021-01-21 PROCEDURE — 2000000000 HC ICU R&B

## 2021-01-21 PROCEDURE — 6360000002 HC RX W HCPCS: Performed by: INTERNAL MEDICINE

## 2021-01-21 PROCEDURE — 6360000002 HC RX W HCPCS: Performed by: STUDENT IN AN ORGANIZED HEALTH CARE EDUCATION/TRAINING PROGRAM

## 2021-01-21 PROCEDURE — 99232 SBSQ HOSP IP/OBS MODERATE 35: CPT | Performed by: INTERNAL MEDICINE

## 2021-01-21 PROCEDURE — 85610 PROTHROMBIN TIME: CPT

## 2021-01-21 PROCEDURE — 2500000003 HC RX 250 WO HCPCS: Performed by: NURSE PRACTITIONER

## 2021-01-21 PROCEDURE — 71045 X-RAY EXAM CHEST 1 VIEW: CPT

## 2021-01-21 PROCEDURE — 2700000000 HC OXYGEN THERAPY PER DAY

## 2021-01-21 PROCEDURE — 6370000000 HC RX 637 (ALT 250 FOR IP): Performed by: NURSE PRACTITIONER

## 2021-01-21 PROCEDURE — 80202 ASSAY OF VANCOMYCIN: CPT

## 2021-01-21 PROCEDURE — 86140 C-REACTIVE PROTEIN: CPT

## 2021-01-21 PROCEDURE — 94640 AIRWAY INHALATION TREATMENT: CPT

## 2021-01-21 PROCEDURE — 6370000000 HC RX 637 (ALT 250 FOR IP): Performed by: INTERNAL MEDICINE

## 2021-01-21 PROCEDURE — 2580000003 HC RX 258: Performed by: INTERNAL MEDICINE

## 2021-01-21 PROCEDURE — 89220 SPUTUM SPECIMEN COLLECTION: CPT

## 2021-01-21 PROCEDURE — 31720 CLEARANCE OF AIRWAYS: CPT

## 2021-01-21 PROCEDURE — 99233 SBSQ HOSP IP/OBS HIGH 50: CPT | Performed by: INTERNAL MEDICINE

## 2021-01-21 PROCEDURE — 85027 COMPLETE CBC AUTOMATED: CPT

## 2021-01-21 PROCEDURE — 80048 BASIC METABOLIC PNL TOTAL CA: CPT

## 2021-01-21 PROCEDURE — 82803 BLOOD GASES ANY COMBINATION: CPT

## 2021-01-21 PROCEDURE — 82962 GLUCOSE BLOOD TEST: CPT

## 2021-01-21 PROCEDURE — 83735 ASSAY OF MAGNESIUM: CPT

## 2021-01-21 PROCEDURE — 84145 PROCALCITONIN (PCT): CPT

## 2021-01-21 PROCEDURE — 85379 FIBRIN DEGRADATION QUANT: CPT

## 2021-01-21 PROCEDURE — 84100 ASSAY OF PHOSPHORUS: CPT

## 2021-01-21 PROCEDURE — 94003 VENT MGMT INPAT SUBQ DAY: CPT

## 2021-01-21 PROCEDURE — 36600 WITHDRAWAL OF ARTERIAL BLOOD: CPT

## 2021-01-21 PROCEDURE — 94761 N-INVAS EAR/PLS OXIMETRY MLT: CPT

## 2021-01-21 RX ORDER — FUROSEMIDE 10 MG/ML
40 INJECTION INTRAMUSCULAR; INTRAVENOUS DAILY
Status: DISCONTINUED | OUTPATIENT
Start: 2021-01-21 | End: 2021-01-21

## 2021-01-21 RX ORDER — FUROSEMIDE 10 MG/ML
40 INJECTION INTRAMUSCULAR; INTRAVENOUS DAILY
Status: DISCONTINUED | OUTPATIENT
Start: 2021-01-21 | End: 2021-01-23

## 2021-01-21 RX ORDER — DEXTROSE MONOHYDRATE 50 MG/ML
INJECTION, SOLUTION INTRAVENOUS CONTINUOUS
Status: ACTIVE | OUTPATIENT
Start: 2021-01-21 | End: 2021-01-21

## 2021-01-21 RX ADMIN — INSULIN HUMAN 5 UNITS: 100 INJECTION, SOLUTION PARENTERAL at 18:04

## 2021-01-21 RX ADMIN — CHLORHEXIDINE GLUCONATE 0.12% ORAL RINSE 15 ML: 1.2 LIQUID ORAL at 21:00

## 2021-01-21 RX ADMIN — DICLOFENAC SODIUM 2 G: 10 GEL TOPICAL at 21:02

## 2021-01-21 RX ADMIN — MIDAZOLAM HYDROCHLORIDE 1 MG/HR: 5 INJECTION, SOLUTION INTRAMUSCULAR; INTRAVENOUS at 10:48

## 2021-01-21 RX ADMIN — METHYLPREDNISOLONE SODIUM SUCCINATE 40 MG: 40 INJECTION, POWDER, LYOPHILIZED, FOR SOLUTION INTRAMUSCULAR; INTRAVENOUS at 08:34

## 2021-01-21 RX ADMIN — ALBUTEROL SULFATE 2 PUFF: 90 AEROSOL, METERED RESPIRATORY (INHALATION) at 08:20

## 2021-01-21 RX ADMIN — INSULIN HUMAN 5 UNITS: 100 INJECTION, SOLUTION PARENTERAL at 23:07

## 2021-01-21 RX ADMIN — ESCITALOPRAM OXALATE 20 MG: 10 TABLET ORAL at 08:34

## 2021-01-21 RX ADMIN — METOCLOPRAMIDE 10 MG: 5 INJECTION, SOLUTION INTRAMUSCULAR; INTRAVENOUS at 00:45

## 2021-01-21 RX ADMIN — INSULIN GLARGINE 50 UNITS: 100 INJECTION, SOLUTION SUBCUTANEOUS at 21:03

## 2021-01-21 RX ADMIN — ARIPIPRAZOLE 15 MG: 10 TABLET ORAL at 08:33

## 2021-01-21 RX ADMIN — SODIUM CHLORIDE, PRESERVATIVE FREE 10 ML: 5 INJECTION INTRAVENOUS at 21:01

## 2021-01-21 RX ADMIN — TRAMADOL HYDROCHLORIDE 100 MG: 50 TABLET, FILM COATED ORAL at 23:06

## 2021-01-21 RX ADMIN — METOPROLOL TARTRATE 12.5 MG: 25 TABLET, FILM COATED ORAL at 08:33

## 2021-01-21 RX ADMIN — Medication 2 PUFF: at 08:21

## 2021-01-21 RX ADMIN — METHYLPREDNISOLONE SODIUM SUCCINATE 40 MG: 40 INJECTION, POWDER, LYOPHILIZED, FOR SOLUTION INTRAMUSCULAR; INTRAVENOUS at 23:06

## 2021-01-21 RX ADMIN — FAMOTIDINE 20 MG: 10 INJECTION, SOLUTION INTRAVENOUS at 21:00

## 2021-01-21 RX ADMIN — PROPOFOL 30 MCG/KG/MIN: 10 INJECTION, EMULSION INTRAVENOUS at 00:45

## 2021-01-21 RX ADMIN — DEXTROSE MONOHYDRATE: 50 INJECTION, SOLUTION INTRAVENOUS at 12:15

## 2021-01-21 RX ADMIN — INSULIN HUMAN 9 UNITS: 100 INJECTION, SOLUTION PARENTERAL at 05:47

## 2021-01-21 RX ADMIN — INSULIN HUMAN 20 UNITS: 100 INJECTION, SOLUTION PARENTERAL at 00:12

## 2021-01-21 RX ADMIN — INSULIN HUMAN 5 UNITS: 100 INJECTION, SOLUTION PARENTERAL at 12:12

## 2021-01-21 RX ADMIN — INSULIN HUMAN 20 UNITS: 100 INJECTION, SOLUTION PARENTERAL at 05:49

## 2021-01-21 RX ADMIN — INSULIN HUMAN 20 UNITS: 100 INJECTION, SOLUTION PARENTERAL at 12:13

## 2021-01-21 RX ADMIN — METHYLPREDNISOLONE SODIUM SUCCINATE 40 MG: 40 INJECTION, POWDER, LYOPHILIZED, FOR SOLUTION INTRAMUSCULAR; INTRAVENOUS at 16:08

## 2021-01-21 RX ADMIN — PROPOFOL 30 MCG/KG/MIN: 10 INJECTION, EMULSION INTRAVENOUS at 05:40

## 2021-01-21 RX ADMIN — CHLORHEXIDINE GLUCONATE 0.12% ORAL RINSE 15 ML: 1.2 LIQUID ORAL at 08:33

## 2021-01-21 RX ADMIN — METOCLOPRAMIDE 10 MG: 5 INJECTION, SOLUTION INTRAMUSCULAR; INTRAVENOUS at 18:05

## 2021-01-21 RX ADMIN — PROPOFOL 25 MCG/KG/MIN: 10 INJECTION, EMULSION INTRAVENOUS at 08:52

## 2021-01-21 RX ADMIN — SODIUM CHLORIDE, PRESERVATIVE FREE 10 ML: 5 INJECTION INTRAVENOUS at 08:36

## 2021-01-21 RX ADMIN — ZINC SULFATE 220 MG (50 MG) CAPSULE 50 MG: CAPSULE at 08:33

## 2021-01-21 RX ADMIN — METOCLOPRAMIDE 10 MG: 5 INJECTION, SOLUTION INTRAMUSCULAR; INTRAVENOUS at 08:34

## 2021-01-21 RX ADMIN — ALBUTEROL SULFATE 2 PUFF: 90 AEROSOL, METERED RESPIRATORY (INHALATION) at 20:22

## 2021-01-21 RX ADMIN — WARFARIN SODIUM 2.5 MG: 2.5 TABLET ORAL at 18:06

## 2021-01-21 RX ADMIN — ALOGLIPTIN 12.5 MG: 12.5 TABLET, FILM COATED ORAL at 08:33

## 2021-01-21 RX ADMIN — METOPROLOL TARTRATE 12.5 MG: 25 TABLET, FILM COATED ORAL at 21:00

## 2021-01-21 RX ADMIN — Medication 2 PUFF: at 16:26

## 2021-01-21 RX ADMIN — BUPROPION HYDROCHLORIDE 300 MG: 150 TABLET, FILM COATED, EXTENDED RELEASE ORAL at 08:33

## 2021-01-21 RX ADMIN — METOCLOPRAMIDE 10 MG: 5 INJECTION, SOLUTION INTRAMUSCULAR; INTRAVENOUS at 12:11

## 2021-01-21 RX ADMIN — ASPIRIN 81 MG CHEWABLE TABLET 81 MG: 81 TABLET CHEWABLE at 08:33

## 2021-01-21 RX ADMIN — FAMOTIDINE 20 MG: 10 INJECTION, SOLUTION INTRAVENOUS at 08:33

## 2021-01-21 RX ADMIN — INSULIN HUMAN 50 UNITS: 100 INJECTION, SUSPENSION SUBCUTANEOUS at 08:39

## 2021-01-21 RX ADMIN — DEXTROSE MONOHYDRATE 100 ML/HR: 50 INJECTION, SOLUTION INTRAVENOUS at 12:14

## 2021-01-21 RX ADMIN — FUROSEMIDE 40 MG: 10 INJECTION, SOLUTION INTRAMUSCULAR; INTRAVENOUS at 08:33

## 2021-01-21 RX ADMIN — INSULIN HUMAN 6 UNITS: 100 INJECTION, SOLUTION PARENTERAL at 00:14

## 2021-01-21 RX ADMIN — ATORVASTATIN CALCIUM 40 MG: 40 TABLET, FILM COATED ORAL at 21:00

## 2021-01-21 RX ADMIN — Medication 2 PUFF: at 20:22

## 2021-01-21 RX ADMIN — PROPOFOL 35 MCG/KG/MIN: 10 INJECTION, EMULSION INTRAVENOUS at 12:17

## 2021-01-21 RX ADMIN — METHYLPREDNISOLONE SODIUM SUCCINATE 40 MG: 40 INJECTION, POWDER, LYOPHILIZED, FOR SOLUTION INTRAMUSCULAR; INTRAVENOUS at 00:11

## 2021-01-21 RX ADMIN — Medication 2 PUFF: at 13:01

## 2021-01-21 RX ADMIN — DEXTROSE MONOHYDRATE 100 ML/HR: 50 INJECTION, SOLUTION INTRAVENOUS at 12:11

## 2021-01-21 RX ADMIN — VANCOMYCIN HYDROCHLORIDE 1250 MG: 5 INJECTION, POWDER, LYOPHILIZED, FOR SOLUTION INTRAVENOUS at 12:11

## 2021-01-21 RX ADMIN — DICLOFENAC SODIUM 2 G: 10 GEL TOPICAL at 08:34

## 2021-01-21 RX ADMIN — ALBUTEROL SULFATE 2 PUFF: 90 AEROSOL, METERED RESPIRATORY (INHALATION) at 13:00

## 2021-01-21 ASSESSMENT — PULMONARY FUNCTION TESTS
PIF_VALUE: 23
PIF_VALUE: 24
PIF_VALUE: 23
PIF_VALUE: 24
PIF_VALUE: 24
PIF_VALUE: 25
PIF_VALUE: 23
PIF_VALUE: 23

## 2021-01-21 ASSESSMENT — PAIN SCALES - GENERAL
PAINLEVEL_OUTOF10: 2
PAINLEVEL_OUTOF10: 5

## 2021-01-21 NOTE — PROGRESS NOTES
Pulmonary and Critical Care  Progress Note    Subjective: The patient is sedated on vent. Shortness of breath none. Chest pain none. Addressing respiratory complaints Patient is negative for  hemoptysis and cyanosis  CONSTITUTIONAL:  negative for fevers and chills.       Past Medical History: injury of right buttock, stage 3 (HCC), WD-Skin tear of right forearm without complication, and WD-Ulcer of right pretibial region, with fat layer exposed ([facility] [address] has a past surgical history that includes Tonsillectomy (1953); Splenectomy (1958); Breast surgery (1970s); hernia repair (1970s); Skin graft (1978-present); Cataract removal (Right, 3/11/2013); Cataract removal (Left, 2/25/2013); Varicose vein surgery (Left, 2009); Carpal tunnel release (Left, 1993); Cardiac catheterization (6/3/14); Colonoscopy (2006); Colonoscopy (11/21/11); Colonoscopy (4/23/12); Colonoscopy (08/04/2016); Splenectomy; hernia repair; and Carpal tunnel release. reports that he quit smoking about 28 years ago. His smoking use included cigarettes. He has a 70.00 pack-year smoking history. He has never used smokeless tobacco. He reports current alcohol use. He reports that he does not use drugs. Family history:  family history includes Cancer in his brother and father; Diabetes in his brother; Heart Disease in his father; High Blood Pressure in his father; High Cholesterol in his father; Thyroid Disease in his brother.     Allergies   Allergen Reactions    Cavilon Durable Barrier [Mineral Oil-Dimeth-Coconut Oil]     Gentamycin [Gentamicin] Hives    Parabens Hives    Parabens Hives    Prinivil [Lisinopril] Swelling    Cortisone Rash    Cortisone Rash    Dilaudid [Hydromorphone Hcl] Rash    Penicillins Rash    Penicillins Rash    Sulfamethoxazole-Trimethoprim Nausea Only    Tape Natalie Lit Tape] Rash     Social History:    Reviewed; no changes    Objective:   PHYSICAL EXAM:        VITALS:  BP (!) 120/57   Pulse 69   Temp 96.6 °F (35.9 °C)   Resp 20   Ht 6' 5.01\" (1.956 m)   Wt 274 lb 14.6 oz (124.7 kg)   SpO2 90%   BMI 32.59 kg/m²     24HR INTAKE/OUTPUT:      Intake/Output Summary (Last 24 hours) at 1/21/2021 1144  Last data filed at 1/21/2021 0530  Gross per 24 hour   Intake 1528.5 ml   Output 2000 ml   Net -471.5 ml CONSTITUTIONAL:  Somnolent. LUNGS:  decreased breath sounds, basilar crackles. CARDIOVASCULAR:  normal S1 and S2 and negative JVD  ABD:Abdomen soft, non-tender. BS normal. No masses,  No organomegaly  NEURO:Sedated on vent. DATA:    CBC:  Recent Labs     01/19/21  0545 01/20/21  1545 01/21/21  0505   WBC 18.3* 10.0 10.6*   RBC 3.81* 3.19* 3.24*   HGB 11.0* 9.3* 9.4*   HCT 36.2* 29.0* 30.0*    267 280   MCV 95.0 90.9 92.6   MCH 28.9 29.2 29.0   MCHC 30.4* 32.1 31.3*   RDW 19.3* 18.2* 18.5*      BMP:  Recent Labs     01/19/21  0545 01/19/21  1700 01/20/21  0610 01/21/21  0505     --  144 147*   K 6.0* 4.5 5.0 5.0     --  109 112*   CO2 24  --  24 26   BUN 73*  --  73* 78*   CREATININE 1.7*  --  1.4* 1.2   CALCIUM 7.4*  --  7.0* 7.4*   GLUCOSE 310*  --  480* 240*      ABG:  Recent Labs     01/19/21  0700 01/20/21  0700 01/21/21  0600   PH 7.26* 7.52* 7.46*   PO2ART 114* 98 174*   WMH2NKE 55.0* 34.0 38.0   O2SAT 96.0 96.4 96.5     Lab Results   Component Value Date    PROBNP 5,973 (H) 01/14/2021    PROBNP 1,642 (H) 01/12/2021    PROBNP 1,196 (H) 11/11/2020     No results found for: [facility]    Radiology Review:  Pertinent images / reports were reviewed as a part of this visit.     Assessment:     Patient Active Problem List   Diagnosis    Gastroesophageal reflux disease without esophagitis    Hypertension in stage 3 chronic kidney disease due to type 2 diabetes mellitus ([facility] [address]    DM (diabetes mellitus) type II, controlled, with peripheral vascular disorder ([facility] [address]    Combined hyperlipidemia associated with type 2 diabetes mellitus ([address]    Diabetic skin ulcer associated with type 2 diabetes mellitus (HCC)    CKD (chronic kidney disease)    History of DVT (deep vein thrombosis)- left femoral    History of pulmonary embolism    Long term current use of anticoagulants with INR goal of 2.0-3.0    COPD (chronic obstructive pulmonary disease) ([facility]

## 2021-01-21 NOTE — PROGRESS NOTES
Nephrology Progress Note        [address], [address] [place] [address]  Phone: [phone]  Office Hours: 8:30AM  4:30PM  Monday - Friday 1/21/2021 6:48 AM  Subjective:   Admit Date: 1/13/2021  PCP: Melissa Pelaez MD  Interval History: nonoliguric  intubated    Diet: DIET TUBE FEED CONTINUOUS/CYCLIC NPO; Low Calorie High Protein (Vital HP);  Nasogastric; Continuous; 10; 55; 24      Data:   Scheduled Meds:   insulin NPH  50 Units Subcutaneous QAM    insulin regular  0-30 Units Subcutaneous Q6H    insulin regular  10 Units Subcutaneous Once    insulin glargine  50 Units Subcutaneous Nightly    insulin regular  20 Units Subcutaneous 4 times per day    warfarin  2.5 mg Oral Daily    vancomycin (VANCOCIN) intermittent dosing (placeholder)   Other See Admin Instructions    methylPREDNISolone  40 mg Intravenous Q8H    metoclopramide  10 mg Intravenous Q6H    alogliptin  12.5 mg Oral Daily    ARIPiprazole  15 mg Oral Daily    aspirin  81 mg Oral Daily    atorvastatin  40 mg Oral Nightly    buPROPion  300 mg Oral QAM    escitalopram  20 mg Oral Daily    metoprolol tartrate  12.5 mg Oral BID    sodium chloride flush  10 mL Intravenous 2 times per day    diclofenac sodium  2 g Topical BID    zinc sulfate  50 mg Oral Daily    albuterol sulfate HFA  2 puff Inhalation 4x daily    ipratropium  2 puff Inhalation 4x daily    chlorhexidine  15 mL Mouth/Throat BID    famotidine (PEPCID) injection  20 mg Intravenous BID     Continuous Infusions:   propofol 30 mcg/kg/min (01/21/21 0540)    dextrose      cisatracurium (NIMBEX) infusion Stopped (01/18/21 1612)    norepinephrine Stopped (01/20/21 1446)    fentaNYL 50 mcg/hr (01/20/21 1745)    dextrose      sodium chloride Abdomen: soft, non distended,   Extremities: no ble edema  Neurologic: sedated  Assessment and Plan:     Patient Active Problem List   Diagnosis    Gastroesophageal reflux disease without esophagitis    Hypertension in stage 3 chronic kidney disease due to type 2 diabetes mellitus ([address]    DM (diabetes mellitus) type II, controlled, with peripheral vascular disorder (HCC)    Combined hyperlipidemia associated with type 2 diabetes mellitus ([address]    Diabetic skin ulcer associated with type 2 diabetes mellitus (HCC)    CKD (chronic kidney disease)    History of DVT (deep vein thrombosis)- left femoral    History of pulmonary embolism    Long term current use of anticoagulants with INR goal of 2.0-3.0    COPD (chronic obstructive pulmonary disease) (HCC)    Severe recurrent major depressive disorder with psychotic features (HCC)    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)    Venous (peripheral) insufficiency    Uncontrolled secondary diabetes mellitus with stage 3 CKD (GFR 30-59) (HCC)    Diabetes mellitus with skin ulcer (HCC)    WD-Ulcer of shin, left, with fat layer exposed ([address]    History of colon polyps    Personal history of PE (pulmonary embolism)    WD-Chronic venous hypertension (idiopathic) with ulcer of left lower extremity (CODE) ([address]    WD-Chronic venous hypertension with inflammation, right    Troponin I above reference range    KIRSTEN (acute kidney injury) ([address]    Cellulitis of lower extremity    Hyponatremia    Generalized weakness    Weakness    Multifocal pneumonia    Pneumonia due to COVID-19 virus   hyperkalemia: K of 6//resolved with medical therapy  KIRSTEN  BLE cellulitis  Acute hypoxic resp failure from COVID 19     Plan:  Labs pending  Resume lasix   Avoid nephrotoxins and renally dose meds  Critically ill                       Electronically signed by Yelitza Orozco DO on 1/21/2021 at 6:48 AM [facility]  MD Cassandra Alcantar DO [address]  [place], [place] [address]  PHONE: [phone]  FAX: [phone]

## 2021-01-21 NOTE — PROGRESS NOTES
Hospitalist Progress Note      Name:  Mike Talavera /Age/Sex: 1945  [de-identified]77 y.o. male)   MRN & CSN:  [id number] & [id number] Admission Date/Time: 2021 12:17 AM   Location:  -A PCP: Kiya Levin MD         Hospital Day: 9    Assessment and Plan:   Mike Talavera is a 77 y.o.  male  who presents with shortness of breath, was transferred from [facility] to for management of COVID-19 pneumonia    · Acute on chronic hypoxic respiratory failure due to COVID-19 pneumonia, possible hospital-acquired pneumonia  · Septic shock due to COVID-19 pneumonia  -Leucocytosis, mild improvement  -Covid positive   -MRSA positive  -Respiratory cultures pending  -Blood cultures negative to date  -On steroids   -Remdesivir   -S/p convalescent plasma 2 units 2021  -On pressors  -Ventilator, management per pulmonology  -On IV vancomycin, cefepime   -Pulmo on board  -ID, continue vancomycin and cefepime    · Right lower extremity cellulitis  On vancomycin  · Diabetes mellitus type 2-poor control  Check A1c, start on sliding scale, Lantus,  endocrine    · Hyperkalemia   Give dose of Kayexalate, monitor    · KIRSTEN, likely due to ATN  On Lasix, with good urine output, nephro on board    · Moderate PEM  Dietitian on board, on tube feedings    Chronic medical condition  Hyperlipidemia, on statin  Hypertension  Morbid obesity, encourage weight loss and exercise  Lower extremity DVT, on Coumadin, INR therapeutic  Chronic respiratory failure at 3 L of O2    Called [facility] updated, prognosis guarded    Diet DIET TUBE FEED CONTINUOUS/CYCLIC NPO; Low Calorie High Protein (Vital HP);  Nasogastric; Continuous; 10; 55; 24   DVT Prophylaxis [] Lovenox, []  Heparin, [] SCDs, []No VTE prophylaxis, patient ambulating   GI Prophylaxis [] PPI, [] H2 Blocker, [] No GI prophylaxis, patient is receiving diet/Tube Feeds   Code Status Full Code   Disposition Patient requires continued admission due to MDM [] Low, [] Moderate,[]  High  Patient's risk as above due to      History of Present Illness:     Pt S&E. Intubated , sedated    10-14 point ROS reviewed negative, unless as noted above    Objective: Intake/Output Summary (Last 24 hours) at 1/21/2021 1309  Last data filed at 1/21/2021 0530  Gross per 24 hour   Intake 1528.5 ml   Output 2000 ml   Net -471.5 ml      Vitals:   Vitals:    01/21/21 1205   BP:    Pulse: 72   Resp: 24   Temp: 97.3 °F (36.3 °C)   SpO2: 90%     Physical Exam:    GEN Intubated, sedated distress. Appears given age. EYES Pupils are equally round. No scleral erythema, discharge, or conjunctivitis. HENT Mucous membranes are moist. NG tube   NECK No apparent thyromegaly or masses. ET Tube insitu  RESP Clear to auscultation, no wheezes, rales or rhonchi. Symmetric chest movement   CARDIO/VASC S1/S2 auscultated. Regular rate without appreciable murmurs, rubs, or gallops. Peripheral pulses equal bilaterally and palpable. No peripheral edema. GI Abdomen is soft without significant tenderness, masses, or guarding. Bowel sounds are normoactive. Rectal exam deferred.  Starkey catheter is present. HEME/LYMPH No petechiae or ecchymoses. MSK No gross joint deformities. Spontaneous movement of all extremities  SKIN Normal coloration, warm, dry. NEURO Cranial nerves appear grossly intact,on vent.     Medications:   Medications:    insulin NPH  50 Units Subcutaneous QAM    insulin regular  0-30 Units Subcutaneous Q6H    insulin regular  10 Units Subcutaneous Once    furosemide  40 mg Intravenous Daily    vancomycin  1,250 mg Intravenous Q24H    insulin glargine  50 Units Subcutaneous Nightly    insulin regular  20 Units Subcutaneous 4 times per day    warfarin  2.5 mg Oral Daily    methylPREDNISolone  40 mg Intravenous Q8H    metoclopramide  10 mg Intravenous Q6H    alogliptin  12.5 mg Oral Daily    ARIPiprazole  15 mg Oral Daily    aspirin  81 mg Oral Daily  atorvastatin  40 mg Oral Nightly    buPROPion  300 mg Oral QAM    escitalopram  20 mg Oral Daily    metoprolol tartrate  12.5 mg Oral BID    sodium chloride flush  10 mL Intravenous 2 times per day    diclofenac sodium  2 g Topical BID    zinc sulfate  50 mg Oral Daily    albuterol sulfate HFA  2 puff Inhalation 4x daily    ipratropium  2 puff Inhalation 4x daily    chlorhexidine  15 mL Mouth/Throat BID    famotidine (PEPCID) injection  20 mg Intravenous BID      Infusions:    dextrose 75 mL/hr at 01/21/21 1215    midazolam 3 mg/hr (01/21/21 1157)    propofol 35 mcg/kg/min (01/21/21 1217)    dextrose 100 mL/hr (01/21/21 1214)    cisatracurium (NIMBEX) infusion Stopped (01/18/21 1612)    norepinephrine Stopped (01/20/21 1446)    fentaNYL 75 mcg/hr (01/21/21 1104)    dextrose      sodium chloride       PRN Meds:     glucose, 15 g, PRN      dextrose, 12.5 g, PRN      glucagon (rDNA), 1 mg, PRN      dextrose, 100 mL/hr, PRN      microfibrillar collagen, , PRN      polyvinyl alcohol, 1 drop, Q4H PRN    And      artificial tears, , PRN      sodium chloride flush, 10 mL, PRN      promethazine, 12.5 mg, Q6H PRN    Or      ondansetron, 4 mg, Q6H PRN      polyethylene glycol, 17 g, Daily PRN      acetaminophen, 650 mg, Q6H PRN    Or      acetaminophen, 650 mg, Q6H PRN      glucose, 15 g, PRN      dextrose, 12.5 g, PRN      glucagon (rDNA), 1 mg, PRN      dextrose, 100 mL/hr, PRN      traMADol, 50 mg, Q6H PRN    Or      traMADol, 100 mg, Q6H PRN      diphenhydrAMINE, 25 mg, Q6H PRN      sodium chloride, , PRN      sodium chloride, 30 mL, PRN        Data    Recent Labs     01/19/21  0545 01/20/21  1545 01/21/21  0505   WBC 18.3* 10.0 10.6*   HGB 11.0* 9.3* 9.4*   HCT 36.2* 29.0* 30.0*    267 280      Recent Labs     01/19/21  0545 01/19/21  1700 01/20/21  0610 01/21/21  0505     --  144 147*   K 6.0* 4.5 5.0 5.0     --  109 112*   CO2 24  --  24 26 PHOS 6.8*  --  3.5 3.6   BUN 73*  --  73* 78*   CREATININE 1.7*  --  1.4* 1.2     Recent Labs     01/19/21  0545 01/20/21  0610   AST 22 17   ALT 18 15   BILIDIR 0.3 0.2   BILITOT 0.4 0.3   ALKPHOS 83 68     Recent Labs     01/19/21  0545 01/20/21  0610 01/21/21  0505   INR 2.16 2.72 2.13     No results for input(s): CKTOTAL, CKMB, CKMBINDEX, TROPONINI in the last 72 hours.         Electronically signed by Andrew Condon MD on 1/21/2021 at 1:09 PM

## 2021-01-21 NOTE — PROGRESS NOTES
[address] [facility]  consulted by Dr. Helena oCuch for monitoring and adjustment. Indication for treatment: COVID 19, MRSA pneumonia  Goal trough: 15 mcg/mL, -600    Pertinent Laboratory Values:   Temp Readings from Last 3 Encounters:   01/21/21 96.2 °F (35.7 °C)   01/12/21 97.1 °F (36.2 °C) (Infrared)   01/08/21 98.9 °F (37.2 °C) (Oral)     Recent Labs     01/19/21  0545 01/20/21  1545 01/21/21  0505   WBC 18.3* 10.0 10.6*     Recent Labs     01/19/21  0545 01/20/21  0610 01/21/21  0505   BUN 73* 73* 78*   CREATININE 1.7* 1.4* 1.2     Estimated Creatinine Clearance: 79 mL/min (based on SCr of 1.2 mg/dL). Intake/Output Summary (Last 24 hours) at 1/21/2021 1014  Last data filed at 1/21/2021 0530  Gross per 24 hour   Intake 1528.5 ml   Output 2000 ml   Net -471.5 ml       Pertinent Cultures:  Date    Source    Results  1/19   Blood    NGTD   1/19   MRSA nasal screen  Positive     Vancomycin level:   TROUGH:    Recent Labs     01/19/21  0545   VANCOTROUGH 21.7*     RANDOM:    Recent Labs     01/20/21  0610 01/21/21  0505   VANCORANDOM 14.8 18.5       Assessment:  · WBC and temperature: elevated WBC (receiving methylprednisolone), afebrile   · SCr, BUN, and urine output: Scr improving   · Day(s) of therapy: 10  · Vancomycin concentration:   · 1/17: 21.1, supra-therapeutic on vancomycin 1250 mg q18h  · 1/19: 21.7, supra-therapeutic on vancomycin 1250 mg q24h   · 1/20: 14.8, therapeutic 48h post-dose   · 1/21: 18.5, therapeutic 18h post-dose     Plan:  · Previously pulse-dosing based on levels 2/2 renal function changes  · Renal function appears to be improving, however BUN remains elevated   · Will dose with caution   · Resume vancomycin 1250 mg q24h   · Repeat trough in 48h   · Pharmacy will continue to monitor patient and adjust therapy as indicated    Lindsay 3 1/23 @0571    Thank you for the consult.   Laura Bradford, PharmD, BCPS   1/21/2021 10:14 AM

## 2021-01-21 NOTE — PROGRESS NOTES
Magnesium 2.3 1.8 - 2.4 mg/dl   Phosphorus    Collection Time: 01/21/21  5:05 AM   Result Value Ref Range    Phosphorus 3.6 2.5 - 4.9 MG/DL   Vancomycin, random    Collection Time: 01/21/21  5:05 AM   Result Value Ref Range    Vancomycin Rm 18.5 UG/ML    DOSE AMOUNT DOSE AMT.  GIVEN - none, random level     DOSE TIME DOSE TIME GIVEN - none, random level    High sensitivity CRP    Collection Time: 01/21/21  5:05 AM   Result Value Ref Range    CRP High Sensitivity 51.7 (H) <8.5 mg/L   POCT Glucose    Collection Time: 01/21/21  5:46 AM   Result Value Ref Range    POC Glucose 284 (H) 70 - 99 MG/DL   Blood Gas, Arterial    Collection Time: 01/21/21  6:00 AM   Result Value Ref Range    pH, Bld 7.46 (H) 7.34 - 7.45    pCO2, Arterial 38.0 32 - 45 MMHG    pO2, Arterial 174 (H) 75 - 100 MMHG    Base Exc, Mixed 3.1 (H) 0 - 1.2    HCO3, Arterial 27.0 (H) 18 - 23 MMOL/L    CO2 Content 28.2 (H) 19 - 24 MMOL/L    O2 Sat 96.5 96 - 97 %    Carbon Monoxide, Blood 1.9 0 - 5 %    Methemoglobin, Arterial 1.4 <1.5 %    Comment AC20 P18 .40 P5    POCT Glucose    Collection Time: 01/21/21  8:18 AM   Result Value Ref Range    POC Glucose 236 (H) 70 - 99 MG/DL     CULTURE results: Invalid input(s): BLOOD CULTURE,  URINE CULTURE, SURGICAL CULTURE    Diagnosis:  Patient Active Problem List   Diagnosis    Gastroesophageal reflux disease without esophagitis    Hypertension in stage 3 chronic kidney disease due to type 2 diabetes mellitus (HCC)    DM (diabetes mellitus) type II, controlled, with peripheral vascular disorder (HCC)    Combined hyperlipidemia associated with type 2 diabetes mellitus (HCC)    Diabetic skin ulcer associated with type 2 diabetes mellitus (HCC)    CKD (chronic kidney disease)    History of DVT (deep vein thrombosis)- left femoral    History of pulmonary embolism    Long term current use of anticoagulants with INR goal of 2.0-3.0    [facility])  Severe recurrent major depressive disorder with psychotic features ([facility]    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)    Venous (peripheral) insufficiency    Uncontrolled secondary diabetes mellitus with stage 3 CKD (GFR 30-59) (HCC)    Diabetes mellitus with skin ulcer ([facility] [address]    WD-Ulcer of shin, left, with fat layer exposed ([facility] [address]    History of colon polyps    Personal history of PE (pulmonary embolism)    WD-Chronic venous hypertension (idiopathic) with ulcer of left lower extremity (CODE) ([facility] [address]    WD-Chronic venous hypertension with inflammation, right    Troponin I above reference range    KIRSTEN (acute kidney injury) ([facility] [address]    Cellulitis of lower extremity    Hyponatremia    Generalized weakness    Weakness    Multifocal pneumonia    Pneumonia due to COVID-19 virus    Hyperkalemia    Acute respiratory failure with hypoxia (HCC)       Active Problems  Active Problems:    Pneumonia due to COVID-19 virus    Hyperkalemia    Acute respiratory failure with hypoxia (HCC)  Resolved Problems:    * No resolved hospital problems. *      Impression and plan  ? Summary and rationale: Patient is a 77 y.o.  male T2DM, hyperlipidemia, htn, depression, morbid obesity who was admitted 1/13/2021 for further evaluation and management of shortness of breath that started on 1/8/2021 and positive COVID test. Has critical COVID-19 pneumonia, in hyperinflammatory phase  ? Clinical status: remains severe, but some improvement in FiO2 and t he is not on vasopressors. MRSA nares positive. On vancomycin. ? Therapeutic:  o Ongoing antibiotics: vancomycin, cefepime  o Antiviral agent: remdesivir 1/13-1/16  o Anti-inflammatory agents:  ? Dexamethasone:1/12-19  ? Solu-Medrol: 1/17. 1/19-  o Completed antibiotics:   o Convalescent plasma receipt:  o Other agents:   ? Diagnostic:  ? F/u: Blood cx 1/19, 0/2; Aspergillus Ag, BDG, IL-6  ?  Other: Electronically signed by: Electronically signed by Zana Armijo MD on 1/21/2021 at 12:15 PM

## 2021-01-21 NOTE — PROGRESS NOTES
PHARMACY ANTICOAGULATION MONITORING SERVICE    Leva Bloch is a 77 y.o. male on warfarin therapy for history of DVT. Pharmacy consulted by Dr. Becky Diaz for monitoring and adjustment of treatment. Indication for anticoagulation: Hx of DVT  INR goal: 2-3  Warfarin dose prior to admission: 5 mg daily    Pertinent Laboratory Values   Recent Labs     01/19/21  0545 01/19/21  0545 01/20/21  1545 01/21/21  0505   INR 2.16   < >  --  2.13   HGB 11.0*  --  9.3* 9.4*   HCT 36.2*  --  29.0* 30.0*     --  267 280    < > = values in this interval not displayed. Assessment/Plan:  ? Possible Drug/ Dring I's:   o No new D/D interactions - over past 4 days. o Aspirin           (home med), can increase bleeding risk, clinically appropriate  o Escitalopram (home med), can increase bleeding risk, appropriate to continue while inpatient  o Atorvastatin   (home med), can increase bleeding risk, on; monitored. o Tramadol and dexamethasone can increase effects of warfarin  ? INR therapeutic @ 2.13 = WNL  ? Decreased dose yesterday to Warfarin 2.5 mg daily. ? CONTINUE SAME DOSE AND FREQUENCY = Warfarin 2.5mg po qday. ? Additional dose adjustments to be made per INR trends, interacting medications, and other clinical factors  ? Pharmacy will continue to monitor and adjust warfarin therapy as indicated.     Thank you for the consult,  Jose Alberto Albarran MS, [facility]  1/21/2021 9:32 AM

## 2021-01-22 ENCOUNTER — APPOINTMENT (OUTPATIENT)
Dept: GENERAL RADIOLOGY | Age: 76
DRG: 870 | End: 2021-01-22
Attending: INTERNAL MEDICINE
Payer: MEDICARE

## 2021-01-22 LAB
ANION GAP SERPL CALCULATED.3IONS-SCNC: 9 MMOL/L (ref 4–16)
BASE EXCESS MIXED: 4 (ref 0–1.2)
BUN BLDV-MCNC: 79 MG/DL (ref 6–23)
C-REACTIVE PROTEIN, HIGH SENSITIVITY: 42.1 MG/L
CALCIUM SERPL-MCNC: 7.5 MG/DL (ref 8.3–10.6)
CARBON MONOXIDE, BLOOD: 2.1 % (ref 0–5)
CHLORIDE BLD-SCNC: 112 MMOL/L (ref 99–110)
CO2 CONTENT: 30.6 MMOL/L (ref 19–24)
CO2: 26 MMOL/L (ref 21–32)
COMMENT: ABNORMAL
CREAT SERPL-MCNC: 1.2 MG/DL (ref 0.9–1.3)
D DIMER: 1744 NG/ML(DDU)
GFR AFRICAN AMERICAN: >60 ML/MIN/1.73M2
GFR NON-AFRICAN AMERICAN: 59 ML/MIN/1.73M2
GLUCOSE BLD-MCNC: 105 MG/DL (ref 70–99)
GLUCOSE BLD-MCNC: 105 MG/DL (ref 70–99)
GLUCOSE BLD-MCNC: 67 MG/DL (ref 70–99)
GLUCOSE BLD-MCNC: 70 MG/DL (ref 70–99)
GLUCOSE BLD-MCNC: 71 MG/DL (ref 70–99)
GLUCOSE BLD-MCNC: 74 MG/DL (ref 70–99)
HCO3 ARTERIAL: 29.2 MMOL/L (ref 18–23)
HCT VFR BLD CALC: 31.3 % (ref 42–52)
HEMOGLOBIN: 9.9 GM/DL (ref 13.5–18)
INR BLD: 1.61 INDEX
MAGNESIUM: 2.3 MG/DL (ref 1.8–2.4)
MCH RBC QN AUTO: 29 PG (ref 27–31)
MCHC RBC AUTO-ENTMCNC: 31.6 % (ref 32–36)
MCV RBC AUTO: 91.8 FL (ref 78–100)
METHEMOGLOBIN ARTERIAL: 1.7 %
O2 SATURATION: 92 % (ref 96–97)
PCO2 ARTERIAL: 45 MMHG (ref 32–45)
PDW BLD-RTO: 18.3 % (ref 11.7–14.9)
PH BLOOD: 7.42 (ref 7.34–7.45)
PHOSPHORUS: 3.8 MG/DL (ref 2.5–4.9)
PLATELET # BLD: 263 K/CU MM (ref 140–440)
PMV BLD AUTO: 12.8 FL (ref 7.5–11.1)
PO2 ARTERIAL: 67 MMHG (ref 75–100)
POTASSIUM SERPL-SCNC: 4.9 MMOL/L (ref 3.5–5.1)
PROCALCITONIN: 0.23
PROTHROMBIN TIME: 19.6 SECONDS (ref 11.7–14.5)
RBC # BLD: 3.41 M/CU MM (ref 4.6–6.2)
SODIUM BLD-SCNC: 147 MMOL/L (ref 135–145)
WBC # BLD: 13.1 K/CU MM (ref 4–10.5)

## 2021-01-22 PROCEDURE — 89220 SPUTUM SPECIMEN COLLECTION: CPT

## 2021-01-22 PROCEDURE — 6360000002 HC RX W HCPCS: Performed by: INTERNAL MEDICINE

## 2021-01-22 PROCEDURE — 2500000003 HC RX 250 WO HCPCS: Performed by: INTERNAL MEDICINE

## 2021-01-22 PROCEDURE — 6370000000 HC RX 637 (ALT 250 FOR IP): Performed by: NURSE PRACTITIONER

## 2021-01-22 PROCEDURE — 99232 SBSQ HOSP IP/OBS MODERATE 35: CPT | Performed by: INTERNAL MEDICINE

## 2021-01-22 PROCEDURE — 2580000003 HC RX 258: Performed by: INTERNAL MEDICINE

## 2021-01-22 PROCEDURE — 85379 FIBRIN DEGRADATION QUANT: CPT

## 2021-01-22 PROCEDURE — 82803 BLOOD GASES ANY COMBINATION: CPT

## 2021-01-22 PROCEDURE — 6370000000 HC RX 637 (ALT 250 FOR IP): Performed by: INTERNAL MEDICINE

## 2021-01-22 PROCEDURE — 85610 PROTHROMBIN TIME: CPT

## 2021-01-22 PROCEDURE — 31500 INSERT EMERGENCY AIRWAY: CPT

## 2021-01-22 PROCEDURE — 86140 C-REACTIVE PROTEIN: CPT

## 2021-01-22 PROCEDURE — 6360000002 HC RX W HCPCS: Performed by: STUDENT IN AN ORGANIZED HEALTH CARE EDUCATION/TRAINING PROGRAM

## 2021-01-22 PROCEDURE — 84100 ASSAY OF PHOSPHORUS: CPT

## 2021-01-22 PROCEDURE — 99233 SBSQ HOSP IP/OBS HIGH 50: CPT | Performed by: INTERNAL MEDICINE

## 2021-01-22 PROCEDURE — 94761 N-INVAS EAR/PLS OXIMETRY MLT: CPT

## 2021-01-22 PROCEDURE — 83735 ASSAY OF MAGNESIUM: CPT

## 2021-01-22 PROCEDURE — 2700000000 HC OXYGEN THERAPY PER DAY

## 2021-01-22 PROCEDURE — 2000000000 HC ICU R&B

## 2021-01-22 PROCEDURE — 80048 BASIC METABOLIC PNL TOTAL CA: CPT

## 2021-01-22 PROCEDURE — 6360000002 HC RX W HCPCS: Performed by: PHYSICIAN ASSISTANT

## 2021-01-22 PROCEDURE — 2500000003 HC RX 250 WO HCPCS: Performed by: NURSE PRACTITIONER

## 2021-01-22 PROCEDURE — 82962 GLUCOSE BLOOD TEST: CPT

## 2021-01-22 PROCEDURE — 85027 COMPLETE CBC AUTOMATED: CPT

## 2021-01-22 PROCEDURE — 51702 INSERT TEMP BLADDER CATH: CPT

## 2021-01-22 PROCEDURE — 84145 PROCALCITONIN (PCT): CPT

## 2021-01-22 PROCEDURE — 71045 X-RAY EXAM CHEST 1 VIEW: CPT

## 2021-01-22 PROCEDURE — 94640 AIRWAY INHALATION TREATMENT: CPT

## 2021-01-22 RX ORDER — INSULIN GLARGINE 100 [IU]/ML
35 INJECTION, SOLUTION SUBCUTANEOUS NIGHTLY
Status: DISCONTINUED | OUTPATIENT
Start: 2021-01-22 | End: 2021-01-23

## 2021-01-22 RX ORDER — WARFARIN SODIUM 5 MG/1
5 TABLET ORAL DAILY
Status: DISCONTINUED | OUTPATIENT
Start: 2021-01-23 | End: 2021-01-25

## 2021-01-22 RX ORDER — HYDRALAZINE HYDROCHLORIDE 20 MG/ML
10 INJECTION INTRAMUSCULAR; INTRAVENOUS EVERY 6 HOURS PRN
Status: DISCONTINUED | OUTPATIENT
Start: 2021-01-22 | End: 2021-02-01 | Stop reason: HOSPADM

## 2021-01-22 RX ORDER — FENTANYL CITRATE 50 UG/ML
25 INJECTION, SOLUTION INTRAMUSCULAR; INTRAVENOUS
Status: DISCONTINUED | OUTPATIENT
Start: 2021-01-22 | End: 2021-02-01 | Stop reason: HOSPADM

## 2021-01-22 RX ADMIN — FUROSEMIDE 40 MG: 10 INJECTION, SOLUTION INTRAMUSCULAR; INTRAVENOUS at 08:41

## 2021-01-22 RX ADMIN — FAMOTIDINE 20 MG: 10 INJECTION, SOLUTION INTRAVENOUS at 08:41

## 2021-01-22 RX ADMIN — Medication 2 PUFF: at 09:06

## 2021-01-22 RX ADMIN — FAMOTIDINE 20 MG: 10 INJECTION, SOLUTION INTRAVENOUS at 23:03

## 2021-01-22 RX ADMIN — ATORVASTATIN CALCIUM 40 MG: 40 TABLET, FILM COATED ORAL at 23:02

## 2021-01-22 RX ADMIN — ASPIRIN 81 MG CHEWABLE TABLET 81 MG: 81 TABLET CHEWABLE at 08:41

## 2021-01-22 RX ADMIN — TRAMADOL HYDROCHLORIDE 100 MG: 50 TABLET, FILM COATED ORAL at 12:18

## 2021-01-22 RX ADMIN — METOCLOPRAMIDE 10 MG: 5 INJECTION, SOLUTION INTRAMUSCULAR; INTRAVENOUS at 00:20

## 2021-01-22 RX ADMIN — SODIUM CHLORIDE, PRESERVATIVE FREE 10 ML: 5 INJECTION INTRAVENOUS at 08:43

## 2021-01-22 RX ADMIN — ALOGLIPTIN 12.5 MG: 12.5 TABLET, FILM COATED ORAL at 08:58

## 2021-01-22 RX ADMIN — FENTANYL CITRATE 25 MCG: 50 INJECTION INTRAMUSCULAR; INTRAVENOUS at 00:55

## 2021-01-22 RX ADMIN — BUPROPION HYDROCHLORIDE 300 MG: 150 TABLET, FILM COATED, EXTENDED RELEASE ORAL at 08:40

## 2021-01-22 RX ADMIN — ALBUTEROL SULFATE 2 PUFF: 90 AEROSOL, METERED RESPIRATORY (INHALATION) at 12:17

## 2021-01-22 RX ADMIN — FENTANYL CITRATE 25 MCG: 50 INJECTION INTRAMUSCULAR; INTRAVENOUS at 02:09

## 2021-01-22 RX ADMIN — METHYLPREDNISOLONE SODIUM SUCCINATE 40 MG: 40 INJECTION, POWDER, LYOPHILIZED, FOR SOLUTION INTRAMUSCULAR; INTRAVENOUS at 23:03

## 2021-01-22 RX ADMIN — ZINC SULFATE 220 MG (50 MG) CAPSULE 50 MG: CAPSULE at 08:40

## 2021-01-22 RX ADMIN — METHYLPREDNISOLONE SODIUM SUCCINATE 40 MG: 40 INJECTION, POWDER, LYOPHILIZED, FOR SOLUTION INTRAMUSCULAR; INTRAVENOUS at 05:32

## 2021-01-22 RX ADMIN — ALBUTEROL SULFATE 2 PUFF: 90 AEROSOL, METERED RESPIRATORY (INHALATION) at 21:22

## 2021-01-22 RX ADMIN — METOPROLOL TARTRATE 12.5 MG: 25 TABLET, FILM COATED ORAL at 08:40

## 2021-01-22 RX ADMIN — Medication 25 MCG/HR: at 15:12

## 2021-01-22 RX ADMIN — FENTANYL CITRATE 25 MCG: 50 INJECTION INTRAMUSCULAR; INTRAVENOUS at 05:33

## 2021-01-22 RX ADMIN — Medication 2 PUFF: at 16:41

## 2021-01-22 RX ADMIN — DICLOFENAC SODIUM 2 G: 10 GEL TOPICAL at 08:46

## 2021-01-22 RX ADMIN — CHLORHEXIDINE GLUCONATE 0.12% ORAL RINSE 15 ML: 1.2 LIQUID ORAL at 08:40

## 2021-01-22 RX ADMIN — ALBUTEROL SULFATE 2 PUFF: 90 AEROSOL, METERED RESPIRATORY (INHALATION) at 09:05

## 2021-01-22 RX ADMIN — INSULIN HUMAN 20 UNITS: 100 INJECTION, SOLUTION PARENTERAL at 00:20

## 2021-01-22 RX ADMIN — WARFARIN SODIUM 7 MG: 6 TABLET ORAL at 18:02

## 2021-01-22 RX ADMIN — ALBUTEROL SULFATE 2 PUFF: 90 AEROSOL, METERED RESPIRATORY (INHALATION) at 16:40

## 2021-01-22 RX ADMIN — SODIUM CHLORIDE, PRESERVATIVE FREE 10 ML: 5 INJECTION INTRAVENOUS at 23:54

## 2021-01-22 RX ADMIN — VANCOMYCIN HYDROCHLORIDE 1250 MG: 5 INJECTION, POWDER, LYOPHILIZED, FOR SOLUTION INTRAVENOUS at 12:18

## 2021-01-22 RX ADMIN — FENTANYL CITRATE 25 MCG: 50 INJECTION INTRAMUSCULAR; INTRAVENOUS at 12:39

## 2021-01-22 RX ADMIN — METHYLPREDNISOLONE SODIUM SUCCINATE 40 MG: 40 INJECTION, POWDER, LYOPHILIZED, FOR SOLUTION INTRAMUSCULAR; INTRAVENOUS at 15:52

## 2021-01-22 RX ADMIN — Medication 2 PUFF: at 12:18

## 2021-01-22 RX ADMIN — FENTANYL CITRATE 25 MCG: 50 INJECTION INTRAMUSCULAR; INTRAVENOUS at 08:41

## 2021-01-22 RX ADMIN — METOCLOPRAMIDE 10 MG: 5 INJECTION, SOLUTION INTRAMUSCULAR; INTRAVENOUS at 05:32

## 2021-01-22 RX ADMIN — ARIPIPRAZOLE 15 MG: 10 TABLET ORAL at 08:41

## 2021-01-22 RX ADMIN — METOCLOPRAMIDE 10 MG: 5 INJECTION, SOLUTION INTRAMUSCULAR; INTRAVENOUS at 20:47

## 2021-01-22 RX ADMIN — METOPROLOL TARTRATE 12.5 MG: 25 TABLET, FILM COATED ORAL at 23:03

## 2021-01-22 RX ADMIN — CHLORHEXIDINE GLUCONATE 0.12% ORAL RINSE 15 ML: 1.2 LIQUID ORAL at 23:03

## 2021-01-22 RX ADMIN — METOCLOPRAMIDE 10 MG: 5 INJECTION, SOLUTION INTRAMUSCULAR; INTRAVENOUS at 12:42

## 2021-01-22 RX ADMIN — HYDRALAZINE HYDROCHLORIDE 10 MG: 20 INJECTION, SOLUTION INTRAMUSCULAR; INTRAVENOUS at 10:31

## 2021-01-22 RX ADMIN — ESCITALOPRAM OXALATE 20 MG: 10 TABLET ORAL at 08:40

## 2021-01-22 RX ADMIN — Medication 2 PUFF: at 21:22

## 2021-01-22 RX ADMIN — DICLOFENAC SODIUM 2 G: 10 GEL TOPICAL at 23:55

## 2021-01-22 ASSESSMENT — PULMONARY FUNCTION TESTS
PIF_VALUE: 11
PIF_VALUE: 19
PIF_VALUE: 24
PIF_VALUE: 20
PIF_VALUE: 26
PIF_VALUE: 15
PIF_VALUE: 24
PIF_VALUE: 23
PIF_VALUE: 24
PIF_VALUE: 9
PIF_VALUE: 23
PIF_VALUE: 24
PIF_VALUE: 14
PIF_VALUE: 26
PIF_VALUE: 23
PIF_VALUE: 23
PIF_VALUE: 19
PIF_VALUE: 9
PIF_VALUE: 26
PIF_VALUE: 14
PIF_VALUE: 23
PIF_VALUE: 25
PIF_VALUE: 21
PIF_VALUE: 23

## 2021-01-22 ASSESSMENT — PAIN SCALES - GENERAL
PAINLEVEL_OUTOF10: 5
PAINLEVEL_OUTOF10: 2

## 2021-01-22 NOTE — PROGRESS NOTES
RRT bissed and bagged pt on the vent. Suctioned stringy creamy white with a tinge of bloody secretions. Pt tolerating well. RRT increased the pt's O2 to 65%. SaO2 o2% on 65%.

## 2021-01-22 NOTE — PROGRESS NOTES
PHARMACY ANTICOAGULATION MONITORING SERVICE    Jaclyn Narvaez is a 77 y.o. male on warfarin therapy for history of DVT. Pharmacy consulted by Dr. Bryanna Patel for monitoring and adjustment of treatment. Indication for anticoagulation: Hx of DVT  INR goal: 2-3  Warfarin dose prior to admission: 5 mg daily    Pertinent Laboratory Values   Recent Labs     01/20/21  1545 01/21/21  0505 01/22/21  0530   INR  --  2.13 1.61   HGB 9.3* 9.4* 9.9*   HCT 29.0* 30.0* 31.3*    280 263       Assessment/Plan:  ? Possible DDI's:   o Aspirin (home med), can increase bleeding risk, clinically appropriate  o Escitalopram (home med), can increase bleeding risk, appropriate to continue while inpatient  o Enoxaparin bridge stopped with therapeutic INR yesterday; consider resuming now that INR trending down  o Tramadol and dexamethasone can increase effects of warfarin  ? INR is now sub-therapeutic following x2 days of 2.5 mg daily doses  ? Give a boosted dose of 7 mg tonight and then continue 5 mg daily thereafter  ? Additional dose adjustments to be made per INR trends, interacting medications, and other clinical factors  ? Pharmacy will continue to monitor and adjust warfarin therapy as indicated.     Thank you for the consult,  Jass Garcia, [address]  1/22/2021 4:08 PM

## 2021-01-22 NOTE — PLAN OF CARE
Remains vented, tubefeed at goal and tolerating, sedation on hold due to not following commands to allow for clearing of medication for accurate neuro eval. PRN pain medication given.

## 2021-01-22 NOTE — PROGRESS NOTES
01/22/21 0454   Vent Information   Vent Type 840   Vent Mode AC/PC   Vt Ordered 0 mL   Pressure Ordered 18   Rate Set 20 bmp   Peak Flow 0 L/min   Pressure Support 0 cmH20   FiO2  65 %   SpO2 (!) 88 %   SpO2/FiO2 ratio 135.38   Sensitivity 0   PEEP/CPAP 5   I Time/ I Time % 1 s   Humidification Source HME   Nitric Oxide/Epoprostenol In Use? No   Vent Patient Data   High Peep/I Pressure 18   Peak Inspiratory Pressure 24 cmH2O   Mean Airway Pressure 13 cmH20   Rate Measured 22 br/min   Vt Exhaled 774 mL   Minute Volume 12.3 Liters   I:E Ratio 1:1.50   Spontaneous Breathing Trial (SBT) RT Doc   Pulse 80   Additional Respiratory  Assessments   Resp 24   Position Semi-Thorpe's   Oral Care Completed? No   Alarm Settings   High Pressure Alarm 40 cmH2O   Delay Alarm 20 sec(s)   Low Minute Volume Alarm 2.5 L/min   Apnea (secs) 20 secs   High Respiratory Rate 40 br/min   Low Exhaled Vt  250 mL   Patient Observation   Observations Pt resting in bed   ETT (adult)   Placement Date/Time: 01/13/21 (c) 8834   Preoxygenation: Yes  Mask Ventilation: Ventilated by mask (1)  Technique: Video laryngoscopy  Tube Size: 7.5 mm  Location: Oral  Grade View: Full view of the glottis  Insertion attempts: 1  Placement Verified B. ..    Secured at 24 cm   Measured From Lips   ET Placement Right   Secured By Commercial tube brewer   Site Condition Dry

## 2021-01-22 NOTE — PROGRESS NOTES
Pulmonary and Critical Care  Progress Note    Subjective: The patient is unchanged. Shortness of breath none. Chest pain none. Addressing respiratory complaints Patient is negative for  hemoptysis and cyanosis  CONSTITUTIONAL:  negative for fevers and chills.       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, Anemia, Arm fracture, Arthritis, Cataract, Cataract, Cellulitis of left lower leg, Chronic venous hypertension with ulcer ([facility] [address] CKD (chronic kidney disease), Colon polyps, COPD (chronic obstructive pulmonary disease) ([facility] [address] Diabetes mellitus ([facility] [address] [facility] Diabetes mellitus ([facility] [address] [facility] Diabetes mellitus with peripheral circulatory disorder ([facility] [address] Diabetes mellitus with skin ulcer ([facility] [address] [facility] Diabetic peripheral neuropathy ([facility] [address] Diabetic skin ulcer associated with type 2 diabetes mellitus ([facility] [address] Diverticulosis, Femoral DVT (deep venous thrombosis) ([facility] [address] GERD (gastroesophageal reflux disease), Glaucoma, H/O cardiac catheterization, H/O Doppler ultrasound, H/O echocardiogram, H/O mumps orchitis, Hemorrhoids, History of nuclear stress test, HLD (hyperlipidemia), Hx of Doppler ultrasound, Hyperlipemia, Hyperlipidemia, Hypertension, Hypertension, Idiopathic chronic venous hypertension of left lower extremity with ulcer and inflammation (HCC), Leg ulcer ([address] No diabetic retinopathy OU, Non-pressure chronic ulcer of left lower leg with fat layer exposed ([facility] [address] Pulmonary embolism ([facility] [address] Sleep apnea, SOB (shortness of breath), Tendinitis, Type II or unspecified type diabetes mellitus with other specified manifestations, not stated as uncontrolled, Unspecified venous (peripheral) insufficiency, Urticaria, WD-Cellulitis of right anterior lower leg, WD-Cellulitis of right lower extremity, WD-Chronic venous hypertension (idiopathic) with ulcer of left lower extremity (CODE) ([facility] [address] WD-Chronic venous hypertension with inflammation, right, WD-Decubitus ulcer of left buttock, stage 3 ([address] 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 to breakdown of skin ([address] WD-Open wound of hand without complication, left, initial encounter, WD-Pressure injury of left buttock, stage 2 ([address] WD-Pressure injury of left buttock, stage 3 ([address] WD-Pressure Net -596 ml       CONSTITUTIONAL:  Somnolent. LUNGS:  decreased breath sounds, basilar crackles. CARDIOVASCULAR:  normal S1 and S2 and negative JVD  ABD:Abdomen soft, non-tender. BS normal. No masses,  No organomegaly  NEURO:Sedated on vent. DATA:    CBC:  Recent Labs     01/20/21  1545 01/21/21  0505 01/22/21  0530   WBC 10.0 10.6* 13.1*   RBC 3.19* 3.24* 3.41*   HGB 9.3* 9.4* 9.9*   HCT 29.0* 30.0* 31.3*    280 263   MCV 90.9 92.6 91.8   MCH 29.2 29.0 29.0   MCHC 32.1 31.3* 31.6*   RDW 18.2* 18.5* 18.3*      BMP:  Recent Labs     01/20/21  0610 01/21/21  0505 01/22/21  0530    147* 147*   K 5.0 5.0 4.9    112* 112*   CO2 24 26 26   BUN 73* 78* 79*   CREATININE 1.4* 1.2 1.2   CALCIUM 7.0* 7.4* 7.5*   GLUCOSE 480* 240* 67*      ABG:  Recent Labs     01/20/21  0700 01/21/21  0600 01/22/21  0600   PH 7.52* 7.46* 7.42   PO2ART 98 174* 67*   ICV9JWE 34.0 38.0 45.0   O2SAT 96.4 96.5 92.0*     Lab Results   Component Value Date    PROBNP 5,973 (H) 01/14/2021    PROBNP 1,642 (H) 01/12/2021    PROBNP 1,196 (H) 11/11/2020     No results found for: CULTRESP    Radiology Review:  Pertinent images / reports were reviewed as a part of this visit.     Assessment:     Patient Active Problem List   Diagnosis    Gastroesophageal reflux disease without esophagitis    Hypertension in stage 3 chronic kidney disease due to type 2 diabetes mellitus ([facility] [address]    DM (diabetes mellitus) type II, controlled, with peripheral vascular disorder ([facility] [address]    Combined hyperlipidemia associated with type 2 diabetes mellitus ([facility] [address]    Diabetic skin ulcer associated with type 2 diabetes mellitus (HCC)    CKD (chronic kidney disease)    History of DVT (deep vein thrombosis)- left femoral    History of pulmonary embolism    Long term current use of anticoagulants with INR goal of 2.0-3.0    COPD (chronic obstructive pulmonary disease) (HCC)    Severe recurrent major depressive disorder with psychotic features ([facility] [address]  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)    Venous (peripheral) insufficiency    Uncontrolled secondary diabetes mellitus with stage 3 CKD (GFR 30-59) (HCC)    Diabetes mellitus with skin ulcer (HCC)    WD-Ulcer of shin, left, with fat layer exposed ([address]    History of colon polyps    Personal history of PE (pulmonary embolism)    WD-Chronic venous hypertension (idiopathic) with ulcer of left lower extremity (CODE) ([address]    WD-Chronic venous hypertension with inflammation, right    Troponin I above reference range    KIRSTEN (acute kidney injury) ([address]    Cellulitis of lower extremity    Hyponatremia    Generalized weakness    Weakness    Multifocal pneumonia    Pneumonia due to COVID-19 virus    Hyperkalemia    Acute respiratory failure with hypoxia ([address]       Plan:   1. Wean sedation. 2. Wean FiO2.   Luis Angel Alvarado MD  1/22/2021  12:00 PM

## 2021-01-22 NOTE — PROGRESS NOTES
Result Value Ref Range    POC Glucose 168 (H) 70 - 99 MG/DL   POCT Glucose    Collection Time: 01/21/21 11:05 PM   Result Value Ref Range    POC Glucose 178 (H) 70 - 99 MG/DL   Basic metabolic panel    Collection Time: 01/22/21  5:30 AM   Result Value Ref Range    Sodium 147 (H) 135 - 145 MMOL/L    Potassium 4.9 3.5 - 5.1 MMOL/L    Chloride 112 (H) 99 - 110 mMol/L    CO2 26 21 - 32 MMOL/L    Anion Gap 9 4 - 16    BUN 79 (H) 6 - 23 MG/DL    CREATININE 1.2 0.9 - 1.3 MG/DL    Glucose 67 (L) 70 - 99 MG/DL    Calcium 7.5 (L) 8.3 - 10.6 MG/DL    GFR Non- 59 (L) >60 mL/min/1.73m2    GFR African American >60 >60 mL/min/1.73m2   CBC    Collection Time: 01/22/21  5:30 AM   Result Value Ref Range    WBC 13.1 (H) 4.0 - 10.5 K/CU MM    RBC 3.41 (L) 4.6 - 6.2 M/CU MM    Hemoglobin 9.9 (L) 13.5 - 18.0 GM/DL    Hematocrit 31.3 (L) 42 - 52 %    MCV 91.8 78 - 100 FL    MCH 29.0 27 - 31 PG    MCHC 31.6 (L) 32.0 - 36.0 %    RDW 18.3 (H) 11.7 - 14.9 %    Platelets 248 147 - 117 K/CU MM    MPV 12.8 (H) 7.5 - 11.1 FL   D-dimer, quantitative    Collection Time: 01/22/21  5:30 AM   Result Value Ref Range    D-Dimer, Quant 1744 (H) <230 NG/mL(DDU)   Procalcitonin    Collection Time: 01/22/21  5:30 AM   Result Value Ref Range    Procalcitonin 0.234    Protime-INR    Collection Time: 01/22/21  5:30 AM   Result Value Ref Range    Protime 19.6 (H) 11.7 - 14.5 SECONDS    INR 1.61 INDEX   Magnesium    Collection Time: 01/22/21  5:30 AM   Result Value Ref Range    Magnesium 2.3 1.8 - 2.4 mg/dl   Phosphorus    Collection Time: 01/22/21  5:30 AM   Result Value Ref Range    Phosphorus 3.8 2.5 - 4.9 MG/DL   High sensitivity CRP    Collection Time: 01/22/21  5:30 AM   Result Value Ref Range    CRP High Sensitivity 42.1 (H) <8.5 mg/L   POCT Glucose    Collection Time: 01/22/21  5:41 AM   Result Value Ref Range    POC Glucose 74 70 - 99 MG/DL   Blood Gas, Arterial    Collection Time: 01/22/21  6:00 AM   Result Value Ref Range  WD-Chronic venous hypertension with inflammation, right    Troponin I above reference range    KIRSTEN (acute kidney injury) [facility] [address] [facility]    Cellulitis of lower extremity    Hyponatremia    Generalized weakness    Weakness    Multifocal pneumonia    Pneumonia due to COVID-19 virus    Hyperkalemia    Acute respiratory failure with hypoxia (HCC)       Active Problems  Active Problems:    Pneumonia due to COVID-19 virus    Hyperkalemia    Acute respiratory failure with hypoxia (HCC)  Resolved Problems:    * No resolved hospital problems. *      Impression and plan  ? Summary and rationale: Patient is a 77 y.o.  male T2DM, hyperlipidemia, htn, depression, morbid obesity who was admitted 1/13/2021 for further evaluation and management of shortness of breath that started on 1/8/2021 and positive COVID test. Has critical COVID-19 pneumonia, in hyperinflammatory phase  ? Clinical status: remains severe, increase in FiO2, CRP is down,  MRSA nares positive. On vancomycin. IL-6; 20.7, Aspergillus Ag,   ? Therapeutic:  o Ongoing antibiotics: vancomycin,   o Antiviral agent: remdesivir 1/13-1/16  o Anti-inflammatory agents:  ? Dexamethasone:1/12-19  ? Solu-Medrol: 1/17. 1/19-  o Completed antibiotics:   o Convalescent plasma receipt:  o Other agents:   ? Diagnostic:cefepime  ? F/u: Blood cx 1/19, 0/2 ngtd; Aspergillus Ag, BDG, IL-6  ?  Other:      Electronically signed by: Electronically signed by Narcisa Ivy MD on 1/22/2021 at 1:19 PM

## 2021-01-22 NOTE — PROGRESS NOTES
Nephrology Progress Note        [address], [address] [address]  Phone: [phone]  Office Hours: 8:30AM  4:30PM  Monday - Friday 1/22/2021 8:10 AM  Subjective:   Admit Date: 1/13/2021  PCP: Mamadou Sousa MD  Interval History: nonoliguric  Intubated     Diet: DIET TUBE FEED CONTINUOUS/CYCLIC NPO; Low Calorie High Protein (Vital HP);  Nasogastric; Continuous; 10; 55; 24      Data:   Scheduled Meds:   insulin glargine  35 Units Subcutaneous Nightly    insulin NPH  25 Units Subcutaneous QAM    insulin regular  0-30 Units Subcutaneous Q6H    insulin regular  10 Units Subcutaneous Once    furosemide  40 mg Intravenous Daily    vancomycin  1,250 mg Intravenous Q24H    insulin regular  20 Units Subcutaneous 4 times per day    warfarin  2.5 mg Oral Daily    methylPREDNISolone  40 mg Intravenous Q8H    metoclopramide  10 mg Intravenous Q6H    alogliptin  12.5 mg Oral Daily    ARIPiprazole  15 mg Oral Daily    aspirin  81 mg Oral Daily    atorvastatin  40 mg Oral Nightly    buPROPion  300 mg Oral QAM    escitalopram  20 mg Oral Daily    metoprolol tartrate  12.5 mg Oral BID    sodium chloride flush  10 mL Intravenous 2 times per day    diclofenac sodium  2 g Topical BID    zinc sulfate  50 mg Oral Daily    albuterol sulfate HFA  2 puff Inhalation 4x daily    ipratropium  2 puff Inhalation 4x daily    chlorhexidine  15 mL Mouth/Throat BID    famotidine (PEPCID) injection  20 mg Intravenous BID     Continuous Infusions:   midazolam Stopped (01/21/21 1411)    dextrose 100 mL/hr (01/21/21 1214)    cisatracurium (NIMBEX) infusion Stopped (01/18/21 1612)    norepinephrine Stopped (01/20/21 1446)    fentaNYL Stopped (01/21/21 1411)    dextrose      sodium chloride PRN Meds:fentanNYL, glucose, dextrose, glucagon (rDNA), dextrose, microfibrillar collagen, polyvinyl alcohol **AND** artificial tears, sodium chloride flush, promethazine **OR** ondansetron, polyethylene glycol, acetaminophen **OR** acetaminophen, glucose, dextrose, glucagon (rDNA), dextrose, traMADol **OR** traMADol, diphenhydrAMINE, sodium chloride, sodium chloride  I/O last 3 completed shifts: In: 1699 [I.V.:1000; NG/GT:1554]  Out: 2450 [Urine:2450]  No intake/output data recorded. Intake/Output Summary (Last 24 hours) at 1/22/2021 0810  Last data filed at 1/22/2021 0600  Gross per 24 hour   Intake 2554 ml   Output 2450 ml   Net 104 ml       CBC:   Recent Labs     01/20/21  1545 01/21/21  0505 01/22/21  0530   WBC 10.0 10.6* 13.1*   HGB 9.3* 9.4* 9.9*    280 263       BMP:    Recent Labs     01/20/21  0610 01/21/21  0505 01/22/21  0530    147* 147*   K 5.0 5.0 4.9    112* 112*   CO2 24 26 26   BUN 73* 78* 79*   CREATININE 1.4* 1.2 1.2   GLUCOSE 480* 240* 67*     Hepatic:   Recent Labs     01/20/21  0610   AST 17   ALT 15   BILITOT 0.3   ALKPHOS 68     Troponin: No results for input(s): TROPONINI in the last 72 hours. BNP: No results for input(s): BNP in the last 72 hours. Lipids: No results for input(s): CHOL, HDL in the last 72 hours.     Invalid input(s): LDLCALCU  ABGs:   Lab Results   Component Value Date    PO2ART 67 01/22/2021    SQX2YZT 45.0 01/22/2021     INR:   Recent Labs     01/20/21  0610 01/21/21  0505 01/22/21  0530   INR 2.72 2.13 1.61       Objective:   Vitals: BP (!) 147/69   Pulse 77   Temp 98.3 °F (36.8 °C) (Rectal)   Resp 20   Ht 6' 5.01\" (1.956 m)   Wt 274 lb 14.6 oz (124.7 kg)   SpO2 91%   BMI 32.59 kg/m²   General appearance:  in no acute distress  HEENT: normocephalic, atraumatic, ET Tube in place  Neck: supple, trachea midline  Lungs: , breathing comfortably on mv,   Heart[de-identified] regular rate and rhythm, S1, S2 normal,  Abdomen: soft, non distended, Extremities: extremities atraumatic, no cyanosis or edema  Neurologic: sedated    Assessment and Plan:     Patient Active Problem List   Diagnosis    Gastroesophageal reflux disease without esophagitis    Hypertension in stage 3 chronic kidney disease due to type 2 diabetes mellitus ([address]    DM (diabetes mellitus) type II, controlled, with peripheral vascular disorder (HCC)    Combined hyperlipidemia associated with type 2 diabetes mellitus ([address]    Diabetic skin ulcer associated with type 2 diabetes mellitus (HCC)    CKD (chronic kidney disease)    History of DVT (deep vein thrombosis)- left femoral    History of pulmonary embolism    Long term current use of anticoagulants with INR goal of 2.0-3.0    COPD (chronic obstructive pulmonary disease) (HCC)    Severe recurrent major depressive disorder with psychotic features (HCC)    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)    Venous (peripheral) insufficiency    Uncontrolled secondary diabetes mellitus with stage 3 CKD (GFR 30-59) (HCC)    Diabetes mellitus with skin ulcer ([address]    WD-Ulcer of shin, left, with fat layer exposed ([address]    History of colon polyps    Personal history of PE (pulmonary embolism)    WD-Chronic venous hypertension (idiopathic) with ulcer of left lower extremity (CODE) ([address]    WD-Chronic venous hypertension with inflammation, right    Troponin I above reference range    KIRSTEN (acute kidney injury) ([address]    Cellulitis of lower extremity    Hyponatremia    Generalized weakness    Weakness    Multifocal pneumonia    Pneumonia due to COVID-19 virus   hyperkalemia: K of 6//resolved with medical therapy  KIRSTEN  BLE cellulitis  Acute hypoxic resp failure from COVID 19  hypernatremia     Plan:  Cr better and stable  Start free water 150ml every 4hrs as the serum sodium remains elevated  Avoid nephrotoxins and renally dose meds  Critically ill             Electronically signed by Fred Carson DO on 1/22/2021 at 8:10 AM    [address],  [facility]  [place], [place] [address]  PHONE: [phone]  FAX: [phone]

## 2021-01-22 NOTE — PROGRESS NOTES
Hospitalist Progress Note      Name:  Jose M Watkins /Age/Sex: 1945  [de-identified]77 y.o. male)   MRN & CSN:  [id number] & [id number] Admission Date/Time: 2021 12:17 AM   Location:  -A PCP: Kayla Villaseñor MD         Hospital Day: 10    Assessment and Plan:   Jose M Watkins is a 77 y.o.  male  who presents with shortness of breath, was transferred from [facility] to for management of COVID-19 pneumonia    · Acute on chronic hypoxic respiratory failure due to COVID-19 pneumonia, possible hospital-acquired pneumonia  · Septic shock due to COVID-19 pneumonia  -Leucocytosis, mild improvement  -Covid positive   -MRSA positive  -Blood cultures negative to date  -On steroids   -Remdesivir   -S/p convalescent plasma 2 units 2021  -On pressors  -Ventilator, management per pulmonology  -On IV vancomycin, cefepime   -Pulmo on board  -ID, continue vancomycin and cefepime  Sedation vacation    · Right lower extremity cellulitis  On vancomycin  · Diabetes mellitus type 2-poor control  Check A1c, start on sliding scale, Lantus,  endocrine    · Hyperkalemia -resolved    · KIRSTEN, likely due to ATN  On Lasix, with good urine output, nephro on board    · Moderate PEM  Dietitian on board, on tube feedings    Chronic medical condition  Hyperlipidemia, on statin  Hypertension  Morbid obesity, encourage weight loss and exercise  Lower extremity DVT, on Coumadin, INR therapeutic  Chronic respiratory failure at 3 L of O2    Prognosis guarded    Diet DIET TUBE FEED CONTINUOUS/CYCLIC NPO; Low Calorie High Protein (Vital HP);  Nasogastric; Continuous; 10; 55; 24   DVT Prophylaxis [] Lovenox, []  Heparin, [] SCDs, []No VTE prophylaxis, patient ambulating   GI Prophylaxis [] PPI, [] H2 Blocker, [] No GI prophylaxis, patient is receiving diet/Tube Feeds   Code Status Full Code   Disposition Patient requires continued admission due to   MDM [] Low, [] Moderate,[]  High  atorvastatin  40 mg Oral Nightly    buPROPion  300 mg Oral QAM    escitalopram  20 mg Oral Daily    metoprolol tartrate  12.5 mg Oral BID    sodium chloride flush  10 mL Intravenous 2 times per day    diclofenac sodium  2 g Topical BID    zinc sulfate  50 mg Oral Daily    albuterol sulfate HFA  2 puff Inhalation 4x daily    ipratropium  2 puff Inhalation 4x daily    chlorhexidine  15 mL Mouth/Throat BID    famotidine (PEPCID) injection  20 mg Intravenous BID      Infusions:    midazolam Stopped (01/21/21 1411)    dextrose 100 mL/hr (01/21/21 1214)    cisatracurium (NIMBEX) infusion Stopped (01/18/21 1612)    norepinephrine Stopped (01/20/21 1446)    fentaNYL Stopped (01/21/21 1411)    dextrose      sodium chloride       PRN Meds:     fentanNYL, 25 mcg, Q1H PRN      hydrALAZINE, 10 mg, Q6H PRN      glucose, 15 g, PRN      dextrose, 12.5 g, PRN      glucagon (rDNA), 1 mg, PRN      dextrose, 100 mL/hr, PRN      microfibrillar collagen, , PRN      polyvinyl alcohol, 1 drop, Q4H PRN    And      artificial tears, , PRN      sodium chloride flush, 10 mL, PRN      promethazine, 12.5 mg, Q6H PRN    Or      ondansetron, 4 mg, Q6H PRN      polyethylene glycol, 17 g, Daily PRN      acetaminophen, 650 mg, Q6H PRN    Or      acetaminophen, 650 mg, Q6H PRN      glucose, 15 g, PRN      dextrose, 12.5 g, PRN      glucagon (rDNA), 1 mg, PRN      dextrose, 100 mL/hr, PRN      traMADol, 50 mg, Q6H PRN    Or      traMADol, 100 mg, Q6H PRN      diphenhydrAMINE, 25 mg, Q6H PRN      sodium chloride, , PRN      sodium chloride, 30 mL, PRN        Data    Recent Labs     01/20/21  1545 01/21/21  0505 01/22/21  0530   WBC 10.0 10.6* 13.1*   HGB 9.3* 9.4* 9.9*   HCT 29.0* 30.0* 31.3*    280 263      Recent Labs     01/20/21  0610 01/21/21  0505 01/22/21  0530    147* 147*   K 5.0 5.0 4.9    112* 112*   CO2 24 26 26   PHOS 3.5 3.6 3.8   BUN 73* 78* 79*   CREATININE 1.4* 1.2 1.2 Recent Labs     01/20/21  0610   AST 17   ALT 15   BILIDIR 0.2   BILITOT 0.3   ALKPHOS 68     Recent Labs     01/20/21  0610 01/21/21  0505 01/22/21  0530   INR 2.72 2.13 1.61     No results for input(s): CKTOTAL, CKMB, CKMBINDEX, TROPONINI in the last 72 hours.         Electronically signed by Emi Sierra MD on 1/22/2021 at 11:21 AM [] : Resident

## 2021-01-22 NOTE — CARE COORDINATION
Patient on Vent on COVID unit . Case Management to follow to continue to assist with discharge plan/needs.

## 2021-01-23 ENCOUNTER — APPOINTMENT (OUTPATIENT)
Dept: GENERAL RADIOLOGY | Age: 76
DRG: 870 | End: 2021-01-23
Attending: INTERNAL MEDICINE
Payer: MEDICARE

## 2021-01-23 LAB
ANION GAP SERPL CALCULATED.3IONS-SCNC: 9 MMOL/L (ref 4–16)
BASE EXCESS MIXED: 4.5 (ref 0–1.2)
BUN BLDV-MCNC: 71 MG/DL (ref 6–23)
CALCIUM SERPL-MCNC: 7.5 MG/DL (ref 8.3–10.6)
CARBON MONOXIDE, BLOOD: 2.5 % (ref 0–5)
CHLORIDE BLD-SCNC: 107 MMOL/L (ref 99–110)
CO2 CONTENT: 31.2 MMOL/L (ref 19–24)
CO2: 26 MMOL/L (ref 21–32)
COMMENT: ABNORMAL
CREAT SERPL-MCNC: 1 MG/DL (ref 0.9–1.3)
D DIMER: 4784 NG/ML(DDU)
DOSE AMOUNT: NORMAL
DOSE TIME: NORMAL
GFR AFRICAN AMERICAN: >60 ML/MIN/1.73M2
GFR NON-AFRICAN AMERICAN: >60 ML/MIN/1.73M2
GLUCOSE BLD-MCNC: 115 MG/DL (ref 70–99)
GLUCOSE BLD-MCNC: 135 MG/DL (ref 70–99)
GLUCOSE BLD-MCNC: 169 MG/DL (ref 70–99)
GLUCOSE BLD-MCNC: 179 MG/DL (ref 70–99)
GLUCOSE BLD-MCNC: 191 MG/DL (ref 70–99)
GLUCOSE BLD-MCNC: 229 MG/DL (ref 70–99)
GLUCOSE BLD-MCNC: 230 MG/DL (ref 70–99)
GLUCOSE BLD-MCNC: 267 MG/DL (ref 70–99)
GLUCOSE BLD-MCNC: 274 MG/DL (ref 70–99)
HCO3 ARTERIAL: 29.8 MMOL/L (ref 18–23)
HCT VFR BLD CALC: 31.2 % (ref 42–52)
HEMOGLOBIN: 9.7 GM/DL (ref 13.5–18)
HIGH SENSITIVE C-REACTIVE PROTEIN: 105.3 MG/L
INR BLD: 1.28 INDEX
MCH RBC QN AUTO: 29.1 PG (ref 27–31)
MCHC RBC AUTO-ENTMCNC: 31.1 % (ref 32–36)
MCV RBC AUTO: 93.7 FL (ref 78–100)
METHEMOGLOBIN ARTERIAL: 1.3 %
O2 SATURATION: 93.3 % (ref 96–97)
PCO2 ARTERIAL: 46 MMHG (ref 32–45)
PDW BLD-RTO: 18.2 % (ref 11.7–14.9)
PH BLOOD: 7.42 (ref 7.34–7.45)
PLATELET # BLD: 207 K/CU MM (ref 140–440)
PMV BLD AUTO: 13 FL (ref 7.5–11.1)
PO2 ARTERIAL: 74 MMHG (ref 75–100)
POTASSIUM SERPL-SCNC: 5.6 MMOL/L (ref 3.5–5.1)
PROCALCITONIN: 0.2
PROTHROMBIN TIME: 15.5 SECONDS (ref 11.7–14.5)
RBC # BLD: 3.33 M/CU MM (ref 4.6–6.2)
SODIUM BLD-SCNC: 142 MMOL/L (ref 135–145)
VANCOMYCIN TROUGH: 17.7 UG/ML (ref 10–20)
WBC # BLD: 15.9 K/CU MM (ref 4–10.5)

## 2021-01-23 PROCEDURE — 2500000003 HC RX 250 WO HCPCS: Performed by: INTERNAL MEDICINE

## 2021-01-23 PROCEDURE — 99233 SBSQ HOSP IP/OBS HIGH 50: CPT | Performed by: INTERNAL MEDICINE

## 2021-01-23 PROCEDURE — 2500000003 HC RX 250 WO HCPCS: Performed by: NURSE PRACTITIONER

## 2021-01-23 PROCEDURE — 6360000002 HC RX W HCPCS: Performed by: INTERNAL MEDICINE

## 2021-01-23 PROCEDURE — 87077 CULTURE AEROBIC IDENTIFY: CPT

## 2021-01-23 PROCEDURE — 6370000000 HC RX 637 (ALT 250 FOR IP): Performed by: INTERNAL MEDICINE

## 2021-01-23 PROCEDURE — XW033N5 INTRODUCTION OF MEROPENEM-VABORBACTAM ANTI-INFECTIVE INTO PERIPHERAL VEIN, PERCUTANEOUS APPROACH, NEW TECHNOLOGY GROUP 5: ICD-10-PCS | Performed by: INTERNAL MEDICINE

## 2021-01-23 PROCEDURE — 80202 ASSAY OF VANCOMYCIN: CPT

## 2021-01-23 PROCEDURE — 2000000000 HC ICU R&B

## 2021-01-23 PROCEDURE — 2580000003 HC RX 258: Performed by: INTERNAL MEDICINE

## 2021-01-23 PROCEDURE — 71045 X-RAY EXAM CHEST 1 VIEW: CPT

## 2021-01-23 PROCEDURE — 80048 BASIC METABOLIC PNL TOTAL CA: CPT

## 2021-01-23 PROCEDURE — 82803 BLOOD GASES ANY COMBINATION: CPT

## 2021-01-23 PROCEDURE — 82962 GLUCOSE BLOOD TEST: CPT

## 2021-01-23 PROCEDURE — 6370000000 HC RX 637 (ALT 250 FOR IP): Performed by: NURSE PRACTITIONER

## 2021-01-23 PROCEDURE — 85610 PROTHROMBIN TIME: CPT

## 2021-01-23 PROCEDURE — 6360000002 HC RX W HCPCS: Performed by: STUDENT IN AN ORGANIZED HEALTH CARE EDUCATION/TRAINING PROGRAM

## 2021-01-23 PROCEDURE — 94761 N-INVAS EAR/PLS OXIMETRY MLT: CPT

## 2021-01-23 PROCEDURE — 94003 VENT MGMT INPAT SUBQ DAY: CPT

## 2021-01-23 PROCEDURE — 87205 SMEAR GRAM STAIN: CPT

## 2021-01-23 PROCEDURE — 83520 IMMUNOASSAY QUANT NOS NONAB: CPT

## 2021-01-23 PROCEDURE — 31500 INSERT EMERGENCY AIRWAY: CPT

## 2021-01-23 PROCEDURE — 89220 SPUTUM SPECIMEN COLLECTION: CPT

## 2021-01-23 PROCEDURE — 87147 CULTURE TYPE IMMUNOLOGIC: CPT

## 2021-01-23 PROCEDURE — 85027 COMPLETE CBC AUTOMATED: CPT

## 2021-01-23 PROCEDURE — 36556 INSERT NON-TUNNEL CV CATH: CPT

## 2021-01-23 PROCEDURE — 85379 FIBRIN DEGRADATION QUANT: CPT

## 2021-01-23 PROCEDURE — 84145 PROCALCITONIN (PCT): CPT

## 2021-01-23 PROCEDURE — 94640 AIRWAY INHALATION TREATMENT: CPT

## 2021-01-23 PROCEDURE — 2700000000 HC OXYGEN THERAPY PER DAY

## 2021-01-23 PROCEDURE — 99232 SBSQ HOSP IP/OBS MODERATE 35: CPT | Performed by: INTERNAL MEDICINE

## 2021-01-23 PROCEDURE — 87186 SC STD MICRODIL/AGAR DIL: CPT

## 2021-01-23 PROCEDURE — 36620 INSERTION CATHETER ARTERY: CPT

## 2021-01-23 PROCEDURE — 87070 CULTURE OTHR SPECIMN AEROBIC: CPT

## 2021-01-23 PROCEDURE — 86141 C-REACTIVE PROTEIN HS: CPT

## 2021-01-23 PROCEDURE — 37799 UNLISTED PX VASCULAR SURGERY: CPT

## 2021-01-23 RX ORDER — SODIUM CHLORIDE 9 MG/ML
INJECTION, SOLUTION INTRAVENOUS CONTINUOUS
Status: DISPENSED | OUTPATIENT
Start: 2021-01-23 | End: 2021-01-23

## 2021-01-23 RX ORDER — MINOCYCLINE HYDROCHLORIDE 100 MG/1
100 CAPSULE ORAL 2 TIMES DAILY
Status: DISCONTINUED | OUTPATIENT
Start: 2021-01-23 | End: 2021-01-26

## 2021-01-23 RX ORDER — FUROSEMIDE 10 MG/ML
40 INJECTION INTRAMUSCULAR; INTRAVENOUS
Status: COMPLETED | OUTPATIENT
Start: 2021-01-25 | End: 2021-01-29

## 2021-01-23 RX ORDER — INSULIN GLARGINE 100 [IU]/ML
40 INJECTION, SOLUTION SUBCUTANEOUS NIGHTLY
Status: DISCONTINUED | OUTPATIENT
Start: 2021-01-23 | End: 2021-01-25

## 2021-01-23 RX ADMIN — INSULIN HUMAN 5 UNITS: 100 INJECTION, SOLUTION PARENTERAL at 23:40

## 2021-01-23 RX ADMIN — SODIUM ZIRCONIUM CYCLOSILICATE 10 G: 10 POWDER, FOR SUSPENSION ORAL at 21:21

## 2021-01-23 RX ADMIN — CHLORHEXIDINE GLUCONATE 0.12% ORAL RINSE 15 ML: 1.2 LIQUID ORAL at 08:09

## 2021-01-23 RX ADMIN — METOCLOPRAMIDE 10 MG: 5 INJECTION, SOLUTION INTRAMUSCULAR; INTRAVENOUS at 08:37

## 2021-01-23 RX ADMIN — ALOGLIPTIN 12.5 MG: 12.5 TABLET, FILM COATED ORAL at 08:09

## 2021-01-23 RX ADMIN — MIDAZOLAM HYDROCHLORIDE 3 MG/HR: 5 INJECTION, SOLUTION INTRAMUSCULAR; INTRAVENOUS at 21:47

## 2021-01-23 RX ADMIN — WARFARIN SODIUM 5 MG: 5 TABLET ORAL at 18:15

## 2021-01-23 RX ADMIN — ATORVASTATIN CALCIUM 40 MG: 40 TABLET, FILM COATED ORAL at 21:18

## 2021-01-23 RX ADMIN — SODIUM ZIRCONIUM CYCLOSILICATE 10 G: 10 POWDER, FOR SUSPENSION ORAL at 14:36

## 2021-01-23 RX ADMIN — SODIUM CHLORIDE, PRESERVATIVE FREE 10 ML: 5 INJECTION INTRAVENOUS at 20:29

## 2021-01-23 RX ADMIN — SODIUM CHLORIDE, PRESERVATIVE FREE 10 ML: 5 INJECTION INTRAVENOUS at 08:37

## 2021-01-23 RX ADMIN — METOPROLOL TARTRATE 12.5 MG: 25 TABLET, FILM COATED ORAL at 22:00

## 2021-01-23 RX ADMIN — ASPIRIN 81 MG CHEWABLE TABLET 81 MG: 81 TABLET CHEWABLE at 08:10

## 2021-01-23 RX ADMIN — POLYVINYL ALCOHOL 1 DROP: 14 SOLUTION/ DROPS OPHTHALMIC at 08:10

## 2021-01-23 RX ADMIN — ARIPIPRAZOLE 15 MG: 10 TABLET ORAL at 08:09

## 2021-01-23 RX ADMIN — DICLOFENAC SODIUM 2 G: 10 GEL TOPICAL at 08:09

## 2021-01-23 RX ADMIN — VANCOMYCIN HYDROCHLORIDE 1250 MG: 5 INJECTION, POWDER, LYOPHILIZED, FOR SOLUTION INTRAVENOUS at 14:32

## 2021-01-23 RX ADMIN — MEROPENEM 1000 MG: 1 INJECTION, POWDER, FOR SOLUTION INTRAVENOUS at 20:30

## 2021-01-23 RX ADMIN — INSULIN HUMAN 20 UNITS: 100 INJECTION, SOLUTION PARENTERAL at 23:38

## 2021-01-23 RX ADMIN — SODIUM CHLORIDE, PRESERVATIVE FREE 10 ML: 5 INJECTION INTRAVENOUS at 21:18

## 2021-01-23 RX ADMIN — INSULIN HUMAN 5 UNITS: 100 INJECTION, SOLUTION PARENTERAL at 11:47

## 2021-01-23 RX ADMIN — INSULIN GLARGINE 40 UNITS: 100 INJECTION, SOLUTION SUBCUTANEOUS at 20:31

## 2021-01-23 RX ADMIN — Medication 2 PUFF: at 19:23

## 2021-01-23 RX ADMIN — ESCITALOPRAM OXALATE 20 MG: 10 TABLET ORAL at 08:10

## 2021-01-23 RX ADMIN — SODIUM ZIRCONIUM CYCLOSILICATE 10 G: 10 POWDER, FOR SUSPENSION ORAL at 08:37

## 2021-01-23 RX ADMIN — METOCLOPRAMIDE 10 MG: 5 INJECTION, SOLUTION INTRAMUSCULAR; INTRAVENOUS at 19:57

## 2021-01-23 RX ADMIN — Medication 2 PUFF: at 14:41

## 2021-01-23 RX ADMIN — INSULIN HUMAN 20 UNITS: 100 INJECTION, SOLUTION PARENTERAL at 06:33

## 2021-01-23 RX ADMIN — ALBUTEROL SULFATE 2 PUFF: 90 AEROSOL, METERED RESPIRATORY (INHALATION) at 19:22

## 2021-01-23 RX ADMIN — Medication 75 MCG/HR: at 16:32

## 2021-01-23 RX ADMIN — DICLOFENAC SODIUM 2 G: 10 GEL TOPICAL at 21:22

## 2021-01-23 RX ADMIN — METHYLPREDNISOLONE SODIUM SUCCINATE 40 MG: 40 INJECTION, POWDER, LYOPHILIZED, FOR SOLUTION INTRAMUSCULAR; INTRAVENOUS at 14:36

## 2021-01-23 RX ADMIN — METHYLPREDNISOLONE SODIUM SUCCINATE 40 MG: 40 INJECTION, POWDER, LYOPHILIZED, FOR SOLUTION INTRAMUSCULAR; INTRAVENOUS at 08:09

## 2021-01-23 RX ADMIN — ALBUTEROL SULFATE 2 PUFF: 90 AEROSOL, METERED RESPIRATORY (INHALATION) at 14:41

## 2021-01-23 RX ADMIN — Medication 2 PUFF: at 11:41

## 2021-01-23 RX ADMIN — Medication 2 PUFF: at 07:28

## 2021-01-23 RX ADMIN — ZINC SULFATE 220 MG (50 MG) CAPSULE 50 MG: CAPSULE at 08:09

## 2021-01-23 RX ADMIN — METOPROLOL TARTRATE 12.5 MG: 25 TABLET, FILM COATED ORAL at 08:09

## 2021-01-23 RX ADMIN — MEROPENEM 1000 MG: 1 INJECTION, POWDER, FOR SOLUTION INTRAVENOUS at 11:46

## 2021-01-23 RX ADMIN — MINOCYCLINE HYDROCHLORIDE 100 MG: 100 CAPSULE ORAL at 12:14

## 2021-01-23 RX ADMIN — MINOCYCLINE HYDROCHLORIDE 100 MG: 100 CAPSULE ORAL at 21:18

## 2021-01-23 RX ADMIN — ALBUTEROL SULFATE 2 PUFF: 90 AEROSOL, METERED RESPIRATORY (INHALATION) at 11:41

## 2021-01-23 RX ADMIN — MIDAZOLAM HYDROCHLORIDE 5 MG/HR: 5 INJECTION, SOLUTION INTRAMUSCULAR; INTRAVENOUS at 02:34

## 2021-01-23 RX ADMIN — INSULIN HUMAN 25 UNITS: 100 INJECTION, SUSPENSION SUBCUTANEOUS at 08:13

## 2021-01-23 RX ADMIN — FAMOTIDINE 20 MG: 10 INJECTION, SOLUTION INTRAVENOUS at 21:18

## 2021-01-23 RX ADMIN — METOCLOPRAMIDE 10 MG: 5 INJECTION, SOLUTION INTRAMUSCULAR; INTRAVENOUS at 14:33

## 2021-01-23 RX ADMIN — Medication 75 MCG/HR: at 02:34

## 2021-01-23 RX ADMIN — CHLORHEXIDINE GLUCONATE 0.12% ORAL RINSE 15 ML: 1.2 LIQUID ORAL at 21:17

## 2021-01-23 RX ADMIN — INSULIN HUMAN 10 UNITS: 100 INJECTION, SOLUTION PARENTERAL at 06:30

## 2021-01-23 RX ADMIN — BUPROPION HYDROCHLORIDE 300 MG: 150 TABLET, FILM COATED, EXTENDED RELEASE ORAL at 08:09

## 2021-01-23 RX ADMIN — METOCLOPRAMIDE 10 MG: 5 INJECTION, SOLUTION INTRAMUSCULAR; INTRAVENOUS at 02:30

## 2021-01-23 RX ADMIN — SODIUM CHLORIDE: 9 INJECTION, SOLUTION INTRAVENOUS at 08:08

## 2021-01-23 RX ADMIN — FAMOTIDINE 20 MG: 10 INJECTION, SOLUTION INTRAVENOUS at 08:09

## 2021-01-23 RX ADMIN — METHYLPREDNISOLONE SODIUM SUCCINATE 40 MG: 40 INJECTION, POWDER, LYOPHILIZED, FOR SOLUTION INTRAMUSCULAR; INTRAVENOUS at 23:37

## 2021-01-23 RX ADMIN — INSULIN HUMAN 5 UNITS: 100 INJECTION, SUSPENSION SUBCUTANEOUS at 11:47

## 2021-01-23 ASSESSMENT — PULMONARY FUNCTION TESTS
PIF_VALUE: 25
PIF_VALUE: 25
PIF_VALUE: 26
PIF_VALUE: 25
PIF_VALUE: 26
PIF_VALUE: 25
PIF_VALUE: 25
PIF_VALUE: 26
PIF_VALUE: 26
PIF_VALUE: 25
PIF_VALUE: 26
PIF_VALUE: 25
PIF_VALUE: 26
PIF_VALUE: 25
PIF_VALUE: 25
PIF_VALUE: 26
PIF_VALUE: 26
PIF_VALUE: 25
PIF_VALUE: 26
PIF_VALUE: 25
PIF_VALUE: 26
PIF_VALUE: 25
PIF_VALUE: 26
PIF_VALUE: 25

## 2021-01-23 NOTE — PROGRESS NOTES
Progress Note( Dr. Underwood Running)  1/23/2021  Subjective:   Admit Date: 1/13/2021  PCP: Nathan Flores MD    Admitted For : Initially admitted to [facility] and transferred here on January 13, 2021 for acute on chronic respiratory failure with hypoxia and patient has Covid pneumonia    Consulted For:  Better control of blood glucose    Interval History: Patient is sedated, intubated and on ventilator  Bolus tube feeding is on and off pending upon residual he has      Intake/Output Summary (Last 24 hours) at 1/23/2021 1622  Last data filed at 1/23/2021 1510  Gross per 24 hour   Intake 2060 ml   Output 2245 ml   Net -185 ml       DATA    CBC:   Recent Labs     01/21/21  0505 01/22/21  0530 01/23/21  0545   WBC 10.6* 13.1* 15.9*   HGB 9.4* 9.9* 9.7*    263 207    CMP:  Recent Labs     01/21/21  0505 01/22/21  0530 01/23/21  0545   * 147* 142   K 5.0 4.9 5.6*   * 112* 107   CO2 26 26 26   BUN 78* 79* 71*   CREATININE 1.2 1.2 1.0   CALCIUM 7.4* 7.5* 7.5*     Lipids:   Lab Results   Component Value Date    CHOL 107 11/22/2019    CHOL 175 05/05/2015    HDL 46 11/22/2019    TRIG 85 01/18/2021     Glucose:  Recent Labs     01/23/21  0630 01/23/21  0812 01/23/21  1145   POCGLU 229* 230* 179*     [id number]:  Lab Results   Component Value Date    LABA1C 11.6 01/19/2021     High Sensitivity TSH:   Lab Results   Component Value Date    TSHHS 1.770 11/21/2019     Free T3: No results found for: FT3  Free T4:  Lab Results   Component Value Date    T4FREE 1.14 11/21/2019       Xr Chest Portable    Result Date: 1/23/2021  EXAMINATION: ONE XRAY VIEW OF THE CHEST 1/23/2021 5:04 am   . 1. Increased left basilar airspace opacity is likely related to atelectasis, although superimposed pneumonia and aspiration are not excluded. 2. Otherwise unchanged subpleural, basilar predominant airspace opacities and diffuse interstitial opacities likely due to COVID-19 pneumonia given patient history. Superimposed edema (noncardiogenic or cardiogenic) is not excluded. Xr Chest Portable    Result Date: 1/22/2021  EXAMINATION: ONE XRAY VIEW OF THE CHEST 1/22/2021 11:03 am      Worsening multifocal infiltrates seen throughout the lungs bilaterally concerning for multifocal pneumonia. Endotracheal tube measures 5.3 cm above the jose. Xr Chest Portable    Result Date: 1/21/2021  EXAMINATION: ONE XRAY VIEW OF THE CHEST 1/21/2021 5:00 am     1. Stable position of support lines and tubes. The left internal jugular central venous catheter terminates in the SVC but is directed laterally. 2. Persistent multifocal airspace consolidation, consistent with the history of COVID-19 pneumonia. Xr Chest Portable    Result Date: 1/20/2021  EXAMINATION: ONE XRAY VIEW OF THE CHEST 1/20/2021 5:04 am COMPARISON: Yesterday HISTORY: ORDERING SYSTEM PROVIDED HISTORY: on vent l. Support devices unchanged in position. Stable areas of COVID-19 pneumonia.           Xr Chest Portable    Result Date: 1/18/2021 EXAMINATION: ONE XRAY VIEW OF THE CHEST 1/18/2021 9:52 am COMPARISON: 01/18/2021, 01/16/2021 HISTORY: ORDERING SYSTEM PROVIDED HISTORY: ETT placement TECHNOLOGIST PROVIDED HISTORY: Reason for exam:->ETT placement Reason for exam:->verify ETT placement per Dr. Kia Viera, how many Cm above the jose? Reason for Exam: 1 FINDINGS: Endotracheal tube terminates 6.5 cm above the jose. Enteric tube courses below the diaphragm. Left internal jugular catheter with tip at the superior SVC directed laterally. Hazy ground-glass opacity areas of consolidation scattered through the lungs are improving from prior exams given differences in technique. No pneumothorax or pleural effusion. Borderline cardiomegaly is stable. Endotracheal tube terminates 6.5 cm above the jose. Moderate changes of COVID-19 pneumonia throughout the lungs with improvement since 01/16/2021. Xr Chest Portable    Result Date: 1/18/2021  EXAMINATION: ONE XRAY VIEW OF THE CHEST 1/18/2021 4:21 am COMPARISON: 01/17/2021 HISTORY: ORDERING SYSTEM PROVIDED HISTORY: on vent . 1. Diffuse bilateral lung infiltrates, likely related to pulmonary edema versus pneumonia.            Scheduled Medicines   Medications:    [START ON 1/25/2021] furosemide  40 mg Intravenous Once per day on Mon Wed Fri    sodium zirconium cyclosilicate  10 g Oral TID    insulin glargine  40 Units Subcutaneous Nightly    [START ON 1/24/2021] insulin NPH  30 Units Subcutaneous QAM    meropenem  1,000 mg Intravenous Q8H    minocycline  100 mg Oral BID    warfarin  5 mg Oral Daily    insulin regular  0-30 Units Subcutaneous Q6H    insulin regular  10 Units Subcutaneous Once    vancomycin  1,250 mg Intravenous Q24H    insulin regular  20 Units Subcutaneous 4 times per day    methylPREDNISolone  40 mg Intravenous Q8H    metoclopramide  10 mg Intravenous Q6H    alogliptin  12.5 mg Oral Daily    ARIPiprazole  15 mg Oral Daily    aspirin  81 mg Oral Daily  atorvastatin  40 mg Oral Nightly    buPROPion  300 mg Oral QAM    escitalopram  20 mg Oral Daily    metoprolol tartrate  12.5 mg Oral BID    sodium chloride flush  10 mL Intravenous 2 times per day    diclofenac sodium  2 g Topical BID    zinc sulfate  50 mg Oral Daily    albuterol sulfate HFA  2 puff Inhalation 4x daily    ipratropium  2 puff Inhalation 4x daily    chlorhexidine  15 mL Mouth/Throat BID    famotidine (PEPCID) injection  20 mg Intravenous BID      Infusions:    sodium chloride 75 mL/hr at 01/23/21 1507    midazolam 5 mg/hr (01/23/21 0234)    dextrose 100 mL/hr (01/21/21 1214)    cisatracurium (NIMBEX) infusion Stopped (01/18/21 1612)    norepinephrine Stopped (01/20/21 1446)    fentaNYL 75 mcg/hr (01/23/21 0234)    dextrose      sodium chloride           Objective:   Vitals: BP (!) 106/49   Pulse 75   Temp 96.9 °F (36.1 °C) (Rectal)   Resp 21   Ht 6' 5.01\" (1.956 m)   Wt 274 lb 14.6 oz (124.7 kg)   SpO2 93%   BMI 32.59 kg/m²   General appearance: Patient is sedated, intubated and on ventilator  Neck: no JVD or bruit  Thyroid : Normal lobes   Lungs: Has Vesicular Breath sounds   Heart:  regular rate and rhythm  Abdomen: soft, non-tender; bowel sounds normal; no masses,  no organomegaly  Musculoskeletal: Normal  Extremities: extremities normal, , no edema  Neurologic:  Patient is sedated, intubated and on ventilator      Assessment:     Patient Active Problem List:     Gastroesophageal reflux disease without esophagitis     Hypertension in stage 3 chronic kidney disease due to type 2 diabetes mellitus (HCC)     DM (diabetes mellitus) type II, controlled, with peripheral vascular disorder ([facility] [address]     Combined hyperlipidemia associated with type 2 diabetes mellitus (HCC)     Diabetic skin ulcer associated with type 2 diabetes mellitus (HCC)     CKD (chronic kidney disease)     History of DVT (deep vein thrombosis)- left femoral     History of pulmonary embolism Long term current use of anticoagulants with INR goal of 2.0-3.0     COPD (chronic obstructive pulmonary disease) (HCC)     Severe recurrent major depressive disorder with psychotic features ([address]     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)     Venous (peripheral) insufficiency     Uncontrolled secondary diabetes mellitus with stage 3 CKD (GFR 30-59) (HCC)     Diabetes mellitus with skin ulcer (HCC)     WD-Ulcer of shin, left, with fat layer exposed ([address]     History of colon polyps     Personal history of PE (pulmonary embolism)     WD-Chronic venous hypertension (idiopathic) with ulcer of left lower extremity (CODE) (HCC)     WD-Chronic venous hypertension with inflammation, right     Troponin I above reference range     KIRSTEN (acute kidney injury) ([address]     Cellulitis of lower extremity     Hyponatremia     Generalized weakness     Weakness     Multifocal pneumonia     Pneumonia due to COVID-19 virus     Hyperkalemia     Acute respiratory failure with hypoxia ([facility] [address]      Plan:     1. Reviewed POC blood glucose . Labs and X ray results   2. Reviewed Current Medicines   3. On his schedule plus correction bolus regular /basal Lantus /NPH insulin regimedrugs   4. Monitor Blood glucose frequently   5. Modified  the dose of Insulin/ other medicines as needed   6. Will follow     .      Kami Redd MD

## 2021-01-23 NOTE — PROGRESS NOTES
Progress Note( Dr. Oliver Gardner)    Subjective:   Admit Date: 1/13/2021  PCP: Delvis Marinelli MD    Admitted For : Initially admitted to [facility] and transferred here on January 13, 2021 for acute on chronic respiratory failure with hypoxia and patient has Covid pneumonia    Consulted For:  Better control of blood glucose    Interval History: Patient is sedated, intubated and on ventilator  Bolus tube feeding is on and off pending upon residual he has      Intake/Output Summary (Last 24 hours) at 1/23/2021 1650  Last data filed at 1/23/2021 1510  Gross per 24 hour   Intake 2060 ml   Output 2245 ml   Net -185 ml       DATA    CBC:   Recent Labs     01/21/21  0505 01/22/21  0530 01/23/21  0545   WBC 10.6* 13.1* 15.9*   HGB 9.4* 9.9* 9.7*    263 207    CMP:  Recent Labs     01/21/21  0505 01/22/21  0530 01/23/21  0545   * 147* 142   K 5.0 4.9 5.6*   * 112* 107   CO2 26 26 26   BUN 78* 79* 71*   CREATININE 1.2 1.2 1.0   CALCIUM 7.4* 7.5* 7.5*     Lipids:   Lab Results   Component Value Date    CHOL 107 11/22/2019    CHOL 175 05/05/2015    HDL 46 11/22/2019    TRIG 85 01/18/2021     Glucose:  Recent Labs     01/23/21  0630 01/23/21  0812 01/23/21  1145   POCGLU 229* 230* 179*     BwrmnijhccL2H:  Lab Results   Component Value Date    LABA1C 11.6 01/19/2021     High Sensitivity TSH:   Lab Results   Component Value Date    TSHHS 1.770 11/21/2019     Free T3: No results found for: FT3  Free T4:  Lab Results   Component Value Date    T4FREE 1.14 11/21/2019       Xr Chest Portable    Result Date: 1/22/2021  EXAMINATION: ONE XRAY VIEW OF THE CHEST 1/22/2021 11:03 am      Worsening multifocal infiltrates seen throughout the lungs bilaterally concerning for multifocal pneumonia. Endotracheal tube measures 5.3 cm above the jose.      Xr Chest Portable    Result Date: 1/21/2021  EXAMINATION: ONE XRAY VIEW OF THE CHEST 1/21/2021 5:00 am 1. Stable position of support lines and tubes. The left internal jugular central venous catheter terminates in the SVC but is directed laterally. 2. Persistent multifocal airspace consolidation, consistent with the history of COVID-19 pneumonia. Xr Chest Portable    Result Date: 1/20/2021  EXAMINATION: ONE XRAY VIEW OF THE CHEST 1/20/2021 5:04 am COMPARISON: Yesterday HISTORY: ORDERING SYSTEM PROVIDED HISTORY: on vent l. Support devices unchanged in position. Stable areas of COVID-19 pneumonia. Xr Chest Portable    Result Date: 1/18/2021  EXAMINATION: ONE XRAY VIEW OF THE CHEST 1/18/2021 9:52 am COMPARISON: 01/18/2021, 01/16/2021 HISTORY: ORDERING SYSTEM PROVIDED HISTORY: ETT placement TECHNOLOGIST PROVIDED HISTORY: Reason for exam:->ETT placement Reason for exam:->verify ETT placement per Dr. Ismael Pedersen, how many Cm above the jose? Reason for Exam: 1 FINDINGS: Endotracheal tube terminates 6.5 cm above the jose. Enteric tube courses below the diaphragm. Left internal jugular catheter with tip at the superior SVC directed laterally. Hazy ground-glass opacity areas of consolidation scattered through the lungs are improving from prior exams given differences in technique. No pneumothorax or pleural effusion. Borderline cardiomegaly is stable. Endotracheal tube terminates 6.5 cm above the jose. Moderate changes of COVID-19 pneumonia throughout the lungs with improvement since 01/16/2021. Xr Chest Portable    Result Date: 1/18/2021  EXAMINATION: ONE XRAY VIEW OF THE CHEST 1/18/2021 4:21 am COMPARISON: 01/17/2021 HISTORY: ORDERING SYSTEM PROVIDED HISTORY: on vent . 1. Diffuse bilateral lung infiltrates, likely related to pulmonary edema versus pneumonia.            Scheduled Medicines   Medications:    [START ON 1/25/2021] furosemide  40 mg Intravenous Once per day on Mon Wed Fri    sodium zirconium cyclosilicate  10 g Oral TID    insulin glargine  40 Units Subcutaneous Nightly Assessment:     Patient Active Problem List:     Gastroesophageal reflux disease without esophagitis     Hypertension in stage 3 chronic kidney disease due to type 2 diabetes mellitus (HCC)     DM (diabetes mellitus) type II, controlled, with peripheral vascular disorder (HCC)     Combined hyperlipidemia associated with type 2 diabetes mellitus ([address]     Diabetic skin ulcer associated with type 2 diabetes mellitus (HCC)     CKD (chronic kidney disease)     History of DVT (deep vein thrombosis)- left femoral     History of pulmonary embolism     Long term current use of anticoagulants with INR goal of 2.0-3.0     COPD (chronic obstructive pulmonary disease) (HCC)     Severe recurrent major depressive disorder with psychotic features ([address]     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)     Venous (peripheral) insufficiency     Uncontrolled secondary diabetes mellitus with stage 3 CKD (GFR 30-59) (HCC)     Diabetes mellitus with skin ulcer (HCC)     WD-Ulcer of shin, left, with fat layer exposed ([address]     History of colon polyps     Personal history of PE (pulmonary embolism)     WD-Chronic venous hypertension (idiopathic) with ulcer of left lower extremity (CODE) ([address]     WD-Chronic venous hypertension with inflammation, right     Troponin I above reference range     KIRSTEN (acute kidney injury) ([address]     Cellulitis of lower extremity     Hyponatremia     Generalized weakness     Weakness     Multifocal pneumonia     Pneumonia due to COVID-19 virus     Hyperkalemia     Acute respiratory failure with hypoxia ([address]      Plan:     1. Reviewed POC blood glucose . Labs and X ray results   2. Reviewed Current Medicines   3. On his schedule plus correction bolus regular /basal Lantus /NPH insulin regimedrugs   4. Monitor Blood glucose frequently   5. Modified  the dose of Insulin/ other medicines as needed   6. Will follow     .      Brandon Cadena MD

## 2021-01-23 NOTE — PROGRESS NOTES
Nephrology Progress Note        [address], [address] [place] [address]  Phone: [phone]  Office Hours: 8:30AM  4:30PM  Monday - Friday 1/23/2021 11:18 AM  Subjective:   Admit Date: 1/13/2021  PCP: Ronaldo Mills MD  Interval History: nonoliguric  Intubated      Diet: DIET TUBE FEED CONTINUOUS/CYCLIC NPO; Renal Formula (low potassium);  Nasogastric; Continuous; 55; 110; 24      Data:   Scheduled Meds:   [START ON 1/25/2021] furosemide  40 mg Intravenous Once per day on Mon Wed Fri    sodium zirconium cyclosilicate  10 g Oral TID    insulin glargine  40 Units Subcutaneous Nightly    [START ON 1/24/2021] insulin NPH  30 Units Subcutaneous QAM    insulin NPH  5 Units Subcutaneous Once    meropenem  1,000 mg Intravenous Q8H    minocycline  100 mg Oral BID    warfarin  5 mg Oral Daily    insulin regular  0-30 Units Subcutaneous Q6H    insulin regular  10 Units Subcutaneous Once    vancomycin  1,250 mg Intravenous Q24H    insulin regular  20 Units Subcutaneous 4 times per day    methylPREDNISolone  40 mg Intravenous Q8H    metoclopramide  10 mg Intravenous Q6H    alogliptin  12.5 mg Oral Daily    ARIPiprazole  15 mg Oral Daily    aspirin  81 mg Oral Daily    atorvastatin  40 mg Oral Nightly    buPROPion  300 mg Oral QAM    escitalopram  20 mg Oral Daily    metoprolol tartrate  12.5 mg Oral BID    sodium chloride flush  10 mL Intravenous 2 times per day    diclofenac sodium  2 g Topical BID    zinc sulfate  50 mg Oral Daily    albuterol sulfate HFA  2 puff Inhalation 4x daily    ipratropium  2 puff Inhalation 4x daily    chlorhexidine  15 mL Mouth/Throat BID    famotidine (PEPCID) injection  20 mg Intravenous BID     Continuous Infusions:   sodium chloride 75 mL/hr at 01/23/21 0808    midazolam 5 mg/hr (01/23/21 0234)    dextrose 100 mL/hr (01/21/21 1214)    cisatracurium (NIMBEX) infusion Stopped (01/18/21 1612) Neck: supple, trachea midline  Lungs: , breathing comfortably on mv,   Heart[de-identified] regular rate and rhythm,  Abdomen: soft, non distended,   Extremities:leg erythema better  Neurologic: sedated  Assessment and Plan:     Patient Active Problem List   Diagnosis    Gastroesophageal reflux disease without esophagitis    Hypertension in stage 3 chronic kidney disease due to type 2 diabetes mellitus ([address]    DM (diabetes mellitus) type II, controlled, with peripheral vascular disorder (HCC)    Combined hyperlipidemia associated with type 2 diabetes mellitus ([address]    Diabetic skin ulcer associated with type 2 diabetes mellitus (HCC)    CKD (chronic kidney disease)    History of DVT (deep vein thrombosis)- left femoral    History of pulmonary embolism    Long term current use of anticoagulants with INR goal of 2.0-3.0    COPD (chronic obstructive pulmonary disease) (HCC)    Severe recurrent major depressive disorder with psychotic features (HCC)    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)    Venous (peripheral) insufficiency    Uncontrolled secondary diabetes mellitus with stage 3 CKD (GFR 30-59) (HCC)    Diabetes mellitus with skin ulcer (HCC)    WD-Ulcer of shin, left, with fat layer exposed ([address]    History of colon polyps    Personal history of PE (pulmonary embolism)    WD-Chronic venous hypertension (idiopathic) with ulcer of left lower extremity (CODE) ([address]    WD-Chronic venous hypertension with inflammation, right    Troponin I above reference range    KIRSTEN (acute kidney injury) ([address]    Cellulitis of lower extremity    Hyponatremia    Generalized weakness    Weakness    Multifocal pneumonia    Pneumonia due to COVID-19 virus   hyperkalemia: recurrent  KIRSTEN  BLE cellulitis  Acute hypoxic resp failure from COVID 19  hypernatremia     Plan:  K is high again, change tube feeds to low K formulation and start lokelma  Continue free water 150ml every 4hrs for now Avoid nephrotoxins and renally dose meds  Critically ill                       Electronically signed by Dmitriy Novak DO on 1/23/2021 at 11:18 AM    MD Kalani Roth DO  [address]  [place], [place] [address]  PHONE: [phone]  FAX: [phone]

## 2021-01-23 NOTE — PROGRESS NOTES
Infectious Disease Progress Note  2021   Patient Name: Derick Cartwright : 1945       Reason for visit: F/u COVID-19 pneumonia, acute respiratory failure? History:? Interval history noted  Intubated, sedated and on mechanical ventilation  Physical Exam:  Vital Signs: BP (!) 115/57   Pulse 63   Temp 96.9 °F (36.1 °C) (Rectal)   Resp 22   Ht 6' 5.01\" (1.956 m)   Wt 274 lb 14.6 oz (124.7 kg)   SpO2 94%   BMI 32.59 kg/m²     Gen: intubated and sedated  Skin: no stigmata of endocarditis  Wounds:  Left anterior shin superficial wound, C/D/I  HEMT: AT/NC ETT, NGT   Eyes: PERRLA. Neck: Supple. Trachea midline. No LAD. Chest:  transmitted breath sounds. Heart:   Abd: soft, non-distended, no tenderness, no hepatomegaly. Normoactive bowel sounds. Ext: no clubbing, cyanosis, or edema  Catheter Site: without erythema or tenderness  LDA: CVC: left IJ, Urethral catheter: 2021  Neuro: sedated and on the ventilator. Radiologic / Imaging / TESTING  CXR 2021      1. Increased left basilar airspace opacity is likely related to atelectasis, although superimposed pneumonia and aspiration are not excluded. 2. Otherwise unchanged subpleural, basilar predominant airspace opacities and diffuse interstitial opacities likely due to COVID-19 pneumonia given patient history. Superimposed edema (noncardiogenic or cardiogenic) is not excluded.         Labs:    Recent Results (from the past 24 hour(s))   POCT Glucose    Collection Time: 21 12:17 PM   Result Value Ref Range    POC Glucose 105 (H) 70 - 99 MG/DL   POCT Glucose    Collection Time: 21  6:00 PM   Result Value Ref Range    POC Glucose 71 70 - 99 MG/DL   POCT Glucose    Collection Time: 21 10:10 PM   Result Value Ref Range    POC Glucose 105 (H) 70 - 99 MG/DL   POCT Glucose    Collection Time: 21  1:06 AM   Result Value Ref Range    POC Glucose 115 (H) 70 - 99 MG/DL   Basic metabolic panel Collection Time: 01/23/21  5:45 AM   Result Value Ref Range    Sodium 142 135 - 145 MMOL/L    Potassium 5.6 (HH) 3.5 - 5.1 MMOL/L    Chloride 107 99 - 110 mMol/L    CO2 26 21 - 32 MMOL/L    Anion Gap 9 4 - 16    BUN 71 (H) 6 - 23 MG/DL    CREATININE 1.0 0.9 - 1.3 MG/DL    Glucose 274 (H) 70 - 99 MG/DL    Calcium 7.5 (L) 8.3 - 10.6 MG/DL    GFR Non-African American >60 >60 mL/min/1.73m2    GFR African American >60 >60 mL/min/1.73m2   C-reactive protein    Collection Time: 01/23/21  5:45 AM   Result Value Ref Range    CRP, High Sensitivity 105.3 mg/L   CBC    Collection Time: 01/23/21  5:45 AM   Result Value Ref Range    WBC 15.9 (H) 4.0 - 10.5 K/CU MM    RBC 3.33 (L) 4.6 - 6.2 M/CU MM    Hemoglobin 9.7 (L) 13.5 - 18.0 GM/DL    Hematocrit 31.2 (L) 42 - 52 %    MCV 93.7 78 - 100 FL    MCH 29.1 27 - 31 PG    MCHC 31.1 (L) 32.0 - 36.0 %    RDW 18.2 (H) 11.7 - 14.9 %    Platelets 395 446 - 256 K/CU MM    MPV 13.0 (H) 7.5 - 11.1 FL   D-dimer, quantitative    Collection Time: 01/23/21  5:45 AM   Result Value Ref Range    D-Dimer, Quant 4784 (H) <230 NG/mL(DDU)   Procalcitonin    Collection Time: 01/23/21  5:45 AM   Result Value Ref Range    Procalcitonin 0.197    Protime-INR    Collection Time: 01/23/21  5:45 AM   Result Value Ref Range    Protime 15.5 (H) 11.7 - 14.5 SECONDS    INR 1.28 INDEX   Blood Gas, Arterial    Collection Time: 01/23/21  6:00 AM   Result Value Ref Range    pH, Bld 7.42 7.34 - 7.45    pCO2, Arterial 46.0 (H) 32 - 45 MMHG    pO2, Arterial 74 (L) 75 - 100 MMHG    Base Exc, Mixed 4.5 (H) 0 - 1.2    HCO3, Arterial 29.8 (H) 18 - 23 MMOL/L    CO2 Content 31.2 (H) 19 - 24 MMOL/L    O2 Sat 93.3 (L) 96 - 97 %    Carbon Monoxide, Blood 2.5 0 - 5 %    Methemoglobin, Arterial 1.3 <1.5 %    Comment PC 15/RR20/70%/+10    POCT Glucose    Collection Time: 01/23/21  6:04 AM   Result Value Ref Range    POC Glucose 267 (H) 70 - 99 MG/DL   POCT Glucose    Collection Time: 01/23/21  6:30 AM   Result Value Ref Range POC Glucose 229 (H) 70 - 99 MG/DL   POCT Glucose    Collection Time: 01/23/21  8:12 AM   Result Value Ref Range    POC Glucose 230 (H) 70 - 99 MG/DL     CULTURE results: Invalid input(s): BLOOD CULTURE,  URINE CULTURE, SURGICAL CULTURE    Diagnosis:  Patient Active Problem List   Diagnosis    Gastroesophageal reflux disease without esophagitis    Hypertension in stage 3 chronic kidney disease due to type 2 diabetes mellitus ([address]    DM (diabetes mellitus) type II, controlled, with peripheral vascular disorder (HCC)    Combined hyperlipidemia associated with type 2 diabetes mellitus ([address]    Diabetic skin ulcer associated with type 2 diabetes mellitus (HCC)    CKD (chronic kidney disease)    History of DVT (deep vein thrombosis)- left femoral    History of pulmonary embolism    Long term current use of anticoagulants with INR goal of 2.0-3.0    COPD (chronic obstructive pulmonary disease) (HCC)    Severe recurrent major depressive disorder with psychotic features (HCC)    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)    Venous (peripheral) insufficiency    Uncontrolled secondary diabetes mellitus with stage 3 CKD (GFR 30-59) (HCC)    Diabetes mellitus with skin ulcer (HCC)    WD-Ulcer of shin, left, with fat layer exposed ([address]    History of colon polyps    Personal history of PE (pulmonary embolism)    WD-Chronic venous hypertension (idiopathic) with ulcer of left lower extremity (CODE) ([address]    WD-Chronic venous hypertension with inflammation, right    Troponin I above reference range    KIRSTEN (acute kidney injury) ([address]    Cellulitis of lower extremity    Hyponatremia    Generalized weakness    Weakness    Multifocal pneumonia    Pneumonia due to COVID-19 virus    Hyperkalemia    Acute respiratory failure with hypoxia (HCC)       Active Problems  Active Problems:    Pneumonia due to COVID-19 virus    Hyperkalemia    Acute respiratory failure with hypoxia ([address]

## 2021-01-23 NOTE — PROGRESS NOTES
EXAMINATION: ONE XRAY VIEW OF THE CHEST 1/18/2021 9:52 am COMPARISON: 01/18/2021, 01/16/2021 HISTORY: ORDERING SYSTEM PROVIDED HISTORY: ETT placement TECHNOLOGIST PROVIDED HISTORY: Reason for exam:->ETT placement Reason for exam:->verify ETT placement per Dr. Patricia Brantley, how many Cm above the jose? Reason for Exam: 1 FINDINGS: Endotracheal tube terminates 6.5 cm above the jose. Enteric tube courses below the diaphragm. Left internal jugular catheter with tip at the superior SVC directed laterally. Hazy ground-glass opacity areas of consolidation scattered through the lungs are improving from prior exams given differences in technique. No pneumothorax or pleural effusion. Borderline cardiomegaly is stable. Endotracheal tube terminates 6.5 cm above the jose. Moderate changes of COVID-19 pneumonia throughout the lungs with improvement since 01/16/2021. Xr Chest Portable    Result Date: 1/18/2021  EXAMINATION: ONE XRAY VIEW OF THE CHEST 1/18/2021 4:21 am COMPARISON: 01/17/2021 HISTORY: ORDERING SYSTEM PROVIDED HISTORY: on vent . 1. Diffuse bilateral lung infiltrates, likely related to pulmonary edema versus pneumonia.            Scheduled Medicines   Medications:    [START ON 1/25/2021] furosemide  40 mg Intravenous Once per day on Mon Wed Fri    sodium zirconium cyclosilicate  10 g Oral TID    insulin glargine  40 Units Subcutaneous Nightly    [START ON 1/24/2021] insulin NPH  30 Units Subcutaneous QAM    meropenem  1,000 mg Intravenous Q8H    minocycline  100 mg Oral BID    warfarin  5 mg Oral Daily    insulin regular  0-30 Units Subcutaneous Q6H    insulin regular  10 Units Subcutaneous Once    vancomycin  1,250 mg Intravenous Q24H    insulin regular  20 Units Subcutaneous 4 times per day    methylPREDNISolone  40 mg Intravenous Q8H    metoclopramide  10 mg Intravenous Q6H    alogliptin  12.5 mg Oral Daily    ARIPiprazole  15 mg Oral Daily    aspirin  81 mg Oral Daily  atorvastatin  40 mg Oral Nightly    buPROPion  300 mg Oral QAM    escitalopram  20 mg Oral Daily    metoprolol tartrate  12.5 mg Oral BID    sodium chloride flush  10 mL Intravenous 2 times per day    diclofenac sodium  2 g Topical BID    zinc sulfate  50 mg Oral Daily    albuterol sulfate HFA  2 puff Inhalation 4x daily    ipratropium  2 puff Inhalation 4x daily    chlorhexidine  15 mL Mouth/Throat BID    famotidine (PEPCID) injection  20 mg Intravenous BID      Infusions:    sodium chloride 75 mL/hr at 01/23/21 1507    midazolam 5 mg/hr (01/23/21 0234)    dextrose 100 mL/hr (01/21/21 1214)    cisatracurium (NIMBEX) infusion Stopped (01/18/21 1612)    norepinephrine Stopped (01/20/21 1446)    fentaNYL 75 mcg/hr (01/23/21 1632)    dextrose      sodium chloride           Objective:   Vitals: BP (!) 106/49   Pulse 75   Temp 96.9 °F (36.1 °C) (Rectal)   Resp 21   Ht 6' 5.01\" (1.956 m)   Wt 274 lb 14.6 oz (124.7 kg)   SpO2 93%   BMI 32.59 kg/m²   General appearance: Patient is sedated, intubated and on ventilator  Neck: no JVD or bruit  Thyroid : Normal lobes   Lungs: Has Vesicular Breath sounds   Heart:  regular rate and rhythm  Abdomen: soft, non-tender; bowel sounds normal; no masses,  no organomegaly  Musculoskeletal: Normal  Extremities: extremities normal, , no edema  Neurologic:  Patient is sedated, intubated and on ventilator      Assessment:     Patient Active Problem List:     Gastroesophageal reflux disease without esophagitis     Hypertension in stage 3 chronic kidney disease due to type 2 diabetes mellitus (HCC)     DM (diabetes mellitus) type II, controlled, with peripheral vascular disorder [address]     Combined hyperlipidemia associated with type 2 diabetes mellitus (HCC)     Diabetic skin ulcer associated with type 2 diabetes mellitus (HCC)     CKD (chronic kidney disease)     History of DVT (deep vein thrombosis)- left femoral     History of pulmonary embolism Long term current use of anticoagulants with INR goal of 2.0-3.0     COPD (chronic obstructive pulmonary disease) (HCC)     Severe recurrent major depressive disorder with psychotic features ([address]     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)     Venous (peripheral) insufficiency     Uncontrolled secondary diabetes mellitus with stage 3 CKD (GFR 30-59) (HCC)     Diabetes mellitus with skin ulcer (HCC)     WD-Ulcer of shin, left, with fat layer exposed ([address]     History of colon polyps     Personal history of PE (pulmonary embolism)     WD-Chronic venous hypertension (idiopathic) with ulcer of left lower extremity (CODE) (HCC)     WD-Chronic venous hypertension with inflammation, right     Troponin I above reference range     KIRSTEN (acute kidney injury) ([address]     Cellulitis of lower extremity     Hyponatremia     Generalized weakness     Weakness     Multifocal pneumonia     Pneumonia due to COVID-19 virus     Hyperkalemia     Acute respiratory failure with hypoxia ([address]      Plan:     1. Reviewed POC blood glucose . Labs and X ray results   2. Reviewed Current Medicines   3. On his schedule plus correction bolus regular /basal Lantus /NPH insulin regimedrugs   4. Monitor Blood glucose frequently   5. Modified  the dose of Insulin/ other medicines as needed   6. Will follow     .      Dru Sevilla MD

## 2021-01-23 NOTE — PROGRESS NOTES
Hospitalist Progress Note      Name:  Ymael Wodo /Age/Sex: 1945  [de-identified]77 y.o. male)   MRN & CSN:  [id number] & [id number] Admission Date/Time: 2021 12:17 AM   Location:  -A PCP: Nora Hillman MD         Hospital Day: 11    Assessment and Plan:   Yamel Wood is a 77 y.o.  male  who presents with shortness of breath, was transferred from [facility] to for management of COVID-19 pneumonia    · Acute on chronic hypoxic respiratory failure due to COVID-19 pneumonia, possible hospital-acquired pneumonia  · Septic shock due to COVID-19 pneumonia  -Leucocytosis, mild improvement  -Covid positive   -MRSA positive  -Blood cultures negative to date  -On steroids   -Remdesivir   -S/p convalescent plasma 2 units 2021  -On pressors  -Ventilator,intubated   management per pulmonology, still on high vent settings  -On IV vancomycin, cefepime   -Pulmo on board  -ID, continue vancomycin and cefepime, cefipime switched to meropenem  Sedation vacation    · Right lower extremity cellulitis  On vancomycin  · Diabetes mellitus type 2-poor control  Check A1c, start on sliding scale, Lantus,  endocrine    · Hyperkalemia -Lokelmar today, Nephro on board    · KIRSTEN, likely due to ATN  On Lasix, with good urine output, nephro on board    · Moderate PEM  Dietitian on board, on tube feedings    Chronic medical condition  Hyperlipidemia, on statin  Hypertension  Morbid obesity, encourage weight loss and exercise  Lower extremity DVT, on Coumadin, INR therapeutic  Chronic respiratory failure at 3 L of O2    Prognosis guarded  Updated Ant Parr, agreeable to palliative care consult  Diet DIET TUBE FEED CONTINUOUS/CYCLIC NPO; Renal Formula (low potassium);  Nasogastric; Continuous; 55; 110; 24   DVT Prophylaxis [] Lovenox, []  Heparin, [] SCDs, []No VTE prophylaxis, patient ambulating GI Prophylaxis [] PPI, [] H2 Blocker, [] No GI prophylaxis, patient is receiving diet/Tube Feeds   Code Status Full Code   Disposition Patient requires continued admission due to   MDM [] Low, [] Moderate,[]  High  Patient's risk as above due to      History of Present Illness:     Pt S&E. Intubated , sedated    10-14 point ROS reviewed negative, unless as noted above    Objective: Intake/Output Summary (Last 24 hours) at 1/23/2021 1240  Last data filed at 1/23/2021 0700  Gross per 24 hour   Intake 2411 ml   Output 4170 ml   Net -1759 ml      Vitals:   Vitals:    01/23/21 0945   BP:    Pulse: 63   Resp: 22   Temp:    SpO2: 94%     Physical Exam:    GEN Intubated, sedated, opens eyes spontaneously, not following commands. Appears given age. EYES Pupils are equally round. No scleral erythema, discharge, or conjunctivitis. HENT Mucous membranes are moist. NG tube   NECK No apparent thyromegaly or masses. ET Tube insitu  RESP DimisheD BS bilaterallyno wheezes, rales or rhonchi. Symmetric chest movement   CARDIO/VASC S1/S2 auscultated. Regular rate without appreciable murmurs, rubs, or gallops. Peripheral pulses equal bilaterally and palpable. No peripheral edema. GI Abdomen is soft without significant tenderness, masses, or guarding. Bowel sounds are normoactive. Rectal exam deferred.  Starkey catheter is present. HEME/LYMPH No petechiae or ecchymoses. MSK No gross joint deformities. Spontaneous movement of all extremities  SKIN Normal coloration, warm, dry. NEURO Cranial nerves appear grossly intact,on vent.     Medications:   Medications:    [START ON 1/25/2021] furosemide  40 mg Intravenous Once per day on Mon Wed Fri    sodium zirconium cyclosilicate  10 g Oral TID    insulin glargine  40 Units Subcutaneous Nightly    [START ON 1/24/2021] insulin NPH  30 Units Subcutaneous QAM    meropenem  1,000 mg Intravenous Q8H    minocycline  100 mg Oral BID    warfarin  5 mg Oral Daily  insulin regular  0-30 Units Subcutaneous Q6H    insulin regular  10 Units Subcutaneous Once    vancomycin  1,250 mg Intravenous Q24H    insulin regular  20 Units Subcutaneous 4 times per day    methylPREDNISolone  40 mg Intravenous Q8H    metoclopramide  10 mg Intravenous Q6H    alogliptin  12.5 mg Oral Daily    ARIPiprazole  15 mg Oral Daily    aspirin  81 mg Oral Daily    atorvastatin  40 mg Oral Nightly    buPROPion  300 mg Oral QAM    escitalopram  20 mg Oral Daily    metoprolol tartrate  12.5 mg Oral BID    sodium chloride flush  10 mL Intravenous 2 times per day    diclofenac sodium  2 g Topical BID    zinc sulfate  50 mg Oral Daily    albuterol sulfate HFA  2 puff Inhalation 4x daily    ipratropium  2 puff Inhalation 4x daily    chlorhexidine  15 mL Mouth/Throat BID    famotidine (PEPCID) injection  20 mg Intravenous BID      Infusions:    sodium chloride 75 mL/hr at 01/23/21 0808    midazolam 5 mg/hr (01/23/21 0234)    dextrose 100 mL/hr (01/21/21 1214)    cisatracurium (NIMBEX) infusion Stopped (01/18/21 1612)    norepinephrine Stopped (01/20/21 1446)    fentaNYL 75 mcg/hr (01/23/21 0234)    dextrose      sodium chloride       PRN Meds:     fentanNYL, 25 mcg, Q1H PRN      hydrALAZINE, 10 mg, Q6H PRN      glucose, 15 g, PRN      dextrose, 12.5 g, PRN      glucagon (rDNA), 1 mg, PRN      dextrose, 100 mL/hr, PRN      microfibrillar collagen, , PRN      polyvinyl alcohol, 1 drop, Q4H PRN    And      artificial tears, , PRN      sodium chloride flush, 10 mL, PRN      promethazine, 12.5 mg, Q6H PRN    Or      ondansetron, 4 mg, Q6H PRN      polyethylene glycol, 17 g, Daily PRN      acetaminophen, 650 mg, Q6H PRN    Or      acetaminophen, 650 mg, Q6H PRN      glucose, 15 g, PRN      dextrose, 12.5 g, PRN      glucagon (rDNA), 1 mg, PRN      dextrose, 100 mL/hr, PRN      traMADol, 50 mg, Q6H PRN    Or      traMADol, 100 mg, Q6H PRN   diphenhydrAMINE, 25 mg, Q6H PRN      sodium chloride, , PRN      sodium chloride, 30 mL, PRN        Data    Recent Labs     01/21/21  0505 01/22/21  0530 01/23/21  0545   WBC 10.6* 13.1* 15.9*   HGB 9.4* 9.9* 9.7*   HCT 30.0* 31.3* 31.2*    263 207      Recent Labs     01/21/21  0505 01/22/21  0530 01/23/21  0545   * 147* 142   K 5.0 4.9 5.6*   * 112* 107   CO2 26 26 26   PHOS 3.6 3.8  --    BUN 78* 79* 71*   CREATININE 1.2 1.2 1.0     No results for input(s): AST, ALT, ALB, BILIDIR, BILITOT, ALKPHOS in the last 72 hours. Recent Labs     01/21/21  0505 01/22/21  0530 01/23/21  0545   INR 2.13 1.61 1.28     No results for input(s): CKTOTAL, CKMB, CKMBINDEX, TROPONINI in the last 72 hours.         Electronically signed by Emi Sierra MD on 1/23/2021 at 12:40 PM

## 2021-01-23 NOTE — PROGRESS NOTES
RRT advanced the ETT down 1. .5 cc to 0.5 cc but when RRT did per Dr. John Urena,  he was agitted, his SaO2's dropped to 80% on 70%. So I placed him back to 23 @ the lip and increased his O2 to 100% and let the pt rest for a while without messing with the patient. Within minutes, the pt's SaO2 is 97% on 100% and +10 of peep. Pt is resting now.

## 2021-01-23 NOTE — PROGRESS NOTES
PHARMACY ANTICOAGULATION MONITORING SERVICE    Mallika Payne is a 77 y.o. male on warfarin therapy for history of DVT. Pharmacy consulted by Dr. Red Butts for monitoring and adjustment of treatment. Indication for anticoagulation: Hx of DVT  INR goal: 2-3  Warfarin dose prior to admission: 5 mg daily    Pertinent Laboratory Values   Recent Labs     01/21/21  0505 01/22/21  0530 01/23/21  0545   INR 2.13 1.61 1.28   HGB 9.4* 9.9* 9.7*   HCT 30.0* 31.3* 31.2*    263 207       Assessment/Plan:  ? Possible DDI's:   o Aspirin (home med), can increase bleeding risk, clinically appropriate  o Escitalopram (home med), can increase bleeding risk, appropriate to continue while inpatient  o Enoxaparin bridge stopped with therapeutic INR ; consider resuming now that INR trending down  o Tramadol may increase effects of warfarin  ? INR sub-therapeutic @ 1.28, continues downward trend. ? Continue with Warfarin 5 mg daily  ? Additional dose adjustments to be made per INR trends, interacting medications, and other clinical factors  ? Pharmacy will continue to monitor and adjust warfarin therapy as indicated.     Thank you for the consult,  Gigi Najera, [address]  1/23/2021 8:50 AM

## 2021-01-23 NOTE — PROGRESS NOTES
Comprehensive Nutrition Assessment    Type and Reason for Visit:  Reassess    Nutrition Recommendations/Plan:   · Increase EN to 110 mL/hr to provide the pt with 2640 kcal and 230 g of protein per day    Nutrition Assessment:  Pt EN is at goal of 55 mL/hr. Pt is having large fluid shifts that are causing him to appear to lose significant amounts of weight. Propofol has been d/c for versed instead. Will change EN order to meet kcal needs. Malnutrition Assessment:  Malnutrition Status:   At risk for malnutrition (Comment)    Context:  Acute Illness       Estimated Daily Nutrient Needs:  Energy (kcal):  2572; Weight Used for Energy Requirements:  Current     Protein (g):  190-236; Weight Used for Protein Requirements:  Ideal        Fluid (ml/day):   ; Method Used for Fluid Requirements:  Standard Renal         Wounds:  Pressure Injury, Stage II, Venous Stasis, Multiple       Current Nutrition Therapies:    Current Tube Feeding (TF) Orders:  · Feeding Route: Nasogastric  · Formula: Low Calorie, High Protein  · Schedule: Continuous  · Current TF & Flush Orders Provides: 1200 kcal and 105 g of protein    Anthropometric Measures:  · Height: 6' 5.01\" (195.6 cm)  · Current Body Weight: 274 lb (124.3 kg)   · Admission Body Weight: 296 lb (134.3 kg)    · Usual Body Weight: 307 lb (139.3 kg)     · Ideal Body Weight: 208 lbs; % Ideal Body Weight 131.7 %   · BMI: 32.5  · BMI Categories: Obese Class 2 (BMI 35.0 -39.9)       Nutrition Diagnosis:   · Inadequate oral intake related to impaired respiratory function as evidenced by NPO or clear liquid status due to medical condition      Nutrition Interventions:   Food and/or Nutrient Delivery:  Modify Tube Feeding  Nutrition Education/Counseling:  No recommendation at this time   Coordination of Nutrition Care:  Continue to monitor while inpatient    Goals:  pt will receive greater than 75% of his estimated needs       Nutrition Monitoring and Evaluation: Behavioral-Environmental Outcomes:  None Identified   Food/Nutrient Intake Outcomes:  Enteral Nutrition Intake/Tolerance  Physical Signs/Symptoms Outcomes:  Biochemical Data, Weight, Skin, GI Status, Hemodynamic Status, Fluid Status or Edema     Discharge Planning:     Too soon to determine     Electronically signed by Shirlene Mcallister RD, [email or web address] on 0/41/30 at 9:17 PM EST    Contact: [phone]

## 2021-01-23 NOTE — PROGRESS NOTES
Pulmonary and Critical Care  Progress Note    Subjective: The patient is sedated on vent. Shortness of breath none. Chest pain none. Addressing respiratory complaints Patient is negative for  hemoptysis and cyanosis  CONSTITUTIONAL:  negative for fevers and chills.       Past Medical History: injury of right buttock, stage 3 (HCC), WD-Skin tear of right forearm without complication, and WD-Ulcer of right pretibial region, with fat layer exposed ([facility] [address] has a past surgical history that includes Tonsillectomy (1953); Splenectomy (1958); Breast surgery (1970s); hernia repair (1970s); Skin graft (1978-present); Cataract removal (Right, 3/11/2013); Cataract removal (Left, 2/25/2013); Varicose vein surgery (Left, 2009); Carpal tunnel release (Left, 1993); Cardiac catheterization (6/3/14); Colonoscopy (2006); Colonoscopy (11/21/11); Colonoscopy (4/23/12); Colonoscopy (08/04/2016); Splenectomy; hernia repair; and Carpal tunnel release. reports that he quit smoking about 28 years ago. His smoking use included cigarettes. He has a 70.00 pack-year smoking history. He has never used smokeless tobacco. He reports current alcohol use. He reports that he does not use drugs. Family history:  family history includes Cancer in his brother and father; Diabetes in his brother; Heart Disease in his father; High Blood Pressure in his father; High Cholesterol in his father; Thyroid Disease in his brother.     Allergies   Allergen Reactions    Cavilon Durable Barrier [Mineral Oil-Dimeth-Coconut Oil]     Gentamycin [Gentamicin] Hives    Parabens Hives    Parabens Hives    Prinivil [Lisinopril] Swelling    Cortisone Rash    Cortisone Rash    Dilaudid [Hydromorphone Hcl] Rash    Penicillins Rash    Penicillins Rash    Sulfamethoxazole-Trimethoprim Nausea Only    Tape Raffaele Eth Tape] Rash     Social History:    Reviewed; no changes    Objective:   PHYSICAL EXAM:        VITALS:  BP (!) 115/57   Pulse 63   Temp 96.9 °F (36.1 °C) (Rectal)   Resp 22   Ht 6' 5.01\" (1.956 m)   Wt 274 lb 14.6 oz (124.7 kg)   SpO2 94%   BMI 32.59 kg/m²     24HR INTAKE/OUTPUT:      Intake/Output Summary (Last 24 hours) at 1/23/2021 1207  Last data filed at 1/23/2021 0700  Gross per 24 hour   Intake 2411 ml   Output 4170 ml Net -1759 ml       CONSTITUTIONAL:  Somnolent. LUNGS:  decreased breath sounds, basilar crackles. CARDIOVASCULAR:  normal S1 and S2 and negative JVD  ABD:Abdomen soft, non-tender. BS normal. No masses,  No organomegaly  NEURO:Sedated on vent. DATA:    CBC:  Recent Labs     01/21/21  0505 01/22/21  0530 01/23/21  0545   WBC 10.6* 13.1* 15.9*   RBC 3.24* 3.41* 3.33*   HGB 9.4* 9.9* 9.7*   HCT 30.0* 31.3* 31.2*    263 207   MCV 92.6 91.8 93.7   MCH 29.0 29.0 29.1   MCHC 31.3* 31.6* 31.1*   RDW 18.5* 18.3* 18.2*      BMP:  Recent Labs     01/21/21  0505 01/22/21  0530 01/23/21  0545   * 147* 142   K 5.0 4.9 5.6*   * 112* 107   CO2 26 26 26   BUN 78* 79* 71*   CREATININE 1.2 1.2 1.0   CALCIUM 7.4* 7.5* 7.5*   GLUCOSE 240* 67* 274*      ABG:  Recent Labs     01/21/21  0600 01/22/21  0600 01/23/21  0600   PH 7.46* 7.42 7.42   PO2ART 174* 67* 74*   URD9CYB 38.0 45.0 46.0*   O2SAT 96.5 92.0* 93.3*     Lab Results   Component Value Date    PROBNP 5,973 (H) 01/14/2021    PROBNP 1,642 (H) 01/12/2021    PROBNP 1,196 (H) 11/11/2020     No results found for: [facility]    Radiology Review:  Pertinent images / reports were reviewed as a part of this visit.     Assessment:     Patient Active Problem List   Diagnosis    Gastroesophageal reflux disease without esophagitis    Hypertension in stage 3 chronic kidney disease due to type 2 diabetes mellitus ([facility] [address]    DM (diabetes mellitus) type II, controlled, with peripheral vascular disorder ([facility] [address]    Combined hyperlipidemia associated with type 2 diabetes mellitus ([facility] [address]    Diabetic skin ulcer associated with type 2 diabetes mellitus (HCC)    CKD (chronic kidney disease)    History of DVT (deep vein thrombosis)- left femoral    History of pulmonary embolism    Long term current use of anticoagulants with INR goal of 2.0-3.0    COPD (chronic obstructive pulmonary disease) (HCC)    Severe recurrent major depressive disorder with psychotic features ([facility] [address]  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)    Venous (peripheral) insufficiency    Uncontrolled secondary diabetes mellitus with stage 3 CKD (GFR 30-59) (HCC)    Diabetes mellitus with skin ulcer (HCC)    WD-Ulcer of shin, left, with fat layer exposed ([address]    History of colon polyps    Personal history of PE (pulmonary embolism)    WD-Chronic venous hypertension (idiopathic) with ulcer of left lower extremity (CODE) ([facility] [address]    WD-Chronic venous hypertension with inflammation, right    Troponin I above reference range    KIRSTEN (acute kidney injury) ([address]    Cellulitis of lower extremity    Hyponatremia    Generalized weakness    Weakness    Multifocal pneumonia    Pneumonia due to COVID-19 virus    Hyperkalemia    Acute respiratory failure with hypoxia ([address]       Plan:   1. Wean sedation. 2. Wean FiO2.  3. Started on Lokelma.   Naty Martin MD  1/23/2021  12:07 PM

## 2021-01-23 NOTE — PROGRESS NOTES
Progress Note( Dr. Trupti Steve)    Subjective:   Admit Date: 1/13/2021  PCP: Deneen Bartlett MD    Admitted For : Initially admitted to [facility] and transferred here on January 13, 2021 for acute on chronic respiratory failure with hypoxia and patient has Covid pneumonia    Consulted For:  Better control of blood glucose    Interval History: Patient is sedated, intubated and on ventilator  Bolus tube feeding is on and off pending upon residual he has      Intake/Output Summary (Last 24 hours) at 1/23/2021 1650  Last data filed at 1/23/2021 1510  Gross per 24 hour   Intake 2060 ml   Output 2245 ml   Net -185 ml       DATA    CBC:     01/21/21  0505   WBC 10.6*   HGB 9.4*           CMP:    01/21/21  0505   *   K 5.0   *   CO2 26   BUN 78*   CREATININE 1.2   CALCIUM 7.4*         Lipids:   Lab Results   Component Value Date    CHOL 107 11/22/2019    CHOL 175 05/05/2015    HDL 46 11/22/2019    TRIG 85 01/18/2021       [id number]:  Lab Results   Component Value Date    LABA1C 11.6 01/19/2021     High Sensitivity TSH:   Lab Results   Component Value Date    TSHHS 1.770 11/21/2019     Free T3: No results found for: FT3  Free T4:  Lab Results   Component Value Date    T4FREE 1.14 11/21/2019         Worsening multifocal infiltrates seen throughout the lungs bilaterally concerning for multifocal pneumonia. Endotracheal tube measures 5.3 cm above the jose. Xr Chest Portable    Result Date: 1/21/2021  EXAMINATION: ONE XRAY VIEW OF THE CHEST 1/21/2021 5:00 am     1. Stable position of support lines and tubes. The left internal jugular central venous catheter terminates in the SVC but is directed laterally. 2. Persistent multifocal airspace consolidation, consistent with the history of COVID-19 pneumonia.      Xr Chest Portable    Result Date: 1/20/2021 EXAMINATION: ONE XRAY VIEW OF THE CHEST 1/20/2021 5:04 am COMPARISON: Yesterday HISTORY: ORDERING SYSTEM PROVIDED HISTORY: on vent l. Support devices unchanged in position. Stable areas of COVID-19 pneumonia. Xr Chest Portable    Result Date: 1/18/2021  EXAMINATION: ONE XRAY VIEW OF THE CHEST 1/18/2021 9:52 am COMPARISON: 01/18/2021, 01/16/2021 HISTORY: ORDERING SYSTEM PROVIDED HISTORY: ETT placement TECHNOLOGIST PROVIDED HISTORY: Reason for exam:->ETT placement Reason for exam:->verify ETT placement per Dr. Lynn Gutierrez, how many Cm above the jose? Reason for Exam: 1 FINDINGS: Endotracheal tube terminates 6.5 cm above the jose. Enteric tube courses below the diaphragm. Left internal jugular catheter with tip at the superior SVC directed laterally. Hazy ground-glass opacity areas of consolidation scattered through the lungs are improving from prior exams given differences in technique. No pneumothorax or pleural effusion. Borderline cardiomegaly is stable. Endotracheal tube terminates 6.5 cm above the jose. Moderate changes of COVID-19 pneumonia throughout the lungs with improvement since 01/16/2021. Xr Chest Portable    Result Date: 1/18/2021  EXAMINATION: ONE XRAY VIEW OF THE CHEST 1/18/2021 4:21 am COMPARISON: 01/17/2021 HISTORY: ORDERING SYSTEM PROVIDED HISTORY: on vent . 1. Diffuse bilateral lung infiltrates, likely related to pulmonary edema versus pneumonia.            Scheduled Medicines   Medications:    [START ON 1/25/2021] furosemide  40 mg Intravenous Once per day on Mon Wed Fri    sodium zirconium cyclosilicate  10 g Oral TID    insulin glargine  40 Units Subcutaneous Nightly    [START ON 1/24/2021] insulin NPH  30 Units Subcutaneous QAM    meropenem  1,000 mg Intravenous Q8H    minocycline  100 mg Oral BID    warfarin  5 mg Oral Daily    insulin regular  0-30 Units Subcutaneous Q6H    insulin regular  10 Units Subcutaneous Once  vancomycin  1,250 mg Intravenous Q24H    insulin regular  20 Units Subcutaneous 4 times per day    methylPREDNISolone  40 mg Intravenous Q8H    metoclopramide  10 mg Intravenous Q6H    alogliptin  12.5 mg Oral Daily    ARIPiprazole  15 mg Oral Daily    aspirin  81 mg Oral Daily    atorvastatin  40 mg Oral Nightly    buPROPion  300 mg Oral QAM    escitalopram  20 mg Oral Daily    metoprolol tartrate  12.5 mg Oral BID    sodium chloride flush  10 mL Intravenous 2 times per day    diclofenac sodium  2 g Topical BID    zinc sulfate  50 mg Oral Daily    albuterol sulfate HFA  2 puff Inhalation 4x daily    ipratropium  2 puff Inhalation 4x daily    chlorhexidine  15 mL Mouth/Throat BID    famotidine (PEPCID) injection  20 mg Intravenous BID      Infusions:    sodium chloride 75 mL/hr at 01/23/21 1507    midazolam 5 mg/hr (01/23/21 0234)    dextrose 100 mL/hr (01/21/21 1214)    cisatracurium (NIMBEX) infusion Stopped (01/18/21 1612)    norepinephrine Stopped (01/20/21 1446)    fentaNYL 75 mcg/hr (01/23/21 1632)    dextrose      sodium chloride           Objective:   Vitals: BP (!) 106/49   Pulse 75   Temp 96.9 °F (36.1 °C) (Rectal)   Resp 21   Ht 6' 5.01\" (1.956 m)   Wt 274 lb 14.6 oz (124.7 kg)   SpO2 93%   BMI 32.59 kg/m²   General appearance: Patient is sedated, intubated and on ventilator  Neck: no JVD or bruit  Thyroid : Normal lobes   Lungs: Has Vesicular Breath sounds   Heart:  regular rate and rhythm  Abdomen: soft, non-tender; bowel sounds normal; no masses,  no organomegaly  Musculoskeletal: Normal  Extremities: extremities normal, , no edema  Neurologic:  Patient is sedated, intubated and on ventilator      Assessment:     Patient Active Problem List:     Gastroesophageal reflux disease without esophagitis     Hypertension in stage 3 chronic kidney disease due to type 2 diabetes mellitus ([facility] [address] DM (diabetes mellitus) type II, controlled, with peripheral vascular disorder ([address]     Combined hyperlipidemia associated with type 2 diabetes mellitus ([address]     Diabetic skin ulcer associated with type 2 diabetes mellitus ([address]     CKD (chronic kidney disease)     History of DVT (deep vein thrombosis)- left femoral     History of pulmonary embolism     Long term current use of anticoagulants with INR goal of 2.0-3.0     COPD (chronic obstructive pulmonary disease) (HCC)     Severe recurrent major depressive disorder with psychotic features ([address]     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)     Venous (peripheral) insufficiency     Uncontrolled secondary diabetes mellitus with stage 3 CKD (GFR 30-59) (HCC)     Diabetes mellitus with skin ulcer (HCC)     WD-Ulcer of shin, left, with fat layer exposed ([address]     History of colon polyps     Personal history of PE (pulmonary embolism)     WD-Chronic venous hypertension (idiopathic) with ulcer of left lower extremity (CODE) ([address]     WD-Chronic venous hypertension with inflammation, right     Troponin I above reference range     KIRSTEN (acute kidney injury) ([address]     Cellulitis of lower extremity     Hyponatremia     Generalized weakness     Weakness     Multifocal pneumonia     Pneumonia due to COVID-19 virus     Hyperkalemia     Acute respiratory failure with hypoxia ([address]      Plan:     1. Reviewed POC blood glucose . Labs and X ray results   2. Reviewed Current Medicines   3. On his schedule plus correction bolus regular /basal Lantus /NPH insulin regimedrugs   4. Monitor Blood glucose frequently   5. Modified  the dose of Insulin/ other medicines as needed   6. Will follow     .      Lise Severs, MD

## 2021-01-24 ENCOUNTER — APPOINTMENT (OUTPATIENT)
Dept: GENERAL RADIOLOGY | Age: 76
DRG: 870 | End: 2021-01-24
Attending: INTERNAL MEDICINE
Payer: MEDICARE

## 2021-01-24 LAB
ANION GAP SERPL CALCULATED.3IONS-SCNC: 8 MMOL/L (ref 4–16)
BASE EXCESS MIXED: 3.3 (ref 0–1.2)
BUN BLDV-MCNC: 76 MG/DL (ref 6–23)
CALCIUM SERPL-MCNC: 7.6 MG/DL (ref 8.3–10.6)
CARBON MONOXIDE, BLOOD: 1.9 % (ref 0–5)
CHLORIDE BLD-SCNC: 110 MMOL/L (ref 99–110)
CO2 CONTENT: 30.6 MMOL/L (ref 19–24)
CO2: 26 MMOL/L (ref 21–32)
COMMENT: ABNORMAL
CREAT SERPL-MCNC: 1.1 MG/DL (ref 0.9–1.3)
CULTURE: NORMAL
CULTURE: NORMAL
D DIMER: >5250 NG/ML(DDU)
GFR AFRICAN AMERICAN: >60 ML/MIN/1.73M2
GFR NON-AFRICAN AMERICAN: >60 ML/MIN/1.73M2
GLUCOSE BLD-MCNC: 137 MG/DL (ref 70–99)
GLUCOSE BLD-MCNC: 145 MG/DL (ref 70–99)
GLUCOSE BLD-MCNC: 179 MG/DL (ref 70–99)
GLUCOSE BLD-MCNC: 236 MG/DL (ref 70–99)
GLUCOSE BLD-MCNC: 249 MG/DL (ref 70–99)
GLUCOSE BLD-MCNC: 265 MG/DL (ref 70–99)
GLUCOSE BLD-MCNC: 269 MG/DL (ref 70–99)
HCO3 ARTERIAL: 29.1 MMOL/L (ref 18–23)
HCT VFR BLD CALC: 30.1 % (ref 42–52)
HEMOGLOBIN: 9.6 GM/DL (ref 13.5–18)
HIGH SENSITIVE C-REACTIVE PROTEIN: 119.8 MG/L
INR BLD: 1.32 INDEX
Lab: NORMAL
Lab: NORMAL
MCH RBC QN AUTO: 29.4 PG (ref 27–31)
MCHC RBC AUTO-ENTMCNC: 31.9 % (ref 32–36)
MCV RBC AUTO: 92.3 FL (ref 78–100)
METHEMOGLOBIN ARTERIAL: 1.2 %
O2 SATURATION: 94.8 % (ref 96–97)
PCO2 ARTERIAL: 48 MMHG (ref 32–45)
PDW BLD-RTO: 17.6 % (ref 11.7–14.9)
PH BLOOD: 7.39 (ref 7.34–7.45)
PLATELET # BLD: 173 K/CU MM (ref 140–440)
PMV BLD AUTO: 12.9 FL (ref 7.5–11.1)
PO2 ARTERIAL: 82 MMHG (ref 75–100)
POTASSIUM SERPL-SCNC: 4.9 MMOL/L (ref 3.5–5.1)
PROCALCITONIN: 0.18
PROTHROMBIN TIME: 16 SECONDS (ref 11.7–14.5)
RBC # BLD: 3.26 M/CU MM (ref 4.6–6.2)
SODIUM BLD-SCNC: 144 MMOL/L (ref 135–145)
SPECIMEN: NORMAL
SPECIMEN: NORMAL
WBC # BLD: 15.1 K/CU MM (ref 4–10.5)

## 2021-01-24 PROCEDURE — 2700000000 HC OXYGEN THERAPY PER DAY

## 2021-01-24 PROCEDURE — 6360000002 HC RX W HCPCS: Performed by: INTERNAL MEDICINE

## 2021-01-24 PROCEDURE — 31500 INSERT EMERGENCY AIRWAY: CPT

## 2021-01-24 PROCEDURE — 2500000003 HC RX 250 WO HCPCS: Performed by: NURSE PRACTITIONER

## 2021-01-24 PROCEDURE — 85027 COMPLETE CBC AUTOMATED: CPT

## 2021-01-24 PROCEDURE — 94761 N-INVAS EAR/PLS OXIMETRY MLT: CPT

## 2021-01-24 PROCEDURE — 6370000000 HC RX 637 (ALT 250 FOR IP): Performed by: INTERNAL MEDICINE

## 2021-01-24 PROCEDURE — 80048 BASIC METABOLIC PNL TOTAL CA: CPT

## 2021-01-24 PROCEDURE — 89220 SPUTUM SPECIMEN COLLECTION: CPT

## 2021-01-24 PROCEDURE — 6360000002 HC RX W HCPCS: Performed by: STUDENT IN AN ORGANIZED HEALTH CARE EDUCATION/TRAINING PROGRAM

## 2021-01-24 PROCEDURE — 94640 AIRWAY INHALATION TREATMENT: CPT

## 2021-01-24 PROCEDURE — 99232 SBSQ HOSP IP/OBS MODERATE 35: CPT | Performed by: INTERNAL MEDICINE

## 2021-01-24 PROCEDURE — 85610 PROTHROMBIN TIME: CPT

## 2021-01-24 PROCEDURE — 82962 GLUCOSE BLOOD TEST: CPT

## 2021-01-24 PROCEDURE — 84145 PROCALCITONIN (PCT): CPT

## 2021-01-24 PROCEDURE — 51702 INSERT TEMP BLADDER CATH: CPT

## 2021-01-24 PROCEDURE — 86141 C-REACTIVE PROTEIN HS: CPT

## 2021-01-24 PROCEDURE — 71045 X-RAY EXAM CHEST 1 VIEW: CPT

## 2021-01-24 PROCEDURE — 2000000000 HC ICU R&B

## 2021-01-24 PROCEDURE — 94003 VENT MGMT INPAT SUBQ DAY: CPT

## 2021-01-24 PROCEDURE — 2500000003 HC RX 250 WO HCPCS: Performed by: INTERNAL MEDICINE

## 2021-01-24 PROCEDURE — 2580000003 HC RX 258: Performed by: INTERNAL MEDICINE

## 2021-01-24 PROCEDURE — 85379 FIBRIN DEGRADATION QUANT: CPT

## 2021-01-24 PROCEDURE — 6370000000 HC RX 637 (ALT 250 FOR IP): Performed by: NURSE PRACTITIONER

## 2021-01-24 PROCEDURE — 82803 BLOOD GASES ANY COMBINATION: CPT

## 2021-01-24 RX ADMIN — SODIUM CHLORIDE, PRESERVATIVE FREE 10 ML: 5 INJECTION INTRAVENOUS at 08:24

## 2021-01-24 RX ADMIN — VANCOMYCIN HYDROCHLORIDE 1250 MG: 5 INJECTION, POWDER, LYOPHILIZED, FOR SOLUTION INTRAVENOUS at 12:57

## 2021-01-24 RX ADMIN — ALBUTEROL SULFATE 2 PUFF: 90 AEROSOL, METERED RESPIRATORY (INHALATION) at 15:02

## 2021-01-24 RX ADMIN — Medication 2 PUFF: at 15:02

## 2021-01-24 RX ADMIN — METOCLOPRAMIDE 10 MG: 5 INJECTION, SOLUTION INTRAMUSCULAR; INTRAVENOUS at 01:32

## 2021-01-24 RX ADMIN — DICLOFENAC SODIUM 2 G: 10 GEL TOPICAL at 21:15

## 2021-01-24 RX ADMIN — FAMOTIDINE 20 MG: 10 INJECTION, SOLUTION INTRAVENOUS at 08:22

## 2021-01-24 RX ADMIN — METOCLOPRAMIDE 10 MG: 5 INJECTION, SOLUTION INTRAMUSCULAR; INTRAVENOUS at 08:24

## 2021-01-24 RX ADMIN — ARIPIPRAZOLE 15 MG: 10 TABLET ORAL at 08:21

## 2021-01-24 RX ADMIN — INSULIN HUMAN 10 UNITS: 100 INJECTION, SOLUTION PARENTERAL at 12:56

## 2021-01-24 RX ADMIN — ESCITALOPRAM OXALATE 20 MG: 10 TABLET ORAL at 08:22

## 2021-01-24 RX ADMIN — ALBUTEROL SULFATE 2 PUFF: 90 AEROSOL, METERED RESPIRATORY (INHALATION) at 19:26

## 2021-01-24 RX ADMIN — SODIUM ZIRCONIUM CYCLOSILICATE 10 G: 10 POWDER, FOR SUSPENSION ORAL at 14:47

## 2021-01-24 RX ADMIN — ALOGLIPTIN 12.5 MG: 12.5 TABLET, FILM COATED ORAL at 08:22

## 2021-01-24 RX ADMIN — MINOCYCLINE HYDROCHLORIDE 100 MG: 100 CAPSULE ORAL at 21:15

## 2021-01-24 RX ADMIN — METHYLPREDNISOLONE SODIUM SUCCINATE 40 MG: 40 INJECTION, POWDER, LYOPHILIZED, FOR SOLUTION INTRAMUSCULAR; INTRAVENOUS at 14:47

## 2021-01-24 RX ADMIN — Medication 100 MCG/HR: at 19:13

## 2021-01-24 RX ADMIN — METHYLPREDNISOLONE SODIUM SUCCINATE 40 MG: 40 INJECTION, POWDER, LYOPHILIZED, FOR SOLUTION INTRAMUSCULAR; INTRAVENOUS at 23:33

## 2021-01-24 RX ADMIN — INSULIN HUMAN 5 UNITS: 100 INJECTION, SOLUTION PARENTERAL at 06:05

## 2021-01-24 RX ADMIN — SODIUM CHLORIDE, PRESERVATIVE FREE 10 ML: 5 INJECTION INTRAVENOUS at 21:22

## 2021-01-24 RX ADMIN — MEROPENEM 1000 MG: 1 INJECTION, POWDER, FOR SOLUTION INTRAVENOUS at 12:56

## 2021-01-24 RX ADMIN — FAMOTIDINE 20 MG: 10 INJECTION, SOLUTION INTRAVENOUS at 21:15

## 2021-01-24 RX ADMIN — METOCLOPRAMIDE 10 MG: 5 INJECTION, SOLUTION INTRAMUSCULAR; INTRAVENOUS at 19:33

## 2021-01-24 RX ADMIN — INSULIN GLARGINE 40 UNITS: 100 INJECTION, SOLUTION SUBCUTANEOUS at 21:17

## 2021-01-24 RX ADMIN — MINOCYCLINE HYDROCHLORIDE 100 MG: 100 CAPSULE ORAL at 08:22

## 2021-01-24 RX ADMIN — ASPIRIN 81 MG CHEWABLE TABLET 81 MG: 81 TABLET CHEWABLE at 08:22

## 2021-01-24 RX ADMIN — SODIUM ZIRCONIUM CYCLOSILICATE 10 G: 10 POWDER, FOR SUSPENSION ORAL at 08:24

## 2021-01-24 RX ADMIN — CHLORHEXIDINE GLUCONATE 0.12% ORAL RINSE 15 ML: 1.2 LIQUID ORAL at 21:15

## 2021-01-24 RX ADMIN — INSULIN HUMAN 20 UNITS: 100 INJECTION, SOLUTION PARENTERAL at 13:05

## 2021-01-24 RX ADMIN — DICLOFENAC SODIUM 2 G: 10 GEL TOPICAL at 08:23

## 2021-01-24 RX ADMIN — MEROPENEM 1000 MG: 1 INJECTION, POWDER, FOR SOLUTION INTRAVENOUS at 04:32

## 2021-01-24 RX ADMIN — INSULIN HUMAN 20 UNITS: 100 INJECTION, SOLUTION PARENTERAL at 17:41

## 2021-01-24 RX ADMIN — ALBUTEROL SULFATE 2 PUFF: 90 AEROSOL, METERED RESPIRATORY (INHALATION) at 11:43

## 2021-01-24 RX ADMIN — ZINC SULFATE 220 MG (50 MG) CAPSULE 50 MG: CAPSULE at 08:23

## 2021-01-24 RX ADMIN — INSULIN HUMAN 15 UNITS: 100 INJECTION, SOLUTION PARENTERAL at 17:43

## 2021-01-24 RX ADMIN — METHYLPREDNISOLONE SODIUM SUCCINATE 40 MG: 40 INJECTION, POWDER, LYOPHILIZED, FOR SOLUTION INTRAMUSCULAR; INTRAVENOUS at 08:24

## 2021-01-24 RX ADMIN — ALBUTEROL SULFATE 2 PUFF: 90 AEROSOL, METERED RESPIRATORY (INHALATION) at 07:27

## 2021-01-24 RX ADMIN — Medication 2 PUFF: at 07:27

## 2021-01-24 RX ADMIN — Medication 2 PUFF: at 19:26

## 2021-01-24 RX ADMIN — BUPROPION HYDROCHLORIDE 300 MG: 150 TABLET, FILM COATED, EXTENDED RELEASE ORAL at 08:22

## 2021-01-24 RX ADMIN — METOCLOPRAMIDE 10 MG: 5 INJECTION, SOLUTION INTRAMUSCULAR; INTRAVENOUS at 13:05

## 2021-01-24 RX ADMIN — WARFARIN SODIUM 5 MG: 5 TABLET ORAL at 17:38

## 2021-01-24 RX ADMIN — Medication 2 PUFF: at 11:43

## 2021-01-24 RX ADMIN — SODIUM ZIRCONIUM CYCLOSILICATE 10 G: 10 POWDER, FOR SUSPENSION ORAL at 21:15

## 2021-01-24 RX ADMIN — ATORVASTATIN CALCIUM 40 MG: 40 TABLET, FILM COATED ORAL at 21:15

## 2021-01-24 RX ADMIN — METOPROLOL TARTRATE 12.5 MG: 25 TABLET, FILM COATED ORAL at 08:23

## 2021-01-24 RX ADMIN — METOPROLOL TARTRATE 12.5 MG: 25 TABLET, FILM COATED ORAL at 21:15

## 2021-01-24 RX ADMIN — CHLORHEXIDINE GLUCONATE 0.12% ORAL RINSE 15 ML: 1.2 LIQUID ORAL at 08:22

## 2021-01-24 RX ADMIN — MEROPENEM 1000 MG: 1 INJECTION, POWDER, FOR SOLUTION INTRAVENOUS at 20:32

## 2021-01-24 ASSESSMENT — PULMONARY FUNCTION TESTS
PIF_VALUE: 27
PIF_VALUE: 25
PIF_VALUE: 26
PIF_VALUE: 25
PIF_VALUE: 26
PIF_VALUE: 25
PIF_VALUE: 28
PIF_VALUE: 27
PIF_VALUE: 27
PIF_VALUE: 25
PIF_VALUE: 25
PIF_VALUE: 27
PIF_VALUE: 25
PIF_VALUE: 27
PIF_VALUE: 25
PIF_VALUE: 27
PIF_VALUE: 25
PIF_VALUE: 25
PIF_VALUE: 27
PIF_VALUE: 27

## 2021-01-24 NOTE — PROGRESS NOTES
Hospitalist Progress Note      Name:  Vicki Hoover /Age/Sex: 1945  [de-identified]77 y.o. male)   MRN & CSN:  [id number] & [id number] Admission Date/Time: 2021 12:17 AM   Location:  -A PCP: Jamshid Banuelos MD         Hospital Day: 12    Assessment and Plan:   Vicki Hoover is a 77 y.o.  male  who presents with shortness of breath, was transferred from [facility] to for management of COVID-19 pneumonia    · Acute on chronic hypoxic respiratory failure due to COVID-19 pneumonia, possible hospital-acquired pneumonia  · Septic shock due to COVID-19 pneumonia  -Leucocytosis  -Covid positive   -MRSA positive  -Blood cultures negative to date  -On steroids   -Remdesivir   -S/p convalescent plasma 2 units 2021  -On pressors  -Ventilator,intubated   management per pulmonology, still on high vent settings  -On IV vancomycin, cefepime   -Pulmo on board  -ID, continue vancomycin and cefepime, cefipime switched to meropenem  Sedation vacation    · Right lower extremity cellulitis  On vancomycin  · Diabetes mellitus type 2-poor control  on sliding scale, Lantus,  Endocrine on board    · Hyperkalemia -Lokelmar , Nephro on board    · KIRSTEN, likely due to ATN  On Lasix, with good urine output, nephro on board    · Moderate PEM  Dietitian on board, on tube feedings    Chronic medical condition  Hyperlipidemia, on statin  Hypertension  Morbid obesity, encourage weight loss and exercise  Lower extremity DVT, on Coumadin, INR therapeutic  Chronic respiratory failure at 3 L of O2    Prognosis guarded  Palliative care consult placed  Diet DIET TUBE FEED CONTINUOUS/CYCLIC NPO; Renal Formula (low potassium);  Nasogastric; Continuous; 55; 110; 24   DVT Prophylaxis [] Lovenox, []  Heparin, [] SCDs, []No VTE prophylaxis, patient ambulating   GI Prophylaxis [] PPI, [] H2 Blocker, [] No GI prophylaxis, patient is receiving diet/Tube Feeds   Code Status Full Code Disposition Patient requires continued admission due to   MDM [] Low, [] Moderate,[]  High  Patient's risk as above due to      History of Present Illness:     Pt S&E. Intubated , sedated    10-14 point ROS reviewed negative, unless as noted above    Objective: Intake/Output Summary (Last 24 hours) at 1/24/2021 1224  Last data filed at 1/24/2021 0655  Gross per 24 hour   Intake 3202 ml   Output 1660 ml   Net 1542 ml      Vitals:   Vitals:    01/24/21 1143   BP:    Pulse: 68   Resp: 10   Temp:    SpO2: 92%     Physical Exam:    GEN Intubated, sedated, opens eyes spontaneously, not following commands. Appears given age. EYES Pupils are equally round. No scleral erythema, discharge, or conjunctivitis. HENT Mucous membranes are moist. NG tube   NECK No apparent thyromegaly or masses. ET Tube insitu  RESP DimisheD BS bilaterallyno wheezes, rales or rhonchi. Symmetric chest movement   CARDIO/VASC S1/S2 auscultated. Regular rate without appreciable murmurs, rubs, or gallops. Peripheral pulses equal bilaterally and palpable. No peripheral edema. GI Abdomen is soft without significant tenderness, masses, or guarding. Bowel sounds are normoactive. Rectal exam deferred.  Starkey catheter is present. HEME/LYMPH No petechiae or ecchymoses. MSK No gross joint deformities. Spontaneous movement of all extremities  SKIN Normal coloration, warm, dry. NEURO Cranial nerves appear grossly intact,on vent.     Medications:   Medications:    [START ON 1/25/2021] furosemide  40 mg Intravenous Once per day on Mon Wed Fri    sodium zirconium cyclosilicate  10 g Oral TID    insulin glargine  40 Units Subcutaneous Nightly    insulin NPH  30 Units Subcutaneous QAM    meropenem  1,000 mg Intravenous Q8H    minocycline  100 mg Oral BID    warfarin  5 mg Oral Daily    insulin regular  0-30 Units Subcutaneous Q6H    insulin regular  10 Units Subcutaneous Once    vancomycin  1,250 mg Intravenous Q24H HGB 9.9* 9.7* 9.6*   HCT 31.3* 31.2* 30.1*    207 173      Recent Labs     01/22/21  0530 01/23/21  0545 01/24/21  0405   * 142 144   K 4.9 5.6* 4.9   * 107 110   CO2 26 26 26   PHOS 3.8  --   --    BUN 79* 71* 76*   CREATININE 1.2 1.0 1.1     No results for input(s): AST, ALT, ALB, BILIDIR, BILITOT, ALKPHOS in the last 72 hours. Recent Labs     01/22/21  0530 01/23/21  0545 01/24/21  0405   INR 1.61 1.28 1.32     No results for input(s): CKTOTAL, CKMB, CKMBINDEX, TROPONINI in the last 72 hours.         Electronically signed by Edd Narvaez MD on 1/24/2021 at 12:24 PM

## 2021-01-24 NOTE — PROGRESS NOTES
Progress Note( Dr. Rufino Trejo)  1/24/2021  Subjective:   Admit Date: 1/13/2021  PCP: Ella Carrasco MD    Admitted For : Initially admitted to [facility] and transferred here on January 13, 2021 for acute on chronic respiratory failure with hypoxia and patient has Covid pneumonia    Consulted For:  Better control of blood glucose    Interval History: Patient is sedated, intubated and on ventilator  Bolus tube feeding is on and off pending upon residual he has      Intake/Output Summary (Last 24 hours) at 1/24/2021 1656  Last data filed at 1/24/2021 1453  Gross per 24 hour   Intake 4203 ml   Output 1835 ml   Net 2368 ml       DATA    CBC:   Recent Labs     01/22/21  0530 01/23/21  0545 01/24/21  0405   WBC 13.1* 15.9* 15.1*   HGB 9.9* 9.7* 9.6*    207 173    CMP:  Recent Labs     01/22/21  0530 01/23/21  0545 01/24/21  0405   * 142 144   K 4.9 5.6* 4.9   * 107 110   CO2 26 26 26   BUN 79* 71* 76*   CREATININE 1.2 1.0 1.1   CALCIUM 7.5* 7.5* 7.6*     Lipids:   Lab Results   Component Value Date    CHOL 107 11/22/2019    CHOL 175 05/05/2015    HDL 46 11/22/2019    TRIG 85 01/18/2021     Glucose:  Recent Labs     01/24/21  0554 01/24/21  0820 01/24/21  1226   POCGLU 145* 179* 249*     [id number]:  Lab Results   Component Value Date    LABA1C 11.6 01/19/2021     High Sensitivity TSH:   Lab Results   Component Value Date    TSHHS 1.770 11/21/2019     Free T3: No results found for: FT3  Free T4:  Lab Results   Component Value Date    T4FREE 1.14 11/21/2019       Xr Chest Portable    Result Date: 1/23/2021  EXAMINATION: ONE XRAY VIEW OF THE CHEST 1/23/2021 5:04 am   . 1. Increased left basilar airspace opacity is likely related to atelectasis, although superimposed pneumonia and aspiration are not excluded. 2. Otherwise unchanged subpleural, basilar predominant airspace opacities and diffuse interstitial opacities likely due to COVID-19 pneumonia given patient history. Superimposed edema (noncardiogenic or cardiogenic) is not excluded. Xr Chest Portable    Result Date: 1/22/2021  EXAMINATION: ONE XRAY VIEW OF THE CHEST 1/22/2021 11:03 am      Worsening multifocal infiltrates seen throughout the lungs bilaterally concerning for multifocal pneumonia. Endotracheal tube measures 5.3 cm above the jose. Xr Chest Portable    Result Date: 1/21/2021  EXAMINATION: ONE XRAY VIEW OF THE CHEST 1/21/2021 5:00 am     1. Stable position of support lines and tubes. The left internal jugular central venous catheter terminates in the SVC but is directed laterally. 2. Persistent multifocal airspace consolidation, consistent with the history of COVID-19 pneumonia. Xr Chest Portable    Result Date: 1/20/2021  EXAMINATION: ONE XRAY VIEW OF THE CHEST 1/20/2021 5:04 am COMPARISON: Yesterday HISTORY: ORDERING SYSTEM PROVIDED HISTORY: on vent l. Support devices unchanged in position. Stable areas of COVID-19 pneumonia.           Xr Chest Portable    Result Date: 1/18/2021 EXAMINATION: ONE XRAY VIEW OF THE CHEST 1/18/2021 9:52 am COMPARISON: 01/18/2021, 01/16/2021 HISTORY: ORDERING SYSTEM PROVIDED HISTORY: ETT placement TECHNOLOGIST PROVIDED HISTORY: Reason for exam:->ETT placement Reason for exam:->verify ETT placement per Dr. Rosavla Perkins, how many Cm above the jose? Reason for Exam: 1 FINDINGS: Endotracheal tube terminates 6.5 cm above the jose. Enteric tube courses below the diaphragm. Left internal jugular catheter with tip at the superior SVC directed laterally. Hazy ground-glass opacity areas of consolidation scattered through the lungs are improving from prior exams given differences in technique. No pneumothorax or pleural effusion. Borderline cardiomegaly is stable. Endotracheal tube terminates 6.5 cm above the jose. Moderate changes of COVID-19 pneumonia throughout the lungs with improvement since 01/16/2021. Xr Chest Portable    Result Date: 1/18/2021  EXAMINATION: ONE XRAY VIEW OF THE CHEST 1/18/2021 4:21 am COMPARISON: 01/17/2021 HISTORY: ORDERING SYSTEM PROVIDED HISTORY: on vent . 1. Diffuse bilateral lung infiltrates, likely related to pulmonary edema versus pneumonia.            Scheduled Medicines   Medications:    [START ON 1/25/2021] furosemide  40 mg Intravenous Once per day on Mon Wed Fri    sodium zirconium cyclosilicate  10 g Oral TID    insulin glargine  40 Units Subcutaneous Nightly    insulin NPH  30 Units Subcutaneous QAM    meropenem  1,000 mg Intravenous Q8H    minocycline  100 mg Oral BID    warfarin  5 mg Oral Daily    insulin regular  0-30 Units Subcutaneous Q6H    insulin regular  10 Units Subcutaneous Once    vancomycin  1,250 mg Intravenous Q24H    insulin regular  20 Units Subcutaneous 4 times per day    methylPREDNISolone  40 mg Intravenous Q8H    metoclopramide  10 mg Intravenous Q6H    alogliptin  12.5 mg Oral Daily    ARIPiprazole  15 mg Oral Daily    aspirin  81 mg Oral Daily  atorvastatin  40 mg Oral Nightly    buPROPion  300 mg Oral QAM    escitalopram  20 mg Oral Daily    metoprolol tartrate  12.5 mg Oral BID    sodium chloride flush  10 mL Intravenous 2 times per day    diclofenac sodium  2 g Topical BID    zinc sulfate  50 mg Oral Daily    albuterol sulfate HFA  2 puff Inhalation 4x daily    ipratropium  2 puff Inhalation 4x daily    chlorhexidine  15 mL Mouth/Throat BID    famotidine (PEPCID) injection  20 mg Intravenous BID      Infusions:    midazolam 3 mg/hr (01/23/21 2147)    dextrose 100 mL/hr (01/21/21 1214)    cisatracurium (NIMBEX) infusion Stopped (01/18/21 1612)    norepinephrine Stopped (01/20/21 1446)    fentaNYL 75 mcg/hr (01/23/21 1632)    dextrose      sodium chloride           Objective:   Vitals: BP (!) 113/57   Pulse 62   Temp 98.3 °F (36.8 °C) (Rectal)   Resp 24   Ht 6' 5.01\" (1.956 m)   Wt 274 lb 14.6 oz (124.7 kg)   SpO2 99%   BMI 32.59 kg/m²   General appearance: Patient is sedated, intubated and on ventilator  Neck: no JVD or bruit  Thyroid : Normal lobes   Lungs: Has Vesicular Breath sounds   Heart:  regular rate and rhythm  Abdomen: soft, non-tender; bowel sounds normal; no masses,  no organomegaly  Musculoskeletal: Normal  Extremities: extremities normal, , no edema  Neurologic:  Patient is sedated, intubated and on ventilator      Assessment:     Patient Active Problem List:     Gastroesophageal reflux disease without esophagitis     Hypertension in stage 3 chronic kidney disease due to type 2 diabetes mellitus (HCC)     DM (diabetes mellitus) type II, controlled, with peripheral vascular disorder ([facility] [address]     Combined hyperlipidemia associated with type 2 diabetes mellitus (HCC)     Diabetic skin ulcer associated with type 2 diabetes mellitus (HCC)     CKD (chronic kidney disease)     History of DVT (deep vein thrombosis)- left femoral     History of pulmonary embolism Long term current use of anticoagulants with INR goal of 2.0-3.0     COPD (chronic obstructive pulmonary disease) (HCC)     Severe recurrent major depressive disorder with psychotic features ([address]     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)     Venous (peripheral) insufficiency     Uncontrolled secondary diabetes mellitus with stage 3 CKD (GFR 30-59) (HCC)     Diabetes mellitus with skin ulcer (HCC)     WD-Ulcer of shin, left, with fat layer exposed ([address]     History of colon polyps     Personal history of PE (pulmonary embolism)     WD-Chronic venous hypertension (idiopathic) with ulcer of left lower extremity (CODE) (HCC)     WD-Chronic venous hypertension with inflammation, right     Troponin I above reference range     KIRSTEN (acute kidney injury) ([address]     Cellulitis of lower extremity     Hyponatremia     Generalized weakness     Weakness     Multifocal pneumonia     Pneumonia due to COVID-19 virus     Hyperkalemia     Acute respiratory failure with hypoxia ([address]      Plan:     1. Reviewed POC blood glucose . Labs and X ray results   2. Reviewed Current Medicines   3. On his schedule plus correction bolus regular /basal Lantus /NPH insulin regimedrugs   4. Monitor Blood glucose frequently   5. Modified  the dose of Insulin/ other medicines as needed   6. Will follow     .      Eileen Lawson MD

## 2021-01-24 NOTE — PROGRESS NOTES
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, Anemia, Arm fracture, Arthritis, Cataract, Cataract, Cellulitis of left lower leg, Chronic venous hypertension with ulcer ([facility] [address] CKD (chronic kidney disease), Colon polyps, COPD (chronic obstructive pulmonary disease) ([facility] [address] Diabetes mellitus ([facility] [address] Diabetes mellitus ([facility] [address] Diabetes mellitus with peripheral circulatory disorder ([facility] [address] Diabetes mellitus with skin ulcer ([facility] [address] Diabetic peripheral neuropathy ([facility] [address] Diabetic skin ulcer associated with type 2 diabetes mellitus ([facility] [address] Diverticulosis, Femoral DVT (deep venous thrombosis) ([facility] [address] GERD (gastroesophageal reflux disease), Glaucoma, H/O cardiac catheterization, H/O Doppler ultrasound, H/O echocardiogram, H/O mumps orchitis, Hemorrhoids, History of nuclear stress test, HLD (hyperlipidemia), Hx of Doppler ultrasound, Hyperlipemia, Hyperlipidemia, Hypertension, Hypertension, Idiopathic chronic venous hypertension of left lower extremity with ulcer and inflammation (HCC), Leg ulcer ([address] No diabetic retinopathy OU, Non-pressure chronic ulcer of left lower leg with fat layer exposed ([facility] [address] Pulmonary embolism ([facility] [address] Sleep apnea, SOB (shortness of breath), Tendinitis, Type II or unspecified type diabetes mellitus with other specified manifestations, not stated as uncontrolled, Unspecified venous (peripheral) insufficiency, Urticaria, WD-Cellulitis of right anterior lower leg, WD-Cellulitis of right lower extremity, WD-Chronic venous hypertension (idiopathic) with ulcer of left lower extremity (CODE) ([facility] [address] WD-Chronic venous hypertension with inflammation, right, WD-Decubitus ulcer of left buttock, stage 3 ([address] 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 to breakdown of skin ([address] WD-Open wound of hand without complication, left, initial encounter, WD-Pressure injury of left buttock, stage 2 ([address] WD-Pressure injury of left buttock, stage 3 ([address] WD-Pressure Net 1542 ml       CONSTITUTIONAL:  Somnolent. LUNGS:  decreased breath sounds, basilar crackles. CARDIOVASCULAR:  normal S1 and S2 and negative JVD  ABD:Abdomen soft, non-tender. BS normal. No masses,  No organomegaly  NEURO:Sedated on vent. DATA:    CBC:  Recent Labs     01/22/21  0530 01/23/21  0545 01/24/21  0405   WBC 13.1* 15.9* 15.1*   RBC 3.41* 3.33* 3.26*   HGB 9.9* 9.7* 9.6*   HCT 31.3* 31.2* 30.1*    207 173   MCV 91.8 93.7 92.3   MCH 29.0 29.1 29.4   MCHC 31.6* 31.1* 31.9*   RDW 18.3* 18.2* 17.6*      BMP:  Recent Labs     01/22/21  0530 01/23/21  0545 01/24/21  0405   * 142 144   K 4.9 5.6* 4.9   * 107 110   CO2 26 26 26   BUN 79* 71* 76*   CREATININE 1.2 1.0 1.1   CALCIUM 7.5* 7.5* 7.6*   GLUCOSE 67* 274* 137*      ABG:  Recent Labs     01/22/21  0600 01/23/21  0600 01/24/21  0700   PH 7.42 7.42 7.39   PO2ART 67* 74* 82   FEX9RYP 45.0 46.0* 48.0*   O2SAT 92.0* 93.3* 94.8*     Lab Results   Component Value Date    PROBNP 5,973 (H) 01/14/2021    PROBNP 1,642 (H) 01/12/2021    PROBNP 1,196 (H) 11/11/2020     No results found for: [facility]    Radiology Review:  Pertinent images / reports were reviewed as a part of this visit.     Assessment:     Patient Active Problem List   Diagnosis    Gastroesophageal reflux disease without esophagitis    Hypertension in stage 3 chronic kidney disease due to type 2 diabetes mellitus ([facility] [address]    DM (diabetes mellitus) type II, controlled, with peripheral vascular disorder ([facility] [address]    Combined hyperlipidemia associated with type 2 diabetes mellitus ([facility] [address]    Diabetic skin ulcer associated with type 2 diabetes mellitus (HCC)    CKD (chronic kidney disease)    History of DVT (deep vein thrombosis)- left femoral    History of pulmonary embolism    Long term current use of anticoagulants with INR goal of 2.0-3.0    COPD (chronic obstructive pulmonary disease) (HCC)    Severe recurrent major depressive disorder with psychotic features ([facility] [address]  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)    Venous (peripheral) insufficiency    Uncontrolled secondary diabetes mellitus with stage 3 CKD (GFR 30-59) (HCC)    Diabetes mellitus with skin ulcer (HCC)    WD-Ulcer of shin, left, with fat layer exposed ([address]    History of colon polyps    Personal history of PE (pulmonary embolism)    WD-Chronic venous hypertension (idiopathic) with ulcer of left lower extremity (CODE) ([facility] [address]    WD-Chronic venous hypertension with inflammation, right    Troponin I above reference range    KIRSTEN (acute kidney injury) ([address]    Cellulitis of lower extremity    Hyponatremia    Generalized weakness    Weakness    Multifocal pneumonia    Pneumonia due to COVID-19 virus    Hyperkalemia    Acute respiratory failure with hypoxia ([address]       Plan:   1. Cont full vent support. 2. Wean FiO2.   Trinh Perez MD  1/24/2021  12:12 PM

## 2021-01-24 NOTE — PROGRESS NOTES
PHARMACY ANTICOAGULATION MONITORING SERVICE    Marco Antonio Echeverria is a 77 y.o. male on warfarin therapy for history of DVT. Pharmacy consulted by Dr. Maliha Emerson for monitoring and adjustment of treatment. Indication for anticoagulation: Hx of DVT  INR goal: 2-3  Warfarin dose prior to admission: 5 mg daily    Pertinent Laboratory Values   Recent Labs     01/22/21  0530 01/23/21  0545 01/24/21  0405   INR 1.61 1.28 1.32   HGB 9.9* 9.7* 9.6*   HCT 31.3* 31.2* 30.1*    207 173       Assessment/Plan:  ? Possible DDI's:   o Aspirin (home med), can increase bleeding risk, clinically appropriate  o Escitalopram (home med), can increase bleeding risk, appropriate to continue while inpatient  o Enoxaparin bridge stopped with therapeutic INR ; consider resuming now that INR trending down  o Tramadol may increase effects of warfarin  ? INR sub-therapeutic @ 1.32, started upward trend today  ? Continue Warfarin 5 mg daily  ? Additional dose adjustments to be made per INR trends, interacting medications, and other clinical factors  ? Pharmacy will continue to monitor and adjust warfarin therapy as indicated.     Thank you for the consult,  Tuan Youssef RPh  1/24/2021 10:44 AM

## 2021-01-24 NOTE — PROGRESS NOTES
Nephrology Progress Note        [address], [address] [address]  Phone: [phone]  Office Hours: 8:30AM  4:30PM  Monday - Friday 1/24/2021 10:33 AM  Subjective:   Admit Date: 1/13/2021  PCP: Jagruti Ashley MD  Interval History: good uop  intubated    Diet: DIET TUBE FEED CONTINUOUS/CYCLIC NPO; Renal Formula (low potassium);  Nasogastric; Continuous; 55; 110; 24      Data:   Scheduled Meds:   [START ON 1/25/2021] furosemide  40 mg Intravenous Once per day on Mon Wed Fri    sodium zirconium cyclosilicate  10 g Oral TID    insulin glargine  40 Units Subcutaneous Nightly    insulin NPH  30 Units Subcutaneous QAM    meropenem  1,000 mg Intravenous Q8H    minocycline  100 mg Oral BID    warfarin  5 mg Oral Daily    insulin regular  0-30 Units Subcutaneous Q6H    insulin regular  10 Units Subcutaneous Once    vancomycin  1,250 mg Intravenous Q24H    insulin regular  20 Units Subcutaneous 4 times per day    methylPREDNISolone  40 mg Intravenous Q8H    metoclopramide  10 mg Intravenous Q6H    alogliptin  12.5 mg Oral Daily    ARIPiprazole  15 mg Oral Daily    aspirin  81 mg Oral Daily    atorvastatin  40 mg Oral Nightly    buPROPion  300 mg Oral QAM    escitalopram  20 mg Oral Daily    metoprolol tartrate  12.5 mg Oral BID    sodium chloride flush  10 mL Intravenous 2 times per day    diclofenac sodium  2 g Topical BID    zinc sulfate  50 mg Oral Daily    albuterol sulfate HFA  2 puff Inhalation 4x daily    ipratropium  2 puff Inhalation 4x daily    chlorhexidine  15 mL Mouth/Throat BID    famotidine (PEPCID) injection  20 mg Intravenous BID     Continuous Infusions:   midazolam 3 mg/hr (01/23/21 2147)    dextrose 100 mL/hr (01/21/21 1214)    cisatracurium (NIMBEX) infusion Stopped (01/18/21 1612)    norepinephrine Stopped (01/20/21 1446)    fentaNYL 75 mcg/hr (01/23/21 1632)    dextrose      sodium chloride Heart[de-identified] regular rate and rhythm, S1, S2 normal,  Abdomen: soft, non distended,   Extremities: rle edema    Assessment and Plan:     Patient Active Problem List   Diagnosis    Gastroesophageal reflux disease without esophagitis    Hypertension in stage 3 chronic kidney disease due to type 2 diabetes mellitus ([address]    DM (diabetes mellitus) type II, controlled, with peripheral vascular disorder (HCC)    Combined hyperlipidemia associated with type 2 diabetes mellitus ([address]    Diabetic skin ulcer associated with type 2 diabetes mellitus (HCC)    CKD (chronic kidney disease)    History of DVT (deep vein thrombosis)- left femoral    History of pulmonary embolism    Long term current use of anticoagulants with INR goal of 2.0-3.0    COPD (chronic obstructive pulmonary disease) (HCC)    Severe recurrent major depressive disorder with psychotic features (HCC)    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)    Venous (peripheral) insufficiency    Uncontrolled secondary diabetes mellitus with stage 3 CKD (GFR 30-59) (HCC)    Diabetes mellitus with skin ulcer (HCC)    WD-Ulcer of shin, left, with fat layer exposed ([address]    History of colon polyps    Personal history of PE (pulmonary embolism)    WD-Chronic venous hypertension (idiopathic) with ulcer of left lower extremity (CODE) ([address]    WD-Chronic venous hypertension with inflammation, right    Troponin I above reference range    KIRSTEN (acute kidney injury) ([address]    Cellulitis of lower extremity    Hyponatremia    Generalized weakness    Weakness    Multifocal pneumonia    Pneumonia due to COVID-19 virus   hyperkalemia: recurrent//resolved  KIRSTEN: better  BLE cellulitis  Acute hypoxic resp failure from COVID 19  hypernatremia     Plan:  Cr stable  On lokelma  Continue free water 150ml every 4hrs for the hypernatremiacontrol  Avoid nephrotoxins and renally dose meds  Critically ill                   Electronically signed by Hilary Guzman DO on 1/24/2021 at 10:33 AM    [facility]  MD Nemesio Nguyen DO [address],  [place] [facility], [facility] [address]  PHONE: [phone]  FAX: [phone]

## 2021-01-24 NOTE — PROGRESS NOTES
[address]  consulted by Dr. Mavis Lara for monitoring and adjustment. Indication for treatment: COVID 19, MRSA pneumonia  Goal trough: 15 mcg/mL, -600    Pertinent Laboratory Values:   Temp Readings from Last 3 Encounters:   01/24/21 98.3 °F (36.8 °C) (Rectal)   01/12/21 97.1 °F (36.2 °C) (Infrared)   01/08/21 98.9 °F (37.2 °C) (Oral)     Recent Labs     01/22/21  0530 01/23/21  0545 01/24/21  0405   WBC 13.1* 15.9* 15.1*     Recent Labs     01/22/21  0530 01/23/21  0545 01/24/21  0405   BUN 79* 71* 76*   CREATININE 1.2 1.0 1.1     Estimated Creatinine Clearance: 86 mL/min (based on SCr of 1.1 mg/dL). Intake/Output Summary (Last 24 hours) at 1/24/2021 1053  Last data filed at 1/24/2021 0655  Gross per 24 hour   Intake 3202 ml   Output 1660 ml   Net 1542 ml       Pertinent Cultures:  Date    Source    Results  1/19   Blood    Negative  1/19   MRSA nasal screen  Positive     Vancomycin level:   TROUGH:    Recent Labs     01/23/21  1200   VANCOTROUGH 17.7     RANDOM:    No results for input(s): VANCORANDOM in the last 72 hours.     Assessment:  · WBC and temperature: elevated WBC (receiving methylprednisolone), afebrile   · SCr, BUN, and urine output: Scr improving   · Day(s) of therapy: 13  · Vancomycin concentration:    · 1/17: 21.1, supra-therapeutic on vancomycin 1250 mg q18h  · 1/19: 21.7, supra-therapeutic on vancomycin 1250 mg q24h   · 1/20: 14.8, therapeutic 48h post-dose   · 1/21: 18.5, therapeutic 18h post-dose   · 1/23:  17.7, therapeutic, 24h post dose    Plan:  · Previously pulse-dosing based on levels 2/2 renal function changes  · Renal function continues to improve; BUN remains elevated   · Will dose with caution   · Trough level therapeutic @ 17.7  · Will continue Vancomycin 1250 mg q24h   · Repeat trough due tomorrow AM  · Pharmacy will continue to monitor patient and adjust therapy as indicated    Lindsay 3 1/25 @6022

## 2021-01-25 ENCOUNTER — APPOINTMENT (OUTPATIENT)
Dept: GENERAL RADIOLOGY | Age: 76
DRG: 870 | End: 2021-01-25
Attending: INTERNAL MEDICINE
Payer: MEDICARE

## 2021-01-25 LAB
ANION GAP SERPL CALCULATED.3IONS-SCNC: 7 MMOL/L (ref 4–16)
ANISOCYTOSIS: ABNORMAL
BANDED NEUTROPHILS ABSOLUTE COUNT: 0.78 K/CU MM
BANDED NEUTROPHILS RELATIVE PERCENT: 5 % (ref 5–11)
BASOPHILS ABSOLUTE: 0.2 K/CU MM
BASOPHILS RELATIVE PERCENT: 1 % (ref 0–1)
BUN BLDV-MCNC: 73 MG/DL (ref 6–23)
CALCIUM SERPL-MCNC: 8.2 MG/DL (ref 8.3–10.6)
CHLORIDE BLD-SCNC: 109 MMOL/L (ref 99–110)
CO2: 26 MMOL/L (ref 21–32)
CREAT SERPL-MCNC: 1 MG/DL (ref 0.9–1.3)
CULTURE: ABNORMAL
D DIMER: >5250 NG/ML(DDU)
DIFFERENTIAL TYPE: ABNORMAL
DOSE AMOUNT: NORMAL
DOSE TIME: NORMAL
GFR AFRICAN AMERICAN: >60 ML/MIN/1.73M2
GFR NON-AFRICAN AMERICAN: >60 ML/MIN/1.73M2
GIANT PLATELETS: ABNORMAL
GLUCOSE BLD-MCNC: 206 MG/DL (ref 70–99)
GLUCOSE BLD-MCNC: 207 MG/DL (ref 70–99)
GLUCOSE BLD-MCNC: 210 MG/DL (ref 70–99)
GLUCOSE BLD-MCNC: 221 MG/DL (ref 70–99)
GLUCOSE BLD-MCNC: 244 MG/DL (ref 70–99)
GLUCOSE BLD-MCNC: 245 MG/DL (ref 70–99)
GLUCOSE BLD-MCNC: 250 MG/DL (ref 70–99)
GRAM SMEAR: ABNORMAL
HCT VFR BLD CALC: 30.6 % (ref 42–52)
HEMOGLOBIN: 9.2 GM/DL (ref 13.5–18)
HIGH SENSITIVE C-REACTIVE PROTEIN: 74.1 MG/L
HYPOCHROMIA: ABNORMAL
INR BLD: 1.34 INDEX
LYMPHOCYTES ABSOLUTE: 0.2 K/CU MM
LYMPHOCYTES RELATIVE PERCENT: 1 % (ref 24–44)
Lab: ABNORMAL
MAGNESIUM: 2.5 MG/DL (ref 1.8–2.4)
MCH RBC QN AUTO: 28.8 PG (ref 27–31)
MCHC RBC AUTO-ENTMCNC: 30.1 % (ref 32–36)
MCV RBC AUTO: 95.6 FL (ref 78–100)
METAMYELOCYTES ABSOLUTE COUNT: 0.31 K/CU MM
METAMYELOCYTES PERCENT: 2 %
MONOCYTES ABSOLUTE: 0.9 K/CU MM
MONOCYTES RELATIVE PERCENT: 6 % (ref 0–4)
MYELOCYTE PERCENT: 3 %
MYELOCYTES ABSOLUTE COUNT: 0.47 K/CU MM
NUCLEATED RED BLOOD CELLS: 1
PDW BLD-RTO: 17.7 % (ref 11.7–14.9)
PHOSPHORUS: 3.4 MG/DL (ref 2.5–4.9)
PLATELET # BLD: 153 K/CU MM (ref 140–440)
PMV BLD AUTO: 12.6 FL (ref 7.5–11.1)
POLYCHROMASIA: ABNORMAL
POTASSIUM SERPL-SCNC: 5.1 MMOL/L (ref 3.5–5.1)
PROCALCITONIN: 0.14
PROMYELOCYTES ABSOLUTE COUNT: 0.16 K/CU MM
PROMYELOCYTES PERCENT: 1 %
PROTHROMBIN TIME: 16.3 SECONDS (ref 11.7–14.5)
RBC # BLD: 3.2 M/CU MM (ref 4.6–6.2)
SEGMENTED NEUTROPHILS ABSOLUTE COUNT: 12.5 K/CU MM
SEGMENTED NEUTROPHILS RELATIVE PERCENT: 81 % (ref 36–66)
SMUDGE CELLS: PRESENT
SODIUM BLD-SCNC: 142 MMOL/L (ref 135–145)
SPECIMEN: ABNORMAL
TOXIC GRANULATION: PRESENT
VANCOMYCIN TROUGH: 19.3 UG/ML (ref 10–20)
WBC # BLD: 15.5 K/CU MM (ref 4–10.5)

## 2021-01-25 PROCEDURE — 6370000000 HC RX 637 (ALT 250 FOR IP): Performed by: INTERNAL MEDICINE

## 2021-01-25 PROCEDURE — 2500000003 HC RX 250 WO HCPCS: Performed by: INTERNAL MEDICINE

## 2021-01-25 PROCEDURE — 2580000003 HC RX 258: Performed by: INTERNAL MEDICINE

## 2021-01-25 PROCEDURE — 71045 X-RAY EXAM CHEST 1 VIEW: CPT

## 2021-01-25 PROCEDURE — 2500000003 HC RX 250 WO HCPCS: Performed by: NURSE PRACTITIONER

## 2021-01-25 PROCEDURE — 85027 COMPLETE CBC AUTOMATED: CPT

## 2021-01-25 PROCEDURE — 82962 GLUCOSE BLOOD TEST: CPT

## 2021-01-25 PROCEDURE — 2580000003 HC RX 258: Performed by: NURSE PRACTITIONER

## 2021-01-25 PROCEDURE — 6360000002 HC RX W HCPCS: Performed by: INTERNAL MEDICINE

## 2021-01-25 PROCEDURE — 85379 FIBRIN DEGRADATION QUANT: CPT

## 2021-01-25 PROCEDURE — 94761 N-INVAS EAR/PLS OXIMETRY MLT: CPT

## 2021-01-25 PROCEDURE — 89220 SPUTUM SPECIMEN COLLECTION: CPT

## 2021-01-25 PROCEDURE — 94640 AIRWAY INHALATION TREATMENT: CPT

## 2021-01-25 PROCEDURE — 86141 C-REACTIVE PROTEIN HS: CPT

## 2021-01-25 PROCEDURE — 2700000000 HC OXYGEN THERAPY PER DAY

## 2021-01-25 PROCEDURE — 6360000002 HC RX W HCPCS: Performed by: STUDENT IN AN ORGANIZED HEALTH CARE EDUCATION/TRAINING PROGRAM

## 2021-01-25 PROCEDURE — 6370000000 HC RX 637 (ALT 250 FOR IP): Performed by: NURSE PRACTITIONER

## 2021-01-25 PROCEDURE — 83735 ASSAY OF MAGNESIUM: CPT

## 2021-01-25 PROCEDURE — 2100000000 HC CCU R&B

## 2021-01-25 PROCEDURE — 80048 BASIC METABOLIC PNL TOTAL CA: CPT

## 2021-01-25 PROCEDURE — 99233 SBSQ HOSP IP/OBS HIGH 50: CPT | Performed by: INTERNAL MEDICINE

## 2021-01-25 PROCEDURE — 84100 ASSAY OF PHOSPHORUS: CPT

## 2021-01-25 PROCEDURE — 80202 ASSAY OF VANCOMYCIN: CPT

## 2021-01-25 PROCEDURE — 85610 PROTHROMBIN TIME: CPT

## 2021-01-25 PROCEDURE — 94003 VENT MGMT INPAT SUBQ DAY: CPT

## 2021-01-25 PROCEDURE — 84145 PROCALCITONIN (PCT): CPT

## 2021-01-25 PROCEDURE — 6360000002 HC RX W HCPCS: Performed by: PHYSICIAN ASSISTANT

## 2021-01-25 PROCEDURE — 85007 BL SMEAR W/DIFF WBC COUNT: CPT

## 2021-01-25 RX ORDER — WARFARIN SODIUM 6 MG/1
6 TABLET ORAL DAILY
Status: DISCONTINUED | OUTPATIENT
Start: 2021-01-25 | End: 2021-01-27 | Stop reason: DRUGHIGH

## 2021-01-25 RX ORDER — INSULIN GLARGINE 100 [IU]/ML
50 INJECTION, SOLUTION SUBCUTANEOUS NIGHTLY
Status: DISCONTINUED | OUTPATIENT
Start: 2021-01-25 | End: 2021-01-27

## 2021-01-25 RX ADMIN — ASPIRIN 81 MG CHEWABLE TABLET 81 MG: 81 TABLET CHEWABLE at 10:25

## 2021-01-25 RX ADMIN — MINOCYCLINE HYDROCHLORIDE 100 MG: 100 CAPSULE ORAL at 10:26

## 2021-01-25 RX ADMIN — SODIUM CHLORIDE, PRESERVATIVE FREE 10 ML: 5 INJECTION INTRAVENOUS at 10:28

## 2021-01-25 RX ADMIN — SODIUM CHLORIDE, PRESERVATIVE FREE 10 ML: 5 INJECTION INTRAVENOUS at 20:53

## 2021-01-25 RX ADMIN — MIDAZOLAM HYDROCHLORIDE 5 MG/HR: 5 INJECTION, SOLUTION INTRAMUSCULAR; INTRAVENOUS at 00:00

## 2021-01-25 RX ADMIN — INSULIN HUMAN 20 UNITS: 100 INJECTION, SOLUTION PARENTERAL at 12:36

## 2021-01-25 RX ADMIN — Medication 150 MCG/HR: at 15:01

## 2021-01-25 RX ADMIN — WARFARIN SODIUM 6 MG: 6 TABLET ORAL at 18:41

## 2021-01-25 RX ADMIN — Medication 2 PUFF: at 15:33

## 2021-01-25 RX ADMIN — INSULIN HUMAN 10 UNITS: 100 INJECTION, SOLUTION PARENTERAL at 00:04

## 2021-01-25 RX ADMIN — MIDAZOLAM HYDROCHLORIDE 6 MG/HR: 5 INJECTION, SOLUTION INTRAMUSCULAR; INTRAVENOUS at 20:35

## 2021-01-25 RX ADMIN — Medication 2 PUFF: at 20:10

## 2021-01-25 RX ADMIN — METHYLPREDNISOLONE SODIUM SUCCINATE 40 MG: 40 INJECTION, POWDER, LYOPHILIZED, FOR SOLUTION INTRAMUSCULAR; INTRAVENOUS at 16:12

## 2021-01-25 RX ADMIN — ZINC SULFATE 220 MG (50 MG) CAPSULE 50 MG: CAPSULE at 10:26

## 2021-01-25 RX ADMIN — CHLORHEXIDINE GLUCONATE 0.12% ORAL RINSE 15 ML: 1.2 LIQUID ORAL at 10:25

## 2021-01-25 RX ADMIN — DICLOFENAC SODIUM 2 G: 10 GEL TOPICAL at 11:24

## 2021-01-25 RX ADMIN — FUROSEMIDE 40 MG: 10 INJECTION, SOLUTION INTRAVENOUS at 10:26

## 2021-01-25 RX ADMIN — SODIUM CHLORIDE 0.2 MCG/KG/HR: 9 INJECTION, SOLUTION INTRAVENOUS at 22:06

## 2021-01-25 RX ADMIN — METOCLOPRAMIDE 10 MG: 5 INJECTION, SOLUTION INTRAMUSCULAR; INTRAVENOUS at 08:24

## 2021-01-25 RX ADMIN — ALBUTEROL SULFATE 2 PUFF: 90 AEROSOL, METERED RESPIRATORY (INHALATION) at 20:10

## 2021-01-25 RX ADMIN — METOCLOPRAMIDE 10 MG: 5 INJECTION, SOLUTION INTRAMUSCULAR; INTRAVENOUS at 12:54

## 2021-01-25 RX ADMIN — INSULIN GLARGINE 50 UNITS: 100 INJECTION, SOLUTION SUBCUTANEOUS at 20:38

## 2021-01-25 RX ADMIN — Medication 150 MCG/HR: at 20:36

## 2021-01-25 RX ADMIN — INSULIN HUMAN 30 UNITS: 100 INJECTION, SUSPENSION SUBCUTANEOUS at 10:34

## 2021-01-25 RX ADMIN — INSULIN HUMAN 20 UNITS: 100 INJECTION, SOLUTION PARENTERAL at 00:00

## 2021-01-25 RX ADMIN — CHLORHEXIDINE GLUCONATE 0.12% ORAL RINSE 15 ML: 1.2 LIQUID ORAL at 20:53

## 2021-01-25 RX ADMIN — INSULIN HUMAN 20 UNITS: 100 INJECTION, SOLUTION PARENTERAL at 05:59

## 2021-01-25 RX ADMIN — ALBUTEROL SULFATE 2 PUFF: 90 AEROSOL, METERED RESPIRATORY (INHALATION) at 15:33

## 2021-01-25 RX ADMIN — Medication 2 PUFF: at 11:20

## 2021-01-25 RX ADMIN — ARIPIPRAZOLE 15 MG: 10 TABLET ORAL at 10:33

## 2021-01-25 RX ADMIN — INSULIN HUMAN 20 UNITS: 100 INJECTION, SOLUTION PARENTERAL at 18:30

## 2021-01-25 RX ADMIN — METOPROLOL TARTRATE 12.5 MG: 25 TABLET, FILM COATED ORAL at 10:26

## 2021-01-25 RX ADMIN — MINOCYCLINE HYDROCHLORIDE 100 MG: 100 CAPSULE ORAL at 20:33

## 2021-01-25 RX ADMIN — MEROPENEM 1000 MG: 1 INJECTION, POWDER, FOR SOLUTION INTRAVENOUS at 11:28

## 2021-01-25 RX ADMIN — INSULIN HUMAN 10 UNITS: 100 INJECTION, SOLUTION PARENTERAL at 18:31

## 2021-01-25 RX ADMIN — ALBUTEROL SULFATE 2 PUFF: 90 AEROSOL, METERED RESPIRATORY (INHALATION) at 11:20

## 2021-01-25 RX ADMIN — FAMOTIDINE 20 MG: 10 INJECTION, SOLUTION INTRAVENOUS at 10:26

## 2021-01-25 RX ADMIN — ALBUTEROL SULFATE 2 PUFF: 90 AEROSOL, METERED RESPIRATORY (INHALATION) at 07:11

## 2021-01-25 RX ADMIN — MEROPENEM 1000 MG: 1 INJECTION, POWDER, FOR SOLUTION INTRAVENOUS at 20:33

## 2021-01-25 RX ADMIN — Medication 2 PUFF: at 07:11

## 2021-01-25 RX ADMIN — INSULIN HUMAN 15 UNITS: 100 INJECTION, SOLUTION PARENTERAL at 12:34

## 2021-01-25 RX ADMIN — FENTANYL CITRATE 25 MCG: 50 INJECTION INTRAMUSCULAR; INTRAVENOUS at 11:21

## 2021-01-25 RX ADMIN — FAMOTIDINE 20 MG: 10 INJECTION, SOLUTION INTRAVENOUS at 20:33

## 2021-01-25 RX ADMIN — INSULIN HUMAN 10 UNITS: 100 INJECTION, SOLUTION PARENTERAL at 06:03

## 2021-01-25 RX ADMIN — Medication 100 MCG/HR: at 05:58

## 2021-01-25 RX ADMIN — MEROPENEM 1000 MG: 1 INJECTION, POWDER, FOR SOLUTION INTRAVENOUS at 03:41

## 2021-01-25 RX ADMIN — ATORVASTATIN CALCIUM 40 MG: 40 TABLET, FILM COATED ORAL at 20:33

## 2021-01-25 RX ADMIN — DICLOFENAC SODIUM 2 G: 10 GEL TOPICAL at 20:54

## 2021-01-25 RX ADMIN — SODIUM ZIRCONIUM CYCLOSILICATE 10 G: 10 POWDER, FOR SUSPENSION ORAL at 10:55

## 2021-01-25 RX ADMIN — ALOGLIPTIN 12.5 MG: 12.5 TABLET, FILM COATED ORAL at 10:26

## 2021-01-25 RX ADMIN — VANCOMYCIN HYDROCHLORIDE 1250 MG: 5 INJECTION, POWDER, LYOPHILIZED, FOR SOLUTION INTRAVENOUS at 12:54

## 2021-01-25 RX ADMIN — METHYLPREDNISOLONE SODIUM SUCCINATE 40 MG: 40 INJECTION, POWDER, LYOPHILIZED, FOR SOLUTION INTRAMUSCULAR; INTRAVENOUS at 23:09

## 2021-01-25 RX ADMIN — METOCLOPRAMIDE 10 MG: 5 INJECTION, SOLUTION INTRAMUSCULAR; INTRAVENOUS at 20:33

## 2021-01-25 RX ADMIN — METOCLOPRAMIDE 10 MG: 5 INJECTION, SOLUTION INTRAMUSCULAR; INTRAVENOUS at 01:57

## 2021-01-25 RX ADMIN — METHYLPREDNISOLONE SODIUM SUCCINATE 40 MG: 40 INJECTION, POWDER, LYOPHILIZED, FOR SOLUTION INTRAMUSCULAR; INTRAVENOUS at 08:24

## 2021-01-25 RX ADMIN — ESCITALOPRAM OXALATE 20 MG: 10 TABLET ORAL at 10:25

## 2021-01-25 ASSESSMENT — PAIN SCALES - GENERAL
PAINLEVEL_OUTOF10: 0
PAINLEVEL_OUTOF10: 0
PAINLEVEL_OUTOF10: 2
PAINLEVEL_OUTOF10: 0
PAINLEVEL_OUTOF10: 0

## 2021-01-25 ASSESSMENT — PULMONARY FUNCTION TESTS
PIF_VALUE: 27
PIF_VALUE: 29
PIF_VALUE: 29
PIF_VALUE: 27
PIF_VALUE: 29
PIF_VALUE: 27
PIF_VALUE: 29

## 2021-01-25 NOTE — PROGRESS NOTES
[address] [facility]  consulted by Dr. Dorian Quiñonez for monitoring and adjustment. Indication for treatment: COVID 19, MRSA pneumonia  Goal trough: 15 mcg/mL, -600    Pertinent Laboratory Values:   Temp Readings from Last 3 Encounters:   01/25/21 97.2 °F (36.2 °C) (Rectal)   01/12/21 97.1 °F (36.2 °C) (Infrared)   01/08/21 98.9 °F (37.2 °C) (Oral)     Recent Labs     01/23/21  0545 01/24/21  0405 01/25/21  0350   WBC 15.9* 15.1* 15.5*     Recent Labs     01/23/21  0545 01/24/21  0405 01/25/21  0350   BUN 71* 76* 73*   CREATININE 1.0 1.1 1.0     Estimated Creatinine Clearance: 95 mL/min (based on SCr of 1 mg/dL). Intake/Output Summary (Last 24 hours) at 1/25/2021 0958  Last data filed at 1/25/2021 0499  Gross per 24 hour   Intake 3948 ml   Output 2005 ml   Net 1943 ml       Pertinent Cultures:  Date    Source    Results  1/19   Blood    Negative  1/19   MRSA nasal screen  Positive     Vancomycin level:   TROUGH:    Recent Labs     01/23/21  1200   VANCOTROUGH 17.7     RANDOM:    No results for input(s): VANCORANDOM in the last 72 hours.     Assessment:  · WBC and temperature: elevated WBC (receiving methylprednisolone), afebrile   · SCr, BUN, and urine output: Scr improving   · Day(s) of therapy: 14  · Vancomycin concentration:    · 1/17: 21.1, supra-therapeutic on vancomycin 1250 mg q18h  · 1/19: 21.7, supra-therapeutic on vancomycin 1250 mg q24h   · 1/20: 14.8, therapeutic 48h post-dose   · 1/21: 18.5, therapeutic 18h post-dose   · 1/23:  17.7, therapeutic, 24h post-dose  · 1/25: 19.3, above goal, 24h post-dose     Plan:  · Previously pulse-dosing based on levels 2/2 renal function changes  · Renal function continues to improve; BUN remains elevated   · Will dose with caution   · Trough slightly above goal on 1250 mg q24h   · Will decrease to 1000 mg q24h   · Pharmacy will continue to monitor patient and adjust therapy as indicated VANCOMYCIN CONCENTRATION SCHEDULED FOR 1/28 @ 1130     Thank you for the consult.   Herve Aviles PharmD, BCPS  1/25/2021  9:58 AM

## 2021-01-25 NOTE — PROGRESS NOTES
Infectious Disease Progress Note  2021   Patient Name: Gray Marinelli : 1945       Reason for visit: F/u COVID-19 pneumonia, acute respiratory failure? History:? Interval history noted  Intubated, sedated and on mechanical ventilation  Physical Exam:  Vital Signs: BP (!) 117/54   Pulse 70   Temp 97.7 °F (36.5 °C) (Rectal)   Resp 9   Ht 6' 5.01\" (1.956 m)   Wt 274 lb 14.6 oz (124.7 kg)   SpO2 93%   BMI 32.59 kg/m²     Gen: intubated and sedated  Skin: no stigmata of endocarditis  Wounds:  Left anterior shin superficial wound, C/D/I  HEMT: AT/NC ETT, NGT   Eyes: PERRLA. Neck: Supple. Trachea midline. No LAD. Chest:  transmitted breath sounds. Heart:   Abd: soft, non-distended, no tenderness, no hepatomegaly. Normoactive bowel sounds. Ext: no clubbing, cyanosis, or edema  Catheter Site: without erythema or tenderness  LDA: CVC: left IJ, Urethral catheter: 2021  Neuro: sedated and on the ventilator. Radiologic / Imaging / TESTING  CXR 2021      1. Increased left basilar airspace opacity is likely related to atelectasis, although superimposed pneumonia and aspiration are not excluded. 2. Otherwise unchanged subpleural, basilar predominant airspace opacities and diffuse interstitial opacities likely due to COVID-19 pneumonia given patient history. Superimposed edema (noncardiogenic or cardiogenic) is not excluded.         Labs:    Recent Results (from the past 24 hour(s))   Blood Gas, Arterial    Collection Time: 21  7:00 AM   Result Value Ref Range    pH, Bld 7.39 7.34 - 7.45    pCO2, Arterial 48.0 (H) 32 - 45 MMHG    pO2, Arterial 82 75 - 100 MMHG    Base Exc, Mixed 3.3 (H) 0 - 1.2    HCO3, Arterial 29.1 (H) 18 - 23 MMOL/L    CO2 Content 30.6 (H) 19 - 24 MMOL/L    O2 Sat 94.8 (L) 96 - 97 %    Carbon Monoxide, Blood 1.9 0 - 5 %    Methemoglobin, Arterial 1.2 <1.5 %    Comment ACPC 20 PI15 65% +10    POCT Glucose    Collection Time: 21  8:20 AM Result Value Ref Range    POC Glucose 179 (H) 70 - 99 MG/DL   POCT Glucose    Collection Time: 01/24/21 12:26 PM   Result Value Ref Range    POC Glucose 249 (H) 70 - 99 MG/DL   POCT Glucose    Collection Time: 01/24/21  5:40 PM   Result Value Ref Range    POC Glucose 269 (H) 70 - 99 MG/DL   POCT Glucose    Collection Time: 01/24/21  8:27 PM   Result Value Ref Range    POC Glucose 265 (H) 70 - 99 MG/DL   POCT Glucose    Collection Time: 01/24/21 11:44 PM   Result Value Ref Range    POC Glucose 236 (H) 70 - 99 MG/DL   Basic metabolic panel    Collection Time: 01/25/21  3:50 AM   Result Value Ref Range    Sodium 142 135 - 145 MMOL/L    Potassium 5.1 3.5 - 5.1 MMOL/L    Chloride 109 99 - 110 mMol/L    CO2 26 21 - 32 MMOL/L    Anion Gap 7 4 - 16    BUN 73 (H) 6 - 23 MG/DL    CREATININE 1.0 0.9 - 1.3 MG/DL    Glucose 207 (H) 70 - 99 MG/DL    Calcium 8.2 (L) 8.3 - 10.6 MG/DL    GFR Non-African American >60 >60 mL/min/1.73m2    GFR African American >60 >60 mL/min/1.73m2   C-reactive protein    Collection Time: 01/25/21  3:50 AM   Result Value Ref Range    CRP, High Sensitivity 74.1 mg/L   D-dimer, quantitative    Collection Time: 01/25/21  3:50 AM   Result Value Ref Range    D-Dimer, Quant >5250 (H) <230 NG/mL(DDU)   Protime-INR    Collection Time: 01/25/21  3:50 AM   Result Value Ref Range    Protime 16.3 (H) 11.7 - 14.5 SECONDS    INR 1.34 INDEX   CBC auto differential    Collection Time: 01/25/21  3:50 AM   Result Value Ref Range    WBC 15.5 (H) 4.0 - 10.5 K/CU MM    RBC 3.20 (L) 4.6 - 6.2 M/CU MM    Hemoglobin 9.2 (L) 13.5 - 18.0 GM/DL    Hematocrit 30.6 (L) 42 - 52 %    MCV 95.6 78 - 100 FL    MCH 28.8 27 - 31 PG    MCHC 30.1 (L) 32.0 - 36.0 %    RDW 17.7 (H) 11.7 - 14.9 %    Platelets 562 895 - 909 K/CU MM    MPV 12.6 (H) 7.5 - 11.1 FL    Promyelocytes Percent 1 (HH) 0.0 %    Myelocyte Percent 3 (H) 0.0 %    Metamyelocytes Relative 2 (H) 0.0 %    Bands Relative 5 5 - 11 %    Segs Relative 81.0 (H) 36 - 66 % Basophils % 1.0 0 - 1 %    Lymphocytes % 1.0 (L) 24 - 44 %    Monocytes % 6.0 (H) 0 - 4 %    nRBC 1     Promyelocytes Absolute 0.16 K/CU MM    Myelocytes Absolute 0.47 K/CU MM    Metamyelocytes Absolute 0.31 K/CU MM    Bands Absolute 0.78 K/CU MM    Segs Absolute 12.5 K/CU MM    Basophils Absolute 0.2 K/CU MM    Lymphocytes Absolute 0.2 K/CU MM    Monocytes Absolute 0.9 K/CU MM    Differential Type MANUAL DIFFERENTIAL     Anisocytosis 1+     Hypochromia 1+     Polychromasia 1+     Toxic Granulation PRESENT     Smudge Cells PRESENT     Giant PLTs PRESENT  PRESENT      Magnesium    Collection Time: 01/25/21  3:50 AM   Result Value Ref Range    Magnesium 2.5 (H) 1.8 - 2.4 mg/dl   Phosphorus    Collection Time: 01/25/21  3:50 AM   Result Value Ref Range    Phosphorus 3.4 2.5 - 4.9 MG/DL   POCT Glucose    Collection Time: 01/25/21  3:53 AM   Result Value Ref Range    POC Glucose 206 (H) 70 - 99 MG/DL   POCT Glucose    Collection Time: 01/25/21  5:43 AM   Result Value Ref Range    POC Glucose 221 (H) 70 - 99 MG/DL     CULTURE results: Invalid input(s): BLOOD CULTURE,  URINE CULTURE, SURGICAL CULTURE    Diagnosis:  Patient Active Problem List   Diagnosis    Gastroesophageal reflux disease without esophagitis    Hypertension in stage 3 chronic kidney disease due to type 2 diabetes mellitus ([facility] [address]    DM (diabetes mellitus) type II, controlled, with peripheral vascular disorder ([address]    Combined hyperlipidemia associated with type 2 diabetes mellitus ([address]    Diabetic skin ulcer associated with type 2 diabetes mellitus (HCC)    CKD (chronic kidney disease)    History of DVT (deep vein thrombosis)- left femoral    History of pulmonary embolism    Long term current use of anticoagulants with INR goal of 2.0-3.0    COPD (chronic obstructive pulmonary disease) (HCC)    Severe recurrent major depressive disorder with psychotic features ([address]    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 ([address]  Venous (peripheral) insufficiency    Uncontrolled secondary diabetes mellitus with stage 3 CKD (GFR 30-59) (HCC)    Diabetes mellitus with skin ulcer (HCC)    WD-Ulcer of shin, left, with fat layer exposed ([facility] [address]    History of colon polyps    Personal history of PE (pulmonary embolism)    WD-Chronic venous hypertension (idiopathic) with ulcer of left lower extremity (CODE) ([facility] [address]    WD-Chronic venous hypertension with inflammation, right    Troponin I above reference range    KIRSTEN (acute kidney injury) ([facility] [address]    Cellulitis of lower extremity    Hyponatremia    Generalized weakness    Weakness    Multifocal pneumonia    Pneumonia due to COVID-19 virus    Hyperkalemia    Acute respiratory failure with hypoxia (HCC)       Active Problems  Active Problems:    Pneumonia due to COVID-19 virus    Hyperkalemia    Acute respiratory failure with hypoxia (HCC)  Resolved Problems:    * No resolved hospital problems. *      Impression and plan  ? Summary and rationale: Patient is a 77 y.o.  male T2DM, hyperlipidemia, htn, depression, morbid obesity who was admitted 1/13/2021 for further evaluation and management of shortness of breath that started on 1/8/2021 and positive COVID test. Has critical COVID-19 pneumonia, in hyperinflammatory phase. MRSA and K pneumoniae super-imposed bacterial pneumonia  ? Clinical status: remains severe,some reduction in FiO2 and CRP   ? MRSA nares positive. On vancomycin. IL-6; 20.7, Aspergillus Ag,   ? Therapeutic:  o Ongoing antibiotics: vancomycin,  meropenem   o Antiviral agent: remdesivir 1/13-1/16  o Anti-inflammatory agents:  ? Dexamethasone:1/12-19  ? Solu-Medrol: 1/17. 1/19-  o Completed antibiotics:   o Convalescent plasma receipt:  o Other agents:   ? Diagnostic: respiratory cx, blood cx, UA, urine cx. ? F/u: Blood cx 1/19, 0/2 ngtd; Aspergillus Ag, BDG, IL-6  ?  Other: Electronically signed by: Electronically signed by Bipin Randall MD on 1/25/2021 at 6:37 AM

## 2021-01-25 NOTE — PROGRESS NOTES
GI Prophylaxis [] PPI, [] H2 Blocker, [] No GI prophylaxis, patient is receiving diet/Tube Feeds   Code Status Full Code   Disposition Patient requires continued admission due to   MDM [] Low, [] Moderate,[]  High  Patient's risk as above due to      History of Present Illness:     Pt S&E. Intubated , sedated    10-14 point ROS reviewed negative, unless as noted above    Objective: Intake/Output Summary (Last 24 hours) at 1/25/2021 1402  Last data filed at 1/25/2021 1232  Gross per 24 hour   Intake 3948 ml   Output 3280 ml   Net 668 ml      Vitals:   Vitals:    01/25/21 1200   BP: (!) 113/55   Pulse: 76   Resp: 9   Temp: 96.9 °F (36.1 °C)   SpO2: 94%     Physical Exam:    GEN Intubated, sedated, opens eyes spontaneously, not following commands. Appears given age. EYES Pupils are equally round. No scleral erythema, discharge, or conjunctivitis. HENT Mucous membranes are moist. NG tube   NECK No apparent thyromegaly or masses. ET Tube insitu  RESP DimisheD BS bilaterallyno wheezes, rales or rhonchi. Symmetric chest movement   CARDIO/VASC S1/S2 auscultated. Regular rate without appreciable murmurs, rubs, or gallops. Peripheral pulses equal bilaterally and palpable. No peripheral edema. GI Abdomen is soft without significant tenderness, masses, or guarding. Bowel sounds are normoactive. Rectal exam deferred.  Starkey catheter is present. HEME/LYMPH No petechiae or ecchymoses. MSK No gross joint deformities. Spontaneous movement of all extremities  SKIN Normal coloration, warm, dry. NEURO Cranial nerves appear grossly intact,on vent.     Medications:   Medications:    sodium zirconium cyclosilicate  10 g Oral Daily    insulin glargine  50 Units Subcutaneous Nightly    warfarin  6 mg Oral Daily    [START ON 1/26/2021] vancomycin  1,000 mg Intravenous Q24H    furosemide  40 mg Intravenous Once per day on Mon Wed Fri    insulin NPH  30 Units Subcutaneous QAM    meropenem  1,000 mg Intravenous Q8H  minocycline  100 mg Oral BID    insulin regular  0-30 Units Subcutaneous Q6H    insulin regular  10 Units Subcutaneous Once    insulin regular  20 Units Subcutaneous 4 times per day    methylPREDNISolone  40 mg Intravenous Q8H    metoclopramide  10 mg Intravenous Q6H    alogliptin  12.5 mg Oral Daily    ARIPiprazole  15 mg Oral Daily    aspirin  81 mg Oral Daily    atorvastatin  40 mg Oral Nightly    buPROPion  300 mg Oral QAM    escitalopram  20 mg Oral Daily    metoprolol tartrate  12.5 mg Oral BID    sodium chloride flush  10 mL Intravenous 2 times per day    diclofenac sodium  2 g Topical BID    zinc sulfate  50 mg Oral Daily    albuterol sulfate HFA  2 puff Inhalation 4x daily    ipratropium  2 puff Inhalation 4x daily    chlorhexidine  15 mL Mouth/Throat BID    famotidine (PEPCID) injection  20 mg Intravenous BID      Infusions:    midazolam 6 mg/hr (01/25/21 1118)    dextrose 100 mL/hr (01/21/21 1214)    cisatracurium (NIMBEX) infusion Stopped (01/18/21 1612)    norepinephrine Stopped (01/20/21 1446)    fentaNYL 150 mcg/hr (01/25/21 1114)    dextrose      sodium chloride       PRN Meds:     fentanNYL, 25 mcg, Q1H PRN      hydrALAZINE, 10 mg, Q6H PRN      glucose, 15 g, PRN      dextrose, 12.5 g, PRN      glucagon (rDNA), 1 mg, PRN      dextrose, 100 mL/hr, PRN      microfibrillar collagen, , PRN      polyvinyl alcohol, 1 drop, Q4H PRN    And      artificial tears, , PRN      sodium chloride flush, 10 mL, PRN      promethazine, 12.5 mg, Q6H PRN    Or      ondansetron, 4 mg, Q6H PRN      polyethylene glycol, 17 g, Daily PRN      acetaminophen, 650 mg, Q6H PRN    Or      acetaminophen, 650 mg, Q6H PRN      glucose, 15 g, PRN      dextrose, 12.5 g, PRN      glucagon (rDNA), 1 mg, PRN      dextrose, 100 mL/hr, PRN      traMADol, 50 mg, Q6H PRN    Or      traMADol, 100 mg, Q6H PRN      diphenhydrAMINE, 25 mg, Q6H PRN      sodium chloride, , PRN

## 2021-01-25 NOTE — PROGRESS NOTES
PHARMACY ANTICOAGULATION MONITORING SERVICE    Anna Bajwa is a 77 y.o. male on warfarin therapy for history of DVT. Pharmacy consulted by Dr. Kati Leyva for monitoring and adjustment of treatment. Indication for anticoagulation: Hx of DVT  INR goal: 2-3  Warfarin dose prior to admission: 5 mg daily    Pertinent Laboratory Values   Recent Labs     01/23/21  0545 01/24/21  0405 01/25/21  0350   INR 1.28 1.32 1.34   HGB 9.7* 9.6* 9.2*   HCT 31.2* 30.1* 30.6*    173 153       Assessment/Plan:  ? Possible DDI's:   o Aspirin (home med), can increase bleeding risk, clinically appropriate  o Escitalopram (home med), can increase bleeding risk, appropriate to continue while inpatient  o Enoxaparin bridge stopped with therapeutic INR ; consider resuming now that INR trending down  o Tramadol may increase effects of warfarin  ? INR sub-therapeutic @ 1.34, slightly higher than yesterday  ? Changed dose to Warfarin 6 mg daily  ? Additional dose adjustments to be made per INR trends, interacting medications, and other clinical factors  ? Pharmacy will continue to monitor and adjust warfarin therapy as indicated.     Thank you for the consult,  Kathryn King [place]  1/25/2021 9:53 AM

## 2021-01-25 NOTE — PROGRESS NOTES
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, Anemia, Arm fracture, Arthritis, Cataract, Cataract, Cellulitis of left lower leg, Chronic venous hypertension with ulcer ([facility] [address] CKD (chronic kidney disease), Colon polyps, COPD (chronic obstructive pulmonary disease) ([facility] [address] Diabetes mellitus ([facility] [address] Diabetes mellitus ([facility] [address] Diabetes mellitus with peripheral circulatory disorder ([facility] [address] Diabetes mellitus with skin ulcer ([facility] [address] Diabetic peripheral neuropathy ([facility] [address] Diabetic skin ulcer associated with type 2 diabetes mellitus ([facility] [address] Diverticulosis, Femoral DVT (deep venous thrombosis) ([facility] [address] GERD (gastroesophageal reflux disease), Glaucoma, H/O cardiac catheterization, H/O Doppler ultrasound, H/O echocardiogram, H/O mumps orchitis, Hemorrhoids, History of nuclear stress test, HLD (hyperlipidemia), Hx of Doppler ultrasound, Hyperlipemia, Hyperlipidemia, Hypertension, Hypertension, Idiopathic chronic venous hypertension of left lower extremity with ulcer and inflammation (HCC), Leg ulcer ([address] No diabetic retinopathy OU, Non-pressure chronic ulcer of left lower leg with fat layer exposed ([facility] [address] Pulmonary embolism ([facility] [address] Sleep apnea, SOB (shortness of breath), Tendinitis, Type II or unspecified type diabetes mellitus with other specified manifestations, not stated as uncontrolled, Unspecified venous (peripheral) insufficiency, Urticaria, WD-Cellulitis of right anterior lower leg, WD-Cellulitis of right lower extremity, WD-Chronic venous hypertension (idiopathic) with ulcer of left lower extremity (CODE) ([facility] [address] WD-Chronic venous hypertension with inflammation, right, WD-Decubitus ulcer of left buttock, stage 3 ([address] 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 to breakdown of skin ([address] WD-Open wound of hand without complication, left, initial encounter, WD-Pressure injury of left buttock, stage 2 ([address] WD-Pressure injury of left buttock, stage 3 ([address] WD-Pressure injury of right buttock, stage 3 (HCC), WD-Skin tear of right forearm without complication, and WD-Ulcer of right pretibial region, with fat layer exposed ([facility] [address] has a past surgical history that includes Tonsillectomy (1953); Splenectomy (1958); Breast surgery (1970s); hernia repair (1970s); Skin graft (1978-present); Cataract removal (Right, 3/11/2013); Cataract removal (Left, 2/25/2013); Varicose vein surgery (Left, 2009); Carpal tunnel release (Left, 1993); Cardiac catheterization (6/3/14); Colonoscopy (2006); Colonoscopy (11/21/11); Colonoscopy (4/23/12); Colonoscopy (08/04/2016); Splenectomy; hernia repair; and Carpal tunnel release. reports that he quit smoking about 28 years ago. His smoking use included cigarettes. He has a 70.00 pack-year smoking history. He has never used smokeless tobacco. He reports current alcohol use. He reports that he does not use drugs. Family history:  family history includes Cancer in his brother and father; Diabetes in his brother; Heart Disease in his father; High Blood Pressure in his father; High Cholesterol in his father; Thyroid Disease in his brother.     Allergies   Allergen Reactions    Cavilon Durable Barrier [Mineral Oil-Dimeth-Coconut Oil]     Gentamycin [Gentamicin] Hives    Parabens Hives    Parabens Hives    Prinivil [Lisinopril] Swelling    Cortisone Rash    Cortisone Rash    Dilaudid [Hydromorphone Hcl] Rash    Penicillins Rash    Penicillins Rash    Sulfamethoxazole-Trimethoprim Nausea Only    [employer]] Rash     Social History:    Reviewed; no changes    Objective:   PHYSICAL EXAM:        VITALS:  BP (!) 99/46   Pulse 88   Temp 97.2 °F (36.2 °C) (Rectal)   Resp 21   Ht 6' 5.01\" (1.956 m)   Wt 274 lb 14.6 oz (124.7 kg)   SpO2 92%   BMI 32.59 kg/m²     24HR INTAKE/OUTPUT:      Intake/Output Summary (Last 24 hours) at 1/25/2021 1046  Last data filed at 1/25/2021 8759  Gross per 24 hour   Intake 3948 ml   Output 2005 ml Net 1943 ml       CONSTITUTIONAL:  Somnolent. LUNGS:  decreased breath sounds, basilar crackles. CARDIOVASCULAR:  normal S1 and S2 and negative JVD  ABD:Abdomen soft, non-tender. BS normal. No masses,  No organomegaly  NEURO:Sedated on vent. DATA:    CBC:  Recent Labs     01/23/21  0545 01/24/21  0405 01/25/21  0350   WBC 15.9* 15.1* 15.5*   RBC 3.33* 3.26* 3.20*   HGB 9.7* 9.6* 9.2*   HCT 31.2* 30.1* 30.6*    173 153   MCV 93.7 92.3 95.6   MCH 29.1 29.4 28.8   MCHC 31.1* 31.9* 30.1*   RDW 18.2* 17.6* 17.7*   NRBC  --   --  1   SEGSPCT  --   --  81.0*   BANDSPCT  --   --  5      BMP:  Recent Labs     01/23/21  0545 01/24/21  0405 01/25/21  0350    144 142   K 5.6* 4.9 5.1    110 109   CO2 26 26 26   BUN 71* 76* 73*   CREATININE 1.0 1.1 1.0   CALCIUM 7.5* 7.6* 8.2*   GLUCOSE 274* 137* 207*      ABG:  Recent Labs     01/23/21  0600 01/24/21  0700   PH 7.42 7.39   PO2ART 74* 82   YWC4LDQ 46.0* 48.0*   O2SAT 93.3* 94.8*     Lab Results   Component Value Date    PROBNP 5,973 (H) 01/14/2021    PROBNP 1,642 (H) 01/12/2021    PROBNP 1,196 (H) 11/11/2020     No results found for: CULTRESP    Radiology Review:  Pertinent images / reports were reviewed as a part of this visit.     Assessment:     Patient Active Problem List   Diagnosis    Gastroesophageal reflux disease without esophagitis    Hypertension in stage 3 chronic kidney disease due to type 2 diabetes mellitus ([facility] [address]    DM (diabetes mellitus) type II, controlled, with peripheral vascular disorder ([facility] [address]    Combined hyperlipidemia associated with type 2 diabetes mellitus ([facility] [address]    Diabetic skin ulcer associated with type 2 diabetes mellitus (HCC)    CKD (chronic kidney disease)    History of DVT (deep vein thrombosis)- left femoral    History of pulmonary embolism    Long term current use of anticoagulants with INR goal of 2.0-3.0    COPD (chronic obstructive pulmonary disease) ([facility] [address]  Severe recurrent major depressive disorder with psychotic features ([address]    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)    Venous (peripheral) insufficiency    Uncontrolled secondary diabetes mellitus with stage 3 CKD (GFR 30-59) (HCC)    Diabetes mellitus with skin ulcer ([address]    WD-Ulcer of shin, left, with fat layer exposed ([address]    History of colon polyps    Personal history of PE (pulmonary embolism)    WD-Chronic venous hypertension (idiopathic) with ulcer of left lower extremity (CODE) ([facility] [address]    WD-Chronic venous hypertension with inflammation, right    Troponin I above reference range    KIRSTEN (acute kidney injury) ([address]    Cellulitis of lower extremity    Hyponatremia    Generalized weakness    Weakness    Multifocal pneumonia    Pneumonia due to COVID-19 virus    Hyperkalemia    Acute respiratory failure with hypoxia ([address]       Plan:   1. Cont full vent support. 2. Wean FiO2.   Eagle Huerta MD  1/25/2021  10:46 AM

## 2021-01-26 ENCOUNTER — APPOINTMENT (OUTPATIENT)
Dept: GENERAL RADIOLOGY | Age: 76
DRG: 870 | End: 2021-01-26
Attending: INTERNAL MEDICINE
Payer: MEDICARE

## 2021-01-26 LAB
BASE EXCESS MIXED: 3.2 (ref 0–1.2)
BASOPHILS ABSOLUTE: 0.1 K/CU MM
BASOPHILS RELATIVE PERCENT: 0.5 % (ref 0–1)
CARBON MONOXIDE, BLOOD: 1.8 % (ref 0–5)
CO2 CONTENT: 31.5 MMOL/L (ref 19–24)
COMMENT: ABNORMAL
DIFFERENTIAL TYPE: ABNORMAL
EOSINOPHILS ABSOLUTE: 0 K/CU MM
EOSINOPHILS RELATIVE PERCENT: 0 % (ref 0–3)
GLUCOSE BLD-MCNC: 188 MG/DL (ref 70–99)
GLUCOSE BLD-MCNC: 196 MG/DL (ref 70–99)
GLUCOSE BLD-MCNC: 239 MG/DL (ref 70–99)
GLUCOSE BLD-MCNC: 299 MG/DL (ref 70–99)
GLUCOSE BLD-MCNC: 363 MG/DL (ref 70–99)
HCO3 ARTERIAL: 29.9 MMOL/L (ref 18–23)
HCT VFR BLD CALC: 32.9 % (ref 42–52)
HEMOGLOBIN: 9.9 GM/DL (ref 13.5–18)
IMMATURE NEUTROPHIL %: 4.9 % (ref 0–0.43)
INR BLD: 1.26 INDEX
LYMPHOCYTES ABSOLUTE: 0.6 K/CU MM
LYMPHOCYTES RELATIVE PERCENT: 3.9 % (ref 24–44)
Lab: ABNORMAL
MCH RBC QN AUTO: 28.7 PG (ref 27–31)
MCHC RBC AUTO-ENTMCNC: 30.1 % (ref 32–36)
MCV RBC AUTO: 95.4 FL (ref 78–100)
METHEMOGLOBIN ARTERIAL: 1.4 %
MONOCYTES ABSOLUTE: 0.7 K/CU MM
MONOCYTES RELATIVE PERCENT: 4.5 % (ref 0–4)
NUCLEATED RBC %: 0.3 %
O2 SATURATION: 96.4 % (ref 96–97)
PCO2 ARTERIAL: 53 MMHG (ref 32–45)
PDW BLD-RTO: 18.1 % (ref 11.7–14.9)
PH BLOOD: 7.36 (ref 7.34–7.45)
PLATELET # BLD: 170 K/CU MM (ref 140–440)
PMV BLD AUTO: 13.9 FL (ref 7.5–11.1)
PO2 ARTERIAL: 110 MMHG (ref 75–100)
PROTHROMBIN TIME: 15.3 SECONDS (ref 11.7–14.5)
RBC # BLD: 3.45 M/CU MM (ref 4.6–6.2)
SEGMENTED NEUTROPHILS ABSOLUTE COUNT: 12.5 K/CU MM
SEGMENTED NEUTROPHILS RELATIVE PERCENT: 86.2 % (ref 36–66)
TEST NAME: ABNORMAL
TOTAL IMMATURE NEUTOROPHIL: 0.71 K/CU MM
TOTAL NUCLEATED RBC: 0 K/CU MM
WBC # BLD: 14.5 K/CU MM (ref 4–10.5)

## 2021-01-26 PROCEDURE — 2500000003 HC RX 250 WO HCPCS: Performed by: NURSE PRACTITIONER

## 2021-01-26 PROCEDURE — 2700000000 HC OXYGEN THERAPY PER DAY

## 2021-01-26 PROCEDURE — 2500000003 HC RX 250 WO HCPCS: Performed by: INTERNAL MEDICINE

## 2021-01-26 PROCEDURE — 94640 AIRWAY INHALATION TREATMENT: CPT

## 2021-01-26 PROCEDURE — 6370000000 HC RX 637 (ALT 250 FOR IP): Performed by: NURSE PRACTITIONER

## 2021-01-26 PROCEDURE — 99221 1ST HOSP IP/OBS SF/LOW 40: CPT | Performed by: NURSE PRACTITIONER

## 2021-01-26 PROCEDURE — 2580000003 HC RX 258: Performed by: INTERNAL MEDICINE

## 2021-01-26 PROCEDURE — 6360000002 HC RX W HCPCS: Performed by: INTERNAL MEDICINE

## 2021-01-26 PROCEDURE — 82962 GLUCOSE BLOOD TEST: CPT

## 2021-01-26 PROCEDURE — 6370000000 HC RX 637 (ALT 250 FOR IP): Performed by: INTERNAL MEDICINE

## 2021-01-26 PROCEDURE — 2580000003 HC RX 258: Performed by: PHYSICIAN ASSISTANT

## 2021-01-26 PROCEDURE — 37799 UNLISTED PX VASCULAR SURGERY: CPT

## 2021-01-26 PROCEDURE — 6360000002 HC RX W HCPCS: Performed by: STUDENT IN AN ORGANIZED HEALTH CARE EDUCATION/TRAINING PROGRAM

## 2021-01-26 PROCEDURE — 94003 VENT MGMT INPAT SUBQ DAY: CPT

## 2021-01-26 PROCEDURE — 82803 BLOOD GASES ANY COMBINATION: CPT

## 2021-01-26 PROCEDURE — 2000000000 HC ICU R&B

## 2021-01-26 PROCEDURE — 2580000003 HC RX 258: Performed by: NURSE PRACTITIONER

## 2021-01-26 PROCEDURE — 85025 COMPLETE CBC W/AUTO DIFF WBC: CPT

## 2021-01-26 PROCEDURE — 99213 OFFICE O/P EST LOW 20 MIN: CPT

## 2021-01-26 PROCEDURE — 94761 N-INVAS EAR/PLS OXIMETRY MLT: CPT

## 2021-01-26 PROCEDURE — 99233 SBSQ HOSP IP/OBS HIGH 50: CPT | Performed by: INTERNAL MEDICINE

## 2021-01-26 PROCEDURE — 71045 X-RAY EXAM CHEST 1 VIEW: CPT

## 2021-01-26 PROCEDURE — 6360000002 HC RX W HCPCS: Performed by: PHYSICIAN ASSISTANT

## 2021-01-26 PROCEDURE — 89220 SPUTUM SPECIMEN COLLECTION: CPT

## 2021-01-26 PROCEDURE — 85610 PROTHROMBIN TIME: CPT

## 2021-01-26 RX ADMIN — METOCLOPRAMIDE 10 MG: 5 INJECTION, SOLUTION INTRAMUSCULAR; INTRAVENOUS at 12:45

## 2021-01-26 RX ADMIN — ATORVASTATIN CALCIUM 40 MG: 40 TABLET, FILM COATED ORAL at 19:35

## 2021-01-26 RX ADMIN — INSULIN HUMAN 20 UNITS: 100 INJECTION, SOLUTION PARENTERAL at 00:10

## 2021-01-26 RX ADMIN — MIDAZOLAM HYDROCHLORIDE 9 MG/HR: 5 INJECTION, SOLUTION INTRAMUSCULAR; INTRAVENOUS at 08:22

## 2021-01-26 RX ADMIN — DICLOFENAC SODIUM 2 G: 10 GEL TOPICAL at 09:55

## 2021-01-26 RX ADMIN — METOPROLOL TARTRATE 12.5 MG: 25 TABLET, FILM COATED ORAL at 09:54

## 2021-01-26 RX ADMIN — ALBUTEROL SULFATE 2 PUFF: 90 AEROSOL, METERED RESPIRATORY (INHALATION) at 15:51

## 2021-01-26 RX ADMIN — INSULIN HUMAN 5 UNITS: 100 INJECTION, SOLUTION PARENTERAL at 18:26

## 2021-01-26 RX ADMIN — ALOGLIPTIN 12.5 MG: 12.5 TABLET, FILM COATED ORAL at 09:54

## 2021-01-26 RX ADMIN — BUPROPION HYDROCHLORIDE 300 MG: 150 TABLET, FILM COATED, EXTENDED RELEASE ORAL at 09:54

## 2021-01-26 RX ADMIN — METHYLPREDNISOLONE SODIUM SUCCINATE 40 MG: 40 INJECTION, POWDER, LYOPHILIZED, FOR SOLUTION INTRAMUSCULAR; INTRAVENOUS at 16:54

## 2021-01-26 RX ADMIN — SODIUM CHLORIDE 0.8 MCG/KG/HR: 9 INJECTION, SOLUTION INTRAVENOUS at 09:22

## 2021-01-26 RX ADMIN — Medication 2 PUFF: at 12:15

## 2021-01-26 RX ADMIN — INSULIN GLARGINE 50 UNITS: 100 INJECTION, SOLUTION SUBCUTANEOUS at 19:38

## 2021-01-26 RX ADMIN — ZINC SULFATE 220 MG (50 MG) CAPSULE 50 MG: CAPSULE at 09:54

## 2021-01-26 RX ADMIN — Medication 200 MCG/HR: at 19:30

## 2021-01-26 RX ADMIN — METHYLPREDNISOLONE SODIUM SUCCINATE 40 MG: 40 INJECTION, POWDER, LYOPHILIZED, FOR SOLUTION INTRAMUSCULAR; INTRAVENOUS at 07:30

## 2021-01-26 RX ADMIN — DICLOFENAC SODIUM 2 G: 10 GEL TOPICAL at 19:57

## 2021-01-26 RX ADMIN — SODIUM CHLORIDE, PRESERVATIVE FREE 10 ML: 5 INJECTION INTRAVENOUS at 19:57

## 2021-01-26 RX ADMIN — ESCITALOPRAM OXALATE 20 MG: 10 TABLET ORAL at 09:55

## 2021-01-26 RX ADMIN — ARIPIPRAZOLE 15 MG: 10 TABLET ORAL at 09:55

## 2021-01-26 RX ADMIN — SODIUM CHLORIDE 0.6 MCG/KG/HR: 9 INJECTION, SOLUTION INTRAVENOUS at 04:30

## 2021-01-26 RX ADMIN — INSULIN HUMAN 20 UNITS: 100 INJECTION, SOLUTION PARENTERAL at 06:07

## 2021-01-26 RX ADMIN — Medication 200 MCG/HR: at 08:12

## 2021-01-26 RX ADMIN — Medication 200 MCG/HR: at 13:54

## 2021-01-26 RX ADMIN — Medication 200 MCG/HR: at 03:09

## 2021-01-26 RX ADMIN — MINOCYCLINE HYDROCHLORIDE 100 MG: 100 CAPSULE ORAL at 09:54

## 2021-01-26 RX ADMIN — INSULIN HUMAN 25 UNITS: 100 INJECTION, SOLUTION PARENTERAL at 18:25

## 2021-01-26 RX ADMIN — ALBUTEROL SULFATE 2 PUFF: 90 AEROSOL, METERED RESPIRATORY (INHALATION) at 12:16

## 2021-01-26 RX ADMIN — INSULIN HUMAN 25 UNITS: 100 INJECTION, SOLUTION PARENTERAL at 12:46

## 2021-01-26 RX ADMIN — MIDAZOLAM HYDROCHLORIDE 9 MG/HR: 5 INJECTION, SOLUTION INTRAMUSCULAR; INTRAVENOUS at 22:40

## 2021-01-26 RX ADMIN — ASPIRIN 81 MG CHEWABLE TABLET 81 MG: 81 TABLET CHEWABLE at 09:54

## 2021-01-26 RX ADMIN — Medication 2 PUFF: at 21:16

## 2021-01-26 RX ADMIN — INSULIN HUMAN 10 UNITS: 100 INJECTION, SOLUTION PARENTERAL at 00:09

## 2021-01-26 RX ADMIN — HYDRALAZINE HYDROCHLORIDE 10 MG: 20 INJECTION, SOLUTION INTRAMUSCULAR; INTRAVENOUS at 01:41

## 2021-01-26 RX ADMIN — SODIUM CHLORIDE 0.9 MCG/KG/HR: 9 INJECTION, SOLUTION INTRAVENOUS at 14:07

## 2021-01-26 RX ADMIN — SODIUM CHLORIDE 0.9 MCG/KG/HR: 9 INJECTION, SOLUTION INTRAVENOUS at 19:04

## 2021-01-26 RX ADMIN — WARFARIN SODIUM 6 MG: 6 TABLET ORAL at 17:49

## 2021-01-26 RX ADMIN — HYDRALAZINE HYDROCHLORIDE 10 MG: 20 INJECTION, SOLUTION INTRAMUSCULAR; INTRAVENOUS at 12:45

## 2021-01-26 RX ADMIN — SODIUM CHLORIDE, PRESERVATIVE FREE 10 ML: 5 INJECTION INTRAVENOUS at 09:55

## 2021-01-26 RX ADMIN — Medication 2 PUFF: at 15:51

## 2021-01-26 RX ADMIN — FAMOTIDINE 20 MG: 10 INJECTION, SOLUTION INTRAVENOUS at 19:35

## 2021-01-26 RX ADMIN — INSULIN HUMAN 40 UNITS: 100 INJECTION, SUSPENSION SUBCUTANEOUS at 10:12

## 2021-01-26 RX ADMIN — MEROPENEM 1000 MG: 1 INJECTION, POWDER, FOR SOLUTION INTRAVENOUS at 04:33

## 2021-01-26 RX ADMIN — CEFTRIAXONE SODIUM 2000 MG: 2 INJECTION, POWDER, FOR SOLUTION INTRAMUSCULAR; INTRAVENOUS at 14:37

## 2021-01-26 RX ADMIN — FAMOTIDINE 20 MG: 10 INJECTION, SOLUTION INTRAVENOUS at 09:55

## 2021-01-26 RX ADMIN — CHLORHEXIDINE GLUCONATE 0.12% ORAL RINSE 15 ML: 1.2 LIQUID ORAL at 09:54

## 2021-01-26 RX ADMIN — INSULIN HUMAN 15 UNITS: 100 INJECTION, SOLUTION PARENTERAL at 06:06

## 2021-01-26 RX ADMIN — CHLORHEXIDINE GLUCONATE 0.12% ORAL RINSE 15 ML: 1.2 LIQUID ORAL at 19:57

## 2021-01-26 RX ADMIN — METOPROLOL TARTRATE 12.5 MG: 25 TABLET, FILM COATED ORAL at 19:35

## 2021-01-26 RX ADMIN — ALBUTEROL SULFATE 2 PUFF: 90 AEROSOL, METERED RESPIRATORY (INHALATION) at 08:22

## 2021-01-26 RX ADMIN — ALBUTEROL SULFATE 2 PUFF: 90 AEROSOL, METERED RESPIRATORY (INHALATION) at 21:16

## 2021-01-26 RX ADMIN — METOCLOPRAMIDE 10 MG: 5 INJECTION, SOLUTION INTRAMUSCULAR; INTRAVENOUS at 07:30

## 2021-01-26 RX ADMIN — Medication 2 PUFF: at 08:24

## 2021-01-26 RX ADMIN — VANCOMYCIN HYDROCHLORIDE 1000 MG: 1 INJECTION, POWDER, LYOPHILIZED, FOR SOLUTION INTRAVENOUS at 14:53

## 2021-01-26 RX ADMIN — METOCLOPRAMIDE 10 MG: 5 INJECTION, SOLUTION INTRAMUSCULAR; INTRAVENOUS at 19:35

## 2021-01-26 RX ADMIN — METOCLOPRAMIDE 10 MG: 5 INJECTION, SOLUTION INTRAMUSCULAR; INTRAVENOUS at 01:11

## 2021-01-26 ASSESSMENT — PULMONARY FUNCTION TESTS
PIF_VALUE: 29
PIF_VALUE: 29
PIF_VALUE: 31
PIF_VALUE: 29
PIF_VALUE: 31
PIF_VALUE: 30
PIF_VALUE: 29
PIF_VALUE: 30
PIF_VALUE: 29
PIF_VALUE: 31
PIF_VALUE: 29
PIF_VALUE: 31
PIF_VALUE: 31
PIF_VALUE: 29
PIF_VALUE: 30
PIF_VALUE: 29
PIF_VALUE: 31
PIF_VALUE: 29
PIF_VALUE: 29

## 2021-01-26 ASSESSMENT — PAIN SCALES - GENERAL
PAINLEVEL_OUTOF10: 0
PAINLEVEL_OUTOF10: 0

## 2021-01-26 NOTE — PROGRESS NOTES
Spoke with daughter Dejuan Orlando this afternoon, updated on Pt's condition and plan of care at this time, daughter briefly made aware that pt has had little progression to being weaned off vent, she asked if pt was trached where would he go and the process of transferring to acute LTC facility vs. Making pt comfortable. Appreciative of information.

## 2021-01-26 NOTE — PROGRESS NOTES
Infectious Disease Progress Note  2021   Patient Name: Vipul Rios : 1945       Reason for visit: F/u COVID-19 pneumonia, acute respiratory failure? History:? Interval history noted  Intubated, sedated and on mechanical ventilation  Physical Exam:  Vital Signs: /63   Pulse 94   Temp 98.6 °F (37 °C) (Rectal)   Resp 23   Ht 6' 5.01\" (1.956 m)   Wt 274 lb 14.6 oz (124.7 kg)   SpO2 95%   BMI 32.59 kg/m²     Gen: intubated and sedated  Skin: no stigmata of endocarditis  Wounds:  Left anterior shin superficial wound, C/D/I  HEMT: AT/NC ETT, NGT   Eyes: PERRLA. Neck: Supple. Trachea midline. No LAD. Chest:  transmitted breath sounds. Heart:   Abd: soft, non-distended, no tenderness, no hepatomegaly. Normoactive bowel sounds. Ext: no clubbing, cyanosis, or edema  Catheter Site: without erythema or tenderness  LDA: CVC: left IJ, Urethral catheter: 2021  Neuro: sedated and on the ventilator. Radiologic / Imaging / TESTING  CXR 2021      1. Increased left basilar airspace opacity is likely related to atelectasis, although superimposed pneumonia and aspiration are not excluded. 2. Otherwise unchanged subpleural, basilar predominant airspace opacities and diffuse interstitial opacities likely due to COVID-19 pneumonia given patient history. Superimposed edema (noncardiogenic or cardiogenic) is not excluded. Labs:    Recent Results (from the past 24 hour(s))   Vancomycin, trough    Collection Time: 21 11:00 AM   Result Value Ref Range    Vancomycin Tr 19.3 10 - 20 UG/ML    DOSE AMOUNT DOSE AMT.  GIVEN - 1250mg     DOSE TIME DOSE TIME GIVEN - 1200    POCT Glucose    Collection Time: 21 12:30 PM   Result Value Ref Range    POC Glucose 250 (H) 70 - 99 MG/DL   POCT Glucose    Collection Time: 21  6:28 PM   Result Value Ref Range    POC Glucose 245 (H) 70 - 99 MG/DL   POCT Glucose    Collection Time: 21  8:38 PM   Result Value Ref Range POC Glucose 210 (H) 70 - 99 MG/DL   POCT Glucose    Collection Time: 01/26/21 12:09 AM   Result Value Ref Range    POC Glucose 239 (H) 70 - 99 MG/DL   CBC auto differential    Collection Time: 01/26/21  5:45 AM   Result Value Ref Range    WBC 14.5 (H) 4.0 - 10.5 K/CU MM    RBC 3.45 (L) 4.6 - 6.2 M/CU MM    Hemoglobin 9.9 (L) 13.5 - 18.0 GM/DL    Hematocrit 32.9 (L) 42 - 52 %    MCV 95.4 78 - 100 FL    MCH 28.7 27 - 31 PG    MCHC 30.1 (L) 32.0 - 36.0 %    RDW 18.1 (H) 11.7 - 14.9 %    Platelets 640 551 - 917 K/CU MM    MPV 13.9 (H) 7.5 - 11.1 FL    Differential Type AUTOMATED DIFFERENTIAL     Segs Relative 86.2 (H) 36 - 66 %    Lymphocytes % 3.9 (L) 24 - 44 %    Monocytes % 4.5 (H) 0 - 4 %    Eosinophils % 0.0 0 - 3 %    Basophils % 0.5 0 - 1 %    Segs Absolute 12.5 K/CU MM    Lymphocytes Absolute 0.6 K/CU MM    Monocytes Absolute 0.7 K/CU MM    Eosinophils Absolute 0.0 K/CU MM    Basophils Absolute 0.1 K/CU MM    Nucleated RBC % 0.3 %    Total Nucleated RBC 0.0 K/CU MM    Total Immature Neutrophil 0.71 K/CU MM    Immature Neutrophil % 4.9 (H) 0 - 0.43 %   Blood Gas, Arterial    Collection Time: 01/26/21  6:00 AM   Result Value Ref Range    pH, Bld 7.36 7.34 - 7.45    pCO2, Arterial 53.0 (H) 32 - 45 MMHG    pO2, Arterial 110 (H) 75 - 100 MMHG    Base Exc, Mixed 3.2 (H) 0 - 1.2    HCO3, Arterial 29.9 (H) 18 - 23 MMOL/L    CO2 Content 31.5 (H) 19 - 24 MMOL/L    O2 Sat 96.4 96 - 97 %    Carbon Monoxide, Blood 1.8 0 - 5 %    Methemoglobin, Arterial 1.4 <1.5 %    Comment AC/PC Pi 17 RR 20 65% +12    POCT Glucose    Collection Time: 01/26/21  6:05 AM   Result Value Ref Range    POC Glucose 299 (H) 70 - 99 MG/DL     CULTURE results: Invalid input(s): BLOOD CULTURE,  URINE CULTURE, SURGICAL CULTURE    Diagnosis:  Patient Active Problem List   Diagnosis    Gastroesophageal reflux disease without esophagitis    Hypertension in stage 3 chronic kidney disease due to type 2 diabetes mellitus ([facility]  DM (diabetes mellitus) type II, controlled, with peripheral vascular disorder ([address]    Combined hyperlipidemia associated with type 2 diabetes mellitus ([address]    Diabetic skin ulcer associated with type 2 diabetes mellitus ([address]    CKD (chronic kidney disease)    History of DVT (deep vein thrombosis)- left femoral    History of pulmonary embolism    Long term current use of anticoagulants with INR goal of 2.0-3.0    COPD (chronic obstructive pulmonary disease) (HCC)    Severe recurrent major depressive disorder with psychotic features (HCC)    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)    Venous (peripheral) insufficiency    Uncontrolled secondary diabetes mellitus with stage 3 CKD (GFR 30-59) (HCC)    Diabetes mellitus with skin ulcer ([address]    WD-Ulcer of shin, left, with fat layer exposed ([address]    History of colon polyps    Personal history of PE (pulmonary embolism)    WD-Chronic venous hypertension (idiopathic) with ulcer of left lower extremity (CODE) [facility]    WD-Chronic venous hypertension with inflammation, right    Troponin I above reference range    KIRSTEN (acute kidney injury) ([facility]    Cellulitis of lower extremity    Hyponatremia    Generalized weakness    Weakness    Multifocal pneumonia    Pneumonia due to COVID-19 virus    Hyperkalemia    Acute respiratory failure with hypoxia (HCC)       Active Problems  Active Problems:    Pneumonia due to COVID-19 virus    Hyperkalemia    Acute respiratory failure with hypoxia ([address]  Resolved Problems:    * No resolved hospital problems.  *      Impression and plan ? Summary and rationale: Patient is a 77 y.o.  male T2DM, hyperlipidemia, htn, depression, morbid obesity who was admitted 1/13/2021 for further evaluation and management of shortness of breath that started on 1/8/2021 and positive COVID test. Has critical COVID-19 pneumonia, in hyperinflammatory phase. MRSA and K pneumoniae super-imposed bacterial pneumonia  ? Clinical status: remains severe,some reduction in FiO2 and CRP   ? MRSA nares positive. On vancomycin. IL-6; 20.7, Aspergillus Ag,   ? Therapeutic:  o Ongoing antibiotics: vancomycin, discontinue meropenem, and start ceftriaxone  o Antiviral agent: remdesivir 1/13-1/16  o Anti-inflammatory agents:  ? Dexamethasone:1/12-19  ? Solu-Medrol: 1/17. 1/19-  o Completed antibiotics:   o Convalescent plasma receipt:  o Other agents:   ? Diagnostic: respiratory cx, blood cx, UA, urine cx. ? F/u: Blood cx 1/19, 0/2 ngtd; Aspergillus Ag, BDG, IL-6  ?  Other:      Electronically signed by: Electronically signed by Teresa Soto MD on 1/26/2021 at 10:33 AM

## 2021-01-26 NOTE — PROGRESS NOTES
PHARMACY ANTICOAGULATION MONITORING SERVICE    Jose M Watkins is a 77 y.o. male on warfarin therapy for history of DVT. Pharmacy consulted by Dr. Alejandrina Wilks for monitoring and adjustment of treatment. Indication for anticoagulation: Hx of DVT  INR goal: 2-3  Warfarin dose prior to admission: 5 mg daily    Pertinent Laboratory Values   Recent Labs     01/24/21  0405 01/25/21  0350 01/26/21  0545   INR 1.32 1.34  --    HGB 9.6* 9.2* 9.9*   HCT 30.1* 30.6* 32.9*    153 170       Assessment/Plan:  ? Possible DDI's:   o Aspirin (home med), can increase bleeding risk, clinically appropriate  o Escitalopram (home med), can increase bleeding risk, appropriate to continue while inpatient  o Enoxaparin bridge stopped with therapeutic INR ; consider resuming now that INR trending down   o Tramadol may increase effects of warfarin (ordered PRN)  o Ordered on minocycline from 1/23-1/26 (tetracyclines can increase effects of warfarin)  ? INR has been collected; level is pending  ? Continue warfarin 6 mg daily, pending INR result  ? Additional dose adjustments to be made per INR trends, interacting medications, and other clinical factors  ? Pharmacy will continue to monitor and adjust warfarin therapy as indicated.     Thank you for the consult,  Arpita Abdullahi, [address]  1/26/2021 4:55 PM

## 2021-01-26 NOTE — CONSULTS
[facility] Continence Nurse  Consult Note       Jimena Oliveira  AGE: 77 y.o. GENDER: male  : 1945  TODAY'S DATE:  2021    Subjective:     Reason for  Evaluation and Assessment:  Wound care scot Oliveira is a 77 y.o. 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 Hailey Mace Anemia 2011    Arm fracture Nestor Licona with also yearly DM exam    Cataract     worsening-Dr. Donavan Blackman    Cellulitis of left lower leg     Chronic venous hypertension with ulcer ([address] 2011    CKD (chronic kidney disease) 2012    Renal u/S normal     Colon polyps     Dr. Hailey Mace COPD (chronic obstructive pulmonary disease) ([address]     Diabetes mellitus ([address]     Diabetes mellitus ([address]     Diabetes mellitus with peripheral circulatory disorder ([address] 10/2/2015    Diabetes mellitus with skin ulcer ([address] 10/2/2015    Diabetic peripheral neuropathy ([address]     + EMG, NCS    Diabetic skin ulcer associated with type 2 diabetes mellitus ([address]     Diverticulosis 12    mild, left colon    Femoral DVT (deep venous thrombosis) ([address] 2011    PARTIAL,CHRONIC-SPF HEART SURGEONS    GERD (gastroesophageal reflux disease)     Glaucoma     open angle-Dr. Clinton Sanford H/O cardiac catheterization 6/3/14    EF55% normal study    H/O Doppler ultrasound 03/26/15    Carotid US- no significant stenosis noted bilaterally.  H/O echocardiogram 2019    EF50-55%, Mild AR. Moderately dilated right ventricle with negative Solis's sign.  WD-Ulcer of right pretibial region, with fat layer exposed ([address] 10/17/2019       PAST SURGICAL HISTORY    Past Surgical History:   Procedure Laterality Date    BREAST SURGERY  1970s    benign tumors bilaterally    CARDIAC CATHETERIZATION  6/3/14    EF55% normal study    CARPAL TUNNEL RELEASE Left     CARPAL TUNNEL RELEASE      CATARACT REMOVAL Right 3/11/2013    Dr. Louise Monroy Left 2013    Dr. Diana Mayer      polyps    COLONOSCOPY  11    villous component--f/u colonoscopy will be needed in 6 months    COLONOSCOPY  12    mild diverticulosis, internal hemorrhoids; repeat in 3 years (Dr. Bobby Cid)    COLONOSCOPY  2016    mild diverticulosis, three colon polyps    HERNIA REPAIR  1970s    HERNIA REPAIR      SKIN GRAFT  -present    skin grafts to left ankle ulcers    SPLENECTOMY      SPLENECTOMY      TONSILLECTOMY      VARICOSE VEIN SURGERY Left        FAMILY HISTORY    Family History   Problem Relation Age of Onset    Heart Disease Father     Cancer Father         prostate    High Blood Pressure Father     High Cholesterol Father     Cancer Brother     Diabetes Brother     Thyroid Disease Brother        SOCIAL HISTORY    Social History     Tobacco Use    Smoking status: Former Smoker     Packs/day: 2.00     Years: 35.00     Pack years: 70.00     Types: Cigarettes     Quit date:      Years since quittin.0    Smokeless tobacco: Never Used    Tobacco comment: chew--30 years.   Reviewed 2015   Substance Use Topics    Alcohol use: Yes     Comment: soc    Drug use: Never       ALLERGIES    Allergies   Allergen Reactions    Cavilon Durable Barrier [Mineral Oil-Dimeth-Coconut Oil]     Gentamycin [Gentamicin] Hives    Parabens Hives    Parabens Hives    Prinivil [Lisinopril] Swelling    Cortisone Rash    Cortisone Rash    Dilaudid [Hydromorphone Hcl] Rash    Penicillins Rash    Penicillins Rash  Sulfamethoxazole-Trimethoprim Nausea Only    Tape Leeroy Gin Tape] Rash       MEDICATIONS    No current facility-administered medications on file prior to encounter.       Current Outpatient Medications on File Prior to Encounter   Medication Sig Dispense Refill    clotrimazole-betamethasone (LOTRISONE) 1-0.05 % cream Apply topically 2 times daily Apply topically 2 times daily to BLE      warfarin (COUMADIN) 5 MG tablet Take 1 tablet by mouth daily Except take 1 and 1/2 tablets by mouth on Mondays or as directed by clinic 98 tablet 1    alogliptin (NESINA) 12.5 MG TABS tablet Take 1 tablet by mouth daily 60 tablet     glipiZIDE (GLUCOTROL) 10 MG tablet Take 1 tablet by mouth 2 times daily (before meals) 60 tablet 3    buPROPion (WELLBUTRIN XL) 300 MG extended release tablet Take 1 tablet by mouth every morning 90 tablet 0    escitalopram (LEXAPRO) 20 MG tablet Take 1 tablet by mouth daily 90 tablet 0    omeprazole (PRILOSEC) 20 MG delayed release capsule TAKE ONE (1) CAPSULE BY MOUTH ONCE DAILY 90 capsule 1    ARIPiprazole (ABILIFY) 15 MG tablet Take 1 tablet by mouth daily 90 tablet 0    atorvastatin (LIPITOR) 40 MG tablet Take 1 tablet by mouth nightly 90 tablet 0    furosemide (LASIX) 20 MG tablet Take 1 tablet by mouth 2 times daily 180 tablet 0    metFORMIN (GLUCOPHAGE) 500 MG tablet Take 1 tablet by mouth daily (with breakfast) 90 tablet 0    glycopyrrolate-formoterol (BEVESPI AEROSPHERE) 9-4.8 MCG/ACT AERO Inhale 2 puffs into the lungs 2 times daily 1 Inhaler 5    potassium chloride (MICRO-K) 10 MEQ extended release capsule Take 10 mEq by mouth daily      metoprolol tartrate (LOPRESSOR) 25 MG tablet Take 0.5 tablets by mouth 2 times daily 90 tablet 3    aspirin 81 MG chewable tablet Take 1 tablet by mouth daily 30 tablet 3         Objective:  COPD (chronic obstructive pulmonary disease) (HCC)    Severe recurrent major depressive disorder with psychotic features (HCC)    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)    Venous (peripheral) insufficiency    Uncontrolled secondary diabetes mellitus with stage 3 CKD (GFR 30-59) (HCC)    Diabetes mellitus with skin ulcer ([address]    WD-Ulcer of shin, left, with fat layer exposed ([address]    History of colon polyps    Personal history of PE (pulmonary embolism)    WD-Chronic venous hypertension (idiopathic) with ulcer of left lower extremity (CODE) ([address]    WD-Chronic venous hypertension with inflammation, right    Troponin I above reference range    KIRSTEN (acute kidney injury) ([address]    Cellulitis of lower extremity    Hyponatremia    Generalized weakness    Weakness    Multifocal pneumonia    Pneumonia due to COVID-19 virus    Hyperkalemia    Acute respiratory failure with hypoxia ([address]       Measurements:  Wound 11/15/13 Other (Comment) Leg Inner erethema with a small puncture site (Active)   Number of days: 2628       Wound 06/27/19 #6 (onset 1 month) Left Distal Medial Ankle (Active)   Wound Etiology Venous 01/19/21 2010   Dressing Status Clean;Dry; Intact 01/26/21 0926   Dressing/Treatment Other (comment) 01/26/21 0926   Wound Length (cm) 0 cm 01/26/21 1328   Wound Width (cm) 0 cm 01/26/21 1328   Wound Depth (cm) 0 cm 01/26/21 1328   Wound Surface Area (cm^2) 0 cm^2 01/26/21 1328   Change in Wound Size % (l*w) 100 01/26/21 1328   Wound Volume (cm^3) 0 cm^3 01/26/21 1328   Wound Healing % 100 01/26/21 1328   Wound Assessment Pink/red 01/26/21 0926   Drainage Amount None 01/26/21 0926   Odor None 01/26/21 0926   Ana-wound Assessment Dry/flaky 01/26/21 0926   Number of days: 579       Wound 07/30/20 Leg Medial;Lower; Left #7 (Active)   Wound Image   01/07/21 0950   Wound Etiology Venous 01/26/21 1328   Dressing Status New dressing applied 01/26/21 1328 Wound Cleansed Cleansed with saline 01/26/21 1328   Dressing/Treatment Roll gauze 01/25/21 1600   Dressing Change Due 01/20/21 01/25/21 1200   Wound Length (cm) 8 cm 01/26/21 1328   Wound Width (cm) 3 cm 01/26/21 1328   Wound Depth (cm) 0.1 cm 01/26/21 1328   Wound Surface Area (cm^2) 24 cm^2 01/26/21 1328   Change in Wound Size % (l*w) 83.39 01/26/21 1328   Wound Volume (cm^3) 2.4 cm^3 01/26/21 1328   Wound Healing % 83 01/26/21 1328   Distance Tunneling (cm) 0 cm 01/26/21 1328   Tunneling Position ___ O'Clock 0 01/26/21 1328   Undermining Starts ___ O'Clock 0 01/26/21 1328   Undermining Ends___ O'Clock 0 01/26/21 1328   Undermining Maxium Distance (cm) 0 01/26/21 1328   Wound Assessment Pink/red 01/14/21 0400   Drainage Amount Moderate 01/26/21 1328   Drainage Description Serosanguinous 01/26/21 1328   Odor None 01/26/21 1328   Ana-wound Assessment Dry/flaky 01/26/21 1328   Margins Defined edges 01/26/21 1328   Wound Thickness Description not for Pressure Injury Full thickness 01/26/21 1328   Number of days: 180       Wound 08/27/20 Leg Medial;Lower;Right #8 Cluster (Active)   Wound Image   01/07/21 0950   Wound Etiology Venous 01/25/21 1600   Dressing Status New dressing applied 01/25/21 1600   Wound Cleansed Cleansed with saline 01/25/21 1600   Dressing/Treatment Open to air 01/26/21 0926   Wound Length (cm) 0 cm 01/26/21 1328   Wound Width (cm) 0 cm 01/26/21 1328   Wound Depth (cm) 0 cm 01/26/21 1328   Wound Surface Area (cm^2) 0 cm^2 01/26/21 1328   Change in Wound Size % (l*w) 100 01/26/21 1328   Wound Volume (cm^3) 0 cm^3 01/26/21 1328   Wound Healing % 100 01/26/21 1328   Distance Tunneling (cm) 0 cm 01/11/21 0945   Tunneling Position ___ O'Clock 0 01/11/21 0945   Undermining Starts ___ O'Clock 0 01/11/21 0945   Undermining Ends___ O'Clock 0 01/11/21 0945   Undermining Maxium Distance (cm) 0 01/11/21 0945   Wound Assessment Pink/red 01/26/21 0926   Drainage Amount None 01/26/21 0926 Drainage Description Thin 01/26/21 0926   Odor None 01/26/21 0926   Ana-wound Assessment Dry/flaky; Warm 01/26/21 0926   Margins Defined edges 01/26/21 0926   Wound Thickness Description not for Pressure Injury Full thickness 01/26/21 0926   Number of days: 152       Wound 01/13/21 Coccyx left buttock (Active)   Wound Etiology Pressure Stage  2 01/26/21 1328   Dressing Status New dressing applied 01/26/21 1328   Wound Cleansed Cleansed with saline 01/26/21 1328   Dressing/Treatment Collagen 01/26/21 1328   Wound Length (cm) 0.5 cm 01/26/21 1328   Wound Width (cm) 0.2 cm 01/26/21 1328   Wound Depth (cm) 0.1 cm 01/26/21 1328   Wound Surface Area (cm^2) 0.1 cm^2 01/26/21 1328   Wound Volume (cm^3) 0.01 cm^3 01/26/21 1328   Distance Tunneling (cm) 0 cm 01/26/21 1328   Tunneling Position ___ O'Clock 0 01/26/21 1328   Undermining Starts ___ O'Clock 0 01/26/21 1328   Undermining Ends___ O'Clock 0 01/26/21 1328   Undermining Maxium Distance (cm) 0 01/26/21 1328   Wound Assessment Purple/maroon 01/25/21 1600   Drainage Amount Small 01/26/21 1328   Drainage Description Serosanguinous 01/26/21 1328   Odor None 01/26/21 1328   Ana-wound Assessment Dry/flaky 01/26/21 1328   Margins Defined edges 01/26/21 1328   Wound Thickness Description not for Pressure Injury Full thickness 01/26/21 1328   Number of days: 13       Wound 01/19/21 Radial Right (Active)   Wound Etiology Skin Tear 01/26/21 1328   Dressing Status New dressing applied 01/26/21 1328   Wound Cleansed Cleansed with saline 01/26/21 1328   Dressing/Treatment Collagen 01/26/21 1328   Wound Length (cm) 0.5 cm 01/26/21 1328   Wound Width (cm) 0.7 cm 01/26/21 1328   Wound Depth (cm) 0.1 cm 01/26/21 1328   Wound Surface Area (cm^2) 0.35 cm^2 01/26/21 1328   Change in Wound Size % (l*w) -191.67 01/26/21 1328   Wound Volume (cm^3) 0.04 cm^3 01/26/21 1328   Wound Healing % -300 01/26/21 1328   Distance Tunneling (cm) 0 cm 01/26/21 1328 Tunneling Position ___ O'Clock 0 01/26/21 1328   Undermining Starts ___ O'Clock 0 01/26/21 1328   Undermining Ends___ O'Clock 0 01/26/21 1328   Undermining Maxium Distance (cm) 0 01/26/21 1328   Wound Assessment Pink/red 01/26/21 0926   Drainage Amount Moderate 01/26/21 1328   Drainage Description Serosanguinous 01/26/21 1328   Odor None 01/26/21 1328   Ana-wound Assessment Fragile 01/26/21 1328   Margins Defined edges 01/26/21 1328   Wound Thickness Description not for Pressure Injury Full thickness 01/26/21 1328   Number of days: 7       Wound 01/19/21 Radial Left (Active)   Wound Etiology Skin Tear 01/25/21 1200   Dressing Status Clean;Dry; Intact 01/26/21 0926   Wound Cleansed Cleansed with saline 01/19/21 1312   Dressing/Treatment Silicone border 97/56/78 0926   Wound Length (cm) 0 cm 01/26/21 1328   Wound Width (cm) 0 cm 01/26/21 1328   Wound Depth (cm) 0 cm 01/26/21 1328   Wound Surface Area (cm^2) 0 cm^2 01/26/21 1328   Change in Wound Size % (l*w) 100 01/26/21 1328   Wound Volume (cm^3) 0 cm^3 01/26/21 1328   Wound Healing % 100 01/26/21 1328   Distance Tunneling (cm) 0 cm 01/19/21 1312   Tunneling Position ___ O'Clock 0 01/19/21 1312   Undermining Starts ___ O'Clock 0 01/19/21 1312   Undermining Ends___ O'Clock 0 01/19/21 1312   Undermining Maxium Distance (cm) 0 01/19/21 1312   Wound Assessment Pink/red 01/19/21 1312   Drainage Amount None 01/25/21 0800   Drainage Description Serosanguinous 01/19/21 1312   Odor None 01/19/21 1312   Ana-wound Assessment Fragile 01/19/21 1312   Margins Defined edges 01/19/21 1312   Wound Thickness Description not for Pressure Injury Partial thickness 01/19/21 1312   Number of days: 7       Wound 01/19/21 Elbow Left (Active)   Wound Etiology Other 01/26/21 1328   Dressing Status New dressing applied 01/26/21 1328   Wound Cleansed Cleansed with saline 01/26/21 1328   Dressing/Treatment Silicone border 49/48/05 0926   Wound Length (cm) 0.5 cm 01/26/21 1328 Wound Width (cm) 0.8 cm 01/26/21 1328   Wound Depth (cm) 0.1 cm 01/26/21 1328   Wound Surface Area (cm^2) 0.4 cm^2 01/26/21 1328   Change in Wound Size % (l*w) 85.19 01/26/21 1328   Wound Volume (cm^3) 0.04 cm^3 01/26/21 1328   Distance Tunneling (cm) 0 cm 01/26/21 1328   Tunneling Position ___ O'Clock 0 01/26/21 1328   Undermining Starts ___ O'Clock 0 01/26/21 1328   Undermining Ends___ O'Clock 0 01/26/21 1328   Undermining Maxium Distance (cm) 0 01/26/21 1328   Wound Assessment Fluid filled blister 01/26/21 0926   Drainage Amount Small 01/26/21 1328   Drainage Description Serosanguinous 01/26/21 1328   Odor None 01/26/21 0926   Ana-wound Assessment Intact 01/26/21 0926   Margins Attached edges 01/26/21 0926   Number of days: 7       Response to treatment:  Well tolerated by patient. Pain Assessment:  Severity:  Unable to assess pt on a ventilator   Quality of pain:   Wound Pain Timing/Severity:   Premedicated: no    Plan:     Plan of Care: Wound 01/13/21 Coccyx left buttock-Dressing/Treatment: Collagen(sacral border)  Wound 08/27/20 Leg Medial;Lower;Right #8 Cluster-Dressing/Treatment: Open to air  Wound 07/30/20 Leg Medial;Lower; Left #7-Dressing/Treatment: (opt foam ag)  Wound 06/27/19 #6 (onset 1 month) Left Distal Medial Ankle-Dressing/Treatment: Other (comment)(lotrisone;optifoam;coban 2 lite; tape)  Wound 01/19/21 Radial Right-Dressing/Treatment: Collagen(opt border)  Wound 01/19/21 Radial Left-Dressing/Treatment: Silicone border  Wound 01/19/21 Elbow Left-Dressing/Treatment: (opt border)

## 2021-01-26 NOTE — PROGRESS NOTES
Pulmonary and Critical Care  Progress Note    Subjective: The patient is unchanged. FiO2 60%  Shortness of breath none. Chest pain none. Addressing respiratory complaints Patient is negative for  hemoptysis and cyanosis  CONSTITUTIONAL:  negative for fevers and chills.       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, Anemia, Arm fracture, Arthritis, Cataract, Cataract, Cellulitis of left lower leg, Chronic venous hypertension with ulcer ([facility] CKD (chronic kidney disease), Colon polyps, COPD (chronic obstructive pulmonary disease) ([facility] Diabetes mellitus ([facility] Diabetes mellitus ([facility] Diabetes mellitus with peripheral circulatory disorder ([facility] Diabetes mellitus with skin ulcer ([facility] Diabetic peripheral neuropathy ([facility] Diabetic skin ulcer associated with type 2 diabetes mellitus ([facility] Diverticulosis, Femoral DVT (deep venous thrombosis) ([facility] GERD (gastroesophageal reflux disease), Glaucoma, H/O cardiac catheterization, H/O Doppler ultrasound, H/O echocardiogram, H/O mumps orchitis, Hemorrhoids, History of nuclear stress test, HLD (hyperlipidemia), Hx of Doppler ultrasound, Hyperlipemia, Hyperlipidemia, Hypertension, Hypertension, Idiopathic chronic venous hypertension of left lower extremity with ulcer and inflammation (HCC), Leg ulcer ([address] No diabetic retinopathy OU, Non-pressure chronic ulcer of left lower leg with fat layer exposed ([facility] Pulmonary embolism ([facility] Sleep apnea, SOB (shortness of breath), Tendinitis, Type II or unspecified type diabetes mellitus with other specified manifestations, not stated as uncontrolled, Unspecified venous (peripheral) insufficiency, Urticaria, WD-Cellulitis of right anterior lower leg, WD-Cellulitis of right lower extremity, WD-Chronic venous hypertension (idiopathic) with ulcer of left lower extremity (CODE) ([facility] WD-Chronic venous hypertension with inflammation, right, WD-Decubitus ulcer of left buttock, stage 3 ([address] 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 to breakdown of skin ([address] WD-Open wound of hand without complication, left, initial encounter, WD-Pressure injury of left buttock, stage 2 ([address] WD-Pressure injury of left buttock, stage 3 ([address] WD-Pressure injury of right buttock, stage 3 (HCC), WD-Skin tear of right forearm without complication, and WD-Ulcer of right pretibial region, with fat layer exposed ([facility] has a past surgical history that includes Tonsillectomy (1953); Splenectomy (1958); Breast surgery (1970s); hernia repair (1970s); Skin graft (1978-present); Cataract removal (Right, 3/11/2013); Cataract removal (Left, 2/25/2013); Varicose vein surgery (Left, 2009); Carpal tunnel release (Left, 1993); Cardiac catheterization (6/3/14); Colonoscopy (2006); Colonoscopy (11/21/11); Colonoscopy (4/23/12); Colonoscopy (08/04/2016); Splenectomy; hernia repair; and Carpal tunnel release. reports that he quit smoking about 28 years ago. His smoking use included cigarettes. He has a 70.00 pack-year smoking history. He has never used smokeless tobacco. He reports current alcohol use. He reports that he does not use drugs. Family history:  family history includes Cancer in his brother and father; Diabetes in his brother; Heart Disease in his father; High Blood Pressure in his father; High Cholesterol in his father; Thyroid Disease in his brother.     Allergies   Allergen Reactions    Cavilon Durable Barrier [Mineral Oil-Dimeth-Coconut Oil]     Gentamycin [Gentamicin] Hives    Parabens Hives    Parabens Hives    Prinivil [Lisinopril] Swelling    Cortisone Rash    Cortisone Rash    Dilaudid [Hydromorphone Hcl] Rash    Penicillins Rash    Penicillins Rash    Sulfamethoxazole-Trimethoprim Nausea Only    Tape [place] Tape] Rash     Social History:    Reviewed; no changes    Objective:   PHYSICAL EXAM:        VITALS:  /63   Pulse 94   Temp 98.6 °F (37 °C) (Rectal)   Resp 23   Ht 6' 5.01\" (1.956 m)   Wt 274 lb 14.6 oz (124.7 kg)   SpO2 95%   BMI 32.59 kg/m²     24HR INTAKE/OUTPUT:      Intake/Output Summary (Last 24 hours) at 1/26/2021 1052  Last data filed at 1/26/2021 0611  Gross per 24 hour   Intake 4096.12 ml   Output 2800 ml  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)    Venous (peripheral) insufficiency    Uncontrolled secondary diabetes mellitus with stage 3 CKD (GFR 30-59) (HCC)    Diabetes mellitus with skin ulcer (HCC)    WD-Ulcer of shin, left, with fat layer exposed ([address]    History of colon polyps    Personal history of PE (pulmonary embolism)    WD-Chronic venous hypertension (idiopathic) with ulcer of left lower extremity (CODE) ([facility]    WD-Chronic venous hypertension with inflammation, right    Troponin I above reference range    KIRSTEN (acute kidney injury) ([address]    Cellulitis of lower extremity    Hyponatremia    Generalized weakness    Weakness    Multifocal pneumonia    Pneumonia due to COVID-19 virus    Hyperkalemia    Acute respiratory failure with hypoxia ([address]       Plan:   1. Inc. A/C to 24.  2. Wean FiO2.   Andrews Tay MD  1/26/2021  10:52 AM

## 2021-01-26 NOTE — PROGRESS NOTES
[address]    Hospitalist Progress Note      Name:  Holly Hou /Age/Sex: 1945  [de-identified]77 y.o. male)   MRN & CSN:  [id number] & [id number] Admission Date/Time: 2021 12:17 AM   Location:  -A PCP: Herb Eason MD         Hospital Day: 14    Assessment and Plan:   Holly Hou is a 77 y.o.  male  who presents with shortness of breath, and was transferred from [facility] to for management of COVID-19 pneumonia     Acute on chronic hypoxic respiratory failure due to COVID-19 pneumonia, possible hospital-acquired pneumonia    Ventilator,intubated   management per pulmonology, still on high vent settings  Completed Remdesivir , S/p convalescent plasma 2 units 2021  On IV vancomycin, cefepime   Pulmo on board    Septic shock due to COVID-19 pneumonia    Leucocytosis, Covid positive , MRSA positive  Respiratory cultures positive for MRSA, Blood cultures negative to date  On steroids   Completed Remdesivir , S/p convalescent plasma 2 units 2021  On pressors  Per ID, continue vancomycin, cefipime switched to meropenem, added minocycline     Right lower extremity cellulitis - On IV ABs    Hyperkalemia -[place] , Nephro on board     KIRSTEN - likely due to ATN - On Lasix, with good urine output, nephro on board    Chronic medical conditions - resume home medications     DM type II   Hyperlipidemia   Hypertension  Morbid obesity, encourage weight loss and exercise  Lower extremity DVT, on Coumadin, INR therapeutic  Chronic respiratory failure at 3 L of O2     Prognosis guarded  Palliative care consulted    Diet DIET TUBE FEED CONTINUOUS/CYCLIC NPO; Low Calorie High Protein (Vital HP);  Nasogastric; Continuous; 55; 95; 24   DVT Prophylaxis [] Lovenox, []  Heparin, [] SCDs, []No VTE prophylaxis, patient ambulating   GI Prophylaxis [] PPI, [] H2 Blocker, [] No GI prophylaxis, patient is receiving diet/Tube Feeds   Code Status Full Code Disposition Patient requires continued admission due to sepsis/respiratory failure   MDM [] Low, [] Moderate,[x]  High     History of Present Illness: Subjective     Patient Seen & Examined at the bedside     Sedated and intubated with a normal response to noxious stimuli    Ten point ROS could not be reviewed due to sedation    Objective: Intake/Output Summary (Last 24 hours) at 1/26/2021 1609  Last data filed at 1/26/2021 7112  Gross per 24 hour   Intake 4096.12 ml   Output 1525 ml   Net 2571.12 ml      Vitals:   Vitals:    01/26/21 1557   BP:    Pulse: 66   Resp: 24   Temp:    SpO2: 98%     Physical Exam:    GEN intubated and sedated with no response  HENT ETT and NGT in place  RESP decreased air entry with diffuse rhonchi  CARDIO/VASC -S1/S2 auscultated. Regular rate without appreciable murmurs, rubs, or gallops. Peripheral pulses equal bilaterally and palpable. No peripheral edema. GI Abdomen is soft without significant tenderness, masses, or guarding. Bowel sounds are normoactive. Rectal exam deferred.  Starkey catheter is present.   NEURO Sedated -no response to stimuli    Medications:   Medications:    insulin NPH  40 Units Subcutaneous QAM    insulin regular  25 Units Subcutaneous 4 times per day    cefTRIAXone (ROCEPHIN) IV  2,000 mg Intravenous Q24H    insulin glargine  50 Units Subcutaneous Nightly    warfarin  6 mg Oral Daily    vancomycin  1,000 mg Intravenous Q24H    furosemide  40 mg Intravenous Once per day on Mon Wed Fri    insulin regular  0-30 Units Subcutaneous Q6H    insulin regular  10 Units Subcutaneous Once    methylPREDNISolone  40 mg Intravenous Q8H    metoclopramide  10 mg Intravenous Q6H    alogliptin  12.5 mg Oral Daily    ARIPiprazole  15 mg Oral Daily    aspirin  81 mg Oral Daily    atorvastatin  40 mg Oral Nightly    buPROPion  300 mg Oral QAM    escitalopram  20 mg Oral Daily    metoprolol tartrate  12.5 mg Oral BID  sodium chloride flush  10 mL Intravenous 2 times per day    diclofenac sodium  2 g Topical BID    zinc sulfate  50 mg Oral Daily    albuterol sulfate HFA  2 puff Inhalation 4x daily    ipratropium  2 puff Inhalation 4x daily    chlorhexidine  15 mL Mouth/Throat BID    famotidine (PEPCID) injection  20 mg Intravenous BID      Infusions:    dexmedetomidine (PRECEDEX) IV infusion 0.9 mcg/kg/hr (01/26/21 1407)    midazolam 9 mg/hr (01/26/21 0822)    dextrose 100 mL/hr (01/21/21 1214)    cisatracurium (NIMBEX) infusion Stopped (01/18/21 1612)    norepinephrine Stopped (01/20/21 1446)    fentaNYL 200 mcg/hr (01/26/21 1354)    dextrose      sodium chloride       PRN Meds:     fentanNYL, 25 mcg, Q1H PRN      hydrALAZINE, 10 mg, Q6H PRN      glucose, 15 g, PRN      dextrose, 12.5 g, PRN      glucagon (rDNA), 1 mg, PRN      dextrose, 100 mL/hr, PRN      microfibrillar collagen, , PRN      polyvinyl alcohol, 1 drop, Q4H PRN    And      artificial tears, , PRN      sodium chloride flush, 10 mL, PRN      promethazine, 12.5 mg, Q6H PRN    Or      ondansetron, 4 mg, Q6H PRN      polyethylene glycol, 17 g, Daily PRN      acetaminophen, 650 mg, Q6H PRN    Or      acetaminophen, 650 mg, Q6H PRN      glucose, 15 g, PRN      dextrose, 12.5 g, PRN      glucagon (rDNA), 1 mg, PRN      dextrose, 100 mL/hr, PRN      traMADol, 50 mg, Q6H PRN    Or      traMADol, 100 mg, Q6H PRN      diphenhydrAMINE, 25 mg, Q6H PRN      sodium chloride, , PRN      sodium chloride, 30 mL, PRN          Electronically signed by Cheyenne Paul MD on 1/26/2021 at 4:09 PM

## 2021-01-26 NOTE — CONSULTS
[facility] Palliative Medicine Consultation      Marco Antonio Echeverria  1945  [id number]  Primary Care:  Nathan Flores MD  Referring physician:  Mela Alamo MD   Date of Admission:  1/13/2021  Date of Consultation:  1/26/2021    Reason for Consult:    __x___ Advance Care Planning  __x___Transition of Care Planning  __x___ Psychosocial Support  __x___ Symptom Management      Informant: Old records are reviewed. There are no family here. Spoke with nurse    Chief Complaint: [phone]    History of Present Illness: Marco Antonio Echeverria is a 77 y.o. male, who was transferred from [place] for further management of COVID-19. He was admitted to [facility] on 1/9/21 and transferred to [address] [place] [address] on 1/13/21. Received remdesivir on 1/13 and plasma on 1/17. Patient failed Bipap and was intubated on 1/13/21.         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 Bandar Willis Anemia 11/8/2011    Arm fracture Ashlee Pleasant     Dr Guille Canas with also yearly DM exam    Cataract 11/12    worsening-Dr. Hernandez [place]    Cellulitis of left lower leg     Chronic venous hypertension with ulcer ([address] 11/18/2011    CKD (chronic kidney disease) Feb 2012    Renal u/S normal     Colon polyps     Dr. Bandar Willis COPD (chronic obstructive pulmonary disease) ([address]     Diabetes mellitus ([address] 1993    Diabetes mellitus ([address]     Diabetes mellitus with peripheral circulatory disorder ([address] 10/2/2015    Diabetes mellitus with skin ulcer ([address] 10/2/2015    Diabetic peripheral neuropathy ([address] 11/12    + EMG, NCS    Diabetic skin ulcer associated with type 2 diabetes mellitus ([address]     Diverticulosis 4/23/12    mild, left colon    Femoral DVT (deep venous thrombosis) ([address] 09/2011    PARTIAL,CHRONIC-SPFLD HEART SURGEONS    GERD (gastroesophageal reflux disease)     Glaucoma 11/12    open angle-Dr. Corina Pool H/O cardiac catheterization 6/3/14    EF55% normal study    H/O Doppler ultrasound 03/26/15    Carotid US- no significant stenosis noted bilaterally.  H/O echocardiogram 11/21/2019    EF50-55%, Mild AR. Moderately dilated right ventricle with negative Solis's sign.  H/O mumps orchitis     as a youth    Hemorrhoids 4/23/12    Dr. Gale Phillips; repeat colonoscopy 3 years    History of nuclear stress test 11/21/2019    EF 60%, Normal study.  HLD (hyperlipidemia)     Hx of Doppler ultrasound 1/06/15    Lymph node seen in left groin area. No bilateral stenosis.     Hyperlipemia     Hyperlipidemia     Hypertension 1992    Hypertension     Idiopathic chronic venous hypertension of left lower extremity with ulcer and inflammation (HCC) 10/2/2015    Leg ulcer ([address] 1978-present    following at wound center, Dr Velma Adams No diabetic retinopathy OU 11/12    Dr. De La Rosa Brittle chronic ulcer of left lower leg with fat layer exposed ([address] 10/2/2015    Pulmonary embolism ([address] 11/13    patient on coumadin    Sleep apnea     doesnt always use cpap dt it drys him out    SOB (shortness of breath) Oct 2011    Stress test normal.     Tendinitis 1973    plantar tendons    Type II or unspecified type diabetes mellitus with other specified manifestations, not stated as uncontrolled 2/8/2013    Unspecified venous (peripheral) insufficiency     Urticaria     WD-Cellulitis of right anterior lower leg 9/5/2019    WD-Cellulitis of right lower extremity 3/26/2020    WD-Chronic venous hypertension (idiopathic) with ulcer of left lower extremity (CODE) ([address] 6/27/2019    WD-Chronic venous hypertension with inflammation, right 9/19/2019    WD-Decubitus ulcer of left buttock, stage 3 ([address] 6/25/2020    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 to breakdown of skin ([address] 3/26/2020    WD-Open wound of hand without complication, left, initial encounter 2019    WD-Pressure injury of left buttock, stage 2 ([address] 2020    WD-Pressure injury of left buttock, stage 3 ([address] 3/12/2020    WD-Pressure injury of right buttock, stage 3 ([address] 3/12/2020    WD-Skin tear of right forearm without complication 3114    WD-Ulcer of right pretibial region, with fat layer exposed ([address] 10/17/2019       Past Surgical History   has a past surgical history that includes Tonsillectomy (); Splenectomy (); Breast surgery (); hernia repair (); Skin graft (-present); Cataract removal (Right, 3/11/2013); Cataract removal (Left, 2013); Varicose vein surgery (Left, ); Carpal tunnel release (Left, ); Cardiac catheterization (6/3/14); Colonoscopy (); Colonoscopy (11); Colonoscopy (12); Colonoscopy (2016); Splenectomy; hernia repair; and Carpal tunnel release. Social History:   Social History     Socioeconomic History    Marital status: Single     Spouse name: Not on file    Number of children: Not on file    Years of education: Not on file    Highest education level: Not on file   Occupational History    Not on file   Social Needs    Financial resource strain: Not on file    Food insecurity     Worry: Not on file     Inability: Not on file    Transportation needs     Medical: Not on file     Non-medical: Not on file   Tobacco Use    Smoking status: Former Smoker     Packs/day: 2.00     Years: 35.00     Pack years: 70.00     Types: Cigarettes     Quit date:      Years since quittin.0    Smokeless tobacco: Never Used    Tobacco comment: chew--30 years.   Reviewed 2015   Substance and Sexual Activity    Alcohol use: Yes     Comment: soc    Drug use: Never    Sexual activity: Not on file   Lifestyle    Physical activity     Days per week: Not on file     Minutes per session: Not on file    Stress: Not on file   Relationships    Social connections     Talks on phone: Not on file     Gets together: Not on file     Attends [employer] service: Not on file     Active member of club or organization: Not on file     Attends meetings of clubs or organizations: Not on file     Relationship status: Not on file    Intimate partner violence     Fear of current or ex partner: Not on file     Emotionally abused: Not on file     Physically abused: Not on file     Forced sexual activity: Not on file   Other Topics Concern    Not on file   Social History Narrative    ** Merged History Encounter **            Family History: family history includes Cancer in his brother and father; Diabetes in his brother; Heart Disease in his father; High Blood Pressure in his father; High Cholesterol in his father; Thyroid Disease in his brother. Old Records:  reviewed.      Advanced Directives:  Full code    Allergies   Allergen Reactions    Cavilon Durable Barrier [Mineral Oil-Dimeth-Coconut Oil]     Gentamycin [Gentamicin] Hives    Parabens Hives    Parabens Hives    Prinivil [Lisinopril] Swelling    Cortisone Rash    Cortisone Rash    Dilaudid [Hydromorphone Hcl] Rash    Penicillins Rash    Penicillins Rash    Sulfamethoxazole-Trimethoprim Nausea Only    Tape [Adhesive Tape] Rash       Medications - reviewed     ROS: As noted in HOP    Physical Exam:   BP (!) 167/70   Pulse 83   Temp 98.6 °F (37 °C) (Rectal)   Resp 15   Ht 6' 5.01\" (1.956 m)   Wt 274 lb 14.6 oz (124.7 kg)   SpO2 97%   BMI 32.59 kg/m²   General: Sedated on vent  HEENT: Mucous membranes are dry   Abdomen: soft, bowel sounds quietly present, no apparent tenderness, nondistended, no masses    LABS:     CBC:   Lab Results   Component Value Date    WBC 14.5 01/26/2021    RBC 3.45 01/26/2021    HGB 9.9 01/26/2021    HCT 32.9 01/26/2021    MCV 95.4 01/26/2021    MCH 28.7 01/26/2021    MCHC 30.1 01/26/2021    RDW 18.1 01/26/2021     01/26/2021    MPV 13.9 01/26/2021     CMP:    Lab Results   Component Value Date     01/25/2021    K 5.1 01/25/2021     01/25/2021    CO2 26 01/25/2021    BUN 73 01/25/2021    CREATININE 1.0 01/25/2021    GFRAA >60 01/25/2021    GFRAA >60 03/19/2013    AGRATIO 1.1 10/21/2019    LABGLOM >60 01/25/2021    GLUCOSE 207 01/25/2021    PROT 5.6 01/20/2021    PROT 7.4 09/17/2012    LABALBU 2.4 01/20/2021    CALCIUM 8.2 01/25/2021    BILITOT 0.3 01/20/2021    ALKPHOS 68 01/20/2021    AST 17 01/20/2021    ALT 15 01/20/2021       IMAGING:  Imaging studies have been reviewed in the computerized chart. Assesment/Plan:   Called and spoke to daughter, Ant Parr. She would like to come see her father tomorrow. Scheduled visit with house supervisor for tomorrow at [facility].    1. Acute on chronic hypoxic respiratory failure due to COVID-19 peumonia  2. MRSA  3. RLE cellulitis  4.  KIRSTEN, likely due to ATN    Patient Active Problem List   Diagnosis Code    Gastroesophageal reflux disease without esophagitis K21.9    Hypertension in stage 3 chronic kidney disease due to type 2 diabetes mellitus ([facility]) E11.22, I12.9, N18.30    DM (diabetes mellitus) type II, controlled, with peripheral vascular disorder (HCC) E11.51    Combined hyperlipidemia associated with type 2 diabetes mellitus ([facility]) E11.69, E78.2    Diabetic skin ulcer associated with type 2 diabetes mellitus ([facility] E11.622, L98.499    CKD (chronic kidney disease) N18.9    History of DVT (deep vein thrombosis)- left femoral Z86.718    History of pulmonary embolism Z86.711    Long term current use of anticoagulants with INR goal of 2.0-3.0 Z79.01    COPD (chronic obstructive pulmonary disease) ([facility]) J44.9    Severe recurrent major depressive disorder with psychotic features ([facility]) F33.3    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 ([facility]) I83.229, I83.219, L97.919, L97.929    Venous (peripheral) insufficiency I87.2    Uncontrolled secondary diabetes mellitus with stage 3 CKD (GFR 30-59) (HCC) E13.22, E13.65, N18.30    Diabetes mellitus with skin ulcer

## 2021-01-26 NOTE — PROGRESS NOTES
Pt now maxed out on versed and fentanyl gtts, still stacking breaths, fighting vent. Notified Xiomy Cisneros and Sisi Mckeon, see new orders.

## 2021-01-26 NOTE — PROGRESS NOTES
Comprehensive Nutrition Assessment    Type and Reason for Visit:  Reassess    Nutrition Recommendations/Plan:   EN Order: Vital HP @ 95 ml/hr which would provide ~2280 kcal and 199 g protein which would meet estimated kcal/protein needs  Will monitor GI tolerance, nutrition status, poc    Nutrition Assessment:  pt remains intubated, off pressors noted ,+fentanyl, precedex, versed, EN infusing @ 110 ml/hr per flowsheet, pt at high nutrition risk    Malnutrition Assessment:  Malnutrition Status: At risk for malnutrition (Comment)    Context:  Acute Illness       Estimated Daily Nutrient Needs:  Energy (kcal):  2386([facility] 2010); Weight Used for Energy Requirements:  Current     Protein (g):  190(2.0 g/kg); Weight Used for Protein Requirements:  Ideal        Fluid (ml/day):  (fluid management per nephrology); Nutrition Related Findings:  Glucose 363, droplet plus isolation      Wounds:  Pressure Injury, Venous Stasis, Multiple       Current Nutrition Therapies:    Current Tube Feeding (TF) Orders:  · Feeding Route: Nasogastric  · Formula: Renal  · Schedule: Continuous @ 110 ml/hr  · Current TF & Flush Orders Provides: 4752 kcal and 214 g protein    Anthropometric Measures:  · Height: 6' 5.01\" (195.6 cm)  · Current Body Weight: 274 lb 14.6 oz (124.7 kg)   · Admission Body Weight: 296 lb (134.3 kg)    · Usual Body Weight: 307 lb (139.3 kg)     · Ideal Body Weight: 208 lbs; % Ideal Body Weight 132.2 %   · BMI: 32.6  · BMI Categories: Obese Class 1 (BMI 30.0-34. 9)       Nutrition Diagnosis:   · Inadequate oral intake related to impaired respiratory function as evidenced by NPO or clear liquid status due to medical condition    Nutrition Interventions:   Food and/or Nutrient Delivery:  Modify Tube Feeding  Nutrition Education/Counseling:  No recommendation at this time   Coordination of Nutrition Care:  Continue to monitor while inpatient    Goals:  pt will receive greater than 75% of his estimated needs Nutrition Monitoring and Evaluation:   Behavioral-Environmental Outcomes:  None Identified   Food/Nutrient Intake Outcomes:  Enteral Nutrition Intake/Tolerance  Physical Signs/Symptoms Outcomes:  Biochemical Data, Weight, GI Status, Hemodynamic Status, Fluid Status or Edema     Discharge Planning:     Too soon to determine     Electronically signed by Mala Roche MS, RD, LD on 1/26/21 at 12:57 PM EST    Contact: [phone]

## 2021-01-27 ENCOUNTER — APPOINTMENT (OUTPATIENT)
Dept: GENERAL RADIOLOGY | Age: 76
DRG: 870 | End: 2021-01-27
Attending: INTERNAL MEDICINE
Payer: MEDICARE

## 2021-01-27 LAB
ANISOCYTOSIS: ABNORMAL
BANDED NEUTROPHILS ABSOLUTE COUNT: 1.45 K/CU MM
BANDED NEUTROPHILS RELATIVE PERCENT: 12 % (ref 5–11)
BASE EXCESS MIXED: 2.8 (ref 0–1.2)
CARBON MONOXIDE, BLOOD: 1.9 % (ref 0–5)
CO2 CONTENT: 31.8 MMOL/L (ref 19–24)
COMMENT: ABNORMAL
DIFFERENTIAL TYPE: ABNORMAL
EKG ATRIAL RATE: 130 BPM
EKG DIAGNOSIS: NORMAL
EKG P-R INTERVAL: 168 MS
EKG Q-T INTERVAL: 322 MS
EKG QRS DURATION: 100 MS
EKG QTC CALCULATION (BAZETT): 473 MS
EKG R AXIS: -18 DEGREES
EKG T AXIS: 70 DEGREES
EKG VENTRICULAR RATE: 130 BPM
GLUCOSE BLD-MCNC: 198 MG/DL (ref 70–99)
GLUCOSE BLD-MCNC: 255 MG/DL (ref 70–99)
GLUCOSE BLD-MCNC: 259 MG/DL (ref 70–99)
GLUCOSE BLD-MCNC: 281 MG/DL (ref 70–99)
GLUCOSE BLD-MCNC: 283 MG/DL (ref 70–99)
GLUCOSE BLD-MCNC: 310 MG/DL (ref 70–99)
GLUCOSE BLD-MCNC: 337 MG/DL (ref 70–99)
HCO3 ARTERIAL: 30.1 MMOL/L (ref 18–23)
HCT VFR BLD CALC: 32.9 % (ref 42–52)
HEMOGLOBIN: 10 GM/DL (ref 13.5–18)
INR BLD: 1.25 INDEX
LYMPHOCYTES ABSOLUTE: 0.6 K/CU MM
LYMPHOCYTES RELATIVE PERCENT: 5 % (ref 24–44)
MAGNESIUM: 2.4 MG/DL (ref 1.8–2.4)
MCH RBC QN AUTO: 29.2 PG (ref 27–31)
MCHC RBC AUTO-ENTMCNC: 30.4 % (ref 32–36)
MCV RBC AUTO: 96.2 FL (ref 78–100)
METAMYELOCYTES ABSOLUTE COUNT: 0.24 K/CU MM
METAMYELOCYTES PERCENT: 2 %
METHEMOGLOBIN ARTERIAL: 1.2 %
MONOCYTES ABSOLUTE: 0.5 K/CU MM
MONOCYTES RELATIVE PERCENT: 4 % (ref 0–4)
MYELOCYTE PERCENT: 1 %
MYELOCYTES ABSOLUTE COUNT: 0.12 K/CU MM
O2 SATURATION: 95.8 % (ref 96–97)
PCO2 ARTERIAL: 57 MMHG (ref 32–45)
PDW BLD-RTO: 18.3 % (ref 11.7–14.9)
PH BLOOD: 7.33 (ref 7.34–7.45)
PHOSPHORUS: 3.7 MG/DL (ref 2.5–4.9)
PLATELET # BLD: 165 K/CU MM (ref 140–440)
PMV BLD AUTO: 13.2 FL (ref 7.5–11.1)
PO2 ARTERIAL: 101 MMHG (ref 75–100)
PROTHROMBIN TIME: 15.2 SECONDS (ref 11.7–14.5)
RBC # BLD: 3.42 M/CU MM (ref 4.6–6.2)
RBC # BLD: ABNORMAL 10*6/UL
SEGMENTED NEUTROPHILS ABSOLUTE COUNT: 9.2 K/CU MM
SEGMENTED NEUTROPHILS RELATIVE PERCENT: 76 % (ref 36–66)
TOXIC GRANULATION: PRESENT
WBC # BLD: 12.1 K/CU MM (ref 4–10.5)

## 2021-01-27 PROCEDURE — 6370000000 HC RX 637 (ALT 250 FOR IP): Performed by: INTERNAL MEDICINE

## 2021-01-27 PROCEDURE — 6360000002 HC RX W HCPCS: Performed by: INTERNAL MEDICINE

## 2021-01-27 PROCEDURE — 2000000000 HC ICU R&B

## 2021-01-27 PROCEDURE — 2500000003 HC RX 250 WO HCPCS: Performed by: NURSE PRACTITIONER

## 2021-01-27 PROCEDURE — 85610 PROTHROMBIN TIME: CPT

## 2021-01-27 PROCEDURE — 84100 ASSAY OF PHOSPHORUS: CPT

## 2021-01-27 PROCEDURE — 94761 N-INVAS EAR/PLS OXIMETRY MLT: CPT

## 2021-01-27 PROCEDURE — 85027 COMPLETE CBC AUTOMATED: CPT

## 2021-01-27 PROCEDURE — 94640 AIRWAY INHALATION TREATMENT: CPT

## 2021-01-27 PROCEDURE — 2500000003 HC RX 250 WO HCPCS: Performed by: INTERNAL MEDICINE

## 2021-01-27 PROCEDURE — 85007 BL SMEAR W/DIFF WBC COUNT: CPT

## 2021-01-27 PROCEDURE — 82962 GLUCOSE BLOOD TEST: CPT

## 2021-01-27 PROCEDURE — 83735 ASSAY OF MAGNESIUM: CPT

## 2021-01-27 PROCEDURE — 2700000000 HC OXYGEN THERAPY PER DAY

## 2021-01-27 PROCEDURE — 6370000000 HC RX 637 (ALT 250 FOR IP): Performed by: NURSE PRACTITIONER

## 2021-01-27 PROCEDURE — 99232 SBSQ HOSP IP/OBS MODERATE 35: CPT | Performed by: NURSE PRACTITIONER

## 2021-01-27 PROCEDURE — 99233 SBSQ HOSP IP/OBS HIGH 50: CPT | Performed by: INTERNAL MEDICINE

## 2021-01-27 PROCEDURE — 2580000003 HC RX 258: Performed by: INTERNAL MEDICINE

## 2021-01-27 PROCEDURE — 6360000002 HC RX W HCPCS: Performed by: PHYSICIAN ASSISTANT

## 2021-01-27 PROCEDURE — 71045 X-RAY EXAM CHEST 1 VIEW: CPT

## 2021-01-27 PROCEDURE — 94003 VENT MGMT INPAT SUBQ DAY: CPT

## 2021-01-27 PROCEDURE — 82803 BLOOD GASES ANY COMBINATION: CPT

## 2021-01-27 PROCEDURE — 37799 UNLISTED PX VASCULAR SURGERY: CPT

## 2021-01-27 PROCEDURE — 2580000003 HC RX 258: Performed by: NURSE PRACTITIONER

## 2021-01-27 PROCEDURE — 89220 SPUTUM SPECIMEN COLLECTION: CPT

## 2021-01-27 PROCEDURE — 2580000003 HC RX 258: Performed by: PHYSICIAN ASSISTANT

## 2021-01-27 PROCEDURE — 6360000002 HC RX W HCPCS: Performed by: STUDENT IN AN ORGANIZED HEALTH CARE EDUCATION/TRAINING PROGRAM

## 2021-01-27 RX ORDER — INSULIN GLARGINE 100 [IU]/ML
60 INJECTION, SOLUTION SUBCUTANEOUS NIGHTLY
Status: DISCONTINUED | OUTPATIENT
Start: 2021-01-27 | End: 2021-01-30

## 2021-01-27 RX ADMIN — METOPROLOL TARTRATE 12.5 MG: 25 TABLET, FILM COATED ORAL at 20:30

## 2021-01-27 RX ADMIN — SODIUM CHLORIDE 0.8 MCG/KG/HR: 9 INJECTION, SOLUTION INTRAVENOUS at 18:41

## 2021-01-27 RX ADMIN — Medication 2 PUFF: at 16:14

## 2021-01-27 RX ADMIN — SODIUM CHLORIDE, PRESERVATIVE FREE 10 ML: 5 INJECTION INTRAVENOUS at 20:32

## 2021-01-27 RX ADMIN — Medication 200 MCG/HR: at 01:26

## 2021-01-27 RX ADMIN — INSULIN HUMAN 15 UNITS: 100 INJECTION, SOLUTION PARENTERAL at 01:24

## 2021-01-27 RX ADMIN — METOCLOPRAMIDE 10 MG: 5 INJECTION, SOLUTION INTRAMUSCULAR; INTRAVENOUS at 17:51

## 2021-01-27 RX ADMIN — Medication 2 PUFF: at 12:54

## 2021-01-27 RX ADMIN — INSULIN GLARGINE 60 UNITS: 100 INJECTION, SOLUTION SUBCUTANEOUS at 20:33

## 2021-01-27 RX ADMIN — ZINC SULFATE 220 MG (50 MG) CAPSULE 50 MG: CAPSULE at 09:29

## 2021-01-27 RX ADMIN — VANCOMYCIN HYDROCHLORIDE 1000 MG: 1 INJECTION, POWDER, LYOPHILIZED, FOR SOLUTION INTRAVENOUS at 12:13

## 2021-01-27 RX ADMIN — FUROSEMIDE 40 MG: 10 INJECTION, SOLUTION INTRAVENOUS at 09:30

## 2021-01-27 RX ADMIN — METHYLPREDNISOLONE SODIUM SUCCINATE 40 MG: 40 INJECTION, POWDER, LYOPHILIZED, FOR SOLUTION INTRAMUSCULAR; INTRAVENOUS at 00:49

## 2021-01-27 RX ADMIN — CHLORHEXIDINE GLUCONATE 0.12% ORAL RINSE 15 ML: 1.2 LIQUID ORAL at 08:45

## 2021-01-27 RX ADMIN — ALBUTEROL SULFATE 2 PUFF: 90 AEROSOL, METERED RESPIRATORY (INHALATION) at 12:54

## 2021-01-27 RX ADMIN — METHYLPREDNISOLONE SODIUM SUCCINATE 40 MG: 40 INJECTION, POWDER, LYOPHILIZED, FOR SOLUTION INTRAMUSCULAR; INTRAVENOUS at 06:46

## 2021-01-27 RX ADMIN — INSULIN HUMAN 50 UNITS: 100 INJECTION, SUSPENSION SUBCUTANEOUS at 08:57

## 2021-01-27 RX ADMIN — Medication 200 MCG/HR: at 07:26

## 2021-01-27 RX ADMIN — INSULIN HUMAN 30 UNITS: 100 INJECTION, SOLUTION PARENTERAL at 17:46

## 2021-01-27 RX ADMIN — INSULIN HUMAN 10 UNITS: 100 INJECTION, SOLUTION PARENTERAL at 06:16

## 2021-01-27 RX ADMIN — SODIUM CHLORIDE 0.8 MCG/KG/HR: 9 INJECTION, SOLUTION INTRAVENOUS at 09:51

## 2021-01-27 RX ADMIN — Medication 200 MCG/HR: at 19:36

## 2021-01-27 RX ADMIN — METOCLOPRAMIDE 10 MG: 5 INJECTION, SOLUTION INTRAMUSCULAR; INTRAVENOUS at 13:59

## 2021-01-27 RX ADMIN — ESCITALOPRAM OXALATE 20 MG: 10 TABLET ORAL at 09:29

## 2021-01-27 RX ADMIN — FAMOTIDINE 20 MG: 10 INJECTION, SOLUTION INTRAVENOUS at 20:30

## 2021-01-27 RX ADMIN — ALBUTEROL SULFATE 2 PUFF: 90 AEROSOL, METERED RESPIRATORY (INHALATION) at 20:22

## 2021-01-27 RX ADMIN — Medication 2 PUFF: at 20:23

## 2021-01-27 RX ADMIN — ALBUTEROL SULFATE 2 PUFF: 90 AEROSOL, METERED RESPIRATORY (INHALATION) at 16:14

## 2021-01-27 RX ADMIN — MIDAZOLAM HYDROCHLORIDE 9 MG/HR: 5 INJECTION, SOLUTION INTRAMUSCULAR; INTRAVENOUS at 01:26

## 2021-01-27 RX ADMIN — FAMOTIDINE 20 MG: 10 INJECTION, SOLUTION INTRAVENOUS at 09:30

## 2021-01-27 RX ADMIN — INSULIN HUMAN 15 UNITS: 100 INJECTION, SOLUTION PARENTERAL at 12:09

## 2021-01-27 RX ADMIN — Medication 200 MCG/HR: at 13:25

## 2021-01-27 RX ADMIN — METHYLPREDNISOLONE SODIUM SUCCINATE 40 MG: 40 INJECTION, POWDER, LYOPHILIZED, FOR SOLUTION INTRAMUSCULAR; INTRAVENOUS at 23:49

## 2021-01-27 RX ADMIN — INSULIN HUMAN 25 UNITS: 100 INJECTION, SOLUTION PARENTERAL at 06:18

## 2021-01-27 RX ADMIN — SODIUM CHLORIDE 0.8 MCG/KG/HR: 9 INJECTION, SOLUTION INTRAVENOUS at 00:45

## 2021-01-27 RX ADMIN — HYDRALAZINE HYDROCHLORIDE 10 MG: 20 INJECTION, SOLUTION INTRAMUSCULAR; INTRAVENOUS at 02:56

## 2021-01-27 RX ADMIN — MIDAZOLAM HYDROCHLORIDE 9 MG/HR: 5 INJECTION, SOLUTION INTRAMUSCULAR; INTRAVENOUS at 17:40

## 2021-01-27 RX ADMIN — SODIUM CHLORIDE 0.8 MCG/KG/HR: 9 INJECTION, SOLUTION INTRAVENOUS at 23:26

## 2021-01-27 RX ADMIN — METHYLPREDNISOLONE SODIUM SUCCINATE 40 MG: 40 INJECTION, POWDER, LYOPHILIZED, FOR SOLUTION INTRAMUSCULAR; INTRAVENOUS at 17:55

## 2021-01-27 RX ADMIN — DICLOFENAC SODIUM 2 G: 10 GEL TOPICAL at 09:31

## 2021-01-27 RX ADMIN — SODIUM CHLORIDE 0.8 MCG/KG/HR: 9 INJECTION, SOLUTION INTRAVENOUS at 05:29

## 2021-01-27 RX ADMIN — INSULIN HUMAN 20 UNITS: 100 INJECTION, SOLUTION PARENTERAL at 23:56

## 2021-01-27 RX ADMIN — CEFTRIAXONE SODIUM 2000 MG: 2 INJECTION, POWDER, FOR SOLUTION INTRAMUSCULAR; INTRAVENOUS at 12:13

## 2021-01-27 RX ADMIN — METOPROLOL TARTRATE 12.5 MG: 25 TABLET, FILM COATED ORAL at 09:30

## 2021-01-27 RX ADMIN — SODIUM CHLORIDE 0.8 MCG/KG/HR: 9 INJECTION, SOLUTION INTRAVENOUS at 14:01

## 2021-01-27 RX ADMIN — WARFARIN SODIUM 7 MG: 4 TABLET ORAL at 17:51

## 2021-01-27 RX ADMIN — INSULIN HUMAN 30 UNITS: 100 INJECTION, SOLUTION PARENTERAL at 23:59

## 2021-01-27 RX ADMIN — ASPIRIN 81 MG CHEWABLE TABLET 81 MG: 81 TABLET CHEWABLE at 09:29

## 2021-01-27 RX ADMIN — CHLORHEXIDINE GLUCONATE 0.12% ORAL RINSE 15 ML: 1.2 LIQUID ORAL at 20:30

## 2021-01-27 RX ADMIN — INSULIN HUMAN 30 UNITS: 100 INJECTION, SOLUTION PARENTERAL at 12:09

## 2021-01-27 RX ADMIN — Medication 2 PUFF: at 08:05

## 2021-01-27 RX ADMIN — MIDAZOLAM HYDROCHLORIDE 9 MG/HR: 5 INJECTION, SOLUTION INTRAMUSCULAR; INTRAVENOUS at 08:38

## 2021-01-27 RX ADMIN — METOCLOPRAMIDE 10 MG: 5 INJECTION, SOLUTION INTRAMUSCULAR; INTRAVENOUS at 01:42

## 2021-01-27 RX ADMIN — INSULIN HUMAN 5 UNITS: 100 INJECTION, SOLUTION PARENTERAL at 17:47

## 2021-01-27 RX ADMIN — METOCLOPRAMIDE 10 MG: 5 INJECTION, SOLUTION INTRAMUSCULAR; INTRAVENOUS at 06:46

## 2021-01-27 RX ADMIN — DICLOFENAC SODIUM 2 G: 10 GEL TOPICAL at 20:32

## 2021-01-27 RX ADMIN — ALBUTEROL SULFATE 2 PUFF: 90 AEROSOL, METERED RESPIRATORY (INHALATION) at 08:05

## 2021-01-27 RX ADMIN — ATORVASTATIN CALCIUM 40 MG: 40 TABLET, FILM COATED ORAL at 20:30

## 2021-01-27 RX ADMIN — ALOGLIPTIN 12.5 MG: 12.5 TABLET, FILM COATED ORAL at 09:30

## 2021-01-27 ASSESSMENT — PULMONARY FUNCTION TESTS
PIF_VALUE: 32
PIF_VALUE: 29
PIF_VALUE: 30
PIF_VALUE: 32
PIF_VALUE: 30
PIF_VALUE: 29
PIF_VALUE: 29
PIF_VALUE: 30

## 2021-01-27 ASSESSMENT — PAIN SCALES - GENERAL: PAINLEVEL_OUTOF10: 0

## 2021-01-27 NOTE — PROGRESS NOTES
PHARMACY ANTICOAGULATION MONITORING SERVICE    Anna Bajwa is a 77 y.o. male on warfarin therapy for history of DVT. Pharmacy consulted by Dr. Kati Leyva for monitoring and adjustment of treatment. Indication for anticoagulation: Hx of DVT ; also current C-19  INR goal: 2-3  Warfarin dose prior to admission: 5 mg daily    Pertinent Laboratory Values   Recent Labs     01/25/21  0350 01/26/21  0545 01/26/21  0545 01/27/21  0440   INR 1.34  --    < > 1.25   HGB 9.2* 9.9*  --  10.0*   HCT 30.6* 32.9*  --  32.9*    170  --  165    < > = values in this interval not displayed. Assessment/Plan:  ? Possible DDI's:   o Aspirin (home med), can increase bleeding risk, clinically appropriate  o Escitalopram (home med), can increase bleeding risk, appropriate to continue while inpatient  o Tramadol may increase effects of warfarin (ordered PRN)  o Ordered on minocycline from 1/23-1/26 -now dc'd. (tetracyclines can increase effects of warfarin)  o No Tx bridge. ? INR today = 1.25  ? Increase Warfarin to 7 mg daily. ? Additional dose adjustments to be made per INR trends, interacting medications, and other clinical factors  ? Pharmacy will continue to monitor and adjust warfarin therapy as indicated.     Thank you for the consult,  Heber Benoit 88, [facility]  1/27/2021 2:53 PM

## 2021-01-27 NOTE — PROGRESS NOTES
Pulmonary and Critical Care  Progress Note    Subjective: The patient is sl better. FiO2 50%  Shortness of breath none. Chest pain none. Addressing respiratory complaints Patient is negative for  hemoptysis and cyanosis  CONSTITUTIONAL:  negative for fevers and chills.       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, Anemia, Arm fracture, Arthritis, Cataract, Cataract, Cellulitis of left lower leg, Chronic venous hypertension with ulcer ([facility] [address] CKD (chronic kidney disease), Colon polyps, COPD (chronic obstructive pulmonary disease) ([facility] [address] [facility] Diabetes mellitus ([facility] [address] [facility] Diabetes mellitus ([facility] Diabetes mellitus with peripheral circulatory disorder ([facility] [facility] Diabetes mellitus with skin ulcer ([facility] Diabetic peripheral neuropathy ([facility] [facility] Diabetic skin ulcer associated with type 2 diabetes mellitus ([facility] Diverticulosis, Femoral DVT (deep venous thrombosis) ([facility] [facility] GERD (gastroesophageal reflux disease), Glaucoma, H/O cardiac catheterization, H/O Doppler ultrasound, H/O echocardiogram, H/O mumps orchitis, Hemorrhoids, History of nuclear stress test, HLD (hyperlipidemia), Hx of Doppler ultrasound, Hyperlipemia, Hyperlipidemia, Hypertension, Hypertension, Idiopathic chronic venous hypertension of left lower extremity with ulcer and inflammation (HCC), Leg ulcer ([address] No diabetic retinopathy OU, Non-pressure chronic ulcer of left lower leg with fat layer exposed ([facility] [address] Pulmonary embolism ([facility] Sleep apnea, SOB (shortness of breath), Tendinitis, Type II or unspecified type diabetes mellitus with other specified manifestations, not stated as uncontrolled, Unspecified venous (peripheral) insufficiency, Urticaria, WD-Cellulitis of right anterior lower leg, WD-Cellulitis of right lower extremity, WD-Chronic venous hypertension (idiopathic) with ulcer of left lower extremity (CODE) ([facility] [address] [facility] WD-Chronic venous hypertension with inflammation, right, WD-Decubitus ulcer of left buttock, stage 3 ([address] 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 to breakdown of skin ([address] WD-Open wound of hand without complication, left, initial encounter, WD-Pressure injury of left buttock, stage 2 ([address] WD-Pressure injury of left buttock, stage 3 ([address] WD-Pressure injury of right buttock, stage 3 (HCC), WD-Skin tear of right forearm without complication, and WD-Ulcer of right pretibial region, with fat layer exposed ([facility] has a past surgical history that includes Tonsillectomy (1953); Splenectomy (1958); Breast surgery (1970s); hernia repair (1970s); Skin graft (1978-present); Cataract removal (Right, 3/11/2013); Cataract removal (Left, 2/25/2013); Varicose vein surgery (Left, 2009); Carpal tunnel release (Left, 1993); Cardiac catheterization (6/3/14); Colonoscopy (2006); Colonoscopy (11/21/11); Colonoscopy (4/23/12); Colonoscopy (08/04/2016); Splenectomy; hernia repair; and Carpal tunnel release. reports that he quit smoking about 28 years ago. His smoking use included cigarettes. He has a 70.00 pack-year smoking history. He has never used smokeless tobacco. He reports current alcohol use. He reports that he does not use drugs. Family history:  family history includes Cancer in his brother and father; Diabetes in his brother; Heart Disease in his father; High Blood Pressure in his father; High Cholesterol in his father; Thyroid Disease in his brother.     Allergies   Allergen Reactions    Cavilon Durable Barrier [Mineral Oil-Dimeth-Coconut Oil]     Gentamycin [Gentamicin] Hives    Parabens Hives    Parabens Hives    Prinivil [Lisinopril] Swelling    Cortisone Rash    Cortisone Rash    Dilaudid [Hydromorphone Hcl] Rash    Penicillins Rash    Penicillins Rash    Sulfamethoxazole-Trimethoprim Nausea Only    Tape Ileene Spark Tape] Rash     Social History:    Reviewed; no changes    Objective:   PHYSICAL EXAM:        VITALS:  BP (!) 143/52   Pulse 84   Temp 99.9 °F (37.7 °C) (Rectal)   Resp 10   Ht 6' 5.01\" (1.956 m)   Wt 274 lb 14.6 oz (124.7 kg)   SpO2 96%   BMI 32.59 kg/m²     24HR INTAKE/OUTPUT:      Intake/Output Summary (Last 24 hours) at 1/27/2021 1126  Last data filed at 1/27/2021 0619  Gross per 24 hour   Intake 3463.82 ml   Output 2475 ml Net 988.82 ml       CONSTITUTIONAL:  Somnolent. LUNGS:  decreased breath sounds, basilar crackles. CARDIOVASCULAR:  normal S1 and S2 and negative JVD  ABD:Abdomen soft, non-tender. BS normal. No masses,  No organomegaly  NEURO:Sedated on vent. DATA:    CBC:  Recent Labs     01/25/21  0350 01/26/21  0545 01/27/21  0440   WBC 15.5* 14.5* 12.1*   RBC 3.20* 3.45* 3.42*   HGB 9.2* 9.9* 10.0*   HCT 30.6* 32.9* 32.9*    170 165   MCV 95.6 95.4 96.2   MCH 28.8 28.7 29.2   MCHC 30.1* 30.1* 30.4*   RDW 17.7* 18.1* 18.3*   NRBC 1  --   --    SEGSPCT 81.0* 86.2* 76.0*   BANDSPCT 5  --  12*      BMP:  Recent Labs     01/25/21  0350      K 5.1      CO2 26   BUN 73*   CREATININE 1.0   CALCIUM 8.2*   GLUCOSE 207*      ABG:  Recent Labs     01/26/21  0600 01/27/21  0700   PH 7.36 7.33*   PO2ART 110* 101*   HRV1LQV 53.0* 57.0*   O2SAT 96.4 95.8*     Lab Results   Component Value Date    PROBNP 5,973 (H) 01/14/2021    PROBNP 1,642 (H) 01/12/2021    PROBNP 1,196 (H) 11/11/2020     No results found for: [facility]    Radiology Review:  Pertinent images / reports were reviewed as a part of this visit.     Assessment:     Patient Active Problem List   Diagnosis    Gastroesophageal reflux disease without esophagitis    Hypertension in stage 3 chronic kidney disease due to type 2 diabetes mellitus ([facility] [facility]    DM (diabetes mellitus) type II, controlled, with peripheral vascular disorder ([address]    Combined hyperlipidemia associated with type 2 diabetes mellitus ([facility] [address] [facility]    Diabetic skin ulcer associated with type 2 diabetes mellitus (HCC)    CKD (chronic kidney disease)    History of DVT (deep vein thrombosis)- left femoral    History of pulmonary embolism    Long term current use of anticoagulants with INR goal of 2.0-3.0    COPD (chronic obstructive pulmonary disease) (HCC)    Severe recurrent major depressive disorder with psychotic features ([facility] [address]  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)    Venous (peripheral) insufficiency    Uncontrolled secondary diabetes mellitus with stage 3 CKD (GFR 30-59) (HCC)    Diabetes mellitus with skin ulcer (HCC)    WD-Ulcer of shin, left, with fat layer exposed ([address]    History of colon polyps    Personal history of PE (pulmonary embolism)    WD-Chronic venous hypertension (idiopathic) with ulcer of left lower extremity (CODE) ([address]    WD-Chronic venous hypertension with inflammation, right    Troponin I above reference range    KIRSTEN (acute kidney injury) ([address]    Cellulitis of lower extremity    Hyponatremia    Generalized weakness    Weakness    Multifocal pneumonia    Pneumonia due to COVID-19 virus    Hyperkalemia    Acute respiratory failure with hypoxia ([address]       Plan:   1. Inc. PC to 20.  2. Wean FiO2.  3. Discussed with the family.   Annette Bishop MD  1/27/2021  11:26 AM

## 2021-01-27 NOTE — PROGRESS NOTES
[address]    Hospitalist Progress Note      Name:  Jeni Chan /Age/Sex: 1945  [de-identified]77 y.o. male)   MRN & CSN:  [id number] & [id number] Admission Date/Time: 2021 12:17 AM   Location:  -A PCP: Cuate Alonzo MD         Hospital Day: 15    Assessment and Plan:   Jeni Chan is a 77 y.o.  male  who presents with shortness of breath, and was transferred from [facility] to for management of COVID-19 pneumonia     Acute on chronic hypoxic respiratory failure due to COVID-19 pneumonia, possible hospital-acquired pneumonia    Ventilator,intubated   management per pulmonology,   Still on high vent settings - 500, Fio2 of 50 and PEEP of 12  Completed Remdesivir , S/p convalescent plasma 2 units 2021  On IV vancomycin, cefepime   Pulmo following     Septic shock due to COVID-19 pneumonia  MRSA and klebsiella pneumonia     Leucocytosis, Covid positive , MRSA culture positive  Respiratory cultures positive for MRSA and Klebsiella, Blood cultures negative to date  On steroids   Completed Remdesivir , S/p convalescent plasma 2 units 2021  On pressors  Per ID, Continue vancomycin, DC meropenem, added rocephin      Right lower extremity cellulitis - On IV ABs    Hyperkalemia -corrected      KIRSTEN - likely due to ATN - On Lasix, with good urine output, nephro on board    Chronic medical conditions - resume home medications     DM type II   Hyperlipidemia   Hypertension  Morbid obesity, encourage weight loss and exercise  Lower extremity DVT, on Coumadin, INR therapeutic  Chronic respiratory failure at 3 L of O2     Prognosis guarded  Palliative care consulted    Discussed with patient's family members (3) at the bedside regarding current plan of care and prognosis, they were given opportunity to ask questions which were answered to their satisfaction.   After the discussion they have decided to change patient's CODE to DNR CCA Diet DIET TUBE FEED CONTINUOUS/CYCLIC NPO; Low Calorie High Protein (Vital HP); Nasogastric; Continuous; 55; 95; 24   DVT Prophylaxis [] Lovenox, []  Heparin, [] SCDs, []No VTE prophylaxis, patient ambulating   GI Prophylaxis [] PPI, [] H2 Blocker, [] No GI prophylaxis, patient is receiving diet/Tube Feeds   Code Status Full Code   Disposition Patient requires continued admission due to sepsis/respiratory failure   MDM [] Low, [] Moderate,[x]  High     History of Present Illness: Subjective     Patient Seen & Examined at the bedside     Sedated and intubated with a normal response to noxious stimuli    Ten point ROS could not be reviewed due to sedation    Objective: Intake/Output Summary (Last 24 hours) at 1/27/2021 0845  Last data filed at 1/27/2021 4656  Gross per 24 hour   Intake 3463.82 ml   Output 2475 ml   Net 988.82 ml      Vitals:   Vitals:    01/27/21 0600   BP:    Pulse: 61   Resp: 24   Temp:    SpO2: 96%     Physical Exam:    GEN intubated and sedated with no response  HENT ETT and NGT in place  RESP decreased air entry with diffuse rhonchi  CARDIO/VASC -S1/S2 auscultated. Regular rate without appreciable murmurs, rubs, or gallops. Peripheral pulses equal bilaterally and palpable. No peripheral edema. GI Abdomen is soft without significant tenderness, masses, or guarding. Bowel sounds are normoactive. Rectal exam deferred.  Starkey catheter is present.   NEURO Sedated -no response to stimuli    Medications:   Medications:    insulin glargine  60 Units Subcutaneous Nightly    insulin NPH  50 Units Subcutaneous QAM    insulin regular  30 Units Subcutaneous 4 times per day    cefTRIAXone (ROCEPHIN) IV  2,000 mg Intravenous Q24H    warfarin  6 mg Oral Daily    vancomycin  1,000 mg Intravenous Q24H    furosemide  40 mg Intravenous Once per day on Mon Wed Fri    insulin regular  0-30 Units Subcutaneous Q6H    methylPREDNISolone  40 mg Intravenous Q8H    metoclopramide  10 mg Intravenous Q6H  alogliptin  12.5 mg Oral Daily    ARIPiprazole  15 mg Oral Daily    aspirin  81 mg Oral Daily    atorvastatin  40 mg Oral Nightly    buPROPion  300 mg Oral QAM    escitalopram  20 mg Oral Daily    metoprolol tartrate  12.5 mg Oral BID    sodium chloride flush  10 mL Intravenous 2 times per day    diclofenac sodium  2 g Topical BID    zinc sulfate  50 mg Oral Daily    albuterol sulfate HFA  2 puff Inhalation 4x daily    ipratropium  2 puff Inhalation 4x daily    chlorhexidine  15 mL Mouth/Throat BID    famotidine (PEPCID) injection  20 mg Intravenous BID      Infusions:    dexmedetomidine (PRECEDEX) IV infusion 0.8 mcg/kg/hr (01/27/21 0529)    midazolam 9 mg/hr (01/27/21 0838)    dextrose 100 mL/hr (01/21/21 1214)    cisatracurium (NIMBEX) infusion Stopped (01/18/21 1612)    norepinephrine Stopped (01/20/21 1446)    fentaNYL 200 mcg/hr (01/27/21 0726)    dextrose      sodium chloride       PRN Meds:     fentanNYL, 25 mcg, Q1H PRN      hydrALAZINE, 10 mg, Q6H PRN      glucose, 15 g, PRN      dextrose, 12.5 g, PRN      glucagon (rDNA), 1 mg, PRN      dextrose, 100 mL/hr, PRN      microfibrillar collagen, , PRN      polyvinyl alcohol, 1 drop, Q4H PRN    And      artificial tears, , PRN      sodium chloride flush, 10 mL, PRN      promethazine, 12.5 mg, Q6H PRN    Or      ondansetron, 4 mg, Q6H PRN      polyethylene glycol, 17 g, Daily PRN      acetaminophen, 650 mg, Q6H PRN    Or      acetaminophen, 650 mg, Q6H PRN      glucose, 15 g, PRN      dextrose, 12.5 g, PRN      glucagon (rDNA), 1 mg, PRN      dextrose, 100 mL/hr, PRN      traMADol, 50 mg, Q6H PRN    Or      traMADol, 100 mg, Q6H PRN      diphenhydrAMINE, 25 mg, Q6H PRN      sodium chloride, , PRN      sodium chloride, 30 mL, PRN          Electronically signed by Cheyenne Paul MD on 1/27/2021 at 8:45 AM

## 2021-01-27 NOTE — PROGRESS NOTES
01/27/21 0459   Vent Information   Vent Type 980   Vent Mode AC/PC   Pressure Ordered 17   Rate Set 24 bmp   FiO2  60 %   SpO2 96 %   SpO2/FiO2 ratio 160   Sensitivity 3   Flow by 60   PEEP/CPAP 12   I Time/ I Time % 1.1 s   Humidification Source HME   Nitric Oxide/Epoprostenol In Use? No   Vent Patient Data   Peak Inspiratory Pressure 29 cmH2O   Mean Airway Pressure 19 cmH20   Rate Measured 25 br/min   Vt Exhaled 400 mL   Minute Volume 11.7 Liters   I:E Ratio 1:1.8   Spontaneous Breathing Trial (SBT) RT Doc   Pulse 61   Additional Respiratory  Assessments   Resp 18   Position Semi-Thorpe's   Oral Care Completed? No   Alarm Settings   High Pressure Alarm 40 cmH2O   Delay Alarm 20 sec(s)   Low Minute Volume Alarm 2.5 L/min   Apnea (secs) 20 secs   High Respiratory Rate 40 br/min   Low Exhaled Vt  250 mL   Patient Observation   Observations #7.5 ETT   ETT (adult)   Placement Date/Time: 01/13/21 (c) 2196   Preoxygenation: Yes  Mask Ventilation: Ventilated by mask (1)  Technique: Video laryngoscopy  Tube Size: 7.5 mm  Location: Oral  Grade View: Full view of the glottis  Insertion attempts: 1  Placement Verified B. ..    Secured at 25 cm   Measured From Lips   ET Placement Left   Secured By Commercial tube brewer   Site Condition Dry

## 2021-01-27 NOTE — PROGRESS NOTES
Infectious Disease Progress Note  2021   Patient Name: Juan Birmingham : 1945       Reason for visit: F/u COVID-19 pneumonia, acute respiratory failure? History:? Interval history noted  Intubated, sedated and on mechanical ventilation  Physical Exam:  Vital Signs: BP (!) 143/52   Pulse 84   Temp 99.9 °F (37.7 °C) (Rectal)   Resp 10   Ht 6' 5.01\" (1.956 m)   Wt 274 lb 14.6 oz (124.7 kg)   SpO2 96%   BMI 32.59 kg/m²     Gen: intubated and sedated  Skin: no stigmata of endocarditis  Wounds:  Left anterior shin superficial wound, C/D/I  HEMT: AT/NC ETT, NGT   Eyes: PERRLA. Neck: Supple. Trachea midline. No LAD. Chest:  transmitted breath sounds. Heart:   Abd: soft, non-distended, no tenderness, no hepatomegaly. Normoactive bowel sounds. Ext: no clubbing, cyanosis, or edema  Catheter Site: without erythema or tenderness  LDA: CVC: left IJ, Urethral catheter: 2021  Neuro: sedated and on the ventilator. Radiologic / Imaging / TESTING  CXR 2021      1. Increased left basilar airspace opacity is likely related to atelectasis, although superimposed pneumonia and aspiration are not excluded. 2. Otherwise unchanged subpleural, basilar predominant airspace opacities and diffuse interstitial opacities likely due to COVID-19 pneumonia given patient history. Superimposed edema (noncardiogenic or cardiogenic) is not excluded.         Labs:    Recent Results (from the past 24 hour(s))   POCT Glucose    Collection Time: 21 12:22 PM   Result Value Ref Range    POC Glucose 363 (H) 70 - 99 MG/DL   POCT Glucose    Collection Time: 21  6:18 PM   Result Value Ref Range    POC Glucose 196 (H) 70 - 99 MG/DL   Protime-INR    Collection Time: 21  6:20 PM   Result Value Ref Range    Protime 15.3 (H) 11.7 - 14.5 SECONDS    INR 1.26 INDEX   POCT Glucose    Collection Time: 21  7:37 PM   Result Value Ref Range    POC Glucose 188 (H) 70 - 99 MG/DL   POCT Glucose Collection Time: 01/27/21  1:24 AM   Result Value Ref Range    POC Glucose 255 (H) 70 - 99 MG/DL   CBC auto differential    Collection Time: 01/27/21  4:40 AM   Result Value Ref Range    WBC 12.1 (H) 4.0 - 10.5 K/CU MM    RBC 3.42 (L) 4.6 - 6.2 M/CU MM    Hemoglobin 10.0 (L) 13.5 - 18.0 GM/DL    Hematocrit 32.9 (L) 42 - 52 %    MCV 96.2 78 - 100 FL    MCH 29.2 27 - 31 PG    MCHC 30.4 (L) 32.0 - 36.0 %    RDW 18.3 (H) 11.7 - 14.9 %    Platelets 728 160 - 493 K/CU MM    MPV 13.2 (H) 7.5 - 11.1 FL    Myelocyte Percent 1 (H) 0.0 %    Metamyelocytes Relative 2 (H) 0.0 %    Bands Relative 12 (H) 5 - 11 %    Segs Relative 76.0 (H) 36 - 66 %    Lymphocytes % 5.0 (L) 24 - 44 %    Monocytes % 4.0 0 - 4 %    Myelocytes Absolute 0.12 K/CU MM    Metamyelocytes Absolute 0.24 K/CU MM    Bands Absolute 1.45 K/CU MM    Segs Absolute 9.2 K/CU MM    Lymphocytes Absolute 0.6 K/CU MM    Monocytes Absolute 0.5 K/CU MM    Differential Type MANUAL DIFFERENTIAL     RBC Morphology OCCASIONAL     Anisocytosis 1+     Toxic Granulation PRESENT    Protime-INR    Collection Time: 01/27/21  4:40 AM   Result Value Ref Range    Protime 15.2 (H) 11.7 - 14.5 SECONDS    INR 1.25 INDEX   Magnesium    Collection Time: 01/27/21  4:40 AM   Result Value Ref Range    Magnesium 2.4 1.8 - 2.4 mg/dl   Phosphorus    Collection Time: 01/27/21  4:40 AM   Result Value Ref Range    Phosphorus 3.7 2.5 - 4.9 MG/DL   POCT Glucose    Collection Time: 01/27/21  6:11 AM   Result Value Ref Range    POC Glucose 283 (H) 70 - 99 MG/DL   Blood Gas, Arterial    Collection Time: 01/27/21  7:00 AM   Result Value Ref Range    pH, Bld 7.33 (L) 7.34 - 7.45    pCO2, Arterial 57.0 (H) 32 - 45 MMHG    pO2, Arterial 101 (H) 75 - 100 MMHG    Base Exc, Mixed 2.8 (H) 0 - 1.2    HCO3, Arterial 30.1 (H) 18 - 23 MMOL/L    CO2 Content 31.8 (H) 19 - 24 MMOL/L    O2 Sat 95.8 (L) 96 - 97 %    Carbon Monoxide, Blood 1.9 0 - 5 %    Methemoglobin, Arterial 1.2 <1.5 % Comment PC 17 R24 PEEP12 60.     POCT Glucose    Collection Time: 01/27/21  8:44 AM   Result Value Ref Range    POC Glucose 310 (H) 70 - 99 MG/DL     CULTURE results: Invalid input(s): BLOOD CULTURE,  URINE CULTURE, SURGICAL CULTURE  SETUP DATE/TIME:  01/23/2021 1220   Susceptibility    Staph aureus mrsa (2)    Antibiotic Interpretation YOLA Status    erythromycin Resistant >=8 Final    gentamicin Sensitive <=0.5 Final    moxifloxacin Resistant >=8 Final    oxacillin Resistant >=4 Final    tetracycline Resistant >=16 Final    trimethoprim-sulfamethoxazole Resistant >=320 Final    vancomycin Sensitive 1 Final    Klebsiella pneumoniae (3)    Antibiotic Interpretation YOLA Status    ampicillin Resistant >=32 Final    ceFAZolin Sensitive <=4 Final    cefepime Sensitive <=0.12 Final    ciprofloxacin Sensitive <=0.25 Final    ertapenem Sensitive <=0.12 Final    gentamicin Sensitive <=1 Final    levofloxacin Sensitive <=0.12 Final    piperacillin-tazobactam Sensitive <=4 Final    trimethoprim-sulfamethoxazole Sensitive <=20 Final        Diagnosis:  Patient Active Problem List   Diagnosis    Gastroesophageal reflux disease without esophagitis    Hypertension in stage 3 chronic kidney disease due to type 2 diabetes mellitus ([facility]    DM (diabetes mellitus) type II, controlled, with peripheral vascular disorder ([facility]    Combined hyperlipidemia associated with type 2 diabetes mellitus ([facility]    Diabetic skin ulcer associated with type 2 diabetes mellitus (HCC)    CKD (chronic kidney disease)    History of DVT (deep vein thrombosis)- left femoral    History of pulmonary embolism    Long term current use of anticoagulants with INR goal of 2.0-3.0    COPD (chronic obstructive pulmonary disease) (HCC)    Severe recurrent major depressive disorder with psychotic features (HCC)    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)    Venous (peripheral) insufficiency  Uncontrolled secondary diabetes mellitus with stage 3 CKD (GFR 30-59) (HCC)    Diabetes mellitus with skin ulcer (HCC)    WD-Ulcer of shin, left, with fat layer exposed ([facility] [address]    History of colon polyps    Personal history of PE (pulmonary embolism)    WD-Chronic venous hypertension (idiopathic) with ulcer of left lower extremity (CODE) ([facility] [address] [facility]    WD-Chronic venous hypertension with inflammation, right    Troponin I above reference range    KIRSTEN (acute kidney injury) ([facility] [address]    Cellulitis of lower extremity    Hyponatremia    Generalized weakness    Weakness    Multifocal pneumonia    Pneumonia due to COVID-19 virus    Hyperkalemia    Acute respiratory failure with hypoxia (HCC)       Active Problems  Active Problems:    Pneumonia due to COVID-19 virus    Hyperkalemia    Acute respiratory failure with hypoxia (HCC)  Resolved Problems:    * No resolved hospital problems. *      Impression and plan  ? Summary and rationale: Patient is a 77 y.o.  male T2DM, hyperlipidemia, htn, depression, morbid obesity who was admitted 1/13/2021 for further evaluation and management of shortness of breath that started on 1/8/2021 and positive COVID test. Has critical COVID-19 pneumonia, in hyperinflammatory phase. MRSA and K pneumoniae super-imposed bacterial pneumonia  ? Clinical status: remains severe, FiO2 60% unchanged, leukocytosis bettter  ? MRSA nares positive. On vancomycin. IL-6; 20.7, Aspergillus Ag,   ? Therapeutic:  o Ongoing antibiotics: vancomycin 1/12-, ceftriaxone 1/23  o Antiviral agent: remdesivir 1/13-1/16  o Anti-inflammatory agents:  ? Dexamethasone:1/12-19  ? Solu-Medrol: 1/17. 1/19-  o Completed antibiotics: meropenem 1/23-1/26  o Convalescent plasma receipt:  o Other agents:   ? Diagnostic:  ? F/u: Blood cx 1/19, 0/2 ngtd; Aspergillus Ag, BDG, IL-6  ?  Other:      Electronically signed by: Electronically signed by Bucky Mart MD on 1/27/2021 at 11:46 AM

## 2021-01-27 NOTE — PROGRESS NOTES
Spoke with Claudio's daughter, Thang Pierce, and grandsons today after they were able to visit Beatriz Harrison on the Covid unit. We discussed his care up-to-date, including treatments, medication and evaluations that have been completed. Dr. Alejandrina Wilks had spoken to the family at bed side. Daughter had decided to change Claudio's CODE STATUS to DNR CCA. She states she was to continue care as he is receiving at this time but should his heart stop she does not want interventions. She realizes that with him on the vent he will remain on the vent at this time. We have briefly discussed his continuation of care, she is concerned that he be pain-free and I have assured her that he will be receiving and is receiving medication for such. She was very tearful at beginning and end of conversation but states she knows that Beatriz Harrison would not want to be kept \"as a vegetable\". We did discuss the possibility of future zoom meetings that she may see and speak to her dad. She would like if possible to arrange a [place] meeting with her dad for Friday at 8 PM.  Her email [email or web address] I have assured her that I will pass her email on to nursing staff so that they can make arrangements for her to be able to see and speak to her dad through video on Friday. Total minute reviewing chart, speaking to hospitalist and consults, speaking with family 40 minutes.

## 2021-01-28 ENCOUNTER — APPOINTMENT (OUTPATIENT)
Dept: GENERAL RADIOLOGY | Age: 76
DRG: 870 | End: 2021-01-28
Attending: INTERNAL MEDICINE
Payer: MEDICARE

## 2021-01-28 LAB
ACANTHOCYTES: ABNORMAL
ANISOCYTOSIS: ABNORMAL
BANDED NEUTROPHILS ABSOLUTE COUNT: 0.53 K/CU MM
BANDED NEUTROPHILS RELATIVE PERCENT: 5 % (ref 5–11)
BASE EXCESS MIXED: 5.1 (ref 0–1.2)
BASOPHILIC STIPPLING: PRESENT
CARBON MONOXIDE, BLOOD: 1.9 % (ref 0–5)
CO2 CONTENT: 33.9 MMOL/L (ref 19–24)
COMMENT: ABNORMAL
CREAT SERPL-MCNC: 0.9 MG/DL (ref 0.9–1.3)
DIFFERENTIAL TYPE: ABNORMAL
DOSE AMOUNT: NORMAL
DOSE TIME: NORMAL
GFR AFRICAN AMERICAN: >60 ML/MIN/1.73M2
GFR NON-AFRICAN AMERICAN: >60 ML/MIN/1.73M2
GLUCOSE BLD-MCNC: 245 MG/DL (ref 70–99)
GLUCOSE BLD-MCNC: 253 MG/DL (ref 70–99)
GLUCOSE BLD-MCNC: 259 MG/DL (ref 70–99)
GLUCOSE BLD-MCNC: 282 MG/DL (ref 70–99)
GLUCOSE BLD-MCNC: 287 MG/DL (ref 70–99)
GLUCOSE BLD-MCNC: 288 MG/DL (ref 70–99)
GLUCOSE BLD-MCNC: 296 MG/DL (ref 70–99)
GLUCOSE BLD-MCNC: 367 MG/DL (ref 70–99)
HCO3 ARTERIAL: 32.2 MMOL/L (ref 18–23)
HCT VFR BLD CALC: 33 % (ref 42–52)
HEMOGLOBIN: 10 GM/DL (ref 13.5–18)
INR BLD: 1.17 INDEX
LYMPHOCYTES ABSOLUTE: 0.7 K/CU MM
LYMPHOCYTES RELATIVE PERCENT: 7 % (ref 24–44)
MCH RBC QN AUTO: 28.8 PG (ref 27–31)
MCHC RBC AUTO-ENTMCNC: 30.3 % (ref 32–36)
MCV RBC AUTO: 95.1 FL (ref 78–100)
METAMYELOCYTES ABSOLUTE COUNT: 0.42 K/CU MM
METAMYELOCYTES PERCENT: 4 %
METHEMOGLOBIN ARTERIAL: 1.4 %
MONOCYTES ABSOLUTE: 0.3 K/CU MM
MONOCYTES RELATIVE PERCENT: 3 % (ref 0–4)
MYELOCYTE PERCENT: 1 %
MYELOCYTES ABSOLUTE COUNT: 0.11 K/CU MM
O2 SATURATION: 91.7 % (ref 96–97)
PCO2 ARTERIAL: 57 MMHG (ref 32–45)
PDW BLD-RTO: 17.5 % (ref 11.7–14.9)
PH BLOOD: 7.36 (ref 7.34–7.45)
PLATELET # BLD: 179 K/CU MM (ref 140–440)
PLT MORPHOLOGY: ABNORMAL
PMV BLD AUTO: 13.1 FL (ref 7.5–11.1)
PO2 ARTERIAL: 68 MMHG (ref 75–100)
POLYCHROMASIA: ABNORMAL
PROTHROMBIN TIME: 14.2 SECONDS (ref 11.7–14.5)
RBC # BLD: 3.47 M/CU MM (ref 4.6–6.2)
SEGMENTED NEUTROPHILS ABSOLUTE COUNT: 8.4 K/CU MM
SEGMENTED NEUTROPHILS RELATIVE PERCENT: 80 % (ref 36–66)
VANCOMYCIN TROUGH: 15.5 UG/ML (ref 10–20)
WBC # BLD: 10.5 K/CU MM (ref 4–10.5)
WBC # BLD: ABNORMAL 10*3/UL

## 2021-01-28 PROCEDURE — 94003 VENT MGMT INPAT SUBQ DAY: CPT

## 2021-01-28 PROCEDURE — 2500000003 HC RX 250 WO HCPCS: Performed by: INTERNAL MEDICINE

## 2021-01-28 PROCEDURE — 2500000003 HC RX 250 WO HCPCS: Performed by: NURSE PRACTITIONER

## 2021-01-28 PROCEDURE — 80202 ASSAY OF VANCOMYCIN: CPT

## 2021-01-28 PROCEDURE — 94640 AIRWAY INHALATION TREATMENT: CPT

## 2021-01-28 PROCEDURE — 2580000003 HC RX 258: Performed by: PHYSICIAN ASSISTANT

## 2021-01-28 PROCEDURE — 82962 GLUCOSE BLOOD TEST: CPT

## 2021-01-28 PROCEDURE — 6370000000 HC RX 637 (ALT 250 FOR IP): Performed by: INTERNAL MEDICINE

## 2021-01-28 PROCEDURE — 85027 COMPLETE CBC AUTOMATED: CPT

## 2021-01-28 PROCEDURE — 2580000003 HC RX 258: Performed by: INTERNAL MEDICINE

## 2021-01-28 PROCEDURE — 6360000002 HC RX W HCPCS: Performed by: INTERNAL MEDICINE

## 2021-01-28 PROCEDURE — 6360000002 HC RX W HCPCS: Performed by: PHYSICIAN ASSISTANT

## 2021-01-28 PROCEDURE — 82803 BLOOD GASES ANY COMBINATION: CPT

## 2021-01-28 PROCEDURE — 2000000000 HC ICU R&B

## 2021-01-28 PROCEDURE — 89220 SPUTUM SPECIMEN COLLECTION: CPT

## 2021-01-28 PROCEDURE — 2700000000 HC OXYGEN THERAPY PER DAY

## 2021-01-28 PROCEDURE — 6360000002 HC RX W HCPCS: Performed by: STUDENT IN AN ORGANIZED HEALTH CARE EDUCATION/TRAINING PROGRAM

## 2021-01-28 PROCEDURE — 85610 PROTHROMBIN TIME: CPT

## 2021-01-28 PROCEDURE — 82565 ASSAY OF CREATININE: CPT

## 2021-01-28 PROCEDURE — 37799 UNLISTED PX VASCULAR SURGERY: CPT

## 2021-01-28 PROCEDURE — 71045 X-RAY EXAM CHEST 1 VIEW: CPT

## 2021-01-28 PROCEDURE — 6370000000 HC RX 637 (ALT 250 FOR IP): Performed by: NURSE PRACTITIONER

## 2021-01-28 PROCEDURE — 94761 N-INVAS EAR/PLS OXIMETRY MLT: CPT

## 2021-01-28 PROCEDURE — 2580000003 HC RX 258: Performed by: NURSE PRACTITIONER

## 2021-01-28 PROCEDURE — 99233 SBSQ HOSP IP/OBS HIGH 50: CPT | Performed by: INTERNAL MEDICINE

## 2021-01-28 PROCEDURE — 85007 BL SMEAR W/DIFF WBC COUNT: CPT

## 2021-01-28 RX ORDER — WARFARIN SODIUM 4 MG/1
8 TABLET ORAL DAILY
Status: DISCONTINUED | OUTPATIENT
Start: 2021-01-28 | End: 2021-01-29 | Stop reason: DRUGHIGH

## 2021-01-28 RX ORDER — GLIPIZIDE 5 MG/1
10 TABLET ORAL
Status: DISCONTINUED | OUTPATIENT
Start: 2021-01-28 | End: 2021-02-01

## 2021-01-28 RX ADMIN — GLIPIZIDE 10 MG: 5 TABLET ORAL at 17:33

## 2021-01-28 RX ADMIN — Medication 2 PUFF: at 12:10

## 2021-01-28 RX ADMIN — ATORVASTATIN CALCIUM 40 MG: 40 TABLET, FILM COATED ORAL at 20:46

## 2021-01-28 RX ADMIN — ZINC SULFATE 220 MG (50 MG) CAPSULE 50 MG: CAPSULE at 08:25

## 2021-01-28 RX ADMIN — INSULIN HUMAN 15 UNITS: 100 INJECTION, SOLUTION PARENTERAL at 12:50

## 2021-01-28 RX ADMIN — WARFARIN SODIUM 8 MG: 4 TABLET ORAL at 17:33

## 2021-01-28 RX ADMIN — INSULIN GLARGINE 60 UNITS: 100 INJECTION, SOLUTION SUBCUTANEOUS at 21:17

## 2021-01-28 RX ADMIN — FAMOTIDINE 20 MG: 10 INJECTION, SOLUTION INTRAVENOUS at 08:25

## 2021-01-28 RX ADMIN — SODIUM CHLORIDE 0.8 MCG/KG/HR: 9 INJECTION, SOLUTION INTRAVENOUS at 17:01

## 2021-01-28 RX ADMIN — Medication 2 PUFF: at 07:56

## 2021-01-28 RX ADMIN — SODIUM CHLORIDE, PRESERVATIVE FREE 10 ML: 5 INJECTION INTRAVENOUS at 21:01

## 2021-01-28 RX ADMIN — METOPROLOL TARTRATE 12.5 MG: 25 TABLET, FILM COATED ORAL at 20:45

## 2021-01-28 RX ADMIN — FAMOTIDINE 20 MG: 10 INJECTION, SOLUTION INTRAVENOUS at 20:45

## 2021-01-28 RX ADMIN — SODIUM CHLORIDE 0.8 MCG/KG/HR: 9 INJECTION, SOLUTION INTRAVENOUS at 12:41

## 2021-01-28 RX ADMIN — Medication 200 MCG/HR: at 18:42

## 2021-01-28 RX ADMIN — Medication 200 MCG/HR: at 12:51

## 2021-01-28 RX ADMIN — INSULIN HUMAN 40 UNITS: 100 INJECTION, SOLUTION PARENTERAL at 23:34

## 2021-01-28 RX ADMIN — INSULIN HUMAN 40 UNITS: 100 INJECTION, SOLUTION PARENTERAL at 17:32

## 2021-01-28 RX ADMIN — CEFTRIAXONE SODIUM 2000 MG: 2 INJECTION, POWDER, FOR SOLUTION INTRAMUSCULAR; INTRAVENOUS at 12:04

## 2021-01-28 RX ADMIN — SODIUM CHLORIDE 0.7 MCG/KG/HR: 9 INJECTION, SOLUTION INTRAVENOUS at 21:17

## 2021-01-28 RX ADMIN — INSULIN HUMAN 30 UNITS: 100 INJECTION, SOLUTION PARENTERAL at 06:07

## 2021-01-28 RX ADMIN — SODIUM CHLORIDE, PRESERVATIVE FREE 10 ML: 5 INJECTION INTRAVENOUS at 08:26

## 2021-01-28 RX ADMIN — ALOGLIPTIN 12.5 MG: 12.5 TABLET, FILM COATED ORAL at 08:25

## 2021-01-28 RX ADMIN — INSULIN HUMAN 40 UNITS: 100 INJECTION, SOLUTION PARENTERAL at 12:54

## 2021-01-28 RX ADMIN — METHYLPREDNISOLONE SODIUM SUCCINATE 40 MG: 40 INJECTION, POWDER, LYOPHILIZED, FOR SOLUTION INTRAMUSCULAR; INTRAVENOUS at 14:28

## 2021-01-28 RX ADMIN — Medication 200 MCG/HR: at 07:22

## 2021-01-28 RX ADMIN — ALBUTEROL SULFATE 2 PUFF: 90 AEROSOL, METERED RESPIRATORY (INHALATION) at 07:58

## 2021-01-28 RX ADMIN — INSULIN HUMAN 50 UNITS: 100 INJECTION, SUSPENSION SUBCUTANEOUS at 08:28

## 2021-01-28 RX ADMIN — METOCLOPRAMIDE 10 MG: 5 INJECTION, SOLUTION INTRAMUSCULAR; INTRAVENOUS at 06:53

## 2021-01-28 RX ADMIN — ASPIRIN 81 MG CHEWABLE TABLET 81 MG: 81 TABLET CHEWABLE at 08:25

## 2021-01-28 RX ADMIN — INSULIN HUMAN 15 UNITS: 100 INJECTION, SOLUTION PARENTERAL at 17:30

## 2021-01-28 RX ADMIN — INSULIN HUMAN 15 UNITS: 100 INJECTION, SOLUTION PARENTERAL at 23:32

## 2021-01-28 RX ADMIN — ALBUTEROL SULFATE 2 PUFF: 90 AEROSOL, METERED RESPIRATORY (INHALATION) at 12:12

## 2021-01-28 RX ADMIN — METHYLPREDNISOLONE SODIUM SUCCINATE 40 MG: 40 INJECTION, POWDER, LYOPHILIZED, FOR SOLUTION INTRAMUSCULAR; INTRAVENOUS at 23:29

## 2021-01-28 RX ADMIN — GLIPIZIDE 10 MG: 5 TABLET ORAL at 08:25

## 2021-01-28 RX ADMIN — CHLORHEXIDINE GLUCONATE 0.12% ORAL RINSE 15 ML: 1.2 LIQUID ORAL at 08:25

## 2021-01-28 RX ADMIN — HYDRALAZINE HYDROCHLORIDE 10 MG: 20 INJECTION, SOLUTION INTRAMUSCULAR; INTRAVENOUS at 14:07

## 2021-01-28 RX ADMIN — CHLORHEXIDINE GLUCONATE 0.12% ORAL RINSE 15 ML: 1.2 LIQUID ORAL at 20:46

## 2021-01-28 RX ADMIN — METOCLOPRAMIDE 10 MG: 5 INJECTION, SOLUTION INTRAMUSCULAR; INTRAVENOUS at 20:45

## 2021-01-28 RX ADMIN — MIDAZOLAM HYDROCHLORIDE 9 MG/HR: 5 INJECTION, SOLUTION INTRAMUSCULAR; INTRAVENOUS at 21:02

## 2021-01-28 RX ADMIN — ALBUTEROL SULFATE 2 PUFF: 90 AEROSOL, METERED RESPIRATORY (INHALATION) at 15:06

## 2021-01-28 RX ADMIN — SODIUM CHLORIDE 0.8 MCG/KG/HR: 9 INJECTION, SOLUTION INTRAVENOUS at 09:35

## 2021-01-28 RX ADMIN — Medication 200 MCG/HR: at 01:56

## 2021-01-28 RX ADMIN — METOCLOPRAMIDE 10 MG: 5 INJECTION, SOLUTION INTRAMUSCULAR; INTRAVENOUS at 14:28

## 2021-01-28 RX ADMIN — METOPROLOL TARTRATE 12.5 MG: 25 TABLET, FILM COATED ORAL at 08:25

## 2021-01-28 RX ADMIN — MIDAZOLAM HYDROCHLORIDE 9 MG/HR: 5 INJECTION, SOLUTION INTRAMUSCULAR; INTRAVENOUS at 07:20

## 2021-01-28 RX ADMIN — ALBUTEROL SULFATE 2 PUFF: 90 AEROSOL, METERED RESPIRATORY (INHALATION) at 19:00

## 2021-01-28 RX ADMIN — METHYLPREDNISOLONE SODIUM SUCCINATE 40 MG: 40 INJECTION, POWDER, LYOPHILIZED, FOR SOLUTION INTRAMUSCULAR; INTRAVENOUS at 06:53

## 2021-01-28 RX ADMIN — VANCOMYCIN HYDROCHLORIDE 1000 MG: 1 INJECTION, POWDER, LYOPHILIZED, FOR SOLUTION INTRAVENOUS at 12:03

## 2021-01-28 RX ADMIN — Medication 2 PUFF: at 19:00

## 2021-01-28 RX ADMIN — ESCITALOPRAM OXALATE 20 MG: 10 TABLET ORAL at 08:25

## 2021-01-28 RX ADMIN — SODIUM CHLORIDE 0.8 MCG/KG/HR: 9 INJECTION, SOLUTION INTRAVENOUS at 05:00

## 2021-01-28 RX ADMIN — Medication 2 PUFF: at 15:04

## 2021-01-28 RX ADMIN — INSULIN HUMAN 15 UNITS: 100 INJECTION, SOLUTION PARENTERAL at 06:05

## 2021-01-28 RX ADMIN — DICLOFENAC SODIUM 2 G: 10 GEL TOPICAL at 21:18

## 2021-01-28 RX ADMIN — Medication 175 MCG/HR: at 23:29

## 2021-01-28 RX ADMIN — METOCLOPRAMIDE 10 MG: 5 INJECTION, SOLUTION INTRAMUSCULAR; INTRAVENOUS at 01:28

## 2021-01-28 ASSESSMENT — PULMONARY FUNCTION TESTS
PIF_VALUE: 29
PIF_VALUE: 30
PIF_VALUE: 29
PIF_VALUE: 29
PIF_VALUE: 30
PIF_VALUE: 29
PIF_VALUE: 30
PIF_VALUE: 30
PIF_VALUE: 29
PIF_VALUE: 30
PIF_VALUE: 29
PIF_VALUE: 29
PIF_VALUE: 30
PIF_VALUE: 29
PIF_VALUE: 29
PIF_VALUE: 30
PIF_VALUE: 29

## 2021-01-28 NOTE — PROGRESS NOTES
PHARMACY ANTICOAGULATION MONITORING SERVICE    Derick Cartwright is a 77 y.o. male on warfarin therapy for history of DVT. Pharmacy consulted by Dr. Mavis Lara for monitoring and adjustment of treatment. Indication for anticoagulation: Hx of DVT ; also current C-19  INR goal: 2-3  Warfarin dose prior to admission: 5 mg daily    Pertinent Laboratory Values   Recent Labs     01/26/21  0545 01/26/21  0545 01/27/21  0440 01/28/21  0600   INR  --    < > 1.25 1.17   HGB 9.9*  --  10.0* 10.0*   HCT 32.9*  --  32.9* 33.0*     --  165 179    < > = values in this interval not displayed. Assessment/Plan:  ? Possible DDI's:   o Aspirin (home med), can increase bleeding risk, clinically appropriate  o Escitalopram (home med), can increase bleeding risk, appropriate to continue while inpatient  o Tramadol may increase effects of warfarin (ordered PRN)  o Ordered on minocycline from 1/23-1/26 -now dc'd. (tetracyclines can increase effects of warfarin)  o No Tx bridge. ? INR today = 1.17   ? Increase Warfarin to 8 mg daily. ? Additional dose adjustments to be made per INR trends, interacting medications, and other clinical factors  ? Pharmacy will continue to monitor and adjust warfarin therapy as indicated.     Thank you for the consult,  Heber [address], [facility]  1/28/2021 12:43 PM

## 2021-01-28 NOTE — PROGRESS NOTES
EXAMINATION: ONE XRAY VIEW OF THE CHEST 1/21/2021 5:00 am     1. Stable position of support lines and tubes. The left internal jugular central venous catheter terminates in the SVC but is directed laterally. 2. Persistent multifocal airspace consolidation, consistent with the history of COVID-19 pneumonia. Xr Chest Portable    Result Date: 1/20/2021  EXAMINATION: ONE XRAY VIEW OF THE CHEST 1/20/2021 5:04 am COMPARISON: Yesterday HISTORY: ORDERING SYSTEM PROVIDED HISTORY: on vent l. Support devices unchanged in position. Stable areas of COVID-19 pneumonia. Xr Chest Portable    Result Date: 1/18/2021  EXAMINATION: ONE XRAY VIEW OF THE CHEST 1/18/2021 9:52 am COMPARISON: 01/18/2021, 01/16/2021 HISTORY: ORDERING SYSTEM PROVIDED HISTORY: ETT placement TECHNOLOGIST PROVIDED HISTORY: Reason for exam:->ETT placement Reason for exam:->verify ETT placement per Dr. Janusz Hartley, how many Cm above the jose? Reason for Exam: 1 FINDINGS: Endotracheal tube terminates 6.5 cm above the jose. Enteric tube courses below the diaphragm. Left internal jugular catheter with tip at the superior SVC directed laterally. Hazy ground-glass opacity areas of consolidation scattered through the lungs are improving from prior exams given differences in technique. No pneumothorax or pleural effusion. Borderline cardiomegaly is stable. Endotracheal tube terminates 6.5 cm above the jose. Moderate changes of COVID-19 pneumonia throughout the lungs with improvement since 01/16/2021. Xr Chest Portable    Result Date: 1/18/2021  EXAMINATION: ONE XRAY VIEW OF THE CHEST 1/18/2021 4:21 am COMPARISON: 01/17/2021 HISTORY: ORDERING SYSTEM PROVIDED HISTORY: on vent . 1. Diffuse bilateral lung infiltrates, likely related to pulmonary edema versus pneumonia.            Scheduled Medicines   Medications:    insulin glargine  60 Units Subcutaneous Nightly    insulin NPH  50 Units Subcutaneous QAM  insulin regular  30 Units Subcutaneous 4 times per day    warfarin  7 mg Oral Daily    cefTRIAXone (ROCEPHIN) IV  2,000 mg Intravenous Q24H    vancomycin  1,000 mg Intravenous Q24H    furosemide  40 mg Intravenous Once per day on Mon Wed Fri    insulin regular  0-30 Units Subcutaneous Q6H    methylPREDNISolone  40 mg Intravenous Q8H    metoclopramide  10 mg Intravenous Q6H    alogliptin  12.5 mg Oral Daily    ARIPiprazole  15 mg Oral Daily    aspirin  81 mg Oral Daily    atorvastatin  40 mg Oral Nightly    buPROPion  300 mg Oral QAM    escitalopram  20 mg Oral Daily    metoprolol tartrate  12.5 mg Oral BID    sodium chloride flush  10 mL Intravenous 2 times per day    diclofenac sodium  2 g Topical BID    zinc sulfate  50 mg Oral Daily    albuterol sulfate HFA  2 puff Inhalation 4x daily    ipratropium  2 puff Inhalation 4x daily    chlorhexidine  15 mL Mouth/Throat BID    famotidine (PEPCID) injection  20 mg Intravenous BID      Infusions:    dexmedetomidine (PRECEDEX) IV infusion 0.8 mcg/kg/hr (01/27/21 1841)    midazolam 9 mg/hr (01/27/21 1912)    dextrose 100 mL/hr (01/21/21 1214)    cisatracurium (NIMBEX) infusion Stopped (01/18/21 1612)    norepinephrine Stopped (01/20/21 1446)    fentaNYL 200 mcg/hr (01/27/21 1936)    dextrose      sodium chloride           Objective:   Vitals: BP (!) 152/52   Pulse 62   Temp 97.9 °F (36.6 °C) (Rectal)   Resp 24   Ht 6' 5.01\" (1.956 m)   Wt 274 lb 14.6 oz (124.7 kg)   SpO2 96%   BMI 32.59 kg/m²   General appearance: Patient is sedated, intubated and on ventilator  Neck: no JVD or bruit  Thyroid : Normal lobes   Lungs: Has Vesicular Breath sounds   Heart:  regular rate and rhythm  Abdomen: soft, non-tender; bowel sounds normal; no masses,  no organomegaly  Musculoskeletal: Normal  Extremities: extremities normal, , no edema  Neurologic:  Patient is sedated, intubated and on ventilator      Assessment:     Patient Active Problem List: Gastroesophageal reflux disease without esophagitis     Hypertension in stage 3 chronic kidney disease due to type 2 diabetes mellitus (HCC)     DM (diabetes mellitus) type II, controlled, with peripheral vascular disorder (HCC)     Combined hyperlipidemia associated with type 2 diabetes mellitus (HCC)     Diabetic skin ulcer associated with type 2 diabetes mellitus (HCC)     CKD (chronic kidney disease)     History of DVT (deep vein thrombosis)- left femoral     History of pulmonary embolism     Long term current use of anticoagulants with INR goal of 2.0-3.0     COPD (chronic obstructive pulmonary disease) (HCC)     Severe recurrent major depressive disorder with psychotic features ([address]     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)     Venous (peripheral) insufficiency     Uncontrolled secondary diabetes mellitus with stage 3 CKD (GFR 30-59) (HCC)     Diabetes mellitus with skin ulcer (HCC)     WD-Ulcer of shin, left, with fat layer exposed ([address]     History of colon polyps     Personal history of PE (pulmonary embolism)     WD-Chronic venous hypertension (idiopathic) with ulcer of left lower extremity (CODE) ([address]     WD-Chronic venous hypertension with inflammation, right     Troponin I above reference range     KIRSTEN (acute kidney injury) ([address]     Cellulitis of lower extremity     Hyponatremia     Generalized weakness     Weakness     Multifocal pneumonia     Pneumonia due to COVID-19 virus     Hyperkalemia     Acute respiratory failure with hypoxia ([address]      Plan:     1. Reviewed POC blood glucose . Labs and X ray results   2. Reviewed Current Medicines   3. On his schedule plus correction bolus regular /basal Lantus /NPH insulin regimedrugs   4. Monitor Blood glucose frequently   5. Modified  the dose of Insulin/ other medicines as needed   6. Will follow     .      Dru Sevilla MD

## 2021-01-28 NOTE — PROGRESS NOTES
Progress Note( Dr. Mary Bacon)    Subjective:   Admit Date: 1/13/2021  PCP: Robert Rivera MD          Admitted For : Initially admitted to [facility] and transferred here on January 13, 2021 for acute on chronic respiratory failure with hypoxia and patient has Covid pneumonia    Consulted For:  Better control of blood glucose    Interval History: Patient is sedated, intubated and on ventilator  Bolus tube feeding is on and off pending upon residual he has      Intake/Output Summary (Last 24 hours) at 1/27/2021 2312  Last data filed at 1/27/2021 2032  Gross per 24 hour   Intake 4042.1 ml   Output 3650 ml   Net 392.1 ml       DATA    CBC:   Recent Labs     01/25/21  0350 01/26/21  0545 01/27/21  0440   WBC 15.5* 14.5* 12.1*   HGB 9.2* 9.9* 10.0*    170 165    CMP:  Recent Labs     01/25/21  0350      K 5.1      CO2 26   BUN 73*   CREATININE 1.0   CALCIUM 8.2*     Lipids:   Lab Results   Component Value Date    CHOL 107 11/22/2019    CHOL 175 05/05/2015    HDL 46 11/22/2019    TRIG 85 01/18/2021     Glucose:  Recent Labs     01/27/21  1208 01/27/21  1745 01/27/21 2029   POCGLU 281* 198* 259*     [id number]:  Lab Results   Component Value Date    LABA1C 11.6 01/19/2021     High Sensitivity TSH:   Lab Results   Component Value Date    TSHHS 1.770 11/21/2019     Free T3: No results found for: FT3  Free T4:  Lab Results   Component Value Date    T4FREE 1.14 11/21/2019       Xr Chest Portable    Result Date: 1/23/2021  EXAMINATION: ONE XRAY VIEW OF THE CHEST 1/23/2021 5:04 am   .     1. Increased left basilar airspace opacity is likely related to atelectasis, although superimposed pneumonia and aspiration are not excluded. 2. Otherwise unchanged subpleural, basilar predominant airspace opacities and diffuse interstitial opacities likely due to COVID-19 pneumonia given patient history. Superimposed edema (noncardiogenic or cardiogenic) is not excluded.      Xr Chest Portable Result Date: 1/22/2021  EXAMINATION: ONE XRAY VIEW OF THE CHEST 1/22/2021 11:03 am      Worsening multifocal infiltrates seen throughout the lungs bilaterally concerning for multifocal pneumonia. Endotracheal tube measures 5.3 cm above the jose. Xr Chest Portable    Result Date: 1/21/2021  EXAMINATION: ONE XRAY VIEW OF THE CHEST 1/21/2021 5:00 am     1. Stable position of support lines and tubes. The left internal jugular central venous catheter terminates in the SVC but is directed laterally. 2. Persistent multifocal airspace consolidation, consistent with the history of COVID-19 pneumonia. Xr Chest Portable    Result Date: 1/20/2021  EXAMINATION: ONE XRAY VIEW OF THE CHEST 1/20/2021 5:04 am COMPARISON: Yesterday HISTORY: ORDERING SYSTEM PROVIDED HISTORY: on vent l. Support devices unchanged in position. Stable areas of COVID-19 pneumonia. Xr Chest Portable    Result Date: 1/18/2021  EXAMINATION: ONE XRAY VIEW OF THE CHEST 1/18/2021 9:52 am COMPARISON: 01/18/2021, 01/16/2021 HISTORY: ORDERING SYSTEM PROVIDED HISTORY: ETT placement TECHNOLOGIST PROVIDED HISTORY: Reason for exam:->ETT placement Reason for exam:->verify ETT placement per Dr. Natasha García, how many Cm above the jose? Reason for Exam: 1 FINDINGS: Endotracheal tube terminates 6.5 cm above the jose. Enteric tube courses below the diaphragm. Left internal jugular catheter with tip at the superior SVC directed laterally. Hazy ground-glass opacity areas of consolidation scattered through the lungs are improving from prior exams given differences in technique. No pneumothorax or pleural effusion. Borderline cardiomegaly is stable. Endotracheal tube terminates 6.5 cm above the jose. Moderate changes of COVID-19 pneumonia throughout the lungs with improvement since 01/16/2021.      Xr Chest Portable    Result Date: 1/18/2021 EXAMINATION: ONE XRAY VIEW OF THE CHEST 1/18/2021 4:21 am COMPARISON: 01/17/2021 HISTORY: ORDERING SYSTEM PROVIDED HISTORY: on vent . 1. Diffuse bilateral lung infiltrates, likely related to pulmonary edema versus pneumonia.            Scheduled Medicines   Medications:    insulin glargine  60 Units Subcutaneous Nightly    insulin NPH  50 Units Subcutaneous QAM    insulin regular  30 Units Subcutaneous 4 times per day    warfarin  7 mg Oral Daily    cefTRIAXone (ROCEPHIN) IV  2,000 mg Intravenous Q24H    vancomycin  1,000 mg Intravenous Q24H    furosemide  40 mg Intravenous Once per day on Mon Wed Fri    insulin regular  0-30 Units Subcutaneous Q6H    methylPREDNISolone  40 mg Intravenous Q8H    metoclopramide  10 mg Intravenous Q6H    alogliptin  12.5 mg Oral Daily    ARIPiprazole  15 mg Oral Daily    aspirin  81 mg Oral Daily    atorvastatin  40 mg Oral Nightly    buPROPion  300 mg Oral QAM    escitalopram  20 mg Oral Daily    metoprolol tartrate  12.5 mg Oral BID    sodium chloride flush  10 mL Intravenous 2 times per day    diclofenac sodium  2 g Topical BID    zinc sulfate  50 mg Oral Daily    albuterol sulfate HFA  2 puff Inhalation 4x daily    ipratropium  2 puff Inhalation 4x daily    chlorhexidine  15 mL Mouth/Throat BID    famotidine (PEPCID) injection  20 mg Intravenous BID      Infusions:    dexmedetomidine (PRECEDEX) IV infusion 0.8 mcg/kg/hr (01/27/21 1841)    midazolam 9 mg/hr (01/27/21 1912)    dextrose 100 mL/hr (01/21/21 1214)    cisatracurium (NIMBEX) infusion Stopped (01/18/21 1612)    norepinephrine Stopped (01/20/21 1446)    fentaNYL 200 mcg/hr (01/27/21 1936)    dextrose      sodium chloride           Objective:   Vitals: BP (!) 152/52   Pulse 62   Temp 97.9 °F (36.6 °C) (Rectal)   Resp 24   Ht 6' 5.01\" (1.956 m)   Wt 274 lb 14.6 oz (124.7 kg)   SpO2 96%   BMI 32.59 kg/m²   General appearance: Patient is sedated, intubated and on ventilator Neck: no JVD or bruit  Thyroid : Normal lobes   Lungs: Has Vesicular Breath sounds   Heart:  regular rate and rhythm  Abdomen: soft, non-tender; bowel sounds normal; no masses,  no organomegaly  Musculoskeletal: Normal  Extremities: extremities normal, , no edema  Neurologic:  Patient is sedated, intubated and on ventilator      Assessment:     Patient Active Problem List:     Gastroesophageal reflux disease without esophagitis     Hypertension in stage 3 chronic kidney disease due to type 2 diabetes mellitus (HCC)     DM (diabetes mellitus) type II, controlled, with peripheral vascular disorder (HCC)     Combined hyperlipidemia associated with type 2 diabetes mellitus (HCC)     Diabetic skin ulcer associated with type 2 diabetes mellitus (HCC)     CKD (chronic kidney disease)     History of DVT (deep vein thrombosis)- left femoral     History of pulmonary embolism     Long term current use of anticoagulants with INR goal of 2.0-3.0     COPD (chronic obstructive pulmonary disease) (HCC)     Severe recurrent major depressive disorder with psychotic features (HCC)     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)     Venous (peripheral) insufficiency     Uncontrolled secondary diabetes mellitus with stage 3 CKD (GFR 30-59) (HCC)     Diabetes mellitus with skin ulcer (HCC)     WD-Ulcer of shin, left, with fat layer exposed ([address]     History of colon polyps     Personal history of PE (pulmonary embolism)     WD-Chronic venous hypertension (idiopathic) with ulcer of left lower extremity (CODE) (HCC)     WD-Chronic venous hypertension with inflammation, right     Troponin I above reference range     KIRSTEN (acute kidney injury) ([address]     Cellulitis of lower extremity     Hyponatremia     Generalized weakness     Weakness     Multifocal pneumonia     Pneumonia due to COVID-19 virus     Hyperkalemia     Acute respiratory failure with hypoxia ([address]      Plan: 1. Reviewed POC blood glucose . Labs and X ray results   2. Reviewed Current Medicines   3. On his schedule plus correction bolus regular /basal Lantus /NPH insulin regimedrugs   4. Monitor Blood glucose frequently   5. Modified  the dose of Insulin/ other medicines as needed   6. Will follow     .      Michelle Sebastian MD

## 2021-01-28 NOTE — PROGRESS NOTES
[address] [facility]  consulted by Dr. Tmaara Fountain for monitoring and adjustment. Indication for treatment: COVID 19, MRSA pneumonia  Goal trough: 15 mcg/mL, -600    Pertinent Laboratory Values:   Temp Readings from Last 3 Encounters:   01/28/21 97.2 °F (36.2 °C) (Rectal)   01/12/21 97.1 °F (36.2 °C) (Infrared)   01/08/21 98.9 °F (37.2 °C) (Oral)     Recent Labs     01/26/21  0545 01/27/21  0440 01/28/21  0600   WBC 14.5* 12.1* 10.5     Recent Labs     01/28/21  0600   CREATININE 0.9     Estimated Creatinine Clearance: 105 mL/min (based on SCr of 0.9 mg/dL). Intake/Output Summary (Last 24 hours) at 1/28/2021 1021  Last data filed at 1/28/2021 0659  Gross per 24 hour   Intake 2243.57 ml   Output 4500 ml   Net -2256.43 ml       Pertinent Cultures:  Date    Source    Results  1/19   Blood    Negative  1/19   MRSA nasal screen  Positive     Vancomycin level:   TROUGH:    Recent Labs     01/25/21  1100   VANCOTROUGH 19.3     RANDOM:    No results for input(s): VANCORANDOM in the last 72 hours. Assessment:  · WBC and temperature: elevated WBC (receiving methylprednisolone), afebrile   · SCr, BUN, and urine output: Scr improving   · Day(s) of therapy: 17  · Vancomycin concentration:    · 1/17: 21.1, supra-therapeutic on vancomycin 1250 mg q18h  · 1/19: 21.7, supra-therapeutic on vancomycin 1250 mg q24h   · 1/20: 14.8, therapeutic 48h post-dose   · 1/21: 18.5, therapeutic 18h post-dose   · 1/23:  17.7, therapeutic, 24h post-dose  · 1/25: 19.3, supra-therapeutic on 1250 mg q24h   · 1/28: 15.5     Plan:  · Dose decreased to 1000 mg q24h   · Trough therapeutic   · Repeat trough in 5 - 7 days    · Pharmacy will continue to monitor patient and adjust therapy as indicated    Thank you for the consult.   Ayan Siddiqi, ShinD, BCPS  1/28/2021  10:21 AM

## 2021-01-28 NOTE — PROGRESS NOTES
[address]    Hospitalist Progress Note      Name:  Kayla Mccartney /Age/Sex: 1945  [de-identified]77 y.o. male)   MRN & CSN:  [id number] & [id number] Admission Date/Time: 2021 12:17 AM   Location:  -A PCP: Martha Kraus MD         Hospital Day: 16    Assessment and Plan:   Kayla Mccartney is a 77 y.o.  male  who presents with shortness of breath, and was transferred from [facility] to for management of COVID-19 pneumonia     Acute on chronic hypoxic respiratory failure due to COVID-19 pneumonia, possible hospital-acquired pneumonia    Intubated , Vent management per pulmonology,   Slow improvement in vent setting 500, Fio2 of 40 and PEEP of 7  Completed Remdesivir , S/p convalescent plasma 2 units 2021  On IV vancomycin, cefepime   May need sedation vacation and eventually SBT  Pulmo following     Septic shock due to COVID-19 pneumonia  MRSA and klebsiella pneumonia     Leucocytosis, Covid positive , MRSA culture positive  Respiratory cultures positive for MRSA and Klebsiella, Blood cultures negative to date  On steroids   Completed Remdesivir , S/p convalescent plasma 2 units 2021  Off pressors  Per ID, Continue vancomycin, DC meropenem, added rocephin      Right lower extremity cellulitis - On IV ABs    Hyperkalemia -corrected      KIRSTEN - likely due to ATN - On Lasix, with good urine output, nephro on board    Chronic medical conditions - resume home medications     DM type II   Hyperlipidemia   Hypertension  Morbid obesity, encourage weight loss and exercise  Lower extremity DVT, on Coumadin, INR therapeutic  Chronic respiratory failure at 3 L of O2     Prognosis guarded  Palliative care consulted Discussed with patient's daughter and her 2 sons on 1/27 at the bedside regarding current plan of care and prognosis, they were given opportunity to ask questions which were answered to their satisfaction. After the discussion they have decided to change patient's CODE to DNR CCA    Diet DIET TUBE FEED CONTINUOUS/CYCLIC NPO; Low Calorie High Protein (Vital HP); Nasogastric; Continuous; 55; 95; 24   DVT Prophylaxis [] Lovenox, []  Heparin, [] SCDs, []No VTE prophylaxis, patient ambulating   GI Prophylaxis [] PPI, [] H2 Blocker, [] No GI prophylaxis, patient is receiving diet/Tube Feeds   Code Status DNR-CCA   Disposition Patient requires continued admission due to sepsis/respiratory failure   MDM [] Low, [] Moderate,[x]  High     History of Present Illness: Subjective     Patient Seen & Examined at the bedside     Sedated and intubated with no response to noxious stimuli    Ten point ROS could not be reviewed due to sedation    Objective: Intake/Output Summary (Last 24 hours) at 1/28/2021 0935  Last data filed at 1/28/2021 0659  Gross per 24 hour   Intake 2243.57 ml   Output 4500 ml   Net -2256.43 ml      Vitals:   Vitals:    01/28/21 0800   BP:    Pulse: 58   Resp: 19   Temp: 97.2 °F (36.2 °C)   SpO2: 99%     Physical Exam:    GEN intubated and sedated with no response  HENT ETT and NGT in place  RESP decreased air entry with diffuse rhonchi  CARDIO/VASC -S1/S2 auscultated. Regular rate without appreciable murmurs, rubs, or gallops. Peripheral pulses equal bilaterally and palpable. No peripheral edema. GI Abdomen is soft without significant tenderness, masses, or guarding. Bowel sounds are normoactive. Rectal exam deferred.  Starkey catheter is present.   NEURO Sedated -no response to stimuli    Medications:   Medications:    insulin regular  40 Units Subcutaneous 4 times per day    glipiZIDE  10 mg Oral BID AC    insulin glargine  60 Units Subcutaneous Nightly  insulin NPH  50 Units Subcutaneous QAM    warfarin  7 mg Oral Daily    cefTRIAXone (ROCEPHIN) IV  2,000 mg Intravenous Q24H    vancomycin  1,000 mg Intravenous Q24H    furosemide  40 mg Intravenous Once per day on Mon Wed Fri    insulin regular  0-30 Units Subcutaneous Q6H    methylPREDNISolone  40 mg Intravenous Q8H    metoclopramide  10 mg Intravenous Q6H    alogliptin  12.5 mg Oral Daily    ARIPiprazole  15 mg Oral Daily    aspirin  81 mg Oral Daily    atorvastatin  40 mg Oral Nightly    buPROPion  300 mg Oral QAM    escitalopram  20 mg Oral Daily    metoprolol tartrate  12.5 mg Oral BID    sodium chloride flush  10 mL Intravenous 2 times per day    diclofenac sodium  2 g Topical BID    zinc sulfate  50 mg Oral Daily    albuterol sulfate HFA  2 puff Inhalation 4x daily    ipratropium  2 puff Inhalation 4x daily    chlorhexidine  15 mL Mouth/Throat BID    famotidine (PEPCID) injection  20 mg Intravenous BID      Infusions:    dexmedetomidine (PRECEDEX) IV infusion 0.8 mcg/kg/hr (01/28/21 0500)    midazolam 9 mg/hr (01/28/21 0720)    dextrose 100 mL/hr (01/21/21 1214)    cisatracurium (NIMBEX) infusion Stopped (01/18/21 1612)    norepinephrine Stopped (01/20/21 1446)    fentaNYL 200 mcg/hr (01/28/21 0722)    dextrose      sodium chloride       PRN Meds:     fentanNYL, 25 mcg, Q1H PRN      hydrALAZINE, 10 mg, Q6H PRN      glucose, 15 g, PRN      dextrose, 12.5 g, PRN      glucagon (rDNA), 1 mg, PRN      dextrose, 100 mL/hr, PRN      microfibrillar collagen, , PRN      polyvinyl alcohol, 1 drop, Q4H PRN    And      artificial tears, , PRN      sodium chloride flush, 10 mL, PRN      promethazine, 12.5 mg, Q6H PRN    Or      ondansetron, 4 mg, Q6H PRN      polyethylene glycol, 17 g, Daily PRN      acetaminophen, 650 mg, Q6H PRN    Or      acetaminophen, 650 mg, Q6H PRN      glucose, 15 g, PRN      dextrose, 12.5 g, PRN      glucagon (rDNA), 1 mg, PRN   dextrose, 100 mL/hr, PRN      traMADol, 50 mg, Q6H PRN    Or      traMADol, 100 mg, Q6H PRN      diphenhydrAMINE, 25 mg, Q6H PRN      sodium chloride, , PRN      sodium chloride, 30 mL, PRN          Electronically signed by Anne Humphries MD on 1/28/2021 at 9:35 AM

## 2021-01-28 NOTE — PROGRESS NOTES
Infectious Disease Progress Note  2021   Patient Name: Rubia Pacheco : 1945       Reason for visit: F/u COVID-19 pneumonia, acute respiratory failure? History:? Interval history noted  Intubated, sedated and on mechanical ventilation  Physical Exam:  Vital Signs: BP (!) 155/51   Pulse 58   Temp 97.2 °F (36.2 °C) (Rectal)   Resp 19   Ht 6' 5.01\" (1.956 m)   Wt 274 lb 14.6 oz (124.7 kg)   SpO2 99%   BMI 32.59 kg/m²     Gen: intubated and sedated  Skin: no stigmata of endocarditis  Wounds:  Left anterior shin superficial wound, C/D/I  HEMT: AT/NC ETT, NGT   Eyes: PERRLA. Neck: Supple. Trachea midline. No LAD. Chest:  transmitted breath sounds. Heart:   Abd: soft, non-distended, no tenderness, no hepatomegaly. Normoactive bowel sounds. Ext: no clubbing, cyanosis, or edema  Catheter Site: without erythema or tenderness  LDA: CVC: left IJ, Urethral catheter: 2021  Neuro: sedated and on the ventilator. Radiologic / Imaging / TESTING  CXR 2021      1. Increased left basilar airspace opacity is likely related to atelectasis, although superimposed pneumonia and aspiration are not excluded. 2. Otherwise unchanged subpleural, basilar predominant airspace opacities and diffuse interstitial opacities likely due to COVID-19 pneumonia given patient history. Superimposed edema (noncardiogenic or cardiogenic) is not excluded.         Labs:    Recent Results (from the past 24 hour(s))   POCT Glucose    Collection Time: 21 12:08 PM   Result Value Ref Range    POC Glucose 281 (H) 70 - 99 MG/DL   POCT Glucose    Collection Time: 21  5:45 PM   Result Value Ref Range    POC Glucose 198 (H) 70 - 99 MG/DL   POCT Glucose    Collection Time: 21  8:29 PM   Result Value Ref Range    POC Glucose 259 (H) 70 - 99 MG/DL   POCT Glucose    Collection Time: 21 11:55 PM   Result Value Ref Range    POC Glucose 337 (H) 70 - 99 MG/DL   POCT Glucose    Collection Time: 21  2:27 AM Result Value Ref Range    POC Glucose 367 (H) 70 - 99 MG/DL   CBC auto differential    Collection Time: 01/28/21  6:00 AM   Result Value Ref Range    WBC 10.5 4.0 - 10.5 K/CU MM    RBC 3.47 (L) 4.6 - 6.2 M/CU MM    Hemoglobin 10.0 (L) 13.5 - 18.0 GM/DL    Hematocrit 33.0 (L) 42 - 52 %    MCV 95.1 78 - 100 FL    MCH 28.8 27 - 31 PG    MCHC 30.3 (L) 32.0 - 36.0 %    RDW 17.5 (H) 11.7 - 14.9 %    Platelets 054 869 - 840 K/CU MM    MPV 13.1 (H) 7.5 - 11.1 FL    Myelocyte Percent 1 (H) 0.0 %    Metamyelocytes Relative 4 (H) 0.0 %    Bands Relative 5 5 - 11 %    Segs Relative 80.0 (H) 36 - 66 %    Lymphocytes % 7.0 (L) 24 - 44 %    Monocytes % 3.0 0 - 4 %    Myelocytes Absolute 0.11 K/CU MM    Metamyelocytes Absolute 0.42 K/CU MM    Bands Absolute 0.53 K/CU MM    Segs Absolute 8.4 K/CU MM    Lymphocytes Absolute 0.7 K/CU MM    Monocytes Absolute 0.3 K/CU MM    Differential Type MANUAL DIFFERENTIAL     Anisocytosis 1+     Polychromasia 1+     Basophilic Stippling PRESENT     Acanthocytes 1+     WBC Morphology OCCASIONAL     PLT Morphology MANY LARGE PLATELETS    Protime-INR    Collection Time: 01/28/21  6:00 AM   Result Value Ref Range    Protime 14.2 11.7 - 14.5 SECONDS    INR 1.17 INDEX   Creatinine, Serum    Collection Time: 01/28/21  6:00 AM   Result Value Ref Range    CREATININE 0.9 0.9 - 1.3 MG/DL    GFR Non-African American >60 >60 mL/min/1.73m2    GFR African American >60 >60 mL/min/1.73m2   POCT Glucose    Collection Time: 01/28/21  6:00 AM   Result Value Ref Range    POC Glucose 288 (H) 70 - 99 MG/DL   Blood Gas, Arterial    Collection Time: 01/28/21  7:00 AM   Result Value Ref Range    pH, Bld 7.36 7.34 - 7.45    pCO2, Arterial 57.0 (H) 32 - 45 MMHG    pO2, Arterial 68 (L) 75 - 100 MMHG    Base Exc, Mixed 5.1 (H) 0 - 1.2    HCO3, Arterial 32.2 (H) 18 - 23 MMOL/L    CO2 Content 33.9 (H) 19 - 24 MMOL/L    O2 Sat 91.7 (L) 96 - 97 %    Carbon Monoxide, Blood 1.9 0 - 5 %    Methemoglobin, Arterial 1.4 <1.5 % Comment [facility] 20 RR 24 45% +10    POCT Glucose    Collection Time: 01/28/21  7:03 AM   Result Value Ref Range    POC Glucose 245 (H) 70 - 99 MG/DL   POCT Glucose    Collection Time: 01/28/21  7:37 AM   Result Value Ref Range    POC Glucose 253 (H) 70 - 99 MG/DL     CULTURE results: Invalid input(s): BLOOD CULTURE,  URINE CULTURE, SURGICAL CULTURE  SETUP DATE/TIME:  01/23/2021 1220   Susceptibility    Staph aureus mrsa (2)    Antibiotic Interpretation YOLA Status    erythromycin Resistant >=8 Final    gentamicin Sensitive <=0.5 Final    moxifloxacin Resistant >=8 Final    oxacillin Resistant >=4 Final    tetracycline Resistant >=16 Final    trimethoprim-sulfamethoxazole Resistant >=320 Final    vancomycin Sensitive 1 Final    Klebsiella pneumoniae (3)    Antibiotic Interpretation YOLA Status    ampicillin Resistant >=32 Final    ceFAZolin Sensitive <=4 Final    cefepime Sensitive <=0.12 Final    ciprofloxacin Sensitive <=0.25 Final    ertapenem Sensitive <=0.12 Final    gentamicin Sensitive <=1 Final    levofloxacin Sensitive <=0.12 Final    piperacillin-tazobactam Sensitive <=4 Final    trimethoprim-sulfamethoxazole Sensitive <=20 Final        Diagnosis:  Patient Active Problem List   Diagnosis    Gastroesophageal reflux disease without esophagitis    Hypertension in stage 3 chronic kidney disease due to type 2 diabetes mellitus ([address]    DM (diabetes mellitus) type II, controlled, with peripheral vascular disorder ([address]    Combined hyperlipidemia associated with type 2 diabetes mellitus ([address]    Diabetic skin ulcer associated with type 2 diabetes mellitus (HCC)    CKD (chronic kidney disease)    History of DVT (deep vein thrombosis)- left femoral    History of pulmonary embolism    Long term current use of anticoagulants with INR goal of 2.0-3.0    COPD (chronic obstructive pulmonary disease) [facility])    Severe recurrent major depressive disorder with psychotic features ([address]  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)    Venous (peripheral) insufficiency    Uncontrolled secondary diabetes mellitus with stage 3 CKD (GFR 30-59) (HCC)    Diabetes mellitus with skin ulcer (HCC)    WD-Ulcer of shin, left, with fat layer exposed [facility]    History of colon polyps    Personal history of PE (pulmonary embolism)    WD-Chronic venous hypertension (idiopathic) with ulcer of left lower extremity (CODE) ([facility]    WD-Chronic venous hypertension with inflammation, right    Troponin I above reference range    KIRSTEN (acute kidney injury) ([facility] [facility]    Cellulitis of lower extremity    Hyponatremia    Generalized weakness    Weakness    Multifocal pneumonia    Pneumonia due to COVID-19 virus    Hyperkalemia    Acute respiratory failure with hypoxia (HCC)       Active Problems  Active Problems:    Pneumonia due to COVID-19 virus    Hyperkalemia    Acute respiratory failure with hypoxia (HCC)  Resolved Problems:    * No resolved hospital problems. *      Impression and plan  ? Summary and rationale: Patient is a 77 y.o.  male T2DM, hyperlipidemia, htn, depression, morbid obesity who was admitted 1/13/2021 for further evaluation and management of shortness of breath that started on 1/8/2021 and positive COVID test. Has critical COVID-19 pneumonia, in hyperinflammatory phase. MRSA and K pneumoniae super-imposed bacterial pneumonia  ? Clinical status: remains severe, but has some reduction in FiO2 60->45% unchanged, leukocytosis has resolved. Overall is improving. ? MRSA nares positive. On vancomycin. IL-6; 20.7, Aspergillus Ag, BDG negative  ? Therapeutic:  o Ongoing antibiotics: vancomycin 1/12-, ceftriaxone 1/23  o Antiviral agent: remdesivir 1/13-1/16  o Anti-inflammatory agents:  ? Dexamethasone:1/12-19  ?  Solu-Medrol: 1/17. 1/19-  o Completed antibiotics: meropenem 1/23-1/26  o Convalescent plasma receipt:  o Other agents:   ? Diagnostic: ? F/u: Blood cx 1/19, 0/2 ngtd  ?  Other:      Electronically signed by: Electronically signed by Nils Villanueva MD on 1/28/2021 at 9:44 AM

## 2021-01-28 NOTE — PROGRESS NOTES
Pulmonary and Critical Care  Progress Note    Subjective: The patient is better. FiO2 45%  Shortness of breath none. Chest pain none. Addressing respiratory complaints Patient is negative for  hemoptysis and cyanosis  CONSTITUTIONAL:  negative for fevers and chills.       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, Anemia, Arm fracture, Arthritis, Cataract, Cataract, Cellulitis of left lower leg, Chronic venous hypertension with ulcer ([facility] CKD (chronic kidney disease), Colon polyps, COPD (chronic obstructive pulmonary disease) ([facility] Diabetes mellitus ([facility] [facility] Diabetes mellitus ([facility] [facility] Diabetes mellitus with peripheral circulatory disorder ([facility] [facility] Diabetes mellitus with skin ulcer ([facility] [address] [facility] Diabetic peripheral neuropathy ([facility] [facility] Diabetic skin ulcer associated with type 2 diabetes mellitus ([facility] [facility] Diverticulosis, Femoral DVT (deep venous thrombosis) ([facility] [address] [facility] GERD (gastroesophageal reflux disease), Glaucoma, H/O cardiac catheterization, H/O Doppler ultrasound, H/O echocardiogram, H/O mumps orchitis, Hemorrhoids, History of nuclear stress test, HLD (hyperlipidemia), Hx of Doppler ultrasound, Hyperlipemia, Hyperlipidemia, Hypertension, Hypertension, Idiopathic chronic venous hypertension of left lower extremity with ulcer and inflammation (HCC), Leg ulcer ([address] No diabetic retinopathy OU, Non-pressure chronic ulcer of left lower leg with fat layer exposed ([facility] [address] [facility] Pulmonary embolism ([facility] [address] Sleep apnea, SOB (shortness of breath), Tendinitis, Type II or unspecified type diabetes mellitus with other specified manifestations, not stated as uncontrolled, Unspecified venous (peripheral) insufficiency, Urticaria, WD-Cellulitis of right anterior lower leg, WD-Cellulitis of right lower extremity, WD-Chronic venous hypertension (idiopathic) with ulcer of left lower extremity (CODE) ([facility] [facility] WD-Chronic venous hypertension with inflammation, right, WD-Decubitus ulcer of left buttock, stage 3 ([address] 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 to breakdown of skin ([address] WD-Open wound of hand without complication, left, initial encounter, WD-Pressure injury of left buttock, stage 2 ([address] WD-Pressure injury of left buttock, stage 3 ([address] WD-Pressure injury of right buttock, stage 3 (HCC), WD-Skin tear of right forearm without complication, and WD-Ulcer of right pretibial region, with fat layer exposed ([facility] [address] has a past surgical history that includes Tonsillectomy (1953); Splenectomy (1958); Breast surgery (1970s); hernia repair (1970s); Skin graft (1978-present); Cataract removal (Right, 3/11/2013); Cataract removal (Left, 2/25/2013); Varicose vein surgery (Left, 2009); Carpal tunnel release (Left, 1993); Cardiac catheterization (6/3/14); Colonoscopy (2006); Colonoscopy (11/21/11); Colonoscopy (4/23/12); Colonoscopy (08/04/2016); Splenectomy; hernia repair; and Carpal tunnel release. reports that he quit smoking about 28 years ago. His smoking use included cigarettes. He has a 70.00 pack-year smoking history. He has never used smokeless tobacco. He reports current alcohol use. He reports that he does not use drugs. Family history:  family history includes Cancer in his brother and father; Diabetes in his brother; Heart Disease in his father; High Blood Pressure in his father; High Cholesterol in his father; Thyroid Disease in his brother.     Allergies   Allergen Reactions    Cavilon Durable Barrier [Mineral Oil-Dimeth-Coconut Oil]     Gentamycin [Gentamicin] Hives    Parabens Hives    Parabens Hives    Prinivil [Lisinopril] Swelling    Cortisone Rash    Cortisone Rash    Dilaudid [Hydromorphone Hcl] Rash    Penicillins Rash    Penicillins Rash    Sulfamethoxazole-Trimethoprim Nausea Only    Tape [place] Tape] Rash     Social History:    Reviewed; no changes    Objective:   PHYSICAL EXAM:        VITALS:  BP (!) 155/51   Pulse 58   Temp 97.2 °F (36.2 °C) (Rectal)   Resp 19   Ht 6' 5.01\" (1.956 m)   Wt 274 lb 14.6 oz (124.7 kg)   SpO2 99%   BMI 32.59 kg/m²     24HR INTAKE/OUTPUT:      Intake/Output Summary (Last 24 hours) at 1/28/2021 1043  Last data filed at 1/28/2021 0659  Gross per 24 hour   Intake 2243.57 ml   Output 4500 ml Net -2256.43 ml       CONSTITUTIONAL:  Somnolent. LUNGS:  decreased breath sounds, basilar crackles. CARDIOVASCULAR:  normal S1 and S2 and negative JVD  ABD:Abdomen soft, non-tender. BS normal. No masses,  No organomegaly  NEURO:Sedated on vent. DATA:    CBC:  Recent Labs     01/26/21  0545 01/27/21  0440 01/28/21  0600   WBC 14.5* 12.1* 10.5   RBC 3.45* 3.42* 3.47*   HGB 9.9* 10.0* 10.0*   HCT 32.9* 32.9* 33.0*    165 179   MCV 95.4 96.2 95.1   MCH 28.7 29.2 28.8   MCHC 30.1* 30.4* 30.3*   RDW 18.1* 18.3* 17.5*   SEGSPCT 86.2* 76.0* 80.0*   BANDSPCT  --  12* 5      BMP:  Recent Labs     01/28/21  0600   CREATININE 0.9      ABG:  Recent Labs     01/26/21  0600 01/27/21  0700 01/28/21  0700   PH 7.36 7.33* 7.36   PO2ART 110* 101* 68*   [id number] 53.0* 57.0* 57.0*   O2SAT 96.4 95.8* 91.7*     Lab Results   Component Value Date    PROBNP 5,973 (H) 01/14/2021    PROBNP 1,642 (H) 01/12/2021    PROBNP 1,196 (H) 11/11/2020     No results found for: [facility]    Radiology Review:  Pertinent images / reports were reviewed as a part of this visit.     Assessment:     Patient Active Problem List   Diagnosis    Gastroesophageal reflux disease without esophagitis    Hypertension in stage 3 chronic kidney disease due to type 2 diabetes mellitus ([address]    DM (diabetes mellitus) type II, controlled, with peripheral vascular disorder ([address]    Combined hyperlipidemia associated with type 2 diabetes mellitus ([address]    Diabetic skin ulcer associated with type 2 diabetes mellitus (HCC)    CKD (chronic kidney disease)    History of DVT (deep vein thrombosis)- left femoral    History of pulmonary embolism    Long term current use of anticoagulants with INR goal of 2.0-3.0    COPD (chronic obstructive pulmonary disease) (HCC)    Severe recurrent major depressive disorder with psychotic features ([facility]    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)    Venous (peripheral) insufficiency  Uncontrolled secondary diabetes mellitus with stage 3 CKD (GFR 30-59) (HCC)    Diabetes mellitus with skin ulcer (HCC)    WD-Ulcer of shin, left, with fat layer exposed ([address]    History of colon polyps    Personal history of PE (pulmonary embolism)    WD-Chronic venous hypertension (idiopathic) with ulcer of left lower extremity (CODE) ([address]    WD-Chronic venous hypertension with inflammation, right    Troponin I above reference range    KIRSTEN (acute kidney injury) ([address]    Cellulitis of lower extremity    Hyponatremia    Generalized weakness    Weakness    Multifocal pneumonia    Pneumonia due to COVID-19 virus    Hyperkalemia    Acute respiratory failure with hypoxia ([address]       Plan:   1. Inc. PC to 22.  2. Wean FiO2.  3. Wean peep to 8.   Jam Cosme MD  1/28/2021  10:43 AM

## 2021-01-28 NOTE — PROGRESS NOTES
Progress Note( Dr. Sultana Ham)    Subjective:   Admit Date: 1/13/2021  PCP: Matilda Jeffries MD          Admitted For : Initially admitted to [facility] and transferred here on January 13, 2021 for acute on chronic respiratory failure with hypoxia and patient has Covid pneumonia    Consulted For:  Better control of blood glucose    Interval History: Patient is sedated, intubated and on ventilator  Bolus tube feeding is on and off pending upon residual he has      Intake/Output Summary (Last 24 hours) at 1/27/2021 2311  Last data filed at 1/27/2021 2032  Gross per 24 hour   Intake 4042.1 ml   Output 3650 ml   Net 392.1 ml       DATA    CBC:   Recent Labs     01/25/21  0350 01/26/21  0545 01/27/21  0440   WBC 15.5* 14.5* 12.1*   HGB 9.2* 9.9* 10.0*    170 165    CMP:  Recent Labs     01/25/21  0350      K 5.1      CO2 26   BUN 73*   CREATININE 1.0   CALCIUM 8.2*     Lipids:   Lab Results   Component Value Date    CHOL 107 11/22/2019    CHOL 175 05/05/2015    HDL 46 11/22/2019    TRIG 85 01/18/2021     Glucose:  Recent Labs     01/27/21  1208 01/27/21  1745 01/27/21 2029   POCGLU 281* 198* 259*     [id number]:  Lab Results   Component Value Date    LABA1C 11.6 01/19/2021     High Sensitivity TSH:   Lab Results   Component Value Date    TSHHS 1.770 11/21/2019     Free T3: No results found for: FT3  Free T4:  Lab Results   Component Value Date    T4FREE 1.14 11/21/2019       Xr Chest Portable    Result Date: 1/23/2021  EXAMINATION: ONE XRAY VIEW OF THE CHEST 1/23/2021 5:04 am   .     1. Increased left basilar airspace opacity is likely related to atelectasis, although superimposed pneumonia and aspiration are not excluded. 2. Otherwise unchanged subpleural, basilar predominant airspace opacities and diffuse interstitial opacities likely due to COVID-19 pneumonia given patient history. Superimposed edema (noncardiogenic or cardiogenic) is not excluded.      Xr Chest Portable Result Date: 1/22/2021  EXAMINATION: ONE XRAY VIEW OF THE CHEST 1/22/2021 11:03 am      Worsening multifocal infiltrates seen throughout the lungs bilaterally concerning for multifocal pneumonia. Endotracheal tube measures 5.3 cm above the jose. Xr Chest Portable    Result Date: 1/21/2021  EXAMINATION: ONE XRAY VIEW OF THE CHEST 1/21/2021 5:00 am     1. Stable position of support lines and tubes. The left internal jugular central venous catheter terminates in the SVC but is directed laterally. 2. Persistent multifocal airspace consolidation, consistent with the history of COVID-19 pneumonia. Xr Chest Portable    Result Date: 1/20/2021  EXAMINATION: ONE XRAY VIEW OF THE CHEST 1/20/2021 5:04 am COMPARISON: Yesterday HISTORY: ORDERING SYSTEM PROVIDED HISTORY: on vent l. Support devices unchanged in position. Stable areas of COVID-19 pneumonia. Xr Chest Portable    Result Date: 1/18/2021  EXAMINATION: ONE XRAY VIEW OF THE CHEST 1/18/2021 9:52 am COMPARISON: 01/18/2021, 01/16/2021 HISTORY: ORDERING SYSTEM PROVIDED HISTORY: ETT placement TECHNOLOGIST PROVIDED HISTORY: Reason for exam:->ETT placement Reason for exam:->verify ETT placement per Dr. Lino Torres, how many Cm above the jose? Reason for Exam: 1 FINDINGS: Endotracheal tube terminates 6.5 cm above the jose. Enteric tube courses below the diaphragm. Left internal jugular catheter with tip at the superior SVC directed laterally. Hazy ground-glass opacity areas of consolidation scattered through the lungs are improving from prior exams given differences in technique. No pneumothorax or pleural effusion. Borderline cardiomegaly is stable. Endotracheal tube terminates 6.5 cm above the jose. Moderate changes of COVID-19 pneumonia throughout the lungs with improvement since 01/16/2021.      Xr Chest Portable    Result Date: 1/18/2021 EXAMINATION: ONE XRAY VIEW OF THE CHEST 1/18/2021 4:21 am COMPARISON: 01/17/2021 HISTORY: ORDERING SYSTEM PROVIDED HISTORY: on vent . 1. Diffuse bilateral lung infiltrates, likely related to pulmonary edema versus pneumonia.            Scheduled Medicines   Medications:    insulin glargine  60 Units Subcutaneous Nightly    insulin NPH  50 Units Subcutaneous QAM    insulin regular  30 Units Subcutaneous 4 times per day    warfarin  7 mg Oral Daily    cefTRIAXone (ROCEPHIN) IV  2,000 mg Intravenous Q24H    vancomycin  1,000 mg Intravenous Q24H    furosemide  40 mg Intravenous Once per day on Mon Wed Fri    insulin regular  0-30 Units Subcutaneous Q6H    methylPREDNISolone  40 mg Intravenous Q8H    metoclopramide  10 mg Intravenous Q6H    alogliptin  12.5 mg Oral Daily    ARIPiprazole  15 mg Oral Daily    aspirin  81 mg Oral Daily    atorvastatin  40 mg Oral Nightly    buPROPion  300 mg Oral QAM    escitalopram  20 mg Oral Daily    metoprolol tartrate  12.5 mg Oral BID    sodium chloride flush  10 mL Intravenous 2 times per day    diclofenac sodium  2 g Topical BID    zinc sulfate  50 mg Oral Daily    albuterol sulfate HFA  2 puff Inhalation 4x daily    ipratropium  2 puff Inhalation 4x daily    chlorhexidine  15 mL Mouth/Throat BID    famotidine (PEPCID) injection  20 mg Intravenous BID      Infusions:    dexmedetomidine (PRECEDEX) IV infusion 0.8 mcg/kg/hr (01/27/21 1841)    midazolam 9 mg/hr (01/27/21 1912)    dextrose 100 mL/hr (01/21/21 1214)    cisatracurium (NIMBEX) infusion Stopped (01/18/21 1612)    norepinephrine Stopped (01/20/21 1446)    fentaNYL 200 mcg/hr (01/27/21 1936)    dextrose      sodium chloride           Objective:   Vitals: BP (!) 152/52   Pulse 62   Temp 97.9 °F (36.6 °C) (Rectal)   Resp 24   Ht 6' 5.01\" (1.956 m)   Wt 274 lb 14.6 oz (124.7 kg)   SpO2 96%   BMI 32.59 kg/m²   General appearance: Patient is sedated, intubated and on ventilator Neck: no JVD or bruit  Thyroid : Normal lobes   Lungs: Has Vesicular Breath sounds   Heart:  regular rate and rhythm  Abdomen: soft, non-tender; bowel sounds normal; no masses,  no organomegaly  Musculoskeletal: Normal  Extremities: extremities normal, , no edema  Neurologic:  Patient is sedated, intubated and on ventilator      Assessment:     Patient Active Problem List:     Gastroesophageal reflux disease without esophagitis     Hypertension in stage 3 chronic kidney disease due to type 2 diabetes mellitus (HCC)     DM (diabetes mellitus) type II, controlled, with peripheral vascular disorder (HCC)     Combined hyperlipidemia associated with type 2 diabetes mellitus (HCC)     Diabetic skin ulcer associated with type 2 diabetes mellitus (HCC)     CKD (chronic kidney disease)     History of DVT (deep vein thrombosis)- left femoral     History of pulmonary embolism     Long term current use of anticoagulants with INR goal of 2.0-3.0     COPD (chronic obstructive pulmonary disease) (HCC)     Severe recurrent major depressive disorder with psychotic features (HCC)     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)     Venous (peripheral) insufficiency     Uncontrolled secondary diabetes mellitus with stage 3 CKD (GFR 30-59) (HCC)     Diabetes mellitus with skin ulcer (HCC)     WD-Ulcer of shin, left, with fat layer exposed ([address]     History of colon polyps     Personal history of PE (pulmonary embolism)     WD-Chronic venous hypertension (idiopathic) with ulcer of left lower extremity (CODE) (HCC)     WD-Chronic venous hypertension with inflammation, right     Troponin I above reference range     KIRSTEN (acute kidney injury) ([address]     Cellulitis of lower extremity     Hyponatremia     Generalized weakness     Weakness     Multifocal pneumonia     Pneumonia due to COVID-19 virus     Hyperkalemia     Acute respiratory failure with hypoxia ([address]      Plan: 1. Reviewed POC blood glucose . Labs and X ray results   2. Reviewed Current Medicines   3. On his schedule plus correction bolus regular /basal Lantus /NPH insulin regimedrugs   4. Monitor Blood glucose frequently   5. Modified  the dose of Insulin/ other medicines as needed   6. Will follow     .      Sumi Su MD

## 2021-01-29 ENCOUNTER — APPOINTMENT (OUTPATIENT)
Dept: GENERAL RADIOLOGY | Age: 76
DRG: 870 | End: 2021-01-29
Attending: INTERNAL MEDICINE
Payer: MEDICARE

## 2021-01-29 LAB
ANION GAP SERPL CALCULATED.3IONS-SCNC: 6 MMOL/L (ref 4–16)
ANISOCYTOSIS: ABNORMAL
BANDED NEUTROPHILS ABSOLUTE COUNT: 1.1 K/CU MM
BANDED NEUTROPHILS RELATIVE PERCENT: 10 % (ref 5–11)
BASE EXCESS MIXED: 5 (ref 0–1.2)
BUN BLDV-MCNC: 72 MG/DL (ref 6–23)
CALCIUM SERPL-MCNC: 8.2 MG/DL (ref 8.3–10.6)
CARBON MONOXIDE, BLOOD: 2.3 % (ref 0–5)
CHLORIDE BLD-SCNC: 104 MMOL/L (ref 99–110)
CO2 CONTENT: 30.3 MMOL/L (ref 19–24)
CO2: 26 MMOL/L (ref 21–32)
COMMENT: ABNORMAL
CREAT SERPL-MCNC: 0.9 MG/DL (ref 0.9–1.3)
DIFFERENTIAL TYPE: ABNORMAL
GFR AFRICAN AMERICAN: >60 ML/MIN/1.73M2
GFR NON-AFRICAN AMERICAN: >60 ML/MIN/1.73M2
GIANT PLATELETS: PRESENT
GLUCOSE BLD-MCNC: 103 MG/DL (ref 70–99)
GLUCOSE BLD-MCNC: 118 MG/DL (ref 70–99)
GLUCOSE BLD-MCNC: 208 MG/DL (ref 70–99)
GLUCOSE BLD-MCNC: 257 MG/DL (ref 70–99)
GLUCOSE BLD-MCNC: 70 MG/DL (ref 70–99)
GLUCOSE BLD-MCNC: 79 MG/DL (ref 70–99)
GLUCOSE BLD-MCNC: 88 MG/DL (ref 70–99)
HCO3 ARTERIAL: 29.1 MMOL/L (ref 18–23)
HCT VFR BLD CALC: 32.6 % (ref 42–52)
HEMOGLOBIN: 10.1 GM/DL (ref 13.5–18)
INR BLD: 1.02 INDEX
LYMPHOCYTES ABSOLUTE: 1 K/CU MM
LYMPHOCYTES RELATIVE PERCENT: 9 % (ref 24–44)
MAGNESIUM: 1.9 MG/DL (ref 1.8–2.4)
MCH RBC QN AUTO: 28.9 PG (ref 27–31)
MCHC RBC AUTO-ENTMCNC: 31 % (ref 32–36)
MCV RBC AUTO: 93.4 FL (ref 78–100)
METAMYELOCYTES ABSOLUTE COUNT: 0.22 K/CU MM
METAMYELOCYTES PERCENT: 2 %
METHEMOGLOBIN ARTERIAL: 1 %
MONOCYTES ABSOLUTE: 0.7 K/CU MM
MONOCYTES RELATIVE PERCENT: 6 % (ref 0–4)
MYELOCYTE PERCENT: 1 %
MYELOCYTES ABSOLUTE COUNT: 0.11 K/CU MM
O2 SATURATION: 94.2 % (ref 96–97)
PCO2 ARTERIAL: 40 MMHG (ref 32–45)
PDW BLD-RTO: 17.4 % (ref 11.7–14.9)
PH BLOOD: 7.47 (ref 7.34–7.45)
PHOSPHORUS: 3.7 MG/DL (ref 2.5–4.9)
PLATELET # BLD: 195 K/CU MM (ref 140–440)
PMV BLD AUTO: 13.7 FL (ref 7.5–11.1)
PO2 ARTERIAL: 79 MMHG (ref 75–100)
POLYCHROMASIA: ABNORMAL
POTASSIUM SERPL-SCNC: 5.9 MMOL/L (ref 3.5–5.1)
POTASSIUM SERPL-SCNC: 6.1 MMOL/L (ref 3.5–5.1)
PROTHROMBIN TIME: 12.4 SECONDS (ref 11.7–14.5)
RBC # BLD: 3.49 M/CU MM (ref 4.6–6.2)
SEGMENTED NEUTROPHILS ABSOLUTE COUNT: 7.9 K/CU MM
SEGMENTED NEUTROPHILS RELATIVE PERCENT: 72 % (ref 36–66)
SODIUM BLD-SCNC: 136 MMOL/L (ref 135–145)
WBC # BLD: 11 K/CU MM (ref 4–10.5)

## 2021-01-29 PROCEDURE — 80048 BASIC METABOLIC PNL TOTAL CA: CPT

## 2021-01-29 PROCEDURE — 2500000003 HC RX 250 WO HCPCS: Performed by: INTERNAL MEDICINE

## 2021-01-29 PROCEDURE — 6370000000 HC RX 637 (ALT 250 FOR IP): Performed by: NURSE PRACTITIONER

## 2021-01-29 PROCEDURE — 2000000000 HC ICU R&B

## 2021-01-29 PROCEDURE — 37799 UNLISTED PX VASCULAR SURGERY: CPT

## 2021-01-29 PROCEDURE — 89220 SPUTUM SPECIMEN COLLECTION: CPT

## 2021-01-29 PROCEDURE — 6360000002 HC RX W HCPCS: Performed by: INTERNAL MEDICINE

## 2021-01-29 PROCEDURE — 6370000000 HC RX 637 (ALT 250 FOR IP): Performed by: INTERNAL MEDICINE

## 2021-01-29 PROCEDURE — 2580000003 HC RX 258: Performed by: NURSE PRACTITIONER

## 2021-01-29 PROCEDURE — 82803 BLOOD GASES ANY COMBINATION: CPT

## 2021-01-29 PROCEDURE — 2580000003 HC RX 258: Performed by: INTERNAL MEDICINE

## 2021-01-29 PROCEDURE — 6360000002 HC RX W HCPCS: Performed by: STUDENT IN AN ORGANIZED HEALTH CARE EDUCATION/TRAINING PROGRAM

## 2021-01-29 PROCEDURE — 85027 COMPLETE CBC AUTOMATED: CPT

## 2021-01-29 PROCEDURE — 2500000003 HC RX 250 WO HCPCS: Performed by: NURSE PRACTITIONER

## 2021-01-29 PROCEDURE — 84100 ASSAY OF PHOSPHORUS: CPT

## 2021-01-29 PROCEDURE — 83735 ASSAY OF MAGNESIUM: CPT

## 2021-01-29 PROCEDURE — 82962 GLUCOSE BLOOD TEST: CPT

## 2021-01-29 PROCEDURE — 94640 AIRWAY INHALATION TREATMENT: CPT

## 2021-01-29 PROCEDURE — 84132 ASSAY OF SERUM POTASSIUM: CPT

## 2021-01-29 PROCEDURE — 94761 N-INVAS EAR/PLS OXIMETRY MLT: CPT

## 2021-01-29 PROCEDURE — 71045 X-RAY EXAM CHEST 1 VIEW: CPT

## 2021-01-29 PROCEDURE — 85007 BL SMEAR W/DIFF WBC COUNT: CPT

## 2021-01-29 PROCEDURE — 94003 VENT MGMT INPAT SUBQ DAY: CPT

## 2021-01-29 PROCEDURE — 85610 PROTHROMBIN TIME: CPT

## 2021-01-29 PROCEDURE — 99233 SBSQ HOSP IP/OBS HIGH 50: CPT | Performed by: INTERNAL MEDICINE

## 2021-01-29 PROCEDURE — 2700000000 HC OXYGEN THERAPY PER DAY

## 2021-01-29 RX ORDER — DEXTROSE MONOHYDRATE 25 G/50ML
50 INJECTION, SOLUTION INTRAVENOUS PRN
Status: DISCONTINUED | OUTPATIENT
Start: 2021-01-29 | End: 2021-01-30

## 2021-01-29 RX ORDER — CALCIUM GLUCONATE 20 MG/ML
1000 INJECTION, SOLUTION INTRAVENOUS ONCE
Status: COMPLETED | OUTPATIENT
Start: 2021-01-29 | End: 2021-01-29

## 2021-01-29 RX ORDER — DEXTROSE MONOHYDRATE 25 G/50ML
25 INJECTION, SOLUTION INTRAVENOUS PRN
Status: DISCONTINUED | OUTPATIENT
Start: 2021-01-29 | End: 2021-02-01 | Stop reason: HOSPADM

## 2021-01-29 RX ADMIN — DEXTROSE MONOHYDRATE 25 G: 500 INJECTION PARENTERAL at 08:30

## 2021-01-29 RX ADMIN — Medication 175 MCG/HR: at 07:30

## 2021-01-29 RX ADMIN — Medication 2 PUFF: at 08:21

## 2021-01-29 RX ADMIN — INSULIN HUMAN 40 UNITS: 100 INJECTION, SOLUTION PARENTERAL at 06:11

## 2021-01-29 RX ADMIN — VANCOMYCIN HYDROCHLORIDE 1000 MG: 1 INJECTION, POWDER, LYOPHILIZED, FOR SOLUTION INTRAVENOUS at 11:58

## 2021-01-29 RX ADMIN — METOCLOPRAMIDE 10 MG: 5 INJECTION, SOLUTION INTRAMUSCULAR; INTRAVENOUS at 21:01

## 2021-01-29 RX ADMIN — METHYLPREDNISOLONE SODIUM SUCCINATE 40 MG: 40 INJECTION, POWDER, LYOPHILIZED, FOR SOLUTION INTRAMUSCULAR; INTRAVENOUS at 18:00

## 2021-01-29 RX ADMIN — FUROSEMIDE 40 MG: 10 INJECTION, SOLUTION INTRAVENOUS at 08:45

## 2021-01-29 RX ADMIN — ALBUTEROL SULFATE 2 PUFF: 90 AEROSOL, METERED RESPIRATORY (INHALATION) at 19:19

## 2021-01-29 RX ADMIN — METOCLOPRAMIDE 10 MG: 5 INJECTION, SOLUTION INTRAMUSCULAR; INTRAVENOUS at 01:42

## 2021-01-29 RX ADMIN — FAMOTIDINE 20 MG: 10 INJECTION, SOLUTION INTRAVENOUS at 08:45

## 2021-01-29 RX ADMIN — INSULIN HUMAN 10 UNITS: 100 INJECTION, SOLUTION PARENTERAL at 06:09

## 2021-01-29 RX ADMIN — SODIUM CHLORIDE 0.6 MCG/KG/HR: 9 INJECTION, SOLUTION INTRAVENOUS at 02:30

## 2021-01-29 RX ADMIN — ALBUTEROL SULFATE 2 PUFF: 90 AEROSOL, METERED RESPIRATORY (INHALATION) at 15:53

## 2021-01-29 RX ADMIN — DICLOFENAC SODIUM 2 G: 10 GEL TOPICAL at 21:02

## 2021-01-29 RX ADMIN — INSULIN HUMAN 10 UNITS: 100 INJECTION, SOLUTION PARENTERAL at 18:00

## 2021-01-29 RX ADMIN — METHYLPREDNISOLONE SODIUM SUCCINATE 40 MG: 40 INJECTION, POWDER, LYOPHILIZED, FOR SOLUTION INTRAMUSCULAR; INTRAVENOUS at 08:56

## 2021-01-29 RX ADMIN — WARFARIN SODIUM 9 MG: 5 TABLET ORAL at 17:59

## 2021-01-29 RX ADMIN — Medication 2 PUFF: at 19:19

## 2021-01-29 RX ADMIN — ARIPIPRAZOLE 15 MG: 10 TABLET ORAL at 09:45

## 2021-01-29 RX ADMIN — SODIUM CHLORIDE 0.4 MCG/KG/HR: 9 INJECTION, SOLUTION INTRAVENOUS at 14:14

## 2021-01-29 RX ADMIN — INSULIN HUMAN 10 UNITS: 100 INJECTION, SOLUTION PARENTERAL at 08:30

## 2021-01-29 RX ADMIN — ALBUTEROL SULFATE 2 PUFF: 90 AEROSOL, METERED RESPIRATORY (INHALATION) at 11:22

## 2021-01-29 RX ADMIN — POLYVINYL ALCOHOL 1 DROP: 14 SOLUTION/ DROPS OPHTHALMIC at 21:02

## 2021-01-29 RX ADMIN — METHYLPREDNISOLONE SODIUM SUCCINATE 40 MG: 40 INJECTION, POWDER, LYOPHILIZED, FOR SOLUTION INTRAMUSCULAR; INTRAVENOUS at 23:43

## 2021-01-29 RX ADMIN — ESCITALOPRAM OXALATE 20 MG: 10 TABLET ORAL at 08:45

## 2021-01-29 RX ADMIN — Medication 2 PUFF: at 15:54

## 2021-01-29 RX ADMIN — ZINC SULFATE 220 MG (50 MG) CAPSULE 50 MG: CAPSULE at 08:45

## 2021-01-29 RX ADMIN — SODIUM CHLORIDE, PRESERVATIVE FREE 10 ML: 5 INJECTION INTRAVENOUS at 21:03

## 2021-01-29 RX ADMIN — CALCIUM GLUCONATE 1000 MG: 20 INJECTION, SOLUTION INTRAVENOUS at 07:23

## 2021-01-29 RX ADMIN — FAMOTIDINE 20 MG: 10 INJECTION, SOLUTION INTRAVENOUS at 21:01

## 2021-01-29 RX ADMIN — METOPROLOL TARTRATE 12.5 MG: 25 TABLET, FILM COATED ORAL at 08:50

## 2021-01-29 RX ADMIN — SODIUM CHLORIDE, PRESERVATIVE FREE 10 ML: 5 INJECTION INTRAVENOUS at 08:45

## 2021-01-29 RX ADMIN — SODIUM CHLORIDE 0.6 MCG/KG/HR: 9 INJECTION, SOLUTION INTRAVENOUS at 08:16

## 2021-01-29 RX ADMIN — ASPIRIN 81 MG CHEWABLE TABLET 81 MG: 81 TABLET CHEWABLE at 08:45

## 2021-01-29 RX ADMIN — DICLOFENAC SODIUM 2 G: 10 GEL TOPICAL at 09:04

## 2021-01-29 RX ADMIN — CHLORHEXIDINE GLUCONATE 0.12% ORAL RINSE 15 ML: 1.2 LIQUID ORAL at 08:45

## 2021-01-29 RX ADMIN — Medication 2 PUFF: at 11:23

## 2021-01-29 RX ADMIN — CEFTRIAXONE SODIUM 2000 MG: 2 INJECTION, POWDER, FOR SOLUTION INTRAMUSCULAR; INTRAVENOUS at 11:58

## 2021-01-29 RX ADMIN — METOCLOPRAMIDE 10 MG: 5 INJECTION, SOLUTION INTRAMUSCULAR; INTRAVENOUS at 08:56

## 2021-01-29 RX ADMIN — CHLORHEXIDINE GLUCONATE 0.12% ORAL RINSE 15 ML: 1.2 LIQUID ORAL at 21:01

## 2021-01-29 RX ADMIN — ATORVASTATIN CALCIUM 40 MG: 40 TABLET, FILM COATED ORAL at 21:01

## 2021-01-29 RX ADMIN — METOCLOPRAMIDE 10 MG: 5 INJECTION, SOLUTION INTRAMUSCULAR; INTRAVENOUS at 18:33

## 2021-01-29 RX ADMIN — CALCIUM GLUCONATE 1000 MG: 20 INJECTION, SOLUTION INTRAVENOUS at 17:59

## 2021-01-29 RX ADMIN — ALBUTEROL SULFATE 2 PUFF: 90 AEROSOL, METERED RESPIRATORY (INHALATION) at 08:20

## 2021-01-29 ASSESSMENT — PULMONARY FUNCTION TESTS
PIF_VALUE: 30
PIF_VALUE: 22
PIF_VALUE: 29
PIF_VALUE: 30
PIF_VALUE: 29
PIF_VALUE: 22
PIF_VALUE: 22
PIF_VALUE: 29
PIF_VALUE: 30
PIF_VALUE: 30
PIF_VALUE: 29
PIF_VALUE: 26
PIF_VALUE: 29
PIF_VALUE: 30
PIF_VALUE: 23
PIF_VALUE: 30
PIF_VALUE: 30
PIF_VALUE: 29
PIF_VALUE: 29

## 2021-01-29 NOTE — PROGRESS NOTES
Call initiated by: Nursing staff:  Jasmyn Rico  Call addressed around: 1/29/2021 6:22 AM  Reason for call: Hyperkalemia-6.1  Orders placed: 10 units of Regular insulin IV, Dextrose 25 g, and calcium Gluconate 1 g IV    WINDY Franco - CNP

## 2021-01-29 NOTE — PROGRESS NOTES
Spoke with mother, Jacek Dove, via telephone and she states she would like pt to be [facility] at this time. Dr. Nas Burton notified.

## 2021-01-29 NOTE — PROGRESS NOTES
[address]    Hospitalist Progress Note      Name:  Lana Brenner /Age/Sex: 1945  [de-identified]77 y.o. male)   MRN & CSN:  [id number] & [id number] Admission Date/Time: 2021 12:17 AM   Location:  -A PCP: Matilda Jeffries MD         Hospital Day: 17    Assessment and Plan:   Lana Brenner is a 77 y.o.  male  who presents with shortness of breath, and was transferred from [facility] to for management of COVID-19 pneumonia     Acute on chronic hypoxic respiratory failure due to COVID-19 pneumonia, possible hospital-acquired pneumonia    Intubated , Vent management per pulmonology,   Slow improvement in vent setting 500, Fio2 of 40 and PEEP of 7  Completed Remdesivir , S/p convalescent plasma 2 units 2021  On IV vancomycin, cefepime   May need sedation vacation and eventually SBT - turning off sedation   Pulmo following     Septic shock due to COVID-19 pneumonia  MRSA and klebsiella pneumonia     Leucocytosis, Covid positive , MRSA culture positive  Respiratory cultures positive for MRSA and Klebsiella, Blood cultures negative to date  On steroids   Completed Remdesivir , S/p convalescent plasma 2 units 2021  Off pressors  Per ID, Continue vancomycin, DC meropenem, added rocephin      Hyperkalemia - Insulin/Dextrose/Calcium - check K in 4 hours      KIRSTEN - resolved     Right lower extremity cellulitis - On IV Abs    Chronic medical conditions - resume home medications     DM type II   Hyperlipidemia   Hypertension  Morbid obesity, encourage weight loss and exercise  Lower extremity DVT, on Coumadin, INR therapeutic  Chronic respiratory failure at 3 L of O2     Prognosis guarded  Palliative care consulted    Discussed with patient's daughter and her 2 sons on  at the bedside regarding current plan of care and prognosis, they were given opportunity to ask questions which were answered to their satisfaction.   After the discussion they have decided to change patient's Called Ms Hardy Arteaga for an update on [phone] on 1/29 - at 4:30 - left a voice mail message     CODE to DNR CCA    Diet DIET TUBE FEED CONTINUOUS/CYCLIC NPO; Low Calorie High Protein (Vital HP); Nasogastric; Continuous; 55; 95; 24   DVT Prophylaxis [] Lovenox, []  Heparin, [] SCDs, []No VTE prophylaxis, patient ambulating   GI Prophylaxis [] PPI, [] H2 Blocker, [] No GI prophylaxis, patient is receiving diet/Tube Feeds   Code Status DNR-CCA   Disposition Patient requires continued admission due to sepsis/respiratory failure   MDM [] Low, [] Moderate,[x]  High     History of Present Illness: Subjective     Patient Seen & Examined at the bedside     Sedated and intubated with no response to noxious stimuli    Ten point ROS could not be reviewed due to sedation    Objective: Intake/Output Summary (Last 24 hours) at 1/29/2021 1242  Last data filed at 1/29/2021 7280  Gross per 24 hour   Intake 1331.84 ml   Output 3600 ml   Net -2268.16 ml      Vitals:   Vitals:    01/29/21 1126   BP:    Pulse:    Resp:    Temp:    SpO2: 91%     Physical Exam:    GEN intubated and sedated with no response  HENT ETT and NGT in place  RESP decreased air entry with diffuse rhonchi  CARDIO/VASC -S1/S2 auscultated. Regular rate without appreciable murmurs, rubs, or gallops. Peripheral pulses equal bilaterally and palpable. No peripheral edema. GI Abdomen is soft without significant tenderness, masses, or guarding. Bowel sounds are normoactive. Rectal exam deferred.  Starkey catheter is present.   NEURO Sedated -no response to stimuli    Medications:   Medications:    insulin regular  40 Units Subcutaneous 4 times per day    glipiZIDE  10 mg Oral BID AC    warfarin  8 mg Oral Daily    insulin glargine  60 Units Subcutaneous Nightly    insulin NPH  50 Units Subcutaneous QAM    cefTRIAXone (ROCEPHIN) IV  2,000 mg Intravenous Q24H    vancomycin  1,000 mg Intravenous Q24H    insulin regular  0-30 Units Subcutaneous Q6H  methylPREDNISolone  40 mg Intravenous Q8H    metoclopramide  10 mg Intravenous Q6H    [Held by provider] alogliptin  12.5 mg Oral Daily    ARIPiprazole  15 mg Oral Daily    aspirin  81 mg Oral Daily    atorvastatin  40 mg Oral Nightly    buPROPion  300 mg Oral QAM    escitalopram  20 mg Oral Daily    metoprolol tartrate  12.5 mg Oral BID    sodium chloride flush  10 mL Intravenous 2 times per day    diclofenac sodium  2 g Topical BID    zinc sulfate  50 mg Oral Daily    albuterol sulfate HFA  2 puff Inhalation 4x daily    ipratropium  2 puff Inhalation 4x daily    chlorhexidine  15 mL Mouth/Throat BID    famotidine (PEPCID) injection  20 mg Intravenous BID      Infusions:    dexmedetomidine (PRECEDEX) IV infusion 0.6 mcg/kg/hr (01/29/21 0816)    midazolam Stopped (01/29/21 0940)    dextrose 100 mL/hr (01/21/21 1214)    norepinephrine Stopped (01/20/21 1446)    fentaNYL Stopped (01/29/21 0940)    dextrose      sodium chloride       PRN Meds:     dextrose, 25 g, PRN      fentanNYL, 25 mcg, Q1H PRN      hydrALAZINE, 10 mg, Q6H PRN      glucose, 15 g, PRN      dextrose, 12.5 g, PRN      glucagon (rDNA), 1 mg, PRN      dextrose, 100 mL/hr, PRN      microfibrillar collagen, , PRN      polyvinyl alcohol, 1 drop, Q4H PRN    And      artificial tears, , PRN      sodium chloride flush, 10 mL, PRN      promethazine, 12.5 mg, Q6H PRN    Or      ondansetron, 4 mg, Q6H PRN      polyethylene glycol, 17 g, Daily PRN      acetaminophen, 650 mg, Q6H PRN    Or      acetaminophen, 650 mg, Q6H PRN      glucose, 15 g, PRN      dextrose, 12.5 g, PRN      glucagon (rDNA), 1 mg, PRN      dextrose, 100 mL/hr, PRN      traMADol, 50 mg, Q6H PRN    Or      traMADol, 100 mg, Q6H PRN      diphenhydrAMINE, 25 mg, Q6H PRN      sodium chloride, , PRN      sodium chloride, 30 mL, PRN          Electronically signed by Thedora Baumgarten, MD on 1/29/2021 at 12:42 PM

## 2021-01-29 NOTE — PROGRESS NOTES
Pulmonary and Critical Care  Progress Note    Subjective: The patient is improving. FiO2 40%  Shortness of breath none. Chest pain none. Addressing respiratory complaints Patient is negative for  hemoptysis and cyanosis  CONSTITUTIONAL:  negative for fevers and chills.       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, Anemia, Arm fracture, Arthritis, Cataract, Cataract, Cellulitis of left lower leg, Chronic venous hypertension with ulcer ([facility] CKD (chronic kidney disease), Colon polyps, COPD (chronic obstructive pulmonary disease) ([facility] Diabetes mellitus ([facility] Diabetes mellitus ([facility] Diabetes mellitus with peripheral circulatory disorder ([facility] Diabetes mellitus with skin ulcer ([facility] Diabetic peripheral neuropathy ([facility] Diabetic skin ulcer associated with type 2 diabetes mellitus ([facility] Diverticulosis, Femoral DVT (deep venous thrombosis) ([facility] GERD (gastroesophageal reflux disease), Glaucoma, H/O cardiac catheterization, H/O Doppler ultrasound, H/O echocardiogram, H/O mumps orchitis, Hemorrhoids, History of nuclear stress test, HLD (hyperlipidemia), Hx of Doppler ultrasound, Hyperlipemia, Hyperlipidemia, Hypertension, Hypertension, Idiopathic chronic venous hypertension of left lower extremity with ulcer and inflammation (HCC), Leg ulcer ([address] No diabetic retinopathy OU, Non-pressure chronic ulcer of left lower leg with fat layer exposed ([facility] Pulmonary embolism ([facility] Sleep apnea, SOB (shortness of breath), Tendinitis, Type II or unspecified type diabetes mellitus with other specified manifestations, not stated as uncontrolled, Unspecified venous (peripheral) insufficiency, Urticaria, WD-Cellulitis of right anterior lower leg, WD-Cellulitis of right lower extremity, WD-Chronic venous hypertension (idiopathic) with ulcer of left lower extremity (CODE) ([facility] WD-Chronic venous hypertension with inflammation, right, WD-Decubitus ulcer of left buttock, stage 3 ([address] 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 to breakdown of skin ([address] WD-Open wound of hand without complication, left, initial encounter, WD-Pressure injury of left buttock, stage 2 ([address] WD-Pressure injury of left buttock, stage 3 ([address] WD-Pressure injury of right buttock, stage 3 (HCC), WD-Skin tear of right forearm without complication, and WD-Ulcer of right pretibial region, with fat layer exposed ([facility] has a past surgical history that includes Tonsillectomy (1953); Splenectomy (1958); Breast surgery (1970s); hernia repair (1970s); Skin graft (1978-present); Cataract removal (Right, 3/11/2013); Cataract removal (Left, 2/25/2013); Varicose vein surgery (Left, 2009); Carpal tunnel release (Left, 1993); Cardiac catheterization (6/3/14); Colonoscopy (2006); Colonoscopy (11/21/11); Colonoscopy (4/23/12); Colonoscopy (08/04/2016); Splenectomy; hernia repair; and Carpal tunnel release. reports that he quit smoking about 28 years ago. His smoking use included cigarettes. He has a 70.00 pack-year smoking history. He has never used smokeless tobacco. He reports current alcohol use. He reports that he does not use drugs. Family history:  family history includes Cancer in his brother and father; Diabetes in his brother; Heart Disease in his father; High Blood Pressure in his father; High Cholesterol in his father; Thyroid Disease in his brother.     Allergies   Allergen Reactions    Cavilon Durable Barrier [Mineral Oil-Dimeth-Coconut Oil]     Gentamycin [Gentamicin] Hives    Parabens Hives    Parabens Hives    Prinivil [Lisinopril] Swelling    Cortisone Rash    Cortisone Rash    Dilaudid [Hydromorphone Hcl] Rash    Penicillins Rash    Penicillins Rash    Sulfamethoxazole-Trimethoprim Nausea Only    Tape Darrold Bloodgood Tape] Rash     Social History:    Reviewed; no changes    Objective:   PHYSICAL EXAM:        VITALS:  BP (!) 139/57   Pulse 69   Temp 99.3 °F (37.4 °C) (Rectal)   Resp 24   Ht 6' 5\" (1.956 m)   Wt 274 lb 14.6 oz (124.7 kg)   SpO2 93%   BMI 32.60 kg/m²     24HR INTAKE/OUTPUT:      Intake/Output Summary (Last 24 hours) at 1/29/2021 1114  Last data filed at 1/29/2021 0959  Gross per 24 hour   Intake 1331.84 ml   Output 3600 ml Net -2268.16 ml       CONSTITUTIONAL:  Somnolent on vent. LUNGS:  decreased breath sounds, basilar crackles. CARDIOVASCULAR:  normal S1 and S2 and negative JVD  ABD:Abdomen soft, non-tender. BS normal. No masses,  No organomegaly  NEURO:Sedated on vent. DATA:    CBC:  Recent Labs     01/27/21  0440 01/28/21  0600 01/29/21  0440   WBC 12.1* 10.5 11.0*   RBC 3.42* 3.47* 3.49*   HGB 10.0* 10.0* 10.1*   HCT 32.9* 33.0* 32.6*    179 195   MCV 96.2 95.1 93.4   MCH 29.2 28.8 28.9   MCHC 30.4* 30.3* 31.0*   RDW 18.3* 17.5* 17.4*   SEGSPCT 76.0* 80.0* 72.0*   BANDSPCT 12* 5 10      BMP:  Recent Labs     01/28/21  0600 01/29/21  0440   NA  --  136   K  --  6.1*   CL  --  104   CO2  --  26   BUN  --  72*   CREATININE 0.9 0.9   CALCIUM  --  8.2*   GLUCOSE  --  257*      ABG:  Recent Labs     01/27/21  0700 01/28/21  0700 01/29/21  0700   PH 7.33* 7.36 7.47*   PO2ART 101* 68* 79   QNG1JFR 57.0* 57.0* 40.0   O2SAT 95.8* 91.7* 94.2*     Lab Results   Component Value Date    PROBNP 5,973 (H) 01/14/2021    PROBNP 1,642 (H) 01/12/2021    PROBNP 1,196 (H) 11/11/2020     No results found for: CULTRESP    Radiology Review:  Pertinent images / reports were reviewed as a part of this visit.     Assessment:     Patient Active Problem List   Diagnosis    Gastroesophageal reflux disease without esophagitis    Hypertension in stage 3 chronic kidney disease due to type 2 diabetes mellitus ([facility]    DM (diabetes mellitus) type II, controlled, with peripheral vascular disorder ([facility]    Combined hyperlipidemia associated with type 2 diabetes mellitus ([facility]    Diabetic skin ulcer associated with type 2 diabetes mellitus (HCC)    CKD (chronic kidney disease)    History of DVT (deep vein thrombosis)- left femoral    History of pulmonary embolism    Long term current use of anticoagulants with INR goal of 2.0-3.0    COPD (chronic obstructive pulmonary disease) ([facility]  Severe recurrent major depressive disorder with psychotic features ([address]    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)    Venous (peripheral) insufficiency    Uncontrolled secondary diabetes mellitus with stage 3 CKD (GFR 30-59) (HCC)    Diabetes mellitus with skin ulcer ([address]    WD-Ulcer of shin, left, with fat layer exposed ([address]    History of colon polyps    Personal history of PE (pulmonary embolism)    WD-Chronic venous hypertension (idiopathic) with ulcer of left lower extremity (CODE) ([address]    WD-Chronic venous hypertension with inflammation, right    Troponin I above reference range    KIRSTEN (acute kidney injury) ([address]    Cellulitis of lower extremity    Hyponatremia    Generalized weakness    Weakness    Multifocal pneumonia    Pneumonia due to COVID-19 virus    Hyperkalemia    Acute respiratory failure with hypoxia ([address]       Plan:   1. Start weaning the pt  2. Wean FiO2.  3. Wean sedation. 4. Discussed with RT and RN.   Naty Martin MD  1/29/2021  11:14 AM

## 2021-01-29 NOTE — PROGRESS NOTES
[address] [facility]  consulted by Dr. Red Butts for monitoring and adjustment. Indication for treatment: COVID 19, MRSA pneumonia  Goal trough: 15 mcg/mL, -600    Pertinent Laboratory Values:   Temp Readings from Last 3 Encounters:   01/29/21 99.3 °F (37.4 °C) (Rectal)   01/12/21 97.1 °F (36.2 °C) (Infrared)   01/08/21 98.9 °F (37.2 °C) (Oral)     Recent Labs     01/27/21  0440 01/28/21  0600 01/29/21  0440   WBC 12.1* 10.5 11.0*     Recent Labs     01/28/21  0600 01/29/21  0440   BUN  --  72*   CREATININE 0.9 0.9     Estimated Creatinine Clearance: 105 mL/min (based on SCr of 0.9 mg/dL). Intake/Output Summary (Last 24 hours) at 1/29/2021 0859  Last data filed at 1/29/2021 4859  Gross per 24 hour   Intake 1331.84 ml   Output 3600 ml   Net -2268.16 ml       Pertinent Cultures:  Date    Source    Results  1/19   Blood    Negative  1/19   MRSA nasal screen  Positive     Vancomycin level:   TROUGH:    Recent Labs     01/28/21  1441   VANCOTROUGH 15.5     RANDOM:    No results for input(s): VANCORANDOM in the last 72 hours. Assessment:  · WBC and temperature: elevated WBC (receiving methylprednisolone), afebrile   · SCr, BUN, and urine output: Scr improving   · Day(s) of therapy: 18  · Vancomycin concentration:    · 1/17: 21.1, supra-therapeutic on vancomycin 1250 mg q18h  · 1/19: 21.7, supra-therapeutic on vancomycin 1250 mg q24h   · 1/20: 14.8, therapeutic 48h post-dose   · 1/21: 18.5, therapeutic 18h post-dose   · 1/23:  17.7, therapeutic, 24h post-dose  · 1/25: 19.3, supra-therapeutic on 1250 mg q24h   · 1/28: 15.5, therapeutic on 1000 mg q24h     Plan:  · Dose decreased to 1000 mg q24h   · Trough therapeutic   · Repeat trough Tuesday  · Pharmacy will continue to monitor patient and adjust therapy as indicated    Thank you for the consult.   Mikel Kaiser, PharmD, BCPS  1/29/2021  8:59 AM

## 2021-01-29 NOTE — PROGRESS NOTES
Patient was able to be weaned from pressure control ventilation back to conventional AC/VC vent settings today without complication today. Patient slow to wake up even off sedation and was not appropriate for further weaning trials on vent. Patient was having some periods of asynchrony and breath stacking on AC/VC and was changed to AC/VC+ settings for comfort. RT and RN will attempt weaning again in the morning.

## 2021-01-29 NOTE — PLAN OF CARE
Problem: Airway Clearance - Ineffective  Goal: Achieve or maintain patent airway  Outcome: Ongoing     Problem: Gas Exchange - Impaired  Goal: Absence of hypoxia  Outcome: Ongoing  Goal: Promote optimal lung function  Outcome: Ongoing     Problem: Breathing Pattern - Ineffective  Goal: Ability to achieve and maintain a regular respiratory rate  Outcome: Ongoing     Problem:  Body Temperature -  Risk of, Imbalanced  Goal: Ability to maintain a body temperature within defined limits  Outcome: Ongoing  Goal: Will regain or maintain usual level of consciousness  Outcome: Ongoing  Goal: Complications related to the disease process, condition or treatment will be avoided or minimized  Outcome: Ongoing     Problem: Isolation Precautions - Risk of Spread of Infection  Goal: Prevent transmission of infection  Outcome: Ongoing     Problem: Nutrition Deficits  Goal: Optimize nutritional status  Outcome: Ongoing     Problem: Risk for Fluid Volume Deficit  Goal: Maintain normal heart rhythm  Outcome: Ongoing  Goal: Maintain absence of muscle cramping  Outcome: Ongoing  Goal: Maintain normal serum potassium, sodium, calcium, phosphorus, and pH  Outcome: Ongoing     Problem: Loneliness or Risk for Loneliness  Goal: Demonstrate positive use of time alone when socialization is not possible  Outcome: Ongoing     Problem: Fatigue  Goal: Verbalize increase energy and improved vitality  Outcome: Ongoing     Problem: Patient Education: Go to Patient Education Activity  Goal: Patient/Family Education  Outcome: Ongoing     Problem: Falls - Risk of:  Goal: Will remain free from falls  Description: Will remain free from falls  Outcome: Ongoing  Goal: Absence of physical injury  Description: Absence of physical injury  Outcome: Ongoing     Problem: Skin Integrity:  Goal: Will show no infection signs and symptoms  Description: Will show no infection signs and symptoms  Outcome: Ongoing  Goal: Absence of new skin breakdown Description: Absence of new skin breakdown  Outcome: Ongoing     Problem: Restraint Use - Nonviolent/Non-Self-Destructive Behavior:  Goal: Absence of restraint indications  Description: Absence of restraint indications  Outcome: Ongoing  Goal: Absence of restraint-related injury  Description: Absence of restraint-related injury  Outcome: Ongoing

## 2021-01-29 NOTE — PROGRESS NOTES
Progress Note( Dr. Aye Cabral)    Subjective:   Admit Date: 1/13/2021  PCP: Cathy Mead MD          Admitted For : Initially admitted to [facility] and transferred here on January 13, 2021 for acute on chronic respiratory failure with hypoxia and patient has Covid pneumonia    Consulted For:  Better control of blood glucose    Interval History: Patient is sedated, intubated and on ventilator  Bolus tube feeding is on and off pending upon residual he has      Intake/Output Summary (Last 24 hours) at 1/28/2021 2244  Last data filed at 1/28/2021 1800  Gross per 24 hour   Intake 912.92 ml   Output 4150 ml   Net -3237.08 ml       DATA    CBC:   Recent Labs     01/26/21  0545 01/27/21  0440 01/28/21  0600   WBC 14.5* 12.1* 10.5   HGB 9.9* 10.0* 10.0*    165 179    CMP:  Recent Labs     01/28/21  0600   CREATININE 0.9     Lipids:   Lab Results   Component Value Date    CHOL 107 11/22/2019    CHOL 175 05/05/2015    HDL 46 11/22/2019    TRIG 85 01/18/2021     Glucose:  Recent Labs     01/28/21  1249 01/28/21  1729 01/28/21  2111   POCGLU 259* 287* 296*     [id number]:  Lab Results   Component Value Date    LABA1C 11.6 01/19/2021     High Sensitivity TSH:   Lab Results   Component Value Date    TSHHS 1.770 11/21/2019     Free T3: No results found for: FT3  Free T4:  Lab Results   Component Value Date    T4FREE 1.14 11/21/2019       Xr Chest Portable    Result Date: 1/23/2021  EXAMINATION: ONE XRAY VIEW OF THE CHEST 1/23/2021 5:04 am   .     1. Increased left basilar airspace opacity is likely related to atelectasis, although superimposed pneumonia and aspiration are not excluded. 2. Otherwise unchanged subpleural, basilar predominant airspace opacities and diffuse interstitial opacities likely due to COVID-19 pneumonia given patient history. Superimposed edema (noncardiogenic or cardiogenic) is not excluded.      Xr Chest Portable    Result Date: 1/22/2021 EXAMINATION: ONE XRAY VIEW OF THE CHEST 1/22/2021 11:03 am      Worsening multifocal infiltrates seen throughout the lungs bilaterally concerning for multifocal pneumonia. Endotracheal tube measures 5.3 cm above the jose. Xr Chest Portable    Result Date: 1/21/2021  EXAMINATION: ONE XRAY VIEW OF THE CHEST 1/21/2021 5:00 am     1. Stable position of support lines and tubes. The left internal jugular central venous catheter terminates in the SVC but is directed laterally. 2. Persistent multifocal airspace consolidation, consistent with the history of COVID-19 pneumonia. Xr Chest Portable    Result Date: 1/20/2021  EXAMINATION: ONE XRAY VIEW OF THE CHEST 1/20/2021 5:04 am COMPARISON: Yesterday HISTORY: ORDERING SYSTEM PROVIDED HISTORY: on vent l. Support devices unchanged in position. Stable areas of COVID-19 pneumonia. Xr Chest Portable    Result Date: 1/18/2021  EXAMINATION: ONE XRAY VIEW OF THE CHEST 1/18/2021 9:52 am COMPARISON: 01/18/2021, 01/16/2021 HISTORY: ORDERING SYSTEM PROVIDED HISTORY: ETT placement TECHNOLOGIST PROVIDED HISTORY: Reason for exam:->ETT placement Reason for exam:->verify ETT placement per Dr. Kayla Hernandez, how many Cm above the jose? Reason for Exam: 1 FINDINGS: Endotracheal tube terminates 6.5 cm above the jose. Enteric tube courses below the diaphragm. Left internal jugular catheter with tip at the superior SVC directed laterally. Hazy ground-glass opacity areas of consolidation scattered through the lungs are improving from prior exams given differences in technique. No pneumothorax or pleural effusion. Borderline cardiomegaly is stable. Endotracheal tube terminates 6.5 cm above the jose. Moderate changes of COVID-19 pneumonia throughout the lungs with improvement since 01/16/2021.      Xr Chest Portable    Result Date: 1/18/2021 EXAMINATION: ONE XRAY VIEW OF THE CHEST 1/18/2021 4:21 am COMPARISON: 01/17/2021 HISTORY: ORDERING SYSTEM PROVIDED HISTORY: on vent . 1. Diffuse bilateral lung infiltrates, likely related to pulmonary edema versus pneumonia.            Scheduled Medicines   Medications:    insulin regular  40 Units Subcutaneous 4 times per day    glipiZIDE  10 mg Oral BID AC    warfarin  8 mg Oral Daily    insulin glargine  60 Units Subcutaneous Nightly    insulin NPH  50 Units Subcutaneous QAM    cefTRIAXone (ROCEPHIN) IV  2,000 mg Intravenous Q24H    vancomycin  1,000 mg Intravenous Q24H    furosemide  40 mg Intravenous Once per day on Mon Wed Fri    insulin regular  0-30 Units Subcutaneous Q6H    methylPREDNISolone  40 mg Intravenous Q8H    metoclopramide  10 mg Intravenous Q6H    alogliptin  12.5 mg Oral Daily    ARIPiprazole  15 mg Oral Daily    aspirin  81 mg Oral Daily    atorvastatin  40 mg Oral Nightly    buPROPion  300 mg Oral QAM    escitalopram  20 mg Oral Daily    metoprolol tartrate  12.5 mg Oral BID    sodium chloride flush  10 mL Intravenous 2 times per day    diclofenac sodium  2 g Topical BID    zinc sulfate  50 mg Oral Daily    albuterol sulfate HFA  2 puff Inhalation 4x daily    ipratropium  2 puff Inhalation 4x daily    chlorhexidine  15 mL Mouth/Throat BID    famotidine (PEPCID) injection  20 mg Intravenous BID      Infusions:    dexmedetomidine (PRECEDEX) IV infusion 0.6 mcg/kg/hr (01/28/21 2146)    midazolam 9 mg/hr (01/28/21 2102)    dextrose 100 mL/hr (01/21/21 1214)    cisatracurium (NIMBEX) infusion Stopped (01/18/21 1612)    norepinephrine Stopped (01/20/21 1446)    fentaNYL 175 mcg/hr (01/28/21 2147)    dextrose      sodium chloride           Objective:   Vitals: BP (!) 171/57   Pulse 55   Temp 96.8 °F (36 °C) (Rectal)   Resp 24   Ht 6' 5.01\" (1.956 m)   Wt 274 lb 14.6 oz (124.7 kg)   SpO2 93%   BMI 32.59 kg/m² General appearance: Patient is sedated, intubated and on ventilator  Neck: no JVD or bruit  Thyroid : Normal lobes   Lungs: Has Vesicular Breath sounds   Heart:  regular rate and rhythm  Abdomen: soft, non-tender; bowel sounds normal; no masses,  no organomegaly  Musculoskeletal: Normal  Extremities: extremities normal, , no edema  Neurologic:  Patient is sedated, intubated and on ventilator      Assessment:     Patient Active Problem List:     Gastroesophageal reflux disease without esophagitis     Hypertension in stage 3 chronic kidney disease due to type 2 diabetes mellitus (HCC)     DM (diabetes mellitus) type II, controlled, with peripheral vascular disorder (HCC)     Combined hyperlipidemia associated with type 2 diabetes mellitus (HCC)     Diabetic skin ulcer associated with type 2 diabetes mellitus (HCC)     CKD (chronic kidney disease)     History of DVT (deep vein thrombosis)- left femoral     History of pulmonary embolism     Long term current use of anticoagulants with INR goal of 2.0-3.0     COPD (chronic obstructive pulmonary disease) (HCC)     Severe recurrent major depressive disorder with psychotic features (HCC)     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)     Venous (peripheral) insufficiency     Uncontrolled secondary diabetes mellitus with stage 3 CKD (GFR 30-59) (HCC)     Diabetes mellitus with skin ulcer (HCC)     WD-Ulcer of shin, left, with fat layer exposed ([address]     History of colon polyps     Personal history of PE (pulmonary embolism)     WD-Chronic venous hypertension (idiopathic) with ulcer of left lower extremity (CODE) ([address]     WD-Chronic venous hypertension with inflammation, right     Troponin I above reference range     KIRSTEN (acute kidney injury) ([address]     Cellulitis of lower extremity     Hyponatremia     Generalized weakness     Weakness     Multifocal pneumonia     Pneumonia due to COVID-19 virus     Hyperkalemia Acute respiratory failure with hypoxia ([facility] [address]      Plan:     1. Reviewed POC blood glucose . Labs and X ray results   2. Reviewed Current Medicines   3. On his schedule plus correction bolus regular /basal Lantus /NPH insulin regimedrugs   4. Monitor Blood glucose frequently   5. Modified  the dose of Insulin/ other medicines as needed   6. Will follow     .      Kimberly Ashby MD

## 2021-01-29 NOTE — PROGRESS NOTES
Infectious Disease Progress Note  2021   Patient Name: Jaclyn Narvaez : 1945       Reason for visit: F/u COVID-19 pneumonia, acute respiratory failure? History:? Interval history noted  Intubated, sedated and on mechanical ventilation  Physical Exam:  Vital Signs: BP (!) 139/57   Pulse 69   Temp 99.3 °F (37.4 °C) (Rectal)   Resp 24   Ht 6' 5\" (1.956 m)   Wt 274 lb 14.6 oz (124.7 kg)   SpO2 93%   BMI 32.60 kg/m²     Gen: intubated and sedated  Skin: no stigmata of endocarditis  Wounds:  Left anterior shin superficial wound, C/D/I  HEMT: AT/NC ETT, NGT   Eyes: PERRLA. Neck: Supple. Trachea midline. No LAD. Chest:  transmitted breath sounds. Heart:   Abd: soft, non-distended, no tenderness, no hepatomegaly. Normoactive bowel sounds. Ext: no clubbing, cyanosis, or edema  Catheter Site: without erythema or tenderness  LDA: CVC: left IJ, Urethral catheter: 2021  Neuro: sedated and on the ventilator. Radiologic / Imaging / TESTING  EXAMINATION:   ONE X-RAY VIEW OF THE CHEST     2021 5:59 am     COMPARISON:   2021     HISTORY:   ORDERING SYSTEM PROVIDED HISTORY: ET Tube   TECHNOLOGIST PROVIDED HISTORY:   Reason for exam:   ET Tube   Reason for Exam: ET Tube   Acuity: Unknown   Type of Exam: Subsequent/Follow-up   Mechanism of Injury: ET Tube   Relevant Medical/Surgical History: ET Tube     FINDINGS:   Endotracheal tube and nasogastric tube are identified. There is a left   central venous catheter with the tip in the SVC. Diffuse bilateral lung airspace disease is identified most apparent within   the left lower lobe. No significant change is identified. No pneumothorax is identified. No acute osseous abnormality is appreciated. Impression:   Stable diffuse bilateral lung airspace disease most apparent within the left   lower lobe. The lines and tubes are stable.        Labs:    Recent Results (from the past 24 hour(s))   POCT Glucose piperacillin-tazobactam Sensitive <=4 Final    trimethoprim-sulfamethoxazole Sensitive <=20 Final        Diagnosis:  Patient Active Problem List   Diagnosis    Gastroesophageal reflux disease without esophagitis    Hypertension in stage 3 chronic kidney disease due to type 2 diabetes mellitus [facility]    DM (diabetes mellitus) type II, controlled, with peripheral vascular disorder (HCC)    Combined hyperlipidemia associated with type 2 diabetes mellitus ([address]    Diabetic skin ulcer associated with type 2 diabetes mellitus (HCC)    CKD (chronic kidney disease)    History of DVT (deep vein thrombosis)- left femoral    History of pulmonary embolism    Long term current use of anticoagulants with INR goal of 2.0-3.0    COPD (chronic obstructive pulmonary disease) (HCC)    Severe recurrent major depressive disorder with psychotic features (HCC)    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)    Venous (peripheral) insufficiency    Uncontrolled secondary diabetes mellitus with stage 3 CKD (GFR 30-59) (HCC)    Diabetes mellitus with skin ulcer ([address]    WD-Ulcer of shin, left, with fat layer exposed ([facility] [address]    History of colon polyps    Personal history of PE (pulmonary embolism)    WD-Chronic venous hypertension (idiopathic) with ulcer of left lower extremity (CODE) ([facility] [address]    WD-Chronic venous hypertension with inflammation, right    Troponin I above reference range    KIRSTEN (acute kidney injury) ([address]    Cellulitis of lower extremity    Hyponatremia    Generalized weakness    Weakness    Multifocal pneumonia    Pneumonia due to COVID-19 virus    Hyperkalemia    Acute respiratory failure with hypoxia (HCC)       Active Problems  Active Problems:    Pneumonia due to COVID-19 virus    Hyperkalemia    Acute respiratory failure with hypoxia (HCC)  Resolved Problems:    * No resolved hospital problems.  *      Impression and plan ? Summary and rationale: Patient is a 77 y.o.  male T2DM, hyperlipidemia, htn, depression, morbid obesity who was admitted 1/13/2021 for further evaluation and management of shortness of breath that started on 1/8/2021 and positive COVID test. Has critical COVID-19 pneumonia, in hyperinflammatory phase. MRSA and K pneumoniae super-imposed bacterial pneumonia  ? Clinical status: remains severe, but has some reduction in FiO2 45% unchanged, leukocytosis has resolved. Overall is improving. ? MRSA nares positive. On vancomycin. IL-6; 20.7, Aspergillus Ag, BDG negative  ? Therapeutic:  o Ongoing antibiotics: vancomycin 1/12-, ceftriaxone 1/23  o Antiviral agent: remdesivir 1/13-1/16  o Anti-inflammatory agents:  ? Dexamethasone:1/12-19  ? Solu-Medrol: 1/17. 1/19-  o Completed antibiotics: meropenem 1/23-1/26  o Convalescent plasma receipt:  o Other agents:   ? Diagnostic:  ? F/u: Blood cx 1/19, 0/2 ngtd  ?  Other:      Electronically signed by: Electronically signed by Zana Armijo MD on 1/29/2021 at 9:11 AM

## 2021-01-30 ENCOUNTER — APPOINTMENT (OUTPATIENT)
Dept: CT IMAGING | Age: 76
DRG: 870 | End: 2021-01-30
Attending: INTERNAL MEDICINE
Payer: MEDICARE

## 2021-01-30 ENCOUNTER — APPOINTMENT (OUTPATIENT)
Dept: GENERAL RADIOLOGY | Age: 76
DRG: 870 | End: 2021-01-30
Attending: INTERNAL MEDICINE
Payer: MEDICARE

## 2021-01-30 LAB
ANION GAP SERPL CALCULATED.3IONS-SCNC: 7 MMOL/L (ref 4–16)
ANION GAP SERPL CALCULATED.3IONS-SCNC: 8 MMOL/L (ref 4–16)
BACTERIA: ABNORMAL /HPF
BASE EXCESS MIXED: 4.8 (ref 0–1.2)
BASOPHILS ABSOLUTE: 0 K/CU MM
BASOPHILS RELATIVE PERCENT: 0.2 % (ref 0–1)
BILIRUBIN URINE: NEGATIVE MG/DL
BLOOD, URINE: ABNORMAL
BUN BLDV-MCNC: 66 MG/DL (ref 6–23)
BUN BLDV-MCNC: 72 MG/DL (ref 6–23)
CALCIUM OXALATE CRYSTALS: ABNORMAL /HPF
CALCIUM SERPL-MCNC: 8.1 MG/DL (ref 8.3–10.6)
CALCIUM SERPL-MCNC: 8.2 MG/DL (ref 8.3–10.6)
CARBON MONOXIDE, BLOOD: 2.1 % (ref 0–5)
CHLORIDE BLD-SCNC: 104 MMOL/L (ref 99–110)
CHLORIDE BLD-SCNC: 105 MMOL/L (ref 99–110)
CLARITY: ABNORMAL
CO2 CONTENT: 31.3 MMOL/L (ref 19–24)
CO2: 26 MMOL/L (ref 21–32)
CO2: 28 MMOL/L (ref 21–32)
COLOR: YELLOW
COMMENT: ABNORMAL
CREAT SERPL-MCNC: 0.9 MG/DL (ref 0.9–1.3)
CREAT SERPL-MCNC: 0.9 MG/DL (ref 0.9–1.3)
DIFFERENTIAL TYPE: ABNORMAL
EKG ATRIAL RATE: 72 BPM
EKG DIAGNOSIS: NORMAL
EKG P AXIS: 23 DEGREES
EKG P-R INTERVAL: 240 MS
EKG Q-T INTERVAL: 386 MS
EKG QRS DURATION: 96 MS
EKG QTC CALCULATION (BAZETT): 422 MS
EKG R AXIS: -14 DEGREES
EKG T AXIS: 57 DEGREES
EKG VENTRICULAR RATE: 72 BPM
EOSINOPHILS ABSOLUTE: 0 K/CU MM
EOSINOPHILS RELATIVE PERCENT: 0 % (ref 0–3)
GFR AFRICAN AMERICAN: >60 ML/MIN/1.73M2
GFR AFRICAN AMERICAN: >60 ML/MIN/1.73M2
GFR NON-AFRICAN AMERICAN: >60 ML/MIN/1.73M2
GFR NON-AFRICAN AMERICAN: >60 ML/MIN/1.73M2
GLUCOSE BLD-MCNC: 102 MG/DL (ref 70–99)
GLUCOSE BLD-MCNC: 110 MG/DL (ref 70–99)
GLUCOSE BLD-MCNC: 132 MG/DL (ref 70–99)
GLUCOSE BLD-MCNC: 145 MG/DL (ref 70–99)
GLUCOSE BLD-MCNC: 159 MG/DL (ref 70–99)
GLUCOSE BLD-MCNC: 82 MG/DL (ref 70–99)
GLUCOSE, URINE: NEGATIVE MG/DL
HCO3 ARTERIAL: 29.9 MMOL/L (ref 18–23)
HCT VFR BLD CALC: 31.9 % (ref 42–52)
HEMOGLOBIN: 10.1 GM/DL (ref 13.5–18)
IMMATURE NEUTROPHIL %: 2.1 % (ref 0–0.43)
INR BLD: 1.05 INDEX
KETONES, URINE: NEGATIVE MG/DL
LEUKOCYTE ESTERASE, URINE: ABNORMAL
LYMPHOCYTES ABSOLUTE: 0.7 K/CU MM
LYMPHOCYTES RELATIVE PERCENT: 4.2 % (ref 24–44)
MCH RBC QN AUTO: 29.5 PG (ref 27–31)
MCHC RBC AUTO-ENTMCNC: 31.7 % (ref 32–36)
MCV RBC AUTO: 93.3 FL (ref 78–100)
METHEMOGLOBIN ARTERIAL: 1 %
MONOCYTES ABSOLUTE: 0.3 K/CU MM
MONOCYTES RELATIVE PERCENT: 1.5 % (ref 0–4)
MUCUS: ABNORMAL HPF
NITRITE URINE, QUANTITATIVE: NEGATIVE
NUCLEATED RBC %: 0 %
O2 SATURATION: 94.7 % (ref 96–97)
PCO2 ARTERIAL: 45 MMHG (ref 32–45)
PDW BLD-RTO: 17.4 % (ref 11.7–14.9)
PH BLOOD: 7.43 (ref 7.34–7.45)
PH, URINE: 5 (ref 5–8)
PLATELET # BLD: 176 K/CU MM (ref 140–440)
PMV BLD AUTO: 13.2 FL (ref 7.5–11.1)
PO2 ARTERIAL: 82 MMHG (ref 75–100)
POTASSIUM SERPL-SCNC: 5.6 MMOL/L (ref 3.5–5.1)
POTASSIUM SERPL-SCNC: 5.8 MMOL/L (ref 3.5–5.1)
PROTEIN UA: NEGATIVE MG/DL
PROTHROMBIN TIME: 12.7 SECONDS (ref 11.7–14.5)
RBC # BLD: 3.42 M/CU MM (ref 4.6–6.2)
RBC URINE: 10 /HPF (ref 0–3)
SEGMENTED NEUTROPHILS ABSOLUTE COUNT: 15.6 K/CU MM
SEGMENTED NEUTROPHILS RELATIVE PERCENT: 92 % (ref 36–66)
SODIUM BLD-SCNC: 138 MMOL/L (ref 135–145)
SODIUM BLD-SCNC: 140 MMOL/L (ref 135–145)
SPECIFIC GRAVITY UA: 1.02 (ref 1–1.03)
SQUAMOUS EPITHELIAL: <1 /HPF
TOTAL CK: 65 IU/L (ref 38–174)
TOTAL IMMATURE NEUTOROPHIL: 0.36 K/CU MM
TOTAL NUCLEATED RBC: 0 K/CU MM
TRICHOMONAS: ABNORMAL /HPF
UROBILINOGEN, URINE: NORMAL MG/DL (ref 0.2–1)
WBC # BLD: 17 K/CU MM (ref 4–10.5)
WBC CLUMP: ABNORMAL /HPF
WBC UA: 8 /HPF (ref 0–2)
YEAST: ABNORMAL /HPF

## 2021-01-30 PROCEDURE — 6360000002 HC RX W HCPCS: Performed by: STUDENT IN AN ORGANIZED HEALTH CARE EDUCATION/TRAINING PROGRAM

## 2021-01-30 PROCEDURE — 6370000000 HC RX 637 (ALT 250 FOR IP): Performed by: NURSE PRACTITIONER

## 2021-01-30 PROCEDURE — 89220 SPUTUM SPECIMEN COLLECTION: CPT

## 2021-01-30 PROCEDURE — 2580000003 HC RX 258: Performed by: NURSE PRACTITIONER

## 2021-01-30 PROCEDURE — 71045 X-RAY EXAM CHEST 1 VIEW: CPT

## 2021-01-30 PROCEDURE — 94003 VENT MGMT INPAT SUBQ DAY: CPT

## 2021-01-30 PROCEDURE — 2580000003 HC RX 258: Performed by: INTERNAL MEDICINE

## 2021-01-30 PROCEDURE — 85025 COMPLETE CBC W/AUTO DIFF WBC: CPT

## 2021-01-30 PROCEDURE — 80048 BASIC METABOLIC PNL TOTAL CA: CPT

## 2021-01-30 PROCEDURE — 94761 N-INVAS EAR/PLS OXIMETRY MLT: CPT

## 2021-01-30 PROCEDURE — 2700000000 HC OXYGEN THERAPY PER DAY

## 2021-01-30 PROCEDURE — 6370000000 HC RX 637 (ALT 250 FOR IP): Performed by: INTERNAL MEDICINE

## 2021-01-30 PROCEDURE — 99233 SBSQ HOSP IP/OBS HIGH 50: CPT | Performed by: INTERNAL MEDICINE

## 2021-01-30 PROCEDURE — 2500000003 HC RX 250 WO HCPCS: Performed by: NURSE PRACTITIONER

## 2021-01-30 PROCEDURE — 82550 ASSAY OF CK (CPK): CPT

## 2021-01-30 PROCEDURE — 85610 PROTHROMBIN TIME: CPT

## 2021-01-30 PROCEDURE — 99223 1ST HOSP IP/OBS HIGH 75: CPT | Performed by: NURSE PRACTITIONER

## 2021-01-30 PROCEDURE — 6360000002 HC RX W HCPCS: Performed by: INTERNAL MEDICINE

## 2021-01-30 PROCEDURE — 93005 ELECTROCARDIOGRAM TRACING: CPT | Performed by: NURSE PRACTITIONER

## 2021-01-30 PROCEDURE — 31720 CLEARANCE OF AIRWAYS: CPT

## 2021-01-30 PROCEDURE — 37799 UNLISTED PX VASCULAR SURGERY: CPT

## 2021-01-30 PROCEDURE — 6360000002 HC RX W HCPCS: Performed by: NURSE PRACTITIONER

## 2021-01-30 PROCEDURE — 93010 ELECTROCARDIOGRAM REPORT: CPT | Performed by: INTERNAL MEDICINE

## 2021-01-30 PROCEDURE — 94640 AIRWAY INHALATION TREATMENT: CPT

## 2021-01-30 PROCEDURE — 82803 BLOOD GASES ANY COMBINATION: CPT

## 2021-01-30 PROCEDURE — 82962 GLUCOSE BLOOD TEST: CPT

## 2021-01-30 PROCEDURE — 81001 URINALYSIS AUTO W/SCOPE: CPT

## 2021-01-30 PROCEDURE — 2000000000 HC ICU R&B

## 2021-01-30 PROCEDURE — 70450 CT HEAD/BRAIN W/O DYE: CPT

## 2021-01-30 RX ORDER — SODIUM POLYSTYRENE SULFONATE 15 G/60ML
15 SUSPENSION ORAL; RECTAL ONCE
Status: COMPLETED | OUTPATIENT
Start: 2021-01-30 | End: 2021-01-30

## 2021-01-30 RX ORDER — DEXTROSE MONOHYDRATE 25 G/50ML
25 INJECTION, SOLUTION INTRAVENOUS ONCE
Status: COMPLETED | OUTPATIENT
Start: 2021-01-30 | End: 2021-01-30

## 2021-01-30 RX ORDER — INSULIN GLARGINE 100 [IU]/ML
40 INJECTION, SOLUTION SUBCUTANEOUS NIGHTLY
Status: DISCONTINUED | OUTPATIENT
Start: 2021-01-30 | End: 2021-02-01 | Stop reason: HOSPADM

## 2021-01-30 RX ORDER — METHYLPREDNISOLONE SODIUM SUCCINATE 40 MG/ML
40 INJECTION, POWDER, LYOPHILIZED, FOR SOLUTION INTRAMUSCULAR; INTRAVENOUS EVERY 12 HOURS
Status: DISCONTINUED | OUTPATIENT
Start: 2021-01-30 | End: 2021-02-01 | Stop reason: HOSPADM

## 2021-01-30 RX ORDER — CALCIUM GLUCONATE 20 MG/ML
1000 INJECTION, SOLUTION INTRAVENOUS ONCE
Status: COMPLETED | OUTPATIENT
Start: 2021-01-30 | End: 2021-01-30

## 2021-01-30 RX ADMIN — LEVETIRACETAM 1500 MG: 100 INJECTION, SOLUTION INTRAVENOUS at 22:19

## 2021-01-30 RX ADMIN — SODIUM CHLORIDE 0.4 MCG/KG/HR: 9 INJECTION, SOLUTION INTRAVENOUS at 01:30

## 2021-01-30 RX ADMIN — ALBUTEROL SULFATE 2 PUFF: 90 AEROSOL, METERED RESPIRATORY (INHALATION) at 21:05

## 2021-01-30 RX ADMIN — ALBUTEROL SULFATE 2 PUFF: 90 AEROSOL, METERED RESPIRATORY (INHALATION) at 15:32

## 2021-01-30 RX ADMIN — DICLOFENAC SODIUM 2 G: 10 GEL TOPICAL at 20:16

## 2021-01-30 RX ADMIN — INSULIN HUMAN 5 UNITS: 100 INJECTION, SOLUTION PARENTERAL at 17:48

## 2021-01-30 RX ADMIN — DICLOFENAC SODIUM 2 G: 10 GEL TOPICAL at 08:37

## 2021-01-30 RX ADMIN — METOCLOPRAMIDE 10 MG: 5 INJECTION, SOLUTION INTRAMUSCULAR; INTRAVENOUS at 15:03

## 2021-01-30 RX ADMIN — INSULIN HUMAN 30 UNITS: 100 INJECTION, SOLUTION PARENTERAL at 17:46

## 2021-01-30 RX ADMIN — METOCLOPRAMIDE 10 MG: 5 INJECTION, SOLUTION INTRAMUSCULAR; INTRAVENOUS at 20:01

## 2021-01-30 RX ADMIN — METOPROLOL TARTRATE 12.5 MG: 25 TABLET, FILM COATED ORAL at 08:36

## 2021-01-30 RX ADMIN — CEFTRIAXONE SODIUM 2000 MG: 2 INJECTION, POWDER, FOR SOLUTION INTRAMUSCULAR; INTRAVENOUS at 11:56

## 2021-01-30 RX ADMIN — CHLORHEXIDINE GLUCONATE 0.12% ORAL RINSE 15 ML: 1.2 LIQUID ORAL at 20:01

## 2021-01-30 RX ADMIN — METOCLOPRAMIDE 10 MG: 5 INJECTION, SOLUTION INTRAMUSCULAR; INTRAVENOUS at 02:54

## 2021-01-30 RX ADMIN — FAMOTIDINE 20 MG: 10 INJECTION, SOLUTION INTRAVENOUS at 20:01

## 2021-01-30 RX ADMIN — ZINC SULFATE 220 MG (50 MG) CAPSULE 50 MG: CAPSULE at 08:36

## 2021-01-30 RX ADMIN — ASPIRIN 81 MG CHEWABLE TABLET 81 MG: 81 TABLET CHEWABLE at 08:37

## 2021-01-30 RX ADMIN — Medication 2 PUFF: at 07:57

## 2021-01-30 RX ADMIN — Medication 2 PUFF: at 11:52

## 2021-01-30 RX ADMIN — CHLORHEXIDINE GLUCONATE 0.12% ORAL RINSE 15 ML: 1.2 LIQUID ORAL at 08:37

## 2021-01-30 RX ADMIN — FAMOTIDINE 20 MG: 10 INJECTION, SOLUTION INTRAVENOUS at 08:37

## 2021-01-30 RX ADMIN — METOPROLOL TARTRATE 12.5 MG: 25 TABLET, FILM COATED ORAL at 20:08

## 2021-01-30 RX ADMIN — Medication 2 PUFF: at 15:33

## 2021-01-30 RX ADMIN — VANCOMYCIN HYDROCHLORIDE 1000 MG: 1 INJECTION, POWDER, LYOPHILIZED, FOR SOLUTION INTRAVENOUS at 11:57

## 2021-01-30 RX ADMIN — SODIUM CHLORIDE, PRESERVATIVE FREE 10 ML: 5 INJECTION INTRAVENOUS at 08:37

## 2021-01-30 RX ADMIN — WARFARIN SODIUM 9 MG: 5 TABLET ORAL at 17:46

## 2021-01-30 RX ADMIN — METHYLPREDNISOLONE SODIUM SUCCINATE 40 MG: 40 INJECTION, POWDER, FOR SOLUTION INTRAMUSCULAR; INTRAVENOUS at 20:01

## 2021-01-30 RX ADMIN — ARIPIPRAZOLE 15 MG: 10 TABLET ORAL at 08:36

## 2021-01-30 RX ADMIN — SODIUM POLYSTYRENE SULFONATE 15 G: 15 SUSPENSION ORAL; RECTAL at 05:55

## 2021-01-30 RX ADMIN — DEXTROSE MONOHYDRATE 25 G: 25 INJECTION, SOLUTION INTRAVENOUS at 05:55

## 2021-01-30 RX ADMIN — METOCLOPRAMIDE 10 MG: 5 INJECTION, SOLUTION INTRAMUSCULAR; INTRAVENOUS at 08:37

## 2021-01-30 RX ADMIN — SODIUM CHLORIDE 0.3 MCG/KG/HR: 9 INJECTION, SOLUTION INTRAVENOUS at 11:30

## 2021-01-30 RX ADMIN — ALBUTEROL SULFATE 2 PUFF: 90 AEROSOL, METERED RESPIRATORY (INHALATION) at 07:57

## 2021-01-30 RX ADMIN — ALBUTEROL SULFATE 2 PUFF: 90 AEROSOL, METERED RESPIRATORY (INHALATION) at 11:52

## 2021-01-30 RX ADMIN — ATORVASTATIN CALCIUM 40 MG: 40 TABLET, FILM COATED ORAL at 20:08

## 2021-01-30 RX ADMIN — ESCITALOPRAM OXALATE 20 MG: 10 TABLET ORAL at 08:37

## 2021-01-30 RX ADMIN — INSULIN HUMAN 10 UNITS: 100 INJECTION, SOLUTION PARENTERAL at 05:54

## 2021-01-30 RX ADMIN — SODIUM ZIRCONIUM CYCLOSILICATE 10 G: 10 POWDER, FOR SUSPENSION ORAL at 13:11

## 2021-01-30 RX ADMIN — CALCIUM GLUCONATE 1000 MG: 20 INJECTION, SOLUTION INTRAVENOUS at 06:14

## 2021-01-30 RX ADMIN — METHYLPREDNISOLONE SODIUM SUCCINATE 40 MG: 40 INJECTION, POWDER, LYOPHILIZED, FOR SOLUTION INTRAMUSCULAR; INTRAVENOUS at 08:37

## 2021-01-30 RX ADMIN — Medication 2 PUFF: at 21:05

## 2021-01-30 ASSESSMENT — PULMONARY FUNCTION TESTS
PIF_VALUE: 20
PIF_VALUE: 16
PIF_VALUE: 14
PIF_VALUE: 22
PIF_VALUE: 12
PIF_VALUE: 22
PIF_VALUE: 22
PIF_VALUE: 26
PIF_VALUE: 15
PIF_VALUE: 15
PIF_VALUE: 22
PIF_VALUE: 16
PIF_VALUE: 22
PIF_VALUE: 16
PIF_VALUE: 20
PIF_VALUE: 26
PIF_VALUE: 22
PIF_VALUE: 16

## 2021-01-30 NOTE — PROGRESS NOTES
Progress Note( Dr. Shaye Boyd)    Subjective:   Admit Date: 1/13/2021  PCP: Yuliya Paredes MD          Admitted For : Initially admitted to [facility] and transferred here on January 13, 2021 for acute on chronic respiratory failure with hypoxia and patient has Covid pneumonia    Consulted For:  Better control of blood glucose    Interval History: Patient is sedated, intubated and on ventilator  Bolus tube feeding is on and off pending upon residual he has      Intake/Output Summary (Last 24 hours) at 1/29/2021 2101  Last data filed at 1/29/2021 1920  Gross per 24 hour   Intake 1159.68 ml   Output 3950 ml   Net -2790.32 ml       DATA    CBC:   Recent Labs     01/27/21  0440 01/28/21  0600 01/29/21  0440   WBC 12.1* 10.5 11.0*   HGB 10.0* 10.0* 10.1*    179 195    CMP:  Recent Labs     01/28/21  0600 01/29/21  0440 01/29/21  1347   NA  --  136  --    K  --  6.1* 5.9*   CL  --  104  --    CO2  --  26  --    BUN  --  72*  --    CREATININE 0.9 0.9  --    CALCIUM  --  8.2*  --      Lipids:   Lab Results   Component Value Date    CHOL 107 11/22/2019    CHOL 175 05/05/2015    HDL 46 11/22/2019    TRIG 85 01/18/2021     Glucose:  Recent Labs     01/29/21  0819 01/29/21  1402 01/29/21  1806   POCGLU 103* 88 118*     [id number]:  Lab Results   Component Value Date    LABA1C 11.6 01/19/2021     High Sensitivity TSH:   Lab Results   Component Value Date    TSHHS 1.770 11/21/2019     Free T3: No results found for: FT3  Free T4:  Lab Results   Component Value Date    T4FREE 1.14 11/21/2019       Xr Chest Portable    Result Date: 1/23/2021  EXAMINATION: ONE XRAY VIEW OF THE CHEST 1/23/2021 5:04 am   . 1. Increased left basilar airspace opacity is likely related to atelectasis, although superimposed pneumonia and aspiration are not excluded. 2. Otherwise unchanged subpleural, basilar predominant airspace opacities and diffuse interstitial opacities likely due to COVID-19 pneumonia given patient history. Superimposed edema (noncardiogenic or cardiogenic) is not excluded. Xr Chest Portable    Result Date: 1/22/2021  EXAMINATION: ONE XRAY VIEW OF THE CHEST 1/22/2021 11:03 am      Worsening multifocal infiltrates seen throughout the lungs bilaterally concerning for multifocal pneumonia. Endotracheal tube measures 5.3 cm above the jose. Xr Chest Portable    Result Date: 1/21/2021  EXAMINATION: ONE XRAY VIEW OF THE CHEST 1/21/2021 5:00 am     1. Stable position of support lines and tubes. The left internal jugular central venous catheter terminates in the SVC but is directed laterally. 2. Persistent multifocal airspace consolidation, consistent with the history of COVID-19 pneumonia. Xr Chest Portable    Result Date: 1/20/2021  EXAMINATION: ONE XRAY VIEW OF THE CHEST 1/20/2021 5:04 am COMPARISON: Yesterday HISTORY: ORDERING SYSTEM PROVIDED HISTORY: on vent l. Support devices unchanged in position. Stable areas of COVID-19 pneumonia.           Xr Chest Portable    Result Date: 1/18/2021  sodium chloride flush  10 mL Intravenous 2 times per day    diclofenac sodium  2 g Topical BID    zinc sulfate  50 mg Oral Daily    albuterol sulfate HFA  2 puff Inhalation 4x daily    ipratropium  2 puff Inhalation 4x daily    chlorhexidine  15 mL Mouth/Throat BID    famotidine (PEPCID) injection  20 mg Intravenous BID      Infusions:    dexmedetomidine (PRECEDEX) IV infusion 0.2 mcg/kg/hr (01/29/21 1800)    midazolam Stopped (01/29/21 0940)    dextrose 100 mL/hr (01/21/21 1214)    norepinephrine Stopped (01/20/21 1446)    fentaNYL Stopped (01/29/21 0940)    sodium chloride           Objective:   Vitals: /65   Pulse 94   Temp 99.5 °F (37.5 °C) (Rectal)   Resp 23   Ht 6' 5\" (1.956 m)   Wt 274 lb 14.6 oz (124.7 kg)   SpO2 93%   BMI 32.60 kg/m²   General appearance: Patient is sedated, intubated and on ventilator  Neck: no JVD or bruit  Thyroid : Normal lobes   Lungs: Has Vesicular Breath sounds   Heart:  regular rate and rhythm  Abdomen: soft, non-tender; bowel sounds normal; no masses,  no organomegaly  Musculoskeletal: Normal  Extremities: extremities normal, , no edema  Neurologic:  Patient is sedated, intubated and on ventilator      Assessment:     Patient Active Problem List:     Gastroesophageal reflux disease without esophagitis     Hypertension in stage 3 chronic kidney disease due to type 2 diabetes mellitus (HCC)     DM (diabetes mellitus) type II, controlled, with peripheral vascular disorder (HCC)     Combined hyperlipidemia associated with type 2 diabetes mellitus (HCC)     Diabetic skin ulcer associated with type 2 diabetes mellitus (HCC)     CKD (chronic kidney disease)     History of DVT (deep vein thrombosis)- left femoral     History of pulmonary embolism     Long term current use of anticoagulants with INR goal of 2.0-3.0     COPD (chronic obstructive pulmonary disease) (HCC)     Severe recurrent major depressive disorder with psychotic features ([address] 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)     Venous (peripheral) insufficiency     Uncontrolled secondary diabetes mellitus with stage 3 CKD (GFR 30-59) (HCC)     Diabetes mellitus with skin ulcer (HCC)     WD-Ulcer of shin, left, with fat layer exposed ([address]     History of colon polyps     Personal history of PE (pulmonary embolism)     WD-Chronic venous hypertension (idiopathic) with ulcer of left lower extremity (CODE) (HCC)     WD-Chronic venous hypertension with inflammation, right     Troponin I above reference range     KIRSTEN (acute kidney injury) ([address]     Cellulitis of lower extremity     Hyponatremia     Generalized weakness     Weakness     Multifocal pneumonia     Pneumonia due to COVID-19 virus     Hyperkalemia     Acute respiratory failure with hypoxia ([address]      Plan:     1. Reviewed POC blood glucose . Labs and X ray results   2. Reviewed Current Medicines   3. On his schedule plus correction bolus regular /basal Lantus /NPH insulin regimedrugs   4. Monitor Blood glucose frequently   5. Modified  the dose of Insulin/ other medicines as needed   6. Will follow     .      Florentino Helm MD

## 2021-01-30 NOTE — PLAN OF CARE
Problem: Airway Clearance - Ineffective  Goal: Achieve or maintain patent airway  1/30/2021 1126 by Felipe Carvajal RN  Outcome: Ongoing  1/29/2021 2141 by Hailey Chamorro RN  Outcome: Ongoing     Problem: Gas Exchange - Impaired  Goal: Absence of hypoxia  1/30/2021 1126 by Felipe Carvajal RN  Outcome: Ongoing  1/29/2021 2141 by Hailey Chamorro RN  Outcome: Ongoing  Goal: Promote optimal lung function  1/30/2021 1126 by Felipe Carvajal RN  Outcome: Ongoing  1/29/2021 2141 by Hailey Chamorro RN  Outcome: Ongoing     Problem: Breathing Pattern - Ineffective  Goal: Ability to achieve and maintain a regular respiratory rate  1/30/2021 1126 by Felipe Carvajal RN  Outcome: Ongoing  1/29/2021 2141 by Hailey Chamorro RN  Outcome: Ongoing     Problem:  Body Temperature -  Risk of, Imbalanced  Goal: Ability to maintain a body temperature within defined limits  1/30/2021 1126 by Felipe Carvajal RN  Outcome: Ongoing  1/29/2021 2141 by Hailey Chamorro RN  Outcome: Ongoing  Goal: Will regain or maintain usual level of consciousness  1/30/2021 1126 by Felipe Carvajal RN  Outcome: Ongoing  1/29/2021 2141 by Hailey Chamorro RN  Outcome: Ongoing  Goal: Complications related to the disease process, condition or treatment will be avoided or minimized  1/30/2021 1126 by Felipe Carvajal RN  Outcome: Ongoing  1/29/2021 2141 by Hailey Chamorro RN  Outcome: Ongoing     Problem: Isolation Precautions - Risk of Spread of Infection  Goal: Prevent transmission of infection  1/30/2021 1126 by Felipe Carvajal RN  Outcome: Ongoing  1/29/2021 2141 by Hailey Chamorro RN  Outcome: Ongoing     Problem: Nutrition Deficits  Goal: Optimize nutritional status  1/30/2021 1126 by Felipe Carvajal RN  Outcome: Ongoing  1/29/2021 2141 by Hailey Chamorro RN  Outcome: Ongoing     Problem: Risk for Fluid Volume Deficit  Goal: Maintain normal heart rhythm  1/30/2021 1126 by Felipe Carvajal RN  Outcome: Ongoing  1/29/2021 2141 by Hailey Chamorro RN  Outcome: Ongoing Goal: Maintain absence of muscle cramping  1/30/2021 1126 by Joseph Morales RN  Outcome: Ongoing  1/29/2021 2141 by Christa Bender RN  Outcome: Ongoing  Goal: Maintain normal serum potassium, sodium, calcium, phosphorus, and pH  1/30/2021 1126 by Joseph Morales RN  Outcome: Ongoing  1/29/2021 2141 by Christa Bender RN  Outcome: Ongoing     Problem: Loneliness or Risk for Loneliness  Goal: Demonstrate positive use of time alone when socialization is not possible  1/30/2021 1126 by Joseph Morales RN  Outcome: Ongoing  1/29/2021 2141 by Christa Bender RN  Outcome: Ongoing     Problem: Fatigue  Goal: Verbalize increase energy and improved vitality  1/30/2021 1126 by Joseph Morales RN  Outcome: Ongoing  1/29/2021 2141 by Christa Bender RN  Outcome: Ongoing     Problem: Patient Education: Go to Patient Education Activity  Goal: Patient/Family Education  1/30/2021 1126 by Joseph Morales RN  Outcome: Ongoing  1/29/2021 2141 by Christa Bender RN  Outcome: Ongoing     Problem: Falls - Risk of:  Goal: Will remain free from falls  Description: Will remain free from falls  1/30/2021 1126 by Joseph Morales RN  Outcome: Ongoing  1/29/2021 2141 by Christa Bender RN  Outcome: Ongoing  Goal: Absence of physical injury  Description: Absence of physical injury  1/30/2021 1126 by Joseph Morales RN  Outcome: Ongoing  1/29/2021 2141 by Christa Bender RN  Outcome: Ongoing     Problem: Skin Integrity:  Goal: Will show no infection signs and symptoms  Description: Will show no infection signs and symptoms  1/30/2021 1126 by Joseph Morales RN  Outcome: Ongoing  1/29/2021 2141 by Christa Bender RN  Outcome: Ongoing  Goal: Absence of new skin breakdown  Description: Absence of new skin breakdown  1/30/2021 1126 by Joseph Morales RN  Outcome: Ongoing  1/29/2021 2141 by Christa Bender RN  Outcome: Ongoing     Problem: Restraint Use - Nonviolent/Non-Self-Destructive Behavior:  Goal: Absence of restraint indications Description: Absence of restraint indications  1/30/2021 1126 by Billy Villalobos RN  Outcome: Ongoing  1/29/2021 2141 by Rose Mary Barrientos RN  Outcome: Ongoing  Goal: Absence of restraint-related injury  Description: Absence of restraint-related injury  1/30/2021 1126 by Billy Villalobos RN  Outcome: Ongoing  1/29/2021 2141 by Rose Mary Barrientos RN  Outcome: Ongoing

## 2021-01-30 NOTE — PROGRESS NOTES
Ventilator settings changed to SIMV per Dr. Mavis Lara order by Marshal Whitfield, DEBRA at this time.      Electronically signed by Billy Villalobos RN on 1/30/2021 at 10:20 AM

## 2021-01-30 NOTE — PROGRESS NOTES
pulmonary      SUBJECTIVE: intubated on vent     OBJECTIVE    VITALS:  /64   Pulse 59   Temp 97 °F (36.1 °C) (Rectal)   Resp 20   Ht 6' 5\" (1.956 m)   Wt 274 lb 14.6 oz (124.7 kg)   SpO2 95%   BMI 32.60 kg/m²   HEAD AND FACE EXAM:  No throat injection, no active exudate,no thrush  NECK EXAM;No JVD, no masses, symmetrical  CHEST EXAM; Expansion equal and symmetrical, no masses  LUNG EXAM; Good breath sounds bilaterally.  There are expiratory wheezes both lungs, there are crackles at both lung bases  CARDIOVASCULAR EXAM: Positive S1 and S2, no S3 or S4, no clicks ,no murmurs  RIGHT AND LEFT LOWER EXTRIMITY EXAM: No edema, no swelling, no inflamation  CNS EXAM: Alert and oriented X3          LABS   Lab Results   Component Value Date    WBC 17.0 (H) 01/30/2021    HGB 10.1 (L) 01/30/2021    HCT 31.9 (L) 01/30/2021    MCV 93.3 01/30/2021     01/30/2021     Lab Results   Component Value Date    CREATININE 0.9 01/30/2021    BUN 72 (H) 01/30/2021     01/30/2021    K 5.8 (HH) 01/30/2021     01/30/2021    CO2 28 01/30/2021     Lab Results   Component Value Date    INR 1.05 01/30/2021    PROTIME 12.7 01/30/2021          Lab Results   Component Value Date    PHOS 3.7 01/29/2021    PHOS 3.7 01/27/2021    PHOS 3.4 01/25/2021        Recent Labs     01/28/21  0700 01/29/21  0700 01/30/21  0700   PH 7.36 7.47* 7.43   PO2ART 68* 79 82   [id number] 57.0* 40.0 45.0   O2SAT 91.7* 94.2* 94.7*         Wt Readings from Last 3 Encounters:   01/20/21 274 lb 14.6 oz (124.7 kg)   01/09/21 296 lb 4 oz (134.4 kg)   01/08/21 285 lb (129.3 kg)        EXAMINATION:   ONE XRAY VIEW OF THE CHEST       1/30/2021 6:09 am       COMPARISON:   01/29/2021 5:34 a.m.       HISTORY:   ORDERING SYSTEM PROVIDED HISTORY: ET Tube   TECHNOLOGIST PROVIDED HISTORY:   Reason for exam:->ET Tube   Reason for Exam: vent   Acuity: Acute   Type of Exam: Initial       FINDINGS: Monitor wires project over the chest.  ETT tip 4.2 cm above the jose. Enteric catheter coiled in the gastric fundus, side port below the GE   junction.  Left IJ catheter tip in the SVC.  Heart size and mediastinal   contours are stable.  Left more than right interstitial and airspace   opacities.  No pleural effusion or pneumothorax.           Impression   Stable chest with left more than right lung opacities compatible with   multifocal pneumonia.       ETT tip 4.2 cm above the jose.           Order History    Open Order Details               ASSESMENT  Ac resp failuire  covid pneumonia  pablo pneumonia        PLAN  1. cpm  2. Cont to wean fr4om vent as tolerated  3.  Weaning protocol in am    1/30/2021  Carolina Adams M.D.

## 2021-01-30 NOTE — PROGRESS NOTES
PHARMACY ANTICOAGULATION MONITORING SERVICE    Jasen Grant is a 77 y.o. male on warfarin therapy for history of DVT. Pharmacy consulted by Dr. Pallavi Conn for monitoring and adjustment of treatment. Indication for anticoagulation: Hx of DVT  INR goal: 2-3  Warfarin dose prior to admission: 5 mg daily    Pertinent Laboratory Values   Recent Labs     01/28/21  0600 01/29/21  0440 01/30/21  0400   INR 1.17 1.02 1.05   HGB 10.0* 10.1* 10.1*   HCT 33.0* 32.6* 31.9*    195 176       Assessment/Plan:  ? Possible DDI's:   o Aspirin (home med), can increase bleeding risk, clinically appropriate  o Escitalopram (home med), can increase bleeding risk, appropriate to continue while inpatient  o Enoxaparin bridge stopped with therapeutic INR; consider resuming now that INR sub-therapeutic.  o Tramadol may increase effects of warfarin (ordered PRN)  o No new DDI's noted in previous 24h  ? INR is sub-therapeutic @ 1.05  ? Patient has had dose titrated up to 9 mg daily per INR trends; continue increased dose (home dose 5 mg daily)  ? Additional dose adjustments to be made per INR trends, interacting medications, and other clinical factors  ? Pharmacy will continue to monitor and adjust warfarin therapy as indicated.     Thank you for the consult,  Marisel Cervantes, [address]  1/30/2021 5:26 PM

## 2021-01-30 NOTE — PROGRESS NOTES
Infectious Disease Progress Note  2021   Patient Name: Eric Bailon : 1945       Reason for visit: F/u COVID-19 pneumonia, acute respiratory failure? History:? Interval history noted  Intubated, sedated and on mechanical ventilation  Physical Exam:  Vital Signs: BP (!) 127/48   Pulse 59   Temp 97 °F (36.1 °C) (Rectal)   Resp 20   Ht 6' 5\" (1.956 m)   Wt 274 lb 14.6 oz (124.7 kg)   SpO2 96%   BMI 32.60 kg/m²     Gen: intubated and sedated  Skin: no stigmata of endocarditis  Wounds:  Left anterior shin superficial wound, C/D/I  HEMT: AT/NC ETT, NGT   Eyes: PERRLA. Neck: Supple. Trachea midline. No LAD. Chest:  transmitted breath sounds. Heart:   Abd: soft, non-distended, no tenderness, no hepatomegaly. Normoactive bowel sounds. Ext: no clubbing, cyanosis, or edema  Catheter Site: without erythema or tenderness  LDA: CVC: left IJ, Urethral catheter: 2021  Neuro: sedated and on the ventilator. Radiologic / Imaging / TESTING  EXAMINATION:   ONE X-RAY VIEW OF THE CHEST     2021 5:59 am     COMPARISON:   2021     HISTORY:   ORDERING SYSTEM PROVIDED HISTORY: ET Tube   TECHNOLOGIST PROVIDED HISTORY:   Reason for exam:   ET Tube   Reason for Exam: ET Tube   Acuity: Unknown   Type of Exam: Subsequent/Follow-up   Mechanism of Injury: ET Tube   Relevant Medical/Surgical History: ET Tube     FINDINGS:   Endotracheal tube and nasogastric tube are identified. There is a left   central venous catheter with the tip in the SVC. Diffuse bilateral lung airspace disease is identified most apparent within   the left lower lobe. No significant change is identified. No pneumothorax is identified. No acute osseous abnormality is appreciated. Impression:   Stable diffuse bilateral lung airspace disease most apparent within the left   lower lobe. The lines and tubes are stable.        Labs:    Recent Results (from the past 24 hour(s))   Potassium Calcium 8.1 (L) 8.3 - 10.6 MG/DL    GFR Non-African American >60 >60 mL/min/1.73m2    GFR African American >60 >60 mL/min/1.73m2   EKG 12 Lead    Collection Time: 01/30/21  5:36 AM   Result Value Ref Range    Ventricular Rate 72 BPM    Atrial Rate 72 BPM    P-R Interval 240 ms    QRS Duration 96 ms    Q-T Interval 386 ms    QTc Calculation (Bazett) 422 ms    P Axis 23 degrees    R Axis -14 degrees    T Axis 57 degrees    Diagnosis       Sinus rhythm with 1st degree AV block  Otherwise normal ECG  When compared with ECG of 16-JAN-2021 14:42,  AZ interval has increased  Vent.  rate has decreased BY  58 BPM     Blood Gas, Arterial    Collection Time: 01/30/21  7:00 AM   Result Value Ref Range    pH, Bld 7.43 7.34 - 7.45    pCO2, Arterial 45.0 32 - 45 MMHG    pO2, Arterial 82 75 - 100 MMHG    Base Exc, Mixed 4.8 (H) 0 - 1.2    HCO3, Arterial 29.9 (H) 18 - 23 MMOL/L    CO2 Content 31.3 (H) 19 - 24 MMOL/L    O2 Sat 94.7 (L) 96 - 97 %    Carbon Monoxide, Blood 2.1 0 - 5 %    Methemoglobin, Arterial 1.0 <1.5 %    Comment AC 20 500 P7 .40      CULTURE results: Invalid input(s): BLOOD CULTURE,  URINE CULTURE, SURGICAL CULTURE  SETUP DATE/TIME:  01/23/2021 1220   Susceptibility    Staph aureus mrsa (2)    Antibiotic Interpretation YOLA Status    erythromycin Resistant >=8 Final    gentamicin Sensitive <=0.5 Final    moxifloxacin Resistant >=8 Final    oxacillin Resistant >=4 Final    tetracycline Resistant >=16 Final    trimethoprim-sulfamethoxazole Resistant >=320 Final    vancomycin Sensitive 1 Final    Klebsiella pneumoniae (3)    Antibiotic Interpretation YOLA Status    ampicillin Resistant >=32 Final    ceFAZolin Sensitive <=4 Final    cefepime Sensitive <=0.12 Final    ciprofloxacin Sensitive <=0.25 Final    ertapenem Sensitive <=0.12 Final    gentamicin Sensitive <=1 Final    levofloxacin Sensitive <=0.12 Final    piperacillin-tazobactam Sensitive <=4 Final    trimethoprim-sulfamethoxazole Sensitive <=20 Final Diagnosis:  Patient Active Problem List   Diagnosis    Gastroesophageal reflux disease without esophagitis    Hypertension in stage 3 chronic kidney disease due to type 2 diabetes mellitus ([facility] [address]    DM (diabetes mellitus) type II, controlled, with peripheral vascular disorder (HCC)    Combined hyperlipidemia associated with type 2 diabetes mellitus ([facility] [address]    Diabetic skin ulcer associated with type 2 diabetes mellitus (HCC)    CKD (chronic kidney disease)    History of DVT (deep vein thrombosis)- left femoral    History of pulmonary embolism    Long term current use of anticoagulants with INR goal of 2.0-3.0    COPD (chronic obstructive pulmonary disease) (HCC)    Severe recurrent major depressive disorder with psychotic features (HCC)    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)    Venous (peripheral) insufficiency    Uncontrolled secondary diabetes mellitus with stage 3 CKD (GFR 30-59) (HCC)    Diabetes mellitus with skin ulcer ([facility] [address]    WD-Ulcer of shin, left, with fat layer exposed ([facility] [address]    History of colon polyps    Personal history of PE (pulmonary embolism)    WD-Chronic venous hypertension (idiopathic) with ulcer of left lower extremity (CODE) ([facility] [address]    WD-Chronic venous hypertension with inflammation, right    Troponin I above reference range    KIRSTEN (acute kidney injury) ([facility] [address]    Cellulitis of lower extremity    Hyponatremia    Generalized weakness    Weakness    Multifocal pneumonia    Pneumonia due to COVID-19 virus    Hyperkalemia    Acute respiratory failure with hypoxia (HCC)       Active Problems  Active Problems:    Pneumonia due to COVID-19 virus    Hyperkalemia    Acute respiratory failure with hypoxia (HCC)  Resolved Problems:    * No resolved hospital problems.  *      Impression and plan ? Summary and rationale: Patient is a 77 y.o.  male T2DM, hyperlipidemia, htn, depression, morbid obesity who was admitted 1/13/2021 for further evaluation and management of shortness of breath that started on 1/8/2021 and positive COVID test. Has critical COVID-19 pneumonia, in hyperinflammatory phase. MRSA and K pneumoniae super-imposed bacterial pneumonia  ? Clinical status: remains severe, but has some reduction in FiO2 45% unchanged, leukocytosis has resolved. Overall is improving. Leukocytosis present, may be steroid induced-we will check other inflammatory markers. On SIMV, neurologically, yet to make any purposeful movement. ? MRSA nares positive. On vancomycin. IL-6; 20.7, Aspergillus Ag, BDG negative  ? Therapeutic:  o Ongoing antibiotics: vancomycin 1/12-, ceftriaxone 1/23  o Antiviral agent: remdesivir 1/13-1/16  o Anti-inflammatory agents:  ? Dexamethasone:1/12-19  ? Solu-Medrol: 1/17. 1/19-(start to taper Solu-Medrol)  o Completed antibiotics: meropenem 1/23-1/26  o Convalescent plasma receipt:  o Other agents:   ? Diagnostic: Resumed trending CRP and procalcitonin  ? F/u: Blood cx 1/19, 0/2 ngtd  ?  Other:      Electronically signed by: Electronically signed by Mabel Webb MD on 1/30/2021 at 9:35 AM

## 2021-01-30 NOTE — PROGRESS NOTES
[address]    Hospitalist Progress Note      Name:  Isela Turner /Age/Sex: 1945  Swapnil Witt77 y.o. male)   MRN & CSN:  [id number] & [id number] Admission Date/Time: 2021 12:17 AM   Location:  -A PCP: Sulma Anderson MD         Hospital Day: 18    Assessment and Plan:   Isela Turner is a 77 y.o.  male  who presents with shortness of breath, and was transferred from [facility] to for management of COVID-19 pneumonia     Acute on chronic hypoxic respiratory failure due to COVID-19 pneumonia, possible hospital-acquired pneumonia    Intubated , Vent management per pulmonology,   Slow improvement in vent setting 500, Fio2 of 40 and PEEP of 7  Completed Remdesivir , S/p convalescent plasma 2 units 2021  On IV vancomycin, cefepime   Had SBT yesterday and will attempt again today - not sure if his mentation improves    Pulmo following     Septic shock due to COVID-19 pneumonia  MRSA and klebsiella pneumonia     Leucocytosis, Covid positive , MRSA culture positive  Respiratory cultures positive for MRSA and Klebsiella, Blood cultures negative to date  On steroids   Completed Remdesivir , S/p convalescent plasma 2 units 2021  Off pressors  Per ID, Continue vancomycin, DC meropenem, added rocephin     Acute metabolic encephalopathy     Likely COVID related, no response despite being off sedation   CT head with no acute issues  Consult Neurology       Hyperkalemia -persistent - didn't improve with Insulin/Dextrose/Calcium- will use Lokelma - check BMP in am      KIRSTEN - resolved     Right lower extremity cellulitis - On IV Abs    Chronic medical conditions - resume home medications     DM type II   Hyperlipidemia   Hypertension  Morbid obesity, encourage weight loss and exercise  Lower extremity DVT, on Coumadin, INR therapeutic  Chronic respiratory failure at 3 L of O2     Prognosis guarded  Palliative care consulted Called and updated Ms Pia Mckinney [facility]) for an update on [phone] on 1/29 - at 4:40     CODE to DNR CCA    Diet DIET TUBE FEED CONTINUOUS/CYCLIC NPO; Low Calorie High Protein (Vital HP); Nasogastric; Continuous; 55; 95; 24   DVT Prophylaxis [] Lovenox, []  Heparin, [] SCDs, []No VTE prophylaxis, patient ambulating   GI Prophylaxis [] PPI, [] H2 Blocker, [] No GI prophylaxis, patient is receiving diet/Tube Feeds   Code Status DNR-CCA   Disposition Patient requires continued admission due to sepsis/respiratory failure   MDM [] Low, [] Moderate,[x]  High     History of Present Illness: Subjective     Patient Seen & Examined at the bedside     Sedated and intubated with no response to noxious stimuli    Ten point ROS could not be reviewed due to sedation    Objective: Intake/Output Summary (Last 24 hours) at 1/30/2021 0915  Last data filed at 1/30/2021 0851  Gross per 24 hour   Intake 955.76 ml   Output 4200 ml   Net -3244.24 ml      Vitals:   Vitals:    01/30/21 0800   BP: (!) 127/48   Pulse: 59   Resp: 20   Temp: 97 °F (36.1 °C)   SpO2: 96%     Physical Exam:    GEN intubated and sedated with no response  HENT ETT and NGT in place  RESP decreased air entry with diffuse rhonchi  CARDIO/VASC -S1/S2 auscultated. Regular rate without appreciable murmurs, rubs, or gallops. Peripheral pulses equal bilaterally and palpable. No peripheral edema. GI Abdomen is soft without significant tenderness, masses, or guarding. Bowel sounds are normoactive. Rectal exam deferred.  Starkey catheter is present.   NEURO Sedated -no response to stimuli    Medications:   Medications:    insulin glargine  40 Units Subcutaneous Nightly    [START ON 1/31/2021] insulin NPH  30 Units Subcutaneous QAM    insulin regular  30 Units Subcutaneous 4 times per day    warfarin  9 mg Oral Daily    glipiZIDE  10 mg Oral BID AC    cefTRIAXone (ROCEPHIN) IV  2,000 mg Intravenous Q24H    vancomycin  1,000 mg Intravenous Q24H  insulin regular  0-30 Units Subcutaneous Q6H    methylPREDNISolone  40 mg Intravenous Q8H    metoclopramide  10 mg Intravenous Q6H    [Held by provider] alogliptin  12.5 mg Oral Daily    ARIPiprazole  15 mg Oral Daily    aspirin  81 mg Oral Daily    atorvastatin  40 mg Oral Nightly    buPROPion  300 mg Oral QAM    escitalopram  20 mg Oral Daily    metoprolol tartrate  12.5 mg Oral BID    sodium chloride flush  10 mL Intravenous 2 times per day    diclofenac sodium  2 g Topical BID    zinc sulfate  50 mg Oral Daily    albuterol sulfate HFA  2 puff Inhalation 4x daily    ipratropium  2 puff Inhalation 4x daily    chlorhexidine  15 mL Mouth/Throat BID    famotidine (PEPCID) injection  20 mg Intravenous BID      Infusions:    dexmedetomidine (PRECEDEX) IV infusion 0.4 mcg/kg/hr (01/30/21 0130)    midazolam Stopped (01/29/21 0940)    dextrose 100 mL/hr (01/21/21 1214)    norepinephrine Stopped (01/20/21 1446)    fentaNYL Stopped (01/29/21 0940)    sodium chloride       PRN Meds:     dextrose, 25 g, PRN      fentanNYL, 25 mcg, Q1H PRN      hydrALAZINE, 10 mg, Q6H PRN      glucose, 15 g, PRN      glucagon (rDNA), 1 mg, PRN      dextrose, 100 mL/hr, PRN      microfibrillar collagen, , PRN      polyvinyl alcohol, 1 drop, Q4H PRN    And      artificial tears, , PRN      sodium chloride flush, 10 mL, PRN      promethazine, 12.5 mg, Q6H PRN    Or      ondansetron, 4 mg, Q6H PRN      polyethylene glycol, 17 g, Daily PRN      acetaminophen, 650 mg, Q6H PRN    Or      acetaminophen, 650 mg, Q6H PRN      traMADol, 50 mg, Q6H PRN    Or      traMADol, 100 mg, Q6H PRN      diphenhydrAMINE, 25 mg, Q6H PRN      sodium chloride, , PRN          Electronically signed by Brooklyn Bui MD on 1/30/2021 at 9:15 AM

## 2021-01-30 NOTE — PROGRESS NOTES
Progress Note( Dr. Mitchel To)    Subjective:   Admit Date: 1/13/2021  PCP: Edilia Murillo MD          Admitted For : Initially admitted to [facility] and transferred here on January 13, 2021 for acute on chronic respiratory failure with hypoxia and patient has Covid pneumonia    Consulted For:  Better control of blood glucose    Interval History: Patient is sedated, intubated and on ventilator   tube feeding is on and off pending upon residual he has      Intake/Output Summary (Last 24 hours) at 1/30/2021 1631  Last data filed at 1/30/2021 1300  Gross per 24 hour   Intake 1005.76 ml   Output 4200 ml   Net -3194.24 ml       DATA    CBC:   Recent Labs     01/28/21  0600 01/29/21  0440 01/30/21  0400   WBC 10.5 11.0* 17.0*   HGB 10.0* 10.1* 10.1*    195 176    CMP:  Recent Labs     01/29/21  0440 01/29/21  1347 01/30/21  0400 01/30/21  1149     --  140 138   K 6.1* 5.9* 5.8* 5.6*     --  105 104   CO2 26  --  28 26   BUN 72*  --  72* 66*   CREATININE 0.9  --  0.9 0.9   CALCIUM 8.2*  --  8.1* 8.2*     Lipids:   Lab Results   Component Value Date    CHOL 107 11/22/2019    CHOL 175 05/05/2015    HDL 46 11/22/2019    TRIG 85 01/18/2021     Glucose:  Recent Labs     01/29/21  2054 01/29/21  2341 01/30/21  1147   POCGLU 79 70 110*     [id number]:  Lab Results   Component Value Date    LABA1C 11.6 01/19/2021     High Sensitivity TSH:   Lab Results   Component Value Date    TSHHS 1.770 11/21/2019     Free T3: No results found for: FT3  Free T4:  Lab Results   Component Value Date    T4FREE 1.14 11/21/2019       Xr Chest Portable    Result Date: 1/23/2021  EXAMINATION: ONE XRAY VIEW OF THE CHEST 1/23/2021 5:04 am   . 1. Increased left basilar airspace opacity is likely related to atelectasis, although superimposed pneumonia and aspiration are not excluded. 2. Otherwise unchanged subpleural, basilar predominant airspace opacities and diffuse interstitial opacities likely due to COVID-19 pneumonia given patient history. Superimposed edema (noncardiogenic or cardiogenic) is not excluded. Xr Chest Portable    Result Date: 1/22/2021  EXAMINATION: ONE XRAY VIEW OF THE CHEST 1/22/2021 11:03 am      Worsening multifocal infiltrates seen throughout the lungs bilaterally concerning for multifocal pneumonia. Endotracheal tube measures 5.3 cm above the jose. Xr Chest Portable    Result Date: 1/21/2021  EXAMINATION: ONE XRAY VIEW OF THE CHEST 1/21/2021 5:00 am     1. Stable position of support lines and tubes. The left internal jugular central venous catheter terminates in the SVC but is directed laterally. 2. Persistent multifocal airspace consolidation, consistent with the history of COVID-19 pneumonia. Xr Chest Portable    Result Date: 1/20/2021  EXAMINATION: ONE XRAY VIEW OF THE CHEST 1/20/2021 5:04 am COMPARISON: Yesterday HISTORY: ORDERING SYSTEM PROVIDED HISTORY: on vent l. Support devices unchanged in position. Stable areas of COVID-19 pneumonia.           Xr Chest Portable    Result Date: 1/18/2021 EXAMINATION: ONE XRAY VIEW OF THE CHEST 1/18/2021 9:52 am COMPARISON: 01/18/2021, 01/16/2021 HISTORY: ORDERING SYSTEM PROVIDED HISTORY: ETT placement TECHNOLOGIST PROVIDED HISTORY: Reason for exam:->ETT placement Reason for exam:->verify ETT placement per Dr. Richi Birmingham, how many Cm above the jose? Reason for Exam: 1 FINDINGS: Endotracheal tube terminates 6.5 cm above the jose. Enteric tube courses below the diaphragm. Left internal jugular catheter with tip at the superior SVC directed laterally. Hazy ground-glass opacity areas of consolidation scattered through the lungs are improving from prior exams given differences in technique. No pneumothorax or pleural effusion. Borderline cardiomegaly is stable. Endotracheal tube terminates 6.5 cm above the jose. Moderate changes of COVID-19 pneumonia throughout the lungs with improvement since 01/16/2021. Xr Chest Portable    Result Date: 1/18/2021  EXAMINATION: ONE XRAY VIEW OF THE CHEST 1/18/2021 4:21 am COMPARISON: 01/17/2021 HISTORY: ORDERING SYSTEM PROVIDED HISTORY: on vent . 1. Diffuse bilateral lung infiltrates, likely related to pulmonary edema versus pneumonia.            Scheduled Medicines   Medications:    insulin glargine  40 Units Subcutaneous Nightly    [START ON 1/31/2021] insulin NPH  30 Units Subcutaneous QAM    insulin regular  30 Units Subcutaneous 4 times per day    methylPREDNISolone  40 mg Intravenous Q12H    warfarin  9 mg Oral Daily    glipiZIDE  10 mg Oral BID AC    cefTRIAXone (ROCEPHIN) IV  2,000 mg Intravenous Q24H    vancomycin  1,000 mg Intravenous Q24H    insulin regular  0-30 Units Subcutaneous Q6H    metoclopramide  10 mg Intravenous Q6H    [Held by provider] alogliptin  12.5 mg Oral Daily    ARIPiprazole  15 mg Oral Daily    aspirin  81 mg Oral Daily    atorvastatin  40 mg Oral Nightly    buPROPion  300 mg Oral QAM    escitalopram  20 mg Oral Daily    metoprolol tartrate  12.5 mg Oral BID  sodium chloride flush  10 mL Intravenous 2 times per day    diclofenac sodium  2 g Topical BID    zinc sulfate  50 mg Oral Daily    albuterol sulfate HFA  2 puff Inhalation 4x daily    ipratropium  2 puff Inhalation 4x daily    chlorhexidine  15 mL Mouth/Throat BID    famotidine (PEPCID) injection  20 mg Intravenous BID      Infusions:    dexmedetomidine (PRECEDEX) IV infusion 0.2 mcg/kg/hr (01/30/21 1622)    midazolam Stopped (01/29/21 0940)    dextrose 100 mL/hr (01/21/21 1214)    norepinephrine Stopped (01/20/21 1446)    fentaNYL Stopped (01/29/21 0940)    sodium chloride           Objective:   Vitals: BP (!) 116/43   Pulse 77   Temp 99.5 °F (37.5 °C) (Rectal)   Resp 18   Ht 6' 5\" (1.956 m)   Wt 274 lb 14.6 oz (124.7 kg)   SpO2 93%   BMI 32.60 kg/m²   General appearance: Patient is sedated, intubated and on ventilator  Neck: no JVD or bruit  Thyroid : Normal lobes   Lungs: Has Vesicular Breath sounds   Heart:  regular rate and rhythm  Abdomen: soft, non-tender; bowel sounds normal; no masses,  no organomegaly  Musculoskeletal: Normal  Extremities: extremities normal, , no edema  Neurologic:  Patient is sedated, intubated and on ventilator      Assessment:     Patient Active Problem List:     Gastroesophageal reflux disease without esophagitis     Hypertension in stage 3 chronic kidney disease due to type 2 diabetes mellitus (HCC)     DM (diabetes mellitus) type II, controlled, with peripheral vascular disorder (HCC)     Combined hyperlipidemia associated with type 2 diabetes mellitus (HCC)     Diabetic skin ulcer associated with type 2 diabetes mellitus (HCC)     CKD (chronic kidney disease)     History of DVT (deep vein thrombosis)- left femoral     History of pulmonary embolism     Long term current use of anticoagulants with INR goal of 2.0-3.0     COPD (chronic obstructive pulmonary disease) (HCC)     Severe recurrent major depressive disorder with psychotic features ([facility] [address] 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)     Venous (peripheral) insufficiency     Uncontrolled secondary diabetes mellitus with stage 3 CKD (GFR 30-59) (HCC)     Diabetes mellitus with skin ulcer (HCC)     WD-Ulcer of shin, left, with fat layer exposed ([address]     History of colon polyps     Personal history of PE (pulmonary embolism)     WD-Chronic venous hypertension (idiopathic) with ulcer of left lower extremity (CODE) (HCC)     WD-Chronic venous hypertension with inflammation, right     Troponin I above reference range     KIRSTEN (acute kidney injury) ([address]     Cellulitis of lower extremity     Hyponatremia     Generalized weakness     Weakness     Multifocal pneumonia     Pneumonia due to COVID-19 virus     Hyperkalemia     Acute respiratory failure with hypoxia ([address]      Plan:     1. Reviewed POC blood glucose . Labs and X ray results   2. Reviewed Current Medicines   3. On his schedule plus correction bolus regular /basal Lantus /NPH insulin regimedrugs   4. Monitor Blood glucose frequently   5. Modified  the dose of Insulin/ other medicines as needed   6. Will follow     .      Kyle Shah MD

## 2021-01-31 ENCOUNTER — APPOINTMENT (OUTPATIENT)
Dept: GENERAL RADIOLOGY | Age: 76
DRG: 870 | End: 2021-01-31
Attending: INTERNAL MEDICINE
Payer: MEDICARE

## 2021-01-31 LAB
ANION GAP SERPL CALCULATED.3IONS-SCNC: 7 MMOL/L (ref 4–16)
BASE EXCESS MIXED: 1.9 (ref 0–1.2)
BASOPHILS ABSOLUTE: 0 K/CU MM
BASOPHILS RELATIVE PERCENT: 0.1 % (ref 0–1)
BUN BLDV-MCNC: 67 MG/DL (ref 6–23)
CALCIUM SERPL-MCNC: 7.8 MG/DL (ref 8.3–10.6)
CARBON MONOXIDE, BLOOD: 2.1 % (ref 0–5)
CHLORIDE BLD-SCNC: 105 MMOL/L (ref 99–110)
CO2 CONTENT: 27.9 MMOL/L (ref 19–24)
CO2: 26 MMOL/L (ref 21–32)
COMMENT: ABNORMAL
CREAT SERPL-MCNC: 0.9 MG/DL (ref 0.9–1.3)
DIFFERENTIAL TYPE: ABNORMAL
EOSINOPHILS ABSOLUTE: 0 K/CU MM
EOSINOPHILS RELATIVE PERCENT: 0 % (ref 0–3)
GFR AFRICAN AMERICAN: >60 ML/MIN/1.73M2
GFR NON-AFRICAN AMERICAN: >60 ML/MIN/1.73M2
GLUCOSE BLD-MCNC: 107 MG/DL (ref 70–99)
GLUCOSE BLD-MCNC: 173 MG/DL (ref 70–99)
GLUCOSE BLD-MCNC: 181 MG/DL (ref 70–99)
GLUCOSE BLD-MCNC: 197 MG/DL (ref 70–99)
GLUCOSE BLD-MCNC: 210 MG/DL (ref 70–99)
GLUCOSE BLD-MCNC: 83 MG/DL (ref 70–99)
GLUCOSE BLD-MCNC: 95 MG/DL (ref 70–99)
HCO3 ARTERIAL: 26.6 MMOL/L (ref 18–23)
HCT VFR BLD CALC: 32.4 % (ref 42–52)
HEMOGLOBIN: 10.2 GM/DL (ref 13.5–18)
HIGH SENSITIVE C-REACTIVE PROTEIN: 4.3 MG/L
IMMATURE NEUTROPHIL %: 1.7 % (ref 0–0.43)
INR BLD: 1.11 INDEX
LYMPHOCYTES ABSOLUTE: 1.2 K/CU MM
LYMPHOCYTES RELATIVE PERCENT: 8.1 % (ref 24–44)
MCH RBC QN AUTO: 29.5 PG (ref 27–31)
MCHC RBC AUTO-ENTMCNC: 31.5 % (ref 32–36)
MCV RBC AUTO: 93.6 FL (ref 78–100)
METHEMOGLOBIN ARTERIAL: 1.4 %
MONOCYTES ABSOLUTE: 0.7 K/CU MM
MONOCYTES RELATIVE PERCENT: 4.7 % (ref 0–4)
NUCLEATED RBC %: 0.1 %
O2 SATURATION: 94.9 % (ref 96–97)
PCO2 ARTERIAL: 41 MMHG (ref 32–45)
PDW BLD-RTO: 17.4 % (ref 11.7–14.9)
PH BLOOD: 7.42 (ref 7.34–7.45)
PLATELET # BLD: 185 K/CU MM (ref 140–440)
PMV BLD AUTO: 13.4 FL (ref 7.5–11.1)
PO2 ARTERIAL: 87 MMHG (ref 75–100)
POTASSIUM SERPL-SCNC: 4.4 MMOL/L (ref 3.5–5.1)
PROCALCITONIN: 0.08
PROTHROMBIN TIME: 13.4 SECONDS (ref 11.7–14.5)
RBC # BLD: 3.46 M/CU MM (ref 4.6–6.2)
SEGMENTED NEUTROPHILS ABSOLUTE COUNT: 12.5 K/CU MM
SEGMENTED NEUTROPHILS RELATIVE PERCENT: 85.4 % (ref 36–66)
SODIUM BLD-SCNC: 138 MMOL/L (ref 135–145)
TOTAL IMMATURE NEUTOROPHIL: 0.25 K/CU MM
TOTAL NUCLEATED RBC: 0 K/CU MM
WBC # BLD: 14.6 K/CU MM (ref 4–10.5)

## 2021-01-31 PROCEDURE — 6370000000 HC RX 637 (ALT 250 FOR IP): Performed by: INTERNAL MEDICINE

## 2021-01-31 PROCEDURE — 6360000002 HC RX W HCPCS: Performed by: INTERNAL MEDICINE

## 2021-01-31 PROCEDURE — 82803 BLOOD GASES ANY COMBINATION: CPT

## 2021-01-31 PROCEDURE — 6360000002 HC RX W HCPCS: Performed by: STUDENT IN AN ORGANIZED HEALTH CARE EDUCATION/TRAINING PROGRAM

## 2021-01-31 PROCEDURE — 82962 GLUCOSE BLOOD TEST: CPT

## 2021-01-31 PROCEDURE — 80048 BASIC METABOLIC PNL TOTAL CA: CPT

## 2021-01-31 PROCEDURE — 6360000002 HC RX W HCPCS: Performed by: NURSE PRACTITIONER

## 2021-01-31 PROCEDURE — 94003 VENT MGMT INPAT SUBQ DAY: CPT

## 2021-01-31 PROCEDURE — 6370000000 HC RX 637 (ALT 250 FOR IP): Performed by: NURSE PRACTITIONER

## 2021-01-31 PROCEDURE — 2580000003 HC RX 258: Performed by: INTERNAL MEDICINE

## 2021-01-31 PROCEDURE — 2580000003 HC RX 258: Performed by: NURSE PRACTITIONER

## 2021-01-31 PROCEDURE — 84145 PROCALCITONIN (PCT): CPT

## 2021-01-31 PROCEDURE — 85610 PROTHROMBIN TIME: CPT

## 2021-01-31 PROCEDURE — 2500000003 HC RX 250 WO HCPCS: Performed by: NURSE PRACTITIONER

## 2021-01-31 PROCEDURE — 94640 AIRWAY INHALATION TREATMENT: CPT

## 2021-01-31 PROCEDURE — 2000000000 HC ICU R&B

## 2021-01-31 PROCEDURE — 71045 X-RAY EXAM CHEST 1 VIEW: CPT

## 2021-01-31 PROCEDURE — 85025 COMPLETE CBC W/AUTO DIFF WBC: CPT

## 2021-01-31 PROCEDURE — 94761 N-INVAS EAR/PLS OXIMETRY MLT: CPT

## 2021-01-31 PROCEDURE — 86141 C-REACTIVE PROTEIN HS: CPT

## 2021-01-31 PROCEDURE — 89220 SPUTUM SPECIMEN COLLECTION: CPT

## 2021-01-31 PROCEDURE — 2700000000 HC OXYGEN THERAPY PER DAY

## 2021-01-31 PROCEDURE — 31720 CLEARANCE OF AIRWAYS: CPT

## 2021-01-31 RX ADMIN — VANCOMYCIN HYDROCHLORIDE 1000 MG: 1 INJECTION, POWDER, LYOPHILIZED, FOR SOLUTION INTRAVENOUS at 11:56

## 2021-01-31 RX ADMIN — LEVETIRACETAM 500 MG: 100 INJECTION, SOLUTION INTRAVENOUS at 23:01

## 2021-01-31 RX ADMIN — WARFARIN SODIUM 9 MG: 5 TABLET ORAL at 17:40

## 2021-01-31 RX ADMIN — ALBUTEROL SULFATE 2 PUFF: 90 AEROSOL, METERED RESPIRATORY (INHALATION) at 11:18

## 2021-01-31 RX ADMIN — ALBUTEROL SULFATE 2 PUFF: 90 AEROSOL, METERED RESPIRATORY (INHALATION) at 19:58

## 2021-01-31 RX ADMIN — SODIUM CHLORIDE 0.4 MCG/KG/HR: 9 INJECTION, SOLUTION INTRAVENOUS at 11:56

## 2021-01-31 RX ADMIN — METOPROLOL TARTRATE 12.5 MG: 25 TABLET, FILM COATED ORAL at 20:13

## 2021-01-31 RX ADMIN — HYDRALAZINE HYDROCHLORIDE 10 MG: 20 INJECTION, SOLUTION INTRAMUSCULAR; INTRAVENOUS at 04:05

## 2021-01-31 RX ADMIN — SODIUM CHLORIDE, PRESERVATIVE FREE 10 ML: 5 INJECTION INTRAVENOUS at 19:49

## 2021-01-31 RX ADMIN — INSULIN HUMAN 10 UNITS: 100 INJECTION, SOLUTION PARENTERAL at 05:32

## 2021-01-31 RX ADMIN — Medication 2 PUFF: at 19:58

## 2021-01-31 RX ADMIN — DICLOFENAC SODIUM 2 G: 10 GEL TOPICAL at 08:00

## 2021-01-31 RX ADMIN — FAMOTIDINE 20 MG: 10 INJECTION, SOLUTION INTRAVENOUS at 07:59

## 2021-01-31 RX ADMIN — METOCLOPRAMIDE 10 MG: 5 INJECTION, SOLUTION INTRAMUSCULAR; INTRAVENOUS at 07:59

## 2021-01-31 RX ADMIN — CEFTRIAXONE SODIUM 2000 MG: 2 INJECTION, POWDER, FOR SOLUTION INTRAMUSCULAR; INTRAVENOUS at 11:56

## 2021-01-31 RX ADMIN — METOCLOPRAMIDE 10 MG: 5 INJECTION, SOLUTION INTRAMUSCULAR; INTRAVENOUS at 20:07

## 2021-01-31 RX ADMIN — ZINC SULFATE 220 MG (50 MG) CAPSULE 50 MG: CAPSULE at 07:59

## 2021-01-31 RX ADMIN — INSULIN HUMAN 30 UNITS: 100 INJECTION, SOLUTION PARENTERAL at 23:03

## 2021-01-31 RX ADMIN — WHITE PETROLATUM: .15; .85 OINTMENT OPHTHALMIC at 08:23

## 2021-01-31 RX ADMIN — LEVETIRACETAM 500 MG: 100 INJECTION, SOLUTION INTRAVENOUS at 09:56

## 2021-01-31 RX ADMIN — ALBUTEROL SULFATE 2 PUFF: 90 AEROSOL, METERED RESPIRATORY (INHALATION) at 08:11

## 2021-01-31 RX ADMIN — DICLOFENAC SODIUM 2 G: 10 GEL TOPICAL at 20:07

## 2021-01-31 RX ADMIN — Medication 2 PUFF: at 08:11

## 2021-01-31 RX ADMIN — INSULIN HUMAN 30 UNITS: 100 INJECTION, SUSPENSION SUBCUTANEOUS at 08:21

## 2021-01-31 RX ADMIN — METHYLPREDNISOLONE SODIUM SUCCINATE 40 MG: 40 INJECTION, POWDER, FOR SOLUTION INTRAMUSCULAR; INTRAVENOUS at 20:06

## 2021-01-31 RX ADMIN — ENOXAPARIN SODIUM 120 MG: 120 INJECTION SUBCUTANEOUS at 08:21

## 2021-01-31 RX ADMIN — METOCLOPRAMIDE 10 MG: 5 INJECTION, SOLUTION INTRAMUSCULAR; INTRAVENOUS at 15:32

## 2021-01-31 RX ADMIN — SODIUM CHLORIDE 0.2 MCG/KG/HR: 9 INJECTION, SOLUTION INTRAVENOUS at 01:03

## 2021-01-31 RX ADMIN — METOPROLOL TARTRATE 12.5 MG: 25 TABLET, FILM COATED ORAL at 07:59

## 2021-01-31 RX ADMIN — INSULIN HUMAN 30 UNITS: 100 INJECTION, SOLUTION PARENTERAL at 05:29

## 2021-01-31 RX ADMIN — METOCLOPRAMIDE 10 MG: 5 INJECTION, SOLUTION INTRAMUSCULAR; INTRAVENOUS at 03:58

## 2021-01-31 RX ADMIN — FAMOTIDINE 20 MG: 10 INJECTION, SOLUTION INTRAVENOUS at 20:06

## 2021-01-31 RX ADMIN — ATORVASTATIN CALCIUM 40 MG: 40 TABLET, FILM COATED ORAL at 20:07

## 2021-01-31 RX ADMIN — ARIPIPRAZOLE 15 MG: 10 TABLET ORAL at 07:58

## 2021-01-31 RX ADMIN — CHLORHEXIDINE GLUCONATE 0.12% ORAL RINSE 15 ML: 1.2 LIQUID ORAL at 20:13

## 2021-01-31 RX ADMIN — GLIPIZIDE 10 MG: 5 TABLET ORAL at 07:58

## 2021-01-31 RX ADMIN — Medication 2 PUFF: at 11:18

## 2021-01-31 RX ADMIN — CHLORHEXIDINE GLUCONATE 0.12% ORAL RINSE 15 ML: 1.2 LIQUID ORAL at 07:58

## 2021-01-31 RX ADMIN — ASPIRIN 81 MG CHEWABLE TABLET 81 MG: 81 TABLET CHEWABLE at 07:58

## 2021-01-31 RX ADMIN — ENOXAPARIN SODIUM 120 MG: 120 INJECTION SUBCUTANEOUS at 20:06

## 2021-01-31 RX ADMIN — Medication 2 PUFF: at 16:07

## 2021-01-31 RX ADMIN — SODIUM CHLORIDE 0.4 MCG/KG/HR: 9 INJECTION, SOLUTION INTRAVENOUS at 19:47

## 2021-01-31 RX ADMIN — POLYVINYL ALCOHOL 1 DROP: 14 SOLUTION/ DROPS OPHTHALMIC at 08:23

## 2021-01-31 RX ADMIN — ESCITALOPRAM OXALATE 20 MG: 10 TABLET ORAL at 07:59

## 2021-01-31 RX ADMIN — INSULIN HUMAN 5 UNITS: 100 INJECTION, SOLUTION PARENTERAL at 23:02

## 2021-01-31 RX ADMIN — SODIUM CHLORIDE, PRESERVATIVE FREE 10 ML: 5 INJECTION INTRAVENOUS at 08:00

## 2021-01-31 RX ADMIN — ALBUTEROL SULFATE 2 PUFF: 90 AEROSOL, METERED RESPIRATORY (INHALATION) at 16:07

## 2021-01-31 RX ADMIN — METHYLPREDNISOLONE SODIUM SUCCINATE 40 MG: 40 INJECTION, POWDER, FOR SOLUTION INTRAMUSCULAR; INTRAVENOUS at 07:59

## 2021-01-31 ASSESSMENT — PULMONARY FUNCTION TESTS
PIF_VALUE: 20
PIF_VALUE: 17
PIF_VALUE: 16
PIF_VALUE: 17
PIF_VALUE: 16
PIF_VALUE: 17
PIF_VALUE: 16
PIF_VALUE: 15
PIF_VALUE: 20
PIF_VALUE: 20
PIF_VALUE: 16
PIF_VALUE: 13
PIF_VALUE: 16
PIF_VALUE: 17
PIF_VALUE: 20

## 2021-01-31 NOTE — PROGRESS NOTES
pulmonary      SUBJECTIVE:  Intubated on vent     OBJECTIVE    VITALS:  /60   Pulse 65   Temp 98 °F (36.7 °C) (Oral)   Resp 17   Ht 6' 5\" (1.956 m)   Wt 274 lb 14.6 oz (124.7 kg)   SpO2 95%   BMI 32.60 kg/m²   HEAD AND FACE EXAM:  No throat injection, no active exudate,no thrush  NECK EXAM;No JVD, no masses, symmetrical  CHEST EXAM; Expansion equal and symmetrical, no masses  LUNG EXAM; Good breath sounds bilaterally.  There are expiratory wheezes both lungs, there are crackles at both lung bases  CARDIOVASCULAR EXAM: Positive S1 and S2, no S3 or S4, no clicks ,no murmurs  RIGHT AND LEFT LOWER EXTRIMITY EXAM: No edema, no swelling, no inflamation            LABS   Lab Results   Component Value Date    WBC 14.6 (H) 01/31/2021    HGB 10.2 (L) 01/31/2021    HCT 32.4 (L) 01/31/2021    MCV 93.6 01/31/2021     01/31/2021     Lab Results   Component Value Date    CREATININE 0.9 01/31/2021    BUN 67 (H) 01/31/2021     01/31/2021    K 4.4 01/31/2021     01/31/2021    CO2 26 01/31/2021     Lab Results   Component Value Date    INR 1.11 01/31/2021    PROTIME 13.4 01/31/2021          Lab Results   Component Value Date    PHOS 3.7 01/29/2021    PHOS 3.7 01/27/2021    PHOS 3.4 01/25/2021        Recent Labs     01/29/21  0700 01/30/21  0700 01/31/21  0700   PH 7.47* 7.43 7.42   PO2ART 79 82 87   VJR0LLB 40.0 45.0 41.0   O2SAT 94.2* 94.7* 94.9*         Wt Readings from Last 3 Encounters:   01/20/21 274 lb 14.6 oz (124.7 kg)   01/09/21 296 lb 4 oz (134.4 kg)   01/08/21 285 lb (129.3 kg)        EXAMINATION:   ONE XRAY VIEW OF THE CHEST       1/31/2021 6:37 am       COMPARISON:   01/30/2021.       HISTORY:   ORDERING SYSTEM PROVIDED HISTORY: ET Tube   TECHNOLOGIST PROVIDED HISTORY:   Reason for exam:->ET Tube   Reason for Exam: VENT   Acuity: Acute   Type of Exam: Initial       FINDINGS:   The endotracheal tube, enteric tube and left jugular central venous catheter appear unchanged in position.  The cardiac silhouette is mildly enlarged, but   unchanged the prior exam.  Patchy opacification throughout the lungs   bilaterally appears unchanged.  No convincing pleural effusion or   pneumothorax.           Impression   1. Support devices appear unchanged in position. 2. No significant change in the appearance of the chest.               ASSESMENT  Ac resp failure  covid pneumonia  pablo pneumonia        PLAN  1. cpm  2. Cont full vent support  3.  Neurology eval in progress    1/31/2021  Pipo Schilling M.D.

## 2021-01-31 NOTE — PROGRESS NOTES
[address]    Hospitalist Progress Note      Name:  Isela Turner /Age/Sex: 1945  [de-identified]77 y.o. male)   MRN & CSN:  [id number] & [id number] Admission Date/Time: 2021 12:17 AM   Location:  -A PCP: Sulma Anderson MD         Hospital Day:     Assessment and Plan:   Isela Turner is a 77 y.o.  male  who presents with shortness of breath, and was transferred from [facility] to for management of COVID-19 pneumonia     Acute on chronic hypoxic respiratory failure due to COVID-19 pneumonia, possible hospital-acquired pneumonia    Intubated , Vent management per pulmonology,   Slow improvement in vent setting 500, Fio2 of 40 and PEEP of 7  Completed Remdesivir , S/p convalescent plasma 2 units 2021  On IV vancomycin, cefepime   Had SBT daily but his mentation has not changed     Pulmo following     Septic shock due to COVID-19 pneumonia  MRSA and klebsiella pneumonia     Leucocytosis, Covid positive , MRSA culture positive  Respiratory cultures positive for MRSA and Klebsiella, Blood cultures negative to date  On steroids   Completed Remdesivir , S/p convalescent plasma 2 units 2021  Off pressors  Per ID, Continue vancomycin, DC meropenem, added rocephin     Acute metabolic encephalopathy   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     Likely COVID related, no response despite being off sedation   Patient able to blink but no other motor or sensory response    CT head with no acute issues, MRI pending   Was empirically started on Keppra for possible seizure   Consulted Neurology        Hyperkalemia -persistent - didn't improve with Insulin/Dextrose/Calcium- will use Lokelma - check BMP in am      KIRSTEN - resolved     Right lower extremity cellulitis - On IV Abs    Chronic medical conditions - resume home medications     DM type II   Hyperlipidemia   Hypertension  Morbid obesity, encourage weight loss and exercise  Lower extremity DVT, on Coumadin, INR therapeutic Chronic respiratory failure at 3 L of O2     Prognosis guarded  Palliative care consulted    Called and updated Ms Bessie Cha [facility]) for an update on [phone] on 1/29 - at 4:40     CODE status - DNR CCA    Diet DIET TUBE FEED CONTINUOUS/CYCLIC NPO; Low Calorie High Protein (Vital HP); Nasogastric; Continuous; 55; 95; 24   DVT Prophylaxis [] Lovenox, []  Heparin, [] SCDs, []No VTE prophylaxis, patient ambulating   GI Prophylaxis [] PPI, [] H2 Blocker, [] No GI prophylaxis, patient is receiving diet/Tube Feeds   Code Status DNR-CCA   Disposition Patient requires continued admission due to sepsis/respiratory failure   MDM [] Low, [] Moderate,[x]  High     History of Present Illness: Subjective     Patient Seen & Examined at the bedside     Sedated and intubated with no response to noxious stimuli - but has opened his eyes and responds by blinking his eyes     Ten point ROS could not be reviewed due to sedation    Objective: Intake/Output Summary (Last 24 hours) at 1/31/2021 0955  Last data filed at 1/31/2021 2491  Gross per 24 hour   Intake 2255.89 ml   Output 2400 ml   Net -144.11 ml      Vitals:   Vitals:    01/31/21 0900   BP: 113/60   Pulse: 65   Resp:    Temp:    SpO2: 95%     Physical Exam:    GEN intubated and sedated with no response  HENT ETT and NGT in place  RESP decreased air entry with diffuse rhonchi  CARDIO/VASC -S1/S2 auscultated. Regular rate without appreciable murmurs, rubs, or gallops. Peripheral pulses equal bilaterally and palpable. No peripheral edema. GI Abdomen is soft without significant tenderness, masses, or guarding. Bowel sounds are normoactive. Rectal exam deferred.  Starkey catheter is present.   NEURO Sedated -no response to stimuli - has blink reflex, babinski flat     Medications:   Medications:    enoxaparin  1 mg/kg Subcutaneous BID    insulin glargine  40 Units Subcutaneous Nightly    insulin NPH  30 Units Subcutaneous QAM Electronically signed by Luis Rooney MD on 1/31/2021 at 9:55 AM

## 2021-01-31 NOTE — PROGRESS NOTES
Progress Note( Dr. Sultana Ham)    Subjective:   Admit Date: 1/13/2021  PCP: Cristian Hardin MD          Admitted For : Initially admitted to [facility] and transferred here on January 13, 2021 for acute on chronic respiratory failure with hypoxia and patient has Covid pneumonia    Consulted For:  Better control of blood glucose    Interval History: Patient is sedated, intubated and on ventilator   tube feeding is on and off pending upon residual he has      Intake/Output Summary (Last 24 hours) at 1/31/2021 1452  Last data filed at 1/31/2021 0812  Gross per 24 hour   Intake 2205.89 ml   Output 2400 ml   Net -194.11 ml       DATA    CBC:   Recent Labs     01/29/21  0440 01/30/21  0400 01/31/21  0415   WBC 11.0* 17.0* 14.6*   HGB 10.1* 10.1* 10.2*    176 185    CMP:  Recent Labs     01/30/21  0400 01/30/21  1149 01/31/21  0415    138 138   K 5.8* 5.6* 4.4    104 105   CO2 28 26 26   BUN 72* 66* 67*   CREATININE 0.9 0.9 0.9   CALCIUM 8.1* 8.2* 7.8*     Lipids:   Lab Results   Component Value Date    CHOL 107 11/22/2019    CHOL 175 05/05/2015    HDL 46 11/22/2019    TRIG 85 01/18/2021     Glucose:  Recent Labs     01/30/21  2323 01/31/21  0744 01/31/21  1150   POCGLU 132* 181* 107*     [id number]:  Lab Results   Component Value Date    LABA1C 11.6 01/19/2021     High Sensitivity TSH:   Lab Results   Component Value Date    TSHHS 1.770 11/21/2019     Free T3: No results found for: FT3  Free T4:  Lab Results   Component Value Date    T4FREE 1.14 11/21/2019       Xr Chest Portable    Result Date: 1/23/2021  EXAMINATION: ONE XRAY VIEW OF THE CHEST 1/23/2021 5:04 am   . 1. Increased left basilar airspace opacity is likely related to atelectasis, although superimposed pneumonia and aspiration are not excluded. 2. Otherwise unchanged subpleural, basilar predominant airspace opacities and diffuse interstitial opacities likely due to COVID-19 pneumonia given patient history. Superimposed edema (noncardiogenic or cardiogenic) is not excluded. Xr Chest Portable    Result Date: 1/22/2021  EXAMINATION: ONE XRAY VIEW OF THE CHEST 1/22/2021 11:03 am      Worsening multifocal infiltrates seen throughout the lungs bilaterally concerning for multifocal pneumonia. Endotracheal tube measures 5.3 cm above the jose. Xr Chest Portable    Result Date: 1/21/2021  EXAMINATION: ONE XRAY VIEW OF THE CHEST 1/21/2021 5:00 am     1. Stable position of support lines and tubes. The left internal jugular central venous catheter terminates in the SVC but is directed laterally. 2. Persistent multifocal airspace consolidation, consistent with the history of COVID-19 pneumonia. Xr Chest Portable    Result Date: 1/20/2021  EXAMINATION: ONE XRAY VIEW OF THE CHEST 1/20/2021 5:04 am COMPARISON: Yesterday HISTORY: ORDERING SYSTEM PROVIDED HISTORY: on vent l. Support devices unchanged in position. Stable areas of COVID-19 pneumonia.           Xr Chest Portable    Result Date: 1/18/2021 EXAMINATION: ONE XRAY VIEW OF THE CHEST 1/18/2021 9:52 am COMPARISON: 01/18/2021, 01/16/2021 HISTORY: ORDERING SYSTEM PROVIDED HISTORY: ETT placement TECHNOLOGIST PROVIDED HISTORY: Reason for exam:->ETT placement Reason for exam:->verify ETT placement per Dr. Janusz Hartley, how many Cm above the jose? Reason for Exam: 1 FINDINGS: Endotracheal tube terminates 6.5 cm above the jose. Enteric tube courses below the diaphragm. Left internal jugular catheter with tip at the superior SVC directed laterally. Hazy ground-glass opacity areas of consolidation scattered through the lungs are improving from prior exams given differences in technique. No pneumothorax or pleural effusion. Borderline cardiomegaly is stable. Endotracheal tube terminates 6.5 cm above the jose. Moderate changes of COVID-19 pneumonia throughout the lungs with improvement since 01/16/2021. Xr Chest Portable    Result Date: 1/18/2021  EXAMINATION: ONE XRAY VIEW OF THE CHEST 1/18/2021 4:21 am COMPARISON: 01/17/2021 HISTORY: ORDERING SYSTEM PROVIDED HISTORY: on vent . 1. Diffuse bilateral lung infiltrates, likely related to pulmonary edema versus pneumonia.            Scheduled Medicines   Medications:    enoxaparin  1 mg/kg Subcutaneous BID    insulin glargine  40 Units Subcutaneous Nightly    insulin NPH  30 Units Subcutaneous QAM    insulin regular  30 Units Subcutaneous 4 times per day    methylPREDNISolone  40 mg Intravenous Q12H    levetiracetam  500 mg Intravenous Q12H    warfarin  9 mg Oral Daily    glipiZIDE  10 mg Oral BID AC    cefTRIAXone (ROCEPHIN) IV  2,000 mg Intravenous Q24H    vancomycin  1,000 mg Intravenous Q24H    insulin regular  0-30 Units Subcutaneous Q6H    metoclopramide  10 mg Intravenous Q6H    [Held by provider] alogliptin  12.5 mg Oral Daily    [Held by provider] ARIPiprazole  15 mg Oral Daily    [Held by provider] aspirin  81 mg Oral Daily    atorvastatin  40 mg Oral Nightly  [Held by provider] buPROPion  300 mg Oral QAM    escitalopram  20 mg Oral Daily    metoprolol tartrate  12.5 mg Oral BID    sodium chloride flush  10 mL Intravenous 2 times per day    diclofenac sodium  2 g Topical BID    zinc sulfate  50 mg Oral Daily    albuterol sulfate HFA  2 puff Inhalation 4x daily    ipratropium  2 puff Inhalation 4x daily    chlorhexidine  15 mL Mouth/Throat BID    famotidine (PEPCID) injection  20 mg Intravenous BID      Infusions:    dexmedetomidine (PRECEDEX) IV infusion 0.4 mcg/kg/hr (01/31/21 1156)    midazolam Stopped (01/29/21 0940)    dextrose 100 mL/hr (01/21/21 1214)    norepinephrine Stopped (01/20/21 1446)    fentaNYL Stopped (01/29/21 0940)    sodium chloride           Objective:   Vitals: /60   Pulse 75   Temp 97.7 °F (36.5 °C) (Rectal)   Resp 17   Ht 6' 5\" (1.956 m)   Wt 274 lb 14.6 oz (124.7 kg)   SpO2 95%   BMI 32.60 kg/m²   General appearance: Patient is sedated, intubated and on ventilator  Neck: no JVD or bruit  Thyroid : Normal lobes   Lungs: Has Vesicular Breath sounds   Heart:  regular rate and rhythm  Abdomen: soft, non-tender; bowel sounds normal; no masses,  no organomegaly  Musculoskeletal: Normal  Extremities: extremities normal, , no edema  Neurologic:  Patient is sedated, intubated and on ventilator      Assessment:     Patient Active Problem List:     Gastroesophageal reflux disease without esophagitis     Hypertension in stage 3 chronic kidney disease due to type 2 diabetes mellitus (HCC)     DM (diabetes mellitus) type II, controlled, with peripheral vascular disorder ([facility] [address]     Combined hyperlipidemia associated with type 2 diabetes mellitus (HCC)     Diabetic skin ulcer associated with type 2 diabetes mellitus (HCC)     CKD (chronic kidney disease)     History of DVT (deep vein thrombosis)- left femoral     History of pulmonary embolism     Long term current use of anticoagulants with INR goal of 2.0-3.0

## 2021-01-31 NOTE — PROGRESS NOTES
01/31/21 0501   Vent Information   Equipment Changed HME   Vent Type 980   Vent Mode SIMV/VC+   Vt Ordered 500 mL   Rate Set 8 bmp   Peak Flow 0 L/min   Pressure Support 10 cmH20   FiO2  40 %   SpO2 94 %   SpO2/FiO2 ratio 235   Sensitivity 0   PEEP/CPAP 5   I Time/ I Time % 1 s   Humidification Source HME   Nitric Oxide/Epoprostenol In Use? No   Vent Patient Data   High Peep/I Pressure 0   Peak Inspiratory Pressure 16 cmH2O   Mean Airway Pressure 9.2 cmH20   Rate Measured 19 br/min   Vt Exhaled 758 mL   Minute Volume 10.5 Liters   I:E Ratio 1:1.50   Cough/Sputum   Sputum How Obtained Suctioned;Endotracheal;Oral   $Obtained Sample $Induced Sputum   Cough Productive;Moist   Frequency Occasional   Sputum Amount Small   Sputum Color Creamy; White   Tenacity Thin   Spontaneous Breathing Trial (SBT) RT Doc   Pulse 88   Additional Respiratory  Assessments   Resp 19   Position Semi-Thorpe's   Oral Care Completed? No   Alarm Settings   High Pressure Alarm 40 cmH2O   Press Low Alarm 8.5 cmH2O   Delay Alarm 20 sec(s)   Low Minute Volume Alarm 2.5 L/min   Apnea (secs) 20 secs   High Respiratory Rate 40 br/min   Low Exhaled Vt  250 mL   Patient Observation   Observations #7. 5ETT   ETT (adult)   Placement Date/Time: 01/13/21 (c) 6148   Preoxygenation: Yes  Mask Ventilation: Ventilated by mask (1)  Technique: Video laryngoscopy  Tube Size: 7.5 mm  Location: Oral  Grade View: Full view of the glottis  Insertion attempts: 1  Placement Verified B. ..    Secured at 25 cm   Measured From [address] [place] [address],[address] By [employer] tube brewer   Site Condition Dry

## 2021-01-31 NOTE — PROGRESS NOTES
Message to dr Frank Najera:  Art line has been in since 14th. do you want us to remove? matching with peripheral cuff pressure.  no longer on pressors

## 2021-01-31 NOTE — CONSULTS
Neurology Service Consult Note  [facility]  Patient Name: Jose M Watkins  : 1945        Subjective:   Reason for consult: \"not waking up off sedation\"  77 y.o. right-handed male with extensive PMH all listed below presenting to [facility] from [facility] on  for complaints of BLE weakness and pain, chronic DVTs, patient found to also be in acute respiratory distress, patient found to have acute COVID-19 pneumonia, transferred to ICU at [facility]. Patient intubated since. He has been sedated since then as well. However he has been off sedation from over 24 hours now and he is making no progress and \"not waking up\". Patient assessed over Facetime to save PPE, nursing staff provided Facetime. Patient off sedation for my exam and has only been 0.1 precedex all day. He has not had any seizure activity. He is on Coumadin and ASA. There is hope he gets extubated tomorrow but unlikely based on nursing evaluation. Plan of care discussed with nursing staff.        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 Madai Hernandez Anemia 2011    Arm fracture Elgie Francis Nicholas with also yearly DM exam    Cataract     worsening-Dr. Billings Red    Cellulitis of left lower leg     Chronic venous hypertension with ulcer ([address] 2011    CKD (chronic kidney disease) 2012    Renal u/S normal     Colon polyps     Dr. Madai Hernandez COPD (chronic obstructive pulmonary disease) ([address]     Diabetes mellitus ([address]     Diabetes mellitus ([address]     Diabetes mellitus with peripheral circulatory disorder ([address] 10/2/2015    Diabetes mellitus with skin ulcer ([address] 10/2/2015    Diabetic peripheral neuropathy ([address]     + EMG, NCS    Diabetic skin ulcer associated with type 2 diabetes mellitus ([address]     Diverticulosis 12    mild, left colon  Femoral DVT (deep venous thrombosis) (HCC) 09/2011    PARTIAL,CHRONIC-SPF HEART SURGEONS    GERD (gastroesophageal reflux disease)     Glaucoma 11/12    open angle-Dr. Melba Singer H/O cardiac catheterization 6/3/14    EF55% normal study    H/O Doppler ultrasound 03/26/15    Carotid US- no significant stenosis noted bilaterally.  H/O echocardiogram 11/21/2019    EF50-55%, Mild AR. Moderately dilated right ventricle with negative Solis's sign.  H/O mumps orchitis     as a youth    Hemorrhoids 4/23/12    Dr. Liya Sarmiento; repeat colonoscopy 3 years    History of nuclear stress test 11/21/2019    EF 60%, Normal study.  HLD (hyperlipidemia)     Hx of Doppler ultrasound 1/06/15    Lymph node seen in left groin area. No bilateral stenosis.     Hyperlipemia     Hyperlipidemia     Hypertension 1992    Hypertension     Idiopathic chronic venous hypertension of left lower extremity with ulcer and inflammation (HCC) 10/2/2015    Leg ulcer ([address] 1978-present    following at wound center, Dr Marisa Rojas No diabetic retinopathy OU 11/12    Dr. Nubia Bella chronic ulcer of left lower leg with fat layer exposed ([address] 10/2/2015    Pulmonary embolism ([address] 11/13    patient on coumadin    Sleep apnea     doesnt always use cpap dt it drys him out    SOB (shortness of breath) Oct 2011    Stress test normal.     Tendinitis 1973    plantar tendons    Type II or unspecified type diabetes mellitus with other specified manifestations, not stated as uncontrolled 2/8/2013    Unspecified venous (peripheral) insufficiency     Urticaria     WD-Cellulitis of right anterior lower leg 9/5/2019    WD-Cellulitis of right lower extremity 3/26/2020    WD-Chronic venous hypertension (idiopathic) with ulcer of left lower extremity (CODE) ([address] 6/27/2019    WD-Chronic venous hypertension with inflammation, right 9/19/2019    WD-Decubitus ulcer of left buttock, stage 3 ([address] 6/25/2020  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 to breakdown of skin ([address] 3/26/2020    WD-Open wound of hand without complication, left, initial encounter 12/12/2019    WD-Pressure injury of left buttock, stage 2 ([address] 1/2/2020    WD-Pressure injury of left buttock, stage 3 ([address] 3/12/2020    WD-Pressure injury of right buttock, stage 3 ([address] 3/12/2020    WD-Skin tear of right forearm without complication 3/81/2991    WD-Ulcer of right pretibial region, with fat layer exposed ([address] 10/17/2019    :   Past Surgical History:   Procedure Laterality Date    BREAST SURGERY  1970s    benign tumors bilaterally    CARDIAC CATHETERIZATION  6/3/14    EF55% normal study    CARPAL TUNNEL RELEASE Left 1993    CARPAL TUNNEL RELEASE      CATARACT REMOVAL Right 3/11/2013    Dr. Cara Gao Left 2/25/2013    Dr. Jo-Ann Baca  2006    polyps    COLONOSCOPY  11/21/11    villous component--f/u colonoscopy will be needed in 6 months    COLONOSCOPY  4/23/12    mild diverticulosis, internal hemorrhoids; repeat in 3 years (Dr. Kendal Mcnamara)    COLONOSCOPY  08/04/2016    mild diverticulosis, three colon polyps    HERNIA REPAIR  1970s    HERNIA REPAIR      SKIN GRAFT  1978-present    skin grafts to left ankle ulcers    SPLENECTOMY  1958    SPLENECTOMY      TONSILLECTOMY  1953    VARICOSE VEIN SURGERY Left 2009     Medications:  Scheduled Meds:   insulin glargine  40 Units Subcutaneous Nightly    [START ON 1/31/2021] insulin NPH  30 Units Subcutaneous QAM    insulin regular  30 Units Subcutaneous 4 times per day    methylPREDNISolone  40 mg Intravenous Q12H    levetiracetam  1,500 mg Intravenous Once    Followed by   Dossie  ON 1/31/2021] levetiracetam  500 mg Intravenous Q12H    warfarin  9 mg Oral Daily    glipiZIDE  10 mg Oral BID AC    cefTRIAXone (ROCEPHIN) IV  2,000 mg Intravenous Q24H    vancomycin  1,000 mg Intravenous Q24H  insulin regular  0-30 Units Subcutaneous Q6H    metoclopramide  10 mg Intravenous Q6H    [Held by provider] alogliptin  12.5 mg Oral Daily    ARIPiprazole  15 mg Oral Daily    aspirin  81 mg Oral Daily    atorvastatin  40 mg Oral Nightly    buPROPion  300 mg Oral QAM    escitalopram  20 mg Oral Daily    metoprolol tartrate  12.5 mg Oral BID    sodium chloride flush  10 mL Intravenous 2 times per day    diclofenac sodium  2 g Topical BID    zinc sulfate  50 mg Oral Daily    albuterol sulfate HFA  2 puff Inhalation 4x daily    ipratropium  2 puff Inhalation 4x daily    chlorhexidine  15 mL Mouth/Throat BID    famotidine (PEPCID) injection  20 mg Intravenous BID     Continuous Infusions:   dexmedetomidine (PRECEDEX) IV infusion 0.2 mcg/kg/hr (01/30/21 1622)    midazolam Stopped (01/29/21 0940)    dextrose 100 mL/hr (01/21/21 1214)    norepinephrine Stopped (01/20/21 1446)    fentaNYL Stopped (01/29/21 0940)    sodium chloride       PRN Meds:.dextrose, fentanNYL, hydrALAZINE, glucose, glucagon (rDNA), dextrose, microfibrillar collagen, polyvinyl alcohol **AND** artificial tears, sodium chloride flush, promethazine **OR** ondansetron, polyethylene glycol, acetaminophen **OR** acetaminophen, traMADol **OR** traMADol, diphenhydrAMINE, sodium chloride    Allergies   Allergen Reactions    Cavilon Durable Barrier [Mineral Oil-Dimeth-Coconut Oil]     Gentamycin [Gentamicin] Hives    Parabens Hives    Parabens Hives    Prinivil [Lisinopril] Swelling    Cortisone Rash    Cortisone Rash    Dilaudid [Hydromorphone Hcl] Rash    Penicillins Rash    Penicillins Rash    Sulfamethoxazole-Trimethoprim Nausea Only    Tape [Adhesive Tape] Rash     Social History     Socioeconomic History    Marital status: Single     Spouse name: Not on file    Number of children: Not on file    Years of education: Not on file    Highest education level: Not on file   Occupational History    Not on file Social Needs    Financial resource strain: Not on file    Food insecurity     Worry: Not on file     Inability: Not on file    Transportation needs     Medical: Not on file     Non-medical: Not on file   Tobacco Use    Smoking status: Former Smoker     Packs/day: 2.00     Years: 35.00     Pack years: 70.00     Types: Cigarettes     Quit date:      Years since quittin.0    Smokeless tobacco: Never Used    Tobacco comment: chew--30 years. Reviewed 2015   Substance and Sexual Activity    Alcohol use: Yes     Comment: soc    Drug use: Never    Sexual activity: Not on file   Lifestyle    Physical activity     Days per week: Not on file     Minutes per session: Not on file    Stress: Not on file   Relationships    Social connections     Talks on phone: Not on file     Gets together: Not on file     Attends Islam service: Not on file     Active member of club or organization: Not on file     Attends meetings of clubs or organizations: Not on file     Relationship status: Not on file    Intimate partner violence     Fear of current or ex partner: Not on file     Emotionally abused: Not on file     Physically abused: Not on file     Forced sexual activity: Not on file   Other Topics Concern    Not on file   Social History Narrative    ** Merged History Encounter **           Family History   Problem Relation Age of Onset    Heart Disease Father     Cancer Father         prostate    High Blood Pressure Father     High Cholesterol Father     Cancer Brother     Diabetes Brother     Thyroid Disease Brother        Review of Symptoms:    Due to current state of encephalopathy patient cannot answer ROS questions.      Physical Exam:       [unfilled]     Gen: intubated, comatose   Heart: Regular   Lungs: vent  Ext: no edema, no calf tenderness b/l  Psych: no eye contact  Skin: no rashes or lesions    NEUROLOGIC EXAM:    Mental Status: comatose     Cranial Nerve Exam: CN II-XII: gaze upward, pupils equal and reactive, corneal reflex intact b/l, blink to menace b/l, face symmetrical, cannot assess tongue against ET tube       Motor Exam/Sensation/Reflexes:  Flaccid x4 with no tone and no withdrawal to pain  Could not assess reflexes over facetime     Coordination/Cerebellum:       Tremors--none      Gait and stance:      Gait: deferred      LABS:     Recent Labs     01/28/21  0600 01/28/21  0600 01/29/21  0440 01/29/21  1347 01/30/21  0400 01/30/21  1149   WBC 10.5  --  11.0*  --  17.0*  --    NA  --   --  136  --  140 138   K  --    < > 6.1* 5.9* 5.8* 5.6*   CL  --   --  104  --  105 104   CO2  --   --  26  --  28 26   BUN  --   --  72*  --  72* 66*   CREATININE 0.9  --  0.9  --  0.9 0.9   GLUCOSE  --   --  257*  --  82 102*   INR 1.17  --  1.02  --  1.05  --     < > = values in this interval not displayed. IMAGING:      CT head:  1. No convincing acute intracranial abnormality. 2. Global parenchymal volume loss with chronic microvascular ischemic   changes, which has progressed from the prior exam.   3. There may be a chronic infarct within the right inferior cerebellum. 4. Atherosclerosis of the intracranial vasculature. 5. Fluid is seen within the paranasal sinuses as well as the mastoid air   cells and right middle ear cavity. MRI brain pending      ASSESSMENT/PLAN:     3 77year old male with unresponsive behavior secondary to severe TME however will rule out \"locked in\" pontine infarction, seizure, severe deep hypoxic damage superimposed on complicated and prolonged COVID-19 PNA and severe co-morbidities. Plan of care as follows:  1. Neuro Exam:  1. Pupils reactive and equal, blinks, corneal's intact, upward gaze, blink to menace  2. No withdrawal to pain x4  2. Neurodiagnostics:  1. CT head non acute from 1/30  2. MRI brain pending  3. Cannot obtain EEG thus will prophylactic treat for seizure   4. Repeat UA pending   3.  Medications: 1. Keppra 500mg BID with loading dose  2. ASA and OAC, please give via PEG tube  3. Wean sedation   4. PT/OT/ST:  1. Per their recommendations   5. Follow up:  1. Pending completion of neurodiagnostics         Thank you for allowing us to participate in the care of your patient. If there are any questions regarding evaluation please feel free to contact us.      WINDY Castelan - JAMIN, 1/30/2021

## 2021-01-31 NOTE — PROGRESS NOTES
PHARMACY ANTICOAGULATION MONITORING SERVICE    Vicki Hoover is a 77 y.o. male on warfarin therapy for history of DVT. Pharmacy consulted by Dr. Nighat Wong for monitoring and adjustment of treatment. Indication for anticoagulation: Hx of DVT  INR goal: 2-3  Warfarin dose prior to admission: 5 mg daily    Pertinent Laboratory Values   Recent Labs     01/29/21  0440 01/30/21  0400 01/31/21  0415   INR 1.02 1.05 1.11   HGB 10.1* 10.1* 10.2*   HCT 32.6* 31.9* 32.4*    176 185       Assessment/Plan:  ? Possible DDI's:   o Aspirin (home med), can increase bleeding risk, clinically appropriate  o Escitalopram (home med), can increase bleeding risk, appropriate to continue while inpatient  o Enoxaparin bridge resumed, until INR is therapeutic  o Tramadol may increase effects of warfarin (ordered PRN)  o No new DDI's noted in previous 24h  ? INR is sub-therapeutic @ 1.11, but has increased slightly in trends  ? Continue boosted dose of 9 mg daily per INR trends (home dose 5 mg daily)  ? Additional dose adjustments to be made per INR trends, interacting medications, and other clinical factors  ? Pharmacy will continue to monitor and adjust warfarin therapy as indicated.     Thank you for the consult,  Bay Velazquez  1/31/2021 4:43 PM

## 2021-01-31 NOTE — PROGRESS NOTES
[id number] [facility]  consulted by Dr. Erin Pro for monitoring and adjustment. Indication for treatment: COVID 19, MRSA pneumonia  Goal trough: 15 mcg/mL, -600    Pertinent Laboratory Values:   Temp Readings from Last 3 Encounters:   01/31/21 97.7 °F (36.5 °C) (Rectal)   01/12/21 97.1 °F (36.2 °C) (Infrared)   01/08/21 98.9 °F (37.2 °C) (Oral)     Recent Labs     01/29/21  0440 01/30/21  0400 01/31/21  0415   WBC 11.0* 17.0* 14.6*     Recent Labs     01/30/21  0400 01/30/21  1149 01/31/21  0415   BUN 72* 66* 67*   CREATININE 0.9 0.9 0.9     Estimated Creatinine Clearance: 105 mL/min (based on SCr of 0.9 mg/dL). Intake/Output Summary (Last 24 hours) at 1/31/2021 1652  Last data filed at 1/31/2021 2193  Gross per 24 hour   Intake 2205.89 ml   Output 2400 ml   Net -194.11 ml       Pertinent Cultures:  Date    Source    Results  1/19   Blood    Negative  1/19   MRSA nasal screen  Positive     Vancomycin level:   TROUGH:    No results for input(s): VANCOTROUGH in the last 72 hours. RANDOM:    No results for input(s): VANCORANDOM in the last 72 hours. Assessment:  · WBC and temperature: elevated WBC (receiving methylprednisolone), afebrile   · SCr, BUN, and urine output: Scr and BUN stable  · Day(s) of therapy: 20  · Vancomycin concentration:    · 1/17: 21.1, supra-therapeutic on vancomycin 1250 mg q18h  · 1/19: 21.7, supra-therapeutic on vancomycin 1250 mg q24h   · 1/20: 14.8, therapeutic 48h post-dose   · 1/21: 18.5, therapeutic 18h post-dose   · 1/23:  17.7, therapeutic, 24h post-dose  · 1/25: 19.3, supra-therapeutic on 1250 mg q24h   · 1/28: 15.5, therapeutic on 1000 mg q24h     Plan:  · Continue vancomycin 1000 mg q24h with a therapeutic trough  · Repeat trough Tuesday  · Pharmacy will continue to monitor patient and adjust therapy as indicated    Thank you for the consult.   Jameel Crane, PharmD, [facility]

## 2021-02-01 ENCOUNTER — APPOINTMENT (OUTPATIENT)
Dept: MRI IMAGING | Age: 76
DRG: 870 | End: 2021-02-01
Attending: INTERNAL MEDICINE
Payer: MEDICARE

## 2021-02-01 ENCOUNTER — APPOINTMENT (OUTPATIENT)
Dept: GENERAL RADIOLOGY | Age: 76
DRG: 870 | End: 2021-02-01
Attending: INTERNAL MEDICINE
Payer: MEDICARE

## 2021-02-01 VITALS
HEART RATE: 105 BPM | RESPIRATION RATE: 16 BRPM | HEIGHT: 77 IN | OXYGEN SATURATION: 96 % | BODY MASS INDEX: 32.46 KG/M2 | TEMPERATURE: 99.7 F | DIASTOLIC BLOOD PRESSURE: 39 MMHG | SYSTOLIC BLOOD PRESSURE: 68 MMHG | WEIGHT: 274.91 LBS

## 2021-02-01 LAB
ANION GAP SERPL CALCULATED.3IONS-SCNC: 8 MMOL/L (ref 4–16)
BASE EXCESS MIXED: 3 (ref 0–1.2)
BASOPHILS ABSOLUTE: 0 K/CU MM
BASOPHILS RELATIVE PERCENT: 0.1 % (ref 0–1)
BUN BLDV-MCNC: 58 MG/DL (ref 6–23)
CALCIUM SERPL-MCNC: 7.6 MG/DL (ref 8.3–10.6)
CARBON MONOXIDE, BLOOD: 2.1 % (ref 0–5)
CHLORIDE BLD-SCNC: 106 MMOL/L (ref 99–110)
CO2 CONTENT: 29.9 MMOL/L (ref 19–24)
CO2: 27 MMOL/L (ref 21–32)
COMMENT: ABNORMAL
CREAT SERPL-MCNC: 0.7 MG/DL (ref 0.9–1.3)
DIFFERENTIAL TYPE: ABNORMAL
EOSINOPHILS ABSOLUTE: 0 K/CU MM
EOSINOPHILS RELATIVE PERCENT: 0.1 % (ref 0–3)
GFR AFRICAN AMERICAN: >60 ML/MIN/1.73M2
GFR NON-AFRICAN AMERICAN: >60 ML/MIN/1.73M2
GLUCOSE BLD-MCNC: 112 MG/DL (ref 70–99)
GLUCOSE BLD-MCNC: 112 MG/DL (ref 70–99)
GLUCOSE BLD-MCNC: 123 MG/DL (ref 70–99)
GLUCOSE BLD-MCNC: 124 MG/DL (ref 70–99)
GLUCOSE BLD-MCNC: 270 MG/DL (ref 70–99)
GLUCOSE BLD-MCNC: 389 MG/DL (ref 70–99)
GLUCOSE BLD-MCNC: 424 MG/DL (ref 70–99)
GLUCOSE BLD-MCNC: 69 MG/DL (ref 70–99)
HCO3 ARTERIAL: 28.5 MMOL/L (ref 18–23)
HCT VFR BLD CALC: 30 % (ref 42–52)
HEMOGLOBIN: 9.3 GM/DL (ref 13.5–18)
HIGH SENSITIVE C-REACTIVE PROTEIN: 7.4 MG/L
IMMATURE NEUTROPHIL %: 1.2 % (ref 0–0.43)
INR BLD: 1.41 INDEX
LYMPHOCYTES ABSOLUTE: 0.9 K/CU MM
LYMPHOCYTES RELATIVE PERCENT: 7 % (ref 24–44)
MAGNESIUM: 2 MG/DL (ref 1.8–2.4)
MCH RBC QN AUTO: 29.3 PG (ref 27–31)
MCHC RBC AUTO-ENTMCNC: 31 % (ref 32–36)
MCV RBC AUTO: 94.6 FL (ref 78–100)
METHEMOGLOBIN ARTERIAL: 1.3 %
MONOCYTES ABSOLUTE: 0.4 K/CU MM
MONOCYTES RELATIVE PERCENT: 3.4 % (ref 0–4)
NUCLEATED RBC %: 0 %
O2 SATURATION: 93.9 % (ref 96–97)
PCO2 ARTERIAL: 46 MMHG (ref 32–45)
PDW BLD-RTO: 17.3 % (ref 11.7–14.9)
PH BLOOD: 7.4 (ref 7.34–7.45)
PHOSPHORUS: 3.4 MG/DL (ref 2.5–4.9)
PLATELET # BLD: 177 K/CU MM (ref 140–440)
PMV BLD AUTO: 14 FL (ref 7.5–11.1)
PO2 ARTERIAL: 77 MMHG (ref 75–100)
POTASSIUM SERPL-SCNC: 4.6 MMOL/L (ref 3.5–5.1)
PROTHROMBIN TIME: 17.1 SECONDS (ref 11.7–14.5)
RBC # BLD: 3.17 M/CU MM (ref 4.6–6.2)
SEGMENTED NEUTROPHILS ABSOLUTE COUNT: 10.7 K/CU MM
SEGMENTED NEUTROPHILS RELATIVE PERCENT: 88.2 % (ref 36–66)
SODIUM BLD-SCNC: 141 MMOL/L (ref 135–145)
TOTAL IMMATURE NEUTOROPHIL: 0.15 K/CU MM
TOTAL NUCLEATED RBC: 0 K/CU MM
WBC # BLD: 12.1 K/CU MM (ref 4–10.5)

## 2021-02-01 PROCEDURE — 2700000000 HC OXYGEN THERAPY PER DAY

## 2021-02-01 PROCEDURE — 6370000000 HC RX 637 (ALT 250 FOR IP): Performed by: INTERNAL MEDICINE

## 2021-02-01 PROCEDURE — 94761 N-INVAS EAR/PLS OXIMETRY MLT: CPT

## 2021-02-01 PROCEDURE — 83735 ASSAY OF MAGNESIUM: CPT

## 2021-02-01 PROCEDURE — 6370000000 HC RX 637 (ALT 250 FOR IP): Performed by: NURSE PRACTITIONER

## 2021-02-01 PROCEDURE — 89220 SPUTUM SPECIMEN COLLECTION: CPT

## 2021-02-01 PROCEDURE — 99233 SBSQ HOSP IP/OBS HIGH 50: CPT | Performed by: NURSE PRACTITIONER

## 2021-02-01 PROCEDURE — 2580000003 HC RX 258: Performed by: INTERNAL MEDICINE

## 2021-02-01 PROCEDURE — 86141 C-REACTIVE PROTEIN HS: CPT

## 2021-02-01 PROCEDURE — 6360000002 HC RX W HCPCS: Performed by: INTERNAL MEDICINE

## 2021-02-01 PROCEDURE — 82803 BLOOD GASES ANY COMBINATION: CPT

## 2021-02-01 PROCEDURE — 2500000003 HC RX 250 WO HCPCS: Performed by: NURSE PRACTITIONER

## 2021-02-01 PROCEDURE — 6360000002 HC RX W HCPCS: Performed by: STUDENT IN AN ORGANIZED HEALTH CARE EDUCATION/TRAINING PROGRAM

## 2021-02-01 PROCEDURE — 71045 X-RAY EXAM CHEST 1 VIEW: CPT

## 2021-02-01 PROCEDURE — 99233 SBSQ HOSP IP/OBS HIGH 50: CPT | Performed by: INTERNAL MEDICINE

## 2021-02-01 PROCEDURE — 85610 PROTHROMBIN TIME: CPT

## 2021-02-01 PROCEDURE — 6360000002 HC RX W HCPCS: Performed by: NURSE PRACTITIONER

## 2021-02-01 PROCEDURE — 6360000002 HC RX W HCPCS: Performed by: PHYSICIAN ASSISTANT

## 2021-02-01 PROCEDURE — 2580000003 HC RX 258: Performed by: NURSE PRACTITIONER

## 2021-02-01 PROCEDURE — 94640 AIRWAY INHALATION TREATMENT: CPT

## 2021-02-01 PROCEDURE — 31720 CLEARANCE OF AIRWAYS: CPT

## 2021-02-01 PROCEDURE — 80048 BASIC METABOLIC PNL TOTAL CA: CPT

## 2021-02-01 PROCEDURE — 70551 MRI BRAIN STEM W/O DYE: CPT

## 2021-02-01 PROCEDURE — 36415 COLL VENOUS BLD VENIPUNCTURE: CPT

## 2021-02-01 PROCEDURE — 84100 ASSAY OF PHOSPHORUS: CPT

## 2021-02-01 PROCEDURE — 2500000003 HC RX 250 WO HCPCS: Performed by: PHYSICIAN ASSISTANT

## 2021-02-01 PROCEDURE — 2580000003 HC RX 258: Performed by: PHYSICIAN ASSISTANT

## 2021-02-01 PROCEDURE — 82962 GLUCOSE BLOOD TEST: CPT

## 2021-02-01 PROCEDURE — 85025 COMPLETE CBC W/AUTO DIFF WBC: CPT

## 2021-02-01 RX ORDER — GLIPIZIDE 5 MG/1
5 TABLET ORAL
Status: DISCONTINUED | OUTPATIENT
Start: 2021-02-01 | End: 2021-02-01 | Stop reason: HOSPADM

## 2021-02-01 RX ORDER — 0.9 % SODIUM CHLORIDE 0.9 %
250 INTRAVENOUS SOLUTION INTRAVENOUS ONCE
Status: COMPLETED | OUTPATIENT
Start: 2021-02-01 | End: 2021-02-01

## 2021-02-01 RX ADMIN — SODIUM CHLORIDE, PRESERVATIVE FREE 10 ML: 5 INJECTION INTRAVENOUS at 08:06

## 2021-02-01 RX ADMIN — ENOXAPARIN SODIUM 120 MG: 120 INJECTION SUBCUTANEOUS at 08:05

## 2021-02-01 RX ADMIN — Medication 2 PUFF: at 07:52

## 2021-02-01 RX ADMIN — ALBUTEROL SULFATE 2 PUFF: 90 AEROSOL, METERED RESPIRATORY (INHALATION) at 07:51

## 2021-02-01 RX ADMIN — VANCOMYCIN HYDROCHLORIDE 1000 MG: 1 INJECTION, POWDER, LYOPHILIZED, FOR SOLUTION INTRAVENOUS at 13:14

## 2021-02-01 RX ADMIN — DEXTROSE MONOHYDRATE 25 G: 500 INJECTION PARENTERAL at 05:43

## 2021-02-01 RX ADMIN — ATORVASTATIN CALCIUM 40 MG: 40 TABLET, FILM COATED ORAL at 20:01

## 2021-02-01 RX ADMIN — PHENYLEPHRINE HYDROCHLORIDE 100 MCG/MIN: 10 INJECTION INTRAVENOUS at 21:25

## 2021-02-01 RX ADMIN — CHLORHEXIDINE GLUCONATE 0.12% ORAL RINSE 15 ML: 1.2 LIQUID ORAL at 08:03

## 2021-02-01 RX ADMIN — METOCLOPRAMIDE 10 MG: 5 INJECTION, SOLUTION INTRAMUSCULAR; INTRAVENOUS at 02:13

## 2021-02-01 RX ADMIN — SODIUM CHLORIDE 0.4 MCG/KG/HR: 9 INJECTION, SOLUTION INTRAVENOUS at 17:45

## 2021-02-01 RX ADMIN — SODIUM CHLORIDE 0.4 MCG/KG/HR: 9 INJECTION, SOLUTION INTRAVENOUS at 05:50

## 2021-02-01 RX ADMIN — ESCITALOPRAM OXALATE 20 MG: 10 TABLET ORAL at 08:04

## 2021-02-01 RX ADMIN — INSULIN HUMAN 30 UNITS: 100 INJECTION, SOLUTION PARENTERAL at 19:52

## 2021-02-01 RX ADMIN — INSULIN HUMAN 15 UNITS: 100 INJECTION, SOLUTION PARENTERAL at 13:15

## 2021-02-01 RX ADMIN — ZINC SULFATE 220 MG (50 MG) CAPSULE 50 MG: CAPSULE at 08:04

## 2021-02-01 RX ADMIN — ACETAMINOPHEN 650 MG: 325 TABLET ORAL at 19:05

## 2021-02-01 RX ADMIN — Medication 1.9 MCG/MIN: at 20:50

## 2021-02-01 RX ADMIN — Medication 2 PUFF: at 11:18

## 2021-02-01 RX ADMIN — VASOPRESSIN 0.03 UNITS/MIN: 20 INJECTION INTRAVENOUS at 21:26

## 2021-02-01 RX ADMIN — INSULIN HUMAN 30 UNITS: 100 INJECTION, SOLUTION PARENTERAL at 17:32

## 2021-02-01 RX ADMIN — SODIUM CHLORIDE 250 ML: 9 INJECTION, SOLUTION INTRAVENOUS at 19:42

## 2021-02-01 RX ADMIN — SODIUM CHLORIDE, PRESERVATIVE FREE 10 ML: 5 INJECTION INTRAVENOUS at 20:07

## 2021-02-01 RX ADMIN — GLIPIZIDE 5 MG: 5 TABLET ORAL at 17:32

## 2021-02-01 RX ADMIN — METHYLPREDNISOLONE SODIUM SUCCINATE 40 MG: 40 INJECTION, POWDER, FOR SOLUTION INTRAMUSCULAR; INTRAVENOUS at 08:03

## 2021-02-01 RX ADMIN — METOCLOPRAMIDE 10 MG: 5 INJECTION, SOLUTION INTRAMUSCULAR; INTRAVENOUS at 15:45

## 2021-02-01 RX ADMIN — FAMOTIDINE 20 MG: 10 INJECTION, SOLUTION INTRAVENOUS at 20:01

## 2021-02-01 RX ADMIN — Medication 2 MCG/MIN: at 20:02

## 2021-02-01 RX ADMIN — DICLOFENAC SODIUM 2 G: 10 GEL TOPICAL at 08:05

## 2021-02-01 RX ADMIN — CEFTRIAXONE SODIUM 2000 MG: 2 INJECTION, POWDER, FOR SOLUTION INTRAMUSCULAR; INTRAVENOUS at 13:15

## 2021-02-01 RX ADMIN — ALBUTEROL SULFATE 2 PUFF: 90 AEROSOL, METERED RESPIRATORY (INHALATION) at 16:10

## 2021-02-01 RX ADMIN — LEVETIRACETAM 500 MG: 100 INJECTION, SOLUTION INTRAVENOUS at 11:34

## 2021-02-01 RX ADMIN — METOCLOPRAMIDE 10 MG: 5 INJECTION, SOLUTION INTRAMUSCULAR; INTRAVENOUS at 08:05

## 2021-02-01 RX ADMIN — ALBUTEROL SULFATE 2 PUFF: 90 AEROSOL, METERED RESPIRATORY (INHALATION) at 11:17

## 2021-02-01 RX ADMIN — CHLORHEXIDINE GLUCONATE 0.12% ORAL RINSE 15 ML: 1.2 LIQUID ORAL at 20:01

## 2021-02-01 RX ADMIN — FAMOTIDINE 20 MG: 10 INJECTION, SOLUTION INTRAVENOUS at 08:04

## 2021-02-01 RX ADMIN — WARFARIN SODIUM 9 MG: 5 TABLET ORAL at 17:30

## 2021-02-01 RX ADMIN — ENOXAPARIN SODIUM 120 MG: 120 INJECTION SUBCUTANEOUS at 20:01

## 2021-02-01 RX ADMIN — INSULIN HUMAN 30 UNITS: 100 INJECTION, SOLUTION PARENTERAL at 13:16

## 2021-02-01 RX ADMIN — METOCLOPRAMIDE 10 MG: 5 INJECTION, SOLUTION INTRAMUSCULAR; INTRAVENOUS at 20:01

## 2021-02-01 RX ADMIN — METHYLPREDNISOLONE SODIUM SUCCINATE 40 MG: 40 INJECTION, POWDER, FOR SOLUTION INTRAMUSCULAR; INTRAVENOUS at 20:01

## 2021-02-01 RX ADMIN — Medication 2 PUFF: at 16:11

## 2021-02-01 ASSESSMENT — PULMONARY FUNCTION TESTS
PIF_VALUE: 15
PIF_VALUE: 15
PIF_VALUE: 20

## 2021-02-01 NOTE — PROGRESS NOTES
Pulmonary and Critical Care  Progress Note    Subjective: The patient didn't tolerate weaning. FiO2 40%. Not responsive off sedation. Shortness of breath none. Chest pain none. Addressing respiratory complaints Patient is negative for  hemoptysis and cyanosis  CONSTITUTIONAL:  negative for fevers and chills.       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, Anemia, Arm fracture, Arthritis, Cataract, Cataract, Cellulitis of left lower leg, Chronic venous hypertension with ulcer ([facility] CKD (chronic kidney disease), Colon polyps, COPD (chronic obstructive pulmonary disease) ([facility] [address] Diabetes mellitus ([facility] [address] [facility] Diabetes mellitus ([facility] [address] [facility] Diabetes mellitus with peripheral circulatory disorder ([facility] [address] [facility] Diabetes mellitus with skin ulcer ([facility] [address] [facility] Diabetic peripheral neuropathy ([facility] [address] [facility] Diabetic skin ulcer associated with type 2 diabetes mellitus ([facility] [address] [facility] Diverticulosis, Femoral DVT (deep venous thrombosis) ([facility] [address] [facility] GERD (gastroesophageal reflux disease), Glaucoma, H/O cardiac catheterization, H/O Doppler ultrasound, H/O echocardiogram, H/O mumps orchitis, Hemorrhoids, History of nuclear stress test, HLD (hyperlipidemia), Hx of Doppler ultrasound, Hyperlipemia, Hyperlipidemia, Hypertension, Hypertension, Idiopathic chronic venous hypertension of left lower extremity with ulcer and inflammation (HCC), Leg ulcer ([address] No diabetic retinopathy OU, Non-pressure chronic ulcer of left lower leg with fat layer exposed ([facility] [address] [facility] Pulmonary embolism ([facility] [address] [facility] Sleep apnea, SOB (shortness of breath), Tendinitis, Type II or unspecified type diabetes mellitus with other specified manifestations, not stated as uncontrolled, Unspecified venous (peripheral) insufficiency, Urticaria, WD-Cellulitis of right anterior lower leg, WD-Cellulitis of right lower extremity, WD-Chronic venous hypertension (idiopathic) with ulcer of left lower extremity (CODE) ([facility] [address] [facility] WD-Chronic venous hypertension with inflammation, right, WD-Decubitus ulcer of left buttock, stage 3 ([address] 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 to breakdown of skin ([address] WD-Open wound of hand without complication, left, initial encounter, WD-Pressure injury of left buttock, stage 2 ([address] WD-Pressure injury of left buttock, stage 3 ([address] WD-Pressure Net 563.1 ml       CONSTITUTIONAL:  Somnolent on vent. LUNGS:  decreased breath sounds, basilar crackles. CARDIOVASCULAR:  normal S1 and S2 and negative JVD  ABD:Abdomen soft, non-tender. BS normal. No masses,  No organomegaly  NEURO:Sedated on vent. DATA:    CBC:  Recent Labs     01/30/21  0400 01/31/21  0415 02/01/21  0220   WBC 17.0* 14.6* 12.1*   RBC 3.42* 3.46* 3.17*   HGB 10.1* 10.2* 9.3*   HCT 31.9* 32.4* 30.0*    185 177   MCV 93.3 93.6 94.6   MCH 29.5 29.5 29.3   MCHC 31.7* 31.5* 31.0*   RDW 17.4* 17.4* 17.3*   SEGSPCT 92.0* 85.4* 88.2*      BMP:  Recent Labs     01/30/21  1149 01/31/21  0415 02/01/21  0220    138 141   K 5.6* 4.4 4.6    105 106   CO2 26 26 27   BUN 66* 67* 58*   CREATININE 0.9 0.9 0.7*   CALCIUM 8.2* 7.8* 7.6*   GLUCOSE 102* 197* 124*      ABG:  Recent Labs     01/30/21  0700 01/31/21  0700 02/01/21  0700   PH 7.43 7.42 7.40   PO2ART 82 87 77   RWM9SSK 45.0 41.0 46.0*   O2SAT 94.7* 94.9* 93.9*     Lab Results   Component Value Date    PROBNP 5,973 (H) 01/14/2021    PROBNP 1,642 (H) 01/12/2021    PROBNP 1,196 (H) 11/11/2020     No results found for: CULTRESP    Radiology Review:  Pertinent images / reports were reviewed as a part of this visit.     Assessment:     Patient Active Problem List   Diagnosis    Gastroesophageal reflux disease without esophagitis    Hypertension in stage 3 chronic kidney disease due to type 2 diabetes mellitus ([facility]    DM (diabetes mellitus) type II, controlled, with peripheral vascular disorder ([facility] [facility]    Combined hyperlipidemia associated with type 2 diabetes mellitus ([facility]    Diabetic skin ulcer associated with type 2 diabetes mellitus (HCC)    CKD (chronic kidney disease)    History of DVT (deep vein thrombosis)- left femoral    History of pulmonary embolism    Long term current use of anticoagulants with INR goal of 2.0-3.0    COPD (chronic obstructive pulmonary disease) ([facility] [facility]  Severe recurrent major depressive disorder with psychotic features ([address]    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)    Venous (peripheral) insufficiency    Uncontrolled secondary diabetes mellitus with stage 3 CKD (GFR 30-59) (HCC)    Diabetes mellitus with skin ulcer ([address]    WD-Ulcer of shin, left, with fat layer exposed ([address]    History of colon polyps    Personal history of PE (pulmonary embolism)    WD-Chronic venous hypertension (idiopathic) with ulcer of left lower extremity (CODE) ([address]    WD-Chronic venous hypertension with inflammation, right    Troponin I above reference range    KIRSTEN (acute kidney injury) ([address]    Cellulitis of lower extremity    Hyponatremia    Generalized weakness    Weakness    Multifocal pneumonia    Pneumonia due to COVID-19 virus    Hyperkalemia    Acute respiratory failure with hypoxia ([address]       Plan:   1. Neuro W/U.  2. Wean FiO2.  3. Discussed with SAMI.   Tory Bolivar MD  2/1/2021  10:42 AM

## 2021-02-01 NOTE — CARE COORDINATION
Chart reviewed and pt remains intubated on vent. Per previous CM notes, pt is from home alone but told CM on 1/11/21 that he felt he was unable to take care of himself and would like to go to [facility] at discharge. CM following for pt progress and possible needs/placement for discharge.

## 2021-02-01 NOTE — PROGRESS NOTES
[address]    Hospitalist Progress Note      Name:  Jasen Grant /Age/Sex: 1945  [de-identified]77 y.o. male)   MRN & CSN:  [id number] & [id number] Admission Date/Time: 2021 12:17 AM   Location:  -A PCP: Eve Keane MD         Hospital Day: 20    Assessment and Plan:   Jasen Grant is a 77 y.o.  male  who presents with shortness of breath, and was transferred from [facility] to for management of COVID-19 pneumonia     Acute on chronic hypoxic respiratory failure due to COVID-19 pneumonia, possible hospital-acquired pneumonia    Intubated , Vent management per pulmonology,   Slow improvement in vent setting 500, Fio2 of 40 and PEEP of 7  Completed Remdesivir , S/p convalescent plasma 2 units 2021  On IV vancomycin, cefepime   Had SBT daily but his mentation has not changed     Pulmo following     Septic shock due to COVID-19 pneumonia  MRSA and klebsiella pneumonia     Leucocytosis, Covid positive , MRSA culture positive  Respiratory cultures positive for MRSA and Klebsiella, Blood cultures negative to date  On steroids   Completed Remdesivir , S/p convalescent plasma 2 units 2021  Off pressors  Per ID, Continue vancomycin, DC meropenem, added rocephin     Acute metabolic encephalopathy   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     Likely COVID related, no response despite being off sedation   Patient able to blink on request but no other motor or sensory response    CT head with no acute issues, MRI no acute ischemia or changes reported    Was empirically started on Keppra for possible seizure   Neurology consulted >need follow up and family requesting to get opinion       Hyperkalemia - intermittent - resolved with Lokelma - check BMP      KIRSTEN - resolved     Right lower extremity cellulitis - On IV Abs    Chronic medical conditions - resume home medications     DM type II   Hyperlipidemia   Hypertension  Morbid obesity, encourage weight loss and exercise Lower extremity DVT, on Coumadin, INR therapeutic  Chronic respiratory failure at 3 L of O2     Prognosis guarded  Palliative care consulted    Called and updated Ms Sepulveda [employer] [facility]) for an update on [phone] on 2/01 - at 3:29     CODE status - DNR CCA    Diet DIET TUBE FEED CONTINUOUS/CYCLIC NPO; Low Calorie High Protein (Vital HP); Nasogastric; Continuous; 55; 95; 24   DVT Prophylaxis [] Lovenox, []  Heparin, [] SCDs, []No VTE prophylaxis, patient ambulating   GI Prophylaxis [] PPI, [] H2 Blocker, [] No GI prophylaxis, patient is receiving diet/Tube Feeds   Code Status DNR-CCA   Disposition Patient requires continued admission due to sepsis/respiratory failure   MDM [] Low, [] Moderate,[x]  High     History of Present Illness: Subjective     Patient Seen & Examined at the bedside     Sedated and intubated with no response to noxious stimuli - but blinks his eyes on request as many times as asked    Ten point ROS could not be reviewed due to Locked in Syndrome     Objective: Intake/Output Summary (Last 24 hours) at 2/1/2021 0944  Last data filed at 2/1/2021 0555  Gross per 24 hour   Intake 2863.1 ml   Output 2300 ml   Net 563.1 ml      Vitals:   Vitals:    02/01/21 0830   BP: (!) 133/59   Pulse: 67   Resp:    Temp:    SpO2: 95%     Physical Exam:    GEN intubated and sedated with no response  HENT ETT and NGT in place  RESP decreased air entry with diffuse rhonchi  CARDIO/VASC -S1/S2 auscultated. Regular rate without appreciable murmurs, rubs, or gallops. Peripheral pulses equal bilaterally and palpable. No peripheral edema. GI Abdomen is soft without significant tenderness, masses, or guarding. Bowel sounds are normoactive. Rectal exam deferred.  Starkey catheter is present.   NEURO Sedated -no response to stimuli - has blink reflex, babinski flat     Medications:   Medications:    glipiZIDE  5 mg Oral BID AC    enoxaparin  1 mg/kg Subcutaneous BID  insulin glargine  40 Units Subcutaneous Nightly    insulin NPH  30 Units Subcutaneous QAM    insulin regular  30 Units Subcutaneous 4 times per day    methylPREDNISolone  40 mg Intravenous Q12H    levetiracetam  500 mg Intravenous Q12H    warfarin  9 mg Oral Daily    cefTRIAXone (ROCEPHIN) IV  2,000 mg Intravenous Q24H    vancomycin  1,000 mg Intravenous Q24H    insulin regular  0-30 Units Subcutaneous Q6H    metoclopramide  10 mg Intravenous Q6H    [Held by provider] alogliptin  12.5 mg Oral Daily    [Held by provider] ARIPiprazole  15 mg Oral Daily    [Held by provider] aspirin  81 mg Oral Daily    atorvastatin  40 mg Oral Nightly    [Held by provider] buPROPion  300 mg Oral QAM    escitalopram  20 mg Oral Daily    metoprolol tartrate  12.5 mg Oral BID    sodium chloride flush  10 mL Intravenous 2 times per day    diclofenac sodium  2 g Topical BID    zinc sulfate  50 mg Oral Daily    albuterol sulfate HFA  2 puff Inhalation 4x daily    ipratropium  2 puff Inhalation 4x daily    chlorhexidine  15 mL Mouth/Throat BID    famotidine (PEPCID) injection  20 mg Intravenous BID      Infusions:    dexmedetomidine (PRECEDEX) IV infusion 0.4 mcg/kg/hr (02/01/21 0550)    midazolam Stopped (01/29/21 0940)    dextrose 100 mL/hr (01/21/21 1214)    norepinephrine Stopped (01/20/21 1446)    fentaNYL Stopped (01/29/21 0940)    sodium chloride       PRN Meds:     dextrose, 25 g, PRN      fentanNYL, 25 mcg, Q1H PRN      hydrALAZINE, 10 mg, Q6H PRN      glucose, 15 g, PRN      glucagon (rDNA), 1 mg, PRN      dextrose, 100 mL/hr, PRN      microfibrillar collagen, , PRN      polyvinyl alcohol, 1 drop, Q4H PRN    And      artificial tears, , PRN      sodium chloride flush, 10 mL, PRN      promethazine, 12.5 mg, Q6H PRN    Or      ondansetron, 4 mg, Q6H PRN      polyethylene glycol, 17 g, Daily PRN      acetaminophen, 650 mg, Q6H PRN    Or      acetaminophen, 650 mg, Q6H PRN   traMADol, 50 mg, Q6H PRN    Or      traMADol, 100 mg, Q6H PRN      diphenhydrAMINE, 25 mg, Q6H PRN      sodium chloride, , PRN          Electronically signed by Thedora Baumgarten, MD on 2/1/2021 at 9:44 AM

## 2021-02-01 NOTE — PROGRESS NOTES
02/01/21 0336   Vent Information   Vent Type 980   Vent Mode SIMV/VC+   Vt Ordered 500 mL   Rate Set 8 bmp   Peak Flow 0 L/min   Pressure Support 10 cmH20   FiO2  40 %   SpO2 95 %   SpO2/FiO2 ratio 237.5   Sensitivity 0   PEEP/CPAP 5   I Time/ I Time % 1 s   Humidification Source HME   Vent Patient Data   High Peep/I Pressure 0   Peak Inspiratory Pressure 20 cmH2O   Mean Airway Pressure 9.3 cmH20   Rate Measured 15 br/min   Vt Exhaled 736 mL   Minute Volume 9.73 Liters   I:E Ratio 1:1.90   Plateau Pressure 18 JGV50   Static Compliance 42 mL/cmH2O   Total PEEP   (no value)   Auto PEEP   (no value)   Cough/Sputum   Sputum How Obtained Endotracheal;Suctioned   $Obtained Sample $Nasotracheal Suction   Cough Productive   Sputum Amount Small   Sputum Color Yellow; Tan   Tenacity Thick   Spontaneous Breathing Trial (SBT) RT Doc   Pulse 65   Breath Sounds   Right Upper Lobe Diminished   Right Middle Lobe Diminished   Right Lower Lobe Diminished   Left Upper Lobe Diminished   Left Lower Lobe Diminished   Alarm Settings   High Pressure Alarm 40 cmH2O   Press Low Alarm 8.5 cmH2O   Delay Alarm 20 sec(s)   Low Minute Volume Alarm 2.5 L/min   Apnea (secs) 20 secs   High Respiratory Rate 40 br/min   Low Exhaled Vt  250 mL   ETT (adult)   Placement Date/Time: 01/13/21 (c) 0047   Preoxygenation: Yes  Mask Ventilation: Ventilated by mask (1)  Technique: Video laryngoscopy  Tube Size: 7.5 mm  Location: Oral  Grade View: Full view of the glottis  Insertion attempts: 1  Placement Verified B. ..    Secured at 25 cm   Measured From Lips   ET Placement Right   Secured By Commercial tube brewer   Site Condition Dry   Cuff Pressure   (mlt)

## 2021-02-01 NOTE — PROGRESS NOTES
[address]    Hospitalist Progress Note      Name:  Josiah Grimm /Age/Sex: 1945  [de-identified]77 y.o. male)   MRN & CSN:  [id number] & [id number] Admission Date/Time: 2021 12:17 AM   Location:  -A PCP: Delvis Marinelli MD         Hospital Day: 20    Assessment and Plan:   Josiah Grimm is a 77 y.o.  male  who presents with shortness of breath, and was transferred from [facility] to for management of COVID-19 pneumonia     Acute on chronic hypoxic respiratory failure due to COVID-19 pneumonia, possible hospital-acquired pneumonia    Intubated , Vent management per pulmonology,   Slow improvement in vent setting 500, Fio2 of 40 and PEEP of 7  Completed Remdesivir , S/p convalescent plasma 2 units 2021  On IV vancomycin, cefepime   Had SBT daily but his mentation has not changed     Pulmo following     Septic shock due to COVID-19 pneumonia  MRSA and klebsiella pneumonia     Leucocytosis, Covid positive , MRSA culture positive  Respiratory cultures positive for MRSA and Klebsiella, Blood cultures negative to date  On steroids   Completed Remdesivir , S/p convalescent plasma 2 units 2021  Off pressors  Per ID, Continue vancomycin, DC meropenem, added rocephin     Acute metabolic encephalopathy   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     Likely COVID related, no response despite being off sedation   Patient able to blink on request but no other motor or sensory response    CT head with no acute issues, MRI no acute ischemia or changes reported    Was empirically started on Keppra for possible seizure   Neurology consulted >need follow up and family requesting to get opinion   Neuro recommending transfer for continuous EEG monitoring (initiated transfer to [address] [place] [address])      Hyperkalemia - intermittent - resolved with Lokelma - check BMP      KIRSTEN - resolved     Right lower extremity cellulitis - On IV Abs    Chronic medical conditions - resume home medications     DM type II Hyperlipidemia   Hypertension  Morbid obesity, encourage weight loss and exercise  Lower extremity DVT, on Coumadin, INR therapeutic  Chronic respiratory failure at 3 L of O2     Prognosis guarded  Palliative care consulted    Called and updated Ms Alica Prader [facility]) for an update on [phone] on 2/01 - at 3:29     CODE status - DNR CCA    Diet DIET TUBE FEED CONTINUOUS/CYCLIC NPO; Low Calorie High Protein (Vital HP); Nasogastric; Continuous; 55; 95; 24   DVT Prophylaxis [] Lovenox, []  Heparin, [] SCDs, []No VTE prophylaxis, patient ambulating   GI Prophylaxis [] PPI, [] H2 Blocker, [] No GI prophylaxis, patient is receiving diet/Tube Feeds   Code Status DNR-CCA   Disposition Patient requires continued admission due to sepsis/respiratory failure   MDM [] Low, [] Moderate,[x]  High     History of Present Illness: Subjective     Patient Seen & Examined at the bedside     Sedated and intubated with no response to noxious stimuli - but blinks his eyes on request as many times as asked    Ten point ROS could not be reviewed due to Locked in Syndrome     Objective: Intake/Output Summary (Last 24 hours) at 2/1/2021 1718  Last data filed at 2/1/2021 1654  Gross per 24 hour   Intake 3061 ml   Output 1900 ml   Net 1161 ml      Vitals:   Vitals:    02/01/21 1500   BP: (!) 119/55   Pulse: 89   Resp:    Temp:    SpO2:      Physical Exam:    GEN intubated and sedated with no response  HENT ETT and NGT in place  RESP decreased air entry with diffuse rhonchi  CARDIO/VASC -S1/S2 auscultated. Regular rate without appreciable murmurs, rubs, or gallops. Peripheral pulses equal bilaterally and palpable. No peripheral edema. GI Abdomen is soft without significant tenderness, masses, or guarding. Bowel sounds are normoactive. Rectal exam deferred.  Starkey catheter is present.   NEURO Sedated -no response to stimuli - has blink reflex, babinski flat     Medications:   Medications:    glipiZIDE  5 mg Oral BID AC  enoxaparin  1 mg/kg Subcutaneous BID    insulin glargine  40 Units Subcutaneous Nightly    insulin NPH  30 Units Subcutaneous QAM    insulin regular  30 Units Subcutaneous 4 times per day    methylPREDNISolone  40 mg Intravenous Q12H    levetiracetam  500 mg Intravenous Q12H    warfarin  9 mg Oral Daily    cefTRIAXone (ROCEPHIN) IV  2,000 mg Intravenous Q24H    vancomycin  1,000 mg Intravenous Q24H    insulin regular  0-30 Units Subcutaneous Q6H    metoclopramide  10 mg Intravenous Q6H    [Held by provider] alogliptin  12.5 mg Oral Daily    [Held by provider] ARIPiprazole  15 mg Oral Daily    [Held by provider] aspirin  81 mg Oral Daily    atorvastatin  40 mg Oral Nightly    [Held by provider] buPROPion  300 mg Oral QAM    escitalopram  20 mg Oral Daily    metoprolol tartrate  12.5 mg Oral BID    sodium chloride flush  10 mL Intravenous 2 times per day    diclofenac sodium  2 g Topical BID    zinc sulfate  50 mg Oral Daily    albuterol sulfate HFA  2 puff Inhalation 4x daily    ipratropium  2 puff Inhalation 4x daily    chlorhexidine  15 mL Mouth/Throat BID    famotidine (PEPCID) injection  20 mg Intravenous BID      Infusions:    dexmedetomidine (PRECEDEX) IV infusion 0.4 mcg/kg/hr (02/01/21 0550)    midazolam Stopped (01/29/21 0940)    dextrose 100 mL/hr (01/21/21 1214)    norepinephrine Stopped (01/20/21 1446)    fentaNYL Stopped (01/29/21 0940)    sodium chloride       PRN Meds:     dextrose, 25 g, PRN      fentanNYL, 25 mcg, Q1H PRN      hydrALAZINE, 10 mg, Q6H PRN      glucose, 15 g, PRN      glucagon (rDNA), 1 mg, PRN      dextrose, 100 mL/hr, PRN      microfibrillar collagen, , PRN      polyvinyl alcohol, 1 drop, Q4H PRN    And      artificial tears, , PRN      sodium chloride flush, 10 mL, PRN      promethazine, 12.5 mg, Q6H PRN    Or      ondansetron, 4 mg, Q6H PRN      polyethylene glycol, 17 g, Daily PRN      acetaminophen, 650 mg, Q6H PRN    Or   acetaminophen, 650 mg, Q6H PRN      traMADol, 50 mg, Q6H PRN    Or      traMADol, 100 mg, Q6H PRN      diphenhydrAMINE, 25 mg, Q6H PRN      sodium chloride, , PRN          Electronically signed by Derick Bertrand MD on 2/1/2021 at 5:18 PM

## 2021-02-01 NOTE — PROGRESS NOTES
Infectious Disease Progress Note  2021   Patient Name: Jaclyn Narvaez : 1945       Reason for visit: F/u COVID-19 pneumonia, acute respiratory failure? History:? Interval history noted  Intubated, sedated and on mechanical ventilation  Physical Exam:  Vital Signs: BP (!) 132/114   Pulse 73   Temp 97.3 °F (36.3 °C) (Rectal)   Resp 14   Ht 6' 5\" (1.956 m)   Wt 274 lb 14.6 oz (124.7 kg)   SpO2 95%   BMI 32.60 kg/m²     Gen: intubated and sedated  Skin: no stigmata of endocarditis  Wounds:  Left anterior shin superficial wound, C/D/I  HEMT: AT/NC ETT, NGT   Eyes: PERRLA. Neck: Supple. Trachea midline. No LAD. Chest:  transmitted breath sounds. Heart:   Abd: soft, non-distended, no tenderness, no hepatomegaly. Normoactive bowel sounds. Ext: no clubbing, cyanosis, or edema  Catheter Site: without erythema or tenderness  LDA: CVC: left IJ, Urethral catheter: 2021  Neuro: sedated and on the ventilator. Radiologic / Imaging / TESTING  EXAMINATION:   ONE X-RAY VIEW OF THE CHEST     2021 5:59 am     COMPARISON:   2021     HISTORY:   ORDERING SYSTEM PROVIDED HISTORY: ET Tube   TECHNOLOGIST PROVIDED HISTORY:   Reason for exam:   ET Tube   Reason for Exam: ET Tube   Acuity: Unknown   Type of Exam: Subsequent/Follow-up   Mechanism of Injury: ET Tube   Relevant Medical/Surgical History: ET Tube     FINDINGS:   Endotracheal tube and nasogastric tube are identified. There is a left   central venous catheter with the tip in the SVC. Diffuse bilateral lung airspace disease is identified most apparent within   the left lower lobe. No significant change is identified. No pneumothorax is identified. No acute osseous abnormality is appreciated. Impression:   Stable diffuse bilateral lung airspace disease most apparent within the left   lower lobe. The lines and tubes are stable.        Labs:    Recent Results (from the past 24 hour(s))   POCT Glucose Collection Time: 01/31/21  4:57 PM   Result Value Ref Range    POC Glucose 83 70 - 99 MG/DL   POCT Glucose    Collection Time: 01/31/21  7:48 PM   Result Value Ref Range    POC Glucose 95 70 - 99 MG/DL   POCT Glucose    Collection Time: 01/31/21 11:01 PM   Result Value Ref Range    POC Glucose 173 (H) 70 - 99 MG/DL   CBC auto differential    Collection Time: 02/01/21  2:20 AM   Result Value Ref Range    WBC 12.1 (H) 4.0 - 10.5 K/CU MM    RBC 3.17 (L) 4.6 - 6.2 M/CU MM    Hemoglobin 9.3 (L) 13.5 - 18.0 GM/DL    Hematocrit 30.0 (L) 42 - 52 %    MCV 94.6 78 - 100 FL    MCH 29.3 27 - 31 PG    MCHC 31.0 (L) 32.0 - 36.0 %    RDW 17.3 (H) 11.7 - 14.9 %    Platelets 046 936 - 035 K/CU MM    MPV 14.0 (H) 7.5 - 11.1 FL    Differential Type AUTOMATED DIFFERENTIAL     Segs Relative 88.2 (H) 36 - 66 %    Lymphocytes % 7.0 (L) 24 - 44 %    Monocytes % 3.4 0 - 4 %    Eosinophils % 0.1 0 - 3 %    Basophils % 0.1 0 - 1 %    Segs Absolute 10.7 K/CU MM    Lymphocytes Absolute 0.9 K/CU MM    Monocytes Absolute 0.4 K/CU MM    Eosinophils Absolute 0.0 K/CU MM    Basophils Absolute 0.0 K/CU MM    Nucleated RBC % 0.0 %    Total Nucleated RBC 0.0 K/CU MM    Total Immature Neutrophil 0.15 K/CU MM    Immature Neutrophil % 1.2 (H) 0 - 0.43 %   Protime-INR    Collection Time: 02/01/21  2:20 AM   Result Value Ref Range    Protime 17.1 (H) 11.7 - 14.5 SECONDS    INR 1.41 INDEX   Basic metabolic panel    Collection Time: 02/01/21  2:20 AM   Result Value Ref Range    Sodium 141 135 - 145 MMOL/L    Potassium 4.6 3.5 - 5.1 MMOL/L    Chloride 106 99 - 110 mMol/L    CO2 27 21 - 32 MMOL/L    Anion Gap 8 4 - 16    BUN 58 (H) 6 - 23 MG/DL    CREATININE 0.7 (L) 0.9 - 1.3 MG/DL    Glucose 124 (H) 70 - 99 MG/DL    Calcium 7.6 (L) 8.3 - 10.6 MG/DL    GFR Non-African American >60 >60 mL/min/1.73m2    GFR African American >60 >60 mL/min/1.73m2   C-Reactive Protein    Collection Time: 02/01/21  2:20 AM   Result Value Ref Range CRP, High Sensitivity 7.4 mg/L   Magnesium    Collection Time: 02/01/21  2:20 AM   Result Value Ref Range    Magnesium 2.0 1.8 - 2.4 mg/dl   Phosphorus    Collection Time: 02/01/21  2:20 AM   Result Value Ref Range    Phosphorus 3.4 2.5 - 4.9 MG/DL   POCT Glucose    Collection Time: 02/01/21  6:08 AM   Result Value Ref Range    POC Glucose 123 (H) 70 - 99 MG/DL   POCT Glucose    Collection Time: 02/01/21  6:27 AM   Result Value Ref Range    POC Glucose 112 (H) 70 - 99 MG/DL   Blood Gas, Arterial    Collection Time: 02/01/21  7:00 AM   Result Value Ref Range    pH, Bld 7.40 7.34 - 7.45    pCO2, Arterial 46.0 (H) 32 - 45 MMHG    pO2, Arterial 77 75 - 100 MMHG    Base Exc, Mixed 3 (H) 0 - 1.2    HCO3, Arterial 28.5 (H) 18 - 23 MMOL/L    CO2 Content 29.9 (H) 19 - 24 MMOL/L    O2 Sat 93.9 (L) 96 - 97 %    Carbon Monoxide, Blood 2.1 0 - 5 %    Methemoglobin, Arterial 1.3 <1.5 %    Comment SIMV RR 8 500 PS 10 40% +5    POCT Glucose    Collection Time: 02/01/21  8:02 AM   Result Value Ref Range    POC Glucose 112 (H) 70 - 99 MG/DL   POCT Glucose    Collection Time: 02/01/21  1:14 PM   Result Value Ref Range    POC Glucose 270 (H) 70 - 99 MG/DL     CULTURE results: Invalid input(s): BLOOD CULTURE,  URINE CULTURE, SURGICAL CULTURE  SETUP DATE/TIME:  01/23/2021 1220   Susceptibility    Staph aureus mrsa (2)    Antibiotic Interpretation YOLA Status    erythromycin Resistant >=8 Final    gentamicin Sensitive <=0.5 Final    moxifloxacin Resistant >=8 Final    oxacillin Resistant >=4 Final    tetracycline Resistant >=16 Final    trimethoprim-sulfamethoxazole Resistant >=320 Final    vancomycin Sensitive 1 Final    Klebsiella pneumoniae (3)    Antibiotic Interpretation YOLA Status    ampicillin Resistant >=32 Final    ceFAZolin Sensitive <=4 Final    cefepime Sensitive <=0.12 Final    ciprofloxacin Sensitive <=0.25 Final    ertapenem Sensitive <=0.12 Final    gentamicin Sensitive <=1 Final    levofloxacin Sensitive <=0.12 Final piperacillin-tazobactam Sensitive <=4 Final    trimethoprim-sulfamethoxazole Sensitive <=20 Final        Diagnosis:  Patient Active Problem List   Diagnosis    Gastroesophageal reflux disease without esophagitis    Hypertension in stage 3 chronic kidney disease due to type 2 diabetes mellitus ([address]    DM (diabetes mellitus) type II, controlled, with peripheral vascular disorder (HCC)    Combined hyperlipidemia associated with type 2 diabetes mellitus ([address]    Diabetic skin ulcer associated with type 2 diabetes mellitus (HCC)    CKD (chronic kidney disease)    History of DVT (deep vein thrombosis)- left femoral    History of pulmonary embolism    Long term current use of anticoagulants with INR goal of 2.0-3.0    COPD (chronic obstructive pulmonary disease) (HCC)    Severe recurrent major depressive disorder with psychotic features (HCC)    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)    Venous (peripheral) insufficiency    Uncontrolled secondary diabetes mellitus with stage 3 CKD (GFR 30-59) (HCC)    Diabetes mellitus with skin ulcer ([address]    WD-Ulcer of shin, left, with fat layer exposed ([address] [address] [address]    History of colon polyps    Personal history of PE (pulmonary embolism)    WD-Chronic venous hypertension (idiopathic) with ulcer of left lower extremity (CODE) ([address] [address]    WD-Chronic venous hypertension with inflammation, right    Troponin I above reference range    KIRSTEN (acute kidney injury) ([address]    Cellulitis of lower extremity    Hyponatremia    Generalized weakness    Weakness    Multifocal pneumonia    Pneumonia due to COVID-19 virus    Hyperkalemia    Acute respiratory failure with hypoxia (HCC)       Active Problems  Active Problems:    Pneumonia due to COVID-19 virus    Hyperkalemia    Acute respiratory failure with hypoxia (HCC)  Resolved Problems:    * No resolved hospital problems.  *      Impression and plan ? Summary and rationale: Patient is a 77 y.o.  male T2DM, hyperlipidemia, htn, depression, morbid obesity who was admitted 1/13/2021 for further evaluation and management of shortness of breath that started on 1/8/2021 and positive COVID test. Has critical COVID-19 pneumonia, in hyperinflammatory phase. MRSA and K pneumoniae super-imposed bacterial pneumonia  ? Clinical status: remains severe, but has some reduction in FiO2 45% unchanged, leukocytosis has resolved. Overall is improving. Leukocytosis present, may be steroid induced-we will check other inflammatory markers. On SIMV, neurologically, yet to make any purposeful movement. ? MRSA nares positive. On vancomycin. IL-6; 20.7, Aspergillus Ag, BDG negative  ? Therapeutic:  o Ongoing antibiotics: vancomycin for 4 weeks 1/12- (end-date:2/8/2021), ceftriaxone for 2 weeks 1/23-(end-date:2/5/2021)  o Antiviral agent: remdesivir 1/13-1/16  o Anti-inflammatory agents:  ? Dexamethasone:1/12-19  ? Solu-Medrol: 1/17. 1/19-(start to taper Solu-Medrol)  o Completed antibiotics: meropenem 1/23-1/26  o Convalescent plasma receipt:  o Other agents:   ? Diagnostic: Resumed trending CRP and procalcitonin  ? F/u: Blood cx 1/19, 0/2 ngtd  ?  Other:      Electronically signed by: Electronically signed by Kelvin Gallego MD on 2/1/2021 at 1:41 PM

## 2021-02-01 NOTE — PROGRESS NOTES
[facility]  consulted by Dr. Becky Diaz for monitoring and adjustment. Indication for treatment: COVID 19, MRSA pneumonia  Goal trough: 15 mcg/mL, -600    Pertinent Laboratory Values:   Temp Readings from Last 3 Encounters:   02/01/21 97.3 °F (36.3 °C) (Rectal)   01/12/21 97.1 °F (36.2 °C) (Infrared)   01/08/21 98.9 °F (37.2 °C) (Oral)     Recent Labs     01/30/21  0400 01/31/21  0415 02/01/21  0220   WBC 17.0* 14.6* 12.1*     Recent Labs     01/30/21  1149 01/31/21  0415 02/01/21  0220   BUN 66* 67* 58*   CREATININE 0.9 0.9 0.7*     Estimated Creatinine Clearance: 135 mL/min (A) (based on SCr of 0.7 mg/dL (L)). Intake/Output Summary (Last 24 hours) at 2/1/2021 0859  Last data filed at 2/1/2021 0555  Gross per 24 hour   Intake 2863.1 ml   Output 2300 ml   Net 563.1 ml       Pertinent Cultures:  Date    Source    Results  1/19   Blood    Negative  1/19   MRSA nasal screen  Positive     Vancomycin level:   TROUGH:    No results for input(s): VANCOTROUGH in the last 72 hours. RANDOM:    No results for input(s): VANCORANDOM in the last 72 hours. Assessment:  · WBC and temperature: elevated WBC (receiving methylprednisolone), afebrile   · SCr, BUN, and urine output: stable  · Day(s) of therapy: 21  · Vancomycin concentration:    · 1/17: 21.1, supra-therapeutic on vancomycin 1250 mg q18h  · 1/19: 21.7, supra-therapeutic on vancomycin 1250 mg q24h   · 1/20: 14.8, therapeutic 48h post-dose   · 1/21: 18.5, therapeutic 18h post-dose   · 1/23:  17.7, therapeutic, 24h post-dose  · 1/25: 19.3, supra-therapeutic on 1250 mg q24h   · 1/28: 15.5, therapeutic on 1000 mg q24h     Plan:  · Continue vancomycin 1000 mg q24h with a therapeutic trough  · Repeat trough Tuesday  · Pharmacy will continue to monitor patient and adjust therapy as indicated    Thank you for the consult.   Cherylann Frankel, PharmD, BCPS

## 2021-02-02 NOTE — PROGRESS NOTES
Chart access at 0050 on 2/2/2021 d/t [facility] requesting transfer report including notes and images results. Printed and faxed to [facility] at this time per number provided.

## 2021-02-02 NOTE — FLOWSHEET NOTE
[facility] transport arrived at 2015. Report was given to transport nurse. Transport team set up patient with their equipment-Levo started d/t low BP. Patient being transferred to [facility] for continuous EEG monitoring. Patient plan to leave at 2110.

## 2021-02-02 NOTE — FLOWSHEET NOTE
Transport planned on leaving at 2110 when pressures started to drop. Two pressors ordered for transport.  Pt left at 2130

## 2021-02-02 NOTE — PROGRESS NOTES
 H/O cardiac catheterization 6/3/14    EF55% normal study    H/O Doppler ultrasound 03/26/15    Carotid US- no significant stenosis noted bilaterally.  H/O echocardiogram 11/21/2019    EF50-55%, Mild AR. Moderately dilated right ventricle with negative Solis's sign.  H/O mumps orchitis     as a youth    Hemorrhoids 4/23/12    Dr. Ramesh Flynn; repeat colonoscopy 3 years    History of nuclear stress test 11/21/2019    EF 60%, Normal study.  HLD (hyperlipidemia)     Hx of Doppler ultrasound 1/06/15    Lymph node seen in left groin area. No bilateral stenosis.     Hyperlipemia     Hyperlipidemia     Hypertension 1992    Hypertension     Idiopathic chronic venous hypertension of left lower extremity with ulcer and inflammation (HCC) 10/2/2015    Leg ulcer ([address] 1978-present    following at wound center, Dr Chantell Zazueta No diabetic retinopathy [facility] 11/12    Dr. Rocio Ramirez chronic ulcer of left lower leg with fat layer exposed ([address] 10/2/2015    Pulmonary embolism ([address] 11/13    patient on coumadin    Sleep apnea     doesnt always use cpap dt it drys him out    SOB (shortness of breath) Oct 2011    Stress test normal.     Tendinitis 1973    plantar tendons    Type II or unspecified type diabetes mellitus with other specified manifestations, not stated as uncontrolled 2/8/2013    Unspecified venous (peripheral) insufficiency     Urticaria     WD-Cellulitis of right anterior lower leg 9/5/2019    WD-Cellulitis of right lower extremity 3/26/2020    WD-Chronic venous hypertension (idiopathic) with ulcer of left lower extremity (CODE) ([address] 6/27/2019    WD-Chronic venous hypertension with inflammation, right 9/19/2019    WD-Decubitus ulcer of left buttock, stage 3 ([address] 6/25/2020    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 to breakdown of skin ([address] 3/26/2020    WD-Open wound of hand without complication, left, initial encounter 12/12/2019  WD-Pressure injury of left buttock, stage 2 ([address] 1/2/2020    WD-Pressure injury of left buttock, stage 3 ([address] 3/12/2020    WD-Pressure injury of right buttock, stage 3 ([address] 3/12/2020    WD-Skin tear of right forearm without complication 2/57/3535    WD-Ulcer of right pretibial region, with fat layer exposed ([address] 10/17/2019    :   Past Surgical History:   Procedure Laterality Date    BREAST SURGERY  1970s    benign tumors bilaterally    CARDIAC CATHETERIZATION  6/3/14    EF55% normal study    CARPAL TUNNEL RELEASE Left 1993    CARPAL TUNNEL RELEASE      CATARACT REMOVAL Right 3/11/2013    Dr. Tracie Paige Left 2/25/2013    Dr. China Marie  2006    polyps    COLONOSCOPY  11/21/11    villous component--f/u colonoscopy will be needed in 6 months    COLONOSCOPY  4/23/12    mild diverticulosis, internal hemorrhoids; repeat in 3 years (Dr. Ashlyn Kauffman)    COLONOSCOPY  08/04/2016    mild diverticulosis, three colon polyps    HERNIA REPAIR  1970s    HERNIA REPAIR      SKIN GRAFT  1978-present    skin grafts to left ankle ulcers    SPLENECTOMY  1958    SPLENECTOMY      TONSILLECTOMY  1953    VARICOSE VEIN SURGERY Left 2009     Medications:  Scheduled Meds:   glipiZIDE  5 mg Oral BID AC    enoxaparin  1 mg/kg Subcutaneous BID    insulin glargine  40 Units Subcutaneous Nightly    insulin NPH  30 Units Subcutaneous QAM    insulin regular  30 Units Subcutaneous 4 times per day    methylPREDNISolone  40 mg Intravenous Q12H    levetiracetam  500 mg Intravenous Q12H    warfarin  9 mg Oral Daily    cefTRIAXone (ROCEPHIN) IV  2,000 mg Intravenous Q24H    vancomycin  1,000 mg Intravenous Q24H    insulin regular  0-30 Units Subcutaneous Q6H    metoclopramide  10 mg Intravenous Q6H    [Held by provider] alogliptin  12.5 mg Oral Daily    [Held by provider] ARIPiprazole  15 mg Oral Daily    [Held by provider] aspirin  81 mg Oral Daily    atorvastatin  40 mg Oral Nightly  Smoking status: Former Smoker     Packs/day: 2.00     Years: 35.00     Pack years: 70.00     Types: Cigarettes     Quit date:      Years since quittin.1    Smokeless tobacco: Never Used    Tobacco comment: chew--30 years. Reviewed 2015   Substance and Sexual Activity    Alcohol use: Yes     Comment: soc    Drug use: Never    Sexual activity: Not on file   Lifestyle    Physical activity     Days per week: Not on file     Minutes per session: Not on file    Stress: Not on file   Relationships    Social connections     Talks on phone: Not on file     Gets together: Not on file     Attends Caoda[employer] service: Not on file     Active member of club or organization: Not on file     Attends meetings of clubs or organizations: Not on file     Relationship status: Not on file    Intimate partner violence     Fear of current or ex partner: Not on file     Emotionally abused: Not on file     Physically abused: Not on file     Forced sexual activity: Not on file   Other Topics Concern    Not on file   Social History Narrative    ** Merged History Encounter **           Family History   Problem Relation Age of Onset    Heart Disease Father     Cancer Father         prostate    High Blood Pressure Father     High Cholesterol Father     Cancer Brother     Diabetes Brother     Thyroid Disease Brother        Review of Symptoms:    Due to current state of encephalopathy patient cannot answer ROS questions. Physical Exam:           Gen: intubated, obtunded   Heart: Regular   Lungs: vent  Ext: no edema, no calf tenderness b/l  Psych: no eye contact  Skin: no rashes or lesions    NEUROLOGIC EXAM:    Mental Status: completely obtunded, his eyes are open and there is fixed upward gaze, he blinks but for me there is no purposeful blinking just repetitive.      Cranial Nerve Exam: 2. ASA and OAC, please give via PEG tube  3. Wean sedation   4. PT/OT/ST:  1. Per their recommendations   5. Follow up:  1. Transfer to [facility] for C-EEG        Thank you for allowing us to participate in the care of your patient. If there are any questions regarding evaluation please feel free to contact us.      WINDY Truong - JAMIN, 2/1/2021

## 2021-02-03 NOTE — DISCHARGE SUMMARY
[address] Hospitalist     Discharge Summary    Name:  Madeleine Vo /Age/Sex: 1945  [facility]77 y.o. male)   MRN & CSN:  [id number] & [id number] Admission Date/Time: 2021 12:17 AM   Attending:  No att. providers found Discharging Physician: Bucky Sumner MD     HPI:     Please, see admission HPI in [address] and patient's hospital course below    Hospital Course:   Madeleine Vo is a 77 y.o.  male  who presents with shortness of breath, and was transferred from [facility] to for management of COVID-19 pneumonia and the following assessments below, reflect the patient's hospital course     Patient continues to be unresponsive except blinking of his eyes despite being off sedation for more than 24 hours while on mechanical ventilation. From my exam he is able to blink his eyes with request, appropriate to the number of times requested. He has no other sensory or motor response to any stimuli. Neurology was consulted (and imaging was reported with no issues) recommending continuous EEG at a tertiary care center for possible status epilepticus. Patient was accepted at [facility] and is to be transferred.  The transfer recommendation was communicated to family by Neurology team.       Acute on chronic hypoxic respiratory failure due to COVID-19 pneumonia, possible hospital-acquired pneumonia     Intubated , Vent management per pulmonology,   Slow improvement in vent setting 500, Fio2 of 40 and PEEP of 7  Completed Remdesivir , S/p convalescent plasma 2 units 2021  On IV vancomycin, cefepime   Had SBT daily but his mentation has not changed     Pulmo follow up as OP     Septic shock due to COVID-19 pneumonia  MRSA and klebsiella pneumonia      Leucocytosis, Covid positive , MRSA culture positive  Respiratory cultures positive for MRSA and Klebsiella, Blood cultures negative to date  On steroids   Completed Remdesivir , S/p convalescent plasma 2 units 2021  Off pressors Per ID, Continue vancomycin, DC meropenem, added rocephin      Acute metabolic encephalopathy   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      Likely COVID related, no response despite being off sedation   Patient able to blink on request but no other motor or sensory response    CT head with no acute issues, MRI no acute ischemia or changes reported    Was empirically started on Keppra for possible seizure   Neurology consulted >need follow up and family requesting to get opinion   Neuro recommending transfer for continuous EEG monitoring (initiated transfer to [facility])      Hyperkalemia - intermittent - resolved with Lokelma - check BMP      KIRSTEN - resolved      Right lower extremity cellulitis - On IV Abs     Chronic medical conditions - resume home medications      DM type II   Hyperlipidemia   Hypertension  Morbid obesity, encourage weight loss and exercise  Lower extremity DVT, on Coumadin, INR therapeutic  Chronic respiratory failure at 3 L of O2     Prognosis guarded  Palliative care consulted     Called and updated Ms Fei Casillas [facility]) for an update on [phone] on 2/01 - at 3:29      CODE status - DNR CCA    Patient is transferred to [facility] this admission:  [address] TO PULMONOLOGY  IP CONSULT TO DIETITIAN  IP CONSULT TO DIETITIAN  IP CONSULT TO ENDOCRINOLOGY  IP CONSULT TO NEPHROLOGY  IP CONSULT TO INFECTIOUS DISEASES  IP CONSULT TO [address] [place] [address] TO NEUROLOGY    Discharge Instruction:       Disposition: Discharged to:  [facility] hospital       Discharge Medications:      Deri Scale   Home Medication Instructions [place]:[id number]    Printed on:02/03/21 6190   Medication Information                      alogliptin (NESINA) 12.5 MG TABS tablet  Take 1 tablet by mouth daily             ARIPiprazole (ABILIFY) 15 MG tablet  Take 1 tablet by mouth daily             aspirin 81 MG chewable tablet Take 1 tablet by mouth daily             atorvastatin (LIPITOR) 40 MG tablet  Take 1 tablet by mouth nightly             buPROPion (WELLBUTRIN XL) 300 MG extended release tablet  Take 1 tablet by mouth every morning             clotrimazole-betamethasone (LOTRISONE) 1-0.05 % cream  Apply topically 2 times daily Apply topically 2 times daily to BLE             escitalopram (LEXAPRO) 20 MG tablet  Take 1 tablet by mouth daily             furosemide (LASIX) 20 MG tablet  Take 1 tablet by mouth 2 times daily             glipiZIDE (GLUCOTROL) 10 MG tablet  Take 1 tablet by mouth 2 times daily (before meals)             glycopyrrolate-formoterol (BEVESPI AEROSPHERE) 9-4.8 MCG/ACT AERO  Inhale 2 puffs into the lungs 2 times daily             metFORMIN (GLUCOPHAGE) 500 MG tablet  Take 1 tablet by mouth daily (with breakfast)             metoprolol tartrate (LOPRESSOR) 25 MG tablet  Take 0.5 tablets by mouth 2 times daily             omeprazole (PRILOSEC) 20 MG delayed release capsule  TAKE ONE (1) CAPSULE BY MOUTH ONCE DAILY             potassium chloride (MICRO-K) 10 MEQ extended release capsule  Take 10 mEq by mouth daily             warfarin (COUMADIN) 5 MG tablet  Take 1 tablet by mouth daily Except take 1 and 1/2 tablets by mouth on Mondays or as directed by clinic                   Objective Findings at Discharge:   BP (!) 68/39   Pulse 105   Temp 99.7 °F (37.6 °C) (Rectal)   Resp 16   Ht 6' 5\" (1.956 m)   Wt 274 lb 14.6 oz (124.7 kg)   SpO2 96%   BMI 32.60 kg/m²            PHYSICAL EXAM   GEN Obtunded with no response to any stimuli despite being off sedation  RESP On mechanical ventilation, diminished air entry     CARDIO/VAS - S1/S2 auscultated. Regular rate without appreciable murmurs, rubs, or gallops. Peripheral pulses equal bilaterally and palpable. No peripheral edema. GI Abdomen is soft without significant tenderness, masses, or guarding.  Bowel sounds are normoactive MSK No gross joint deformities. Spontaneous movement of all extremities  SKIN Normal coloration, warm, dry.   NEURO Can blink his eyes but no response to any stimuli      BMP/CBC  Recent Labs     02/01/21  0220      K 4.6      CO2 27   BUN 58*   CREATININE 0.7*   WBC 12.1*   HCT 30.0*            Discharge Time of 39 minutes    Electronically signed by Redd Chen MD on 2/3/2021 at 6:47 PM

## 2021-02-08 ENCOUNTER — TELEPHONE (OUTPATIENT)
Dept: WOUND CARE | Age: 76
End: 2021-02-08

## 2021-02-08 DIAGNOSIS — L98.499 DIABETES MELLITUS WITH SKIN ULCER (HCC): ICD-10-CM

## 2021-02-08 DIAGNOSIS — E11.622 DIABETES MELLITUS WITH SKIN ULCER (HCC): ICD-10-CM

## 2021-02-08 DIAGNOSIS — L97.919 VARICOSE VEINS OF LOWER EXTREMITIES WITH ULCER AND INFLAMMATION (HCC): ICD-10-CM

## 2021-02-08 DIAGNOSIS — I87.321 CHRONIC VENOUS HYPERTENSION WITH INFLAMMATION, RIGHT: ICD-10-CM

## 2021-02-08 DIAGNOSIS — I83.219 VARICOSE VEINS OF LOWER EXTREMITIES WITH ULCER AND INFLAMMATION (HCC): ICD-10-CM

## 2021-02-08 DIAGNOSIS — E11.622 DIABETIC SKIN ULCER ASSOCIATED WITH TYPE 2 DIABETES MELLITUS (HCC): ICD-10-CM

## 2021-02-08 DIAGNOSIS — L98.499 DIABETIC SKIN ULCER ASSOCIATED WITH TYPE 2 DIABETES MELLITUS (HCC): ICD-10-CM

## 2021-02-08 DIAGNOSIS — I83.229 VARICOSE VEINS OF LOWER EXTREMITIES WITH ULCER AND INFLAMMATION (HCC): ICD-10-CM

## 2021-02-08 DIAGNOSIS — I87.2 VENOUS (PERIPHERAL) INSUFFICIENCY: ICD-10-CM

## 2021-02-08 DIAGNOSIS — I87.312 CHRONIC VENOUS HYPERTENSION (IDIOPATHIC) WITH ULCER OF LEFT LOWER EXTREMITY (CODE) (HCC): ICD-10-CM

## 2021-02-08 DIAGNOSIS — L97.822 ULCER OF SHIN, LEFT, WITH FAT LAYER EXPOSED (HCC): ICD-10-CM

## 2021-02-08 DIAGNOSIS — L97.929 VARICOSE VEINS OF LOWER EXTREMITIES WITH ULCER AND INFLAMMATION (HCC): ICD-10-CM

## 2022-11-08 NOTE — PROGRESS NOTES
No retinal tears or retinal detachment seen on clinical exam today. Reviewed the signs and symptoms of retinal tear/retinal detachment and the importance of calling for prompt evaluation should there be increasing floaters, new flashing lights, or decreasing peripheral vision in either eye at any time. Observation recommended. [facility] Progress Note With Procedure    Edwin Larios  AGE: 69 y.o. GENDER: male  : 1945  EPISODE DATE:  2020     Subjective:     Chief Complaint   Patient presents with    Wound Check     BLE         HISTORY of PRESENT ILLNESS      Edwin Larios is a 69 y.o. male who presents today for wound evaluation of Chronic venous ulcer(s) of the bilateral legs. The ulcer is of moderate severity. The underlying cause of the wound is venous. He is in for f/u- the wound is slightly better. He also complains of buttocks sores- I evaluate today- he has no open ulcers there, but does have stage 2 pressure and shearing. I discuss that wearing underwear instead of having just his jeans rub the area would be helpful.     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 Lexx Lugo Anemia 2011    Arm fracture Ciera Cm with also yearly DM exam    Cataract     worsening-Dr. Bert Enriquez    Cellulitis of left lower leg     Chronic venous hypertension with ulcer ([address] 2011    CKD (chronic kidney disease) 2012    Renal u/S normal     Colon polyps     Dr. Lexx Lugo COPD (chronic obstructive pulmonary disease) ([address]     Diabetes mellitus ([address]     Diabetes mellitus ([address]     Diabetes mellitus with peripheral circulatory disorder ([address] 10/2/2015    Diabetes mellitus with skin ulcer ([address] 10/2/2015    Diabetic peripheral neuropathy ([address]     + EMG, NCS    Diabetic skin ulcer associated with type 2 diabetes mellitus ([address]     Diverticulosis 12    mild, left colon    Femoral DVT (deep venous thrombosis) ([address] 2011 tablets by mouth 2 times daily 90 tablet 3    Zinc Oxide (DESITIN CREAMY EX) Apply 13 % topically      aspirin 81 MG chewable tablet Take 1 tablet by mouth daily 30 tablet 3    blood glucose test strips (ALISHA CONTOUR TEST) strip USE AS DIRECTED TO TEST BLOOD SUGAR FOUR TIMES DAILY AS NEEDED 100 strip 3    ALISHA CONTOUR TEST strip USE AS DIRECTED TO TEST BLOOD SUGAR FOUR TIMES DAILY 100 strip 5    Glucose Blood (BLOOD GLUCOSE TEST STRIPS) STRP Please give contour testing strips to test blood sugar 4x daily 200 strip 3    phytonadione (VITAMIN K) 5 MG tablet Take 1 tablet by mouth once for 1 dose Today on 6/9/2020 (Today's INR was 8.5) 1 tablet 0     No current facility-administered medications on file prior to encounter. REVIEW OF SYSTEMS    Pertinent items are noted in HPI. Constitutional: Negative for systemic symptoms including fever, chills and malaise. Objective:      /65   Pulse 84   Temp 98.9 °F (37.2 °C) (Temporal)   Resp 20     PHYSICAL EXAM      General: The patient is in no acute distress. Mental status:  Patient is appropriate, is  oriented to place and plan of care. Dermatologic exam: Visual inspection of the periwound reveals the skin to be normal in turgor and texture  Wound exam: see wound description below in procedure note      Assessment:     Problem List Items Addressed This Visit     WD-Ulcer of shin, left, with fat layer exposed ([address] - Primary    WD-Chronic venous hypertension (idiopathic) with ulcer of left lower extremity (CODE) ([address]    WD-Chronic venous hypertension with inflammation, right        Procedure Note    Indications:  Based on my examination of this patient's wound(s) today, sharp excision into necrotic subcutaneous tissue is required to promote healing and evaluate the extent of previous healing.     Performed by: Manley Apley, MD    Consent obtained: Yes    Time out taken:  Yes    Pain Control: none       Debridement:Excisional Debridement    Using curette the wound(s) was/were sharply debrided down through and including the removal of subcutaneous tissue. Devitalized Tissue Debrided:  fibrin, biofilm, slough and callus    Pre Debridement Measurements:  Are located in the Wound Documentation Flow Sheet    All active wounds listed below with today's date are evaluated  Wound(s)    debrided this date include # : 6     Post  Debridement Measurements:  Wound 11/15/13 Other (Comment) Leg Inner erethema with a small puncture site (Active)   Number of days: 2469       Wound 06/27/19 #6 (onset 1 month) Left Medial Distal Ankle (Active)   Wound Image   08/20/20 1002   Wound Venous 08/20/20 1002   Offloading for Diabetic Foot Ulcers No 08/20/20 1002   Dressing Status Clean;Dry; Intact 08/20/20 1040   Dressing Changed Changed/New 08/20/20 1040   Dressing/Treatment Moisturizing cream 08/06/20 1008   Wound Cleansed Soap and water;Rinsed/Irrigated with saline 08/20/20 1002   Wound Length (cm) 1.5 cm 08/20/20 1002   Wound Width (cm) 1.5 cm 08/20/20 1002   Wound Depth (cm) 0.1 cm 08/20/20 1002   Wound Surface Area (cm^2) 2.25 cm^2 08/20/20 1002   Change in Wound Size % (l*w) 67.86 08/20/20 1002   Wound Volume (cm^3) 0.22 cm^3 08/20/20 1002   Wound Healing % 90 08/20/20 1002   Post-Procedure Length (cm) 1.5 cm 08/20/20 1038   Post-Procedure Width (cm) 1.5 cm 08/20/20 1038   Post-Procedure Depth (cm) 0.1 cm 08/20/20 1038   Post-Procedure Surface Area (cm^2) 2.25 cm^2 08/20/20 1038   Post-Procedure Volume (cm^3) 0.22 cm^3 08/20/20 1038   Distance Tunneling (cm) 0 cm 08/20/20 1002   Tunneling Position ___ O'Clock 0 08/20/20 1002   Undermining Starts ___ O'Clock 0 08/20/20 1002   Undermining Ends___ O'Clock 0 08/20/20 1002   Undermining Maxium Distance (cm) 0 08/20/20 1002   Wound Assessment Red;Yellow 08/20/20 1002   Drainage Amount Moderate 08/20/20 1002   Drainage Description Serosanguinous 08/20/20 1002   Odor None 08/20/20 1002   Margins Defined edges;Unattached edges 08/20/20 1002   Ana-wound Assessment Calloused;Pink 08/20/20 1002   Non-staged Wound Description Full thickness 08/20/20 1002   [place]%Wound Bed 0 08/20/20 1002   Red%Wound Bed 5 08/20/20 1002   Yellow%Wound Bed 95 08/20/20 1002   Black%Wound Bed 0 08/20/20 1002   Purple%Wound Bed 0 08/20/20 1002   Other%Wound Bed 0 08/20/20 1002   Number of days: 419       Wound 07/30/20 #7 (onset 1 week) Right Lower Leg Circumferential (Active)   Wound Image   08/20/20 1002   Wound Other 08/20/20 1002   Offloading for Diabetic Foot Ulcers No 08/20/20 1002   Dressing Status Clean;Dry; Intact 08/20/20 1040   Dressing Changed Changed/New 08/20/20 1040   Dressing/Treatment ABD; Alginate 08/06/20 1008   Wound Cleansed Soap and water;Rinsed/Irrigated with saline 08/20/20 1002   Wound Length (cm) 19 cm 08/20/20 1002   Wound Width (cm) 4 cm 08/20/20 1002   Wound Depth (cm) 0.1 cm 08/20/20 1002   Wound Surface Area (cm^2) 76 cm^2 08/20/20 1002   Change in Wound Size % (l*w) 47.4 08/20/20 1002   Wound Volume (cm^3) 7.6 cm^3 08/20/20 1002   Wound Healing % 47 08/20/20 1002   Post-Procedure Length (cm) 21.5 cm 08/13/20 1020   Post-Procedure Width (cm) 7.5 cm 08/13/20 1020   Post-Procedure Depth (cm) 0.1 cm 08/13/20 1020   Post-Procedure Surface Area (cm^2) 161.25 cm^2 08/13/20 1020   Post-Procedure Volume (cm^3) 16.12 cm^3 08/13/20 1020   Distance Tunneling (cm) 0 cm 08/20/20 1002   Tunneling Position ___ O'Clock 0 08/20/20 1002   Undermining Starts ___ O'Clock 0 08/20/20 1002   Undermining Ends___ O'Clock 0 08/20/20 1002   Undermining Maxium Distance (cm) 0 08/13/20 0950   Wound Assessment [place] 08/20/20 1002   Drainage Amount Moderate 08/20/20 1002   Drainage Description Yellow 08/20/20 1002   Odor None 08/20/20 1002   Margins Unattached edges; Undefined edges 08/20/20 1002   Ana-wound Assessment Maceration;Pink; White 08/20/20 1002   Non-staged Wound Description Full thickness 08/20/20 1002   [place]%Wound Bed 100 08/20/20 BID and CIPRO 500 mg BID for 10 days ordered on 8/22/19                                     Doxycycline for 7 days on 9/5/19                                   Doxycycline for 7 days on 3/12/2020 continued on 3/19/2020 for 7 days                                    Bactrim DS for  10 days on 3/26/48596                                   Bactrim DS for 10 days on 6/25/2020              Earlier Wound care treatments:                [id number]:                        Consults:   Date 7/18/19 to Dr Yesenia Louise-- [facility] [place] [facility] physician:      Continuing wound care orders and information:              Residence: private               Continue home health care with:none at this time               Your wound-care supplies will be provided by: Cal Mccracken provider:              YBNNJAROD with              OEM loading: Date              VTLJL Medications: RX SANTYL GIVEN 8/1/19              LKZWJ cleansing:                           DW not scrub or use excessive force.                          Wash hands with soap and water before and after dressing changes.                           Prior to applying a clean dressing, cleanse wound with normal saline,                                wound cleanser, or mild soap and water.                                     Daily Wound management:                                                Avoid standing for long periods of time.                          Apply wraps/stockings in AM and remove at bedtime.                          If swelling is present, elevate legs to the level of the heart or above for 30 minutes 4-5 times a day and/or when sitting.                                             When taking antibiotics take entire prescription as ordered by physician do not stop taking until medicine is all gone.                                                    Orders for this week: 8/20/2020      Left medial ankle distal-- cleanse with  vashe in clinic today --for 5-10 minutes, pat dry. Lotion to leg. Lotrisone to fabricio wound(in clinic only). Apply endoform to wound bed, cover wound and periwound with  vacutex, coban 2 lite --leave in place x 1 week until patient returns to clinic next week      Right Leg -- Apply calcium alginate to open areas and cover with ABD. Wrap with coban 2 lite leave in place until next visit         Buttock-- Apply Mepilex border. Change Every 7 days and as needed.      Follow up [address] in 1 week on Thursday in the wound care center  Call [phone] for any questions or concerns.   Physician Signature            Treatment Note Wound 07/30/20 #7 (onset 1 week) Right Lower Leg Circumferential-Dressing/Treatment: (ca alginate; abd; coban 2; tape)  Wound 06/27/19 #6 (onset 1 month) Left Medial Distal Ankle-Dressing/Treatment: (endform; vacutex; abd; coban 2 lite; tape)    Written Patient Dismissal Instructions Given            Electronically signed by Ricco Proctor MD on 8/20/2020 at 11:04 AM

## 2024-01-02 NOTE — PROGRESS NOTES
Patient is seeing [facility] today for [id number] visit.   Please to please obtain POCT A1c and POCT urine micro (orders placed) if he does his attend his in person appointment
FAMILY HISTORY:  Father  Still living? Unknown  FH: prostate cancer, Age at diagnosis: Age Unknown    Mother  Still living? Unknown  Family history of cancer in mother, Age at diagnosis: Age Unknown    Sibling  Still living? Unknown  FH: breast cancer, Age at diagnosis: Age Unknown  FH: liver cancer, Age at diagnosis: Age Unknown